# Patient Record
Sex: FEMALE | Race: WHITE | NOT HISPANIC OR LATINO | Employment: FULL TIME | URBAN - METROPOLITAN AREA
[De-identification: names, ages, dates, MRNs, and addresses within clinical notes are randomized per-mention and may not be internally consistent; named-entity substitution may affect disease eponyms.]

---

## 2017-01-06 ENCOUNTER — OFFICE VISIT (OUTPATIENT)
Dept: FAMILY MEDICINE | Facility: CLINIC | Age: 47
End: 2017-01-06
Payer: COMMERCIAL

## 2017-01-06 VITALS
BODY MASS INDEX: 42.7 KG/M2 | WEIGHT: 241 LBS | DIASTOLIC BLOOD PRESSURE: 68 MMHG | HEART RATE: 68 BPM | RESPIRATION RATE: 16 BRPM | SYSTOLIC BLOOD PRESSURE: 104 MMHG | HEIGHT: 63 IN | TEMPERATURE: 97.4 F

## 2017-01-06 DIAGNOSIS — E55.9 HYPOVITAMINOSIS D: ICD-10-CM

## 2017-01-06 DIAGNOSIS — F45.22 BODY DYSMORPHIC DISORDER: ICD-10-CM

## 2017-01-06 DIAGNOSIS — Z98.84 BARIATRIC SURGERY STATUS: ICD-10-CM

## 2017-01-06 DIAGNOSIS — R73.01 ELEVATED FASTING BLOOD SUGAR: ICD-10-CM

## 2017-01-06 DIAGNOSIS — Z00.00 ENCOUNTER FOR ROUTINE ADULT HEALTH EXAMINATION WITHOUT ABNORMAL FINDINGS: Primary | ICD-10-CM

## 2017-01-06 DIAGNOSIS — Z13.6 CARDIOVASCULAR SCREENING; LDL GOAL LESS THAN 160: ICD-10-CM

## 2017-01-06 DIAGNOSIS — E66.01 MORBID OBESITY, UNSPECIFIED OBESITY TYPE (H): ICD-10-CM

## 2017-01-06 DIAGNOSIS — Z23 NEED FOR PROPHYLACTIC VACCINATION AND INOCULATION AGAINST INFLUENZA: ICD-10-CM

## 2017-01-06 DIAGNOSIS — Z12.4 SCREENING FOR MALIGNANT NEOPLASM OF CERVIX: ICD-10-CM

## 2017-01-06 DIAGNOSIS — L73.1 INGROWN HAIR: ICD-10-CM

## 2017-01-06 DIAGNOSIS — Z00.00 LABORATORY EXAMINATION ORDERED AS PART OF A ROUTINE GENERAL MEDICAL EXAMINATION: ICD-10-CM

## 2017-01-06 DIAGNOSIS — Z87.42 HISTORY OF ABNORMAL CERVICAL PAP SMEAR: ICD-10-CM

## 2017-01-06 LAB
ALBUMIN SERPL-MCNC: 3.5 G/DL (ref 3.4–5)
ALP SERPL-CCNC: 74 U/L (ref 40–150)
ALT SERPL W P-5'-P-CCNC: 58 U/L (ref 0–50)
ANION GAP SERPL CALCULATED.3IONS-SCNC: 10 MMOL/L (ref 3–14)
AST SERPL W P-5'-P-CCNC: 28 U/L (ref 0–45)
BILIRUB SERPL-MCNC: 0.7 MG/DL (ref 0.2–1.3)
BUN SERPL-MCNC: 9 MG/DL (ref 7–30)
CALCIUM SERPL-MCNC: 8.7 MG/DL (ref 8.5–10.1)
CHLORIDE SERPL-SCNC: 103 MMOL/L (ref 94–109)
CHOLEST SERPL-MCNC: 173 MG/DL
CO2 SERPL-SCNC: 28 MMOL/L (ref 20–32)
CREAT SERPL-MCNC: 0.56 MG/DL (ref 0.52–1.04)
GFR SERPL CREATININE-BSD FRML MDRD: ABNORMAL ML/MIN/1.7M2
GLUCOSE SERPL-MCNC: 89 MG/DL (ref 70–99)
HDLC SERPL-MCNC: 45 MG/DL
LDLC SERPL CALC-MCNC: 100 MG/DL
NONHDLC SERPL-MCNC: 128 MG/DL
POTASSIUM SERPL-SCNC: 2.7 MMOL/L (ref 3.4–5.3)
PROT SERPL-MCNC: 6.9 G/DL (ref 6.8–8.8)
SODIUM SERPL-SCNC: 141 MMOL/L (ref 133–144)
TRIGL SERPL-MCNC: 142 MG/DL
TSH SERPL DL<=0.005 MIU/L-ACNC: 1.27 MU/L (ref 0.4–4)

## 2017-01-06 PROCEDURE — 87624 HPV HI-RISK TYP POOLED RSLT: CPT | Mod: 90 | Performed by: PHYSICIAN ASSISTANT

## 2017-01-06 PROCEDURE — 99212 OFFICE O/P EST SF 10 MIN: CPT | Mod: 25 | Performed by: PHYSICIAN ASSISTANT

## 2017-01-06 PROCEDURE — 90686 IIV4 VACC NO PRSV 0.5 ML IM: CPT | Performed by: PHYSICIAN ASSISTANT

## 2017-01-06 PROCEDURE — 82306 VITAMIN D 25 HYDROXY: CPT | Mod: 90 | Performed by: PHYSICIAN ASSISTANT

## 2017-01-06 PROCEDURE — 36415 COLL VENOUS BLD VENIPUNCTURE: CPT | Performed by: PHYSICIAN ASSISTANT

## 2017-01-06 PROCEDURE — 99000 SPECIMEN HANDLING OFFICE-LAB: CPT | Performed by: PHYSICIAN ASSISTANT

## 2017-01-06 PROCEDURE — 84443 ASSAY THYROID STIM HORMONE: CPT | Performed by: PHYSICIAN ASSISTANT

## 2017-01-06 PROCEDURE — G0145 SCR C/V CYTO,THINLAYER,RESCR: HCPCS | Mod: 90 | Performed by: PHYSICIAN ASSISTANT

## 2017-01-06 PROCEDURE — 90471 IMMUNIZATION ADMIN: CPT | Performed by: PHYSICIAN ASSISTANT

## 2017-01-06 PROCEDURE — 80061 LIPID PANEL: CPT | Performed by: PHYSICIAN ASSISTANT

## 2017-01-06 PROCEDURE — 80053 COMPREHEN METABOLIC PANEL: CPT | Performed by: PHYSICIAN ASSISTANT

## 2017-01-06 PROCEDURE — 99396 PREV VISIT EST AGE 40-64: CPT | Mod: 25 | Performed by: PHYSICIAN ASSISTANT

## 2017-01-06 NOTE — MR AVS SNAPSHOT
After Visit Summary   1/6/2017    Criss Don    MRN: [id number]           Patient Information     Date Of Birth          1970        Visit Information        Provider Department      1/6/2017 8:20 AM Katrina Garner PA-C [facility] [place]'s Diagnoses     Screening for malignant neoplasm of cervix    -  1     CARDIOVASCULAR SCREENING; LDL GOAL LESS THAN 160         History of abnormal cervical Pap smear         Morbid obesity with BMI of 50.0-59.9, adult (H)         Bariatric surgery status         Hypovitaminosis D         Laboratory examination ordered as part of a routine general medical examination         Elevated fasting blood sugar         Morbid obesity, unspecified obesity type (H)           Care Instructions      Preventive Health Recommendations  Female Ages 40 to 49    Yearly exam:     See your health care provider every year in order to  1. Review health changes.   2. Discuss preventive care.    3. Review your medicines if your doctor prescribed any.      Get a Pap test every three years (unless you have an abnormal result and your provider advises testing more often).      If you get Pap tests with HPV test, you only need to test every 5 years, unless you have an abnormal result. You do not need a Pap test if your uterus was removed (hysterectomy) and you have not had cancer.      You should be tested each year for STDs (sexually transmitted diseases), if you're at risk.       Ask your doctor if you should have a mammogram.      Have a colonoscopy (test for colon cancer) if someone in your family has had colon cancer or polyps before age 50.       Have a cholesterol test every 5 years.       Have a diabetes test (fasting glucose) after age 45. If you are at risk for diabetes, you should have this test every 3 years.    Shots: Get a flu shot each year. Get a tetanus shot every 10 years.     Nutrition:     Eat at least 5 servings of fruits and  vegetables each day.    Eat whole-grain bread, whole-wheat pasta and brown rice instead of white grains and rice.    Talk to your provider about Calcium and Vitamin D.     Lifestyle    Exercise at least 150 minutes a week (an average of 30 minutes a day, 5 days a week). This will help you control your weight and prevent disease.    Limit alcohol to one drink per day.    No smoking.     Wear sunscreen to prevent skin cancer.    See your dentist every six months for an exam and cleaning.        Follow-ups after your visit        Your next 10 appointments already scheduled     Jan 27, 2017  9:00 AM   Return Visit with Usman Arthur 3, MCKENZIE   [facility] ([facility])    [address] [place] [id number] [phone]            Jan 27, 2017  9:30 AM   Return Visit with Cris Paez PA-C   [facility] ([facility])    [address] [place] [id number] [phone]            Feb 02, 2017 10:00 AM   Return Visit with Ce Flores LP   [facility]    [facility]  [address]  [facility] [address] [phone]              Who to contact     If you have questions or need follow up information about today's clinic visit or your schedule please contact [facility] [place] [place] directly at [phone].  Normal or non-critical lab and imaging results will be communicated to you by MyChart, letter or phone within 4 business days after the clinic has received the results. If you do not hear from us within 7 days, please contact the clinic through Famig[employer] or phone. If you have a critical or abnormal lab result, we will notify you by phone as soon as possible.  Submit refill requests through [employer] or call your pharmacy and they will forward the refill request to us.  "Please allow 3 business days for your refill to be completed.          Additional Information About Your Visit        MyChart Information     JetPayhart lets you send messages to your doctor, view your test results, renew your prescriptions, schedule appointments and more. To sign up, go to [email or web address] . Click on \"Log in\" on the left side of the screen, which will take you to the Welcome page. Then click on \"Sign up Now\" on the right side of the page.     You will be asked to enter the access code listed below, as well as some personal information. Please follow the directions to create your username and password.     Your access code is: [email or web address]  Expires: 2017  1:51 PM     Your access code will  in 90 days. If you need help or a new code, please call your [facility] clinic or [phone].        Care EveryWhere ID     This is your Care EveryWhere ID. This could be used by other organizations to access your [facility] [employer] medical records  [id number]        Your Vitals Were     Pulse Temperature Respirations Height BMI (Body Mass Index) Last Period     97.4  F (36.3  C) 16 5' 2.5\" (1.588 m) 43.35 kg/m2 2016 (Exact Date)       Blood Pressure from Last 3 Encounters:   17 104/68   16 100/55   16 107/72    Weight from Last 3 Encounters:   17 241 lb (109.317 kg)   16 272 lb 1.6 oz (123.424 kg)   16 281 lb 1.6 oz (127.506 kg)              We Performed the Following     Comprehensive metabolic panel (BMP + Alb, Alk Phos, ALT, AST, Total. Bili, TP)     HPV High Risk Types DNA Cervical     Lipid Profile with reflex to direct LDL     Pap imaged thin layer screen with HPV - recommended age 30 - 65 years (select HPV order below)     TSH with free T4 reflex     Vitamin D Deficiency        Primary Care Provider Office Phone # Fax #    Katrina Garner PA-C [phone]       [facility] [address] [place] [address] DR [place] " [phone]        Thank you!     Thank you for choosing [facility]  for your care. Our goal is always to provide you with excellent care. Hearing back from our patients is one way we can continue to improve our services. Please take a few minutes to complete the written survey that you may receive in the mail after your visit with us. Thank you!             Your Updated Medication List - Protect others around you: Learn how to safely use, store and throw away your medicines at www.disposemymeds.org.          This list is accurate as of: 1/6/17  9:01 AM.  Always use your most recent med list.                   Brand Name Dispense Instructions for use    B-12 2500 MCG Subl      Place 1 tablet under the tongue 3 times weekly       FLAXSEED OIL PO      Take 1,000 mg by mouth 2 times daily       multivitamin  peds with iron 60 MG chewable tablet      Take 2 chew tab by mouth every morning       ranitidine 75 MG tablet    ZANTAC    60 tablet    Take 1 tablet (75 mg) by mouth 2 times daily       VIACTIV 500-500-40 MG-UNT-MCG Chew   Generic drug:  calcium-vitamin D-vitamin K      Take 1 tablet by mouth 3 times daily       VITAMIN D3 PO      Take 2,000 Units by mouth daily       VITRON-C  MG Tabs tablet   Generic drug:  ferrous fumarate 65 mg ([place]. FE)-Vitamin C 125 mg      Take 1 tablet by mouth every morning

## 2017-01-06 NOTE — Clinical Note
[facility] [place]  [address] [address]  [phone]      January 13, 2017    Criss Don  [address] [address] [facility] [address]    Dear Paulette,  We are happy to inform you that your PAP smear result from 01/06/17 is normal.  We are now able to do a follow up test on PAP smears. The DNA test is for HPV (Human Papilloma Virus). Cervical cancer is closely linked with certain types of HPV. Your result showed no evidence of high risk HPV.  Therefore we recommend you return in 3 years for your next pap smear.  You will still need to return to the clinic every year for an annual exam and other preventive tests.  Please contact the clinic with any questions.  Sincerely,  Katrina Garner PA-C/paulie

## 2017-01-06 NOTE — PROGRESS NOTES
SUBJECTIVE:     CC: Criss Don is an 46 year old woman who presents for preventive health visit.     Healthy Habits:    Do you get at least three servings of calcium containing foods daily (dairy, green leafy vegetables, etc.)? yes and no, taking calcium and/or vitamin D supplement: yes -     Amount of exercise or daily activities, outside of work: 3-4 day(s) per week    Problems taking medications regularly No    Medication side effects: No    Have you had an eye exam in the past two years? no    Do you see a dentist twice per year? no    Do you have sleep apnea, excessive snoring or daytime drowsiness?no    -? Ingrown hair in inguinal area. Got better now seems to be a little worse.   -Due for repeat pap d/t unknown abn in past.   -Has bariatric surgery a few months ago - has lost 40 lbs - having issues with vomiting and BDD - seeing dietician and therapist.     Today's PHQ-2 Score:   PHQ-2 ( 1999 [employer]) 1/6/2017 6/2/2016   Q1: Little interest or pleasure in doing things 0 0   Q2: Feeling down, depressed or hopeless 1 0   PHQ-2 Score 1 0       Abuse: Current or Past(Physical, Sexual or Emotional)- No  Do you feel safe in your environment - Yes    Social History   Substance Use Topics     Smoking status: Never Smoker      Smokeless tobacco: Never Used     Alcohol Use: 0.0 oz/week     0 Standard drinks or equivalent per week      Comment: very rare     The patient does not drink >3 drinks per day nor >7 drinks per week.    Recent Labs   Lab Test  12/21/15   1200   CHOL  196   HDL  39*   LDL  120*   TRIG  187*   NHDL  157*       Reviewed orders with patient.  Reviewed health maintenance and updated orders accordingly - Yes    OG Manning LPN      Mammo Decision Support:  Patient under age 50, mutual decision reflected in health maintenance.      Pertinent mammograms are reviewed under the imaging tab.  History of abnormal Pap smear: YES - updated in Problem List and Health Maintenance accordingly. Unsure  what abn was, but would always be normal the next year. Was a few years ago.   All Histories reviewed and updated in Epic.      ROS:  10 point review of systems negative other than symptoms noted above.   Constitutional, HEENT, CV, pulmonary, GI, , MS, Endo, Psych systems are all negative, except as otherwise noted.       Problem list, Medication list, Allergies, and Medical/Social/Surgical histories reviewed in [facility] and updated as appropriate.  Labs reviewed in EPIC  Patient Active Problem List   Diagnosis     Labial abscess     CARDIOVASCULAR SCREENING; LDL GOAL LESS THAN 160     Mass of right foot     Morbid obesity (H)     Elevated fasting blood sugar     History of abnormal cervical Pap smear     Morbid obesity with BMI of 50.0-59.9, adult (H)     Intertrigo     Bilateral edema of lower extremity     Bariatric surgery status     Malnutrition following gastrointestinal surgery     Body dysmorphic disorder     Past Surgical History   Procedure Laterality Date     No history of surgery       Laparoscopic bypass gastric N/A 10/26/2016     Procedure: LAPAROSCOPIC BYPASS GASTRIC;  Surgeon: Romario Reyna MD;  Location:  OR       Social History   Substance Use Topics     Smoking status: Never Smoker      Smokeless tobacco: Never Used     Alcohol Use: 0.0 oz/week     0 Standard drinks or equivalent per week      Comment: very rare     Family History   Problem Relation Age of Onset     Hypertension Mother      Hypertension Maternal Grandmother      Breast Cancer Mother      64 dx     Allergies Mother      CANCER Sister      Cervical     CANCER Maternal Grandmother      Ovarian     CANCER Other      Mat. great aunt-ovarian     Obesity Mother      Respiratory Mother      Asthma     Hypertension Brother      CANCER Maternal Aunt      ?Gastric     Brain Cancer Cousin      maternal         Current Outpatient Prescriptions   Medication Sig Dispense Refill     ferrous fumarate 65 mg, [place]. FE,-Vitamin C 125 mg  "(VITRON-C)  MG TABS Take 1 tablet by mouth every morning       Cyanocobalamin (B-12) 2500 MCG SUBL Place 1 tablet under the tongue 3 times weekly       ranitidine (ZANTAC) 75 MG tablet Take 1 tablet (75 mg) by mouth 2 times daily 60 tablet 1     calcium-vitamin D-vitamin K (VIACTIV) 500-500-40 MG-UNT-MCG CHEW Take 1 tablet by mouth 3 times daily        multivitamin  peds with iron (FLINTSTONES COMPLETE) 60 MG chewable tablet Take 2 chew tab by mouth every morning        Cholecalciferol (VITAMIN D3 PO) Take 2,000 Units by mouth daily        Flaxseed, Linseed, (FLAXSEED OIL PO) Take 1,000 mg by mouth 2 times daily        Allergies   Allergen Reactions     Lortab [Hydrocodone-Acetaminophen] Nausea     Also light headed and flushed     OBJECTIVE:     /68 mmHg  Pulse 68  Temp(Src) 97.4  F (36.3  C)  Resp 16  Ht 5' 2.5\" (1.588 m)  Wt 241 lb (109.317 kg)  BMI 43.35 kg/m2  LMP 12/27/2016 (Exact Date)  EXAM:  GENERAL: healthy, alert and no distress  EYES: Eyes grossly normal to inspection, PERRL and conjunctivae and sclerae normal  HENT: ear canals and TM's normal, nose and mouth without ulcers or lesions  NECK: no adenopathy, no asymmetry, masses, or scars and thyroid normal to palpation  RESP: lungs clear to auscultation - no rales, rhonchi or wheezes  BREAST: normal without masses, tenderness or nipple discharge and no palpable axillary masses or adenopathy  CV: regular rate and rhythm, normal S1 S2, no S3 or S4, no murmur, click or rub, no peripheral edema and peripheral pulses strong  ABDOMEN: soft, nontender, no hepatosplenomegaly, no masses and bowel sounds normal   (female): normal female external genitalia, normal urethral meatus, vaginal mucosa pink, moist, well rugated, and normal cervix/adnexa/uterus without masses or discharge. Pap collected. Ingrown hair of left labia - very small, non-erythematous and non-tender today, non-fluctuant.   MS: no gross musculoskeletal defects noted, no " edema  SKIN: no suspicious lesions or rashes  NEURO: Normal strength and tone, mentation intact and speech normal  PSYCH: mentation appears normal, affect normal/bright    ASSESSMENT/PLAN:     Criss was seen today for physical.    Diagnoses and all orders for this visit:    Encounter for routine adult health examination without abnormal findings    Screening for malignant neoplasm of cervix  -     Pap imaged thin layer screen with HPV - recommended age 30 - 65 years (select HPV order below)  -     HPV High Risk Types DNA Cervical    CARDIOVASCULAR SCREENING; LDL GOAL LESS THAN 160  -     Lipid Profile with reflex to direct LDL    History of abnormal cervical Pap smear  -     Pap imaged thin layer screen with HPV - recommended age 30 - 65 years (select HPV order below)  -     HPV High Risk Types DNA Cervical    Bariatric surgery status    Hypovitaminosis D  -     Vitamin D Deficiency    Laboratory examination ordered as part of a routine general medical examination  -     Lipid Profile with reflex to direct LDL  -     TSH with free T4 reflex  -     Vitamin D Deficiency  -     Comprehensive metabolic panel (BMP + Alb, Alk Phos, ALT, AST, Total. Bili, TP)    Elevated fasting blood sugar  -     Comprehensive metabolic panel (BMP + Alb, Alk Phos, ALT, AST, Total. Bili, TP)    Morbid obesity, unspecified obesity type (H)  She is exercising most days of the week. Encourage continuing this. Doing a great job!    Need for prophylactic vaccination and inoculation against influenza  -     FLU VAC, SPLIT VIRUS IM > 3 YO (QUADRIVALENT) [13397]  -     Vaccine Administration, Initial [93832]    Ingrown hair  -Heat packs and warm soaks. Nothing to drain and abx not indicated at this time, call if worsening .     Body dysmorphic disorder  -F/b therapist. She is weighting herself excessively, multiple times per day and feels like she is not losing weight fasting enough and her body does not look different. Encourage weighing no  "more than once per week and not putting so much stress on the number. Pay attention to how she is feeling and the changes in the way her clothes fit.       COUNSELING:   Reviewed preventive health counseling, as reflected in patient instructions  Special attention given to:        Regular exercise       Healthy diet/nutrition       Vision screening       Osteoporosis Prevention/Bone Health       (Sindy)menopause management         reports that she has never smoked. She has never used smokeless tobacco.    Estimated body mass index is 43.35 kg/(m^2) as calculated from the following:    Height as of this encounter: 5' 2.5\" (1.588 m).    Weight as of this encounter: 241 lb (109.317 kg).   Weight management plan: Patient referred to endocrine and/or weight management specialty Discussed healthy diet and exercise guidelines and patient will follow up in 12 months in clinic to re-evaluate. patient s/p bariatric surgery.     Counseling Resources:  ATP IV Guidelines  Pooled Cohorts Equation Calculator  Breast Cancer Risk Calculator  FRAX Risk Assessment  ICSI Preventive Guidelines  Dietary Guidelines for Americans, 2010  USDA's MyPlate  ASA Prophylaxis  Lung CA Screening    Katrina Garner PA-C  [facility] [place]  Injectable Influenza Immunization Documentation    1.  Is the person to be vaccinated sick today?  No    2. Does the person to be vaccinated have an allergy to eggs or to a component of the vaccine?  No    3. Has the person to be vaccinated today ever had a serious reaction to influenza vaccine in the past?  No    4. Has the person to be vaccinated ever had Guillain-[place] syndrome?  No     Form completed by OG Manning LPN      "

## 2017-01-06 NOTE — NURSING NOTE
"Chief Complaint   Patient presents with     Physical       Initial /68 mmHg  Pulse 68  Temp(Src) 97.4  F (36.3  C)  Resp 16  Ht 5' 2.5\" (1.588 m)  Wt 241 lb (109.317 kg)  BMI 43.35 kg/m2  LMP 12/27/2016 (Exact Date) Estimated body mass index is 43.35 kg/(m^2) as calculated from the following:    Height as of this encounter: 5' 2.5\" (1.588 m).    Weight as of this encounter: 241 lb (109.317 kg).  BP completed using cuff size: large. OG Manning LPN      "

## 2017-01-06 NOTE — Clinical Note
[address] [place] [address]   [phone]      January 11, 2017    Criss Don  [address] [address] [address]                Daryl Caldwell,    I have reviewed your recent labs. Here are the results:    -Liver and gallbladder tests (ALT,AST, Alk phos,bilirubin) are normal. The very minor elevation in ALT is nothing to worry about - we will monitor. Avoid tylenol and alcohol.   -Kidney function (GFR) is normal.  -Sodium is normal.  -Potassium is decreased as we discussed.   -Glucose (diabetic screening test) is normal.  -Cholesterol levels (LDL,HDL, Triglycerides) are normal.  ADVISE: rechecking in 1 year.  -TSH (thyroid stimulating hormone) level is normal which indicates normal thyroid function.  -Vitamin D level is normal, continue your supplements.     If you have any questions please do not hesitate to contact our office via phone ([phone]) or [facility] by clicking the contact my Care Team link.    If you have further questions about the interpretation of your lab results, [email or web address] is a great website to check out.    Thank you for allowing me to participate in your care!    ALEXANDER Hunt, PA-C  [place]

## 2017-01-08 ENCOUNTER — TELEPHONE (OUTPATIENT)
Dept: FAMILY MEDICINE | Facility: CLINIC | Age: 47
End: 2017-01-08

## 2017-01-08 DIAGNOSIS — E87.6 HYPOKALEMIA: Primary | ICD-10-CM

## 2017-01-08 RX ORDER — POTASSIUM CHLORIDE 1500 MG/1
20 TABLET, EXTENDED RELEASE ORAL 2 TIMES DAILY
Qty: 60 TABLET | Refills: 1 | Status: SHIPPED | OUTPATIENT
Start: 2017-01-08 | End: 2017-02-21

## 2017-01-08 NOTE — TELEPHONE ENCOUNTER
Hi Cris,     I saw Paulette for a physical this last week. She mentions ongoing vomiting since surgery. I drew a CMP as part of routine labs and her potassium is down to 2.7 from last check in Nov at 4.1. She is seemingly asymptomatic. I have sent KCl supps at 40 meq to her pharmacy. I don't see any medications on her list that may be the cause, so I assume this is from the vomiting? I wanted to run it by you and see how you would normally manage this in a post-desirae surg patient. Should something like Zofran be sent to help stop vomiting?     I will plan to have her come back mid-week to recheck a BMP.     Please let me know you thoughts on this.     Thanks!  ALEXANDER Hernandez, PA-C   (feel free to call if that's easier)  [phone]    *(Called patient and she will  prescription from [facility]. She denies any symptoms. Vomiting has gotten less this past week. She will schedule lab apptmt to recheck BMP on Wednesday. I will hopefully talk to Cris tomorrow and update patient. )*

## 2017-01-09 ENCOUNTER — TELEPHONE (OUTPATIENT)
Dept: SURGERY | Facility: CLINIC | Age: 47
End: 2017-01-09

## 2017-01-09 NOTE — TELEPHONE ENCOUNTER
Patient had gastric bypass 10/26/16.    Patient's provider called to discuss low K+ of 2.7 from 1/6/17.  Stated that patient is vomiting daily but unknown how many times per day and what contents are.  The vomiting was reported to be decreased from recent past.  BM's are tending toward constipation.  Provider stated a K+  prescription was done for patient to take.     Informed provider that this writer would call patient and assess P.O. Intake/vomiting.  Patient may be vomiting numerous times daily and losing K+ via vomited contents - she could also be strictured and taking in limited P.O. and need EGD.  Patients don't always get in high K+ foods due to tolerance or liking them as well as gut needs to adapt absorption postop.  Will also discuss foods high in K+.    This writer called patient.   Patient states vomiting was daily up until 4 days ago when she stopped.  When she was vomiting, she would vomit 3-4 times in a row and then may or may not vomit the remainder of the day; it varied.  She vomited food she ate.  When she vomited, she would go back to clear liquids for the rest of the day and resume purees the next day.    Discussed what foods she is able to eat and patient stated the following:  She tries to eat protein that is in moist liquid because this goes better.  Food recall from the past couple of days indicated the following intake:  Breakfast - SF Jello w/mandarin oranges 3/4 c  Lunch-  veggie straws (chip-like). She stated she may also eat baby pureed fruits and veggies up to 1/2 c if she didn't eat the veggie straws.  Dinner - soup with chix < 1/2 cup or with beef 1/2 cup.  She states lunch is eaten between clients and is having difficulty due to no refrigeration or microwave.    From food recall, patient doesn't appear strictured.  She's not getting in foods with K+ due food intolerances and types of food she likes (ex. doesn't like milk, yogurt tasted too sour immediately postop and she vomited and  "avoids bananas).  Suspect not getting in foods with K+, recent vomiting, and malabsorptive surgery causing the low K+.    Discussed with patient ways to improve P.O. intake including foods high in K+.  Breakfast - try to reintroduce yogurt as it may work now since it has been a couple of weeks since she tried it. Add cereal or fruit to breakfast in addition to the yogurt.  Lunch recommend she carry a cooler in the car in order to get in protein - cheese stix - cottage cheese - fruit  Try to reintroduce foods she didn't tolerate in the past - they may now work since it's been weeks and her body has healed some more.  Dinner get in a moist protein and potato./sweet potato or veggie or fruit high in K+.  Try to reintroduce the Fair Life milk (tried it a few times since surgery felt sick) 2 cups daily. It is an excellent source of K+.  Mailed hi K+ list to patient today.  Patient stated she would try the above.      Also discussed BM's.  Patient states she has BM's every 4-5 days gets uncomfortable then \"goes 2 days\" in a row.   She avoids bananas = constipation.  When she needs to have a BM, she has a Premier Protein drink that works to relieve her and has a BM in a day or 2.    Suggested for regularity:   4 ounces prune juice, warm on empty stomach or +/- lax 1-2 doses or continue with the Premier protein drinks.  Patient stated prune juice worked preop but didn't know she could use it postop.  He BM's aren't hard - didn't recommend stool softener at this time.  Continue with fluid intake of 50-65 ounces or more.  Don't add fiber until she is able to get in at least 1-2 cups above what she is taking in fluid wise.      Patient also wondering if her current weight loss is good  - Yes. Has lost 50# since surgery.  Keep moving forward and embracing the lifestyle and the surgery tool will work.  Patient to call if questions before she is seen in 1/27/16.  Patient verbalized understanding and is agreeable to plan.  Vesna " Cristina MS, RD, RN

## 2017-01-09 NOTE — TELEPHONE ENCOUNTER
"Patient called to inform you of symptoms that she has noted but did not think to include in your conversation yesterday    Has been waking up for the last few weeks with right arm tingling and numbeness    This morning she woke with a charley horse, right foot, top of foot to shin area    Also noted that when she woke up, right arm \"asleep feeling\" for about two hours after waking up, now is resolved, arm feels slightly weak, felt cooler than left arm, color was paler than left also during episode.  No symptoms of shortness of breath, chest pain noted.     Has instances in the last few weeks where she has noticed tingling in legs, in the front in thigh area.     Feeling a little lightheaded today but is feeling a bit stressed this morning, most likely related to stress    Started potassium pills last nigh.    Not sure if these symptoms are related to the potassium level being low or another issue     Is awaiting your response as stated in phone message from 1/8.    MIGDALIA Wade    "

## 2017-01-09 NOTE — TELEPHONE ENCOUNTER
Patient notified with information noted below from provider and agrees with plan.  Desirae Tolbert RN  Triage [employer]

## 2017-01-09 NOTE — TELEPHONE ENCOUNTER
Likely more related to positional nerve compression.   However, cramping could be related to low postassium/dehydration - plan would be the same.   I talked with Vesna RN at [facility] weight loss clinic - IMMANUEL Lynch is out today. She and dietician will call patient to assess PO intake and help offer some dietary support to increase potassium.   She will send a note back to me after taking with her and hopefully get an update from Cris tomorrow.   For now - wait for call from them, continue potassium, increase dietary options (milk, bananas, potatoes, etc) and plan for recheck here on Wednesday.  Call with any concerns.     Please let her know above -  Thanks!  ALEXANDER Hernandez, PAEmelinaC

## 2017-01-09 NOTE — TELEPHONE ENCOUNTER
Patient called with other questions/comments.  1. Would baked or instant potato be best for potassium?  Baked would be best with skin but can't eat the skin now even if it is pureed - so baked.    2. Should I stop exercising with low K+?  Patient reports she is working out 50-60 minutes most days (treadmill and bike).  Yes, stop until sees PCP this Thursday. After K+ normalized it will be safer to exercise.    3. She states her roommate thinks she is eating closer to 1/4 cup meat at a meal time.  This is still a good amount and don't think she is strictured.    4. She just vomited at 1.5-1.75 post meal of sf jello and mandarin oranges.  She states it is usually within 1/2 hour after ingestion.  This is an outlier and will occur.  Return to full liquids - try low fat butternut squash or potato soup along with her clear liquids.  Patient verbalized understanding and is agreeable to plan.  Vesna Evans, MS, RD, RN

## 2017-01-10 PROBLEM — R74.01 ELEVATED ALT MEASUREMENT: Status: ACTIVE | Noted: 2017-01-10

## 2017-01-10 LAB
COPATH REPORT: NORMAL
DEPRECATED CALCIDIOL+CALCIFEROL SERPL-MC: 57 UG/L (ref 20–75)
PAP: NORMAL

## 2017-01-11 LAB
FINAL DIAGNOSIS: NORMAL
HPV HR 12 DNA CVX QL NAA+PROBE: NEGATIVE
HPV16 DNA SPEC QL NAA+PROBE: NEGATIVE
HPV18 DNA SPEC QL NAA+PROBE: NEGATIVE
SPECIMEN DESCRIPTION: NORMAL

## 2017-01-12 ENCOUNTER — TELEPHONE (OUTPATIENT)
Dept: FAMILY MEDICINE | Facility: CLINIC | Age: 47
End: 2017-01-12

## 2017-01-12 ENCOUNTER — OFFICE VISIT (OUTPATIENT)
Dept: FAMILY MEDICINE | Facility: CLINIC | Age: 47
End: 2017-01-12
Payer: COMMERCIAL

## 2017-01-12 VITALS
SYSTOLIC BLOOD PRESSURE: 110 MMHG | HEIGHT: 63 IN | TEMPERATURE: 97.7 F | WEIGHT: 240 LBS | RESPIRATION RATE: 16 BRPM | BODY MASS INDEX: 42.52 KG/M2 | DIASTOLIC BLOOD PRESSURE: 70 MMHG | HEART RATE: 60 BPM

## 2017-01-12 DIAGNOSIS — Z98.84 BARIATRIC SURGERY STATUS: ICD-10-CM

## 2017-01-12 DIAGNOSIS — E87.6 HYPOKALEMIA: Primary | ICD-10-CM

## 2017-01-12 DIAGNOSIS — R11.10 VOMITING, INTRACTABILITY OF VOMITING NOT SPECIFIED, PRESENCE OF NAUSEA NOT SPECIFIED, UNSPECIFIED VOMITING TYPE: ICD-10-CM

## 2017-01-12 LAB
ANION GAP SERPL CALCULATED.3IONS-SCNC: 6 MMOL/L (ref 3–14)
BUN SERPL-MCNC: 6 MG/DL (ref 7–30)
CALCIUM SERPL-MCNC: 8.9 MG/DL (ref 8.5–10.1)
CHLORIDE SERPL-SCNC: 103 MMOL/L (ref 94–109)
CO2 SERPL-SCNC: 30 MMOL/L (ref 20–32)
CREAT SERPL-MCNC: 0.52 MG/DL (ref 0.52–1.04)
GFR SERPL CREATININE-BSD FRML MDRD: ABNORMAL ML/MIN/1.7M2
GLUCOSE SERPL-MCNC: 100 MG/DL (ref 70–99)
POTASSIUM SERPL-SCNC: 3 MMOL/L (ref 3.4–5.3)
SODIUM SERPL-SCNC: 139 MMOL/L (ref 133–144)

## 2017-01-12 PROCEDURE — 99213 OFFICE O/P EST LOW 20 MIN: CPT | Performed by: PHYSICIAN ASSISTANT

## 2017-01-12 PROCEDURE — 80048 BASIC METABOLIC PNL TOTAL CA: CPT | Performed by: PHYSICIAN ASSISTANT

## 2017-01-12 PROCEDURE — 36415 COLL VENOUS BLD VENIPUNCTURE: CPT | Performed by: PHYSICIAN ASSISTANT

## 2017-01-12 NOTE — TELEPHONE ENCOUNTER
Patient informed via VM to wait until lab result come back.    Patient to call clinic with further questions    Ruth Interiano RN

## 2017-01-12 NOTE — MR AVS SNAPSHOT
After Visit Summary   1/12/2017    Criss Don    MRN: [id number]           Patient Information     Date Of Birth          1970        Visit Information        Provider Department      1/12/2017 8:40 AM Katrina Garner PA-C [facility]        Today's Diagnoses     Hypokalemia    -  1     Bariatric surgery status         Vomiting, intractability of vomiting not specified, presence of nausea not specified, unspecified vomiting type            Follow-ups after your visit        Follow-up notes from your care team     Return in about 1 week (around 1/19/2017) for Lab Work.      Your next 10 appointments already scheduled     Jan 19, 2017  9:00 AM   LAB with [facility] LAB   [facility] ([facility]    [address] [place] [id number] [phone]           OUTSIDE LABS:  Please include name of facility and Physician that is requesting outside labs be drawn.  Please indicate if labs are fasting or non-fasting on appt notes.  Be as specific as you can on which labs are being drawn.            Jan 27, 2017  9:00 AM   Return Visit with Usman Raymond Diet 3, RD   [facility] ([facility]    [address] [place] [id number] [phone]            Jan 27, 2017  9:30 AM   Return Visit with Cris Paez PA-C   [facility] ([facility])    [address] [place] [id number] [phone]            Feb 02, 2017 10:00 AM   Return Visit with Ce Flores LP   [facility] ([facility]    [facility]  [address]  [facility] [id number] [phone]              Future tests that were ordered for you today     Open Future Orders        Priority Expected Expires Ordered    Potassium Routine  "2017            Who to contact     If you have questions or need follow up information about today's clinic visit or your schedule please contact [facility] [place] directly at [phone].  Normal or non-critical lab and imaging results will be communicated to you by MyChart, letter or phone within 4 business days after the clinic has received the results. If you do not hear from us within 7 days, please contact the clinic through MyChart or phone. If you have a critical or abnormal lab result, we will notify you by phone as soon as possible.  Submit refill requests through [employer] [facility] or call your pharmacy and they will forward the refill request to us. Please allow 3 business days for your refill to be completed.          Additional Information About Your Visit        MyChart Information     [employer] [facility] lets you send messages to your doctor, view your test results, renew your prescriptions, schedule appointments and more. To sign up, go to [email or web address] . Click on \"Log in\" on the left side of the screen, which will take you to the Welcome page. Then click on \"Sign up Now\" on the right side of the page.     You will be asked to enter the access code listed below, as well as some personal information. Please follow the directions to create your username and password.     Your access code is: [email or web address]  Expires: 2017  1:51 PM     Your access code will  in 90 days. If you need help or a new code, please call your [place] clinic or [phone].        Care EveryWhere ID     This is your Care EveryWhere ID. This could be used by other organizations to access your [place] medical records  [id number]        Your Vitals Were     Pulse Temperature Respirations Height BMI (Body Mass Index) Last Period    60 97.7  F (36.5  C) 16 5' 2.5\" (1.588 m) 43.17 kg/m2 2016 (Exact Date)       Blood Pressure from Last 3 Encounters:   17 110/70   17 104/68 "   11/09/16 100/55    Weight from Last 3 Encounters:   01/12/17 240 lb (108.863 kg)   01/06/17 241 lb (109.317 kg)   11/09/16 272 lb 1.6 oz (123.424 kg)              We Performed the Following     Basic metabolic panel  (Ca, Cl, CO2, Creat, Gluc, K, Na, BUN)        Primary Care Provider Office Phone # Fax #    Katrina Garner PA-C [phone]       [facility] [address] [place] [address] [address]  [place] [address]        Thank you!     Thank you for choosing [facility]  for your care. Our goal is always to provide you with excellent care. Hearing back from our patients is one way we can continue to improve our services. Please take a few minutes to complete the written survey that you may receive in the mail after your visit with us. Thank you!             Your Updated Medication List - Protect others around you: Learn how to safely use, store and throw away your medicines at www.disposemymeds.org.          This list is accurate as of: 1/12/17  3:44 PM.  Always use your most recent med list.                   Brand Name Dispense Instructions for use    B-12 2500 MCG Subl      Place 1 tablet under the tongue 3 times weekly       FLAXSEED OIL PO      Take 1,000 mg by mouth 2 times daily       multivitamin  peds with iron 60 MG chewable tablet      Take 2 chew tab by mouth every morning       potassium chloride SA 20 MEQ CR tablet    potassium chloride    60 tablet    Take 1 tablet (20 mEq) by mouth 2 times daily       ranitidine 75 MG tablet    ZANTAC    60 tablet    Take 1 tablet (75 mg) by mouth 2 times daily       VIACTIV 500-500-40 MG-UNT-MCG Chew   Generic drug:  calcium-vitamin D-vitamin K      Take 1 tablet by mouth 3 times daily       VITAMIN D3 PO      Take 2,000 Units by mouth daily       VITRON-C  MG Tabs tablet   Generic drug:  ferrous fumarate 65 mg ([place]. FE)-Vitamin C 125 mg      Take 1 tablet by mouth every morning

## 2017-01-12 NOTE — TELEPHONE ENCOUNTER
Paulette is calling as she forgot to ask Daina at her appt if she can exercise.  Or does she need to wait until blood tests are back?  On Monday she was told not to go the gym and exercise until she was seen by provider.    Routing to Daina Rodriguez RN- Triage [employer]

## 2017-01-12 NOTE — PROGRESS NOTES
SUBJECTIVE:                                                    Criss Don is a 46 year old female who presents to clinic today for the following health issues:      Medication Followup of Potassium supplement    Taking Medication as prescribed: yes    Side Effects:  None    Medication Helping Symptoms:  Due for lab recheck        Patient came in for px last week, CMP checked for routine labs and K+ noted to be 2.7.   Started on BID 20 meq K+.  Started taking Sunday night 4 days of dosing in so far.   Denies symptoms.   Is s/p desirae surgery and has had ongoing vomiting. Poor K+ intake, as well.   This week, vomited a large amount on Monday x2, Tuesday x1, Weds 1x after breakfast and 1x after dinner.   [facility] [place] RN/dietician talked with patient about dietary recommendations - she is having a hard time with sweet potatoes, as they are too sweet. Can't eat the skin. Tolerating normal potatoes OK. Yogurt tastes like spoiled milk to her.    -------------------------------------    Problem list and histories reviewed & adjusted, as indicated.  Additional history: as documented    Patient Active Problem List   Diagnosis     Labial abscess     CARDIOVASCULAR SCREENING; LDL GOAL LESS THAN 160     Mass of right foot     Morbid obesity (H)     Elevated fasting blood sugar     History of abnormal cervical Pap smear     Intertrigo     Bilateral edema of lower extremity     Bariatric surgery status     Malnutrition following gastrointestinal surgery     Body dysmorphic disorder     Hypokalemia     Elevated ALT measurement     Past Surgical History   Procedure Laterality Date     No history of surgery       Laparoscopic bypass gastric N/A 10/26/2016     Procedure: LAPAROSCOPIC BYPASS GASTRIC;  Surgeon: Romario Reyna MD;  Location:  OR       Social History   Substance Use Topics     Smoking status: Never Smoker      Smokeless tobacco: Never Used     Alcohol Use: 0.0 oz/week     0 Standard drinks or equivalent per week       Comment: very rare     Family History   Problem Relation Age of Onset     Hypertension Mother      Hypertension Maternal Grandmother      Breast Cancer Mother      64 dx     Allergies Mother      CANCER Sister      Cervical     CANCER Maternal Grandmother      Ovarian     CANCER Other      Mat. great aunt-ovarian     Obesity Mother      Respiratory Mother      Asthma     Hypertension Brother      CANCER Maternal Aunt      ?Gastric     Brain Cancer Cousin      maternal         Current Outpatient Prescriptions   Medication Sig Dispense Refill     potassium chloride SA (POTASSIUM CHLORIDE) 20 MEQ CR tablet Take 1 tablet (20 mEq) by mouth 2 times daily 60 tablet 1     ferrous fumarate 65 mg, [place]. FE,-Vitamin C 125 mg (VITRON-C)  MG TABS Take 1 tablet by mouth every morning       Cyanocobalamin (B-12) 2500 MCG SUBL Place 1 tablet under the tongue 3 times weekly       ranitidine (ZANTAC) 75 MG tablet Take 1 tablet (75 mg) by mouth 2 times daily 60 tablet 1     calcium-vitamin D-vitamin K (VIACTIV) 500-500-40 MG-UNT-MCG CHEW Take 1 tablet by mouth 3 times daily        multivitamin  peds with iron (FLINTSTONES COMPLETE) 60 MG chewable tablet Take 2 chew tab by mouth every morning        Cholecalciferol (VITAMIN D3 PO) Take 2,000 Units by mouth daily        Flaxseed, Linseed, (FLAXSEED OIL PO) Take 1,000 mg by mouth 2 times daily        Allergies   Allergen Reactions     Lortab [Hydrocodone-Acetaminophen] Nausea     Also light headed and flushed     Labs reviewed in EPIC  Problem list, Medication list, Allergies, and Medical/Social/Surgical histories reviewed in [facility] and updated as appropriate.    Social History     Social History     Marital Status:      Spouse Name: seperated     Number of Children: N/A     Years of Education: N/A     Occupational History     [employer]      Social History Main Topics     Smoking status: Never Smoker      Smokeless tobacco: Never Used     Alcohol Use: 0.0 oz/week     0 Standard  "drinks or equivalent per week      Comment: very rare     Drug Use: No     Sexual Activity:     Partners: Male     Other Topics Concern     None     Social History Narrative       10 point review of systems negative other than symptoms noted above.   Constitutional, HEENT, CV, pulmonary, GI, , MS, Endo, Psych systems are all negative, except as otherwise noted.       OBJECTIVE:  /70 mmHg  Pulse 60  Temp(Src) 97.7  F (36.5  C)  Resp 16  Ht 5' 2.5\" (1.588 m)  Wt 240 lb (108.863 kg)  BMI 43.17 kg/m2  LMP 12/27/2016 (Exact Date)  CONSTITUTIONAL: Alert, well-nourished, well-groomed, NAD  RESP: Lungs CTA. No wheeze, rhonchi, rales. Normal effort on room air. Equal lung sounds bilaterally.   CV: HRRR, normal S1, S2. No MRG. No peripheral edema.  DERM: No rashes or suspicious lesions    Diagnostic Tests:  Results for orders placed or performed in visit on 01/12/17   Basic metabolic panel  (Ca, Cl, CO2, Creat, Gluc, K, Na, BUN)   Result Value Ref Range    Sodium 139 133 - 144 mmol/L    Potassium 3.0 (L) 3.4 - 5.3 mmol/L    Chloride 103 94 - 109 mmol/L    Carbon Dioxide 30 20 - 32 mmol/L    Anion Gap 6 3 - 14 mmol/L    Glucose 100 (H) 70 - 99 mg/dL    Urea Nitrogen 6 (L) 7 - 30 mg/dL    Creatinine 0.52 0.52 - 1.04 mg/dL    GFR Estimate >90  Non  GFR Calc   >60 mL/min/1.7m2    GFR Estimate If Black >90   GFR Calc   >60 mL/min/1.7m2    Calcium 8.9 8.5 - 10.1 mg/dL         ASSESSMENT/PLAN:  (E87.6) Hypokalemia  (primary encounter diagnosis)  Comment:   Plan: Improving. Advised to continue dietary increases as directed and continue supplement.   Keep f/u with weight loss team later this month.   Recheck K+ level is ONE week. Labs ordered. Call with any concerns.     (Z98.84) Bariatric surgery status  Comment:   Plan:     (R11.10) Vomiting, intractability of vomiting not specified, presence of nausea not specified, unspecified vomiting type  Comment:   Plan:       FOLLOW-UP: Lab " recheck in 1 week.   The patient agrees with this assessment and plan and agrees to call or return to the clinic with any questions or concerns or if their condition worsens.    ALEXANDER Gonzalez, PAEmelinaC  [facility]

## 2017-01-12 NOTE — TELEPHONE ENCOUNTER
Please call patient - potassium is improving. Now 3.0 up from 2.7.    Keep taking potassium supplement and working on increasing potassium in diet.     If vomiting persists, I need her to call the bariatric team to make them aware of this. I have tried to contact the provider and still have not heard back.     ALEXANDER Hernandez, PA-C

## 2017-01-12 NOTE — TELEPHONE ENCOUNTER
Can patient exercise based on lab result?  Please review.  Thank you     Last Basic Metabolic Panel:  NA      139   1/12/2017   POTASSIUM      3.0   1/12/2017  CHLORIDE      103   1/12/2017  RAMSES      8.9   1/12/2017  CO2       30   1/12/2017  BUN        6   1/12/2017  CR     0.52   1/12/2017  GLC      100   1/12/2017    Ruth Interiano RN

## 2017-01-12 NOTE — NURSING NOTE
"Chief Complaint   Patient presents with     Recheck Medication       Initial /70 mmHg  Pulse 60  Temp(Src) 97.7  F (36.5  C)  Resp 16  Ht 5' 2.5\" (1.588 m)  Wt 240 lb (108.863 kg)  BMI 43.17 kg/m2  LMP 12/27/2016 (Exact Date) Estimated body mass index is 43.17 kg/(m^2) as calculated from the following:    Height as of this encounter: 5' 2.5\" (1.588 m).    Weight as of this encounter: 240 lb (108.863 kg).  BP completed using cuff size: ekaterina. OG Manning LPN      "

## 2017-01-12 NOTE — TELEPHONE ENCOUNTER
Patient informed.  States that she can't do gatorade/powerade zero.  Will continue with Potassium supplement and increased K+ in diet.     appt for lab scheduled next Thursday 1/19/17 at 9:00am.      Ruth Interiano RN

## 2017-01-12 NOTE — TELEPHONE ENCOUNTER
Patient informed of result and agrees with plan.   Bariatric team already informed of vomiting issues.      Ruth Interiano RN

## 2017-01-12 NOTE — TELEPHONE ENCOUNTER
I guess the biggest concern is that she would lose potassium in her sweat. I think in moderation is OK but should try to avoid anything that causes excessive sweating, but she needs to make sure she is getting enough potassium back in diet and if she can do a small amount of gatorade/powerade zero.     I am going to place another lab order for K+ recheck next week. Just come for lab, doesn't need to see provider.     Katrina Garner, ALEXANDER, PA-C

## 2017-01-12 NOTE — Clinical Note
Paulette Jhaveri is still having vomiting. She has f/u with you later this month. K+ up from 2.7 to 3.0. Will recheck in 1 week, continue K+ supp and increase in diet. Let me know if you recommend otherwise.   Thanks! Daina

## 2017-01-19 ENCOUNTER — TELEPHONE (OUTPATIENT)
Dept: FAMILY MEDICINE | Facility: CLINIC | Age: 47
End: 2017-01-19

## 2017-01-19 ENCOUNTER — OFFICE VISIT (OUTPATIENT)
Dept: FAMILY MEDICINE | Facility: CLINIC | Age: 47
End: 2017-01-19
Payer: COMMERCIAL

## 2017-01-19 VITALS
SYSTOLIC BLOOD PRESSURE: 100 MMHG | HEART RATE: 70 BPM | DIASTOLIC BLOOD PRESSURE: 70 MMHG | TEMPERATURE: 99.8 F | WEIGHT: 235 LBS | BODY MASS INDEX: 41.64 KG/M2 | HEIGHT: 63 IN

## 2017-01-19 DIAGNOSIS — E87.6 HYPOKALEMIA: Primary | ICD-10-CM

## 2017-01-19 DIAGNOSIS — E87.6 HYPOKALEMIA: ICD-10-CM

## 2017-01-19 DIAGNOSIS — R21 RASH AND NONSPECIFIC SKIN ERUPTION: Primary | ICD-10-CM

## 2017-01-19 LAB — POTASSIUM SERPL-SCNC: 3.5 MMOL/L (ref 3.4–5.3)

## 2017-01-19 PROCEDURE — 99213 OFFICE O/P EST LOW 20 MIN: CPT | Performed by: PHYSICIAN ASSISTANT

## 2017-01-19 PROCEDURE — 84132 ASSAY OF SERUM POTASSIUM: CPT | Performed by: PHYSICIAN ASSISTANT

## 2017-01-19 PROCEDURE — 36415 COLL VENOUS BLD VENIPUNCTURE: CPT | Performed by: PHYSICIAN ASSISTANT

## 2017-01-19 NOTE — NURSING NOTE
"Chief Complaint   Patient presents with     Derm Problem       Initial /70 mmHg  Pulse 70  Temp(Src) 99.8  F (37.7  C) (Tympanic)  Ht 5' 2.5\" (1.588 m)  Wt 235 lb (106.595 kg)  BMI 42.27 kg/m2  LMP 12/27/2016 (Exact Date) Estimated body mass index is 42.27 kg/(m^2) as calculated from the following:    Height as of this encounter: 5' 2.5\" (1.588 m).    Weight as of this encounter: 235 lb (106.595 kg).  BP completed using cuff size: large.sls      "

## 2017-01-19 NOTE — Clinical Note
[facility]          [address] [place] [address]                            [phone]  Fax: [phone]    Criss Don  [address] [facility] [id number]    [id number]    January 19, 2017      To whom it may concern    Please excuse Criss Don from work on 01/19/2017 due to illness/doctor visit.  If you have any other questions or concerns please feel free to contact me at anytime.        Sincerely,        Katrina Garner PA-C

## 2017-01-19 NOTE — PROGRESS NOTES
SUBJECTIVE:                                                    Criss Don is a 46 year old female who presents to clinic today for the following health issues:      Rash     Onset: x 2 hours     Description:   Location: upper chest and upper back  Character: blotchy, red  Itching (Pruritis): YES    Progression of Symptoms:  same    Accompanying Signs & Symptoms:  Fever: no   Body aches or joint pain: no   Sore throat symptoms: no   Recent cold symptoms: no    History:   Previous similar rash: no     Precipitating factors:   Exposure to similar rash: no   New exposures: None   Recent travel: no     Alleviating factors:       Therapies Tried and outcome: nothing    Itchy rash on upper chest and back that just started. No pain or blisters. No new exposures. Denies cough, vomiting, wheezing, swelling, throat symptoms. 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 She has been trying to reach her to see what they said the last time.   She otherwise feels fine and does not have a cold or diarrhea, etc.   -------------------------------------    Problem list and histories reviewed & adjusted, as indicated.  Additional history: as documented    Patient Active Problem List   Diagnosis     Labial abscess     CARDIOVASCULAR SCREENING; LDL GOAL LESS THAN 160     Mass of right foot     Morbid obesity (H)     Elevated fasting blood sugar     History of abnormal cervical Pap smear     Intertrigo     Bilateral edema of lower extremity     Bariatric surgery status     Malnutrition following gastrointestinal surgery     Body dysmorphic disorder     Hypokalemia     Elevated ALT measurement     Past Surgical History   Procedure Laterality Date     No history of surgery       Laparoscopic bypass gastric N/A 10/26/2016     Procedure: LAPAROSCOPIC BYPASS GASTRIC;  Surgeon: Romario Reyna MD;  Location:  [facility] OR       Social History   Substance Use Topics     Smoking status: Never Smoker      Smokeless tobacco: Never Used     Alcohol Use: 0.0 oz/week     0 Standard drinks or equivalent per week      Comment: very rare     Family History   Problem Relation Age of Onset     Hypertension Mother      Hypertension Maternal Grandmother      Breast Cancer Mother      64 dx     Allergies Mother      CANCER Sister      Cervical     CANCER Maternal Grandmother      Ovarian     CANCER Other      Mat. great aunt-ovarian     Obesity Mother      Respiratory Mother      Asthma     Hypertension Brother      CANCER Maternal Aunt      ?Gastric     Brain Cancer Cousin      maternal         Current Outpatient Prescriptions   Medication Sig Dispense Refill     potassium chloride SA (POTASSIUM CHLORIDE) 20 MEQ CR tablet Take 1 tablet (20 mEq) by mouth 2 times daily 60 tablet 1     ferrous fumarate 65 mg, [place]. FE,-Vitamin C 125 mg (VITRON-C)  MG TABS Take 1 tablet by mouth every morning       Cyanocobalamin (B-12) 2500 MCG SUBL Place 1 tablet under the tongue 3 times weekly       ranitidine (ZANTAC) 75 MG tablet Take 1 tablet (75 mg) by mouth 2 times daily 60 tablet 1     calcium-vitamin D-vitamin K (VIACTIV) 500-500-40 MG-UNT-MCG CHEW Take 1 tablet by mouth 3 times daily        multivitamin  peds with iron (FLINTSTONES COMPLETE) 60 MG chewable tablet Take 2 chew tab by mouth every morning        Cholecalciferol (VITAMIN D3 PO) Take 2,000 Units by mouth daily        Flaxseed, Linseed, (FLAXSEED OIL PO) Take 1,000 mg by mouth 2 times daily        Allergies   Allergen Reactions     Lortab [Hydrocodone-Acetaminophen] Nausea     Also light headed and flushed     Labs reviewed in EPIC  Problem list, Medication list, Allergies, and Medical/Social/Surgical histories reviewed in [facility] and updated as appropriate.    Social History     Social History     Marital Status:      Spouse Name: seperated     Number of Children: N/A     Years of  "Education: N/A     Occupational History     PCA      Social History Main Topics     Smoking status: Never Smoker      Smokeless tobacco: Never Used     Alcohol Use: 0.0 oz/week     0 Standard drinks or equivalent per week      Comment: very rare     Drug Use: No     Sexual Activity:     Partners: Male     Other Topics Concern     None     Social History Narrative       10 point review of systems negative other than symptoms noted above.   Constitutional, HEENT, CV, pulmonary, GI, , MS, Endo, Psych systems are all negative, except as otherwise noted.       OBJECTIVE:  /70 mmHg  Pulse 70  Temp(Src) 99.8  F (37.7  C) (Tympanic)  Ht 5' 2.5\" (1.588 m)  Wt 235 lb (106.595 kg)  BMI 42.27 kg/m2  LMP 12/27/2016 (Exact Date)  CONSTITUTIONAL: Alert, well-nourished, well-groomed, NAD  RESP: Lungs CTA. No wheeze, rhonchi, rales. Normal effort on room air. Equal lung sounds bilaterally.   CV: HRRR, normal S1, S2. No MRG. No peripheral edema.  DERM: very faint erythematous/pink excoriated rash of chest and upper back - follows the collar of her shirt and is not present under her bra strap.     Diagnostic Tests:  none    ASSESSMENT/PLAN:  (R21) Rash and nonspecific skin eruption  (primary encounter diagnosis)  Comment:   Plan: Rash seems to follow the distribution of exposure above the neckline of her shirt and outside of her bra straps. She has a light temp elevation, but feels fine. I have asked her to monitor for worsening and try a topical benadryl, like warm soaks and zyrtec. Return if worsening or concern and can call if she talks to her client and finds out what the dr gave her/diagnosed her with.       FOLLOW-UP: Routinely and sooner as needed.  The patient agrees with this assessment and plan and agrees to call or return to the clinic with any questions or concerns or if their condition worsens.    ALEXANDER Gonzalez, PAEmelinaC  [facility]          "

## 2017-01-19 NOTE — TELEPHONE ENCOUNTER
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  Advised patient that it would be best to be seen so that the provider can evaluate the type of rash it it.  Patient agreeable and will be seen today at 240 pm    Tammy Mendez RN  [facility]  [phone]

## 2017-01-19 NOTE — PATIENT INSTRUCTIONS
1. Topical benadryl.   2. Zyrtec daily.       Self-Care for Skin Rashes  When your skin reacts to a substance your body is sensitive to, it can cause a rash. You can treat most rashes at home by keeping the skin clean and dry. Many rashes may get better on their own within 2 to 3 days. You may need medical attention if your rash itches, drains, or hurts, particularly if the rash is getting worse.  What can cause a skin rash?    Sun poisoning, caused by too much exposure to the sun    An irritant or allergic reaction to a certain type of food, plant, or chemical, such as  shellfish, poison ivy, and or cleaning products    An infection caused by a fungus (ringworm), virus (chickenpox), or bacteria (strep)    Bites or infestation caused by insects or pests, such as ticks, lice, or mites    Dry skin, which is often seen during the winter months and in older people  How can I control itching and skin damage?    Take soothing  lukewarm baths in a colloidal oatmeal product. You can buy this at the [employer].    Do your best not to scratch. Clip fingernails short, especially in young children, to reduce skin damage if scratching does occur.    Use moisturizing skin lotion instead of scratching your dry skin.    Use sunscreen whenever going out into direct sun.    Use only mild cleansing agents whenever possible.    Wash with mild, nonirritating soap and warm water.    Wear clothing that breathes, such as cotton shirts or canvas shoes.    If fluid is seeping from the rash, cover it loosely with clean gauze to absorb the discharge.    Many rashes are contagious. Prevent the rash from spreading to others by washing your hands often before or after touching others with any skin rash.  Use medicine    Antihistamines such as diphenhydramine can help control itching. But use with caution because they can make you drowsy.    Using over-the-counter hydrocortisone cream on small rashes may help reduce swelling and itching    Most  over-the-counter antifungal medicines can treat athlete s foot and many other fungal infections of the skin.  Check with your healthcare provider  Call your healthcare provider if:    You were told that you have a fungal infection on your skin to make sure you have the correct type of medicine    You have questions or concerns about medicines or their side effects.      Call 911  Call 911 if either of these occur:    Your tongue or lips start to swell    You have difficulty breathing      Call your healthcare provider  Call your healthcare provider if any of these occur:    Temperature  of more than 101.0 F (38.3 C), or as directed    Sore throat, a cough, or unusual fatigue    Red, oozy, or painful rash gets worse. These are signs of infection.    Rash covers your face, genitals, or most of your body    Crusty sores or red rings that begin to spread    You were exposed to someone who has a contagious rash, such as scabies or lice.    Red bull s-eye rash with a white center (a sign of Lyme disease)    You were told that you have resistant bacteria (MRSA) on your skin.     8218-5639 [employer]. [address] [place] [address], [place] [address]. All rights reserved. This information is not intended as a substitute for professional medical care. Always follow your healthcare professional's instructions.

## 2017-01-19 NOTE — TELEPHONE ENCOUNTER
Patient informed and agrees with plan.  No further questions  appt with bariatric center on 1/27/17.  Will call clinic in 1 month for Potassium recheck    Ruth Interiano RN

## 2017-01-19 NOTE — MR AVS SNAPSHOT
After Visit Summary   1/19/2017    Criss Don    MRN: [id number]           Patient Information     Date Of Birth          1970        Visit Information        Provider Department      1/19/2017 2:40 PM Katrina Garner PA-C [facility]        Today's Diagnoses     Rash and nonspecific skin eruption    -  1       Care Instructions    1. Topical benadryl.   2. Zyrtec daily.       Self-Care for Skin Rashes  When your skin reacts to a substance your body is sensitive to, it can cause a rash. You can treat most rashes at home by keeping the skin clean and dry. Many rashes may get better on their own within 2 to 3 days. You may need medical attention if your rash itches, drains, or hurts, particularly if the rash is getting worse.  What can cause a skin rash?    Sun poisoning, caused by too much exposure to the sun    An irritant or allergic reaction to a certain type of food, plant, or chemical, such as  shellfish, poison ivy, and or cleaning products    An infection caused by a fungus (ringworm), virus (chickenpox), or bacteria (strep)    Bites or infestation caused by insects or pests, such as ticks, lice, or mites    Dry skin, which is often seen during the winter months and in older people  How can I control itching and skin damage?    Take soothing  lukewarm baths in a colloidal oatmeal product. You can buy this at the [employer].    Do your best not to scratch. Clip fingernails short, especially in young children, to reduce skin damage if scratching does occur.    Use moisturizing skin lotion instead of scratching your dry skin.    Use sunscreen whenever going out into direct sun.    Use only mild cleansing agents whenever possible.    Wash with mild, nonirritating soap and warm water.    Wear clothing that breathes, such as cotton shirts or canvas shoes.    If fluid is seeping from the rash, cover it loosely with clean gauze to absorb the discharge.    Many  rashes are contagious. Prevent the rash from spreading to others by washing your hands often before or after touching others with any skin rash.  Use medicine    Antihistamines such as diphenhydramine can help control itching. But use with caution because they can make you drowsy.    Using over-the-counter hydrocortisone cream on small rashes may help reduce swelling and itching    Most over-the-counter antifungal medicines can treat athlete s foot and many other fungal infections of the skin.  Check with your healthcare provider  Call your healthcare provider if:    You were told that you have a fungal infection on your skin to make sure you have the correct type of medicine    You have questions or concerns about medicines or their side effects.      Call 911  Call 911 if either of these occur:    Your tongue or lips start to swell    You have difficulty breathing      Call your healthcare provider  Call your healthcare provider if any of these occur:    Temperature  of more than 101.0 F (38.3 C), or as directed    Sore throat, a cough, or unusual fatigue    Red, oozy, or painful rash gets worse. These are signs of infection.    Rash covers your face, genitals, or most of your body    Crusty sores or red rings that begin to spread    You were exposed to someone who has a contagious rash, such as scabies or lice.    Red bull s-eye rash with a white center (a sign of Lyme disease)    You were told that you have resistant bacteria (MRSA) on your skin.     5579-0789 [employer]. [address] [place] [address]. All rights reserved. This information is not intended as a substitute for professional medical care. Always follow your healthcare professional's instructions.              Follow-ups after your visit        Your next 10 appointments already scheduled     Jan 27, 2017  9:00 AM   Return Visit with Usman Raymond Diet 3, RD   [facility] ([facility]    [address]  [place] [address]   [phone]            Jan 27, 2017  9:30 AM   Return Visit with Cris Paez PA-C   [facility] ([facility])    [address]  [place] [address]   [phone]            Feb 02, 2017 10:00 AM   Return Visit with Ce Flores LP   [facility] ([facility]    [facility]  [address]  [facility] [id number] [phone]              Future tests that were ordered for you today     Open Future Orders        Priority Expected Expires Ordered    Potassium Routine 2/19/2017 1/19/2018 1/19/2017            Who to contact     If you have questions or need follow up information about today's clinic visit or your schedule please contact [facility] [place] directly at [phone].  Normal or non-critical lab and imaging results will be communicated to you by MyChart, letter or phone within 4 business days after the clinic has received the results. If you do not hear from us within 7 days, please contact the clinic through MyChart or phone. If you have a critical or abnormal lab result, we will notify you by phone as soon as possible.  Submit refill requests through [employer] or call your pharmacy and they will forward the refill request to us. Please allow 3 business days for your refill to be completed.          Additional Information About Your Visit        [employer] Information     [employer] lets you send messages to your doctor, view your test results, renew your prescriptions, schedule appointments and more. To sign up, go to [email or web address] . Click on \"Log in\" on the left side of the screen, which will take you to the Welcome page. Then click on \"Sign up Now\" on the right side of the page.     You will be asked to enter the access code listed below, as well as some " "personal information. Please follow the directions to create your username and password.     Your access code is: [email or web address]  Expires: 2017  1:51 PM     Your access code will  in 90 days. If you need help or a new code, please call your [facility] clinic or [phone].        Care EveryWhere ID     This is your Care EveryWhere ID. This could be used by other organizations to access your [facility] medical records  [id number]        Your Vitals Were     Pulse Temperature Height BMI (Body Mass Index) Last Period        99.8  F (37.7  C) (Tympanic) 5' 2.5\" (1.588 m) 42.27 kg/m2 2016 (Exact Date)        Blood Pressure from Last 3 Encounters:   17 100/70   17 110/70   17 104/68    Weight from Last 3 Encounters:   17 235 lb (106.595 kg)   17 240 lb (108.863 kg)   17 241 lb (109.317 kg)              Today, you had the following     No orders found for display       Primary Care Provider Office Phone # Fax #    Katrina Garner PA-C [phone]       [facility] [place] [place] [address] [place] [address] DR  [place] [address]        Thank you!     [facility] you for [facility]  [facility] your care. Our goal is always to provide you with excellent care. Hearing back from our patients is one way we can continue to improve our services. Please take a few minutes to complete the written survey that you may receive in the mail after your visit with us. Thank you!             Your Updated Medication List - Protect others around you: Learn how to safely use, store and throw away your medicines at www.disposemymeds.org.          This list is accurate as of: 17  3:02 PM.  Always use your most recent med list.                   Brand Name Dispense Instructions for use    B-12 2500 MCG Subl      Place 1 tablet under the tongue 3 times weekly       FLAXSEED OIL PO      Take 1,000 mg by mouth 2 times daily       multivitamin  peds with iron " 60 MG chewable tablet      Take 2 chew tab by mouth every morning       potassium chloride SA 20 MEQ CR tablet    potassium chloride    60 tablet    Take 1 tablet (20 mEq) by mouth 2 times daily       ranitidine 75 MG tablet    ZANTAC    60 tablet    Take 1 tablet (75 mg) by mouth 2 times daily       VIACTIV 500-500-40 MG-UNT-MCG Chew   Generic drug:  calcium-vitamin D-vitamin K      Take 1 tablet by mouth 3 times daily       VITAMIN D3 PO      Take 2,000 Units by mouth daily       VITRON-C  MG Tabs tablet   Generic drug:  ferrous fumarate 65 mg ([place]. FE)-Vitamin C 125 mg      Take 1 tablet by mouth every morning

## 2017-01-19 NOTE — TELEPHONE ENCOUNTER
Please call patient and let her know her potassium level is now within normal range at 3.5 - this is the bottom of normal range.   Keep taking potassium. Hopefully the vomiting will lessen and she will maintain good potassium intake with her diet and we can stop in the future.   Please have her bring this up at her f/u with the bariatric center.   I have placed a lab order to recheck in 1 month.     ALEXANDER Hernandez, PAEmelinaC

## 2017-01-27 ENCOUNTER — OFFICE VISIT (OUTPATIENT)
Dept: SURGERY | Facility: CLINIC | Age: 47
End: 2017-01-27
Payer: COMMERCIAL

## 2017-01-27 ENCOUNTER — HOSPITAL ENCOUNTER (OUTPATIENT)
Dept: LAB | Facility: CLINIC | Age: 47
Discharge: HOME | End: 2017-01-27
Attending: PHYSICIAN ASSISTANT | Admitting: PHYSICIAN ASSISTANT
Payer: COMMERCIAL

## 2017-01-27 VITALS
DIASTOLIC BLOOD PRESSURE: 78 MMHG | BODY MASS INDEX: 41.89 KG/M2 | HEART RATE: 72 BPM | WEIGHT: 232.9 LBS | SYSTOLIC BLOOD PRESSURE: 116 MMHG | RESPIRATION RATE: 16 BRPM

## 2017-01-27 DIAGNOSIS — E87.6 HYPOKALEMIA: ICD-10-CM

## 2017-01-27 DIAGNOSIS — E66.01 OBESITY, CLASS III, BMI 40-49.9 (MORBID OBESITY) (H): ICD-10-CM

## 2017-01-27 DIAGNOSIS — R73.03 PREDIABETES: ICD-10-CM

## 2017-01-27 DIAGNOSIS — R74.01 ELEVATED ALT MEASUREMENT: ICD-10-CM

## 2017-01-27 DIAGNOSIS — Z98.84 BARIATRIC SURGERY STATUS: Primary | ICD-10-CM

## 2017-01-27 DIAGNOSIS — F43.29 ADJUSTMENT DISORDER WITH OTHER SYMPTOM: ICD-10-CM

## 2017-01-27 DIAGNOSIS — Z98.84 BARIATRIC SURGERY STATUS: ICD-10-CM

## 2017-01-27 DIAGNOSIS — K91.2 POSTSURGICAL MALABSORPTION: ICD-10-CM

## 2017-01-27 DIAGNOSIS — F45.22 BODY DYSMORPHIC DISORDER: ICD-10-CM

## 2017-01-27 LAB
DEPRECATED CALCIDIOL+CALCIFEROL SERPL-MC: 55 UG/L (ref 20–75)
FERRITIN SERPL-MCNC: 35 NG/ML (ref 8–252)
HBA1C MFR BLD: 5.1 % (ref 4.3–6)
IRON SATN MFR SERPL: 22 % (ref 15–46)
IRON SERPL-MCNC: 55 UG/DL (ref 35–180)
PTH-INTACT SERPL-MCNC: 92 PG/ML (ref 12–72)
TIBC SERPL-MCNC: 251 UG/DL (ref 240–430)
VIT B12 SERPL-MCNC: 698 PG/ML (ref 193–986)

## 2017-01-27 PROCEDURE — 82306 VITAMIN D 25 HYDROXY: CPT | Performed by: PHYSICIAN ASSISTANT

## 2017-01-27 PROCEDURE — 36415 COLL VENOUS BLD VENIPUNCTURE: CPT | Performed by: PHYSICIAN ASSISTANT

## 2017-01-27 PROCEDURE — 83540 ASSAY OF IRON: CPT | Performed by: PHYSICIAN ASSISTANT

## 2017-01-27 PROCEDURE — 83970 ASSAY OF PARATHORMONE: CPT | Performed by: PHYSICIAN ASSISTANT

## 2017-01-27 PROCEDURE — 83036 HEMOGLOBIN GLYCOSYLATED A1C: CPT | Performed by: PHYSICIAN ASSISTANT

## 2017-01-27 PROCEDURE — 97803 MED NUTRITION INDIV SUBSEQ: CPT | Performed by: DIETITIAN, REGISTERED

## 2017-01-27 PROCEDURE — 82607 VITAMIN B-12: CPT | Performed by: PHYSICIAN ASSISTANT

## 2017-01-27 PROCEDURE — 99214 OFFICE O/P EST MOD 30 MIN: CPT | Performed by: PHYSICIAN ASSISTANT

## 2017-01-27 PROCEDURE — 82728 ASSAY OF FERRITIN: CPT | Performed by: PHYSICIAN ASSISTANT

## 2017-01-27 PROCEDURE — 83550 IRON BINDING TEST: CPT | Performed by: PHYSICIAN ASSISTANT

## 2017-01-27 NOTE — PATIENT INSTRUCTIONS
Stop ranitidine.  Continue current vitamins.  Continue seeing Ce and working on weight self image  Start weigh ins every other day.  Start prune juice 4 oz daily to help with potassium.  Have labs drawn.  Follow up in 3 months.

## 2017-01-27 NOTE — Clinical Note
Paulette is struggling with some body image and also very focused on regulating her weight loss and food intake.  Please see my note.  Dietician is concerned if her thought patterns continue she might develop an eating disorder.  She has a follow up with you in Feb.  I think she might needs some regular follow up to help out with this.  I discussed this with he pt today.  GODFREY

## 2017-01-27 NOTE — PROGRESS NOTES
Bariatric Follow Up Visit   Date: 2017  RE: KOJO ASHTON  MR#: [id number]  : 1970  HISTORY OF PRESENT ILLNESS: KOJO ASHTON returns today for her follow-up appointment status post Laparoscopic Rasheeda-en-Y Gastric Bypass surgery. She has lost 75.7 lbs since starting our program. Patient is feeling well. She is doing the following exercise(s):  20 minutes on treadmill 30 minutes on the bike. 4-5 days a week.    Patient is taking the following bariatric postoperative vitamins:  2 Complete multivitamins with minerals (at different times than calcium)  2000 Int Units of Vitamin D daily   1857-7686 mg of Calcium daily in divided doses  2500 mcg of Vitamin B12 sl every three days  1 Iron/Vit C. daily    OBESITY RELATED CONDITIONS:  Pre-diabetes: present     SOCIAL HISTORY:  She does not smoke. She does not drink alcohol. She attends support group and feels safe in current environment.  REVIEW OF SYSTEMS:  GI:  Nausea-seldom  Vomiting-occasionally, this has improved in the last 2 weeks.  Is having some trouble tolerating meats but that is very normal at this time in her post op recovery.   Diarrhea-never  Constipation-seldom, having a BM at least every 3 days.  BM are soft and formed.  No straining involved.    Dysphagia-never  Abdominal Pains-never  Heartburn-never  Skin:  Intertriginous Irritation-never    Psychiatric:  Body Dysmorphic disorder- pt says she has always had a body image problem.  Even with her current weight loss she feels she is the same size she was before. States she weighs in daily to prove to herself that she has lost weight.  In the beginning she would be upset if she hadn't lost weight every day.  She states she is trying to get better with that.  She feels uncomfortable eating in front of anyone.  Feels they are judging her.  Very concerned he weight loss is going to stall.  Was hoping to get clear guidelines of calorie intake and exactly the types of foods she should eat.  Still in  that diet mentality.Tio has asked her to steer away from this focus.  Did give her the sample diet menu.  Dietician was concerned patient may develop an eating disorder if she does not change some of her thinking around food and her weight.        PHYSICAL EXAMINATION:   Vital Signs: Weight: 232 lbs; BMI: 41.7; BP: 100 / 55; Pulse: 98; Resp: 16;  GENERAL: Alert and oriented x3. NAD  HEART: Regular rate and rhythm, No murmurs, rubs or gallops  LUNGS: Breathing unlabored, Lung sounds clear to auscultation bilaterally  ABDOMEN: soft; nontender; nondistended, incision well healed. No hernia  EXTREMITIES: No LE edema bilaterally, Gait normal  SKIN: No intertriginous irritation or rash    ASSESSMENT/PLAN:   3 months s/p Laparoscopic Rasheeda-en-Y Gastric Bypass-    Return to clinic in 3 months  Post surgical malabsorption-    Ordered vitamin B12, vitamin D, PTH, ferritin, TIBC, and iron labs.   Continue taking recommended post-op vitamins.   Follow food plan per dietitian recommendations.  Elevated LFT   suspect this is transient from manipulating the liver during surgery. Will recheck at 6 months.  Hypokalemia   will start 4 oz of prune juice daily in addition to potassium supplement.    Continue seeing PCP for follow up labs  Pre-diabetes-    Ordered HgA1C today  Adjustment disorder   Continue to see SONI Gonzalez   Talked about incorporating a new healthy lifestyle.  Try to reframe your thinking about dieting. No need to count calories or write down your food daily. Shared concerned dietician had regarding the patterns the patient is exibiting.  Pt also feels she is at risk of developing an eating disorder and feels she has had one in the past.  Feels she is monitoring this and will continue working with Ce on these issues.     Body Dysmorphic disorder-    Decrease weight ins to every other day.  Will discuss decreasing to weekly weigh ins at her follow up in 3 months.     This was a 25 minute visit with  greater than 50% spent on counseling.

## 2017-01-27 NOTE — PROGRESS NOTES
POST-OPERATIVE NUTRITION APPOINTMENT  DATE OF VISIT: 2017  Name: KOJO ASHTON  : 1970  Gender: Female  MRN: [id number]  Age: 46    ASSESSMENT:  REASON FOR VISIT:  KOJO ASHTON is a 46 year old Female presents today for 3 month PO nutrition follow-up appointment.  DIAGNOSIS:  Status post Laparoscopic Rasheeda-en-Y Gastric Bypass surgery.  Obesity Class III  ANTHROPOMETRICS:  Height: 62.5 inches  Weight: 232 lbs  BMI: 41.7 kg/m2  VITAMINS AND MINERALS:  2 CCC-morning (morning/ 2 hour away from calcium)   500 mg calcium chew 3 time per day with meals   2000 international units Vitamin D  2500 Vitamin B-12, SL (3 times per week)   Vitron C (morning)  NUTRITION HISTORY:  Breakfast: 1/2 cup Malt-o-meal + 1 scoop protein powder or 1/2 cup sugar free gelatin or 1 protein drink   Lunch: 1/2 cup cottage cheese + 2 teaspoons pureed fruit (baby food)  Supper: 1/2 cup refried beans or chopped chicken breast with honey mustard or light dougherty   Snacks: none  Beverages:water, protein drink every other day  Consuming liquid calories: Protein supplements/shakes  Protein intake: <30 grams/day  Tolerate regular texture food: Yes  Any foods not tolerated details: many protein sources and fruits  Portion size: 1/2 cup or less  Take 30 minutes to consume each meal: Yes  Eat protein foods first: No  Fluids and meals separate by at least 30 minutes: Yes  Chew foods 20 plus times: Yes  Tolerating diet: bariatric regular diet  Drinking high protein supplements/shakes: Yes  Consuming meals per day: 3  Consuming snacks per day: none  Comment: Pt with low potassium - placed on K+ supplement by PCP and instructed on high potassium foods. Unfortunately, limited tolerance to many foods at this point despite strong effort to consume bananas, vegetables, potatoes, dairy, etc. Encouraged slow introduction of new foods and small amounts of protein supplement (not tolerating full protein drink). Recommended 4oz prune juice/day to help with  constipation as well as increase K+ intake. Of note, pt verbalizes a lot of anxiety regarding not losing enough weight - continues with daily weight checks (previously multiple times/day), inability to visually appreciate weight loss, fear of gaining muscle, etc. Relayed concerns to PAEmelinaC. Encouraged pt to mentally prepare (and not get discouraged) when rapid weight loss starts to decrease ~6 months post-op.     PHYSICAL ACTIVITY:  Type: Walking or stationary bike  Frequency: 2-3 times a week  Duration (min): 30  DIAGNOSIS:  Previous Nutrition Diagnosis: Altered gastrointestinal function related to alteration in gastrointestinal structure as evidenced by history of Laparoscopic Rasheeda-en-Y Gastric Bypass surgery.  Unchanged, modified below.   Previous goals:  Continue to practice all prebariatric surgery diet guidelines - met  Focus on getting in 60-90 g protein per day - not met  Transition to soft texture food now; then regular texture foods at 8-12 weeks post-op -met  If nausea persists upon waking try having a protein drink at breakfast - met  Limit weight checks to 1 time per week (was doing daily checks) - not met  Current Nutrition Diagnosis: Altered gastrointestinal function related to alteration in gastrointestinal structure as evidenced by history of Laparoscopic Rasheeda-en-Y Gastric Bypass   Unchanged  INTERVENTION:  Nutrition Prescription: Eat 3 meals a day at regular intervals. Consume 60-90 grams of protein daily. Follow post-surgical vitamins and minerals protocol.  Goals:  Try 3 new foods each week.   Have 4oz of prune juice each day   Focus on protein (60-90g), hydration (48-64oz) and portions (1c or less) rather than sugar and carbohydrate content of food.   Implementation: Discussed progress toward previous goals; reinforced importance of following bariatric lifestyle changes  NUTRITION MONITORING AND EVALUATION:  Anticipated compliance: Good  Verbalized understanding by discussing well-tolerated  sources of protein and potassium.     Follow up: in 3 months, at 6 months post op. Pt will follow up with PCP regarding K+ levels.  Patient to follow up in 3 months  TIME SPENT WITH PATIENT:  30 minutes  Marimar Smith RD, LD  Clinical Dietitian

## 2017-02-02 ENCOUNTER — OFFICE VISIT (OUTPATIENT)
Dept: PSYCHOLOGY | Facility: CLINIC | Age: 47
End: 2017-02-02
Payer: COMMERCIAL

## 2017-02-02 DIAGNOSIS — F41.9 ANXIETY DISORDER, UNSPECIFIED: Primary | ICD-10-CM

## 2017-02-02 PROCEDURE — 90834 PSYTX W PT 45 MINUTES: CPT | Performed by: PSYCHOLOGIST

## 2017-02-02 ASSESSMENT — ANXIETY QUESTIONNAIRES
2. NOT BEING ABLE TO STOP OR CONTROL WORRYING: SEVERAL DAYS
5. BEING SO RESTLESS THAT IT IS HARD TO SIT STILL: SEVERAL DAYS
5. BEING SO RESTLESS THAT IT IS HARD TO SIT STILL: NOT AT ALL
GAD7 TOTAL SCORE: 6
6. BECOMING EASILY ANNOYED OR IRRITABLE: NOT AT ALL
7. FEELING AFRAID AS IF SOMETHING AWFUL MIGHT HAPPEN: NOT AT ALL
1. FEELING NERVOUS, ANXIOUS, OR ON EDGE: MORE THAN HALF THE DAYS
1. FEELING NERVOUS, ANXIOUS, OR ON EDGE: SEVERAL DAYS
2. NOT BEING ABLE TO STOP OR CONTROL WORRYING: SEVERAL DAYS
3. WORRYING TOO MUCH ABOUT DIFFERENT THINGS: NOT AT ALL
7. FEELING AFRAID AS IF SOMETHING AWFUL MIGHT HAPPEN: NOT AT ALL
6. BECOMING EASILY ANNOYED OR IRRITABLE: NOT AT ALL
GAD7 TOTAL SCORE: 2
3. WORRYING TOO MUCH ABOUT DIFFERENT THINGS: SEVERAL DAYS

## 2017-02-02 ASSESSMENT — PATIENT HEALTH QUESTIONNAIRE - PHQ9
5. POOR APPETITE OR OVEREATING: SEVERAL DAYS
5. POOR APPETITE OR OVEREATING: NOT AT ALL

## 2017-02-02 NOTE — PROGRESS NOTES
"Progress Note    Client Name: Criss Don           Date:   2/2/2017                                            Service Type:Individual/3-Month Post-Surgery Follow-Up                            Session Start Time:  10:00                 Session End Time:  10:50                            Session Length:        50 mins                Session #:      6                Attendees:     Client attended alone                  DATA                Progress Since Last Session (Related to Symptoms / Goals / Homework):  Symptoms: Worsening anxiety; Following post-surgery recommendations; Frustration with eating as well as perceived lack of progress in weight loss; compulsively weighing herself; relational stressors are increasing    Homework: N/A    Current / Ongoing Stressors and Concerns:  3-Month Post-Bariatric Surgery  Relational stressors  Financial stressors    Treatment Objective(s) Addressed in This Session:         Reassess mental health symptoms  Review progress on following recommendations    Intervention:  Reassess symptoms  Assess for Compliance  Validation    Client reported she has been struggling with anxiety related to her perceived slow weight loss (has actually lost 60 pounds in 3 months), needing to eat foods that are high in potassium but also high in carbohydrates, eating larger portion sizes, etc. She stated she has reduced the amount of times she weighs herself, but continues to do so and indicated \"if I don't do it, I will go crazy.\" She also acknowledged that due to a potassium deficiency she has had to eat more foods that she perceives to be too high in carbohydrates or fat. This has led her to be anxious when eating these foods, however, she is able to rationalize why she needs to eat them. Client reported she also has been having difficulty eating in front of people because she worries they will  her. This has been reinforced by her nephew and roommate who have questioned and been critical of " what she is eating. Client able to identify ways she is coping with her stress. She has been spending down time cross stitching and reading. Finally, client's  (they are ) has been trying to be more present in her life which has been stressful due to his reported alcoholism. Client was in agreement with recommendation for on-going individual therapy. Assisted in scheduling her with Brenda Perez PsyD, LP for individual therapy beginning next week.                 ASSESSMENT: Current Emotional / Mental Status (status of significant symptoms):              Risk status (Self / Other harm or suicidal ideation)              Client denies current fears or concerns for personal safety.              Client reports the following current or recent suicidal ideation or behaviors: none.              Client denies current or recent homicidal ideation or behaviors.              Client denies current or recent self injurious behavior or ideation.              Client denies other safety concerns.              A safety and risk management plan has not been developed at this time, however client was given the after-hours number should there be a change in any of these risk factors.                Appearance:                           Appropriate               Eye Contact:                           Good               Psychomotor Behavior:          Normal               Attitude:                                   Cooperative               Orientation:                             All              Speech                          Rate / Production:       Normal                           Volume:                       Normal               Mood:                                      Anxious               Affect:                                      Subdued, Worrisome              Thought Content:                    Clear               Thought Form:                        Coherent  Goal Directed  Logical                Insight:                                    Fair                 Medication Review:              No changes to current psychiatric medication(s)                Medication Compliance:              Yes                Changes in Health Issues:  Yes: Vomiting has decreased, but continues a few times per week                Chemical Use Review:              Substance Use: Chemical use reviewed, no active concerns identified                 Tobacco Use: No current tobacco use.                  Collateral Reports Completed:              Routed note to Care Team Member(s)                  PLAN: (Client Tasks / Therapist Tasks / Other)  Client will follow up with [facility] team and Dr. Brenda Perez (therapy).  She is aware she can call to schedule with me at any time. She is to follow-up with other medical providers regarding continued difficulty with vomiting and food intake.    Ce Flores, AUBREY  2/2/2017

## 2017-02-03 ASSESSMENT — ANXIETY QUESTIONNAIRES: GAD7 TOTAL SCORE: 2

## 2017-02-03 ASSESSMENT — PATIENT HEALTH QUESTIONNAIRE - PHQ9: SUM OF ALL RESPONSES TO PHQ QUESTIONS 1-9: 2

## 2017-02-10 ENCOUNTER — OFFICE VISIT (OUTPATIENT)
Dept: PSYCHOLOGY | Facility: CLINIC | Age: 47
End: 2017-02-10
Payer: COMMERCIAL

## 2017-02-10 DIAGNOSIS — F41.9 ANXIETY DISORDER, UNSPECIFIED TYPE: Primary | ICD-10-CM

## 2017-02-10 PROCEDURE — 90834 PSYTX W PT 45 MINUTES: CPT | Performed by: PSYCHOLOGIST

## 2017-02-10 NOTE — MR AVS SNAPSHOT
MRN:[id number]                      After Visit Summary   2/10/2017    Criss Don    MRN: [id number]           Visit Information        Provider Department      2/10/2017 8:00 AM Brenda Perez PsyD [facility]      Your next 10 appointments already scheduled     Mar 10, 2017  8:00 AM CST   Return Visit with Brenda Perez PsyD   [facility] ([facility])    [address] [place], [place] [address] [address]   [phone]            Mar 24, 2017  8:00 AM CDT   Return Visit with Brenda Perez PsyD   [facility] ([facility])    [address] [place] [address]   [phone]            Apr 28, 2017 10:30 AM CDT   Return Visit with Cris Paez PA-C   [facility] ([facility])    [address] [place] [id number] [phone]            Apr 28, 2017 11:00 AM CDT   Return Visit with Usman Raymond Diet 2, RD   [facility] ([facility])    [address] [place] [id number] [phone]              Care EveryWhere ID     This is your Care EveryWhere ID. This could be used by other organizations to access your [facility] medical records  [id number]

## 2017-02-17 DIAGNOSIS — E87.6 HYPOKALEMIA: ICD-10-CM

## 2017-02-17 LAB — POTASSIUM SERPL-SCNC: 3.3 MMOL/L (ref 3.4–5.3)

## 2017-02-17 PROCEDURE — 84132 ASSAY OF SERUM POTASSIUM: CPT | Performed by: PHYSICIAN ASSISTANT

## 2017-02-17 PROCEDURE — 36415 COLL VENOUS BLD VENIPUNCTURE: CPT | Performed by: PHYSICIAN ASSISTANT

## 2017-02-20 ENCOUNTER — TELEPHONE (OUTPATIENT)
Dept: SURGERY | Facility: CLINIC | Age: 47
End: 2017-02-20

## 2017-02-20 ENCOUNTER — TELEPHONE (OUTPATIENT)
Dept: FAMILY MEDICINE | Facility: CLINIC | Age: 47
End: 2017-02-20

## 2017-02-20 DIAGNOSIS — Z98.84 BARIATRIC SURGERY STATUS: ICD-10-CM

## 2017-02-20 DIAGNOSIS — E87.6 HYPOKALEMIA: ICD-10-CM

## 2017-02-20 DIAGNOSIS — E87.6 LOW SERUM POTASSIUM: ICD-10-CM

## 2017-02-20 DIAGNOSIS — Z98.84 BARIATRIC SURGERY STATUS: Primary | ICD-10-CM

## 2017-02-20 PROCEDURE — 36415 COLL VENOUS BLD VENIPUNCTURE: CPT | Performed by: PHYSICIAN ASSISTANT

## 2017-02-20 PROCEDURE — 83735 ASSAY OF MAGNESIUM: CPT | Performed by: PHYSICIAN ASSISTANT

## 2017-02-20 NOTE — TELEPHONE ENCOUNTER
Pt had her potassium drawn at PCP. Despite 20 meq of Potassium BID she is still dropping low.  Wanted to let the clinic know she is vomiting about 2-3 times a week still. I reviewed this with the dietician. This still seems in the normal range 4 months post op.   Potassium could be low due to the small amount of foods she is eating, malnutrition, and her vomiting. Would like to check a magnesium level to rule our low magnesium as the cause.  Will staff message PCP today and discuss.  I suspect this will improve with time.

## 2017-02-20 NOTE — TELEPHONE ENCOUNTER
Daryl Lynch,     Thanks for the message! Good thought - ordered a mag for her to have drawn. Let's go from there. Patient is very concerned about this and she isn't taking any medications to cause this and it would be strange to all of a sudden develop some type of potassium wasting since K+ was always very normal pre-surgery. Will wait for mag level.     Thanks!    ALEXANDER Hernandez, PAEmelinaC

## 2017-02-20 NOTE — TELEPHONE ENCOUNTER
Patient states that she is takig potassium supplements and is eating baked potatoes.   Patient states that she has not had diarrhea. States that she vomits 2-3 times a week.  Patient states that she spoke with Cris GAMBINO from the bariatric clinic. Cris does not think that the patient's low potassium is due to her vomiting. Cris is really not sure why the patient's potassium is decreasing.    Patient states that she is concerned.  Please advise. Triage to call the patient back.  Megan Chery RN

## 2017-02-20 NOTE — TELEPHONE ENCOUNTER
Received a message from Cris -  She would like us to check her magnesium level for possible cause.   Lab ordered - please have her make lab apptmt.   They think her levels are normal for post-op status and her intake/vomiting.   Let's check mag and go from there.   Katrina Garner, ALEXANDER, PA-C

## 2017-02-20 NOTE — TELEPHONE ENCOUNTER
Called pt back to let her know Daina and I spoke and would like her to get her magnesium drawn.  She is actually on her way to get her magnesium level drawn now.

## 2017-02-21 LAB — MAGNESIUM SERPL-MCNC: 1.7 MG/DL (ref 1.6–2.3)

## 2017-02-21 RX ORDER — POTASSIUM CHLORIDE 1500 MG/1
20 TABLET, EXTENDED RELEASE ORAL 2 TIMES DAILY
Qty: 60 TABLET | Refills: 1 | Status: SHIPPED | OUTPATIENT
Start: 2017-02-21 | End: 2017-11-28

## 2017-02-21 NOTE — TELEPHONE ENCOUNTER
Patient informed and agrees with plan.    Will take K+ and OTC magnesium and recheck lab in 1 month.   No further questions    Ruth Interiano RN

## 2017-02-21 NOTE — TELEPHONE ENCOUNTER
Magnesium on low end of normal.   Let's have her  an over the counter supplement and begin taking. Usual dose would be around 400 mg, I think should be reasonable.   If having diarrhea, back off on dose.      REYNOLD Fordissa -- OK with plan?    Sent K+ refills. Labs ordered.    Let's recheck labs in 1 month.   ALEXANDER Hernandez, PAEmelinaC

## 2017-02-24 ENCOUNTER — OFFICE VISIT (OUTPATIENT)
Dept: PSYCHOLOGY | Facility: CLINIC | Age: 47
End: 2017-02-24
Payer: COMMERCIAL

## 2017-02-24 DIAGNOSIS — F41.9 ANXIETY DISORDER, UNSPECIFIED TYPE: Primary | ICD-10-CM

## 2017-02-24 PROCEDURE — 90834 PSYTX W PT 45 MINUTES: CPT | Performed by: PSYCHOLOGIST

## 2017-02-24 NOTE — MR AVS SNAPSHOT
MRN:[id number]                      After Visit Summary   2/24/2017    Criss Don    MRN: [id number]           Visit Information        Provider Department      2/24/2017 9:00 AM Brenda Perez PsyD [facility]      Your next 10 appointments already scheduled     Mar 10, 2017  8:00 AM CST   Return Visit with Brenda Perez PsyD   [facility] ([facility])    [address] [place], [place] [address] [address]   [phone]            Mar 24, 2017  8:00 AM CDT   Return Visit with Brenda Perez PsyD   [facility] ([facility])    [address] [place] [address]   [phone]            Apr 28, 2017 10:30 AM CDT   Return Visit with Cris Paez PA-C   [facility] ([facility])    [address] [place] [id number] [phone]            Apr 28, 2017 11:00 AM CDT   Return Visit with Usman Raymond Diet 2, RD   [facility] ([facility])    [address] [place] [id number] [phone]              Care EveryWhere ID     This is your Care EveryWhere ID. This could be used by other organizations to access your [facility] medical records  [id number]

## 2017-02-28 NOTE — PROGRESS NOTES
"                                             Progress Note    Client Name: Criss Don  Date: 2/10/2017         Service Type: Individual      Session Start Time: 8:00  Session End Time: 8:45      Session Length: 45     Session #: 1 (with this provider, client was transferred from Ce Flores PsyD LP)     Attendees: Client attended alone    Treatment Plan Last Reviewed: complete next session  PHQ-9 / NED-7 :      DATA      Progress Since Last Session (Related to Symptoms / Goals / Homework):   Symptoms: no improvement    Homework: N/A      Episode of Care Goals: No improvement - PREPARATION (Decided to change - considering how); Intervened by negotiating a change plan and determining options / strategies for behavior change, identifying triggers, exploring social supports, and working towards setting a date to begin behavior change     Current / Ongoing Stressors and Concerns:   Reported relationship strain with her roommate and concerns about her adult nephew who has an infant. Client is currently not living with her spouse and they are , but she does not feel financially secure enough to file for divorce. Reported she is weighing herself daily (to confirm she is not gaining weight) and \"will go crazy\" if she tries to avoid weighing herself. Reported that she does not see the weight she has lost when looking in the mirror, even though she can notice weight loss in the way her clothes fit. Reported she is struggling to eat in front of other people. Reported that it has been anxiety producing for her eat foods that are high in potassium (and carbs), even though she understands that she must eat them and is under the supervision of her weight loss team. Reported that she is getting better at using distractions and stress management skills (e.g. Coloring, cross stitch) but gets easily preoccupied with her weight.      Treatment Objective(s) Addressed in This Session:   stress management  Maintain " compliance with post-surgical dietary needs     Intervention:   CBT: assigned homework; explored thinking patterns that increase anxiety  Supportive therapy:  provided active listening, support, and encouragement. Rapport building.        ASSESSMENT: Current Emotional / Mental Status (status of significant symptoms):   Risk status (Self / Other harm or suicidal ideation)   Client denies current fears or concerns for personal safety.   Client denies current or recent suicidal ideation or behaviors.   Client denies current or recent homicidal ideation or behaviors.   Client denies current or recent self injurious behavior or ideation.   Client denies other safety concerns.   A safety and risk management plan has not been developed at this time, however client was given the after-hours number / 911 should there be a change in any of these risk factors.     Appearance:   Appropriate    Eye Contact:   Good    Psychomotor Behavior: Normal    Attitude:   Cooperative  Pleasant    Orientation:   All   Speech    Rate / Production: Normal     Volume:  Normal    Mood:    Anxious    Affect:    Appropriate    Thought Content:  Clear    Thought Form:  Coherent  Logical    Insight:    Fair      Medication Review:   No current psychiatric medications prescribed     Medication Compliance:   Yes     Changes in Health Issues:   None reported     Chemical Use Review:   Substance Use: Chemical use reviewed, no active concerns identified      Tobacco Use: No current tobacco use.       Collateral Reports Completed:   Not Applicable    PLAN: (Client Tasks / Therapist Tasks / Other)  Future appointments are scheduled. Complete treatment plan next visit. Homework: keep a thought log at mealtime to identify thinking patterns that trigger anxiety. Reduce weighing self to every other day, and keep a thought log to identify thought patterns and fears about not weighing self.         Brenda Perez PsyD LP

## 2017-02-28 NOTE — PROGRESS NOTES
"                                             Progress Note    Client Name: Criss Don  Date: 2/24/2017         Service Type: Individual      Session Start Time: 9:00  Session End Time: 9:45      Session Length: 45     Session #: 2 (with this provider)     Attendees: Client attended alone    Treatment Plan Last Reviewed: 2/24/2017  PHQ-9 / NED-7 :      DATA      Progress Since Last Session (Related to Symptoms / Goals / Homework):   Symptoms: no improvement    Homework: N/A      Episode of Care Goals: No improvement - ACTION (Actively working towards change); Intervened by reinforcing change plan / affirming steps taken     Current / Ongoing Stressors and Concerns:   Client attempted to cut back on weighing herself by 50%, but \"it drove me crazy\". Reported that she identified feeling sad after eating, that she is experiencing some doubt about her meal plan because it is so different from what her mother did when she had bariatric surgery many years ago (client reports that mother was on a liquid diet for a year, and client is irrationally--by her admission--worried that she will not lose enough weight if she is consuming more than liquids). Reported that eating solid foods leaves her feeling guilty at times because she does not think she has lost enough weight. Reported that weighing herself daily functions to provide confirmation that she is not gaining weight. Acknowledged that she has expectations for weight loss that are based on the experiences of others; again, client states she understands that the expectations are not fair or realistic and that direct comparisons cannot be made. Client was able to agree in session that since she is not gaining weight, following recommendations from her weight loss team is working.      Treatment Objective(s) Addressed in This Session:   stress management  Maintain compliance with post-surgical dietary needs     Intervention:   CBT: surfaced and challenged \"should\" " statements; explored fears underlying eating solid foods; explored expectations for weight loss; explored client's perception of her weight loss; provided psychoeducation about the cycle of anxiety and how certain behaviors reinforce rather than alleviate anxiety in the long term  Solution Focused: coached client on ways to distract away from worry thoughts about weight, and ways to manage interpersonal stress more effectively  Supportive therapy:  provided active listening, support, and encouragement. Reinforced follow through on homework.        ASSESSMENT: Current Emotional / Mental Status (status of significant symptoms):   Risk status (Self / Other harm or suicidal ideation)   Client denies current fears or concerns for personal safety.   Client denies current or recent suicidal ideation or behaviors.   Client denies current or recent homicidal ideation or behaviors.   Client denies current or recent self injurious behavior or ideation.   Client denies other safety concerns.   A safety and risk management plan has not been developed at this time, however client was given the after-hours number / 911 should there be a change in any of these risk factors.     Appearance:   Appropriate    Eye Contact:   Good    Psychomotor Behavior: Normal    Attitude:   Cooperative  Pleasant    Orientation:   All   Speech    Rate / Production: Normal     Volume:  Normal    Mood:    Anxious    Affect:    Appropriate    Thought Content:  Clear    Thought Form:  Coherent  Logical    Insight:    Fair      Medication Review:   No current psychiatric medications prescribed     Medication Compliance:   Yes     Changes in Health Issues:   None reported     Chemical Use Review:   Substance Use: Chemical use reviewed, no active concerns identified      Tobacco Use: No current tobacco use.       Collateral Reports Completed:   Not Applicable    PLAN: (Client Tasks / Therapist Tasks / Other)  Future appointments are scheduled. Homework:  Reduce the frequency of weighing self by 25%, and use calming and distracting activities to manage stress of not weighing self. Keep a thought log at meals to identify unhealthy and unrealistic thinking patterns related to food and weight loss. Create a list of additional ways to calm, distract, and entertain yourself.       Brenda Perez PsyD LP                                                       Treatment Plan    Client's Name: Criss Don  YOB: 1970    Date: 2/24/2017    DSM-V Diagnoses: 300.00 Unspecified Anxiety Disorder  Psychosocial / Contextual Factors: strain with roommate, spouse, and nephews  WHODAS: 22    Referral / Collaboration:  Referral to another professional/service is not indicated at this time..    Anticipated number of session or this episode of care: reassess after 12 sessions      MeasurableTreatment Goal(s) related to diagnosis / functional impairment(s)  Goal 1: Client will learn coping skills to manage stress and adjust to post-surgical lifestyle changes more effectively.    I will know I've met my goal when I'm not weighing myself all the time and feeling more comfortable with my weight loss.      Objective #A (Client Action)    Client will identify thinking patterns that contribute to anxiety about weight and eating habits.  Status: New - Date: 2/24/2017     Intervention(s)  Therapist will assign homework (thought  logs), assist client in surfacing and replacement ineffective thinking patterns.    Objective #B  Client will identify new ways to cope with stress and worry thoughts.  Status: New - Date: 2/24/2017     Intervention(s)  Therapist will assign homework, problem-solve barriers to follow through.    Objective #C  Client will identify and challenge unreasonable or unrealistic expectations about weight loss.  Status: New - Date: 2/24/2017     Intervention(s)  Therapist will assist client in identifying and balancing her expectations.        Client has reviewed  and agreed to the above plan.      Brenda Perez PsyD LP  2/24/2017

## 2017-03-10 ENCOUNTER — OFFICE VISIT (OUTPATIENT)
Dept: PSYCHOLOGY | Facility: CLINIC | Age: 47
End: 2017-03-10
Payer: COMMERCIAL

## 2017-03-10 DIAGNOSIS — F41.9 ANXIETY DISORDER, UNSPECIFIED TYPE: Primary | ICD-10-CM

## 2017-03-10 PROCEDURE — 90834 PSYTX W PT 45 MINUTES: CPT | Performed by: PSYCHOLOGIST

## 2017-03-10 NOTE — PROGRESS NOTES
"                                             Progress Note    Client Name: Criss Don  Date: 3/10/2017         Service Type: Individual      Session Start Time: 8:00  Session End Time: 8:45      Session Length: 45     Session #: 3 (with this provider)     Attendees: Client attended alone    Treatment Plan Last Reviewed: 2/24/2017  PHQ-9 / NED-7 :      DATA      Progress Since Last Session (Related to Symptoms / Goals / Homework):   Symptoms: Improved    Homework: Partially completed      Episode of Care Goals: Minimal progress - ACTION (Actively working towards change); Intervened by reinforcing change plan / affirming steps taken     Current / Ongoing Stressors and Concerns:   Client attempted to cut back on weighing herself by 25%, and reported some \"obessive\" thinking but was able to distract and divert her attention to more productive tasks to keep her mind off fixating on her weight. Reported that she has been working on identifying and replacing imbalanced thoughts about her weight and eating to reduce guilt and sadness. Reported that she sees the progress on the scale but not in her body, so continues to feel discouraged by that. Reported that she is planning a family meal in a few weeks, and is trying to plan a menu that accommodates everybody while putting an emphasis on the activities and spending time together (e.g. Game night) rather than the food.       Treatment Objective(s) Addressed in This Session:   stress management  Maintain compliance with post-surgical dietary needs     Intervention:   CBT: surfaced and challenged \"should\" statements and unreasonable expectations about weight loss; identified sources of unhealthy and unhelpful comparisons  Solution Focused: coached client on ways to distract away from worry thoughts about weight, and ways to manage interpersonal stress more effectively  Supportive therapy:  provided active listening, support, and encouragement. Reinforced follow through " on homework.        ASSESSMENT: Current Emotional / Mental Status (status of significant symptoms):   Risk status (Self / Other harm or suicidal ideation)   Client denies current fears or concerns for personal safety.   Client denies current or recent suicidal ideation or behaviors.   Client denies current or recent homicidal ideation or behaviors.   Client denies current or recent self injurious behavior or ideation.   Client denies other safety concerns.   A safety and risk management plan has not been developed at this time, however client was given the after-hours number / 911 should there be a change in any of these risk factors.     Appearance:   Appropriate    Eye Contact:   Good    Psychomotor Behavior: Normal    Attitude:   Cooperative  Pleasant    Orientation:   All   Speech    Rate / Production: Normal     Volume:  Normal    Mood:    Anxious    Affect:    Appropriate    Thought Content:  Clear    Thought Form:  Coherent  Logical    Insight:    Fair      Medication Review:   No current psychiatric medications prescribed     Medication Compliance:   Yes     Changes in Health Issues:   None reported     Chemical Use Review:   Substance Use: Chemical use reviewed, no active concerns identified      Tobacco Use: No current tobacco use.       Collateral Reports Completed:   Not Applicable    PLAN: (Client Tasks / Therapist Tasks / Other)  Future appointments are scheduled. Homework: Reduce the frequency of weighing self by 50%, and use calming and distracting activities to manage stress of not weighing self. Keep a thought log at meals to identify unhealthy and unrealistic thinking patterns related to food and weight loss. Create a list of additional ways to calm, distract, and entertain yourself. Place an emphasis on the activity, rather than the meal, at an upcoming family gathering.      Brenda Perez PsyD LP                                                       Treatment Plan    Client's Name: Criss SNATOS  Arian  YOB: 1970    Date: 3/10/2017    DSM-V Diagnoses: 300.00 Unspecified Anxiety Disorder  Psychosocial / Contextual Factors: strain with roommate, spouse, and nephews  WHODAS: 22    Referral / Collaboration:  Referral to another professional/service is not indicated at this time..    Anticipated number of session or this episode of care: reassess after 12 sessions      MeasurableTreatment Goal(s) related to diagnosis / functional impairment(s)  Goal 1: Client will learn coping skills to manage stress and adjust to post-surgical lifestyle changes more effectively.    I will know I've met my goal when I'm not weighing myself all the time and feeling more comfortable with my weight loss.      Objective #A (Client Action)    Client will identify thinking patterns that contribute to anxiety about weight and eating habits.  Status: Continued - Date(s): 3/10/2017     Intervention(s)  Therapist will assign homework (thought  logs), assist client in surfacing and replacement ineffective thinking patterns.    Objective #B  Client will identify new ways to cope with stress and worry thoughts.  Status: Continued - Date(s): 3/10/2017     Intervention(s)  Therapist will assign homework, problem-solve barriers to follow through.    Objective #C  Client will identify and challenge unreasonable or unrealistic expectations about weight loss.  Status: Continued - Date(s): 3/10/2017     Intervention(s)  Therapist will assist client in identifying and balancing her expectations.        Client has reviewed and agreed to the above plan.      Brenda Perez PsyD LP  3/10/2017

## 2017-03-10 NOTE — MR AVS SNAPSHOT
MRN:[id number]                      After Visit Summary   3/10/2017    Criss Don    MRN: [id number]           Visit Information        Provider Department      3/10/2017 8:00 AM Brenda Perez PsyD [facility] Generic      Your next 10 appointments already scheduled     Mar 17, 2017 10:00 AM CDT   LAB with [facility] LAB   [facility] [place] [address] [facility] [address]   [phone]           OUTSIDE LABS:  Please include name of facility and Physician that is requesting outside labs be drawn.  Please indicate if labs are fasting or non-fasting on appt notes.  Be as specific as you can on which labs are being drawn.            Mar 24, 2017  8:00 AM CDT   Return Visit with Brenda Perez PsyD   [facility] [place] ([facility])    [address] [place] [address]  [facility] [address]   [phone]            Apr 14, 2017  8:00 AM CDT   Return Visit with Brenda Perez PsyD   [facility] [place] ([facility]    [address] [place] [address] [place] [address]   [phone]            Apr 28, 2017 10:30 AM CDT   Return Visit with Cris Paez PA-C   [facility] ([facility])    [address]  [address]  [facility] [address] [phone]            Apr 28, 2017 11:00 AM CDT   Return Visit with Usman Raymond Diet 2, RD   [facility] ([facility])    [address]  [address]  [facility] [address] [phone]              Care EveryWhere ID     This is your Care EveryWhere ID. This could be used by other organizations to access your [facility] medical records  [id number]

## 2017-03-17 DIAGNOSIS — E87.6 LOW SERUM POTASSIUM: ICD-10-CM

## 2017-03-17 DIAGNOSIS — E87.6 HYPOKALEMIA: ICD-10-CM

## 2017-03-17 DIAGNOSIS — Z98.84 BARIATRIC SURGERY STATUS: ICD-10-CM

## 2017-03-17 LAB
MAGNESIUM SERPL-MCNC: 2 MG/DL (ref 1.6–2.3)
POTASSIUM SERPL-SCNC: 3.8 MMOL/L (ref 3.4–5.3)

## 2017-03-17 PROCEDURE — 36415 COLL VENOUS BLD VENIPUNCTURE: CPT | Performed by: PHYSICIAN ASSISTANT

## 2017-03-17 PROCEDURE — 84132 ASSAY OF SERUM POTASSIUM: CPT | Performed by: PHYSICIAN ASSISTANT

## 2017-03-17 PROCEDURE — 83735 ASSAY OF MAGNESIUM: CPT | Performed by: PHYSICIAN ASSISTANT

## 2017-03-24 ENCOUNTER — OFFICE VISIT (OUTPATIENT)
Dept: PSYCHOLOGY | Facility: CLINIC | Age: 47
End: 2017-03-24
Payer: COMMERCIAL

## 2017-03-24 DIAGNOSIS — F41.9 ANXIETY DISORDER, UNSPECIFIED TYPE: Primary | ICD-10-CM

## 2017-03-24 PROCEDURE — 90834 PSYTX W PT 45 MINUTES: CPT | Performed by: PSYCHOLOGIST

## 2017-03-24 NOTE — MR AVS SNAPSHOT
"                  MRN:[id number]                      After Visit Summary   3/24/2017    Criss Don    MRN: [id number]           Visit Information        Provider Department      3/24/2017 8:00 AM [facility] Generic      Your next 10 appointments already scheduled     Jun 23, 2017  8:00 AM CDT   Return Visit with Brenda Perez PsyD   [facility] [place] ([facility] [place])    [address] [place] [address]  [place] [address]   [phone]            Jul 07, 2017  8:00 AM CDT   Return Visit with Brenda Perez PsyD   [facility] [place] ([facility] [place])    [address] [place] [address] [place] [address]   [phone]            Jul 18, 2017  8:00 AM CDT   Return Visit with Brenda Perez PsyD   [facility] [place] ([facility] [place])    [address] [place], [place] [address] [place] [address]   [phone]            Oct 27, 2017 10:00 AM CDT   Annual Visit with Cris Paez PA-C   [facility] ([facility])    [address] [place] [address]   [phone]            Oct 27, 2017 10:30 AM CDT   Annual Visit with Usman Raymond Diet 2, RD   [facility])    [address] [place] [address]   [phone]              MyChart Information     optionsXpress lets you send messages to your doctor, view your test results, renew your prescriptions, schedule appointments and more. To sign up, go to [email or web address] . Click on \"Log in\" on the left side of the screen, which will take you to the Welcome page. Then click on \"Sign up Now\" on the right side of the page.     You will be asked to enter the access code listed below, as well as some personal information. Please follow the directions to create your " username and password.     Your access code is: [email or web address]  Expires: 2017 10:40 AM     Your access code will  in 90 days. If you need help or a new code, please call your [place] clinic or [phone].        Care EveryWhere ID     This is your Care EveryWhere ID. This could be used by other organizations to access your [place] medical records  [id number]

## 2017-04-14 ENCOUNTER — OFFICE VISIT (OUTPATIENT)
Dept: PSYCHOLOGY | Facility: CLINIC | Age: 47
End: 2017-04-14
Payer: COMMERCIAL

## 2017-04-14 DIAGNOSIS — F41.9 ANXIETY DISORDER, UNSPECIFIED TYPE: Primary | ICD-10-CM

## 2017-04-14 PROCEDURE — 90834 PSYTX W PT 45 MINUTES: CPT | Performed by: PSYCHOLOGIST

## 2017-04-14 NOTE — MR AVS SNAPSHOT
"                  MRN:[id number]                      After Visit Summary   4/14/2017    Criss Don    MRN: [id number]           Visit Information        Provider Department      4/14/2017 8:00 AM Brenda Perez PsyD [facility] [place] [facility] Generic      Your next 10 appointments already scheduled     Jun 23, 2017  8:00 AM CDT   Return Visit with Brenda Perez PsyD   [facility] [place] ([facility] [place])    [address] [place] [address]  [place] [address]   [phone]            Jul 07, 2017  8:00 AM CDT   Return Visit with Brenda Perez PsyD   [facility] [place] ([facility] [place])    [address] [place] [address] [place] [address]   [phone]            Jul 18, 2017  8:00 AM CDT   Return Visit with Brenda Perez PsyD   [facility] [place] ([facility] [place])    [address] [place], [place] [address] [place] [address]   [phone]            Oct 27, 2017 10:00 AM CDT   Annual Visit with Cris Paez PA-C   [facility] ([facility])    [address] [place] [address]   [phone]            Oct 27, 2017 10:30 AM CDT   Annual Visit with Usman Raymond Diet 2, RD   [facility])    [address] [place] [address]   [phone]              MyChart Information     [employer] lets you send messages to your doctor, view your test results, renew your prescriptions, schedule appointments and more. To sign up, go to [email or web address] . Click on \"Log in\" on the left side of the screen, which will take you to the Welcome page. Then click on \"Sign up Now\" on the right side of the page.     You will be asked to enter the access code listed below, as well as some personal information. Please follow the directions to create your " username and password.     Your access code is: [email or web address]  Expires: 2017 10:40 AM     Your access code will  in 90 days. If you need help or a new code, please call your [place] clinic or [phone].        Care EveryWhere ID     This is your Care EveryWhere ID. This could be used by other organizations to access your [place] medical records  [id number]

## 2017-04-28 ENCOUNTER — HOSPITAL ENCOUNTER (OUTPATIENT)
Dept: LAB | Facility: CLINIC | Age: 47
Discharge: HOME | End: 2017-04-28
Attending: PHYSICIAN ASSISTANT | Admitting: PHYSICIAN ASSISTANT
Payer: COMMERCIAL

## 2017-04-28 ENCOUNTER — OFFICE VISIT (OUTPATIENT)
Dept: SURGERY | Facility: CLINIC | Age: 47
End: 2017-04-28
Payer: COMMERCIAL

## 2017-04-28 VITALS
SYSTOLIC BLOOD PRESSURE: 113 MMHG | DIASTOLIC BLOOD PRESSURE: 72 MMHG | WEIGHT: 199 LBS | BODY MASS INDEX: 35.82 KG/M2 | HEART RATE: 85 BPM

## 2017-04-28 DIAGNOSIS — R79.89 ELEVATED LFTS: ICD-10-CM

## 2017-04-28 DIAGNOSIS — E87.6 HYPOKALEMIA: ICD-10-CM

## 2017-04-28 DIAGNOSIS — E66.9 OBESITY (BMI 30-39.9): ICD-10-CM

## 2017-04-28 DIAGNOSIS — K59.09 OTHER CONSTIPATION: ICD-10-CM

## 2017-04-28 DIAGNOSIS — K91.2 POSTSURGICAL MALABSORPTION: ICD-10-CM

## 2017-04-28 DIAGNOSIS — Z98.84 BARIATRIC SURGERY STATUS: Primary | ICD-10-CM

## 2017-04-28 DIAGNOSIS — Z98.84 BARIATRIC SURGERY STATUS: ICD-10-CM

## 2017-04-28 DIAGNOSIS — R74.01 ELEVATED ALT MEASUREMENT: ICD-10-CM

## 2017-04-28 DIAGNOSIS — E87.6 LOW POTASSIUM SYNDROME: ICD-10-CM

## 2017-04-28 DIAGNOSIS — L65.0 TELOGEN EFFLUVIUM: ICD-10-CM

## 2017-04-28 LAB
ALBUMIN SERPL-MCNC: 3.5 G/DL (ref 3.4–5)
ALP SERPL-CCNC: 75 U/L (ref 40–150)
ALT SERPL W P-5'-P-CCNC: 36 U/L (ref 0–50)
AST SERPL W P-5'-P-CCNC: 16 U/L (ref 0–45)
BILIRUB DIRECT SERPL-MCNC: 0.1 MG/DL (ref 0–0.2)
BILIRUB SERPL-MCNC: 0.5 MG/DL (ref 0.2–1.3)
DEPRECATED CALCIDIOL+CALCIFEROL SERPL-MC: 63 UG/L (ref 20–75)
ERYTHROCYTE [DISTWIDTH] IN BLOOD BY AUTOMATED COUNT: 14.4 % (ref 10–15)
FERRITIN SERPL-MCNC: 22 NG/ML (ref 8–252)
HCT VFR BLD AUTO: 42.3 % (ref 35–47)
HGB BLD-MCNC: 14 G/DL (ref 11.7–15.7)
IRON SATN MFR SERPL: 10 % (ref 15–46)
IRON SERPL-MCNC: 27 UG/DL (ref 35–180)
MAGNESIUM SERPL-MCNC: 2 MG/DL (ref 1.6–2.3)
MCH RBC QN AUTO: 28.5 PG (ref 26.5–33)
MCHC RBC AUTO-ENTMCNC: 33.1 G/DL (ref 31.5–36.5)
MCV RBC AUTO: 86 FL (ref 78–100)
PLATELET # BLD AUTO: 269 10E9/L (ref 150–450)
POTASSIUM SERPL-SCNC: 3.8 MMOL/L (ref 3.4–5.3)
PROT SERPL-MCNC: 7.1 G/DL (ref 6.8–8.8)
PTH-INTACT SERPL-MCNC: 70 PG/ML (ref 12–72)
RBC # BLD AUTO: 4.91 10E12/L (ref 3.8–5.2)
TIBC SERPL-MCNC: 284 UG/DL (ref 240–430)
WBC # BLD AUTO: 6.7 10E9/L (ref 4–11)

## 2017-04-28 PROCEDURE — 83550 IRON BINDING TEST: CPT | Performed by: PHYSICIAN ASSISTANT

## 2017-04-28 PROCEDURE — 83970 ASSAY OF PARATHORMONE: CPT | Performed by: PHYSICIAN ASSISTANT

## 2017-04-28 PROCEDURE — 97803 MED NUTRITION INDIV SUBSEQ: CPT | Performed by: DIETITIAN, REGISTERED

## 2017-04-28 PROCEDURE — 85027 COMPLETE CBC AUTOMATED: CPT | Performed by: PHYSICIAN ASSISTANT

## 2017-04-28 PROCEDURE — 99214 OFFICE O/P EST MOD 30 MIN: CPT | Performed by: PHYSICIAN ASSISTANT

## 2017-04-28 PROCEDURE — 83540 ASSAY OF IRON: CPT | Performed by: PHYSICIAN ASSISTANT

## 2017-04-28 PROCEDURE — 84132 ASSAY OF SERUM POTASSIUM: CPT | Performed by: PHYSICIAN ASSISTANT

## 2017-04-28 PROCEDURE — 82728 ASSAY OF FERRITIN: CPT | Performed by: PHYSICIAN ASSISTANT

## 2017-04-28 PROCEDURE — 83735 ASSAY OF MAGNESIUM: CPT | Performed by: PHYSICIAN ASSISTANT

## 2017-04-28 PROCEDURE — 80076 HEPATIC FUNCTION PANEL: CPT | Performed by: PHYSICIAN ASSISTANT

## 2017-04-28 PROCEDURE — 82306 VITAMIN D 25 HYDROXY: CPT | Performed by: PHYSICIAN ASSISTANT

## 2017-04-28 PROCEDURE — 36415 COLL VENOUS BLD VENIPUNCTURE: CPT | Performed by: PHYSICIAN ASSISTANT

## 2017-04-28 RX ORDER — ASPIRIN 81 MG
100 TABLET, DELAYED RELEASE (ENTERIC COATED) ORAL 2 TIMES DAILY PRN
Qty: 180 TABLET | Refills: 1 | Status: ON HOLD | OUTPATIENT
Start: 2017-04-28 | End: 2018-10-24

## 2017-04-28 NOTE — PROGRESS NOTES
POST-OPERATIVE NUTRITION APPOINTMENT  DATE OF VISIT: 2017  Name: KOJO ASHTON  : 1970  Gender: Female  MRN: [id number]  Age: 46    ASSESSMENT:  REASON FOR VISIT:  KOJO ASHTON is a 46 year old Female presents today for 6 month PO nutrition follow-up appointment.  DIAGNOSIS:  Status post Laparoscopic Rsaheeda-en-Y Gastric Bypass surgery.  Obesity Class II  ANTHROPOMETRICS:  Height: 62.5 inches  Weight: 199 lbs  BMI: 35.8 kg/m2  VITAMINS AND MINERALS:  Multivitamin - 2 adult doses daily   Calcium - 500mg TID   Vitamin D - 3000 international units, daily   Vitamin B12 - 2500 SL 3x/week   Iron - Vitron C daily  NUTRITION HISTORY:  Breakfast: egg w/onions and cheese OR yogurt w/fruit   Lunch: yogurt or SF jello, chicken and light dougherty  Supper: chicken, potatoes (d/t low potassium), sliced ham, SF jello w/fruit, protein shake  Snacks: none  Beverages:water (64+) + 5oz prune juice  Consuming liquid calories: Protein supplements/shakes  Protein intake: 60-90 grams/day  Tolerate regular texture food: Yes  Any foods not tolerated details: red meat, bread  Portion size: 1/2 - 1 cup  Take 30 minutes to consume each meal: No  Eat protein foods first: No  Fluids and meals separate by at least 30 minutes: Yes  Chew foods 20 plus times: Yes  Tolerating diet: bariatric regular diet  Drinking high protein supplements/shakes: Yes  Consuming meals per day: 3  Consuming snacks per day: none  Comment: Pt continues to struggle with body perception and visual appreciation of weight loss - meeting with therapist regarding these issues. Reiterated the importance of not fixating on CHO and grams of sugar, but rather protein, volume and variety.     PHYSICAL ACTIVITY:  Type: Walking or stationary bike, weight machines, water aerobics  Frequency: 4-5 times a week  Duration (min): 60  DIAGNOSIS:  Previous Nutrition Diagnosis: Altered gastrointestinal function related to alteration in gastrointestinal structure as evidenced by  history of Laparoscopic Rasheeda-en-Y Gastric Bypass surgery.  Unchanged, modified below.   Previous goals:  Try 3 new foods each week. - met   Have 4oz of prune juice each day - met   Focus on protein (60-90g), hydration (48-64oz) and portions (1c or less) rather than sugar and carbohydrate content of food. - improving  Current Nutrition Diagnosis: Altered gastrointestinal function related to alteration in gastrointestinal structure as evidenced by history of Laparoscopic Rasheeda-en-Y Gastric Bypass   Unchanged  INTERVENTION:  Nutrition Prescription: Eat 3 meals a day at regular intervals. Consume 60-90 grams of protein daily. Follow post-surgical vitamins and minerals protocol.  Goals:  Have 3 food groups per meal.   Aim for 60-90g of protein, 1cup volume per meal, and no more than 3g/fat per every 100 kcals.   Implementation: Discussed progress toward previous goals; reinforced importance of following bariatric lifestyle changes  NUTRITION MONITORING AND EVALUATION:  Anticipated compliance: Good  Verbalized understanding by discussing the importance of increasing variety.     Follow up: Continue to monitor pt closely regarding wt loss and diet, Patient to call with questions/concersns.  Patient to follow up in 6 months, at 1-year post-op  TIME SPENT WITH PATIENT:  25 minutes  Marimar Smith RD, LD  Clinical Dietitian

## 2017-04-28 NOTE — MR AVS SNAPSHOT
MRN:[id number]                      After Visit Summary   4/28/2017    Criss Don    MRN: [id number]           Visit Information        Provider Department      4/28/2017 11:00 AM 3, Usman Arthur RD [facility]      Your next 10 appointments already scheduled     Apr 28, 2017 11:30 AM CDT   LAB with  LAB ONLY   [facility] ([facility])    [address]  [place] [address]   [phone]           Patient must bring picture ID.  Patient should be prepared to give a urine specimen  Please do not eat 10-12 hours before your appointment if you are coming in fasting for labs on lipids, cholesterol, or glucose (sugar).  Pregnant women should follow their Care Team instructions. Water with medications is okay. Do not drink coffee or other fluids.   If you have concerns about taking  your medications, please ask at office or if scheduling via makerSQRhart, send a message by clicking on Secure Messaging, Message Your Care Team.            May 05, 2017  8:00 AM CDT   Return Visit with Brenda Perez PsyD   [facility] [place] ([facility] [place])    [address] [place] [address]   [phone]            May 19, 2017 11:00 AM CDT   Return Visit with Brenda Perez PsyD   [facility] [place] ([facility] [place])    [address] [place] [address]   [phone]            Oct 27, 2017 10:00 AM CDT   Annual Visit with Cris Paez PA-C   [facility] ([facility])    [address]  [address]  [place] [address] [phone]            Oct 27, 2017 10:30 AM CDT   Annual Visit with Usman Arthur 2, MCKENZIE   [facility] ([facility])    [address]  [address]  [place]  "[phone]   [phone]              MyChart Information     [employer] lets you send messages to your doctor, view your test results, renew your prescriptions, schedule appointments and more. To sign up, go to [email or web address] . Click on \"Log in\" on the left side of the screen, which will take you to the Welcome page. Then click on \"Sign up Now\" on the right side of the page.     You will be asked to enter the access code listed below, as well as some personal information. Please follow the directions to create your username and password.     Your access code is: [email or web address]  Expires: 2017 10:40 AM     Your access code will  in 90 days. If you need help or a new code, please call your [place] clinic or [phone].        Care EveryWhere ID     This is your Care EveryWhere ID. This could be used by other organizations to access your [place] medical records  [id number]        "

## 2017-04-28 NOTE — PROGRESS NOTES
Bariatric Follow Up Visit   2017    RE: KOJO ASHTON  MR#: [id number]  : 1970    HISTORY OF PRESENT ILLNESS: KOJO ASHTON returns today for her follow-up appointment status post Laparoscopic Rasheeda-en-Y Gastric Bypass surgery. She has lost 108.7 lbs since starting our program. Patient is feeling well. She is doing the following exercise(s): at gym 4 days a week on treadmill and /or other machines.   Other 3 days she is busy at work or does a walk at home.      Patient is taking the following bariatric postoperative vitamins:  2 Complete multivitamins with minerals (at different times than calcium)  3000 Int Units of Vitamin D daily   6121-4693 mg of Calcium daily in divided doses  2500 mcg of Vitamin B12 sl every three days  1 Iron/Vit C. Daily    Is avoiding NSAID use.    OBESITY RELATED CONDITIONS:  Pre-diabetes: resolved HgA1C 5.1 17    SOCIAL HISTORY:  She does not smoke. She does not drink alcohol. She attends support group and feels safe in current environment.  Is seeing Brenda Perez regularly for therapy.    REVIEW OF SYSTEMS:  GI:  Nausea-seldom  Vomiting-seldom, this has improved in the last 2 weeks.  Is having some trouble tolerating meats but that is very normal at this time in her post op recovery.   Diarrhea-never  Constipation-occasional, having a BM inconsistently.  If she has not gone for several days she notices it is painful to evacuate.  Occasionally gets lower abdominal pain.    Dysphagia-never  Abdominal Pains-never  Heartburn-never  Skin:  Intertriginous Irritation-never    Psychiatric:  Anxiety disorder, NOS:  pt is currently working with Cesario Lopez regarding her body image problem.  Even with her current weight loss she continues to feel she is the same size she was before. She is still weighing herself frequently. Is concerns she might be eating foods to high in fat and sugar.  Would like clear guidelines from the dietitian regarding this.     :    Pt  has monthly menses.  Is not using birth control.  States she infrequently has intercourse.  When she does her ex  pulls out.  She does have condoms but he does not like using them.      PHYSICAL EXAMINATION:   /72  Pulse 85  Wt 199 lb (90.3 kg)  BMI 35.82 kg/m2,  GENERAL: Alert and oriented x3. NAD  HEART: Regular rate and rhythm, No murmurs, rubs or gallops  LUNGS: Breathing unlabored, Lung sounds clear to auscultation bilaterally  ABDOMEN: soft; nontender; nondistended, incision well healed. No hernia  EXTREMITIES: No LE edema bilaterally, Gait normal  SKIN: No intertriginous irritation or rash    ASSESSMENT/PLAN:   6 months s/p Laparoscopic Rasheeda-en-Y Gastric Bypass-    Return to clinic in 6 months  Post surgical malabsorption-    Ordered vitamin D, PTH, ferritin, TIBC, and iron labs.   Continue taking recommended post-op vitamins along with vitamin D 3000 Int Units.   We discussed the need for 2 forms of birth control for the first 18 months following bariatric surgery. Stress reasoning behind this. Reviewed OCP options, mirena, IUD, abstinence.    Pt was not ready to commit to any birth control besides condoms.   Elevated LFT   suspect this is transient from manipulating the liver during surgery. Will recheck hepatic panel today.  Hypokalemia   Stay on potasium supplement.   Recheck potassium and magnesium today.    Continue seeing PCP for follow up.  Telogen effluvium   Gave written patient information on telogen effluvium and discussed normal course of hair loss.    Constipation:   Ordered stool softener BID daily.   Anxiety disorder, unspecified-    Continue to see SONI Lopez   Work on reducing the number of times you weigh in   Try to shift focus on how much easier it is to move/exercise with the 100 lbs lost,       This was a 25 minute visit with greater than 50% spent on counseling.

## 2017-04-28 NOTE — PATIENT INSTRUCTIONS
1.  Have labs drawn.  2.  Can change to prenatal vitamins as long as it contains the following:  in at least 18 mg of Iron, 400 mg of Folic Acid,  2 mg of Copper, 1.5 mg of Thiamine.  3.  Use 2 forms of birth control for the first 18 months following surgery.   Remember following this surgery you are extremely fertile.    4.  Follow up in 6 months.    Telogen Effluvium (Hair Loss)  What Is It?  At any given time, about 85% to 90% of the hairs on the average person's head are actively growing (the anagen phase) and the others are resting (the telogen phase). Typically, a hair is in the anagen phase for two to four years, then enters the telogen phase, rests for about two to four months, and then falls out and is replaced by a new, growing hair. The average person naturally loses about 100 hairs a day.  In a person with telogen effluvium, some body change or shock pushes more hairs into the telogen phase. Typically in this condition, about 30% of the hairs stop growing and go into the resting phase before falling out. So if you have telogen effluvium, you may lose an average of 300 hairs a day instead of 100.  Telogen effluvium can be triggered by a number of different events, including:    Surgery (like wt. loss surgery)    High fever or severe infection    Extreme weight loss    Extreme change in diet    Abrupt hormonal changes, including those associated with childbirth and menopause    Iron deficiency    Hypothyroidism or hyperthyroidism    A new medication  Because hairs that enter the telogen phase rest in place for two to four months before falling out, you may not notice any hair loss until two to four months after the event that caused the problem. Telogen effluvium rarely lasts longer than six months, although some cases last longer.  Although losing a great number of hairs within a short time can be frightening, the condition is usually temporary. Each hair that is pushed prematurely into the telogen phase  is replaced by a new, growing hair, so there is no danger of complete baldness. Because hair on the scalp grows slowly, your hair may feel or look thinner than usual for a time, but fullness will return as the new hairs grow in.  Expected Duration  Typically, hair loss begins two to four months after the event that triggered the problem, and lasts approximately six months. New hairs begin growing immediately after the hair falls out, but significant growth may not be noticed for several months.  Prevention  Nothing can be done to prevent most of the types of physical shock that can start telogen effluvium. Some cases may be caused by a poor diet, and these might be prevented by eating a balanced diet that provides enough protein, iron and other nutrients.  Treatment  No treatment for active telogen effluvium has been proven effective.  Prognosis  The outlook for telogen effluvium is very good. Most cases run their course within six to nine months, and the hair usually grows back. In some cases, the disorder may last longer. In other cases, not all hairs grow back.

## 2017-04-28 NOTE — MR AVS SNAPSHOT
After Visit Summary   4/28/2017    Criss Don    MRN: [id number]           Patient Information     Date Of Birth          1970        Visit Information        Provider Department      4/28/2017 10:30 AM Cris Paez PA-C [facility]      Today's Diagnoses     Bariatric surgery status    -  1    Obesity (BMI 30-39.9)        Elevated LFTs        Postsurgical malabsorption        Other constipation        Telogen effluvium          Care Instructions    1.  Have labs drawn.  2.  Can change to prenatal vitamins as long as it contains the following:  in at least 18 mg of Iron, 400 mg of Folic Acid,  2 mg of Copper, 1.5 mg of Thiamine.  3.  Use 2 forms of birth control for the first 18 months following surgery.   Remember following this surgery you are extremely fertile.    4.  Follow up in 6 months.    Telogen Effluvium (Hair Loss)  What Is It?  At any given time, about 85% to 90% of the hairs on the average person's head are actively growing (the anagen phase) and the others are resting (the telogen phase). Typically, a hair is in the anagen phase for two to four years, then enters the telogen phase, rests for about two to four months, and then falls out and is replaced by a new, growing hair. The average person naturally loses about 100 hairs a day.  In a person with telogen effluvium, some body change or shock pushes more hairs into the telogen phase. Typically in this condition, about 30% of the hairs stop growing and go into the resting phase before falling out. So if you have telogen effluvium, you may lose an average of 300 hairs a day instead of 100.  Telogen effluvium can be triggered by a number of different events, including:    Surgery (like wt. loss surgery)    High fever or severe infection    Extreme weight loss    Extreme change in diet    Abrupt hormonal changes, including those associated  with childbirth and menopause    Iron deficiency    Hypothyroidism or hyperthyroidism    A new medication  Because hairs that enter the telogen phase rest in place for two to four months before falling out, you may not notice any hair loss until two to four months after the event that caused the problem. Telogen effluvium rarely lasts longer than six months, although some cases last longer.  Although losing a great number of hairs within a short time can be frightening, the condition is usually temporary. Each hair that is pushed prematurely into the telogen phase is replaced by a new, growing hair, so there is no danger of complete baldness. Because hair on the scalp grows slowly, your hair may feel or look thinner than usual for a time, but fullness will return as the new hairs grow in.  Expected Duration  Typically, hair loss begins two to four months after the event that triggered the problem, and lasts approximately six months. New hairs begin growing immediately after the hair falls out, but significant growth may not be noticed for several months.  Prevention  Nothing can be done to prevent most of the types of physical shock that can start telogen effluvium. Some cases may be caused by a poor diet, and these might be prevented by eating a balanced diet that provides enough protein, iron and other nutrients.  Treatment  No treatment for active telogen effluvium has been proven effective.  Prognosis  The outlook for telogen effluvium is very good. Most cases run their course within six to nine months, and the hair usually grows back. In some cases, the disorder may last longer. In other cases, not all hairs grow back.                    Follow-ups after your visit        Follow-up notes from your care team     Return in 6 months (on 10/28/2017).      Your next 10 appointments already scheduled     Apr 28, 2017 11:00 AM CDT   Return Visit with Usman Arthur 3, RD   [facility]  ([facility])    [address]  [place] [address]   [phone]            May 05, 2017  8:00 AM CDT   Return Visit with Brenda Perez PsyD   [facility] [place] ([facility] [place])    [address]  [place] [address]   [phone]            May 19, 2017 11:00 AM CDT   Return Visit with Brenda Perez PsyD   [facility] [place] ([facility] [place])    [facility]  [place] [address]   [phone]              Future tests that were ordered for you today     Open Future Orders        Priority Expected Expires Ordered    CBC with platelets Routine 4/28/2017 10/25/2017 4/28/2017    Iron and Iron Binding Capacity Routine 4/28/2017 10/25/2017 4/28/2017    Ferritin Routine 4/28/2017 10/25/2017 4/28/2017    Vitamin D Screen Routine 4/28/2017 10/25/2017 4/28/2017    Parathyroid Hormone Intact Routine 4/28/2017 10/25/2017 4/28/2017    Hepatic panel Routine  4/28/2018 4/28/2017            Who to contact     If you have questions or need follow up information about today's clinic visit or your schedule please contact [facility] directly at [phone].  Normal or non-critical lab and imaging results will be communicated to you by [employer], letter or phone within 4 business days after the clinic has received the results. If you do not hear from us within 7 days, please contact the clinic through [employer] or phone. If you have a critical or abnormal lab result, we will notify you by phone as soon as possible.  Submit refill requests through [employer] or call your pharmacy and they will forward the refill request to us. Please allow 3 business days for your refill to be completed.          Additional Information About Your Visit        [employer] Information     [employer] lets you send messages to your doctor, view your test results, renew your prescriptions, schedule  "appointments and more. To sign up, go to [email or web address] . Click on \"Log in\" on the left side of the screen, which will take you to the Welcome page. Then click on \"Sign up Now\" on the right side of the page.     You will be asked to enter the access code listed below, as well as some personal information. Please follow the directions to create your username and password.     Your access code is: [email or web address]  Expires: 2017 10:40 AM     Your access code will  in 90 days. If you need help or a new code, please call your [place] clinic or [phone].        Care EveryWhere ID     This is your Care EveryWhere ID. This could be used by other organizations to access your [place] medical records  [id number]        Your Vitals Were     Pulse BMI (Body Mass Index)                85 35.82 kg/m2           Blood Pressure from Last 3 Encounters:   17 113/72   17 116/78   17 100/70    Weight from Last 3 Encounters:   17 199 lb (90.3 kg)   17 232 lb 14.4 oz (105.6 kg)   17 235 lb (106.6 kg)              We Performed the Following     OP ROOMING NOTE TO DEMETRI          Today's Medication Changes          These changes are accurate as of: 17 10:40 AM.  If you have any questions, ask your nurse or doctor.               Start taking these medicines.        Dose/Directions    docusate sodium 100 MG tablet   Commonly known as:  COLACE   Used for:  Other constipation   Started by:  Cris Paez PA-C        Dose:  100 mg   Take 100 mg by mouth 2 times daily as needed for constipation   Quantity:  180 tablet   Refills:  1            Where to get your medicines      These medications were sent to [id number] - [place], [place] - [address]  [address] [place] [address]    Hours:  Added 10/26 CK Checked with pharmacy Phone:  [phone]     docusate sodium 100 MG tablet                Primary Care Provider Office Phone # Fax " #    Katrina Garner PA-C [phone]       [facility] [place] [address] [place] [address] [address]  [place] [address]        Thank you!     Thank you for choosing [facility]  for your care. Our goal is always to provide you with excellent care. Hearing back from our patients is one way we can continue to improve our services. Please take a few minutes to complete the written survey that you may receive in the mail after your visit with us. Thank you!             Your Updated Medication List - Protect others around you: Learn how to safely use, store and throw away your medicines at www.disposemymeds.org.          This list is accurate as of: 4/28/17 10:40 AM.  Always use your most recent med list.                   Brand Name Dispense Instructions for use    B-12 2500 MCG Subl      Place 1 tablet under the tongue 3 times weekly       docusate sodium 100 MG tablet    COLACE    180 tablet    Take 100 mg by mouth 2 times daily as needed for constipation       FLAXSEED OIL PO      Take 1,000 mg by mouth 2 times daily Reported on 4/28/2017       MAGNESIUM OXIDE PO      Take 500 mg % by mouth       multivitamin  peds with iron 60 MG chewable tablet      Take 2 chew tab by mouth every morning       potassium chloride SA 20 MEQ CR tablet    potassium chloride    60 tablet    Take 1 tablet (20 mEq) by mouth 2 times daily       VIACTIV 500-500-40 MG-UNT-MCG Chew   Generic drug:  calcium-vitamin D-vitamin K      Take 1 tablet by mouth 3 times daily       VITAMIN D3 PO      Take 2,000 Units by mouth daily       VITRON-C  MG Tabs tablet   Generic drug:  ferrous fumarate 65 mg ([place]. FE)-Vitamin C 125 mg      Take 1 tablet by mouth every morning

## 2017-05-05 ENCOUNTER — OFFICE VISIT (OUTPATIENT)
Dept: PSYCHOLOGY | Facility: CLINIC | Age: 47
End: 2017-05-05
Payer: COMMERCIAL

## 2017-05-05 DIAGNOSIS — F41.9 ANXIETY DISORDER, UNSPECIFIED TYPE: Primary | ICD-10-CM

## 2017-05-05 PROCEDURE — 90834 PSYTX W PT 45 MINUTES: CPT | Performed by: PSYCHOLOGIST

## 2017-05-05 NOTE — MR AVS SNAPSHOT
"                  MRN:[id number]                      After Visit Summary   5/5/2017    Criss Don    MRN: [id number]           Visit Information        Provider Department      5/5/2017 8:00 AM Brenda Perez PsyD [facility]      Your next 10 appointments already scheduled     Jun 23, 2017  8:00 AM CDT   Return Visit with Brenda Perez PsyD   [facility] [place] ([facility] [place])    [address] [place] [address]  [place] [address]   [phone]            Jul 07, 2017  8:00 AM CDT   Return Visit with Brenda Perez PsyD   [facility] [place] ([facility] [place])    [address] [place] [address]   [phone]            Jul 18, 2017  8:00 AM CDT   Return Visit with Brenda Perez PsyD   [facility] ([facility] [place])    [address] [place] [address]   [phone]            Oct 27, 2017 10:00 AM CDT   Annual Visit with Cris Paez PA-C   [facility] ([facility])    [address] [place] [address] [phone]            Oct 27, 2017 10:30 AM CDT   Annual Visit with Usman Raymond Diet 2, RD   [facility])    [address] [place] [address]   [phone]              MyChart Information     [employer] lets you send messages to your doctor, view your test results, renew your prescriptions, schedule appointments and more. To sign up, go to [email or web address] . Click on \"Log in\" on the left side of the screen, which will take you to the Welcome page. Then click on \"Sign up Now\" on the right side of the page.     You will be asked to enter the access code listed below, as well as some personal information. Please follow the directions to create your username " and password.     Your access code is: [email or web address]  Expires: 2017 10:40 AM     Your access code will  in 90 days. If you need help or a new code, please call your [facility] or [phone].        Care EveryWhere ID     This is your Care EveryWhere ID. This could be used by other organizations to access your [place] medical records  [id number]

## 2017-05-19 ENCOUNTER — OFFICE VISIT (OUTPATIENT)
Dept: PSYCHOLOGY | Facility: CLINIC | Age: 47
End: 2017-05-19
Payer: COMMERCIAL

## 2017-05-19 DIAGNOSIS — F41.9 ANXIETY DISORDER, UNSPECIFIED TYPE: Primary | ICD-10-CM

## 2017-05-19 PROCEDURE — 90834 PSYTX W PT 45 MINUTES: CPT | Performed by: PSYCHOLOGIST

## 2017-05-19 NOTE — MR AVS SNAPSHOT
"                  MRN:[id number]                      After Visit Summary   5/19/2017    Criss Don    MRN: [id number]           Visit Information        Provider Department      5/19/2017 11:00 AM Brenda Perez PsyD [facility] [place] [facility]      Your next 10 appointments already scheduled     Jun 23, 2017  8:00 AM CDT   Return Visit with Brenda Perez PsyD   [facility] [place] ([facility] [place] [facility])    [address] [place] [address]  [place] [address]   [phone]            Jul 07, 2017  8:00 AM CDT   Return Visit with Brenda Perez PsyD   [facility] [place] ([facility])    [address] [place] [address] [place] [address]   [phone]            Jul 18, 2017  8:00 AM CDT   Return Visit with Brenda Perez PsyD   [facility] [place] ([facility])    [address] [place], [place] [address] [address]   [phone]            Oct 27, 2017 10:00 AM CDT   Annual Visit with Cris Paez PA-C   [facility] ([facility])    [address] [place] [address]   [phone]            Oct 27, 2017 10:30 AM CDT   Annual Visit with Usman Raymond Diet 2, RD   [facility])    [address] [place] [address]   [phone]              MyChart Information     Swidjit lets you send messages to your doctor, view your test results, renew your prescriptions, schedule appointments and more. To sign up, go to [email or web address] . Click on \"Log in\" on the left side of the screen, which will take you to the Welcome page. Then click on \"Sign up Now\" on the right side of the page.     You will be asked to enter the access code listed below, as well as some personal information. Please follow the directions to create your " username and password.     Your access code is: [email or web address]  Expires: 2017 10:40 AM     Your access code will  in 90 days. If you need help or a new code, please call your [facility] clinic or [phone].        Care EveryWhere ID     This is your Care EveryWhere ID. This could be used by other organizations to access your [facility] medical records  [id number]        Equal Access to Services     STEVEN MUNOZ : Phong langfordo Sojorge, waaxda luqadaha, qaybta kaalmada adenancie, suri yeager. So [facility] [phone].    ATENCIÓN: Si habla español, tiene a madera disposición servicios gratuitos de asistencia lingüística. Llame al [phone].    We comply with applicable federal civil rights laws and [place] laws. We do not discriminate on the basis of race, color, national origin, age, disability sex, sexual orientation or gender identity.

## 2017-05-24 ENCOUNTER — TELEPHONE (OUTPATIENT)
Dept: FAMILY MEDICINE | Facility: CLINIC | Age: 47
End: 2017-05-24

## 2017-05-24 NOTE — TELEPHONE ENCOUNTER
"Patient reports that her skin burns if she perspires when she is exercising. Describes it as, \" pins and needles\". Rates pain as 10/10. States that it is, \"like getting a body tattoo\". She states that she feels like she could scratch her skin right off.    Patient states that once she rinses off in the shower or cools down her skin calms down. States that her skin from the neck up is not affected.    Asked the patient if her skin was dry. She states that she applies Shea butter which she has used for years. Applies nothing else to her skin.    Patient is wondering if it could be a medication side effect. Asking if due to one of her supplements on her med list.    Please advise. Triage to call the patient back.  Megan Chery RN            "

## 2017-05-25 NOTE — TELEPHONE ENCOUNTER
Not that I could think of. Ask that she think back to when it started and if there was anything new in the weeks prior to that. Otherwise, she could cut things out for a few days to see if that helps.     Otherwise, take measures to stay cool while exercising - fan, breathable / light clothing, ice water. Recommend using eucerin or vanicream moisturizer more often. Also make sure she is staying hydrated.     Katrina Garner, ALEXANDER, PA-C

## 2017-05-25 NOTE — TELEPHONE ENCOUNTER
Left detail message for patient with PCP message below.    Patient to call to with further questions/concerns    Ruth Interiano RN

## 2017-05-30 ENCOUNTER — OFFICE VISIT (OUTPATIENT)
Dept: PSYCHOLOGY | Facility: CLINIC | Age: 47
End: 2017-05-30
Payer: COMMERCIAL

## 2017-05-30 DIAGNOSIS — F41.9 ANXIETY DISORDER, UNSPECIFIED TYPE: Primary | ICD-10-CM

## 2017-05-30 PROCEDURE — 90834 PSYTX W PT 45 MINUTES: CPT | Performed by: PSYCHOLOGIST

## 2017-05-30 NOTE — MR AVS SNAPSHOT
"                  MRN:[id number]                      After Visit Summary   5/30/2017    Criss Don    MRN: [id number]           Visit Information        Provider Department      5/30/2017 8:00 AM [facility]      Your next 10 appointments already scheduled     Jul 07, 2017  8:00 AM CDT   Return Visit with Brenda Perez PsyD   [facility] [place] ([facility] [place])    [address] [place] [address]  [place] [address]   [phone]            Jul 18, 2017  8:00 AM CDT   Return Visit with Brenda Perez PsyD   [facility] [place] ([facility] [place])    [address] [place] [address] [place] [address]   [phone]            Aug 01, 2017  7:00 AM CDT   Return Visit with Brenda Perez PsyD   [facility] [place] ([facility] [place])    [address] [place], [place] [address] [place] [address]   [phone]            Oct 27, 2017 10:00 AM CDT   Annual Visit with Cris Paez PA-C   [facility] ([facility])    [address] [place] [address]   [phone]            Oct 27, 2017 10:30 AM CDT   Annual Visit with Usman Raymond Diet 2, RD   [facility] ([facility])    [address] [place] [address]   [phone]              MyChart Information     Sassor lets you send messages to your doctor, view your test results, renew your prescriptions, schedule appointments and more. To sign up, go to [email or web address] . Click on \"Log in\" on the left side of the screen, which will take you to the Welcome page. Then click on \"Sign up Now\" on the right side of the page.     You will be asked to enter the access code listed below, as well as some personal information. Please follow the directions to create your " username and password.     Your access code is: [email or web address]  Expires: 2017 10:40 AM     Your access code will  in 90 days. If you need help or a new code, please call your [facility] clinic or [phone].        Care EveryWhere ID     This is your Care EveryWhere ID. This could be used by other organizations to access your [facility] medical records  [id number]        Equal Access to Services     STEVEN MUNOZ : Phong langfordo Sojorge, waaxda luqadaha, qaybta kaalmada adenancie, suri yeager. So [facility] [phone].    ATENCIÓN: Si habla español, tiene a madera disposición servicios gratuitos de asistencia lingüística. Llame al [phone].    We comply with applicable federal civil rights laws and [place] laws. We do not discriminate on the basis of race, color, national origin, age, disability sex, sexual orientation or gender identity.

## 2017-06-06 NOTE — PROGRESS NOTES
Progress Note    Client Name: Criss Don  Date: 4/14/2017         Service Type: Individual      Session Start Time: 8:00  Session End Time: 8:45      Session Length: 45     Session #: 5 (with this provider)     Attendees: Client attended alone    Treatment Plan Last Reviewed: 4/14/2017  PHQ-9 / NED-7 :      DATA      Progress Since Last Session (Related to Symptoms / Goals / Homework):   Symptoms: Stable    Homework: Partially completed      Episode of Care Goals: Minimal progress - ACTION (Actively working towards change); Intervened by reinforcing change plan / affirming steps taken     Current / Ongoing Stressors and Concerns:   Reported that she attempted to cut back on weighing herself by 25%, and was able to distract and divert her attention to more productive tasks to keep her mind off fixating on her weight. She acknowledged that she fears she will not lose enough weight even though rationally, she knows she is losing weight. Reported that she continues to feel discouraged by not seeing the difference in her size, and by others not making comments about her weight loss even though she knows she is losing weight. Reported that she has been working on identifying and replacing imbalanced thoughts about her weight and eating to reduce guilt and sadness. Reported that despite these thoughts, she is sticking to her bariatric diet as recommended by her weight loss team and denies problematic eating habits. Reported that she has an upcoming nutrition visit scheduled. Reported that work has been a source of stress due to the demands of her schedule and having some difficult clients. Also reported that there are some upcoming anniversaries (her wedding anniversary, the anniversary of her sister's death) that she is anticipating will be hard on her.          Treatment Objective(s) Addressed in This Session:   stress management  Maintain compliance with post-surgical  "dietary needs     Intervention:   CBT: surfaced and challenged \"should\" statements and unreasonable expectations about weight loss; identified sources of unhealthy and unhelpful comparisons  Solution Focused: coached client on ways to distract away from worry thoughts about weight, and ways to manage interpersonal stress more effectively; encouraged her to bring questions to her nutritionist at their upcoming visit  Supportive therapy:  provided active listening, support, and encouragement. Reinforced follow through on homework.        ASSESSMENT: Current Emotional / Mental Status (status of significant symptoms):   Risk status (Self / Other harm or suicidal ideation)   Client denies current fears or concerns for personal safety.   Client denies current or recent suicidal ideation or behaviors.   Client denies current or recent homicidal ideation or behaviors.   Client denies current or recent self injurious behavior or ideation.   Client denies other safety concerns.   A safety and risk management plan has not been developed at this time, however client was given the after-hours number / 911 should there be a change in any of these risk factors.     Appearance:   Appropriate    Eye Contact:   Good    Psychomotor Behavior: Normal    Attitude:   Cooperative  Pleasant    Orientation:   All   Speech    Rate / Production: Normal     Volume:  Normal    Mood:    Anxious    Affect:    Appropriate    Thought Content:  Clear    Thought Form:  Coherent  Logical    Insight:    Fair      Medication Review:   No current psychiatric medications prescribed     Medication Compliance:   Yes     Changes in Health Issues:   None reported     Chemical Use Review:   Substance Use: Chemical use reviewed, no active concerns identified      Tobacco Use: No current tobacco use.       Collateral Reports Completed:   Not Applicable    PLAN: (Client Tasks / Therapist Tasks / Other)  Future appointments are scheduled. Homework: Reduce the " frequency of weighing self by 50%, and use calming and distracting activities to manage stress of not weighing self. Keep a thought log at meals to identify unhealthy and unrealistic thinking patterns related to food and weight loss. Create a list of additional ways to calm, distract, and entertain yourself.       Brenda Perez PsyD LP                                                       Treatment Plan    Client's Name: Criss Don  YOB: 1970    Date: 4/14/17    DSM-V Diagnoses: 300.00 Unspecified Anxiety Disorder  Psychosocial / Contextual Factors: strain with roommate, spouse, and nephews  WHODAS: 22    Referral / Collaboration:  Referral to another professional/service is not indicated at this time..    Anticipated number of session or this episode of care: reassess after 12 sessions      MeasurableTreatment Goal(s) related to diagnosis / functional impairment(s)  Goal 1: Client will learn coping skills to manage stress and adjust to post-surgical lifestyle changes more effectively.    I will know I've met my goal when I'm not weighing myself all the time and feeling more comfortable with my weight loss.      Objective #A (Client Action)    Client will identify thinking patterns that contribute to anxiety about weight and eating habits.  Status: Continued - Date(s): 4/14/17    Intervention(s)  Therapist will assign homework (thought  logs), assist client in surfacing and replacement ineffective thinking patterns.    Objective #B  Client will identify new ways to cope with stress and worry thoughts.  Status: Continued - Date(s): 4/14/17    Intervention(s)  Therapist will assign homework, problem-solve barriers to follow through.    Objective #C  Client will identify and challenge unreasonable or unrealistic expectations about weight loss.  Status: Continued - Date(s): 4/14/17     Intervention(s)  Therapist will assist client in identifying and balancing her expectations.        Client has  reviewed and agreed to the above plan.      Brenda Perez PsyD LP  4/14/17

## 2017-06-16 NOTE — PROGRESS NOTES
"                                             Progress Note    Client Name: Criss Don  Date: 5/5/2017         Service Type: Individual      Session Start Time: 8:00  Session End Time: 8:45      Session Length: 45     Session #: 6 (with this provider)     Attendees: Client attended alone    Treatment Plan Last Reviewed: 5/5/2017  PHQ-9 / NED-7 :      DATA      Progress Since Last Session (Related to Symptoms / Goals / Homework):   Symptoms: Stable    Homework: Partially completed      Episode of Care Goals: Minimal progress - ACTION (Actively working towards change); Intervened by reinforcing change plan / affirming steps taken     Current / Ongoing Stressors and Concerns:   Reported that she continues to feel discouraged by not seeing the difference in her size, and by others not making comments about her weight loss even though she knows she is losing weight. Reported that she was reinforced at her last post-surgical office visit with her weight loss team, and is feeling a little more reassured that she is making good food choices. Reported that she has been working on identifying and replacing imbalanced thoughts about her weight and eating to reduce guilt and sadness. Reported that there were some difficult anniversaries that passed since our last visit, leaving her feel more sad and down than usual. Reported that she has been coping by trying to distract and get out of the house more. Denied problematic or emotional eating. Reported that has been less prone to feeling overwhelmed and anxious when putting off weighing herself.          Treatment Objective(s) Addressed in This Session:   stress management  Maintain compliance with post-surgical dietary needs     Intervention:   CBT: surfaced and challenged \"should\" statements and unreasonable expectations about weight loss; identified sources of unhealthy and unhelpful comparisons  Solution Focused: coached client on ways to distract away from worry thoughts " about weight, and ways to manage interpersonal stress more effectively  Supportive therapy:  provided active listening, support, and encouragement. Reinforced follow through on homework. Normalized feelings of sadness and loss during a time of year with several significant anniversaries.         ASSESSMENT: Current Emotional / Mental Status (status of significant symptoms):   Risk status (Self / Other harm or suicidal ideation)   Client denies current fears or concerns for personal safety.   Client denies current or recent suicidal ideation or behaviors.   Client denies current or recent homicidal ideation or behaviors.   Client denies current or recent self injurious behavior or ideation.   Client denies other safety concerns.   A safety and risk management plan has not been developed at this time, however client was given the after-hours number / 911 should there be a change in any of these risk factors.     Appearance:   Appropriate    Eye Contact:   Good    Psychomotor Behavior: Normal    Attitude:   Cooperative  Pleasant    Orientation:   All   Speech    Rate / Production: Normal     Volume:  Normal    Mood:    Anxious  Sad    Affect:    Appropriate    Thought Content:  Clear    Thought Form:  Coherent  Logical    Insight:    Fair      Medication Review:   No current psychiatric medications prescribed     Medication Compliance:   Yes     Changes in Health Issues:   None reported     Chemical Use Review:   Substance Use: Chemical use reviewed, no active concerns identified      Tobacco Use: No current tobacco use.       Collateral Reports Completed:   Not Applicable    PLAN: (Client Tasks / Therapist Tasks / Other)  Future appointments are scheduled. Give yourself permission to feel emotions rather than immediately try to avoid them. Homework: Reduce the frequency of weighing self by 50%, and use calming and distracting activities to manage stress of not weighing self. Keep a thought log at meals to identify  unhealthy and unrealistic thinking patterns related to food and weight loss. Create a list of additional ways to calm, distract, and entertain yourself.       Brenda Perez PsyD LP                                                       Treatment Plan    Client's Name: Criss Don  YOB: 1970    Date: 5/5/2017    DSM-V Diagnoses: 300.00 Unspecified Anxiety Disorder  Psychosocial / Contextual Factors: strain with roommate, spouse, and nephews  WHODAS: 22    Referral / Collaboration:  Referral to another professional/service is not indicated at this time..    Anticipated number of session or this episode of care: reassess after 12 sessions      MeasurableTreatment Goal(s) related to diagnosis / functional impairment(s)  Goal 1: Client will learn coping skills to manage stress and adjust to post-surgical lifestyle changes more effectively.    I will know I've met my goal when I'm not weighing myself all the time and feeling more comfortable with my weight loss.      Objective #A (Client Action)    Client will identify thinking patterns that contribute to anxiety about weight and eating habits.  Status: Continued - Date(s): 5/5/2017    Intervention(s)  Therapist will assign homework (thought  logs), assist client in surfacing and replacement ineffective thinking patterns.    Objective #B  Client will identify new ways to cope with stress and worry thoughts.  Status: Continued - Date(s): 5/5/2017    Intervention(s)  Therapist will assign homework, problem-solve barriers to follow through.    Objective #C  Client will identify and challenge unreasonable or unrealistic expectations about weight loss.  Status: Continued - Date(s): 5/5/2017     Intervention(s)  Therapist will assist client in identifying and balancing her expectations.        Client has reviewed and agreed to the above plan.      Brenda Perez PsyD LP  5/5/2017

## 2017-06-21 NOTE — PROGRESS NOTES
"                                             Progress Note    Client Name: Criss Don  Date: 5/19/2017         Service Type: Individual      Session Start Time: 11:00  Session End Time: 11:45      Session Length: 45     Session #: 7 (with this provider)     Attendees: Client attended alone    Treatment Plan Last Reviewed: 5/19/2017  PHQ-9 / NED-7 :      DATA      Progress Since Last Session (Related to Symptoms / Goals / Homework):   Symptoms: Stable    Homework: Partially completed      Episode of Care Goals: Minimal progress - ACTION (Actively working towards change); Intervened by reinforcing change plan / affirming steps taken     Current / Ongoing Stressors and Concerns:   Reported that she has been working on identifying and replacing imbalanced thoughts about her weight and eating to reduce guilt and sadness. Reported that has been less prone to feeling overwhelmed and anxious when putting off weighing herself.  Reported that her roommate has not been speaking to her but she cannot afford to ask him to move out. Reported that she has had some interactions with her spouse that confirm her reasons for being  but still leaves her feeling sad about the marriage failing.         Treatment Objective(s) Addressed in This Session:   stress management  Maintain compliance with post-surgical dietary needs     Intervention:   CBT: surfaced and challenged \"should\" statements and unreasonable expectations about weight loss; identified sources of unhealthy and unhelpful comparisons  Solution Focused: coached client on ways to distract away from worry thoughts about weight, and ways to manage interpersonal stress more effectively  Supportive therapy:  provided active listening, support, and encouragement. Reinforced follow through on homework and positive behavioral choices.          ASSESSMENT: Current Emotional / Mental Status (status of significant symptoms):   Risk status (Self / Other harm or suicidal " ideation)   Client denies current fears or concerns for personal safety.   Client denies current or recent suicidal ideation or behaviors.   Client denies current or recent homicidal ideation or behaviors.   Client denies current or recent self injurious behavior or ideation.   Client denies other safety concerns.   A safety and risk management plan has not been developed at this time, however client was given the after-hours number / 911 should there be a change in any of these risk factors.     Appearance:   Appropriate    Eye Contact:   Good    Psychomotor Behavior: Normal    Attitude:   Cooperative  Pleasant    Orientation:   All   Speech    Rate / Production: Normal     Volume:  Normal    Mood:    Anxious    Affect:    Appropriate    Thought Content:  Clear    Thought Form:  Coherent  Logical    Insight:    Fair      Medication Review:   No current psychiatric medications prescribed     Medication Compliance:   Yes     Changes in Health Issues:   None reported     Chemical Use Review:   Substance Use: Chemical use reviewed, no active concerns identified      Tobacco Use: No current tobacco use.       Collateral Reports Completed:   Not Applicable    PLAN: (Client Tasks / Therapist Tasks / Other)  Future appointments are scheduled. Give yourself permission to feel emotions rather than immediately try to avoid them. Homework: Reduce the frequency of weighing self by 50%, and use calming and distracting activities to manage stress of not weighing self. Keep a thought log at meals to identify unhealthy and unrealistic thinking patterns related to food and weight loss. Create a list of additional ways to calm, distract, and entertain yourself.       Brenda Perez PsyD LP                                                       Treatment Plan    Client's Name: Criss Don  YOB: 1970    Date: 5/18/17      DSM-V Diagnoses: 300.00 Unspecified Anxiety Disorder  Psychosocial / Contextual Factors: strain  with roommate, spouse, and nephews  WHODAS: 22    Referral / Collaboration:  Referral to another professional/service is not indicated at this time..    Anticipated number of session or this episode of care: reassess after 12 sessions      MeasurableTreatment Goal(s) related to diagnosis / functional impairment(s)  Goal 1: Client will learn coping skills to manage stress and adjust to post-surgical lifestyle changes more effectively.    I will know I've met my goal when I'm not weighing myself all the time and feeling more comfortable with my weight loss.      Objective #A (Client Action)    Client will identify thinking patterns that contribute to anxiety about weight and eating habits.  Status: Continued - Date(s): 5/18/17    Intervention(s)  Therapist will assign homework (thought  logs), assist client in surfacing and replacement ineffective thinking patterns.    Objective #B  Client will identify new ways to cope with stress and worry thoughts.  Status: Continued - Date(s): 5/18/17    Intervention(s)  Therapist will assign homework, problem-solve barriers to follow through.    Objective #C  Client will identify and challenge unreasonable or unrealistic expectations about weight loss.  Status: Continued - Date(s): 5/18/17     Intervention(s)  Therapist will assist client in identifying and balancing her expectations.        Client has reviewed and agreed to the above plan.      Brenda Perez PsyD LP  5/18/17

## 2017-06-23 ENCOUNTER — OFFICE VISIT (OUTPATIENT)
Dept: PSYCHOLOGY | Facility: CLINIC | Age: 47
End: 2017-06-23
Payer: COMMERCIAL

## 2017-06-23 DIAGNOSIS — F41.9 ANXIETY DISORDER, UNSPECIFIED TYPE: Primary | ICD-10-CM

## 2017-06-23 PROCEDURE — 90834 PSYTX W PT 45 MINUTES: CPT | Performed by: PSYCHOLOGIST

## 2017-06-23 NOTE — MR AVS SNAPSHOT
"                  MRN:[id number]                      After Visit Summary   6/23/2017    Criss Don    MRN: [id number]           Visit Information        Provider Department      6/23/2017 8:00 AM [facility]D [facility] [place] [facility] Generic      Your next 10 appointments already scheduled     Aug 01, 2017  7:00 AM CDT   Return Visit with Brenda Perez PsyD   [facility] [place] ([facility] [place])    [address] [place] [address]  [place] [address]   [phone]            Aug 18, 2017  8:00 AM CDT   Return Visit with Brenda Perez PsyD   [facility] [place] ([facility] [place])    [address] [place], [place] [address] [place] [address]   [phone]            Aug 29, 2017  7:00 AM CDT   Return Visit with Brenda Perez PsyD   [facility] [place] ([facility] [place])    [address] [place] [address]   [phone]            Oct 27, 2017 10:00 AM CDT   Annual Visit with Cris Paez PA-C   [facility] ([facility])    [address] [place] [address]   [phone]            Oct 27, 2017 10:30 AM CDT   Annual Visit with Usman Raymond Diet 2, RD   [facility] ([facility])    [address] [place] [address]   [phone]              MyChart Information     Ivisys lets you send messages to your doctor, view your test results, renew your prescriptions, schedule appointments and more. To sign up, go to [email or web address] . Click on \"Log in\" on the left side of the screen, which will take you to the Welcome page. Then click on \"Sign up Now\" on the right side of the page.     You will be asked to enter the access code listed below, as well as some personal information. Please follow the directions to create your " username and password.     Your access code is: [email or web address]  Expires: 2017 10:40 AM     Your access code will  in 90 days. If you need help or a new code, please call your [place] clinic or [phone].        Care EveryWhere ID     This is your Care EveryWhere ID. This could be used by other organizations to access your [place] medical records  [id number]        Equal Access to Services     STEVEN MUNOZ : Phong langfordo Sojorge, waaxda luqadaha, qaybta kaalmada adenancie, suri yeager. So [facility] [phone].    ATENCIÓN: Si habla español, tiene a madera disposición servicios gratuitos de asistencia lingüística. Llame al [phone].    We comply with applicable federal civil rights laws and [place] laws. We do not discriminate on the basis of race, color, national origin, age, disability sex, sexual orientation or gender identity.

## 2017-06-26 NOTE — PROGRESS NOTES
"                                             Progress Note    Client Name: Criss Don  Date: 5/30/2017         Service Type: Individual      Session Start Time: 8:00  Session End Time: 8:45      Session Length: 45     Session #: 8 (with this provider)     Attendees: Client attended alone    Treatment Plan Last Reviewed: 5/30/2017  PHQ-9 / NED-7 :      DATA      Progress Since Last Session (Related to Symptoms / Goals / Homework):   Symptoms: Stable    Homework: Partially completed      Episode of Care Goals: Minimal progress - ACTION (Actively working towards change); Intervened by reinforcing change plan / affirming steps taken     Current / Ongoing Stressors and Concerns:   Reported that she has signed up for a karoilna class with a friend but is worried about being judged by others in the class. Reported that strain with her roommate continues. Reported that a friend from out of [facility] has been persistently saying they should be in a romantic relationship, but has found comments he has made to be hurtful (e.g. Are you going to get skin removed when you lost all your weight). Reported that her spouse continues to send mixed messages to her but does not show signs of giving up alcohol. Reported that she still feels anxious about weighing herself and is not seeing the change in her body, but reported that the anxiety is less and denies that it is negatively impacting her food choices. Reported that she is getting adequate calories and has been avoiding unhealthy foods and avoiding emotional eating by engaging in other activities.         Treatment Objective(s) Addressed in This Session:   stress management  Maintain compliance with post-surgical dietary needs     Intervention:   CBT: surfaced and challenged \"should\" statements and unreasonable expectations about weight loss; identified sources of unhealthy and unhelpful comparisons  Solution Focused: coached client on ways to distract away from worry thoughts " about weight, and ways to manage interpersonal stress more effectively  Supportive therapy:  provided active listening, support, and encouragement. Reinforced follow through on homework and positive behavioral choices.          ASSESSMENT: Current Emotional / Mental Status (status of significant symptoms):   Risk status (Self / Other harm or suicidal ideation)   Client denies current fears or concerns for personal safety.   Client denies current or recent suicidal ideation or behaviors.   Client denies current or recent homicidal ideation or behaviors.   Client denies current or recent self injurious behavior or ideation.   Client denies other safety concerns.   A safety and risk management plan has not been developed at this time, however client was given the after-hours number / 911 should there be a change in any of these risk factors.     Appearance:   Appropriate    Eye Contact:   Good    Psychomotor Behavior: Normal    Attitude:   Cooperative  Pleasant    Orientation:   All   Speech    Rate / Production: Normal     Volume:  Normal    Mood:    Anxious    Affect:    Appropriate    Thought Content:  Clear    Thought Form:  Coherent  Logical    Insight:    Fair      Medication Review:   No current psychiatric medications prescribed     Medication Compliance:   Yes     Changes in Health Issues:   None reported     Chemical Use Review:   Substance Use: Chemical use reviewed, no active concerns identified      Tobacco Use: No current tobacco use.       Collateral Reports Completed:   Not Applicable    PLAN: (Client Tasks / Therapist Tasks / Other)  Future appointments are scheduled. Give yourself permission to feel emotions rather than immediately try to avoid them. Homework: Reduce the frequency of weighing self by 50%, and use calming and distracting activities to manage stress of not weighing self. Keep a thought log at meals to identify unhealthy and unrealistic thinking patterns related to food and weight loss.  Create a list of additional ways to calm, distract, and entertain yourself.       Brenda Perez PsyD LP                                                       Treatment Plan    Client's Name: Criss Don  YOB: 1970    Date: 5/30/2017      DSM-V Diagnoses: 300.00 Unspecified Anxiety Disorder  Psychosocial / Contextual Factors: strain with roommate, spouse, and nephews  WHODAS: 22    Referral / Collaboration:  Referral to another professional/service is not indicated at this time..    Anticipated number of session or this episode of care: reassess after 12 sessions      MeasurableTreatment Goal(s) related to diagnosis / functional impairment(s)  Goal 1: Client will learn coping skills to manage stress and adjust to post-surgical lifestyle changes more effectively.    I will know I've met my goal when I'm not weighing myself all the time and feeling more comfortable with my weight loss.      Objective #A (Client Action)    Client will identify thinking patterns that contribute to anxiety about weight and eating habits.  Status: Continued - Date(s): 5/30/2017    Intervention(s)  Therapist will assign homework (thought  logs), assist client in surfacing and replacement ineffective thinking patterns.    Objective #B  Client will identify new ways to cope with stress and worry thoughts.  Status: Continued - Date(s): 5/30/2017    Intervention(s)  Therapist will assign homework, problem-solve barriers to follow through.    Objective #C  Client will identify and challenge unreasonable or unrealistic expectations about weight loss.  Status: Continued - Date(s): 5/30/2017     Intervention(s)  Therapist will assist client in identifying and balancing her expectations.        Client has reviewed and agreed to the above plan.      Brenda Perez PsyD LP  5/30/2017

## 2017-07-07 ENCOUNTER — OFFICE VISIT (OUTPATIENT)
Dept: PSYCHOLOGY | Facility: CLINIC | Age: 47
End: 2017-07-07
Payer: COMMERCIAL

## 2017-07-07 DIAGNOSIS — F41.9 ANXIETY DISORDER, UNSPECIFIED TYPE: Primary | ICD-10-CM

## 2017-07-07 PROCEDURE — 90834 PSYTX W PT 45 MINUTES: CPT | Performed by: PSYCHOLOGIST

## 2017-07-07 NOTE — MR AVS SNAPSHOT
"                  MRN:[id number]                      After Visit Summary   7/7/2017    Criss Don    MRN: [id number]           Visit Information        Provider Department      7/7/2017 8:00 AM [facility]yD [facility] Generic      Your next 10 appointments already scheduled     Aug 18, 2017  8:00 AM CDT   Return Visit with Brenda Perez PsyD   [facility] [place] ([facility] [place])    [address] [place] [address]  [place] [address]   [phone]            Aug 29, 2017  7:00 AM CDT   Return Visit with Brenda Perez PsyD   [facility] [place] ([facility] [place])    [address] [place] [address]   [phone]            Sep 15, 2017  8:00 AM CDT   [facility] Visit with [facility] PsyD   [facility]    [address] [place] [address]   [phone]            Oct 27, 2017 10:00 AM CDT   Annual Visit with Cris Paez PA-C   [facility] ([facility])    [address] [place] [id number] [phone]            Oct 27, 2017 10:30 AM CDT   Annual Visit with Usman Raymond Diet 2, RD   [facility])    [address] [place] [address]   [phone]              MyChart Information     [email or web address] lets you send messages to your doctor, view your test results, renew your prescriptions, schedule appointments and more. To sign up, go to [email or web address] . Click on \"Log in\" on the left side of the screen, which will take you to the Welcome page. Then click on \"Sign up Now\" on the right side of the page.     You will be asked to enter the access code listed below, as well as some personal information. Please follow the directions to create your username " and password.     Your access code is: [email or web address]  Expires: 2017 10:21 AM     Your access code will  in 90 days. If you need help or a new code, please call your [place] clinic or [phone].        Care EveryWhere ID     This is your Care EveryWhere ID. This could be used by other organizations to access your [place] medical records  [id number]        Equal Access to Services     STEVEN MUNOZ : Phong langfordo Sojorge, waaxda luqadaha, qaybta kaalmada adeegyagabriela, suri yeager. So [facility] [phone].    ATENCIÓN: Si habla español, tiene a madera disposición servicios gratuitos de asistencia lingüística. Llame al [phone].    We comply with applicable federal civil rights laws and [place] laws. We do not discriminate on the basis of race, color, national origin, age, disability sex, sexual orientation or gender identity.

## 2017-07-18 ENCOUNTER — OFFICE VISIT (OUTPATIENT)
Dept: PSYCHOLOGY | Facility: CLINIC | Age: 47
End: 2017-07-18
Payer: COMMERCIAL

## 2017-07-18 ENCOUNTER — TELEPHONE (OUTPATIENT)
Dept: NUTRITION | Facility: CLINIC | Age: 47
End: 2017-07-18

## 2017-07-18 DIAGNOSIS — F41.9 ANXIETY DISORDER, UNSPECIFIED TYPE: Primary | ICD-10-CM

## 2017-07-18 PROCEDURE — 90834 PSYTX W PT 45 MINUTES: CPT | Performed by: PSYCHOLOGIST

## 2017-07-18 NOTE — TELEPHONE ENCOUNTER
"Returned phone call to patient regarding being \"stuck\" with weight loss at ~ 7.5 months s/p gastric bypass. Patient stated in her phone message that her therapist (Brenda Perez) advised her to talk with an RD.Congratulated patient on 109 pound weight loss over the past ~7.5 months. Patient replied that her records show she is down 125 pounds. Explained to patient that the most aggressive weight loss is in the first 6 months after surgery and it is quite normal to hit a weight plateau at this time. Exercise regimen is 5-6X per week for 50 minutes with strength training 3 X per week.Reviewed diet hx. (eating 2 meals/ protein drink for lunch and no snacks) and suggested that pt try to eat \"solid foods\" for lunch and have one protein drink divided between meals. Encouraged pt to keep weight checks to 1 X per week. Suggested RD follow up in 1-2 months (pt will consider this and call if desired). Encouraged pt to call with further questions/ concerns.  Lazaro Ortiz RD, LD  [facility]  [phone]   "

## 2017-07-18 NOTE — MR AVS SNAPSHOT
"                  MRN:[id number]                      After Visit Summary   7/18/2017    Criss Don    MRN: [id number]           Visit Information        Provider Department      7/18/2017 8:00 AM [facility]D [facility] Generic      Your next 10 appointments already scheduled     Aug 18, 2017  8:00 AM CDT   Return Visit with Brenda Perez PsyD   [facility] [place] ([facility] [place])    [address] [place] [address]  [place] [address]   [phone]            Aug 29, 2017  7:00 AM CDT   Return Visit with Brenda Perez PsyD   [facility] [place] ([facility] [place])    [address] [place] [address] [place] [address]   [phone]            Sep 15, 2017  8:00 AM CDT   Return Visit with Brenda Perez PsyD   [facility] [place] ([facility] [place])    [address] [place] [address]   [phone]            Oct 27, 2017 10:00 AM CDT   Annual Visit with Cris Paez PA-C   [facility] ([facility])    [address] [place] [address]   [phone]            Oct 27, 2017 10:30 AM CDT   Annual Visit with Usman Raymond Diet 2, RD   [facility])    [address] [place] [address]   [phone]              MyChart Information     [employer] lets you send messages to your doctor, view your test results, renew your prescriptions, schedule appointments and more. To sign up, go to [email or web address] . Click on \"Log in\" on the left side of the screen, which will take you to the Welcome page. Then click on \"Sign up Now\" on the right side of the page.     You will be asked to enter the access code listed below, as well as some personal information. Please follow the directions to create your " username and password.     Your access code is: [email or web address]  Expires: 2017 10:21 AM     Your access code will  in 90 days. If you need help or a new code, please call your [place] clinic or [phone].        Care EveryWhere ID     This is your Care EveryWhere ID. This could be used by other organizations to access your [place] medical records  [id number]        Equal Access to Services     STEVEN [facility] HospitalDILE[facility] : Hadii deandra langfordo Sojorge, waaxda luqadaha, qaybta kaalmada adenancie, suri cuello . So [facility] [phone].    ATENCIÓN: Si habla español, tiene a madera disposición servicios gratuitos de asistencia lingüística. Llame al [phone].    We comply with applicable federal civil rights laws and [place] laws. We do not discriminate on the basis of race, color, national origin, age, disability sex, sexual orientation or gender identity.

## 2017-07-24 NOTE — PROGRESS NOTES
"                                             Progress Note    Client Name: Criss Don  Date: 6/23/2017         Service Type: Individual      Session Start Time: 8:00  Session End Time: 8:45      Session Length: 45     Session #: 9 (with this provider)     Attendees: Client attended alone    Treatment Plan Last Reviewed: 6/23/2017  PHQ-9 / NED-7 :      DATA      Progress Since Last Session (Related to Symptoms / Goals / Homework):   Symptoms: Stable    Homework: Partially completed      Episode of Care Goals: Minimal progress - ACTION (Actively working towards change); Intervened by reinforcing change plan / affirming steps taken     Current / Ongoing Stressors and Concerns:   Reported that she is feeling like her spouse is trying to \"sabotage\" her weight loss efforts by bringing her unhealthy food choices and telling her \"you looked good the way you were.\" Reported having mixed feeling about their relationship because she sometimes sees \"glimpses of my \" but then sees many more instances of him drinking and making poor choices. Reported that she is also getting mixed messages from a friend. Reported that she has been less fixated on weighing herself but acknowledged that she is becoming uncomfortable with the plateau in her weight loss. Denies disordered eating or excessive exercise as means to lose more weight quickly.         Treatment Objective(s) Addressed in This Session:   stress management  Maintain compliance with post-surgical dietary needs     Intervention:   CBT: surfaced and challenged \"should\" statements and unreasonable expectations about weight loss; identified sources of unhealthy and unhelpful comparisons  Solution Focused: coached client on ways to encourage rather than criticize herself as her weight loss slows down; coached her on ways to set more effective limits with her spouse  Supportive therapy:  provided active listening, support, and encouragement. Reinforced follow through on " "homework and positive behavioral choices.          ASSESSMENT: Current Emotional / Mental Status (status of significant symptoms):   Risk status (Self / Other harm or suicidal ideation)   Client denies current fears or concerns for personal safety.   Client denies current or recent suicidal ideation or behaviors.   Client denies current or recent homicidal ideation or behaviors.   Client denies current or recent self injurious behavior or ideation.   Client denies other safety concerns.   A safety and risk management plan has not been developed at this time, however client was given the after-hours number / 911 should there be a change in any of these risk factors.     Appearance:   Appropriate    Eye Contact:   Good    Psychomotor Behavior: Normal    Attitude:   Cooperative  Pleasant    Orientation:   All   Speech    Rate / Production: Normal     Volume:  Normal    Mood:    Anxious    Affect:    Appropriate    Thought Content:  Clear    Thought Form:  Coherent  Logical    Insight:    Fair      Medication Review:   No current psychiatric medications prescribed     Medication Compliance:   Yes     Changes in Health Issues:   None reported     Chemical Use Review:   Substance Use: Chemical use reviewed, no active concerns identified      Tobacco Use: No current tobacco use.       Collateral Reports Completed:   Not Applicable    PLAN: (Client Tasks / Therapist Tasks / Other)  Future appointments are scheduled. Give yourself permission to feel emotions rather than immediately try to avoid them. Homework: Reduce the frequency of weighing self by 50%, and use calming and distracting activities to manage stress of not weighing self. Keep a thought log at meals to identify unhealthy and unrealistic thinking patterns related to food and weight loss. Create a list of additional ways to calm, distract, and entertain yourself. Practice assertiveness skills with others who \"sabotage\" your lifestyle changes as discussed in " session.       Brenda Perez PsyD LP                                                       Treatment Plan    Client's Name: Criss Don  YOB: 1970    Date: 6/23/2017      DSM-V Diagnoses: 300.00 Unspecified Anxiety Disorder  Psychosocial / Contextual Factors: strain with roommate, spouse, and nephews  WHODAS: 22    Referral / Collaboration:  Referral to another professional/service is not indicated at this time..    Anticipated number of session or this episode of care: reassess after 12 sessions      MeasurableTreatment Goal(s) related to diagnosis / functional impairment(s)  Goal 1: Client will learn coping skills to manage stress and adjust to post-surgical lifestyle changes more effectively.    I will know I've met my goal when I'm not weighing myself all the time and feeling more comfortable with my weight loss.      Objective #A (Client Action)    Client will identify thinking patterns that contribute to anxiety about weight and eating habits.  Status: Continued - Date(s): 6/23/2017    Intervention(s)  Therapist will assign homework (thought  logs), assist client in surfacing and replacement ineffective thinking patterns.    Objective #B  Client will identify new ways to cope with stress and worry thoughts.  Status: Continued - Date(s): 6/23/2017    Intervention(s)  Therapist will assign homework, problem-solve barriers to follow through.    Objective #C  Client will identify and challenge unreasonable or unrealistic expectations about weight loss.  Status: Continued - Date(s): 6/23/2017    Intervention(s)  Therapist will assist client in identifying and balancing her expectations.        Client has reviewed and agreed to the above plan.      Brenda Perez PsyD LP  6/23/2017

## 2017-08-01 ENCOUNTER — OFFICE VISIT (OUTPATIENT)
Dept: PSYCHOLOGY | Facility: CLINIC | Age: 47
End: 2017-08-01
Payer: COMMERCIAL

## 2017-08-01 DIAGNOSIS — F41.9 ANXIETY DISORDER, UNSPECIFIED TYPE: Primary | ICD-10-CM

## 2017-08-01 PROCEDURE — 90834 PSYTX W PT 45 MINUTES: CPT | Performed by: PSYCHOLOGIST

## 2017-08-01 NOTE — MR AVS SNAPSHOT
"                  MRN:[id number]                      After Visit Summary   8/1/2017    Criss Don    MRN: [id number]           Visit Information        Provider Department      8/1/2017 7:00 AM Brenda Perez PsyD [facility] [place] [facility]      Your next 10 appointments already scheduled     Aug 18, 2017  8:00 AM CDT   Return Visit with Brenda Perez PsyD   [facility]    [address] [place], [place] [address] [address]   [phone]            Aug 29, 2017  7:00 AM CDT   Return Visit with Brenda Perez PsyD   [facility]    [address] [place] [id number]   [phone]            Sep 15, 2017  8:00 AM CDT   Return Visit with Brenda Perez PsyD   [facility]    [address] [place] [address]   [phone]            Oct 27, 2017 10:00 AM CDT   Annual Visit with Cris Paez PA-C   [facility] ([facility])    [address] [place] [id number] [phone]            Oct 27, 2017 10:30 AM CDT   Annual Visit with Usman Raymond Diet 2, RD   [facility])    [address] [place] [address]   [phone]              MyChart Information     [facility] lets you send messages to your doctor, view your test results, renew your prescriptions, schedule appointments and more. To sign up, go to [email or web address] . Click on \"Log in\" on the left side of the screen, which will take you to the Welcome page. Then click on \"Sign up Now\" on the right side of the page.     You will be asked to enter the access code listed below, as well as some personal information. Please follow the directions to create your username " and password.     Your access code is: [email or web address]  Expires: 2017 10:21 AM     Your access code will  in 90 days. If you need help or a new code, please call your [facility] clinic or [phone].        Care EveryWhere ID     This is your Care EveryWhere ID. This could be used by other organizations to access your [facility] medical records  [id number]        Equal Access to Services     STEVEN MUNOZ : Phong langfordo Sojorge, waaxda luqadaha, qaybta kaalmada adeegyagabriela, suri yeager. So [facility] [phone].    ATENCIÓN: Si habla español, tiene a madera disposición servicios gratuitos de asistencia lingüística. Llame al [phone].    We comply with applicable federal civil rights laws and [place] laws. We do not discriminate on the basis of race, color, national origin, age, disability sex, sexual orientation or gender identity.

## 2017-08-04 NOTE — PROGRESS NOTES
"                                             Progress Note    Client Name: Criss Don  Date: 7/18/2017         Service Type: Individual      Session Start Time: 8:00  Session End Time: 8:45      Session Length: 45     Session #: 11 (with this provider)     Attendees: Client attended alone    Treatment Plan Last Reviewed: 7/18/2017  PHQ-9 / NED-7 :      DATA      Progress Since Last Session (Related to Symptoms / Goals / Homework):   Symptoms: Stable    Homework: Partially completed      Episode of Care Goals: Minimal progress - ACTION (Actively working towards change); Intervened by reinforcing change plan / affirming steps taken     Current / Ongoing Stressors and Concerns:   Reported that she has been suggesting that family bring yard games and board games to the annual family picnic in an effort to shift the focus from food to other methods of entertainment. Reported being very disappointed to hear that her nephews are planning a roadtrip to [place] for a bachelor party, and is concerned about them fighting, exacerbating their mental health issues by smoking pot, or getting into legal problems along the way. Acknowledged that high stress can result in her skipping meals because she gets preoccupied with worry thoughts and does not set aside time to eat her lunch while she's at work. Reported that this has happened a few times, but she is working to get back on track with bringing healthy food because she does not want to risk forming unhealthy eating habits. 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 Acknowledged that temptation to snack on something was high but she was able to avoid it and felt encouraged afterwards that she was able to resist those urges.         Treatment Objective(s) Addressed in This Session:   stress management  Maintain compliance with post-surgical dietary needs     Intervention:   CBT: surfaced " "and challenged \"should\" statements and unreasonable expectations about weight loss; identified sources of unhealthy and unhelpful comparisons  Solution Focused: coached client on ways to encourage rather than criticize herself as her weight loss slows down; coached her on ways to let go of frustration generated by things that are out of her control with her nephews; reviewed ways she is ensuring that she has healthy food options on hand during busy work days; encouraged her to call the weight loss clinic to address her nutrition questions  Supportive therapy:  provided active listening, support, and encouragement. Applauded client for using healthy coping skills when tempted to make poor food choices.          ASSESSMENT: Current Emotional / Mental Status (status of significant symptoms):   Risk status (Self / Other harm or suicidal ideation)   Client denies current fears or concerns for personal safety.   Client denies current or recent suicidal ideation or behaviors.   Client denies current or recent homicidal ideation or behaviors.   Client denies current or recent self injurious behavior or ideation.   Client denies other safety concerns.   A safety and risk management plan has not been developed at this time, however client was given the after-hours number / 911 should there be a change in any of these risk factors.     Appearance:   Appropriate    Eye Contact:   Good    Psychomotor Behavior: Normal    Attitude:   Cooperative  Pleasant    Orientation:   All   Speech    Rate / Production: Normal     Volume:  Normal    Mood:    Anxious    Affect:    Appropriate    Thought Content:  Clear    Thought Form:  Coherent  Logical    Insight:    Fair      Medication Review:   No current psychiatric medications prescribed     Medication Compliance:   Yes     Changes in Health Issues:   None reported     Chemical Use Review:   Substance Use: Chemical use reviewed, no active concerns identified      Tobacco Use: No current " "tobacco use.       Collateral Reports Completed:   Not Applicable    PLAN: (Client Tasks / Therapist Tasks / Other)  Future appointments are scheduled. Give yourself permission to feel emotions rather than immediately try to avoid them. Homework: Reduce the frequency of weighing self by 50%, and use calming and distracting activities to manage stress of not weighing self. Keep a thought log at meals to identify unhealthy and unrealistic thinking patterns related to food and weight loss. Create a list of additional ways to calm, distract, and entertain yourself. Practice assertiveness skills with others who \"sabotage\" your lifestyle changes as discussed in session.       Brenda Perez PsyD LP                                                       Treatment Plan    Client's Name: Criss Don  YOB: 1970    Date: 7/18/2017      DSM-V Diagnoses: 300.00 Unspecified Anxiety Disorder  Psychosocial / Contextual Factors: strain with roommate, spouse, and nephews  WHODAS: 22    Referral / Collaboration:  Referral to another professional/service is not indicated at this time..    Anticipated number of session or this episode of care: reassess after 12 sessions      MeasurableTreatment Goal(s) related to diagnosis / functional impairment(s)  Goal 1: Client will learn coping skills to manage stress and adjust to post-surgical lifestyle changes more effectively.    I will know I've met my goal when I'm not weighing myself all the time and feeling more comfortable with my weight loss.      Objective #A (Client Action)    Client will identify thinking patterns that contribute to anxiety about weight and eating habits.  Status: Continued - Date(s): 7/18/2017    Intervention(s)  Therapist will assign homework (thought  logs), assist client in surfacing and replacement ineffective thinking patterns.    Objective #B  Client will identify new ways to cope with stress and worry thoughts.  Status: Continued - Date(s): " 7/18/2017    Intervention(s)  Therapist will assign homework, problem-solve barriers to follow through.    Objective #C  Client will identify and challenge unreasonable or unrealistic expectations about weight loss.  Status: Continued - Date(s): 7/18/2017    Intervention(s)  Therapist will assist client in identifying and balancing her expectations.        Client has reviewed and agreed to the above plan.      Brenda Perez PsyD LP  7/18/2017

## 2017-08-04 NOTE — PROGRESS NOTES
"                                             Progress Note    Client Name: Criss Don  Date: 7/7/2017         Service Type: Individual      Session Start Time: 8:00  Session End Time: 8:45      Session Length: 45     Session #: 10 (with this provider)     Attendees: Client attended alone    Treatment Plan Last Reviewed: 7/7/2017  PHQ-9 / NED-7 :      DATA      Progress Since Last Session (Related to Symptoms / Goals / Homework):   Symptoms: Stable    Homework: Partially completed      Episode of Care Goals: Minimal progress - ACTION (Actively working towards change); Intervened by reinforcing change plan / affirming steps taken     Current / Ongoing Stressors and Concerns:   Reported feeling uncomfortable about her plateau in weight and frustrated that she is \"just a few pounds\" away from a weight loss goal. Denies disordered eating or excessive exercise as means to lose more weight quickly. Acknowledged that she continues to weigh herself frequently. Reported she is nervous about an upcoming annual family picnic with her extended family that tends to be centered around unhealthy food options. Reported feeling frustrated that her nephews have been making some poor choices but \"they're adults and I can't change their behaviors for them.\"        Treatment Objective(s) Addressed in This Session:   stress management  Maintain compliance with post-surgical dietary needs     Intervention:   CBT: surfaced and challenged \"should\" statements and unreasonable expectations about weight loss; identified sources of unhealthy and unhelpful comparisons  Solution Focused: coached client on ways to encourage rather than criticize herself as her weight loss slows down; coached her on ways to let go of frustration generated by things that are out of her control with her nephews  Supportive therapy:  provided active listening, support, and encouragement. Normalized concerns about her weight loss plateau, encouraged her to call " "the weight loss clinic with concerns or questions.          ASSESSMENT: Current Emotional / Mental Status (status of significant symptoms):   Risk status (Self / Other harm or suicidal ideation)   Client denies current fears or concerns for personal safety.   Client denies current or recent suicidal ideation or behaviors.   Client denies current or recent homicidal ideation or behaviors.   Client denies current or recent self injurious behavior or ideation.   Client denies other safety concerns.   A safety and risk management plan has not been developed at this time, however client was given the after-hours number / 911 should there be a change in any of these risk factors.     Appearance:   Appropriate    Eye Contact:   Good    Psychomotor Behavior: Normal    Attitude:   Cooperative  Pleasant    Orientation:   All   Speech    Rate / Production: Normal     Volume:  Normal    Mood:    Anxious    Affect:    Appropriate    Thought Content:  Clear    Thought Form:  Coherent  Logical    Insight:    Fair      Medication Review:   No current psychiatric medications prescribed     Medication Compliance:   Yes     Changes in Health Issues:   None reported     Chemical Use Review:   Substance Use: Chemical use reviewed, no active concerns identified      Tobacco Use: No current tobacco use.       Collateral Reports Completed:   Not Applicable    PLAN: (Client Tasks / Therapist Tasks / Other)  Future appointments are scheduled. Give yourself permission to feel emotions rather than immediately try to avoid them. Homework: Reduce the frequency of weighing self by 50%, and use calming and distracting activities to manage stress of not weighing self. Keep a thought log at meals to identify unhealthy and unrealistic thinking patterns related to food and weight loss. Create a list of additional ways to calm, distract, and entertain yourself. Practice assertiveness skills with others who \"sabotage\" your lifestyle changes as discussed " in session.       Brenda Perez PsyD LP                                                       Treatment Plan    Client's Name: Criss Don  YOB: 1970    Date: 7/7/2017      DSM-V Diagnoses: 300.00 Unspecified Anxiety Disorder  Psychosocial / Contextual Factors: strain with roommate, spouse, and nephews  WHODAS: 22    Referral / Collaboration:  Referral to another professional/service is not indicated at this time..    Anticipated number of session or this episode of care: reassess after 12 sessions      MeasurableTreatment Goal(s) related to diagnosis / functional impairment(s)  Goal 1: Client will learn coping skills to manage stress and adjust to post-surgical lifestyle changes more effectively.    I will know I've met my goal when I'm not weighing myself all the time and feeling more comfortable with my weight loss.      Objective #A (Client Action)    Client will identify thinking patterns that contribute to anxiety about weight and eating habits.  Status: Continued - Date(s): 7/7/2017    Intervention(s)  Therapist will assign homework (thought  logs), assist client in surfacing and replacement ineffective thinking patterns.    Objective #B  Client will identify new ways to cope with stress and worry thoughts.  Status: Continued - Date(s): 7/7/2017    Intervention(s)  Therapist will assign homework, problem-solve barriers to follow through.    Objective #C  Client will identify and challenge unreasonable or unrealistic expectations about weight loss.  Status: Continued - Date(s): 7/7/2017    Intervention(s)  Therapist will assist client in identifying and balancing her expectations.        Client has reviewed and agreed to the above plan.      Brenda Perez PsyD LP  7/7/2017

## 2017-08-04 NOTE — PROGRESS NOTES
"                                             Progress Note    Client Name: Criss Don  Date: 8/1/2017         Service Type: Individual      Session Start Time: 7:00  Session End Time: 7:45      Session Length: 45     Session #: 12 (with this provider)     Attendees: Client attended alone    Treatment Plan Last Reviewed: 8/1/2017  PHQ-9 / NED-7 :      DATA      Progress Since Last Session (Related to Symptoms / Goals / Homework):   Symptoms: Stable    Homework: Partially completed      Episode of Care Goals: Minimal progress - ACTION (Actively working towards change); Intervened by reinforcing change plan / affirming steps taken     Current / Ongoing Stressors and Concerns:   Reported that she followed up with the weight loss clinic, and felt assured that her exercise and nutrition were appropriate. Reported that she has achieved her initial weight loss goal and is hopeful that she will continue to lose a little more weight. She admitted that it is still hard to notice her weight loss by looking in the mirror (notably, client has lost over 100 pounds) and she is nervous about trying on smaller clothing even though her current clothes are getting too loose. Reported that she is \"working up the nerve\" to buy some new clothes and try on some clothes that a friend gave her. Reported that her family picnic is coming up, and she is nervous about people asking intrusive questions and about people not noticing her weight loss.         Treatment Objective(s) Addressed in This Session:   stress management  Maintain compliance with post-surgical dietary needs     Intervention:   CBT: surfaced and challenged \"should\" statements and unreasonable expectations about weight loss; identified sources of unhealthy and unhelpful comparisons  Solution Focused: coached client on ways to encourage rather than criticize herself as her weight loss slows down; reviewed her plan for managing comments at her family picnic, as well as her " plans for how to encourage others to engage in activities that are not just related to the meal  Supportive therapy:  provided active listening, support, and encouragement. Reflected on ways client can be more accepting and tolerant of her new body shape, and acknowledge the significant progress she has made with weight loss.          ASSESSMENT: Current Emotional / Mental Status (status of significant symptoms):   Risk status (Self / Other harm or suicidal ideation)   Client denies current fears or concerns for personal safety.   Client denies current or recent suicidal ideation or behaviors.   Client denies current or recent homicidal ideation or behaviors.   Client denies current or recent self injurious behavior or ideation.   Client denies other safety concerns.   A safety and risk management plan has not been developed at this time, however client was given the after-hours number / 911 should there be a change in any of these risk factors.     Appearance:   Appropriate    Eye Contact:   Good    Psychomotor Behavior: Normal    Attitude:   Cooperative  Pleasant    Orientation:   All   Speech    Rate / Production: Normal     Volume:  Normal    Mood:    Anxious    Affect:    Appropriate    Thought Content:  Clear    Thought Form:  Coherent  Logical    Insight:    Fair      Medication Review:   No current psychiatric medications prescribed     Medication Compliance:   Yes     Changes in Health Issues:   None reported     Chemical Use Review:   Substance Use: Chemical use reviewed, no active concerns identified      Tobacco Use: No current tobacco use.       Collateral Reports Completed:   Not Applicable    PLAN: (Client Tasks / Therapist Tasks / Other)  Future appointments are scheduled. Practice acceptance of your new body shape as discussed in session. Homework: Reduce the frequency of weighing self by 50%, and use calming and distracting activities to manage stress of not weighing self. Keep a thought log at  meals to identify unhealthy and unrealistic thinking patterns related to food and weight loss. Create a list of additional ways to calm, distract, and entertain yourself. Practice assertiveness skills with others who ask intrusive questions, and do not make assumptions about what people are thinking who do not comment on weight loss.       Brenda Perez PsyD LP                                                       Treatment Plan    Client's Name: Criss Don  YOB: 1970    Date: 8/1/2017     DSM-V Diagnoses: 300.00 Unspecified Anxiety Disorder  Psychosocial / Contextual Factors: strain with roommate, spouse, and nephews  WHODAS: 22    Referral / Collaboration:  Referral to another professional/service is not indicated at this time..    Anticipated number of session or this episode of care: reassess after 12 sessions      MeasurableTreatment Goal(s) related to diagnosis / functional impairment(s)  Goal 1: Client will learn coping skills to manage stress and adjust to post-surgical lifestyle changes more effectively.    I will know I've met my goal when I'm not weighing myself all the time and feeling more comfortable with my weight loss.      Objective #A (Client Action)    Client will identify thinking patterns that contribute to anxiety about weight and eating habits.  Status: Continued - Date(s): 8/1/2017    Intervention(s)  Therapist will assign homework (thought  logs), assist client in surfacing and replacement ineffective thinking patterns.    Objective #B  Client will identify new ways to cope with stress and worry thoughts.  Status: Continued - Date(s): 8/1/2017    Intervention(s)  Therapist will assign homework, problem-solve barriers to follow through.    Objective #C  Client will identify and challenge unreasonable or unrealistic expectations about weight loss.  Status: Continued - Date(s): 8/1/2017    Intervention(s)  Therapist will assist client in identifying and balancing her  expectations.        Client has reviewed and agreed to the above plan.      Brenda Perez PsyD LP  8/1/2017

## 2017-08-18 ENCOUNTER — OFFICE VISIT (OUTPATIENT)
Dept: PSYCHOLOGY | Facility: CLINIC | Age: 47
End: 2017-08-18
Payer: COMMERCIAL

## 2017-08-18 DIAGNOSIS — F41.9 ANXIETY DISORDER, UNSPECIFIED TYPE: Primary | ICD-10-CM

## 2017-08-18 PROCEDURE — 90834 PSYTX W PT 45 MINUTES: CPT | Performed by: PSYCHOLOGIST

## 2017-08-18 NOTE — PROGRESS NOTES
"                                             Progress Note    Client Name: Criss Don  Date: 8/18/2017         Service Type: Individual      Session Start Time: 8:00  Session End Time: 8:45      Session Length: 45     Session #: 13 (with this provider)     Attendees: Client attended alone    Treatment Plan Last Reviewed: 8/18/2017  PHQ-9 / NED-7 :      DATA      Progress Since Last Session (Related to Symptoms / Goals / Homework):   Symptoms: Stable    Homework: Partially completed      Episode of Care Goals: Minimal progress - ACTION (Actively working towards change); Intervened by reinforcing change plan / affirming steps taken     Current / Ongoing Stressors and Concerns:   Reported that the family joseluis went well overall. Reported that there were some family comments/questions that were unexpected, but felt she was prepared and handled them well. Reported that several family members commented on how much weight she has lost, though client reported that her change in size is still hard to see in the mirror. Reported that she has been taking more risks (e.g. Clothes shopping, wearing a jingle skirt at karolina class, etc) to push herself to accept her new size and boost her confidence. Reported being nervous about going swim suit shopping this afternoon. Reported being frustrated that her  has been calling her often and talking about future plans together, but he is usually intoxicated when he calls and \"doesn't follow through with anything he says.\"  Reported that she has been weighing herself less and is feeling more secure about her weight loss.         Treatment Objective(s) Addressed in This Session:   stress management  Maintain compliance with post-surgical dietary needs     Intervention:   CBT: surfaced and challenged \"should\" statements and unreasonable expectations about weight loss; identified sources of unhealthy and unhelpful comparisons  Solution Focused: reviewed her plan for how to keep " a balanced mindset while shopping for a swim suit  Supportive therapy:  provided active listening, support, and encouragement. Reflected on ways client can be more accepting and tolerant of her new body shape, and acknowledge the significant progress she has made with weight loss.          ASSESSMENT: Current Emotional / Mental Status (status of significant symptoms):   Risk status (Self / Other harm or suicidal ideation)   Client denies current fears or concerns for personal safety.   Client denies current or recent suicidal ideation or behaviors.   Client denies current or recent homicidal ideation or behaviors.   Client denies current or recent self injurious behavior or ideation.   Client denies other safety concerns.   A safety and risk management plan has not been developed at this time, however client was given the after-hours number / 911 should there be a change in any of these risk factors.     Appearance:   Appropriate    Eye Contact:   Good    Psychomotor Behavior: Normal    Attitude:   Cooperative  Pleasant    Orientation:   All   Speech    Rate / Production: Normal     Volume:  Normal    Mood:    Anxious    Affect:    Appropriate    Thought Content:  Clear    Thought Form:  Coherent  Logical    Insight:    Good      Medication Review:   No current psychiatric medications prescribed     Medication Compliance:   Yes     Changes in Health Issues:   None reported     Chemical Use Review:   Substance Use: Chemical use reviewed, no active concerns identified      Tobacco Use: No current tobacco use.       Collateral Reports Completed:   Not Applicable    PLAN: (Client Tasks / Therapist Tasks / Other)  Future appointments are scheduled. Practice acceptance of your new body shape as discussed in session. Homework: Reduce the frequency of weighing self by 50%, and use calming and distracting activities to manage stress of not weighing self. Keep a thought log at meals to identify unhealthy and unrealistic  thinking patterns related to food and weight loss. Create a list of additional ways to calm, distract, and entertain yourself. Practice assertiveness skills with others who ask intrusive questions, and do not make assumptions about what people are thinking who do not comment on weight loss.       Brenda Perez PsyD LP                                                       Treatment Plan    Client's Name: Criss Don  YOB: 1970    Date: 8/18/2017    DSM-V Diagnoses: 300.00 Unspecified Anxiety Disorder  Psychosocial / Contextual Factors: strain with roommate, spouse, and nephews  WHODAS: 22    Referral / Collaboration:  Referral to another professional/service is not indicated at this time..    Anticipated number of session or this episode of care: reassess after 12 sessions      MeasurableTreatment Goal(s) related to diagnosis / functional impairment(s)  Goal 1: Client will learn coping skills to manage stress and adjust to post-surgical lifestyle changes more effectively.    I will know I've met my goal when I'm not weighing myself all the time and feeling more comfortable with my weight loss.      Objective #A (Client Action)    Client will identify thinking patterns that contribute to anxiety about weight and eating habits.  Status: Continued - Date(s): 8/18/2017    Intervention(s)  Therapist will assign homework (thought  logs), assist client in surfacing and replacement ineffective thinking patterns.    Objective #B  Client will identify new ways to cope with stress and worry thoughts.  Status: Continued - Date(s): 8/18/2017    Intervention(s)  Therapist will assign homework, problem-solve barriers to follow through.    Objective #C  Client will identify and challenge unreasonable or unrealistic expectations about weight loss.  Status: Continued - Date(s): 8/18/2017    Intervention(s)  Therapist will assist client in identifying and balancing her expectations.        Client has reviewed and  agreed to the above plan.      Brenda Perez PsyD LP  8/18/2017

## 2017-08-18 NOTE — MR AVS SNAPSHOT
"                  MRN:[id number]                      After Visit Summary   8/18/2017    Criss Don    MRN: [id number]           Visit Information        Provider Department      8/18/2017 8:00 AM [facility]yD [facility]      Your next 10 appointments already scheduled     Aug 29, 2017  7:00 AM CDT   Return Visit with Brenda Perez PsyD   [facility] ([facility] [place])    [address] [place] [address]  [place] [address]   [phone]            Sep 15, 2017  8:00 AM CDT   Return Visit with Brenda Perez PsyD   [facility] ([facility] [place])    [address] [place] [address] [place] [address]   [phone]            Sep 29, 2017  8:00 AM CDT   Return Visit with Brenda Perez PsyD   [facility] [place] ([facility] [place])    [address] [place], [place] [address] [address]   [phone]            Oct 27, 2017 10:00 AM CDT   Annual Visit with Cris Paez PA-C   [facility] ([facility])    [address] [place] [id number] [phone]            Oct 27, 2017 10:30 AM CDT   Annual Visit with Usman Raymond Diet 2, RD   [facility])    [address] [place] [id number]   [phone]              MyChart Information     [employer] lets you send messages to your doctor, view your test results, renew your prescriptions, schedule appointments and more. To sign up, go to [email or web address] . Click on \"Log in\" on the left side of the screen, which will take you to the Welcome page. Then click on \"Sign up Now\" on the right side of the page.     You will be asked to enter the access code listed below, as well as some personal information. Please follow the directions to create your " username and password.     Your access code is: [email or web address]  Expires: 2017 10:21 AM     Your access code will  in 90 days. If you need help or a new code, please call your [facility] clinic or [phone].        Care EveryWhere ID     This is your Care EveryWhere ID. This could be used by other organizations to access your [facility] medical records  [id number]        Equal Access to Services     STEVEN [facility] CenterDILEEP : Hadii deandra langfordo Sojorge, waaxda luqadaha, qaybta kaalmada adenancie, suri cuello . So [facility] [phone].    ATENCIÓN: Si habla español, tiene a madera disposición servicios gratuitos de asistencia lingüística. Llame al [phone].    We comply with applicable federal civil rights laws and [place] laws. We do not discriminate on the basis of race, color, national origin, age, disability sex, sexual orientation or gender identity.

## 2017-08-22 ENCOUNTER — TELEPHONE (OUTPATIENT)
Dept: FAMILY MEDICINE | Facility: CLINIC | Age: 47
End: 2017-08-22

## 2017-08-22 NOTE — TELEPHONE ENCOUNTER
Patient states that she felt light headed this morning when she woke up. States that everything was spinning.Denies headache, vision changes, or any weakness. States that she ate and her symptoms went away. Denies any medication changes.  Slept 9 hours last night. Denies fever. Only other symptoms is a strange feeling when she breathes. States that it is not shortness of breath or pain with inspiration.    Patient states that it is not an anxiety attack. 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    Patient wants to see MARTHA Garner. Patient is at work and cannot take any available appointment today. Scheduled for Thursday afternoon.    Criss Don is a 46 year old female who calls with spining sensation.    NURSING ASSESSMENT:  Description:  above  Onset/duration:  This morning  Precip. factors:  unknown  Associated symptoms:  Weird breathing sensation, no pain or SOB  Improves/worsens symptoms:  eating  Pain scale (0-10)   0/10  LMP/preg/breast feeding:  Not assessed  Last exam/Treatment:  1/19/17  Allergies:   Allergies   Allergen Reactions     Lortab [Hydrocodone-Acetaminophen] Nausea     Also light headed and flushed         NURSING PLAN: Nursing advice to patient appointment today    RECOMMENDED DISPOSITION:  See in 24 hours - patient wanted to wait to see MARTHA Garner  Will comply with recommendation: No- Barriers to comply with plan of care provider preference.  If further questions/concerns or if symptoms do not improve, worsen or new symptoms develop, call your PCP or [facility] as soon as possible.      Guideline used:  Telephone Triage Protocols for Nurses, Fourth Edition, Megan Chery RN

## 2017-08-24 ENCOUNTER — OFFICE VISIT (OUTPATIENT)
Dept: FAMILY MEDICINE | Facility: CLINIC | Age: 47
End: 2017-08-24
Payer: COMMERCIAL

## 2017-08-24 VITALS
OXYGEN SATURATION: 100 % | HEIGHT: 63 IN | BODY MASS INDEX: 30.48 KG/M2 | TEMPERATURE: 97.3 F | WEIGHT: 172 LBS | HEART RATE: 73 BPM | DIASTOLIC BLOOD PRESSURE: 65 MMHG | SYSTOLIC BLOOD PRESSURE: 98 MMHG | RESPIRATION RATE: 12 BRPM

## 2017-08-24 DIAGNOSIS — H81.12 BPPV (BENIGN PAROXYSMAL POSITIONAL VERTIGO), LEFT: Primary | ICD-10-CM

## 2017-08-24 PROCEDURE — 99214 OFFICE O/P EST MOD 30 MIN: CPT | Performed by: PHYSICIAN ASSISTANT

## 2017-08-24 PROCEDURE — 80048 BASIC METABOLIC PNL TOTAL CA: CPT | Performed by: PHYSICIAN ASSISTANT

## 2017-08-24 PROCEDURE — 36415 COLL VENOUS BLD VENIPUNCTURE: CPT | Performed by: PHYSICIAN ASSISTANT

## 2017-08-24 NOTE — PROGRESS NOTES
SUBJECTIVE:   Criss Don is a 46 year old female who presents to clinic today for the following health issues:      Dizziness      Duration: 2 days     Description   Feeling faint:  no   Feeling like the surroundings are moving: YES  Loss of consciousness or falls: no     Intensity:  moderate    Accompanying signs and symptoms:   Nausea/vomitting: no   Palpitations: no   Weakness in arms or legs: no   Vision or speech changes: no   Ringing in ears (Tinnitus): no   Hearing loss related to dizziness: no   Other (fevers/chills/sweating/dyspnea): YES- tiredness     History (similar episodes/head trauma/previous evaluation/recent bleeding): None    Precipitating or alleviating factors (new meds/chemicals): None  Worse with activity/head movement: YES    Therapies tried and outcome: None    Wonders if her K+ is low again  She has not been vomiting any more.   Has been doing a lot of karolina. Still having appearance issues and seeing Brenda cohen.     -------------------------------------    Problem list and histories reviewed & adjusted, as indicated.  Additional history: as documented    Patient Active Problem List   Diagnosis     Labial abscess     CARDIOVASCULAR SCREENING; LDL GOAL LESS THAN 160     Mass of right foot     History of abnormal cervical Pap smear     Intertrigo     Bilateral edema of lower extremity     Bariatric surgery status     Malnutrition following gastrointestinal surgery     Body dysmorphic disorder     Hypokalemia     Elevated ALT measurement     Obesity (BMI 30-39.9)     Past Surgical History:   Procedure Laterality Date     LAPAROSCOPIC BYPASS GASTRIC N/A 10/26/2016    Procedure: LAPAROSCOPIC BYPASS GASTRIC;  Surgeon: Romario Reyna MD;  Location: [facility] OR     NO HISTORY OF SURGERY         Social History   Substance Use Topics     Smoking status: Never Smoker     Smokeless tobacco: Never Used     Alcohol use 0.0 oz/week     0 Standard drinks or equivalent per week      Comment: very  rare     Family History   Problem Relation Age of Onset     Hypertension Mother      Hypertension Maternal Grandmother      Breast Cancer Mother      64 dx     Allergies Mother      CANCER Sister      Cervical     CANCER Maternal Grandmother      Ovarian     CANCER Other      Mat. great aunt-ovarian     Obesity Mother      Respiratory Mother      Asthma     Hypertension Brother      CANCER Maternal Aunt      ?Gastric     Brain Cancer Cousin      maternal         Current Outpatient Prescriptions   Medication Sig Dispense Refill     MAGNESIUM OXIDE PO Take 500 mg % by mouth       docusate sodium (COLACE) 100 MG tablet Take 100 mg by mouth 2 times daily as needed for constipation 180 tablet 1     ferrous fumarate 65 mg, [place]. FE,-Vitamin C 125 mg (VITRON-C)  MG TABS Take 1 tablet by mouth every morning       Cyanocobalamin (B-12) 2500 MCG SUBL Place 1 tablet under the tongue 3 times weekly       calcium-vitamin D-vitamin K (VIACTIV) 500-500-40 MG-UNT-MCG CHEW Take 1 tablet by mouth 3 times daily        multivitamin  peds with iron (FLINTSTONES COMPLETE) 60 MG chewable tablet Take 2 chew tab by mouth every morning        Cholecalciferol (VITAMIN D3 PO) Take 2,000 Units by mouth daily        potassium chloride SA (POTASSIUM CHLORIDE) 20 MEQ CR tablet Take 1 tablet (20 mEq) by mouth 2 times daily (Patient not taking: Reported on 8/24/2017) 60 tablet 1     Allergies   Allergen Reactions     Lortab [Hydrocodone-Acetaminophen] Nausea     Also light headed and flushed     Labs reviewed in EPIC      Reviewed and updated as needed this visit by clinical staff     Reviewed and updated as needed this visit by Provider         Social History     Social History     Marital status:      Spouse name: seperated     Number of children: N/A     Years of education: N/A     Occupational History     [employer]      Social History Main Topics     Smoking status: Never Smoker     Smokeless tobacco: Never Used     Alcohol use 0.0  "oz/week     0 Standard drinks or equivalent per week      Comment: very rare     Drug use: No     Sexual activity: Yes     Partners: Male     Other Topics Concern     None     Social History Narrative       10 point review of systems negative other than symptoms noted above.   Constitutional, HEENT, CV, pulmonary, GI, , MS, Endo, Psych systems are all negative, except as otherwise noted.       OBJECTIVE:  BP 98/65 (Cuff Size: Adult Regular)  Pulse 73  Temp 97.3  F (36.3  C) (Tympanic)  Resp 12  Ht 5' 2.5\" (1.588 m)  Wt 172 lb (78 kg)  LMP 08/02/2017 (Exact Date)  SpO2 100%  BMI 30.96 kg/m2  CONSTITUTIONAL: Alert, well-nourished, well-groomed, NAD  RESP: Lungs CTA. No wheeze, rhonchi, rales. Normal effort on room air. Equal lung sounds bilaterally.   CV: HRRR, normal S1, S2. No MRG. No peripheral edema.  DERM: No rashes or suspicious lesions  Head: Normocephalic, atraumatic.  Eyes: Conjunctiva clear, non icteric. PERRLA.  Ears: External ears and TMs normal BL.  Nose: Septum midline, nasal mucosa pink and moist. No discharge.  Mouth / Throat: Normal dentition.  No oral lesions. Pharynx non erythematous, tonsils without hypertrophy.  Neck: Supple, no enlarged LN, trachea midline.  GI: Abdomen flat. BS x 4. No TTP. No HSM or masses. No CVAT  PSYCH: Alert and oriented. Thought process is clear and goal-directed. Adequate insight and judgement. Mood - normal. Affect - normal.  NEURO: CN II-XII grossly intact. Speech and mentation appropriate. Normal gait.   +gomez-hallpike on left.     Diagnostic Tests:  pending    ASSESSMENT/PLAN:  (H81.12) BPPV (benign paroxysmal positional vertigo), left  (primary encounter diagnosis)  Comment: will check BMP to verify OK electrolyte levels.   Likely BPPV. She will work on Epley at home, reviewed this in clinic.   Vestibular rehab if not improving.   Can send in meclizine if needed.   blood pressure slightly on low side - will monitor this. She hasn't eaten or drank anything " today.   She will return for blood pressure recheck in a few days.   Plan: Basic metabolic panel  (Ca, Cl, CO2, Creat,         Gluc, K, Na, BUN)              FOLLOW-UP: Routinely and sooner as needed.  The patient agrees with this assessment and plan and agrees to call or return to the clinic with any questions or concerns or if their condition worsens.    ALEXANDER Gonzalez, PAEmelinaC  [facility]

## 2017-08-24 NOTE — NURSING NOTE
"Chief Complaint   Patient presents with     Dizziness       Initial BP 98/65 (Cuff Size: Adult Regular)  Pulse 73  Temp 97.3  F (36.3  C) (Tympanic)  Resp 12  Ht 5' 2.5\" (1.588 m)  Wt 172 lb (78 kg)  LMP 08/02/2017 (Exact Date)  SpO2 100%  BMI 30.96 kg/m2 Estimated body mass index is 30.96 kg/(m^2) as calculated from the following:    Height as of this encounter: 5' 2.5\" (1.588 m).    Weight as of this encounter: 172 lb (78 kg).  Medication Reconciliation: complete   Mila Brown, KATINA    "

## 2017-08-24 NOTE — PATIENT INSTRUCTIONS
Benign Paroxysmal Positional Vertigo     Your health care provider may move your head in certain ways to treat your BPPV.     Benign paroxysmal positional vertigo (BPPV) is a problem with the inner ear. The inner ear contains the vestibular system. This system is what helps you keep your balance. BPPV causes a feeling of spinning. It is a common problem of the vestibular system.  Understanding the vestibular system  The vestibular system of the ear is made up of very tiny parts. They include the utricle, saccule, and semicircular canals. The utricle is a tiny organ that contains calcium crystals. In some people, the crystals can move into the semicircular canals. When this happens, the system no longer works as it should. This causes BPPV. Benign means it is not life-threatening. Paroxysmal means it happens suddenly. Positional means that it happens when you move your head. Vertigo is a feeling of spinning.  What causes BPPV?  Causes include injury to your head or neck. Other problems with the vestibular system may cause BPPV. In many people, the cause of BPPV is not known.  Symptoms of BPPV  You many have repeated feelings of spinning (vertigo). The vertigo usually lasts less than 1 minute. Some movements, suchas rolling over in bed, can bring on vertigo.  Diagnosing BPPV  Your primary health care provider may diagnose and treat your BPPV. Or you may see an ear, nose, and throat doctor (otolaryngologist). In some cases, you may see a nervous system doctor (neurologist).  The health care provider will ask about your symptoms and your medical history. He or she will examine you. You may have hearing and balance tests. As part of the exam, your health care provider may have you move your head and body in certain ways. If you have BPPV, the movements can bring on vertigo. Your provider will also look for abnormal movements of your eyes. You may have other tests to check your vestibular or nervous systems.  Treatment  for BPPV  Your health care provider may try to move the calcium crystals. This is done by having you move your head and neck in certain ways. This treatment is safe and often works well. You may also be told to do these movements at home. You may still have vertigo for a few weeks. Your health care provider will recheck your symptoms, usually in about a month. Special physical therapy may also be part of treatment. In rare cases surgery may be needed for BPPV that does not go away.     When to call the health care provider  Call your health care provider right away if you have any of these:    Symptoms that do not go away with treatment    Symptoms that get worse    New symptoms   Date Last Reviewed: 3/19/2015    5309-0535 [employer] [address] [place] [address], [place] [address]. All rights reserved. This information is not intended as a substitute for professional medical care. Always follow your healthcare professional's instructions.

## 2017-08-24 NOTE — MR AVS SNAPSHOT
After Visit Summary   8/24/2017    Criss Don    MRN: [id number]           Patient Information     Date Of Birth          1970        Visit Information        Provider Department      8/24/2017 3:00 PM Katrina Garner PA-C [facility]        Today's Diagnoses     BPPV (benign paroxysmal positional vertigo), left    -  1      Care Instructions      Benign Paroxysmal Positional Vertigo     Your health care provider may move your head in certain ways to treat your BPPV.     Benign paroxysmal positional vertigo (BPPV) is a problem with the inner ear. The inner ear contains the vestibular system. This system is what helps you keep your balance. BPPV causes a feeling of spinning. It is a common problem of the vestibular system.  Understanding the vestibular system  The vestibular system of the ear is made up of very tiny parts. They include the utricle, saccule, and semicircular canals. The utricle is a tiny organ that contains calcium crystals. In some people, the crystals can move into the semicircular canals. When this happens, the system no longer works as it should. This causes BPPV. Benign means it is not life-threatening. Paroxysmal means it happens suddenly. Positional means that it happens when you move your head. Vertigo is a feeling of spinning.  What causes BPPV?  Causes include injury to your head or neck. Other problems with the vestibular system may cause BPPV. In many people, the cause of BPPV is not known.  Symptoms of BPPV  You many have repeated feelings of spinning (vertigo). The vertigo usually lasts less than 1 minute. Some movements, suchas rolling over in bed, can bring on vertigo.  Diagnosing BPPV  Your primary health care provider may diagnose and treat your BPPV. Or you may see an ear, nose, and throat doctor (otolaryngologist). In some cases, you may see a nervous system doctor (neurologist).  The health care provider will ask about your  symptoms and your medical history. He or she will examine you. You may have hearing and balance tests. As part of the exam, your health care provider may have you move your head and body in certain ways. If you have BPPV, the movements can bring on vertigo. Your provider will also look for abnormal movements of your eyes. You may have other tests to check your vestibular or nervous systems.  Treatment for BPPV  Your health care provider may try to move the calcium crystals. This is done by having you move your head and neck in certain ways. This treatment is safe and often works well. You may also be told to do these movements at home. You may still have vertigo for a few weeks. Your health care provider will recheck your symptoms, usually in about a month. Special physical therapy may also be part of treatment. In rare cases surgery may be needed for BPPV that does not go away.     When to call the health care provider  Call your health care provider right away if you have any of these:    Symptoms that do not go away with treatment    Symptoms that get worse    New symptoms   Date Last Reviewed: 3/19/2015    3707-0976 [employer] [address] [place] [address]. All rights reserved. This information is not intended as a substitute for professional medical care. Always follow your healthcare professional's instructions.                Follow-ups after your visit        Your next 10 appointments already scheduled     Aug 29, 2017  7:00 AM CDT   Return Visit with Brenda Perez PsyD   [facility] [place] ([facility] [place])    [address] [place], [place] [address] [address]   [phone]            Sep 15, 2017  8:00 AM CDT   Return Visit with Brenda Perez PsyD   [facility] [place] ([facility] [place])    [address] [place] [address] [place] [address]   [phone]            Sep 29, 2017  8:00 AM CDT   Return Visit with Brenda Perez  "PsyD   [facility] [place] ([facility] [place])    [address]  [place] [address]   [phone]            Oct 27, 2017 10:00 AM CDT   Annual Visit with Cris Paez PA-C   [facility] ([facility])    [address]  [address]  [facility] [id number] [phone]            Oct 27, 2017 10:30 AM CDT   Annual Visit with Usman Raymond Diet 2, RD   [facility] ([facility])    [address]  [address]  [facility] [id number] [phone]              Who to contact     If you have questions or need follow up information about today's clinic visit or your schedule please contact [facility] [place] directly at [phone].  Normal or non-critical lab and imaging results will be communicated to you by [email or web address], letter or phone within 4 business days after the clinic has received the results. If you do not hear from us within 7 days, please contact the clinic through [employer] or phone. If you have a critical or abnormal lab result, we will notify you by phone as soon as possible.  Submit refill requests through [employer] or call your pharmacy and they will forward the refill request to us. Please allow 3 business days for your refill to be completed.          Additional Information About Your Visit        [employer] Information     [employer] lets you send messages to your doctor, view your test results, renew your prescriptions, schedule appointments and more. To sign up, go to [email or web address] . Click on \"Log in\" on the left side of the screen, which will take you to the Welcome page. Then click on \"Sign up Now\" on the right side of the page.     You will be asked to enter the access code listed below, as well as some personal information. Please follow the directions to create your username and password.     Your access code is: " "[email or web address]  Expires: 2017 10:21 AM     Your access code will  in 90 days. If you need help or a new code, please call your [place] clinic or [phone].        Care EveryWhere ID     This is your Care EveryWhere ID. This could be used by other organizations to access your [place] medical records  [id number]        Your Vitals Were     Pulse Temperature Respirations Height Last Period Pulse Oximetry    73 97.3  F (36.3  C) (Tympanic) 12 5' 2.5\" (1.588 m) 2017 (Exact Date) 100%    BMI (Body Mass Index)                   30.96 kg/m2            Blood Pressure from Last 3 Encounters:   17 98/65   17 113/72   17 116/78    Weight from Last 3 Encounters:   17 172 lb (78 kg)   17 199 lb (90.3 kg)   17 232 lb 14.4 oz (105.6 kg)              We Performed the Following     Basic metabolic panel  (Ca, Cl, CO2, Creat, Gluc, K, Na, BUN)        Primary Care Provider Office Phone # Fax #    Katrina Garner PA-C [phone]       [address]  [place] [address]        Equal Access to Services     [facility]: Hadii aad ku hadasho Soomaali, waaxda luqadaha, qaybta kaalmada adeegyada, suri cuello . So [facility] [phone].    ATENCIÓN: Si habla español, tiene a madera disposición servicios gratuitos de asistencia lingüística. Llame al [phone].    We comply with applicable federal civil rights laws and [place] laws. We do not discriminate on the basis of race, color, national origin, age, disability sex, sexual orientation or gender identity.            Thank you!     Thank you for choosing [facility] [place]  for your care. Our goal is always to provide you with excellent care. Hearing back from our patients is one way we can continue to improve our services. Please take a few minutes to complete the written survey that you may receive in the mail after your visit with us. Thank you!             Your " Updated Medication List - Protect others around you: Learn how to safely use, store and throw away your medicines at www.disposemymeds.org.          This list is accurate as of: 8/24/17  3:17 PM.  Always use your most recent med list.                   Brand Name Dispense Instructions for use Diagnosis    B-12 2500 MCG Subl      Place 1 tablet under the tongue 3 times weekly        docusate sodium 100 MG tablet    COLACE    180 tablet    Take 100 mg by mouth 2 times daily as needed for constipation    Other constipation       MAGNESIUM OXIDE PO      Take 500 mg % by mouth        multivitamin  peds with iron 60 MG chewable tablet      Take 2 chew tab by mouth every morning        potassium chloride SA 20 MEQ CR tablet    potassium chloride    60 tablet    Take 1 tablet (20 mEq) by mouth 2 times daily    Hypokalemia       VIACTIV 500-500-40 MG-UNT-MCG Chew   Generic drug:  calcium-vitamin D-vitamin K      Take 1 tablet by mouth 3 times daily        VITAMIN D3 PO      Take 2,000 Units by mouth daily        VITRON-C  MG Tabs tablet   Generic drug:  ferrous fumarate 65 mg ([place]. FE)-Vitamin C 125 mg      Take 1 tablet by mouth every morning

## 2017-08-24 NOTE — LETTER
August 28, 2017      Criss Don  [address] [facility] [address]      Paulette,    I have reviewed your recent labs. Here are the results:    -Kidney function is normal (Cr, GFR), Sodium is normal, Potassium is normal, Calcium is normal, Glucose is normal (diabetes screening test).     If you have any questions please do not hesitate to contact our office via phone ([phone]) or [employer] by clicking the contact my Care Team link.    If you have further questions about the interpretation of your lab results, www.labtestsonline.org is a great website to check out.    Thank you for allowing me to participate in your care!    ALEXANDER Hunt, PAEmelinaC  [facility] [place]

## 2017-08-25 LAB
ANION GAP SERPL CALCULATED.3IONS-SCNC: 11 MMOL/L (ref 3–14)
BUN SERPL-MCNC: 21 MG/DL (ref 7–30)
CALCIUM SERPL-MCNC: 8.7 MG/DL (ref 8.5–10.1)
CHLORIDE SERPL-SCNC: 106 MMOL/L (ref 94–109)
CO2 SERPL-SCNC: 23 MMOL/L (ref 20–32)
CREAT SERPL-MCNC: 0.52 MG/DL (ref 0.52–1.04)
GFR SERPL CREATININE-BSD FRML MDRD: >90 ML/MIN/1.7M2
GLUCOSE SERPL-MCNC: 77 MG/DL (ref 70–99)
POTASSIUM SERPL-SCNC: 4.1 MMOL/L (ref 3.4–5.3)
SODIUM SERPL-SCNC: 140 MMOL/L (ref 133–144)

## 2017-08-29 ENCOUNTER — OFFICE VISIT (OUTPATIENT)
Dept: PSYCHOLOGY | Facility: CLINIC | Age: 47
End: 2017-08-29
Payer: COMMERCIAL

## 2017-08-29 DIAGNOSIS — F41.9 ANXIETY DISORDER, UNSPECIFIED TYPE: Primary | ICD-10-CM

## 2017-08-29 PROCEDURE — 90834 PSYTX W PT 45 MINUTES: CPT | Performed by: PSYCHOLOGIST

## 2017-08-29 NOTE — PROGRESS NOTES
"                                             Progress Note    Client Name: Criss Don  Date: 8/29/2017         Service Type: Individual      Session Start Time: 7:00  Session End Time: 7:45      Session Length: 45     Session #: 14 (with this provider)     Attendees: Client attended alone    Treatment Plan Last Reviewed: 8/29/2017  PHQ-9 / NED-7 :      DATA      Progress Since Last Session (Related to Symptoms / Goals / Homework):   Symptoms: Stable    Homework: Partially completed      Episode of Care Goals: Minimal progress - ACTION (Actively working towards change); Intervened by reinforcing change plan / affirming steps taken     Current / Ongoing Stressors and Concerns:   Reported being concerned about traveling to [place] to visit a friend because she is unsure how he will respond to her weight loss, and is concerned that he will make critical or hurtful comments. Reported being frustrated that her  stopped over while intoxicated and started talking about their future, and got upset with her for losing so much weight. Reported that she was disappointed that many of her aunts and cousins did not attend her nephew's fiance's baby shower, and thinks that it is because \"they are judgmental about fat people.\" Reported that shopping for a bathing suit was a very anxiety producing experience because she felt like people were watching and negatively judging her.         Treatment Objective(s) Addressed in This Session:   stress management  Maintain compliance with post-surgical dietary needs     Intervention:   CBT: surfaced and challenged \"should\" statements and unreasonable expectations about weight loss; identified sources of unhealthy and unhelpful comparisons  Solution Focused: problem-solved ways to assert herself and set appropriate limits with her friend in [place]  Supportive therapy:  provided active listening, support, and encouragement. Reflected on ways client can be more accepting and tolerant of her " new body shape, and acknowledged the significant progress she has made with weight loss.          ASSESSMENT: Current Emotional / Mental Status (status of significant symptoms):   Risk status (Self / Other harm or suicidal ideation)   Client denies current fears or concerns for personal safety.   Client denies current or recent suicidal ideation or behaviors.   Client denies current or recent homicidal ideation or behaviors.   Client denies current or recent self injurious behavior or ideation.   Client denies other safety concerns.   A safety and risk management plan has not been developed at this time, however client was given the after-hours number / 911 should there be a change in any of these risk factors.     Appearance:   Appropriate    Eye Contact:   Good    Psychomotor Behavior: Normal    Attitude:   Cooperative  Pleasant    Orientation:   All   Speech    Rate / Production: Normal     Volume:  Normal    Mood:    Anxious    Affect:    Appropriate    Thought Content:  Clear    Thought Form:  Coherent  Logical    Insight:    Good      Medication Review:   No current psychiatric medications prescribed     Medication Compliance:   Yes     Changes in Health Issues:   None reported     Chemical Use Review:   Substance Use: Chemical use reviewed, no active concerns identified      Tobacco Use: No current tobacco use.       Collateral Reports Completed:   Not Applicable    PLAN: (Client Tasks / Therapist Tasks / Other)  Future appointments are scheduled. Practice acceptance of your new body shape as discussed in session. Homework: Reduce the frequency of weighing self by 50%, and use calming and distracting activities to manage stress of not weighing self. Keep a thought log at meals to identify unhealthy and unrealistic thinking patterns related to food and weight loss. Create a list of additional ways to calm, distract, and entertain yourself. Practice assertiveness skills with others who ask intrusive questions,  and do not make assumptions about what people are thinking who do not comment on weight loss.       Brenda Perez PsyD LP                                                       Treatment Plan    Client's Name: Criss Don  YOB: 1970    Date: 8/29/2017    DSM-V Diagnoses: 300.00 Unspecified Anxiety Disorder  Psychosocial / Contextual Factors: strain with roommate, spouse, and nephews  WHODAS: 22    Referral / Collaboration:  Referral to another professional/service is not indicated at this time..    Anticipated number of session or this episode of care: reassess after 12 sessions      MeasurableTreatment Goal(s) related to diagnosis / functional impairment(s)  Goal 1: Client will learn coping skills to manage stress and adjust to post-surgical lifestyle changes more effectively.    I will know I've met my goal when I'm not weighing myself all the time and feeling more comfortable with my weight loss.      Objective #A (Client Action)    Client will identify thinking patterns that contribute to anxiety about weight and eating habits.  Status: Continued - Date(s): 8/29/2017    Intervention(s)  Therapist will assign homework (thought  logs), assist client in surfacing and replacement ineffective thinking patterns.    Objective #B  Client will identify new ways to cope with stress and worry thoughts.  Status: Continued - Date(s): 8/29/2017    Intervention(s)  Therapist will assign homework, problem-solve barriers to follow through.    Objective #C  Client will identify and challenge unreasonable or unrealistic expectations about weight loss.  Status: Continued - Date(s): 8/29/2017    Intervention(s)  Therapist will assist client in identifying and balancing her expectations.        Client has reviewed and agreed to the above plan.      Brenda Perez PsyD LP  8/29/2017

## 2017-08-29 NOTE — MR AVS SNAPSHOT
"                  MRN:[id number]                      After Visit Summary   8/29/2017    Criss Don    MRN: [id number]           Visit Information        Provider Department      8/29/2017 7:00 AM [facility]D [facility]      Your next 10 appointments already scheduled     Sep 15, 2017  8:00 AM CDT   Return Visit with Brenda Perez PsyD   [facility] [place] ([facility] [place])    [address] [place] [address]  [place] [address]   [phone]            Sep 29, 2017  8:00 AM CDT   Return Visit with Brenda Perez PsyD   [facility] [place] ([facility] [place])    [address] [place] [address] [place] [address]   [phone]            Oct 13, 2017  8:00 AM CDT   Return Visit with Brenda Perez PsyD   [facility] [place] ([facility] [place])    [address] [place] [address] [address]   [phone]            Oct 27, 2017 10:00 AM CDT   Annual Visit with Cris Paez PA-C   [facility] ([facility])    [address] [place] [address] [phone]            Oct 27, 2017 10:30 AM CDT   Annual Visit with Usman Raymond Diet 2, RD   [facility] ([facility])    [address] [place] [address]   [phone]              MyChart Information     [employer] lets you send messages to your doctor, view your test results, renew your prescriptions, schedule appointments and more. To sign up, go to [email or web address] . Click on \"Log in\" on the left side of the screen, which will take you to the Welcome page. Then click on \"Sign up Now\" on the right side of the page.     You will be asked to enter the access code listed below, as well as some personal information. Please follow the directions to create your " username and password.     Your access code is: [email or web address]  Expires: 2017 10:21 AM     Your access code will  in 90 days. If you need help or a new code, please call your [place] clinic or [phone].        Care EveryWhere ID     This is your Care EveryWhere ID. This could be used by other organizations to access your [place] medical records  [id number]        Equal Access to Services     [facility] : Hadii deandra langfordo Sojorge, waaxda luqadaha, qaybta kaalmada adenancie, suri cuello . So [facility] [phone].    ATENCIÓN: Si habla español, tiene a madera disposición servicios gratuitos de asistencia lingüística. Llame al [phone].    We comply with applicable federal civil rights laws and [place] laws. We do not discriminate on the basis of race, color, national origin, age, disability sex, sexual orientation or gender identity.

## 2017-09-06 ENCOUNTER — TELEPHONE (OUTPATIENT)
Dept: SURGERY | Facility: CLINIC | Age: 47
End: 2017-09-06

## 2017-09-06 NOTE — TELEPHONE ENCOUNTER
Patient called in stating that she had spoke with Cris Paez regarding birth control and would like a call back.    Called patient back let her know that there are several options available to her for birth control.  It would be best to have that discussion with her primary care provider.  Patient was not sure who to initially ask regarding this.  Advised primary.

## 2017-09-15 ENCOUNTER — OFFICE VISIT (OUTPATIENT)
Dept: PSYCHOLOGY | Facility: CLINIC | Age: 47
End: 2017-09-15
Payer: COMMERCIAL

## 2017-09-15 DIAGNOSIS — F41.9 ANXIETY DISORDER, UNSPECIFIED TYPE: Primary | ICD-10-CM

## 2017-09-15 PROCEDURE — 90834 PSYTX W PT 45 MINUTES: CPT | Performed by: PSYCHOLOGIST

## 2017-09-15 NOTE — MR AVS SNAPSHOT
"                  MRN:[id number]                      After Visit Summary   9/15/2017    Criss Don    MRN: [id number]           Visit Information        Provider Department      9/15/2017 8:00 AM Brenda Perez PsyD [facility] Generic      Your next 10 appointments already scheduled     Sep 29, 2017  8:00 AM CDT   Return Visit with Brenda Perez PsyD   [facility] [place] ([facility])    [address] [place], [place] [address] [address]   [phone]            Oct 13, 2017  8:00 AM CDT   Return Visit with Brenda Perez PsyD   [facility]    [address] [place] [address]   [phone]            Oct 27, 2017 10:00 AM CDT   Annual Visit with Cris Paez PA-C   [facility] ([facility])    [address] [place] [id number] [phone]            Oct 27, 2017 10:30 AM CDT   Annual Visit with Usman Raymond Diet 2, RD   [facility] ([facility])    [address] [place] [address] [phone]            Nov 03, 2017  8:00 AM CDT   Return Visit with Brenda Perez PsyD   [facility] ([facility] [place])    [address] [place] [address]   [phone]            Nov 14, 2017  8:00 AM CST   Return Visit with Brenda Perez PsyD   [facility] ([facility]    [address] [place] [address]   [phone]              MyChart Information     [email or web address] lets you send messages to your doctor, view your test results, renew your prescriptions, schedule appointments and more. To sign up, go to [email or web address] . Click on \"Log in\" on the left side of the screen, which will take you to the Welcome " "page. Then click on \"Sign up Now\" on the right side of the page.     You will be asked to enter the access code listed below, as well as some personal information. Please follow the directions to create your username and password.     Your access code is: [email or web address]  Expires: 2017 10:21 AM     Your access code will  in 90 days. If you need help or a new code, please call your [place] clinic or [phone].        Care EveryWhere ID     This is your Care EveryWhere ID. This could be used by other organizations to access your [place] medical records  [id number]        Equal Access to Services     STEVEN MUNOZ : Phong Wooten, seble medina, mirna faulkner, suri yeager. So [facility] [phone].    ATENCIÓN: Si habla español, tiene a madera disposición servicios gratuitos de asistencia lingüística. Llame al [phone].    We comply with applicable federal civil rights laws and [place] laws. We do not discriminate on the basis of race, color, national origin, age, disability sex, sexual orientation or gender identity.            "

## 2017-09-15 NOTE — PROGRESS NOTES
"                                             Progress Note    Client Name: Criss Don  Date: 9/15/2017         Service Type: Individual      Session Start Time: 8:00  Session End Time: 8:45      Session Length: 45     Session #: 15     Attendees: Client attended alone    Treatment Plan Last Reviewed: 9/15/2017  PHQ-9 / NED-7 :      DATA      Progress Since Last Session (Related to Symptoms / Goals / Homework):   Symptoms: Stable    Homework: Partially completed      Episode of Care Goals: Minimal progress - ACTION (Actively working towards change); Intervened by reinforcing change plan / affirming steps taken     Current / Ongoing Stressors and Concerns:   Reported that she booked a ticket to visit her friend in [place] and also booked a cruise with a friend next spring. Reported that she is feeling some added pressure to lose more weight because she fears that her friend will not be impressed enough by her weight loss. Also acknowledged that she wants to \"look my best\" when she sees him in November. Reported that her spouse has been contacting her and talking about their future plans (e.g. Buying a home in [place], having a baby). Client reported that this is not her vision for the future and that \"he says this stuff all the time,\" particularly when he is drinking.         Treatment Objective(s) Addressed in This Session:   stress management  Maintain compliance with post-surgical dietary needs     Intervention:   CBT: surfaced and challenged \"should\" statements and unreasonable expectations about weight loss; identified sources of unhealthy and unhelpful comparisons; discussed \"red flag\" thoughts that could lead to unhealthy eating habits  Solution Focused: problem-solved ways to assert herself and set appropriate limits with her friend in [place]  Supportive therapy:  provided active listening, support, and encouragement. Reflected on ways client can be more accepting and tolerant of her new body shape. Normalized " concerns about what others think about size/shape after weight loss surgery, and assisted her in recognizing when the concerns are excessive, disproportionate, or trigger unhealthy thinking/behaviors.           ASSESSMENT: Current Emotional / Mental Status (status of significant symptoms):   Risk status (Self / Other harm or suicidal ideation)   Client denies current fears or concerns for personal safety.   Client denies current or recent suicidal ideation or behaviors.   Client denies current or recent homicidal ideation or behaviors.   Client denies current or recent self injurious behavior or ideation.   Client denies other safety concerns.   A safety and risk management plan has not been developed at this time, however client was given the after-hours number / 911 should there be a change in any of these risk factors.     Appearance:   Appropriate    Eye Contact:   Good    Psychomotor Behavior: Normal    Attitude:   Cooperative  Pleasant    Orientation:   All   Speech    Rate / Production: Normal     Volume:  Normal    Mood:    Anxious    Affect:    Appropriate    Thought Content:  Clear    Thought Form:  Coherent  Logical    Insight:    Good      Medication Review:   No current psychiatric medications prescribed     Medication Compliance:   Yes     Changes in Health Issues:   None reported     Chemical Use Review:   Substance Use: Chemical use reviewed, no active concerns identified      Tobacco Use: No current tobacco use.       Collateral Reports Completed:   Not Applicable    PLAN: (Client Tasks / Therapist Tasks / Other)  Future appointments are scheduled. Practice acceptance of your new body shape as discussed in session. Homework: Reduce the frequency of weighing self by 50%, and use calming and distracting activities to manage stress of not weighing self. Keep a thought log at meals to identify unhealthy and unrealistic thinking patterns related to food and weight loss. Create a list of additional ways  to calm, distract, and entertain yourself. Practice assertiveness skills with others who ask intrusive questions, and do not make assumptions about what people are thinking who do not comment on weight loss.       Bredna Perez PsyD LP                                                       Treatment Plan    Client's Name: Criss Don  YOB: 1970    Date: 9/15/2017    DSM-V Diagnoses: 300.00 Unspecified Anxiety Disorder  Psychosocial / Contextual Factors: strain with roommate, spouse, and nephews  WHODAS: 22    Referral / Collaboration:  Referral to another professional/service is not indicated at this time..    Anticipated number of session or this episode of care: reassess after 12 sessions      MeasurableTreatment Goal(s) related to diagnosis / functional impairment(s)  Goal 1: Client will learn coping skills to manage stress and adjust to post-surgical lifestyle changes more effectively.    I will know I've met my goal when I'm not weighing myself all the time and feeling more comfortable with my weight loss.      Objective #A (Client Action)    Client will identify thinking patterns that contribute to anxiety about weight and eating habits.  Status: Continued - Date(s): 9/15/2017    Intervention(s)  Therapist will assign homework (thought  logs), assist client in surfacing and replacement ineffective thinking patterns.    Objective #B  Client will identify new ways to cope with stress and worry thoughts.  Status: Continued - Date(s): 9/15/2017    Intervention(s)  Therapist will assign homework, problem-solve barriers to follow through.    Objective #C  Client will identify and challenge unreasonable or unrealistic expectations about weight loss.  Status: Continued - Date(s): 9/15/2017    Intervention(s)  Therapist will assist client in identifying and balancing her expectations.        Client has reviewed and agreed to the above plan.      Brenda Perez PsyD LP  9/15/2017

## 2017-09-19 ENCOUNTER — OFFICE VISIT (OUTPATIENT)
Dept: FAMILY MEDICINE | Facility: CLINIC | Age: 47
End: 2017-09-19
Payer: COMMERCIAL

## 2017-09-19 VITALS
SYSTOLIC BLOOD PRESSURE: 94 MMHG | OXYGEN SATURATION: 99 % | TEMPERATURE: 98 F | DIASTOLIC BLOOD PRESSURE: 58 MMHG | HEART RATE: 82 BPM | BODY MASS INDEX: 29.95 KG/M2 | RESPIRATION RATE: 16 BRPM | HEIGHT: 63 IN | WEIGHT: 169 LBS

## 2017-09-19 DIAGNOSIS — Z23 NEED FOR VACCINATION: ICD-10-CM

## 2017-09-19 DIAGNOSIS — Z30.09 GENERAL COUNSELING FOR PRESCRIPTION OF ORAL CONTRACEPTIVES: Primary | ICD-10-CM

## 2017-09-19 DIAGNOSIS — T75.3XXA MOTION SICKNESS, INITIAL ENCOUNTER: ICD-10-CM

## 2017-09-19 DIAGNOSIS — Z23 NEED FOR PROPHYLACTIC VACCINATION AND INOCULATION AGAINST INFLUENZA: ICD-10-CM

## 2017-09-19 DIAGNOSIS — I95.9 HYPOTENSION, UNSPECIFIED HYPOTENSION TYPE: ICD-10-CM

## 2017-09-19 LAB — BETA HCG QUAL IFA URINE: NEGATIVE

## 2017-09-19 PROCEDURE — 99214 OFFICE O/P EST MOD 30 MIN: CPT | Mod: 25 | Performed by: PHYSICIAN ASSISTANT

## 2017-09-19 PROCEDURE — 90472 IMMUNIZATION ADMIN EACH ADD: CPT | Performed by: PHYSICIAN ASSISTANT

## 2017-09-19 PROCEDURE — 90686 IIV4 VACC NO PRSV 0.5 ML IM: CPT | Performed by: PHYSICIAN ASSISTANT

## 2017-09-19 PROCEDURE — 90632 HEPA VACCINE ADULT IM: CPT | Performed by: PHYSICIAN ASSISTANT

## 2017-09-19 PROCEDURE — 90471 IMMUNIZATION ADMIN: CPT | Performed by: PHYSICIAN ASSISTANT

## 2017-09-19 PROCEDURE — 84703 CHORIONIC GONADOTROPIN ASSAY: CPT | Performed by: PHYSICIAN ASSISTANT

## 2017-09-19 RX ORDER — NORGESTIMATE AND ETHINYL ESTRADIOL 0.25-0.035
1 KIT ORAL DAILY
Qty: 84 TABLET | Refills: 1 | Status: SHIPPED | OUTPATIENT
Start: 2017-09-19 | End: 2018-01-19

## 2017-09-19 NOTE — PATIENT INSTRUCTIONS
Dramamine tabs over the counter - for sea sickness  Scopolamine patches - can wear for up to 72 hours - call if you would like these.

## 2017-09-19 NOTE — PROGRESS NOTES
Injectable Influenza Immunization Documentation    1.  Is the person to be vaccinated sick today? no    2. Does the person to be vaccinated have an allergy to a component   of the vaccine? no    3. Has the person to be vaccinated ever had a serious reaction   to influenza vaccine in the past? no    4. Has the person to be vaccinated ever had Guillain-Barré syndrome? no    Form completed by OG Manning LPN

## 2017-09-19 NOTE — MR AVS SNAPSHOT
After Visit Summary   9/19/2017    Criss Don    MRN: [id number]           Patient Information     Date Of Birth          1970        Visit Information        Provider Department      9/19/2017 9:00 AM Katrina Garner PA-C [facility]        Today's Diagnoses     Contraceptive management    -  1    General counseling for prescription of oral contraceptives          Care Instructions    Dramamine tabs over the counter - for sea sickness  Scopolamine patches - can wear for up to 72 hours - call if you would like these.             Follow-ups after your visit        Your next 10 appointments already scheduled     Sep 29, 2017  8:00 AM CDT   Return Visit with Brenda Perez PsyD   [facility] [place] ([facility] [place])    [address] [place], [place] [address] [address]   [phone]            Oct 13, 2017  8:00 AM CDT   Return Visit with Brenda Perez PsyD   [facility] [place] [place] ([facility] [place])    [address] [place] [address]   [phone]            Oct 27, 2017 10:00 AM CDT   Annual Visit with Cris Paez PA-C   [facility] [place] [facility] ([facility])    [address] [place] [address]   [phone]            Oct 27, 2017 10:30 AM CDT   Annual Visit with Usman Raymond Diet 2, RD   [facility] ([facility])    [address] [place] [address]   [phone]            Nov 03, 2017  8:00 AM CDT   Return Visit with Brenda Perez PsyD   [facility] [place] ([facility] [place])    [address] [place] [address]   [phone]            Nov 14, 2017  8:00 AM CST   Return Visit with Brenda Perez PsyD   [facility] [place] [place] ([facility] [place])    [address] [place] [address]  "Drive  [place] [id number] [phone]              Who to contact     If you have questions or need follow up information about today's clinic visit or your schedule please contact [facility] [place] directly at [phone].  Normal or non-critical lab and imaging results will be communicated to you by MyChart, letter or phone within 4 business days after the clinic has received the results. If you do not hear from us within 7 days, please contact the clinic through MyChart or phone. If you have a critical or abnormal lab result, we will notify you by phone as soon as possible.  Submit refill requests through [employer] or call your pharmacy and they will forward the refill request to us. Please allow 3 business days for your refill to be completed.          Additional Information About Your Visit        MyChart Information     [employer] lets you send messages to your doctor, view your test results, renew your prescriptions, schedule appointments and more. To sign up, go to [email or web address] . Click on \"Log in\" on the left side of the screen, which will take you to the Welcome page. Then click on \"Sign up Now\" on the right side of the page.     You will be asked to enter the access code listed below, as well as some personal information. Please follow the directions to create your username and password.     Your access code is: [email or web address]  Expires: 2017 10:21 AM     Your access code will  in 90 days. If you need help or a new code, please call your [place] clinic or [phone].        Care EveryWhere ID     This is your Care EveryWhere ID. This could be used by other organizations to access your [place] medical records  [id number]        Your Vitals Were     Pulse Temperature Respirations Height Last Period Pulse Oximetry    82 98  F (36.7  C) 16 5' 2.5\" (1.588 m) 2017 99%    BMI (Body Mass Index)                   30.42 kg/m2            Blood Pressure from Last 3 " Encounters:   09/19/17 94/58   08/24/17 98/65   04/28/17 113/72    Weight from Last 3 Encounters:   09/19/17 169 lb (76.7 kg)   08/24/17 172 lb (78 kg)   04/28/17 199 lb (90.3 kg)              We Performed the Following     Beta HCG qual IFA urine          Today's Medication Changes          These changes are accurate as of: 9/19/17  9:27 AM.  If you have any questions, ask your nurse or doctor.               Start taking these medicines.        Dose/Directions    norgestimate-ethinyl estradiol 0.25-35 MG-MCG per tablet   Commonly known as:  ORTHO-CYCLEN, SPRINTEC   Used for:  General counseling for prescription of oral contraceptives   Started by:  Katrina Garner PA-C        Dose:  1 tablet   Take 1 tablet by mouth daily   Quantity:  84 tablet   Refills:  1            Where to get your medicines      These medications were sent to [facility] [place], [place] - [address] [facility] [address]  [address] [facility] [place] [address]    Hours:  Added 10/26 CK Checked with pharmacy Phone:  [phone]     norgestimate-ethinyl estradiol 0.25-35 MG-MCG per tablet                Primary Care Provider Office Phone # Fax #    Katrina Garner PA-C [phone] [address]  [place] [address]        Equal Access to Services     [facility] AH: Hadii deandra cowart hadasho Solouisali, waaxda luqadaha, qaybta kaalmada adeegyada, suri yeager. So [facility] [phone].    ATENCIÓN: Si habla español, tiene a madera disposición servicios gratuitos de asistencia lingüística. Llame al [phone].    We comply with applicable federal civil rights laws and [place] laws. We do not discriminate on the basis of race, color, national origin, age, disability sex, sexual orientation or gender identity.            Thank you!     Thank you for choosing [facility] [place]  for your care. Our goal is always to provide you with excellent care. Hearing back  from our patients is one way we can continue to improve our services. Please take a few minutes to complete the written survey that you may receive in the mail after your visit with us. Thank you!             Your Updated Medication List - Protect others around you: Learn how to safely use, store and throw away your medicines at www.disposemymeds.org.          This list is accurate as of: 9/19/17  9:27 AM.  Always use your most recent med list.                   Brand Name Dispense Instructions for use Diagnosis    B-12 2500 MCG Subl      Place 1 tablet under the tongue 3 times weekly        docusate sodium 100 MG tablet    COLACE    180 tablet    Take 100 mg by mouth 2 times daily as needed for constipation    Other constipation       MAGNESIUM OXIDE PO      Take 500 mg % by mouth        multivitamin  peds with iron 60 MG chewable tablet      Take 2 chew tab by mouth every morning        norgestimate-ethinyl estradiol 0.25-35 MG-MCG per tablet    ORTHO-CYCLEN, SPRINTEC    84 tablet    Take 1 tablet by mouth daily    General counseling for prescription of oral contraceptives       potassium chloride SA 20 MEQ CR tablet    potassium chloride    60 tablet    Take 1 tablet (20 mEq) by mouth 2 times daily    Hypokalemia       VIACTIV 500-500-40 MG-UNT-MCG Chew   Generic drug:  calcium-vitamin D-vitamin K      Take 1 tablet by mouth 3 times daily        VITAMIN D3 PO      Take 2,000 Units by mouth daily        VITRON-C  MG Tabs tablet   Generic drug:  ferrous fumarate 65 mg ([place]. FE)-Vitamin C 125 mg      Take 1 tablet by mouth every morning

## 2017-09-19 NOTE — PROGRESS NOTES
"Chief Complaint   Patient presents with     Recheck Medication     bc       Initial BP 94/58  Pulse 82  Temp 98  F (36.7  C)  Resp 16  Ht 5' 2.5\" (1.588 m)  Wt 169 lb (76.7 kg)  LMP 09/06/2017  SpO2 99%  BMI 30.42 kg/m2 Estimated body mass index is 30.42 kg/(m^2) as calculated from the following:    Height as of this encounter: 5' 2.5\" (1.588 m).    Weight as of this encounter: 169 lb (76.7 kg).  Medication Reconciliation: complete. OG Manning LPN        SUBJECTIVE:   Criss Don is a 46 year old female who presents to clinic today for the following health issues:      Concern - discuss bc   Onset: wants flu shot    Wants to start pills. Used in past, no issues. No personal or fam h/o migraine aura, dvt, smoking.   3 mo w/o menses and then came back normal q30 days.   Using protection currently.  Had a few hot flashes in the past, now resolved.     #2- going on cruise to [place] in May - vaccines? Sea sickness options?    #3- blood pressure on low side. occas is dizzy.       -------------------------------------    Problem list and histories reviewed & adjusted, as indicated.  Additional history: as documented    Patient Active Problem List   Diagnosis     Labial abscess     CARDIOVASCULAR SCREENING; LDL GOAL LESS THAN 160     Mass of right foot     History of abnormal cervical Pap smear     Intertrigo     Bilateral edema of lower extremity     Bariatric surgery status     Malnutrition following gastrointestinal surgery     Body dysmorphic disorder     Hypokalemia     Elevated ALT measurement     Obesity (BMI 30-39.9)     Past Surgical History:   Procedure Laterality Date     LAPAROSCOPIC BYPASS GASTRIC N/A 10/26/2016    Procedure: LAPAROSCOPIC BYPASS GASTRIC;  Surgeon: Romario Reyna MD;  Location: [facility]     NO HISTORY OF SURGERY         Social History   Substance Use Topics     Smoking status: Never Smoker     Smokeless tobacco: Never Used     Alcohol use 0.0 oz/week     0 Standard drinks or " equivalent per week      Comment: very rare     Family History   Problem Relation Age of Onset     Hypertension Mother      Breast Cancer Mother      64 dx     Allergies Mother      Obesity Mother      Respiratory Mother      Asthma     Hypertension Maternal Grandmother      CANCER Maternal Grandmother      Ovarian     CANCER Sister      Cervical     CANCER Other      Mat. great aunt-ovarian     Hypertension Brother      CANCER Maternal Aunt      ?Gastric     Brain Cancer Cousin      maternal         Current Outpatient Prescriptions   Medication Sig Dispense Refill     norgestimate-ethinyl estradiol (ORTHO-CYCLEN, SPRINTEC) 0.25-35 MG-MCG per tablet Take 1 tablet by mouth daily 84 tablet 1     MAGNESIUM OXIDE PO Take 500 mg % by mouth       docusate sodium (COLACE) 100 MG tablet Take 100 mg by mouth 2 times daily as needed for constipation 180 tablet 1     ferrous fumarate 65 mg, [place]. FE,-Vitamin C 125 mg (VITRON-C)  MG TABS Take 1 tablet by mouth every morning       Cyanocobalamin (B-12) 2500 MCG SUBL Place 1 tablet under the tongue 3 times weekly       calcium-vitamin D-vitamin K (VIACTIV) 500-500-40 MG-UNT-MCG CHEW Take 1 tablet by mouth 3 times daily        multivitamin  peds with iron (FLINTSTONES COMPLETE) 60 MG chewable tablet Take 2 chew tab by mouth every morning        Cholecalciferol (VITAMIN D3 PO) Take 2,000 Units by mouth daily        potassium chloride SA (POTASSIUM CHLORIDE) 20 MEQ CR tablet Take 1 tablet (20 mEq) by mouth 2 times daily (Patient not taking: Reported on 8/24/2017) 60 tablet 1     Allergies   Allergen Reactions     Lortab [Hydrocodone-Acetaminophen] Nausea     Also light headed and flushed     Labs reviewed in EPIC      Reviewed and updated as needed this visit by clinical staffTobacco  Allergies  Meds  Fam Hx  Soc Hx      Reviewed and updated as needed this visit by Provider         Social History     Social History     Marital status:      Spouse name: seperated      "Number of children: N/A     Years of education: N/A     Occupational History     [employer]      Social History Main Topics     Smoking status: Never Smoker     Smokeless tobacco: Never Used     Alcohol use 0.0 oz/week     0 Standard drinks or equivalent per week      Comment: very rare     Drug use: No     Sexual activity: Yes     Partners: Male     Other Topics Concern     None     Social History Narrative       10 point review of systems negative other than symptoms noted above.   Constitutional, HEENT, CV, pulmonary, GI, , MS, Endo, Psych systems are all negative, except as otherwise noted.       OBJECTIVE:  BP 94/58  Pulse 82  Temp 98  F (36.7  C)  Resp 16  Ht 5' 2.5\" (1.588 m)  Wt 169 lb (76.7 kg)  LMP 09/06/2017  SpO2 99%  BMI 30.42 kg/m2  CONSTITUTIONAL: Alert, well-nourished, well-groomed, NAD  RESP: Lungs CTA. No wheeze, rhonchi, rales. Normal effort on room air. Equal lung sounds bilaterally.   CV: HRRR, normal S1, S2. No MRG. No peripheral edema.  DERM: No rashes or suspicious lesions  GI: Abdomen flat. BS x 4. No TTP. No HSM or masses. No CVAT  PSYCH: Alert and oriented. Thought process is clear and goal-directed. Adequate insight and judgement. Mood - normal. Affect - normal.  Neck: No lymphadenopathy or masses. Thyroid smooth, non-tender, and non-enlarged.  Head: Normocephalic, atraumatic.  Eyes: Conjunctiva clear, non icteric. PERRLA.  MUSCULOSKELETAL: maew. No gross abn.      Diagnostic Tests:  Results for orders placed or performed in visit on 09/19/17   Beta HCG qual IFA urine   Result Value Ref Range    Beta HCG Qual IFA Urine Negative NEG^Negative            ASSESSMENT/PLAN:  (Z30.09) General counseling for prescription of oral contraceptives  (primary encounter diagnosis)  Comment: start below.   Counseling was performed. She is aware it is not abnormal to have spotting/BTB/some irregularity for first couple months on pill. No personal or family history of clotting d/o's. No migraine " history. She is not a smoker and has no intention to begin. We talked about increased risk of clots, she should be evaluated for any leg pain or swelling, chest pain, sob, migraines/auras, severe abdominal pain. She is aware the pill does not prevent STDs and she should use another method, such as condoms to prevent these. She will use back up method (condoms) for first 14 days.  OK to refill x1 yr if going well. Px in spring.   Plan to stop and re-assess around 49 yo.   Plan: norgestimate-ethinyl estradiol (ORTHO-CYCLEN,         SPRINTEC) 0.25-35 MG-MCG per tablet            (I95.9) Hypotension, unspecified hypotension type  Comment:   Plan: Recommend salting her food. Monitor.     (T75.3XXA) Motion sickness, initial encounter  Comment:   Plan: dramamine otc or she can call for scopolamine patches her when gets closer.     Recommend hep A vaccine.   Flu shot today.     FOLLOW-UP: Spring for px.   1 yr desirae f/u in Oct.   The patient agrees with this assessment and plan and agrees to call or return to the clinic with any questions or concerns or if their condition worsens.    ALEXANDER Gonzalez, PAEmelinaC  [facility]

## 2017-09-29 ENCOUNTER — OFFICE VISIT (OUTPATIENT)
Dept: PSYCHOLOGY | Facility: CLINIC | Age: 47
End: 2017-09-29
Payer: COMMERCIAL

## 2017-09-29 DIAGNOSIS — F41.9 ANXIETY DISORDER, UNSPECIFIED TYPE: Primary | ICD-10-CM

## 2017-09-29 PROCEDURE — 90834 PSYTX W PT 45 MINUTES: CPT | Performed by: PSYCHOLOGIST

## 2017-09-29 NOTE — PROGRESS NOTES
"                                             Progress Note    Client Name: Criss Don  Date: 9/29/2017         Service Type: Individual      Session Start Time: 8:00  Session End Time: 8:45      Session Length: 45     Session #: 16     Attendees: Client attended alone    Treatment Plan Last Reviewed: 9/29/2017  PHQ-9 / NED-7 :      DATA      Progress Since Last Session (Related to Symptoms / Goals / Homework):   Symptoms: Stable    Homework: Partially completed      Episode of Care Goals: Minimal progress - ACTION (Actively working towards change); Intervened by reinforcing change plan / affirming steps taken     Current / Ongoing Stressors and Concerns:   Reported that she has been preoccupied with appearance due to her upcoming trip to visit a friend. Reported that she continues to see herself as fat in the mirror, even though she is wearing much smaller clothing and can feel the difference in her body. Acknowledged that she is fearful of  her spouse because \"at least he's a sure bet\", even though the relationship is unhealthy and unfulfilling. Reported that she does not trust people easily and has a hard time envisioning getting into another relationship.         Treatment Objective(s) Addressed in This Session:   stress management  Maintain compliance with post-surgical dietary needs     Intervention:   CBT: surfaced and challenged \"should\" statements and unreasonable expectations about weight loss; identified sources of unhealthy and unhelpful comparisons; discussed \"red flag\" thoughts that could lead to unhealthy eating habits  Solution Focused: problem-solved ways to assert herself and set appropriate limits with her friend in [place]  Supportive therapy:  provided active listening, support, and encouragement. Reflected on ways client can be more accepting and tolerant of her new body shape. Normalized concerns about what others think about size/shape after weight loss surgery, and assisted her in " recognizing when the concerns are excessive, disproportionate, or trigger unhealthy thinking/behaviors.           ASSESSMENT: Current Emotional / Mental Status (status of significant symptoms):   Risk status (Self / Other harm or suicidal ideation)   Client denies current fears or concerns for personal safety.   Client denies current or recent suicidal ideation or behaviors.   Client denies current or recent homicidal ideation or behaviors.   Client denies current or recent self injurious behavior or ideation.   Client denies other safety concerns.   A safety and risk management plan has not been developed at this time, however client was given the after-hours number / 911 should there be a change in any of these risk factors.     Appearance:   Appropriate    Eye Contact:   Good    Psychomotor Behavior: Normal    Attitude:   Cooperative  Pleasant    Orientation:   All   Speech    Rate / Production: Normal     Volume:  Normal    Mood:    Anxious    Affect:    Appropriate    Thought Content:  Clear    Thought Form:  Coherent  Logical    Insight:    Good      Medication Review:   No current psychiatric medications prescribed     Medication Compliance:   Yes     Changes in Health Issues:   None reported     Chemical Use Review:   Substance Use: Chemical use reviewed, no active concerns identified      Tobacco Use: No current tobacco use.       Collateral Reports Completed:   Not Applicable    PLAN: (Client Tasks / Therapist Tasks / Other)  Future appointments are scheduled. Practice acceptance of your new body shape as discussed in session. Surface and challenge unrealistic expectations and disproportionate thoughts.       Brenda Perez PsyD LP                                                       Treatment Plan    Client's Name: Criss Don  YOB: 1970    Date: 9/29/2017    DSM-V Diagnoses: 300.00 Unspecified Anxiety Disorder  Psychosocial / Contextual Factors: strain with roommate, spouse, and  sonia  WHODAS: 22    Referral / Collaboration:  Referral to another professional/service is not indicated at this time..    Anticipated number of session or this episode of care: reassess after 12 sessions      MeasurableTreatment Goal(s) related to diagnosis / functional impairment(s)  Goal 1: Client will learn coping skills to manage stress and adjust to post-surgical lifestyle changes more effectively.    I will know I've met my goal when I'm not weighing myself all the time and feeling more comfortable with my weight loss.      Objective #A (Client Action)    Client will identify thinking patterns that contribute to anxiety about weight and eating habits.  Status: Continued - Date(s): 9/29/2017    Intervention(s)  Therapist will assign homework (thought  logs), assist client in surfacing and replacement ineffective thinking patterns.    Objective #B  Client will identify new ways to cope with stress and worry thoughts.  Status: Continued - Date(s): 9/29/2017    Intervention(s)  Therapist will assign homework, problem-solve barriers to follow through.    Objective #C  Client will identify and challenge unreasonable or unrealistic expectations about weight loss.  Status: Continued - Date(s): 9/29/2017    Intervention(s)  Therapist will assist client in identifying and balancing her expectations.        Client has reviewed and agreed to the above plan.      Brenda Perez PsyD LP  9/29/2017

## 2017-09-29 NOTE — MR AVS SNAPSHOT
"                  MRN:[id number]                      After Visit Summary   9/29/2017    Criss Don    MRN: [id number]           Visit Information        Provider Department      9/29/2017 8:00 AM Brenda Perez PsyD [facility]      Your next 10 appointments already scheduled     Oct 13, 2017  8:00 AM CDT   Return Visit with Brenda Perez PsyD   [facility] [place] ([facility])    [address] [place] [address]   [phone]            Oct 27, 2017 10:00 AM CDT   Annual Visit with Cris Paez PA-C   [facility] ([facility])    [address]  [address]  [place] [address]   [phone]            Oct 27, 2017 10:30 AM CDT   Annual Visit with Usman Raymond Diet 2, RD   [facility] ([facility])    [address]  [address]  [place] [address]   [phone]            Nov 03, 2017  8:00 AM CDT   Return Visit with Brenda Perez PsyD   [facility]    [address] [place] [address]   [phone]            Nov 14, 2017  8:00 AM CST   Return Visit with Brenda Perez PsyD   [facility] ([facility])    [address] [place] [address]   [phone]            Dec 01, 2017  7:00 AM CST   Return Visit with Brenda Perez PsyD   [facility] [place] ([facility])    [address] [place] [address]   [phone]              MyChart Information     [employer] lets you send messages to your doctor, view your test results, renew your prescriptions, schedule appointments and more. To sign up, go to [email or web address] . Click on \"Log in\" on the left side of the screen, which will take you to the Welcome " "page. Then click on \"Sign up Now\" on the right side of the page.     You will be asked to enter the access code listed below, as well as some personal information. Please follow the directions to create your username and password.     Your access code is: [email or web address]  Expires: 2017 10:21 AM     Your access code will  in 90 days. If you need help or a new code, please call your [facility] clinic or [phone].        Care EveryWhere ID     This is your Care EveryWhere ID. This could be used by other organizations to access your [facility] medical records  [id number]        Equal Access to Services     STEVEN MUNOZ : Phong Wooten, seble medina, mirna faulkner, suri yeager. So [facility] [phone].    ATENCIÓN: Si habla español, tiene a madera disposición servicios gratuitos de asistencia lingüística. Llame al [phone].    We comply with applicable federal civil rights laws and [place] laws. We do not discriminate on the basis of race, color, national origin, age, disability, sex, sexual orientation, or gender identity.            "

## 2017-10-13 ENCOUNTER — TELEPHONE (OUTPATIENT)
Dept: FAMILY MEDICINE | Facility: CLINIC | Age: 47
End: 2017-10-13

## 2017-10-13 ENCOUNTER — OFFICE VISIT (OUTPATIENT)
Dept: PSYCHOLOGY | Facility: CLINIC | Age: 47
End: 2017-10-13
Payer: COMMERCIAL

## 2017-10-13 DIAGNOSIS — F41.9 ANXIETY DISORDER, UNSPECIFIED TYPE: Primary | ICD-10-CM

## 2017-10-13 PROCEDURE — 90834 PSYTX W PT 45 MINUTES: CPT | Performed by: PSYCHOLOGIST

## 2017-10-13 NOTE — MR AVS SNAPSHOT
MRN:[id number]                      After Visit Summary   10/13/2017    Criss Don    MRN: [id number]           Visit Information        Provider Department      10/13/2017 8:00 AM Brenda Perez PsyD [facility]      Your next 10 appointments already scheduled     Oct 17, 2017  7:40 AM CDT   Office Visit with Napoleon Gray PA-C   [facility]    [address] [place] [address]   [phone]           Bring a current list of meds and any records pertaining to this visit. For Physicals, please bring immunization records and any forms needing to be filled out. Please arrive 10 minutes early to complete paperwork.            Oct 27, 2017 10:00 AM CDT   Annual Visit with Cris Paez PA-C   [facility] ([facility])    [address] [place] [address]  [address] [address]   [phone]            Oct 27, 2017 10:30 AM CDT   Annual Visit with Usman Raymond Diet 2, RD   [facility] ([facility])    [address] [place] [address]  [address] [address]   [phone]            Nov 03, 2017  8:00 AM CDT   Return Visit with Brenda Perez PsyD   [facility] ([facility] [place])    [address] [place] [address]   [phone]            Nov 14, 2017  8:00 AM CST   Return Visit with Brenda Perez PsyD   [facility] ([facility] [place])    [address] [place], [place] [address] [place] [address]   [phone]            Dec 01, 2017  7:00 AM CST   Return Visit with Brenda Perez PsyD   [facility] [place] ([facility] [place])    [address] [place], [place] [address] [place] [address]   [phone]            Dec 12, 2017  8:00 AM CST   Return Visit with Brenda  "Elen Perez   [facility] [place] ([facility] [place])    [address]  [place] [address] [phone]              MyChart Information     [employer] lets you send messages to your doctor, view your test results, renew your prescriptions, schedule appointments and more. To sign up, go to [email or web address] . Click on \"Log in\" on the left side of the screen, which will take you to the Welcome page. Then click on \"Sign up Now\" on the right side of the page.     You will be asked to enter the access code listed below, as well as some personal information. Please follow the directions to create your username and password.     Your access code is: [email or web address]  Expires: 2017 10:21 AM     Your access code will  in 90 days. If you need help or a new code, please call your [facility] clinic or [phone].        Care EveryWhere ID     This is your Care EveryWhere ID. This could be used by other organizations to access your [facility] medical records  [id number]        Equal Access to Services     STEVEN MUNOZ AH: Hadii deandra Wooten, waaxda luzee, qaybta kaalmada adenancie, suri yeager. So [facility] [phone].    ATENCIÓN: Si habla español, tiene a madera disposición servicios gratuitos de asistencia lingüística. Trevon al [phone].    We comply with applicable federal civil rights laws and [place] laws. We do not discriminate on the basis of race, color, national origin, age, disability, sex, sexual orientation, or gender identity.            "

## 2017-10-13 NOTE — TELEPHONE ENCOUNTER
Spoke with patient who report that she has been having on and off light headedness for a while and PCP has been known about it  Usually upon waking up.  Last about 5 minutes and will resolved after sitting down.    BP has been on low side but was informed by PCP that this may be the norm after weight loss  Denies faint/fall.  Would like to see a provider to further eval as she has family history of the same issue and also aneurysm.    appt scheduled to Napoleon Gray PA-C on next Tuesday to determine if further testing is needed.   (also want to establish care as Daina is leaving)      BP Readings from Last 3 Encounters:   09/19/17 94/58   08/24/17 98/65   04/28/17 113/72     Next 5 appointments (look out 90 days)     Oct 17, 2017  7:40 AM CDT   Office Visit with Napoleon Gray PA-C   [facility] ([facility])    [address] [place] [address]   [phone]            Nov 03, 2017  8:00 AM CDT   Return Visit with Brenda Perez [facility] [place] ([facility] [place])    [address] [place] [address]   [phone]            Nov 14, 2017  8:00 AM CST   Return Visit with Brenda Perez [facility] [place] ([facility] [place])    [address] [place], [place] [address] [facility] [place] [address]   [phone]            Dec 01, 2017  7:00 AM CST   Return Visit with Brenda Perez [facility] [place] ([facility] [place])    [address] [place], [place] [address] [facility] [place] [address]   [phone]            Dec 12, 2017  8:00 AM CST   Return Visit with Brenda Perez [facility] [place] ([facility] [place])    [address] [place], [place] [address] [place] [address]   [phone]                Ruth Interiano RN

## 2017-10-13 NOTE — TELEPHONE ENCOUNTER
Reason for Call:  Other call back    Detailed comments: CONCERNS WITH LIGHT HEADDEDNESS, WONDERS IF FROM WEIGHT  LOSS WOULD LIKE A CALL BACK FROM NURSE    Phone Number Patient can be reached at: Home number on file [phone] (home)    Best Time: ANYTIME    Can we leave a detailed message on this number? YES    Call taken on 10/13/2017 at 9:12 AM by Junior Mclaughlin

## 2017-10-13 NOTE — PROGRESS NOTES
"                                             Progress Note    Client Name: Criss Don  Date: 10/13/2017         Service Type: Individual      Session Start Time: 8:00  Session End Time: 8:45      Session Length: 45     Session #: 17     Attendees: Client attended alone    Treatment Plan Last Reviewed: 10/13/2017  PHQ-9 / NED-7 :      DATA      Progress Since Last Session (Related to Symptoms / Goals / Homework):   Symptoms: Stable    Homework: Partially completed      Episode of Care Goals: Minimal progress - ACTION (Actively working towards change); Intervened by reinforcing change plan / affirming steps taken     Current / Ongoing Stressors and Concerns:   Reported that she continues to be preoccupied with appearance, however did report that she had success finding an outfit to wear at her nephew's wedding. Reported that this was a mild boost to her confidence and confirms that she has \"shrunk\" despite her perception of being the same size as she was before surgery.          Treatment Objective(s) Addressed in This Session:   stress management  Maintain compliance with post-surgical dietary needs     Intervention:   CBT: surfaced and challenged \"should\" statements and unreasonable expectations about weight loss; identified sources of unhealthy and unhelpful comparisons; discussed \"red flag\" thoughts that could lead to unhealthy eating habits  Supportive therapy:  provided active listening, support, and encouragement. Reflected on ways client can be more accepting and tolerant of her new body shape. Normalized concerns about what others think about size/shape after weight loss surgery, and assisted her in recognizing when the concerns are excessive, disproportionate, or trigger unhealthy thinking/behaviors.           ASSESSMENT: Current Emotional / Mental Status (status of significant symptoms):   Risk status (Self / Other harm or suicidal ideation)   Client denies current fears or concerns for personal " safety.   Client denies current or recent suicidal ideation or behaviors.   Client denies current or recent homicidal ideation or behaviors.   Client denies current or recent self injurious behavior or ideation.   Client denies other safety concerns.   A safety and risk management plan has not been developed at this time, however client was given the after-hours number / 911 should there be a change in any of these risk factors.     Appearance:   Appropriate    Eye Contact:   Good    Psychomotor Behavior: Normal    Attitude:   Cooperative  Pleasant    Orientation:   All   Speech    Rate / Production: Normal     Volume:  Normal    Mood:    Anxious    Affect:    Appropriate    Thought Content:  Clear    Thought Form:  Coherent  Logical    Insight:    Good      Medication Review:   No current psychiatric medications prescribed     Medication Compliance:   Yes     Changes in Health Issues:   None reported     Chemical Use Review:   Substance Use: Chemical use reviewed, no active concerns identified      Tobacco Use: No current tobacco use.       Collateral Reports Completed:   Not Applicable    PLAN: (Client Tasks / Therapist Tasks / Other)  Future appointments are scheduled. Practice acceptance of your new body shape as discussed in session. Surface and challenge unrealistic expectations and disproportionate thoughts.       Brenda Perez PsyD LP                                                       Treatment Plan    Client's Name: Criss Don  YOB: 1970    Date: 10/13/2017    DSM-V Diagnoses: 300.00 Unspecified Anxiety Disorder  Psychosocial / Contextual Factors: strain with roommate, spouse, and nephews  WHODAS: 22    Referral / Collaboration:  Referral to another professional/service is not indicated at this time..    Anticipated number of session or this episode of care: reassess after 12 sessions      MeasurableTreatment Goal(s) related to diagnosis / functional impairment(s)  Goal 1: Client  will learn coping skills to manage stress and adjust to post-surgical lifestyle changes more effectively.    I will know I've met my goal when I'm not weighing myself all the time and feeling more comfortable with my weight loss.      Objective #A (Client Action)    Client will identify thinking patterns that contribute to anxiety about weight and eating habits.  Status: Continued - Date(s): 10/13/2017    Intervention(s)  Therapist will assign homework (thought  logs), assist client in surfacing and replacement ineffective thinking patterns.    Objective #B  Client will identify new ways to cope with stress and worry thoughts.  Status: Continued - Date(s): 10/13/2017    Intervention(s)  Therapist will assign homework, problem-solve barriers to follow through.    Objective #C  Client will identify and challenge unreasonable or unrealistic expectations about weight loss.  Status: Continued - Date(s): 10/13/2017    Intervention(s)  Therapist will assist client in identifying and balancing her expectations.        Client has reviewed and agreed to the above plan.      Brenda Perez PsyD LP  10/13/2017

## 2017-10-17 ENCOUNTER — OFFICE VISIT (OUTPATIENT)
Dept: FAMILY MEDICINE | Facility: CLINIC | Age: 47
End: 2017-10-17
Payer: COMMERCIAL

## 2017-10-17 VITALS
SYSTOLIC BLOOD PRESSURE: 103 MMHG | RESPIRATION RATE: 14 BRPM | DIASTOLIC BLOOD PRESSURE: 67 MMHG | WEIGHT: 160.8 LBS | TEMPERATURE: 97.9 F | BODY MASS INDEX: 28.49 KG/M2 | HEART RATE: 66 BPM | OXYGEN SATURATION: 100 % | HEIGHT: 63 IN

## 2017-10-17 DIAGNOSIS — Z82.49 FAMILY HISTORY OF ABDOMINAL AORTIC ANEURYSM: ICD-10-CM

## 2017-10-17 DIAGNOSIS — R42 DIZZINESS: Primary | ICD-10-CM

## 2017-10-17 PROCEDURE — 99214 OFFICE O/P EST MOD 30 MIN: CPT | Performed by: PHYSICIAN ASSISTANT

## 2017-10-17 NOTE — NURSING NOTE
"Chief Complaint   Patient presents with     Dizziness       Initial /67 (BP Location: Left arm, Patient Position: Chair, Cuff Size: Adult Large)  Pulse 66  Temp 97.9  F (36.6  C) (Tympanic)  Resp 14  Ht 5' 2.5\" (1.588 m)  Wt 160 lb 12.8 oz (72.9 kg)  LMP 10/03/2017  SpO2 100%  BMI 28.94 kg/m2 Estimated body mass index is 28.94 kg/(m^2) as calculated from the following:    Height as of this encounter: 5' 2.5\" (1.588 m).    Weight as of this encounter: 160 lb 12.8 oz (72.9 kg).  Medication Reconciliation: complete    Aminata Walters MA  "

## 2017-10-17 NOTE — MR AVS SNAPSHOT
"              After Visit Summary   10/17/2017    Criss Don    MRN: [id number]           Patient Information     Date Of Birth          1970        Visit Information        Provider Department      10/17/2017 7:40 AM Napoleon Gray PA-C [facility] [place]        Today's Diagnoses     Dizziness    -  1    Family history of abdominal aortic aneurysm           Follow-ups after your visit        Additional Services     PHYSICAL THERAPY REFERRAL       *This therapy referral will be filtered to a centralized scheduling office at [facility] and the patient will receive a call to schedule an appointment at a [place] location most convenient for them. *     [facility] provides Physical Therapy evaluation and treatment and many specialty services across the [place] system.  If requesting a specialty program, please choose from the list below.    If you have not heard from the scheduling office within 2 business days, please call [phone] for all locations, with the exception of [place], please call [phone].  Treatment: Evaluation & Treatment  Special Instructions/Modalities: dizziness, vestibular therapy, modalities as per PT  Special Programs: Balance/Vestibular    Please be aware that coverage of these services is subject to the terms and limitations of your health insurance plan.  Call member services at your health plan with any benefit or coverage questions.      **Note to Provider:  If you are referring outside of [place] for the therapy appointment, please list the name of the location in the \"special instructions\" above, print the referral and give to the patient to schedule the appointment.                  Your next 10 appointments already scheduled     Oct 27, 2017 10:00 AM CDT   Annual Visit with Cris Paez PA-C   [facility]  [place] ([facility])    [address]  [place] [address] [phone]            Oct 27, 2017 10:30 AM CDT   Annual Visit with Usman Raymond Diet 2, RD   [facility] ([facility])    [address]  [place] [address]   [phone]            Nov 03, 2017  8:00 AM CDT   Return Visit with Brenda Perez PsyD   [facility] [place] ([facility] [place])    [address] [place] [address] [place] [address]   [phone]            Nov 14, 2017  8:00 AM CST   Return Visit with Brenda Perez PsyD   [facility] [place] ([facility] [place])    [address] [place], [place] [address] [address]   [phone]            Dec 01, 2017  7:00 AM CST   Return Visit with Brenda Perez PsyD   [facility] [place] ([facility] [place])    [address] [place] [address]   [phone]            Dec 12, 2017  8:00 AM CST   Return Visit with Brenda Perez PsyD   [facility] [place] ([facility] [place])    [address] [place] [address] [address]   [phone]              Future tests that were ordered for you today     Open Future Orders        Priority Expected Expires Ordered    MRA Angiogram Brain and Neck Routine  10/17/2018 10/17/2017            Who to contact     If you have questions or need follow up information about today's clinic visit or your schedule please contact [facility] directly at [phone].  Normal or non-critical lab and imaging results will be communicated to you by MyChart, letter or phone within 4 business days after the clinic has received the results. If you do not hear from us within 7 days, please contact the clinic through MyChart or phone. If you have a critical or abnormal lab result, we will notify you by phone as soon as possible.  Submit refill requests through [facility] or call your pharmacy and  "they will forward the refill request to us. Please allow 3 business days for your refill to be completed.          Additional Information About Your Visit        MyChart Information     [employer] lets you send messages to your doctor, view your test results, renew your prescriptions, schedule appointments and more. To sign up, go to [email or web address] . Click on \"Log in\" on the left side of the screen, which will take you to the Welcome page. Then click on \"Sign up Now\" on the right side of the page.     You will be asked to enter the access code listed below, as well as some personal information. Please follow the directions to create your username and password.     Your access code is: [email or web address]  Expires: 2017 10:21 AM     Your access code will  in 90 days. If you need help or a new code, please call your [facility] clinic or [phone].        Care EveryWhere ID     This is your [facility] EveryWhere ID. This could be used by other organizations to access your [facility] medical records  [id number]        Your Vitals Were     Pulse Temperature Respirations Height Last Period Pulse Oximetry    66 97.9  F (36.6  C) (Tympanic) 14 5' 2.5\" (1.588 m) 10/03/2017 100%    BMI (Body Mass Index)                   28.94 kg/m2            Blood Pressure from Last 3 Encounters:   10/17/17 103/67   17 94/58   17 98/65    Weight from Last 3 Encounters:   10/17/17 160 lb 12.8 oz (72.9 kg)   17 169 lb (76.7 kg)   17 172 lb (78 kg)              We Performed the Following     PHYSICAL THERAPY REFERRAL        Primary Care Provider Office Phone # Fax #    Katrina Garner PA-C [phone]       [address]  [place] [address]        Equal Access to Services     [facility] TAMMY : Phong Wooten, seble medina, suri sharma. [facility] [phone].    ATENCIÓN: Si habla español, tiene a madera disposición " servicios gratuitos de asistencia lingüística. Trevon wright [phone].    We comply with applicable federal civil rights laws and [place] laws. We do not discriminate on the basis of race, color, national origin, age, disability, sex, sexual orientation, or gender identity.            Thank you!     Thank you for choosing [facility] [place]  for your care. Our goal is always to provide you with excellent care. Hearing back from our patients is one way we can continue to improve our services. Please take a few minutes to complete the written survey that you may receive in the mail after your visit with us. Thank you!             Your Updated Medication List - Protect others around you: Learn how to safely use, store and throw away your medicines at www.disposemymeds.org.          This list is accurate as of: 10/17/17  8:26 AM.  Always use your most recent med list.                   Brand Name Dispense Instructions for use Diagnosis    B-12 2500 MCG Subl      Place 1 tablet under the tongue 3 times weekly        docusate sodium 100 MG tablet    COLACE    180 tablet    Take 100 mg by mouth 2 times daily as needed for constipation    Other constipation       MAGNESIUM OXIDE PO      Take 500 mg % by mouth        multivitamin  peds with iron 60 MG chewable tablet      Take 2 chew tab by mouth every morning        norgestimate-ethinyl estradiol 0.25-35 MG-MCG per tablet    ORTHO-CYCLEN, SPRINTEC    84 tablet    Take 1 tablet by mouth daily    General counseling for prescription of oral contraceptives       potassium chloride SA 20 MEQ CR tablet    KLOR-CON    60 tablet    Take 1 tablet (20 mEq) by mouth 2 times daily    Hypokalemia       VIACTIV 500-500-40 MG-UNT-MCG Chew   Generic drug:  calcium-vitamin D-vitamin K      Take 1 tablet by mouth 3 times daily        VITAMIN D3 PO      Take 2,000 Units by mouth daily        VITRON-C  MG Tabs tablet   Generic drug:  ferrous fumarate 65 mg ([place]. FE)-Vitamin  C 125 mg      Take 1 tablet by mouth every morning

## 2017-10-17 NOTE — PROGRESS NOTES
SUBJECTIVE:   Criss Don is a 46 year old female who presents to clinic today for the following health issues:    Dizziness  Onset: For over 2-3 months - Intermittent symptoms of dizziness    Description: Pt has been having dizziness that she describes as spinning and lightheadedness  Usually upon waking up and walking.  Last about 5 minutes and will resolved after sitting down.  She has episodes of this with standing from a seated position, and with head  movement. She underwent bariatric surgery 1 year ago and has lost about 80 lbs. Her BP has been running slightly low during last Office visits, she is not monitoring this at home.  No associated HA, n/v, changes in vision, or speech. She is concerned because her sister and cousin had brain aneurysm , sister  from rupture of aneurysm.  Epley maneuvers help  Do you feel faint:  YES-  Does it feel like the surroundings (bed, room) are moving: YES  Unsteady/off balance: YES  Have you passed out or fallen:No    Intensity: moderate    Progression of Symptoms:  worsening    Accompanying Signs & Symptoms:  Heart palpitations: no   Nausea, vomiting: nausea, no vomiting  Weakness in arms or legs: NO  Fatigue: YES  Vision or speech changes: NO  Ringing in ears (Tinnitus): YES- ocassionally  Hearing Loss: no     History:   Head trauma/concussion hx: no   Previous similar symptoms: no   Recent bleeding history: no     Precipitating factors:   Worse with activity or head movement: no   Any new medications (BP?): YES- Birthcontrol 17  Alcohol/drug abuse/withdrawal: no     Alleviating factors:   Does staying in a fixed position give relief:  YES- sitting down and covering eyes    Therapies Tried and outcome: Nothing          Problem list and histories reviewed & adjusted, as indicated.  Additional history: as documented    Patient Active Problem List   Diagnosis     Labial abscess     CARDIOVASCULAR SCREENING; LDL GOAL LESS THAN 160     Mass of right foot      History of abnormal cervical Pap smear     Intertrigo     Bilateral edema of lower extremity     Bariatric surgery status     Malnutrition following gastrointestinal surgery     Body dysmorphic disorder     Hypokalemia     Elevated ALT measurement     Obesity (BMI 30-39.9)     Past Surgical History:   Procedure Laterality Date     LAPAROSCOPIC BYPASS GASTRIC N/A 10/26/2016    Procedure: LAPAROSCOPIC BYPASS GASTRIC;  Surgeon: Romario Reyna MD;  Location: [facility] OR     NO HISTORY OF SURGERY         Social History   Substance Use Topics     Smoking status: Never Smoker     Smokeless tobacco: Never Used     Alcohol use 0.0 oz/week     0 Standard drinks or equivalent per week      Comment: very rare     Family History   Problem Relation Age of Onset     Hypertension Mother      Breast Cancer Mother      64 dx     Allergies Mother      Obesity Mother      Respiratory Mother      Asthma     Hypertension Maternal Grandmother      CANCER Maternal Grandmother      Ovarian     CANCER Sister      Cervical     CANCER Other      Mat. great aunt-ovarian     Hypertension Brother      CANCER Maternal Aunt      ?Gastric     Brain Cancer Cousin      maternal         Current Outpatient Prescriptions   Medication Sig Dispense Refill     norgestimate-ethinyl estradiol (ORTHO-CYCLEN, SPRINTEC) 0.25-35 MG-MCG per tablet Take 1 tablet by mouth daily 84 tablet 1     MAGNESIUM OXIDE PO Take 500 mg % by mouth       docusate sodium (COLACE) 100 MG tablet Take 100 mg by mouth 2 times daily as needed for constipation 180 tablet 1     ferrous fumarate 65 mg, [place]. FE,-Vitamin C 125 mg (VITRON-C)  MG TABS Take 1 tablet by mouth every morning       Cyanocobalamin (B-12) 2500 MCG SUBL Place 1 tablet under the tongue 3 times weekly       calcium-vitamin D-vitamin K (VIACTIV) 500-500-40 MG-UNT-MCG CHEW Take 1 tablet by mouth 3 times daily        multivitamin  peds with iron ([place] COMPLETE) 60 MG chewable tablet Take 2 chew tab by  "mouth every morning        Cholecalciferol (VITAMIN D3 PO) Take 2,000 Units by mouth daily        potassium chloride SA (POTASSIUM CHLORIDE) 20 MEQ CR tablet Take 1 tablet (20 mEq) by mouth 2 times daily (Patient not taking: Reported on 8/24/2017) 60 tablet 1     Allergies   Allergen Reactions     Lortab [Hydrocodone-Acetaminophen] Nausea     Also light headed and flushed         Reviewed and updated as needed this visit by clinical staff     Reviewed and updated as needed this visit by Provider         ROS:  Constitutional, HEENT, cardiovascular, pulmonary, GI, , musculoskeletal, neuro, skin, endocrine and psych systems are negative, except as otherwise noted.      OBJECTIVE:   /67 (BP Location: Left arm, Patient Position: Chair, Cuff Size: Adult Large)  Pulse 66  Temp 97.9  F (36.6  C) (Tympanic)  Resp 14  Ht 5' 2.5\" (1.588 m)  Wt 160 lb 12.8 oz (72.9 kg)  LMP 10/03/2017  SpO2 100%  BMI 28.94 kg/m2  Body mass index is 28.94 kg/(m^2).  GENERAL: healthy, alert and no distress  EYES: Eyes grossly normal to inspection, PERRL and conjunctivae and sclerae normal, EOMI, no nystagmus  HENT: ear canals and TM's normal, nose and mouth without ulcers or lesions  RESP: lungs clear to auscultation - no rales, rhonchi or wheezes  CV: regular rate and rhythm, normal S1 S2, no S3 or S4, no murmur, click or rub  MS: no gross musculoskeletal defects noted, no edema  NEURO: Normal strength and tone, mentation intact and speech normal, FROM of UE and LE, 5/5 strength of UE and LE, negative [place]-Hallpike  PSYCH: mentation appears normal, affect normal/bright    Diagnostic Test Results:  none     ASSESSMENT/PLAN:     1. Dizziness  Labs done recently and are normal.  Patient not having any dizziness now.  She is neuro intact today with negative [place]-hallpike. Etiology of symptoms may be multifactorial relating both to her recent weight loss, low blood pressure and possible inner ear cause.  She does have a family hx of " brain aneurysm and would like to be screening for this.  Guidelines conflict, but given her ongoing dizziness, imaging is indicated.  BP normal today, I have advised she monitor BP at home for lows, stay well hydrated, continue epley maneuvers and follow up with dizziness/vestibular rehab.she is agreeable  - MRA Angiogram Brain and Neck; Future  - PHYSICAL THERAPY REFERRAL    2. Family history of abdominal aortic aneurysm  - MRA Angiogram Brain and Neck; Future    See Patient Instructions    Napoleon Gray PA-C  [facility]

## 2017-10-24 ENCOUNTER — HOSPITAL ENCOUNTER (OUTPATIENT)
Dept: PHYSICAL THERAPY | Facility: CLINIC | Age: 47
Setting detail: THERAPIES SERIES
End: 2017-10-24
Attending: PHYSICIAN ASSISTANT
Payer: COMMERCIAL

## 2017-10-24 PROCEDURE — 97161 PT EVAL LOW COMPLEX 20 MIN: CPT | Mod: GP | Performed by: PHYSICAL THERAPIST

## 2017-10-24 PROCEDURE — 40000840 ZZHC STATISTIC PT VESTIBULAR VISIT: Performed by: PHYSICAL THERAPIST

## 2017-10-24 PROCEDURE — 95992 CANALITH REPOSITIONING PROC: CPT | Mod: GP | Performed by: PHYSICAL THERAPIST

## 2017-10-24 NOTE — PROGRESS NOTES
" 10/24/17 0700   Quick Adds   Quick Adds Vestibular Eval   Type of Visit Initial OP PT Evaluation   General Information   Start of Care Date 10/24/17   Referring Physician Napoleon Gray PA-C   Orders Evaluate and Treat as Indicated   Order Date 10/17/17   Medical Diagnosis Dizziness    Onset of illness/injury or Date of Surgery 08/25/17   Precautions/Limitations no known precautions/limitations   Surgical/Medical history reviewed Yes   Pertinent history of current problem (include personal factors and/or comorbidities that impact the POC) The client reports her symptoms have been present for about 2-3 months, describes the symptoms as spinning and lightheadedness.  Symptoms usually last about 5 minutes and than subside. The client reports her doctor diagnosed her with vertigo and prescribed Epley maneuvers about 2 months ago, which helped at first. Reports her symptoms have changed to a inconsistent feeling that her body will not hold her up.  Reports most major episode happened in which she felt warm, and was not able to stand any longer and had to \"plop down\" in a hurry.  The client reports the frequency is increasing over the last few weeks, happening sometimes when sitting still and sometimes when standing and walking. Has family history of sister who passed from anuerysm. Reports issues wiht \"black spots\" for almost a yeat, went to eye doctor who did not find anything to explain. Denies tinnitus, neck pain or headaches.     Prior level of function comment exercises 4 times per week on treadmill.   Patient role/Employment history Employed   Living environment House/townhome   Home/Community Accessibility Comments Reports has 2 adult sons with mild developmental concerns which cause her stress.    Patient/Family Goals Statement Improve symptoms back to normal   Fall Risk Screen   Per patient - Fall 2 or more times in past year? No   Per patient - Fall with injury in past year? No   Is patient a fall " "risk? No   System Outcome Measures   Outcome Measures BPPV   Dizziness Handicap Inventory (score out of 100) A decrease in score by 17.18 or greater indicates a clinically significant change in symptoms. 28   Pain   Patient currently in pain No   Cognitive Status Examination   Orientation orientation to person, place and time   Level of Consciousness alert   Follows Commands and Answers Questions 100% of the time   Personal Safety and Judgment intact   Memory intact   Posture   Posture Forward head position   Range of Motion (ROM)   ROM Comment normal   Strength   Strength Comments Not formally assessed   Bed Mobility   Bed Mobility Comments independent   Gait   Gait Comments No major gait deficits   Balance   Balance Comments Able to stand NBOS with EO, Romberg with EO, SLS with EO. Difficulty with NBOS and EC.    Coordination   Coordination no deficits were identified   Muscle Tone   Muscle Tone no deficits were identified   Cervicogenic Screen   Neck ROM Normal   Oculomotor Exam   Smooth Pursuit Normal   Saccades Normal   VOR Normal   VOR Comments mildly \"off\"   Infrared Goggle Exam or Frenzel Lense Exam   Vestibular Suppressant in Last 24 Hours? No   Exam completed with Infrared Goggles   Spontaneous Nystagmus Negative   Gaze Evoked Nystagmus Negative   Gaze Evoked Nystagmus comments reports feeling \"odd\" with upward gaze   Head Shake Horizontal Nystagmus Negative   Rosalinda-Hallpike (right) Upbeating R torsional   Glendo-Hallpike (Left) Negative   Planned Therapy Interventions   Planned Therapy Interventions balance training;neuromuscular re-education;other (see comments)  (CRT)   Clinical Impression   Criteria for Skilled Therapeutic Interventions Met yes, treatment indicated   PT Diagnosis R PC BPPV, imbalance   Influenced by the following impairments Dizziness, imbalance   Functional limitations due to impairments Decreased tolerance to mobility and exercise   Clinical Presentation Evolving/Changing   Clinical " Decision Making (Complexity) Low complexity   Therapy Frequency 1 time/week   Predicted Duration of Therapy Intervention (days/wks) 4 weeks   Risk & Benefits of therapy have been explained Yes   Patient, Family & other staff in agreement with plan of care Yes   Education Assessment   Barriers to Learning No barriers   GOALS   PT Eval Goals 1;2   Goal 1   Goal Identifier BPPV   Goal Description The client will be negative for any signs of BPPV in order to demonstrate resolution of symptoms   Target Date 11/22/17   Goal 2   Goal Identifier DHI   Goal Description The client will score 20 or less on DHI in order to demonstrate minimal impairment related to dizziness.    Target Date 11/22/17   Total Evaluation Time   Total Evaluation Time (Minutes) 30

## 2017-10-27 ENCOUNTER — OFFICE VISIT (OUTPATIENT)
Dept: SURGERY | Facility: CLINIC | Age: 47
End: 2017-10-27
Payer: COMMERCIAL

## 2017-10-27 ENCOUNTER — HOSPITAL ENCOUNTER (OUTPATIENT)
Dept: LAB | Facility: CLINIC | Age: 47
Discharge: HOME | End: 2017-10-27
Attending: PHYSICIAN ASSISTANT | Admitting: PHYSICIAN ASSISTANT
Payer: COMMERCIAL

## 2017-10-27 VITALS
SYSTOLIC BLOOD PRESSURE: 108 MMHG | WEIGHT: 159.5 LBS | HEART RATE: 65 BPM | BODY MASS INDEX: 28.71 KG/M2 | DIASTOLIC BLOOD PRESSURE: 66 MMHG

## 2017-10-27 DIAGNOSIS — E65 PANNUS, ABDOMINAL: ICD-10-CM

## 2017-10-27 DIAGNOSIS — E66.3 OVERWEIGHT (BMI 25.0-29.9): ICD-10-CM

## 2017-10-27 DIAGNOSIS — Z98.84 BARIATRIC SURGERY STATUS: ICD-10-CM

## 2017-10-27 DIAGNOSIS — Z98.84 BARIATRIC SURGERY STATUS: Primary | ICD-10-CM

## 2017-10-27 DIAGNOSIS — E16.1 REACTIVE HYPOGLYCEMIA: ICD-10-CM

## 2017-10-27 DIAGNOSIS — K91.2 POSTSURGICAL MALABSORPTION: ICD-10-CM

## 2017-10-27 DIAGNOSIS — R42 LIGHTHEADEDNESS: ICD-10-CM

## 2017-10-27 DIAGNOSIS — R42 VERTIGO: ICD-10-CM

## 2017-10-27 DIAGNOSIS — F45.22 BODY DYSMORPHIC DISORDER: ICD-10-CM

## 2017-10-27 PROBLEM — R74.01 ELEVATED ALT MEASUREMENT: Status: RESOLVED | Noted: 2017-01-10 | Resolved: 2017-10-27

## 2017-10-27 PROBLEM — E66.9 OBESITY (BMI 30-39.9): Status: RESOLVED | Noted: 2017-04-28 | Resolved: 2017-10-27

## 2017-10-27 LAB
DEPRECATED CALCIDIOL+CALCIFEROL SERPL-MC: 94 UG/L (ref 20–75)
ERYTHROCYTE [DISTWIDTH] IN BLOOD BY AUTOMATED COUNT: 13.3 % (ref 10–15)
FERRITIN SERPL-MCNC: 15 NG/ML (ref 8–252)
HCT VFR BLD AUTO: 39.4 % (ref 35–47)
HGB BLD-MCNC: 13.2 G/DL (ref 11.7–15.7)
IRON SATN MFR SERPL: 18 % (ref 15–46)
IRON SERPL-MCNC: 56 UG/DL (ref 35–180)
MCH RBC QN AUTO: 29.7 PG (ref 26.5–33)
MCHC RBC AUTO-ENTMCNC: 33.5 G/DL (ref 31.5–36.5)
MCV RBC AUTO: 89 FL (ref 78–100)
PLATELET # BLD AUTO: 253 10E9/L (ref 150–450)
PTH-INTACT SERPL-MCNC: 78 PG/ML (ref 12–72)
RBC # BLD AUTO: 4.45 10E12/L (ref 3.8–5.2)
TIBC SERPL-MCNC: 315 UG/DL (ref 240–430)
VIT B12 SERPL-MCNC: 1235 PG/ML (ref 193–986)
WBC # BLD AUTO: 6.4 10E9/L (ref 4–11)

## 2017-10-27 PROCEDURE — 85027 COMPLETE CBC AUTOMATED: CPT | Performed by: PHYSICIAN ASSISTANT

## 2017-10-27 PROCEDURE — 97803 MED NUTRITION INDIV SUBSEQ: CPT | Performed by: DIETITIAN, REGISTERED

## 2017-10-27 PROCEDURE — 83970 ASSAY OF PARATHORMONE: CPT | Performed by: PHYSICIAN ASSISTANT

## 2017-10-27 PROCEDURE — 82607 VITAMIN B-12: CPT | Performed by: PHYSICIAN ASSISTANT

## 2017-10-27 PROCEDURE — 82306 VITAMIN D 25 HYDROXY: CPT | Performed by: PHYSICIAN ASSISTANT

## 2017-10-27 PROCEDURE — 83550 IRON BINDING TEST: CPT | Performed by: PHYSICIAN ASSISTANT

## 2017-10-27 PROCEDURE — 36415 COLL VENOUS BLD VENIPUNCTURE: CPT | Performed by: PHYSICIAN ASSISTANT

## 2017-10-27 PROCEDURE — 82728 ASSAY OF FERRITIN: CPT | Performed by: PHYSICIAN ASSISTANT

## 2017-10-27 PROCEDURE — 83540 ASSAY OF IRON: CPT | Performed by: PHYSICIAN ASSISTANT

## 2017-10-27 PROCEDURE — 99215 OFFICE O/P EST HI 40 MIN: CPT | Performed by: PHYSICIAN ASSISTANT

## 2017-10-27 NOTE — PROGRESS NOTES
Bariatric Follow Up Visit   2017    RE: KOJO ASHTON  MR#: [id number]  : 1970    HISTORY OF PRESENT ILLNESS: KOJO ASHTON returns today for her follow-up appointment status post Laparoscopic Rasheeda-en-Y Gastric Bypass surgery. She has lost 148 lbs since starting our program. She states she is still losing about 1 lb a week.  She is doing the following exercise(s): at gym 4-5 days a week on treadmill and /or other machines for an hour.  She also does aqua karolina on Wednesday for an hour.     Paulette has been having problems with vertigo since August.  She recently underwent a vestibular exam with physical therapy and will continue to see them.       She also has been having another issue that she believe is related.  It can occur at random and is happening more frequently.  It occurs about 2-3 times a week.  Notices it can happen whenever.  During the episodes her head feels light, eyes get blurry.  Feels like legs are not going to hold her up.  She feels lightheaded.  She has never fainted.  She can also get some tingling in her fingers or feet. She sits down on leans on the wall. It usually lasts about 5 minutes and then resolves.      BARIATRIC METRICS:    Current Weight: 159 lb 8 oz (72.3 kg)  Body mass index is 28.71 kg/(m^2).   Wt change since last visit (lbs): -39.5  Cumulative weight loss (lbs): 147.94    Patient is taking the following bariatric postoperative vitamins:  2 Complete multivitamins with minerals (at different times than calcium)  6000 Int Units of Vitamin D daily   2426-5098 mg of Calcium daily in divided doses  2500 mcg of Vitamin B12 sl every three days  1 Iron/Vit C. Daily    Is avoiding NSAID use.    OBESITY RELATED CONDITIONS:  Pre-diabetes: resolved HgA1C 5.1 17    SOCIAL HISTORY:  She does not smoke. She does not drink alcohol. She feels safe in current environment.  She is .  Is seeing Brenda Perez regularly for therapy.    PMH:  Sister-  from  Brain aneurysm    REVIEW OF SYSTEMS:  GI:  Nausea-seldom  Vomiting-seldom,   Diarrhea-never  Constipation-occasional, takes stool softener daily.     Dysphagia-never  Abdominal Pains-never  Heartburn-never    Skin:  Large abdominal pannus with redundant skin present.    Intertriginous Irritation-never    Psychiatric:  Anxiety disorder, NOS:  pt is currently working with Soni Lopez regarding her body image problem.  Even with her current weight loss she continues to feel she is the same size she was before. She is down to weighing herself once a day.  Pt is hoping if she is able to get rid of redundant skin she may feel differently about her body.     :    Pt has monthly menses.  Is now on oral birth control.      PHYSICAL EXAMINATION:   /66  Pulse 65  Wt 159 lb 8 oz (72.3 kg)  LMP 10/03/2017  BMI 28.71 kg/m2,  GENERAL: Alert and oriented x3. NAD  HEART: Regular rate and rhythm, No murmurs, rubs or gallops  LUNGS: Breathing unlabored, Lung sounds clear to auscultation bilaterally  ABDOMEN: soft; nontender; nondistended, incision well healed. No hernia  EXTREMITIES: No LE edema bilaterally, Gait normal  SKIN: No intertriginous irritation or rash    ASSESSMENT/PLAN:   1 year s/p Laparoscopic Rsaheeda-en-Y Gastric Bypass-    Return to clinic in 1 year     Post surgical malabsorption-    Ordered CBC, vitamin D, PTH, ferritin, TIBC, and iron labs.   Continue taking recommended post-op vitamins.     R/O Reactive Hypoglycemia/Lightheadedness   Reviewed reactive hypoglycemia symptoms, diagnosis, and treatment.  Written PT information given today.  Pt to meet with dietician today to go over RH diet plan. Follow up in 1 month.     Vertigo   Continue working with PCP and PT   Referral to ENT- consider work up for meniere's  Constipation:   Increase stool softener to BID daily.     Anxiety disorder, unspecified-    Continue to see SONI Lopez    Abdominal pannus   Referral to [facility].   Pt to wait until wait loss has stabilized for at least 3 months before having consult.      This was a 50 minute visit with greater than 50% spent on counseling.

## 2017-10-27 NOTE — PROGRESS NOTES
POST-OPERATIVE NUTRITION APPOINTMENT  DATE OF VISIT: 10/27/2017  Name: KOJO ASHTON  : 1970  Gender: Female  MRN: [id number]  Age: 46    ASSESSMENT:  REASON FOR VISIT:  KOJO ASHTON is a 46 year old Female presents today for 1 year PO nutrition follow-up appointment.  DIAGNOSIS:  Status post Laparoscopic Rasheeda-en-Y Gastric Bypass surgery.  ANTHROPOMETRICS:  Height: 62.5 inches  Weight: 159 lbs  BMI: 28.6 kg/m2  VITAMINS AND MINERALS:  Multivitamin - 2 adult doses daily  Calcium - 500mg TID-2 hours away from MVI and Vitron C  Vitamin D - 6000 international units, daily  Vitamin B12 - 2500 SL 3x/week Iron   Vitron C daily  NUTRITION HISTORY:  Breakfast: 1 egg w/onions and cheese OR Greek yogurt w/Kashi cereal with dried fruit or 1 plain of flavored oatmeal-6:45-7:30   Lunch: yogurt or chicken and light dougherty; 30 minutes later 1 protein drink or 1 cup chili-12:00-1:30  Supper: chicken or steak, broccoli or cauliflower or cooked carrots, sometimes small amount of fruit-7:00-7:30  Snacks: none  Beverages:water (64+) + occasional 4oz prune juice,diluted  Consuming liquid calories: Protein supplements/shakes  Protein intake: 60-90 grams/day  Tolerate regular texture food: Yes  Any foods not tolerated details: bread  Portion size: 1/2 - 1 cup  Take 30 minutes to consume each meal: Yes  Eat protein foods first: No  Fluids and meals separate by at least 30 minutes: Yes  Chew foods 20 plus times: Yes  Tolerating diet: bariatric regular diet  Drinking high protein supplements/shakes: Yes  Consuming meals per day: 3  Consuming snacks per day: 0  Comment: pt. complains of feeling light headed/ faint and her face turning red 2X per week, but can not related this to any specific foods/ eating patterns ; Cris Paez PA-C suspects pt may be having hypoglycemia; pt continues to work with her therapist on body perception     PHYSICAL ACTIVITY:  Type: Walking or stationary bike, weight machines, water aerobics  Frequency:  5-6 times a week  Duration (min): 60  DIAGNOSIS:  Previous Nutrition Diagnosis: Altered gastrointestinal function related to alteration in gastrointestinal structure as evidenced by history of Laparoscopic Rasheeda-en-Y Gastric Bypass surgery.  Previous goals:  Continue to practice all prebariatric surgery diet guidelines  Have 3 food groups per meal-improving Aim for 60-90g of protein, 1cup volume per meal, and no more than 3g/fat per every 100 kcals.-met   Current Nutrition Diagnosis: Altered gastrointestinal function related to alteration in gastrointestinal structure as evidenced by history of Laparoscopic Rasheeda-en-Y Gastric Bypass   Unchanged  INTERVENTION:  Nutrition Prescription: Eat 3 meals a day at regular intervals. Consume 60-90 grams of protein daily. Follow post-surgical vitamins and minerals protocol.  Goals:  Track food intake when having possible symptoms of reactive hypoglycemia/ bring to next RD visit  Aim to get in 10-15 g fiber per day (discussed foods rich in fiber)  Follow diet guidelines to prevent hypoglycemia after bariatric surgery (written information provided)  Include protein at each meal (discussed not written)  Implementation: Discussed progress toward previous goals; reinforced importance of following bariatric lifestyle changes  NUTRITION MONITORING AND EVALUATION:  Anticipated compliance: Good  Verbalized understanding by listing high fiber food she will try    Follow up: Continue to monitor pt closely regarding wt loss and diet, Follow up with PCP, Follow-up with psychologist within 3 months post-op, Push fluids, Guidelines provided re: how to increase activity/exercise., Return to clinic 3 months post-op, Patient to call with questions/concerns.  Patient to follow up in 1 month.  TIME SPENT WITH PATIENT:  25 minutes  Lazaro Ortiz RD, LD  [facility]  [phone]

## 2017-10-27 NOTE — PATIENT INSTRUCTIONS
Increase stool softener to twice daily.    Work with the dietician to change diet around and to see if this helps with the reactive hypoglycemia.     Continue current vitamins.    Have labs drawn.    Call to have plastic surgery referral.      Follow up in 1 month.        Reactive Hypoglycemia     Reactive hypoglycemia can be common in sleeve gastrectomy or gastric bypass patients.  It is usually due to an excess insulin response caused by a rapid emptying of the stomach.  It happens 45 to 60 minutes after eating a meal, especially one that is high in carbohydrates.   Symptoms you may feel include:  sweaty   jittery   lightheaded   your heart is racing     To avoid reactive hypoglycemia, try the following steps:  Avoid simple carbohydrates such and limit sugar intake  Eat small meals and healthy snacks about every 3 hours  Exercise regularly  Eat protein first  Eat a variety of foods, including meat, poultry, fish, or nonmeat sources of protein, foods such as whole-grains, fruits, vegetables, and dairy products  Reactive hypoglycemia is treatable.  If symptoms continue, call [phone] and schedule an appointment with the dietitian and physician assistant.

## 2017-10-27 NOTE — MR AVS SNAPSHOT
MRN:[id number]                      After Visit Summary   10/27/2017    Criss Don    MRN: [id number]           Visit Information        Provider Department      10/27/2017 10:30 AM 1, Sh Mandi Arthur, RD [facility]      Your next 10 appointments already scheduled     Nov 01, 2017  8:15 AM CDT   Vestibular Treatment with Tyson Cherry, PT   [facility] ([facility])    [address] [place] [address]  [address]  [facility] [address]   [phone]            Nov 03, 2017  8:00 AM CDT   Return Visit with Brenda Perez PsyD   [facility] ([facility])    [address] [place] [address]   [phone]            Nov 14, 2017  8:00 AM CST   Return Visit with Brenda Perez PsyD   [facility] ([facility])    [address] [place] [address]   [phone]            Nov 28, 2017  9:00 AM CST   Return Visit with Usman Arthur 1, RD   [facility] [place] ([facility])    [address] [place], [place] [id number]  [facility] [address]   [phone]            Nov 28, 2017  9:30 AM CST   Return Visit with Cris Paez PA-C   [facility] [place] ([facility])    [address] [place], [place] [id number]  [facility] [address]   [phone]            Dec 01, 2017  7:00 AM CST   Return Visit with Brenda Perez PsyD   [facility] ([facility])    [address] [place] [address]   [phone]            Dec 12, 2017  8:00 AM CST   Return Visit with Brenda Perez PsyD   [facility] ([facility])    [address] [place] [address]   [phone]              [employer] Information     [employer] lets you send  "messages to your doctor, view your test results, renew your prescriptions, schedule appointments and more. To sign up, go to [email or web address] . Click on \"Log in\" on the left side of the screen, which will take you to the Welcome page. Then click on \"Sign up Now\" on the right side of the page.     You will be asked to enter the access code listed below, as well as some personal information. Please follow the directions to create your username and password.     Your access code is: [email or web address]  Expires: 2017 10:21 AM     Your access code will  in 90 days. If you need help or a new code, please call your [facility] clinic or [phone].        Care EveryWhere ID     This is your Care EveryWhere ID. This could be used by other organizations to access your [facility] medical records  [id number]        Equal Access to Services     STEVEN MUNOZ : Hadii deandra Wooten, waaxda luzee, qaybta kaalmada kaushik, suri cuello . So [facility] [phone].    ATENCIÓN: Si habla español, tiene a madera disposición servicios gratuitos de asistencia lingüística. Llame al [phone].    We comply with applicable federal civil rights laws and [place] laws. We do not discriminate on the basis of race, color, national origin, age, disability, sex, sexual orientation, or gender identity.            "

## 2017-10-27 NOTE — MR AVS SNAPSHOT
After Visit Summary   10/27/2017    Criss Don    MRN: [id number]           Patient Information     Date Of Birth          1970        Visit Information        Provider Department      10/27/2017 10:00 AM Cris Paez PA-C [facility]      Today's Diagnoses     Bariatric surgery status    -  1    Overweight (BMI 25.0-29.9)        Postsurgical malabsorption        Vertigo        Lightheadedness        Reactive hypoglycemia        Pannus, abdominal          Care Instructions    Increase stool softener to twice daily.    Work with the dietician to change diet around and to see if this helps with the reactive hypoglycemia.     Continue current vitamins.    Have labs drawn.    Call to have plastic surgery referral.      Follow up in 1 month.        Reactive Hypoglycemia     Reactive hypoglycemia can be common in sleeve gastrectomy or gastric bypass patients.  It is usually due to an excess insulin response caused by a rapid emptying of the stomach.  It happens 45 to 60 minutes after eating a meal, especially one that is high in carbohydrates.   Symptoms you may feel include:  sweaty   jittery   lightheaded   your heart is racing     To avoid reactive hypoglycemia, try the following steps:  Avoid simple carbohydrates such and limit sugar intake  Eat small meals and healthy snacks about every 3 hours  Exercise regularly  Eat protein first  Eat a variety of foods, including meat, poultry, fish, or nonmeat sources of protein, foods such as whole-grains, fruits, vegetables, and dairy products  Reactive hypoglycemia is treatable.  If symptoms continue, call [phone] and schedule an appointment with the dietitian and physician assistant.               Follow-ups after your visit        Additional Services     OTOLARYNGOLOGY REFERRAL       Your provider has referred you to: [facility]: [facility] -  Abbie Contreras [phone]   [email or web address]    Please be aware that coverage of these services is subject to the terms and limitations of your health insurance plan.  Call member services at your health plan with any benefit or coverage questions.      Please bring the following with you to your appointment:    (1) Any X-Rays, CTs or MRIs which have been performed.  Contact the facility where they were done to arrange for  prior to your scheduled appointment.   (2) List of current medications  (3) This referral request   (4) Any documents/labs given to you for this referral            PLASTIC SURGERY REFERRAL       Your provider has referred you to: [facility] [place] [phone]   [email or web address]    Please be aware that coverage of these services is subject to the terms and limitations of your health insurance plan.  Call member services at your health plan with any benefit or coverage questions.      Please bring the following with you to your appointment:    (1) Any X-Rays, CTs or MRIs which have been performed.  Contact the facility where they were done to arrange for  prior to your scheduled appointment.    (2) List of current medications  (3) This referral request   (4) Any documents/labs given to you for this referral                  Follow-up notes from your care team     Return in 1 year (on 10/27/2018).      Your next 10 appointments already scheduled     Oct 27, 2017 10:30 AM CDT   Annual Visit with Usman Arthur 2, RD   [facility] [place] ([facility])    [address] [place] [address]  [address]  [place] [address]   [phone]            Nov 01, 2017  8:15 AM CDT   Vestibular Treatment with Tyson Cherry, RACHAEL   [facility] ([facility])    [address]  [address]  [place] [address]   [phone]            Nov 03, 2017  8:00 AM CDT   Return  Visit with Brenda Perez PsyD   [facility] [place] ([facility] [place])    [address]  [place] [address]   [phone]            Nov 14, 2017  8:00 AM CST   Return Visit with Brenda Perez PsyD   [facility] [place] ([facility] [place])    [address]  [place] [address]   [phone]            Dec 01, 2017  7:00 AM CST   Return Visit with Brenda Perez PsyD   [facility] [place] ([facility] [place])    [address]  [place] [address]   [phone]            Dec 12, 2017  8:00 AM CST   Return Visit with Brenda Perez PsyD   [facility] [place] ([facility] [place])    [address]  [place] [address]   [phone]              Future tests that were ordered for you today     Open Future Orders        Priority Expected Expires Ordered    CBC with platelets Routine 10/27/2017 10/27/2018 10/27/2017    Vitamin B12 Routine 10/27/2017 10/27/2018 10/27/2017    Vitamin D Screen Routine 10/27/2017 10/27/2018 10/27/2017    Parathyroid Hormone Intact Routine 10/27/2017 10/27/2018 10/27/2017    Iron and Iron Binding Capacity Routine 10/27/2017 10/27/2018 10/27/2017    Ferritin Routine 10/27/2017 10/27/2018 10/27/2017            Who to contact     If you have questions or need follow up information about today's clinic visit or your schedule please contact [facility] directly at [phone].  Normal or non-critical lab and imaging results will be communicated to you by MyChart, letter or phone within 4 business days after the clinic has received the results. If you do not hear from us within 7 days, please contact the clinic through MyChart or phone. If you have a critical or abnormal lab result, we will notify you by phone as soon as possible.  Submit refill requests through [employer] or call your pharmacy and they will  "forward the refill request to us. Please allow 3 business days for your refill to be completed.          Additional Information About Your Visit        MyChart Information     [employer] lets you send messages to your doctor, view your test results, renew your prescriptions, schedule appointments and more. To sign up, go to [email or web address] . Click on \"Log in\" on the left side of the screen, which will take you to the Welcome page. Then click on \"Sign up Now\" on the right side of the page.     You will be asked to enter the access code listed below, as well as some personal information. Please follow the directions to create your username and password.     Your access code is: [email or web address]  Expires: 2017 10:21 AM     Your access code will  in 90 days. If you need help or a new code, please call your [facility] clinic or [phone].        Care EveryWhere ID     This is your Care EveryWhere ID. This could be used by other organizations to access your [facility] medical records  [id number]        Your Vitals Were     Pulse Last Period BMI (Body Mass Index)             65 10/03/2017 28.71 kg/m2          Blood Pressure from Last 3 Encounters:   10/27/17 108/66   10/17/17 103/67   17 94/58    Weight from Last 3 Encounters:   10/27/17 159 lb 8 oz (72.3 kg)   10/17/17 160 lb 12.8 oz (72.9 kg)   17 169 lb (76.7 kg)              We Performed the Following     OP ROOMING NOTE TO EDMETRI     OTOLARYNGOLOGY REFERRAL     PLASTIC SURGERY REFERRAL        Primary Care Provider Office Phone # Fax #    Katrina Garner PA-C [phone]       [address]  [place] [address]        Equal Access to Services     [facility] TAMMY : Phong langfordo Sojorge, waaxda luqadaha, qaybta kaalmada adeegyagabriela, waxay camille yeager. So [facility] [phone].    ATENCIÓN: Si habla español, tiene a madera disposición servicios gratuitos de asistencia lingüística. Llame al " [phone].    We comply with applicable federal civil rights laws and [place] laws. We do not discriminate on the basis of race, color, national origin, age, disability, sex, sexual orientation, or gender identity.            Thank you!     Thank you for choosing [facility]  for your care. Our goal is always to provide you with excellent care. Hearing back from our patients is one way we can continue to improve our services. Please take a few minutes to complete the written survey that you may receive in the mail after your visit with us. Thank you!             Your Updated Medication List - Protect others around you: Learn how to safely use, store and throw away your medicines at www.disposemymeds.org.          This list is accurate as of: 10/27/17 10:03 AM.  Always use your most recent med list.                   Brand Name Dispense Instructions for use Diagnosis    B-12 2500 MCG Subl      Place 1 tablet under the tongue 3 times weekly        docusate sodium 100 MG tablet    COLACE    180 tablet    Take 100 mg by mouth 2 times daily as needed for constipation    Other constipation       MAGNESIUM OXIDE PO      Take 500 mg % by mouth        multivitamin  peds with iron 60 MG chewable tablet      Take 2 chew tab by mouth every morning        norgestimate-ethinyl estradiol 0.25-35 MG-MCG per tablet    ORTHO-CYCLEN, SPRINTEC    84 tablet    Take 1 tablet by mouth daily    General counseling for prescription of oral contraceptives       potassium chloride SA 20 MEQ CR tablet    KLOR-CON    60 tablet    Take 1 tablet (20 mEq) by mouth 2 times daily    Hypokalemia       VIACTIV 500-500-40 MG-UNT-MCG Chew   Generic drug:  calcium-vitamin D-vitamin K      Take 1 tablet by mouth 3 times daily        VITAMIN D3 PO      Take 2,000 Units by mouth 3 times daily        VITRON-C  MG Tabs tablet   Generic drug:  ferrous fumarate 65 mg ([place]. FE)-Vitamin C 125 mg      Take 1 tablet by  mouth every morning

## 2017-10-30 DIAGNOSIS — E21.1 HYPERPARATHYROIDISM DUE TO INTESTINAL MALABSORPTION (H): ICD-10-CM

## 2017-10-30 DIAGNOSIS — K91.2 MALNUTRITION FOLLOWING GASTROINTESTINAL SURGERY: Primary | ICD-10-CM

## 2017-10-30 DIAGNOSIS — K90.9 HYPERPARATHYROIDISM DUE TO INTESTINAL MALABSORPTION (H): ICD-10-CM

## 2017-10-31 ENCOUNTER — TELEPHONE (OUTPATIENT)
Dept: SURGERY | Facility: CLINIC | Age: 47
End: 2017-10-31

## 2017-10-31 ENCOUNTER — TRANSFERRED RECORDS (OUTPATIENT)
Dept: HEALTH INFORMATION MANAGEMENT | Facility: CLINIC | Age: 47
End: 2017-10-31

## 2017-10-31 ENCOUNTER — TELEPHONE (OUTPATIENT)
Dept: FAMILY MEDICINE | Facility: CLINIC | Age: 47
End: 2017-10-31

## 2017-10-31 NOTE — TELEPHONE ENCOUNTER
Results received from [facility] and given to provider to review.  MR Intracranial/Cervical MR angiography  Barbara ARCEO

## 2017-10-31 NOTE — TELEPHONE ENCOUNTER
"Patient wanted to know if there was anything she needed to do differently from her recent lab draw on 10/27/17.  Informed patient that a letter had been done and sent on 10/30/17 and that the letter recommended:    \"Dear Criss,     Enclosed are your recent lab test results.  Recommendations are listed below.     All labs look great except:     Your PTH is slightly elevated and your vitamin D is high.   This means you could be getting some of your calcium from your bones instead of you food.  Because your Vitamin D is high, I would like to just watch this.  I will put labs in the [facility] system for you to recheck you PTH and Vit D level in 3 months.  Please have this done at any [facility] or [facility] lab.       Your iron labs look good but you could benefit from taking some additional iron.   If you can handle this now that you increase your stool softener, I would recommend you increase your iron to 2 supplements daily.  Continue with your current supplements.             Sincerely,        Cris Paez PA-C\"    Patient verbalized understanding and is agreeable to plan.  Vesna Evans, MS, RD, RN    "

## 2017-10-31 NOTE — TELEPHONE ENCOUNTER
Please call kennedy and inform her of a normal MRA head and neck without evidence of aneurysm. If she has continued to experience dizziness, please let me know.  We can discuss reevaluation vs. Neuro eval.

## 2017-11-01 ENCOUNTER — HOSPITAL ENCOUNTER (OUTPATIENT)
Dept: PHYSICAL THERAPY | Facility: CLINIC | Age: 47
Setting detail: THERAPIES SERIES
End: 2017-11-01
Attending: PHYSICIAN ASSISTANT
Payer: COMMERCIAL

## 2017-11-01 PROCEDURE — 97112 NEUROMUSCULAR REEDUCATION: CPT | Mod: GP | Performed by: PHYSICAL THERAPIST

## 2017-11-01 PROCEDURE — 40000840 ZZHC STATISTIC PT VESTIBULAR VISIT: Performed by: PHYSICAL THERAPIST

## 2017-11-03 ENCOUNTER — OFFICE VISIT (OUTPATIENT)
Dept: PSYCHOLOGY | Facility: CLINIC | Age: 47
End: 2017-11-03
Payer: COMMERCIAL

## 2017-11-03 DIAGNOSIS — F41.9 ANXIETY DISORDER, UNSPECIFIED TYPE: Primary | ICD-10-CM

## 2017-11-03 PROCEDURE — 90834 PSYTX W PT 45 MINUTES: CPT | Performed by: PSYCHOLOGIST

## 2017-11-03 NOTE — PROGRESS NOTES
"                                             Progress Note    Client Name: Criss Don  Date: 11/3/2017         Service Type: Individual      Session Start Time: 8:00  Session End Time: 8:45      Session Length: 45     Session #: 18     Attendees: Client attended alone    Treatment Plan Last Reviewed: 11/3/2017  PHQ-9 / NED-7 :      DATA      Progress Since Last Session (Related to Symptoms / Goals / Homework):   Symptoms: Stable    Homework: Partially completed      Episode of Care Goals: Minimal progress - ACTION (Actively working towards change); Intervened by reinforcing change plan / affirming steps taken     Current / Ongoing Stressors and Concerns:   Reported that her nephew's wedding went well, and that she felt fairly comfortable being \"in front of everybody\". Reported that she is starting to feel more nervous about her upcoming trip to [place] because she wants to make a good impression and is fearful of being judged about her new appearance. Reported feeling confused after her last dietary visit because she has to make some adjustments to her diet.           Treatment Objective(s) Addressed in This Session:   stress management  Maintain compliance with post-surgical dietary needs     Intervention:   CBT: surfaced and challenged \"should\" statements and unreasonable expectations about weight loss; identified sources of unhealthy and unhelpful comparisons; discussed \"red flag\" thoughts that could lead to unhealthy eating habits  Supportive therapy:  provided active listening, support, and encouragement. Reflected on ways client can be more accepting and tolerant of her new body shape. Normalized concerns about what others think about size/shape after weight loss surgery, and assisted her in recognizing when the concerns are excessive, disproportionate, or trigger unhealthy thinking/behaviors.           ASSESSMENT: Current Emotional / Mental Status (status of significant symptoms):   Risk status (Self / Other " harm or suicidal ideation)   Client denies current fears or concerns for personal safety.   Client denies current or recent suicidal ideation or behaviors.   Client denies current or recent homicidal ideation or behaviors.   Client denies current or recent self injurious behavior or ideation.   Client denies other safety concerns.   A safety and risk management plan has not been developed at this time, however client was given the after-hours number / 911 should there be a change in any of these risk factors.     Appearance:   Appropriate    Eye Contact:   Good    Psychomotor Behavior: Normal    Attitude:   Cooperative  Pleasant    Orientation:   All   Speech    Rate / Production: Normal     Volume:  Normal    Mood:    Anxious    Affect:    Appropriate    Thought Content:  Clear    Thought Form:  Coherent  Logical    Insight:    Good      Medication Review:   No current psychiatric medications prescribed     Medication Compliance:   Yes     Changes in Health Issues:   None reported     Chemical Use Review:   Substance Use: Chemical use reviewed, no active concerns identified      Tobacco Use: No current tobacco use.       Collateral Reports Completed:   Not Applicable    PLAN: (Client Tasks / Therapist Tasks / Other)  Future appointments are scheduled. Practice acceptance of your new body shape as discussed in session. Surface and challenge unrealistic expectations and disproportionate thoughts. Follow up with weight loss clinic about questions about dietary adjustments.       Brenda Perez PsyD LP                                                       Treatment Plan    Client's Name: Criss Don  YOB: 1970    Date: 11/3/2017    DSM-V Diagnoses: 300.00 Unspecified Anxiety Disorder  Psychosocial / Contextual Factors: strain with roommate, spouse, and nephews  WHODAS: 20    Referral / Collaboration:  Referral to another professional/service is not indicated at this time..    Anticipated number of  session or this episode of care: reassess after 12 sessions      MeasurableTreatment Goal(s) related to diagnosis / functional impairment(s)  Goal 1: Client will learn coping skills to manage stress and adjust to post-surgical lifestyle changes more effectively.    I will know I've met my goal when I'm not weighing myself all the time and feeling more comfortable with my weight loss.      Objective #A (Client Action)    Client will identify thinking patterns that contribute to anxiety about weight and eating habits.  Status: Continued - Date(s): 11/3/2017    Intervention(s)  Therapist will assign homework (thought  logs), assist client in surfacing and replacement ineffective thinking patterns.    Objective #B  Client will identify new ways to cope with stress and worry thoughts.  Status: Continued - Date(s): 11/3/2017    Intervention(s)  Therapist will assign homework, problem-solve barriers to follow through.    Objective #C  Client will identify and challenge unreasonable or unrealistic expectations about weight loss.  Status: Continued - Date(s): 11/3/2017    Intervention(s)  Therapist will assist client in identifying and balancing her expectations.        Client has reviewed and agreed to the above plan.      Brenda Perez PsyD LP  11/3/2017

## 2017-11-03 NOTE — MR AVS SNAPSHOT
"                  MRN:[id number]                      After Visit Summary   11/3/2017    Criss Don    MRN: [id number]           Visit Information        Provider Department      11/3/2017 8:00 AM Brenda Perez PsyD [facility]      Your next 10 appointments already scheduled     Nov 28, 2017  9:00 AM CST   Return Visit with Usman Raymond Diet 1, RD   [facility] [place] ([facility])    [address] [place] [address]   [phone]            Nov 28, 2017  9:30 AM CST   Return Visit with Cris Paez PA-C   [facility] [place] ([facility])    [address] [place] [address]   [phone]            Dec 01, 2017  7:00 AM CST   Return Visit with Brenda Perez Song   [facility] [place] ([facility] [place])    [address] [place] [address]   [phone]            Dec 12, 2017  8:00 AM CST   Return Visit with Brenda Perez PsyD   [facility] [place] ([facility] [place])    [address] [place] [address]   [phone]            Jan 05, 2018  8:00 AM CST   Return Visit with Brenda Perez PsyD   [facility] [place] ([facility] [place])    [address] [place] [address]   [phone]            Jan 19, 2018  8:00 AM CST   Return Visit with Brenda Perez PsyD   [facility] [place] ([facility] [place])    [address] [place] [address]   [phone]              MyChart Information     [employer] lets you send messages to your doctor, view your test results, renew your prescriptions, schedule appointments and more. To sign up, go to [email or web address] . Click on \"Log in\" on the left side of the screen, which will take you to the Welcome " "page. Then click on \"Sign up Now\" on the right side of the page.     You will be asked to enter the access code listed below, as well as some personal information. Please follow the directions to create your username and password.     Your access code is: [email or web address]  Expires: 2/15/2018  6:34 AM     Your access code will  in 90 days. If you need help or a new code, please call your [facility] clinic or [phone].        Care EveryWhere ID     This is your Care EveryWhere ID. This could be used by other organizations to access your [facility] medical records  [id number]        Equal Access to Services     STEVEN MUNOZ : Phong Wooten, seble medina, mirna faulkner, suri yeager. So [facility] [phone].    ATENCIÓN: Si habla español, tiene a madera disposición servicios gratuitos de asistencia lingüística. Llame al [phone].    We comply with applicable federal civil rights laws and [place] laws. We do not discriminate on the basis of race, color, national origin, age, disability, sex, sexual orientation, or gender identity.            "

## 2017-11-14 ENCOUNTER — OFFICE VISIT (OUTPATIENT)
Dept: PSYCHOLOGY | Facility: CLINIC | Age: 47
End: 2017-11-14
Payer: COMMERCIAL

## 2017-11-14 DIAGNOSIS — F41.9 ANXIETY DISORDER, UNSPECIFIED TYPE: Primary | ICD-10-CM

## 2017-11-14 PROCEDURE — 90834 PSYTX W PT 45 MINUTES: CPT | Performed by: PSYCHOLOGIST

## 2017-11-14 NOTE — MR AVS SNAPSHOT
"                  MRN:[id number]                      After Visit Summary   11/14/2017    Criss Don    MRN: [id number]           Visit Information        Provider Department      11/14/2017 8:00 AM Brenda Perez PsyD [facility] Generic      Your next 10 appointments already scheduled     Nov 28, 2017  9:00 AM CST   Return Visit with Usman Raymond Diet 1, RD   [facility] [place] ([facility])    [address] [place] [address]   [phone]            Nov 28, 2017  9:30 AM CST   Return Visit with Cris Paez PA-C   [facility] [place] ([facility])    [address] [place] [address]   [phone]            Dec 01, 2017  7:00 AM CST   Return Visit with Brenda Perez Song   [facility] [place] ([facility] [place])    [address] [place] [address]   [phone]            Dec 12, 2017  8:00 AM CST   Return Visit with Bredna Perez PsyD   [facility] [place] ([facility] [place])    [address] [place] [address]   [phone]            Jan 05, 2018  8:00 AM CST   Return Visit with Brenda Perez PsyD   [facility] [place] ([facility] [place])    [address] [place] [address]   [phone]            Jan 19, 2018  8:00 AM CST   Return Visit with Brenda Perez PsyD   [facility] [place] ([facility] [place])    [address] [place] [address]   [phone]              MyChart Information     [employer] lets you send messages to your doctor, view your test results, renew your prescriptions, schedule appointments and more. To sign up, go to [email or web address] . Click on \"Log in\" on the left side of the screen, which will take you to the Welcome " "page. Then click on \"Sign up Now\" on the right side of the page.     You will be asked to enter the access code listed below, as well as some personal information. Please follow the directions to create your username and password.     Your access code is: [email or web address]  Expires: 2/15/2018  6:34 AM     Your access code will  in 90 days. If you need help or a new code, please call your [facility] clinic or [phone].        Care EveryWhere ID     This is your Care EveryWhere ID. This could be used by other organizations to access your [facility] medical records  [id number]        Equal Access to Services     STEVEN MUNOZ : Phong Wooten, seble medina, mirna faulkner, suri yeager. So [facility] [phone].    ATENCIÓN: Si habla español, tiene a madera disposición servicios gratuitos de asistencia lingüística. Llame al [phone].    We comply with applicable federal civil rights laws and [place] laws. We do not discriminate on the basis of race, color, national origin, age, disability, sex, sexual orientation, or gender identity.            "

## 2017-11-17 NOTE — PROGRESS NOTES
"                                             Progress Note    Client Name: Criss Don  Date: 11/14/2017         Service Type: Individual      Session Start Time: 8:00  Session End Time: 8:45      Session Length: 45     Session #: 19     Attendees: Client attended alone    Treatment Plan Last Reviewed: 11/14/2017  PHQ-9 / NED-7 :      DATA      Progress Since Last Session (Related to Symptoms / Goals / Homework):   Symptoms: Stable    Homework: Partially completed      Episode of Care Goals: Minimal progress - ACTION (Actively working towards change); Intervened by reinforcing change plan / affirming steps taken     Current / Ongoing Stressors and Concerns:   Reported that she has been attempting to divert her attention to more productive thoughts when feeling insecure or nervous about her upcoming trip. Reported that she has been attempting to follow the dietary recommendations made at her last nutrition visit but acknowledged that she is getting \"fixated\" on her food choices and worrying that she is not doing the right things.            Treatment Objective(s) Addressed in This Session:   stress management  Maintain compliance with post-surgical dietary needs     Intervention:   CBT: surfaced and challenged \"should\" statements and unreasonable expectations about weight loss; identified sources of unhealthy and unhelpful comparisons; discussed \"red flag\" thoughts that could lead to unhealthy eating habits  Supportive therapy:  provided active listening, support, and encouragement. Reflected on ways client can be more accepting and tolerant of her new body shape. Normalized concerns about what others think about size/shape after weight loss surgery, and assisted her in recognizing when the concerns are excessive, disproportionate, or trigger unhealthy thinking/behaviors.           ASSESSMENT: Current Emotional / Mental Status (status of significant symptoms):   Risk status (Self / Other harm or suicidal " ideation)   Client denies current fears or concerns for personal safety.   Client denies current or recent suicidal ideation or behaviors.   Client denies current or recent homicidal ideation or behaviors.   Client denies current or recent self injurious behavior or ideation.   Client denies other safety concerns.   A safety and risk management plan has not been developed at this time, however client was given the after-hours number / 911 should there be a change in any of these risk factors.     Appearance:   Appropriate    Eye Contact:   Good    Psychomotor Behavior: Normal    Attitude:   Cooperative  Pleasant    Orientation:   All   Speech    Rate / Production: Normal     Volume:  Normal    Mood:    Anxious    Affect:    Appropriate    Thought Content:  Clear    Thought Form:  Coherent  Logical    Insight:    Good      Medication Review:   No current psychiatric medications prescribed     Medication Compliance:   Yes     Changes in Health Issues:   None reported     Chemical Use Review:   Substance Use: Chemical use reviewed, no active concerns identified      Tobacco Use: No current tobacco use.       Collateral Reports Completed:   Not Applicable    PLAN: (Client Tasks / Therapist Tasks / Other)  Future appointments are scheduled. Practice acceptance of your new body shape as discussed in session. Surface and challenge unrealistic expectations and disproportionate thoughts. Follow up with weight loss clinic about questions about dietary adjustments.       Brenda Perez PsyD LP                                                       Treatment Plan    Client's Name: Criss Don  YOB: 1970    Date: 11/14/2017    DSM-V Diagnoses: 300.00 Unspecified Anxiety Disorder  Psychosocial / Contextual Factors: strain with roommate, spouse, and nephews  WHODAS: 22    Referral / Collaboration:  Referral to another professional/service is not indicated at this time..    Anticipated number of session or this  episode of care: reassess after 12 sessions      MeasurableTreatment Goal(s) related to diagnosis / functional impairment(s)  Goal 1: Client will learn coping skills to manage stress and adjust to post-surgical lifestyle changes more effectively.    I will know I've met my goal when I'm not weighing myself all the time and feeling more comfortable with my weight loss.      Objective #A (Client Action)    Client will identify thinking patterns that contribute to anxiety about weight and eating habits.  Status: Continued - Date(s): 11/14/2017    Intervention(s)  Therapist will assign homework (thought  logs), assist client in surfacing and replacement ineffective thinking patterns.    Objective #B  Client will identify new ways to cope with stress and worry thoughts.  Status: Continued - Date(s): 11/14/2017    Intervention(s)  Therapist will assign homework, problem-solve barriers to follow through.    Objective #C  Client will identify and challenge unreasonable or unrealistic expectations about weight loss.  Status: Continued - Date(s): 11/14/2017    Intervention(s)  Therapist will assist client in identifying and balancing her expectations.        Client has reviewed and agreed to the above plan.      Brenda Perez PsyD LP  11/14/2017

## 2017-11-28 ENCOUNTER — OFFICE VISIT (OUTPATIENT)
Dept: SURGERY | Facility: CLINIC | Age: 47
End: 2017-11-28
Payer: COMMERCIAL

## 2017-11-28 VITALS
BODY MASS INDEX: 26.23 KG/M2 | DIASTOLIC BLOOD PRESSURE: 76 MMHG | WEIGHT: 148.06 LBS | HEIGHT: 63 IN | HEART RATE: 68 BPM | SYSTOLIC BLOOD PRESSURE: 110 MMHG

## 2017-11-28 DIAGNOSIS — E66.3 OVERWEIGHT (BMI 25.0-29.9): ICD-10-CM

## 2017-11-28 DIAGNOSIS — N64.81 SAGGING BREASTS: ICD-10-CM

## 2017-11-28 DIAGNOSIS — E65 ABDOMINAL PANNUS: ICD-10-CM

## 2017-11-28 DIAGNOSIS — F45.22 BODY DYSMORPHIC DISORDER: ICD-10-CM

## 2017-11-28 DIAGNOSIS — Z98.84 BARIATRIC SURGERY STATUS: ICD-10-CM

## 2017-11-28 DIAGNOSIS — E16.1 REACTIVE HYPOGLYCEMIA: ICD-10-CM

## 2017-11-28 DIAGNOSIS — Z98.84 BARIATRIC SURGERY STATUS: Primary | ICD-10-CM

## 2017-11-28 PROCEDURE — 97803 MED NUTRITION INDIV SUBSEQ: CPT | Performed by: DIETITIAN, REGISTERED

## 2017-11-28 PROCEDURE — 99214 OFFICE O/P EST MOD 30 MIN: CPT | Performed by: PHYSICIAN ASSISTANT

## 2017-11-28 NOTE — MR AVS SNAPSHOT
"                  MRN:[id number]                      After Visit Summary   11/28/2017    Criss Don    MRN: [id number]           Visit Information        Provider Department      11/28/2017 9:00 AM [address] RD [facility]      Your next 10 appointments already scheduled     Dec 01, 2017  7:00 AM CST   Return Visit with Brenda Perez PsyD   [facility] ([facility] [place])    [address] [place] [address]   [phone]            Dec 12, 2017  8:00 AM CST   Return Visit with Brenda Perez PsyD   [facility] [place] [place] ([facility] [place])    [address] [place] [address]   [phone]            Jan 05, 2018  8:00 AM CST   Return Visit with Brenda Perez PsyD   [facility] [place] ([facility] [place])    [address] [place] [address]   [phone]            Jan 19, 2018  8:00 AM CST   Return Visit with Brenda Perez PsyD   [facility] [place] ([facility] [place])    [address] [place] [address]   [phone]            Feb 02, 2018 11:00 AM CST   Return Visit with Usman Arthur 1MCKENZIE   [facility] ([facility])    [address] [place] [address]   [phone]            Feb 02, 2018 11:30 AM CST   Return Visit with Cris Paez PA-C   [facility] ([facility])    [address] [place] [id number] [phone]              MyChart Information     Inbiomotionhart lets you send messages to your doctor, view your test results, renew your prescriptions, schedule appointments and more. To sign up, go to [email or web address] . Click on \"Log in\" on the left side of the screen, which " "will take you to the Welcome page. Then click on \"Sign up Now\" on the right side of the page.     You will be asked to enter the access code listed below, as well as some personal information. Please follow the directions to create your username and password.     Your access code is: [email or web address]  Expires: 2/15/2018  6:34 AM     Your access code will  in 90 days. If you need help or a new code, please call your [place] clinic or [phone].        Care EveryWhere ID     This is your Care EveryWhere ID. This could be used by other organizations to access your [place] medical records  [id number]        Equal Access to Services     STEVEN MUNOZ : Phong Wooten, seble medina, mirna faulkner, suri yeager. So [facility] [phone].    ATENCIÓN: Si habla español, tiene a madera disposición servicios gratuitos de asistencia lingüística. Llame al [phone].    We comply with applicable federal civil rights laws and [place] laws. We do not discriminate on the basis of race, color, national origin, age, disability, sex, sexual orientation, or gender identity.            "

## 2017-11-28 NOTE — PROGRESS NOTES
"NUTRITION POST OP APPOINTMENT  DATE OF VISIT: November 28, 2017    Criss Don  1970  female  [id number]  47 year old     ASSESSMENT:    REASON FOR VISIT:  Criss is a 47 year old year old female presents today for 14 month PO nutrition follow-up appointment. Patient is accompanied by self.    DIAGNOSIS:  Status post gastric bypass surgery.      ANTHROPOMETRICS:  Initial Weight: 307.7 lbs  Height:  62.5\"  Current Weight: 149 lbs   BMI: 26.65  kg/(m^2).    VITAMINS AND MINERALS:  2 Multivitamin with Minerals  500 mg Calcium With Vitamin D TID-2 hours away from [facility] and Vitron C  2-2000 International units Vitamin D  2500 mcg Vitamin B-12 sublingual -3X per week  1 Vitron C    NUTRITION HISTORY:  Breakfast: Greek Yogurt or 1 egg, pepper or onion  Lunch: 1/2 cup turkey chili -times vary depending on work schedule  Supper: 3 oz chicken, 1/2 cup black beans-times vary depending on work schedule  Snacks: 1 cheese stick, 5 crackers/ 1/2 protein drink-2X per day  Fluids consumed: Water, Protein Drink and enhanced water  Consuming liquid calories: Yes  Protein intake: 70-80 grams/day  Tolerate regular texture food: Yes  Any foods not tolerated details: Yes  If any food not tolerated: bread  Portion size: 1/2-1 cup  Take 20-30 minutes to consume each meal: Yes   Eat protein foods first: Yes  Fluids and meals separate by at least 30 minutes: Yes  Chew foods thoroughly: Yes  Tolerating diet: Yes  Drinking high protein supplements: Yes  Consuming snacks per day: 2  Additional Information: 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; continues to work with Brenda pena on body perception issues; pt reports she had to stop logging her intake/ symptoms due to obsessing about her intake; pt has episodes (1-5 x per week ) of feeling light headed, seeing spots, can't stand up; was on a 1 week vacation; explained to patient that her exercise is getting to be excessive          PHYSICAL ACTIVITY:  Type: aqua karolina, " treadmill, strength training  Frequency (days per week): 6  Duration (min): 60-90    DIAGNOSIS:  Previous Nutrition Diagnosis: Altered gastrointestinal function related to alteration in gastrointestinal structure as evidenced by history of gastric bypass surgery.- no change    Previous goals:  Track food intake when having possible symptoms of reactive hypoglycemia/ bring to next RD visit-partially met (done for ~ 3 weeks)  Aim to get in 10-15 g fiber per day (discussed foods rich in fiber)-improving  Follow diet guidelines to prevent hypoglycemia after bariatric surgery (written information provided)-met  Include protein at each meal (discussed not written)-met    Current Nutrition Diagnosis: Altered gastrointestinal function related to alteration in gastrointestinal structure as evidenced by history of gastric bypass surgery.    INTERVENTION:   Nutrition Prescription: Eat 3 meals a day at regular intervals. Consume 60-90 grams of protein daily. Follow post-surgical vitamin and mineral protocol.  Assessed learning needs and learning preferences.    GOALS:  Relating To Eating:  Continue to have 5-6 small meals per day  Continue to follow diet guidelines for preventing hypoglycemia  Limit activity to 60 minutes 6 X per week  Consistently include a serving of carbohydrate at each meal (discussed options)      Follow-Up:   Implementation: Discussed progress toward previous goals; reinforced importance of following bariatric lifestyle changes.    NUTRITION MONITORING AND EVALUATION:  Anticipated compliance: fair-good  Verbalized fair-good understanding.    Follow up: Patient to follow up in 2 months.    TIME SPENT WITH PATIENT:  35 minutes    Lazaro Ortiz RD, LD  [facility]  [phone]

## 2017-11-28 NOTE — MR AVS SNAPSHOT
After Visit Summary   11/28/2017    Criss Don    MRN: [id number]           Patient Information     Date Of Birth          1970        Visit Information        Provider Department      11/28/2017 9:30 AM Cris Paez PA-C [facility]      Today's Diagnoses     Bariatric surgery status    -  1    Overweight (BMI 25.0-29.9)        Reactive hypoglycemia          Care Instructions    See endocrinologist for reactive hypoglycemia.  Decrese exercise to 60 minutes a session.  Have a carbohydrate at each meal.  Get fitted for new undergarments.  Follow up in 2 months.          Follow-ups after your visit        Additional Services     ENDOCRINOLOGY ADULT REFERRAL       Your provider has referred you to: [facility]: Endocrinology and Diabetes Clinic - [place] [phone]   [email or web address]    Pt is 1 year s/p gastric bypass surgery experiencing reactive hypoglycemia episodes.  Would like to have pt be seen for work up quickly but also considered for Dr. Arroyo study in the long run.      Please be aware that coverage of these services is subject to the terms and limitations of your health insurance plan.  Call member services at your health plan with any benefit or coverage questions.      Please bring the following to your appointment:    >>   Any x-rays, CTs or MRIs which have been performed.  Contact the facility where they were done to arrange for  prior to your scheduled appointment.    >>   List of current medications   >>   This referral request   >>   Any documents/labs given to you for this referral            ENDOCRINOLOGY ADULT REFERRAL       Your provider has referred you to: [facility]: Endocrinology Clinic [facility] [phone]   [email or web address]  Dr. Owen    Please be aware that coverage of these services is  subject to the terms and limitations of your health insurance plan.  Call member services at your health plan with any benefit or coverage questions.      Please bring the following to your appointment:    >>   Any x-rays, CTs or MRIs which have been performed.  Contact the facility where they were done to arrange for  prior to your scheduled appointment.    >>   List of current medications   >>   This referral request   >>   Any documents/labs given to you for this referral                  Your next 10 appointments already scheduled     Dec 01, 2017  7:00 AM CST   Return Visit with Brenda Perez [facility] ([facility] [place])    [address] [place] [address]  [place] [address]   [phone]            Dec 12, 2017  8:00 AM CST   Return Visit with Brenda Perez [facility] ([facility] [place])    [facility]  [place] [address]   [phone]            Jan 05, 2018  8:00 AM CST   Return Visit with Brenda Perez [facility] ([facility] [place])    [facility]  [place] [address]   [phone]            Jan 19, 2018  8:00 AM CST   Return Visit with Brenda Perez [facility] ([facility] [place])    [facility]  [place] [address]   [phone]              Who to contact     If you have questions or need follow up information about today's clinic visit or your schedule please contact [facility] directly at [phone].  Normal or non-critical lab and imaging results will be communicated to you by MyChart, letter or phone within 4 business days after the clinic has received the results. If you do not hear from us within 7 days, please contact the clinic through MyChart or phone. If you have a critical or abnormal lab result, we will notify you by phone as soon as  "possible.  Submit refill requests through Pyreg or call your pharmacy and they will forward the refill request to us. Please allow 3 business days for your refill to be completed.          Additional Information About Your Visit        [employer] Information     Pyreg lets you send messages to your doctor, view your test results, renew your prescriptions, schedule appointments and more. To sign up, go to [email or web address] . Click on \"Log in\" on the left side of the screen, which will take you to the Welcome page. Then click on \"Sign up Now\" on the right side of the page.     You will be asked to enter the access code listed below, as well as some personal information. Please follow the directions to create your username and password.     Your access code is: [email or web address]  Expires: 2/15/2018  6:34 AM     Your access code will  in 90 days. If you need help or a new code, please call your [place] clinic or [phone].        Care EveryWhere ID     This is your Care EveryWhere ID. This could be used by other organizations to access your [place] medical records  [id number]        Your Vitals Were     Pulse Height BMI (Body Mass Index)             68 5' 2.5\" (1.588 m) 26.65 kg/m2          Blood Pressure from Last 3 Encounters:   17 110/76   10/27/17 108/66   10/17/17 103/67    Weight from Last 3 Encounters:   17 148 lb 1 oz (67.2 kg)   10/27/17 159 lb 8 oz (72.3 kg)   10/17/17 160 lb 12.8 oz (72.9 kg)              We Performed the Following     ENDOCRINOLOGY ADULT REFERRAL     ENDOCRINOLOGY ADULT REFERRAL     OP ROOMING NOTE TO DEMETRI        Primary Care Provider Office Phone # Fax #    Napoleon Gray PA-C [phone]       [address]  [place] [address]        Equal Access to Services     [facility] : Phong Wooten, waaxda luqadaha, qaybta kaalsandra faulkner, suri cuello . So [facility] [phone].    ATENCIÓN: Si " keira schroeder, tiene a madera disposición servicios gratuitos de asistencia lingüística. Trevon wright [phone].    We comply with applicable federal civil rights laws and [place] laws. We do not discriminate on the basis of race, color, national origin, age, disability, sex, sexual orientation, or gender identity.            Thank you!     Thank you for choosing [facility]  for your care. Our goal is always to provide you with excellent care. Hearing back from our patients is one way we can continue to improve our services. Please take a few minutes to complete the written survey that you may receive in the mail after your visit with us. Thank you!             Your Updated Medication List - Protect others around you: Learn how to safely use, store and throw away your medicines at www.disposemymeds.org.          This list is accurate as of: 11/28/17 10:23 AM.  Always use your most recent med list.                   Brand Name Dispense Instructions for use Diagnosis    B-12 2500 MCG Subl      Place 1 tablet under the tongue 3 times weekly        docusate sodium 100 MG tablet    COLACE    180 tablet    Take 100 mg by mouth 2 times daily as needed for constipation    Other constipation       MAGNESIUM OXIDE PO      Take 500 mg % by mouth        multivitamin  peds with iron 60 MG chewable tablet      Take 2 chew tab by mouth every morning        norgestimate-ethinyl estradiol 0.25-35 MG-MCG per tablet    ORTHO-CYCLEN, SPRINTEC    84 tablet    Take 1 tablet by mouth daily    General counseling for prescription of oral contraceptives       VIACTIV 500-500-40 MG-UNT-MCG Chew   Generic drug:  calcium-vitamin D-vitamin K      Take 1 tablet by mouth 3 times daily        VITAMIN D3 PO      Take 6,000 Units by mouth daily        VITRON-C  MG Tabs tablet   Generic drug:  ferrous fumarate 65 mg ([place]. FE)-Vitamin C 125 mg      Take 1 tablet by mouth every morning

## 2017-11-28 NOTE — PATIENT INSTRUCTIONS
See endocrinologist for reactive hypoglycemia.  Decrese exercise to 60 minutes a session.  Have a carbohydrate at each meal.  Get fitted for new undergarments.  Follow up in 2 months.

## 2017-11-28 NOTE — Clinical Note
VLADIMIR Greer and Kwadwo,  Her is a pt I am sending you who is having some severe hypoglycemic episodes post bypass.  She is 1 year post op.  I have put in a endocrine consult.  Could you please have someone in your department see her quickly.  I know it will be hard to get in to see you quickly but if she would see a resident right a way for a work up and then follow up later with someone for the study that would be appreciated.  Thanks.  Cris Paez PA-C

## 2017-11-28 NOTE — PROGRESS NOTES
Bariatric Follow Up Visit   2017    RE: KOJO ASHTON  MR#: [id number]  : 1970    HISTORY OF PRESENT ILLNESS: KOJO ASHTON returns today for a 1 month follow up appointment status post Laparoscopic Rasheeda-en-Y Gastric Bypass surgery. Last month she described having episodes consistent with reactive hypoglycemia lasting around 5 minutes about 2-3 times a week.  We had her change her diet to 3 meals and 2 planned snacks daily developed specifically for patients with reactive hypoglycemia.  She followed this for the month but continues to have episodes about twice weekly.        The most recent episodes have added lower left sided abdominal pain sometimes severe for a few minutes that then subsides.  The episodes last 5-20 minutes.  They do not seem to happen in a relation to her food intake.  Some have been upon waking.  Some an hour after eating, others several hours later.  Consistent symptoms:  Lightheadedness, blurry vision, feeling flushed, weak, feeling like she her legs might buckle. Cold/chills and fatigue during recovery from the episode.  Inconsistent symptoms: vomiting/ LLQ abdominal pain, tingling in her fingers or feet.      She is exercising 60-90 minutes 6 days a week.  In contrast last month, She stated she was at the gym 4-5 days a week on treadmill and or other machines for an hour and doing aqua Momo on Wednesday for an hour.     BARIATRIC METRICS:  Current Weight: 148 lb 1 oz (67.2 kg)  Body mass index is 26.65 kg/(m^2).   Wt change since last visit (lbs): -11.44  since seen on 10/27/2017  Cumulative weight loss (lbs): 159.38    Patient is taking the following bariatric postoperative vitamins:  2 Complete multivitamins with minerals (at different times than calcium)  4000 Int Units of Vitamin D daily   1056-5893 mg of Calcium daily in divided doses  2500 mcg of Vitamin B12 sl every three days  1 Iron/Vit C. Daily      OBESITY RELATED CONDITIONS:  Pre-diabetes: resolved HgA1C  "5.1 1/27/17    SOCIAL HISTORY:  She does not smoke. She does not drink alcohol. She feels safe in current environment.  She is  and works as a PCA.  Is seeing Brenda Perez every other week for therapy.    REVIEW OF SYSTEMS:  GI:  Nausea-seldom  Vomiting-seldom,   Diarrhea-never  Constipation-occasional, takes stool softener daily.     Dysphagia-never  Abdominal Pains-never  Heartburn-never    Skin:  Intertriginous Irritation-never  Large abdominal pannus with redundant skin present.  Pt has appt set up at [facility] before xmas.  She would like to get abdominoplasty and breast reduction/lift.  Is hoping this will help her body image.  She asked me about this numerous times.  I cautioned her regarding this.  I do not think plastic surgery is going to be the answer to her boy image issues.  Paulette had some body distortion issues very early post op even before a lot of excess skin.  She did say, she would like to be able to exercise without falling out of her undergarments.  Her upper body has shifted so much she gets some back pain and chest discomfort.  I do think this could be improved with well fitting bras and/or surgery.        Psychiatric:  Anxiety disorder, NOS:  pt is currently working with Cesario Lopez regarding her body image problem. She sees her every other week.  Even with her current weight loss she continues to feel she is the same size she was before. She is down to weighing herself once a day.  Pt is hoping if she is able to get rid of redundant skin she may feel differently about her body.  The dietician asked her to keep a food journal at her last visit to track what she ate in relation to her hypoglycemia symptoms.  She dif this for two weeks but then had to stop because she became obsessive with this.     :    Pt has monthly menses.  Is now on oral birth control.      PHYSICAL EXAMINATION:   /76  Pulse 68  Ht 5' 2.5\" (1.588 m)  Wt 148 lb 1 oz (67.2 kg)  BMI " 26.65 kg/m2,  GENERAL: Alert and oriented x3. NAD  HEART: Regular rate and rhythm, No murmurs, rubs or gallops  LUNGS: Breathing unlabored, Lung sounds clear to auscultation bilaterally  ABDOMEN: soft; nontender; nondistended, incision well healed. No hernia  EXTREMITIES: No LE edema bilaterally, Gait normal  SKIN: No intertriginous irritation or rash    ASSESSMENT/PLAN:   S/P Laparoscopic Rasheeda-en-Y Gastric Bypass-   Post surgical malabsorption-   Reactive Hypoglycemia/Lightheadedness    Referral given for to both the [facility] (Dr. Greer) and also [facility] (Dr. Owen).  Pt is not sure which one is covered under her insurance and I would like her to be seen within the next month.   She has not responded well do the reactive hypoglycemia diet we gave her last month.  I would like to have this worked up further.    Have a carbohydrate at each meal per dietician in addition to RH diet we reviewed last month.     Body dysmorphic disorder   Continue to see SONI Lopez twice monthly   I am concerned she may have an eating/exercise disorder.  Wondering if an ED evaluation is warranted.  I did ask her today if she felt she was getting the help she needed to control the feeling she was having right now regarding her body image.  Making sure it was not interfering with her life.  She said yes, and she was working well with Brenda. For now I will continue to monitor along with the dietician.  May reach out the Brenda Perez,   if her weight continues to drop this rapidly.    Decrease exercise to 60 minutes a session. (Pt is willing to do this.)  Abdominal pannus/  saggy breasts   Referral to [facility].     Get fitted for new undergarments.  Handout given for professional fitters.   This was a 25 minute visit with greater than 50% spent on counseling.    Follow up in 2 months.

## 2017-12-01 ENCOUNTER — OFFICE VISIT (OUTPATIENT)
Dept: PSYCHOLOGY | Facility: CLINIC | Age: 47
End: 2017-12-01
Payer: COMMERCIAL

## 2017-12-01 DIAGNOSIS — F41.9 ANXIETY DISORDER, UNSPECIFIED TYPE: Primary | ICD-10-CM

## 2017-12-01 PROCEDURE — 90834 PSYTX W PT 45 MINUTES: CPT | Performed by: PSYCHOLOGIST

## 2017-12-01 NOTE — MR AVS SNAPSHOT
MRN:[id number]                      After Visit Summary   12/1/2017    Criss Don    MRN: [id number]           Visit Information        Provider Department      12/1/2017 7:00 AM Brenda Perez PsyD [facility]      Your next 10 appointments already scheduled     Dec 12, 2017  8:00 AM CST   Return Visit with Brenda Perez PsyD   [facility] ([facility])    [address] [place], [place] [address] [place] [address]   [phone]            Dec 29, 2017 10:00 AM CST   (Arrive by 9:45 AM)   NEW ENDOCRINE with Inocencia Cartagena MD   [facility] ([facility])    [address] [place] [id number] [phone]            Jan 05, 2018  8:00 AM CST   Return Visit with Brenda Perez PsyD   [facility] ([facility])    [address] [place] [address] [address]   [phone]            Jan 19, 2018  8:00 AM CST   Return Visit with Brenda Perez PsyD   [facility] ([facility])    [address] [place] [address]   [phone]            Feb 02, 2018  9:00 AM CST   Return Visit with Brenda Perez PsyD   [facility] ([facility])    [address] [place] [address]   [phone]            Feb 02, 2018 11:00 AM CST   Return Visit with Usman Raymond Diet 1, RD   [facility] [place] ([facility])    [address] [place] [address]  [address]  [place] [id number] [phone]            Feb 02, 2018 11:30 AM CST   Return Visit with Cris Paez PA-C   [facility] [place] ([facility])    [facility] [facility]  [address]  [place] [id number] [phone]              [employer] Information     [employer] lets you send  "messages to your doctor, view your test results, renew your prescriptions, schedule appointments and more. To sign up, go to [email or web address] . Click on \"Log in\" on the left side of the screen, which will take you to the Welcome page. Then click on \"Sign up Now\" on the right side of the page.     You will be asked to enter the access code listed below, as well as some personal information. Please follow the directions to create your username and password.     Your access code is: [email or web address]  Expires: 2/15/2018  6:34 AM     Your access code will  in 90 days. If you need help or a new code, please call your [facility] clinic or [phone].        Care EveryWhere ID     This is your Care EveryWhere ID. This could be used by other organizations to access your [facility] medical records  [id number]        Equal Access to Services     STEVEN MUNOZ : Hadii deandra Wooten, waaxda carol, qaybta kaalmada kaushik, suri yeager. So [facility] [phone].    ATENCIÓN: Si habla español, tiene a madera disposición servicios gratuitos de asistencia lingüística. Llame al [phone].    We comply with applicable federal civil rights laws and [place] laws. We do not discriminate on the basis of race, color, national origin, age, disability, sex, sexual orientation, or gender identity.            "

## 2017-12-04 ENCOUNTER — TELEPHONE (OUTPATIENT)
Dept: SURGERY | Facility: CLINIC | Age: 47
End: 2017-12-04

## 2017-12-04 ENCOUNTER — TELEPHONE (OUTPATIENT)
Dept: ENDOCRINOLOGY | Facility: CLINIC | Age: 47
End: 2017-12-04

## 2017-12-04 NOTE — TELEPHONE ENCOUNTER
----- Message from Yu Mahi sent at 12/4/2017  8:37 AM CST -----  Regarding: New Patient Referral  Contact: [phone]  Patient has a New Patient Referral for Reactive hypoglycemia, Bariatric surgery status referred by YOLANDE FOWLER.  Please follow up with patient at [phone].    Thank you,    [facility]

## 2017-12-04 NOTE — TELEPHONE ENCOUNTER
Pt called.  She called Dr. Benitez.  He is not taking new patients.  Would like alternative referral.  Has not heard from [facility] clinic yet. Only from [facility] the .  Will call [facility] tomorrow to see if he is able tot didier the few pts a refer or if he was another provider I should refer to.  Will also look into the [place] and reach out to Dr. Banerjee.

## 2017-12-04 NOTE — TELEPHONE ENCOUNTER
To schedulers: Please schedule her for lst available endocrine    Mary Jo Hill MD  Endocrine triage

## 2017-12-05 NOTE — TELEPHONE ENCOUNTER
Sent staff message to Dr. Banerjee asking if she is willing to see pt for symptoms consistent with reactive hypoglycemia.

## 2017-12-05 NOTE — PROGRESS NOTES
"                                             Progress Note    Client Name: Criss Don  Date: 12/1/2017         Service Type: Individual      Session Start Time: 7:00  Session End Time: 7:45      Session Length: 45     Session #: 20     Attendees: Client attended alone    Treatment Plan Last Reviewed: 12/1/2017  PHQ-9 / NED-7 :      DATA      Progress Since Last Session (Related to Symptoms / Goals / Homework):   Symptoms: Stable    Homework: Partially completed      Episode of Care Goals: Minimal progress - ACTION (Actively working towards change); Intervened by reinforcing change plan / affirming steps taken     Current / Ongoing Stressors and Concerns:   Reported that her trip to visit a friend in [place] went \"really well.\" Reported that she enjoyed herself and was pleased that he seemed to be impressed with her weight loss. Client acknowledged that she still sees herself as larger than she is, and has a consultation with a plastic surgeon to discuss excess skin removal. Reported that she hopes by removing extra skin around her abdomen she will be more satisfied with her appearance.  Reported that she is considering filing for divorce soon, because she is considering moving to [place] to be closer to her friend and pursue a relationship with him. Reported that the primary barrier is financial, and she does not want to borrow money from anybody even though some family members have offered to lend her money to pursue divorce.          Treatment Objective(s) Addressed in This Session:   stress management  Maintain compliance with post-surgical dietary needs     Intervention:   CBT: surfaced and challenged \"should\" statements and unreasonable expectations about weight loss; identified sources of unhealthy and unhelpful comparisons; explored client's perceptions about her body size  Supportive therapy:  provided active listening, support, and encouragement. Reflected on ways client can be more accepting and tolerant of " her new body shape. Normalized concerns about what others think about size/shape after weight loss surgery, and assisted her in recognizing when the concerns are excessive, disproportionate, or trigger unhealthy thinking/behaviors.           ASSESSMENT: Current Emotional / Mental Status (status of significant symptoms):   Risk status (Self / Other harm or suicidal ideation)   Client denies current fears or concerns for personal safety.   Client denies current or recent suicidal ideation or behaviors.   Client denies current or recent homicidal ideation or behaviors.   Client denies current or recent self injurious behavior or ideation.   Client denies other safety concerns.   A safety and risk management plan has not been developed at this time, however client was given the after-hours number / 911 should there be a change in any of these risk factors.     Appearance:   Appropriate    Eye Contact:   Good    Psychomotor Behavior: Normal    Attitude:   Cooperative  Pleasant    Orientation:   All   Speech    Rate / Production: Normal     Volume:  Normal    Mood:    Anxious    Affect:    Appropriate    Thought Content:  Clear    Thought Form:  Coherent  Logical    Insight:    Good      Medication Review:   No current psychiatric medications prescribed     Medication Compliance:   Yes     Changes in Health Issues:   None reported     Chemical Use Review:   Substance Use: Chemical use reviewed, no active concerns identified      Tobacco Use: No current tobacco use.       Collateral Reports Completed:   Not Applicable    PLAN: (Client Tasks / Therapist Tasks / Other)  Future appointments are scheduled. Practice acceptance of your new body shape as discussed in session. Surface and challenge unrealistic expectations and disproportionate thoughts. Follow up with weight loss clinic about questions about dietary adjustments.       Brenda Perez PsyD LP                                                       Treatment  Plan    Client's Name: Criss Don  YOB: 1970    Date: 12/1/2017    DSM-V Diagnoses: 300.00 Unspecified Anxiety Disorder  Psychosocial / Contextual Factors: strain with roommate, spouse, and nephews  WHODAS: 22    Referral / Collaboration:  Referral to another professional/service is not indicated at this time..    Anticipated number of session or this episode of care: reassess after 12 sessions      MeasurableTreatment Goal(s) related to diagnosis / functional impairment(s)  Goal 1: Client will learn coping skills to manage stress and adjust to post-surgical lifestyle changes more effectively.    I will know I've met my goal when I'm not weighing myself all the time and feeling more comfortable with my weight loss.      Objective #A (Client Action)    Client will identify thinking patterns that contribute to anxiety about weight and eating habits.  Status: Continued - Date(s): 12/1/2017    Intervention(s)  Therapist will assign homework (thought  logs), assist client in surfacing and replacement ineffective thinking patterns.    Objective #B  Client will identify new ways to cope with stress and worry thoughts.  Status: Continued - Date(s): 12/1/2017    Intervention(s)  Therapist will assign homework, problem-solve barriers to follow through.    Objective #C  Client will identify and challenge unreasonable or unrealistic expectations about weight loss.  Status: Continued - Date(s): 12/1/2017    Intervention(s)  Therapist will assist client in identifying and balancing her expectations.        Client has reviewed and agreed to the above plan.      Brenda Perez PsyD LP  12/1/2017

## 2017-12-07 ENCOUNTER — TRANSFERRED RECORDS (OUTPATIENT)
Dept: HEALTH INFORMATION MANAGEMENT | Facility: CLINIC | Age: 47
End: 2017-12-07

## 2017-12-07 LAB
CREAT SERPL-MCNC: 0.58 MG/DL (ref 0.5–1.1)
GFR SERPL CREATININE-BSD FRML MDRD: 108 ML/MIN/1.73M2
GLUCOSE SERPL-MCNC: 83 MG/DL (ref 65–99)
HBA1C MFR BLD: 5.2 % (ref 4–6)
POTASSIUM SERPL-SCNC: 3.7 MMOL/L (ref 3.5–5.3)
TSH SERPL-ACNC: 1.44 UIU/ML (ref 0.3–5)

## 2017-12-07 NOTE — TELEPHONE ENCOUNTER
Connected with Dr. Banerjee.  Dr. Owen will  Be joining [employer] after the first of the year.  In mean time, I see she has appt at .  Called pt back and saw she has appt at the [facility] within the month.  She states she had another episode last night.  Dr. Owen actually had a cancelation today so she will be seeing him today at 12:45 PM. I look forward to seeing the notes from the visit.

## 2017-12-08 ENCOUNTER — TRANSFERRED RECORDS (OUTPATIENT)
Dept: HEALTH INFORMATION MANAGEMENT | Facility: CLINIC | Age: 47
End: 2017-12-08

## 2017-12-12 ENCOUNTER — OFFICE VISIT (OUTPATIENT)
Dept: PSYCHOLOGY | Facility: CLINIC | Age: 47
End: 2017-12-12
Payer: COMMERCIAL

## 2017-12-12 DIAGNOSIS — F41.9 ANXIETY DISORDER, UNSPECIFIED TYPE: Primary | ICD-10-CM

## 2017-12-12 PROCEDURE — 90834 PSYTX W PT 45 MINUTES: CPT | Performed by: PSYCHOLOGIST

## 2017-12-12 NOTE — PROGRESS NOTES
"                                             Progress Note    Client Name: Criss Don  Date: 12/12/2017         Service Type: Individual      Session Start Time: 8:00  Session End Time: 8:45      Session Length: 45     Session #: 21     Attendees: Client attended alone    Treatment Plan Last Reviewed: 12/12/2017  PHQ-9 / NED-7 :      DATA      Progress Since Last Session (Related to Symptoms / Goals / Homework):   Symptoms: Stable    Homework: Partially completed      Episode of Care Goals: Minimal progress - ACTION (Actively working towards change); Intervened by reinforcing change plan / affirming steps taken     Current / Ongoing Stressors and Concerns:   Reported she is meeting with a  this week. Reported that she has reached a plateau in her weight loss, which she finds discouraging.          Treatment Objective(s) Addressed in This Session:   stress management  Maintain compliance with post-surgical dietary needs     Intervention:   CBT: surfaced and challenged \"should\" statements and unreasonable expectations about weight loss; identified sources of unhealthy and unhelpful comparisons; explored client's perceptions about her body size  Supportive therapy:  provided active listening, support, and encouragement.            ASSESSMENT: Current Emotional / Mental Status (status of significant symptoms):   Risk status (Self / Other harm or suicidal ideation)   Client denies current fears or concerns for personal safety.   Client denies current or recent suicidal ideation or behaviors.   Client denies current or recent homicidal ideation or behaviors.   Client denies current or recent self injurious behavior or ideation.   Client denies other safety concerns.   A safety and risk management plan has not been developed at this time, however client was given the after-hours number / 911 should there be a change in any of these risk factors.     Appearance:   Appropriate    Eye Contact:   Good "    Psychomotor Behavior: Normal    Attitude:   Cooperative  Pleasant    Orientation:   All   Speech    Rate / Production: Normal     Volume:  Normal    Mood:    Anxious    Affect:    Appropriate    Thought Content:  Clear    Thought Form:  Coherent  Logical    Insight:    Good      Medication Review:   No current psychiatric medications prescribed     Medication Compliance:   Yes     Changes in Health Issues:   None reported     Chemical Use Review:   Substance Use: Chemical use reviewed, no active concerns identified      Tobacco Use: No current tobacco use.       Collateral Reports Completed:   Not Applicable    PLAN: (Client Tasks / Therapist Tasks / Other)  Future appointments are scheduled. Practice acceptance of your new body shape as discussed in session. Surface and challenge unrealistic expectations and disproportionate thoughts. Follow up with weight loss clinic about questions about dietary adjustments.       Brenda Perez PsyD LP                                                       Treatment Plan    Client's Name: Criss Don  YOB: 1970    Date: 12/12/2017    DSM-V Diagnoses: 300.00 Unspecified Anxiety Disorder  Psychosocial / Contextual Factors: strain with roommate, spouse, and nephews  WHODAS: 22    Referral / Collaboration:  Referral to another professional/service is not indicated at this time..    Anticipated number of session or this episode of care: reassess after 12 sessions      MeasurableTreatment Goal(s) related to diagnosis / functional impairment(s)  Goal 1: Client will learn coping skills to manage stress and adjust to post-surgical lifestyle changes more effectively.    I will know I've met my goal when I'm not weighing myself all the time and feeling more comfortable with my weight loss.      Objective #A (Client Action)    Client will identify thinking patterns that contribute to anxiety about weight and eating habits.  Status: Continued - Date(s):  12/12/2017    Intervention(s)  Therapist will assign homework (thought  logs), assist client in surfacing and replacement ineffective thinking patterns.    Objective #B  Client will identify new ways to cope with stress and worry thoughts.  Status: Continued - Date(s): 12/12/2017    Intervention(s)  Therapist will assign homework, problem-solve barriers to follow through.    Objective #C  Client will identify and challenge unreasonable or unrealistic expectations about weight loss.  Status: Continued - Date(s): 12/12/2017    Intervention(s)  Therapist will assist client in identifying and balancing her expectations.        Client has reviewed and agreed to the above plan.      Brenda Perez PsyD LP  12/12/2017

## 2017-12-12 NOTE — MR AVS SNAPSHOT
"                  MRN:[id number]                      After Visit Summary   12/12/2017    Criss Don    MRN: [id number]           Visit Information        Provider Department      12/12/2017 8:00 AM Brenda Perez PsyD [facility] [place] [facility] Generic      Your next 10 appointments already scheduled     Jan 05, 2018  8:00 AM CST   Return Visit with Brenda Perez PsyD   [facility] [place] ([facility] [place])    [address] [place], [place] [address] [address]   [phone]            Jan 19, 2018  8:00 AM CST   Return Visit with Brenda Perez PsyD   [facility] [place] [facility]    [address] [place] [address]   [phone]            Feb 02, 2018  9:00 AM CST   Return Visit with Brenda Perez PsyD   [facility] [place]    [address] [place] [address]   [phone]            Feb 02, 2018 11:00 AM CST   Return Visit with Usman Arthur 1, RD   [facility] ([facility])    [address] [place] [address]   [phone]            Feb 02, 2018 11:30 AM CST   Return Visit with Cris Paez PA-C   [facility] ([facility])    [address] [place] [address]   [phone]            Feb 16, 2018  7:00 AM CST   Return Visit with Brenda Perez PsyD   [facility] [place]    [address] [place] [address]   [phone]              MyChart Information     DermLinkt lets you send messages to your doctor, view your test results, renew your prescriptions, schedule appointments and more. To sign up, go to [email or web address] . Click on \"Log in\" on the left side of the screen, which will take you to the Welcome " "page. Then click on \"Sign up Now\" on the right side of the page.     You will be asked to enter the access code listed below, as well as some personal information. Please follow the directions to create your username and password.     Your access code is: [email or web address]  Expires: 2/15/2018  6:34 AM     Your access code will  in 90 days. If you need help or a new code, please call your [place] clinic or [phone].        Care EveryWhere ID     This is your Care EveryWhere ID. This could be used by other organizations to access your [place] medical records  [id number]        Equal Access to Services     STEVEN MUNOZ : Phong Wooten, seble medina, mirna faulkner, suri yeager. So [facility] [phone].    ATENCIÓN: Si habla español, tiene a madera disposición servicios gratuitos de asistencia lingüística. Llame al [phone].    We comply with applicable federal civil rights laws and [place] laws. We do not discriminate on the basis of race, color, national origin, age, disability, sex, sexual orientation, or gender identity.            "

## 2017-12-22 ENCOUNTER — TRANSFERRED RECORDS (OUTPATIENT)
Dept: HEALTH INFORMATION MANAGEMENT | Facility: CLINIC | Age: 47
End: 2017-12-22

## 2018-01-04 DIAGNOSIS — K90.9 HYPERPARATHYROIDISM DUE TO INTESTINAL MALABSORPTION (H): ICD-10-CM

## 2018-01-04 DIAGNOSIS — E21.1 HYPERPARATHYROIDISM DUE TO INTESTINAL MALABSORPTION (H): ICD-10-CM

## 2018-01-04 DIAGNOSIS — K91.2 MALNUTRITION FOLLOWING GASTROINTESTINAL SURGERY: ICD-10-CM

## 2018-01-04 LAB
DEPRECATED CALCIDIOL+CALCIFEROL SERPL-MC: 93 UG/L (ref 20–75)
PTH-INTACT SERPL-MCNC: 70 PG/ML (ref 12–72)

## 2018-01-04 PROCEDURE — 82306 VITAMIN D 25 HYDROXY: CPT | Performed by: PHYSICIAN ASSISTANT

## 2018-01-04 PROCEDURE — 36415 COLL VENOUS BLD VENIPUNCTURE: CPT | Performed by: PHYSICIAN ASSISTANT

## 2018-01-04 PROCEDURE — 83970 ASSAY OF PARATHORMONE: CPT | Performed by: PHYSICIAN ASSISTANT

## 2018-01-05 ENCOUNTER — OFFICE VISIT (OUTPATIENT)
Dept: PSYCHOLOGY | Facility: CLINIC | Age: 48
End: 2018-01-05
Payer: COMMERCIAL

## 2018-01-05 DIAGNOSIS — F41.9 ANXIETY DISORDER, UNSPECIFIED TYPE: Primary | ICD-10-CM

## 2018-01-05 DIAGNOSIS — E66.3 OVERWEIGHT (BMI 25.0-29.9): Primary | ICD-10-CM

## 2018-01-05 DIAGNOSIS — Z98.84 BARIATRIC SURGERY STATUS: ICD-10-CM

## 2018-01-05 DIAGNOSIS — K91.2 MALNUTRITION FOLLOWING GASTROINTESTINAL SURGERY: ICD-10-CM

## 2018-01-05 PROCEDURE — 90834 PSYTX W PT 45 MINUTES: CPT | Performed by: PSYCHOLOGIST

## 2018-01-05 NOTE — MR AVS SNAPSHOT
"                  MRN:[id number]                      After Visit Summary   1/5/2018    Criss Don    MRN: [id number]           Visit Information        Provider Department      1/5/2018 8:00 AM Brenda Perez PsyD [facility] [place] Generic      Your next 10 appointments already scheduled     Jan 19, 2018  8:00 AM CST   Return Visit with Brenda Perez PsyD   [facility] [place] ([facility])    [address] [place] [address]   [phone]            Feb 02, 2018  9:00 AM CST   Return Visit with Brenda Perez PsyD   [facility]    [address] [place] [address]   [phone]            Feb 02, 2018 11:00 AM CST   Return Visit with Usman Arthur 1, RD   [facility] ([facility])    [address] [place] [address]   [phone]            Feb 02, 2018 11:30 AM CST   Return Visit with Cris Paez PA-C   [facility] ([facility])    [address] [place] [address]   [phone]            Feb 16, 2018  7:00 AM CST   Return Visit with Brenda Perez PsyD   [facility]    [address] [place] [address]   [phone]            Mar 02, 2018  7:00 AM CST   Return Visit with Brenda Perez PsyD   [facility] [place] ([facility]    [address] [place] [address]   [phone]              MyChart Information     Versartist lets you send messages to your doctor, view your test results, renew your prescriptions, schedule appointments and more. To sign up, go to [email or web address] . Click on \"Log in\" on the left side of the screen, which will take you to the Welcome page. " "Then click on \"Sign up Now\" on the right side of the page.     You will be asked to enter the access code listed below, as well as some personal information. Please follow the directions to create your username and password.     Your access code is: [email or web address]  Expires: 2/15/2018  6:34 AM     Your access code will  in 90 days. If you need help or a new code, please call your [place] clinic or [phone].        Care EveryWhere ID     This is your Care EveryWhere ID. This could be used by other organizations to access your [place] medical records  [id number]        Equal Access to Services     STEVEN MUNOZ : Phong Wooten, seble medina, mirna faulkner, suri yeager. So [facility] [phone].    ATENCIÓN: Si habla español, tiene a madera disposición servicios gratuitos de asistencia lingüística. Llame al [phone].    We comply with applicable federal civil rights laws and [place] laws. We do not discriminate on the basis of race, color, national origin, age, disability, sex, sexual orientation, or gender identity.            "

## 2018-01-05 NOTE — PROGRESS NOTES
"                                             Progress Note    Client Name: Criss Don  Date: 1/5/2018         Service Type: Individual      Session Start Time: 8:00  Session End Time: 8:45      Session Length: 45     Session #: 22     Attendees: Client attended alone    Treatment Plan Last Reviewed: 1/5/2018  PHQ-9 / NED-7 :      DATA      Progress Since Last Session (Related to Symptoms / Goals / Homework):   Symptoms: Stable    Homework: Partially completed      Episode of Care Goals: Minimal progress - ACTION (Actively working towards change); Intervened by reinforcing change plan / affirming steps taken     Current / Ongoing Stressors and Concerns:   Reported she is meeting with her  again today. Reported that she has informed her spouse that she wants a divorce and expects that he will be served papers sometime this week. Reported feeling guilty because he is trying to talk her out of it and sad that the marriage did not work out. Reported she had a consultation with a plastic surgeon about excess skin removal. Acknowledged that she had a hard time answering if she is done losing weight, because she still wants to lose more. Reported that she believes one of the benefits of skin removal aside from medical reasons is that it will improve her self-image and help her let go of the image of herself when she was morbidly obese.           Treatment Objective(s) Addressed in This Session:   stress management  Maintain compliance with post-surgical dietary needs     Intervention:   CBT: surfaced and challenged \"should\" statements and unreasonable expectations about weight loss; identified sources of unhealthy and unhelpful comparisons; explored client's perceptions about her body size  Supportive therapy:  provided active listening, support, and encouragement. Reinforced healthy behavioral choices. Normalized the mixed emotions that come along with seeking divorce.            ASSESSMENT: Current " Emotional / Mental Status (status of significant symptoms):   Risk status (Self / Other harm or suicidal ideation)   Client denies current fears or concerns for personal safety.   Client denies current or recent suicidal ideation or behaviors.   Client denies current or recent homicidal ideation or behaviors.   Client denies current or recent self injurious behavior or ideation.   Client denies other safety concerns.   A safety and risk management plan has not been developed at this time, however client was given the after-hours number / 911 should there be a change in any of these risk factors.     Appearance:   Appropriate    Eye Contact:   Good    Psychomotor Behavior: Normal    Attitude:   Cooperative  Pleasant    Orientation:   All   Speech    Rate / Production: Normal     Volume:  Normal    Mood:    Anxious    Affect:    Appropriate    Thought Content:  Clear    Thought Form:  Coherent  Logical    Insight:    Good      Medication Review:   No current psychiatric medications prescribed     Medication Compliance:   Yes     Changes in Health Issues:   None reported     Chemical Use Review:   Substance Use: Chemical use reviewed, no active concerns identified      Tobacco Use: No current tobacco use.       Collateral Reports Completed:   Not Applicable    PLAN: (Client Tasks / Therapist Tasks / Other)  Future appointments are scheduled. Practice acceptance of your new body shape as discussed in session. Surface and challenge unrealistic expectations and disproportionate thoughts. Surface and challenge disproportionate and unjustified guilt as discussed in session.       Brenda Perez PsyD LP                                                       Treatment Plan    Client's Name: Criss Don  YOB: 1970    Date: 1/5/2018    DSM-V Diagnoses: 300.00 Unspecified Anxiety Disorder  Psychosocial / Contextual Factors: strain with roommate, spouse, and nephews  WHODAS: 22    Referral /  Collaboration:  Referral to another professional/service is not indicated at this time..    Anticipated number of session or this episode of care: reassess after 12 sessions      MeasurableTreatment Goal(s) related to diagnosis / functional impairment(s)  Goal 1: Client will learn coping skills to manage stress and adjust to post-surgical lifestyle changes more effectively.    I will know I've met my goal when I'm not weighing myself all the time and feeling more comfortable with my weight loss.      Objective #A (Client Action)    Client will identify thinking patterns that contribute to anxiety about weight and eating habits.  Status: Continued - Date(s): 1/5/2018    Intervention(s)  Therapist will assign homework (thought  logs), assist client in surfacing and replacement ineffective thinking patterns.    Objective #B  Client will identify new ways to cope with stress and worry thoughts.  Status: Continued - Date(s): 1/5/2018    Intervention(s)  Therapist will assign homework, problem-solve barriers to follow through.    Objective #C  Client will identify and challenge unreasonable or unrealistic expectations about weight loss.  Status: Continued - Date(s): 1/5/2018    Intervention(s)  Therapist will assist client in identifying and balancing her expectations.        Client has reviewed and agreed to the above plan.      Brenda Perez PsyD LP  1/5/2018

## 2018-01-19 ENCOUNTER — OFFICE VISIT (OUTPATIENT)
Dept: PSYCHOLOGY | Facility: CLINIC | Age: 48
End: 2018-01-19
Payer: COMMERCIAL

## 2018-01-19 DIAGNOSIS — Z30.09 GENERAL COUNSELING FOR PRESCRIPTION OF ORAL CONTRACEPTIVES: ICD-10-CM

## 2018-01-19 DIAGNOSIS — F41.9 ANXIETY DISORDER, UNSPECIFIED TYPE: Primary | ICD-10-CM

## 2018-01-19 PROCEDURE — 90834 PSYTX W PT 45 MINUTES: CPT | Performed by: PSYCHOLOGIST

## 2018-01-19 NOTE — TELEPHONE ENCOUNTER
"Requested Prescriptions   Pending Prescriptions Disp Refills     norgestimate-ethinyl estradiol (ORTHO-CYCLEN, SPRINTEC) 0.25-35 MG-MCG per tablet  Last Written Prescription Date:  9/19/17  Last Fill Quantity: 84,  # refills: 1   Last Office Visit with CORETS, LINDA or [facility] prescribing provider:  10/17/17   Future Office Visit:    Next 5 appointments (look out 90 days)     Feb 02, 2018  9:00 AM CST   Return Visit with Brenda Perez PsyD   [facility]    [address] [place] [address]   [phone]            Feb 02, 2018 11:00 AM CST   Return Visit with Usman Arthur 1, RD   [facility] ([facility])    [address] [place] [address]   [phone]            Feb 02, 2018 11:30 AM CST   Return Visit with Cris Paez PA-C   [facility] ([facility])    [address] [place] [address]   [phone]            Feb 16, 2018  7:00 AM CST   Return Visit with Brenda Perez PsyD   [facility]    [address] [place] [address]   [phone]            Mar 02, 2018  7:00 AM CST   Return Visit with Brenda Perez PsyD   [facility]    [address] [place] [address]   [phone]                  84 tablet 1     Sig: Take 1 tablet by mouth daily    Contraceptives Protocol Passed    1/19/2018  8:07 AM       Passed - Patient is not a current smoker if age is 35 or older       Passed - Recent or future visit with authorizing provider's specialty    Patient had office visit in the last year or has a visit in the next 30 days with authorizing provider.  See \"Patient Info\" tab in inbasket, or \"Choose Columns\" in Meds & Orders section of the " refill encounter.            Passed - No active pregnancy on record       Passed - No positive pregnancy test in past 12 months

## 2018-01-19 NOTE — PROGRESS NOTES
"                                             Progress Note    Client Name: Criss Don  Date: 1/19/2018         Service Type: Individual      Session Start Time: 8:00  Session End Time: 8:45      Session Length: 45     Session #: 23     Attendees: Client attended alone    Treatment Plan Last Reviewed: 1/19/2018  PHQ-9 / NED-7 :      DATA      Progress Since Last Session (Related to Symptoms / Goals / Homework):   Symptoms: Stable    Homework: Partially completed      Episode of Care Goals: Minimal progress - ACTION (Actively working towards change); Intervened by reinforcing change plan / affirming steps taken     Current / Ongoing Stressors and Concerns:   Reported that  has been served divorce papers. Reported feeling anxious that she has not heard back from him yet, and is concerned that he is going to \"fight it\" or that his girlfriend is going to try to \"make my life hell\" by encouraging him to fight for assets or by putting client's personal information online. Reported that the stress has increased her temptation to snack when not hungry. Client reported she has resisted the temptation to snack so far, but admitted that she needs more things to do to keep her hands busy or her mind occupied.           Treatment Objective(s) Addressed in This Session:   stress management  Maintain compliance with post-surgical dietary needs     Intervention:   Solution Focused: assisted client indentifying healthy ways to keep her hands and mind occupied during times of high stress; activity planning  Supportive therapy:  provided active listening, support, and encouragement. Reinforced healthy behavioral choices. Normalized the mixed emotions that come along with seeking divorce. Reflected on the fear of retaliation by her spouse's girlfriend.           ASSESSMENT: Current Emotional / Mental Status (status of significant symptoms):   Risk status (Self / Other harm or suicidal ideation)   Client denies current fears " or concerns for personal safety.   Client denies current or recent suicidal ideation or behaviors.   Client denies current or recent homicidal ideation or behaviors.   Client denies current or recent self injurious behavior or ideation.   Client denies other safety concerns.   A safety and risk management plan has not been developed at this time, however client was given the after-hours number / 911 should there be a change in any of these risk factors.     Appearance:   Appropriate    Eye Contact:   Good    Psychomotor Behavior: Normal    Attitude:   Cooperative  Pleasant    Orientation:   All   Speech    Rate / Production: Normal     Volume:  Normal    Mood:    Anxious    Affect:    Appropriate    Thought Content:  Clear    Thought Form:  Coherent  Logical    Insight:    Good      Medication Review:   No current psychiatric medications prescribed     Medication Compliance:   Yes     Changes in Health Issues:   None reported     Chemical Use Review:   Substance Use: Chemical use reviewed, no active concerns identified      Tobacco Use: No current tobacco use.       Collateral Reports Completed:   Not Applicable    PLAN: (Client Tasks / Therapist Tasks / Other)  Future appointments are scheduled. Generate a list of productive ways to keep your hands and mind busy (i.e. Crafting, initiating contact with friends, planning outings, etc.). Continue: Practice acceptance of your new body shape as discussed in session. Surface and challenge unrealistic expectations and disproportionate thoughts. Surface and challenge disproportionate and unjustified guilt as discussed in session.       Brenda Perez PsyD LP                                                       Treatment Plan    Client's Name: Criss Don  YOB: 1970    Date: 1/19/2018    DSM-V Diagnoses: 300.00 Unspecified Anxiety Disorder  Psychosocial / Contextual Factors: strain with roommate, spouse, and nephews  WHODAS: 22    Referral /  Collaboration:  Referral to another professional/service is not indicated at this time..    Anticipated number of session or this episode of care: reassess after 12 sessions      MeasurableTreatment Goal(s) related to diagnosis / functional impairment(s)  Goal 1: Client will learn coping skills to manage stress and adjust to post-surgical lifestyle changes more effectively.    I will know I've met my goal when I'm not weighing myself all the time and feeling more comfortable with my weight loss.      Objective #A (Client Action)    Client will identify thinking patterns that contribute to anxiety about weight and eating habits.  Status: Continued - Date(s): 1/19/2018    Intervention(s)  Therapist will assign homework (thought  logs), assist client in surfacing and replacement ineffective thinking patterns.    Objective #B  Client will identify new ways to cope with stress and worry thoughts.  Status: Continued - Date(s): 1/19/2018    Intervention(s)  Therapist will assign homework, problem-solve barriers to follow through.    Objective #C  Client will identify and challenge unreasonable or unrealistic expectations about weight loss.  Status: Continued - Date(s): 1/19/2018    Intervention(s)  Therapist will assist client in identifying and balancing her expectations.        Client has reviewed and agreed to the above plan.      Brenda Perez PsyD LP  1/19/2018

## 2018-01-19 NOTE — TELEPHONE ENCOUNTER
Reason for Call:  Medication or medication refill:    Do you use a [facility]?  Name of the pharmacy and phone number for the current request:  [facility] - [phone]    Name of the medication requested: ortho cyclen    Other request: na     Can we leave a detailed message on this number? YES    Phone number patient can be reached at: Home number on file [phone] (home)    Best Time: anytime    Call taken on 1/19/2018 at 7:57 AM by Vee Flores

## 2018-01-19 NOTE — MR AVS SNAPSHOT
MRN:[id number]                      After Visit Summary   1/19/2018    Criss Don    MRN: [id number]           Visit Information        Provider Department      1/19/2018 8:00 AM Brenda Perez PsyD [facility] Generic      Your next 10 appointments already scheduled     Feb 02, 2018  7:40 AM CST   Office Visit with Napoleon Gray PA-C   [facility] ([facility]    [address] [place] [address]   [phone]           Bring a current list of meds and any records pertaining to this visit. For Physicals, please bring immunization records and any forms needing to be filled out. Please arrive 10 minutes early to complete paperwork.            Feb 02, 2018  9:00 AM CST   Return Visit with Brenda Perez PsyD   [facility])    [address] [place] [address]   [phone]            Feb 02, 2018 11:00 AM CST   Return Visit with Usman Raymond Diet 1, RD   [facility] ([facility])    [address] [place] [address]  [address] [place] [address]   [phone]            Feb 02, 2018 11:30 AM CST   Return Visit with Cris Paez PA-C   [facility] ([facility])    [address] [place] [address]  [address] [address]   [phone]            Feb 16, 2018  7:00 AM CST   Return Visit with Brenda Perez PsyD   [facility] ([facility])    [address] [place] [address]   [phone]            Mar 02, 2018  7:00 AM CST   Return Visit with Brenda Perez PsyD   [facility] ([facility])    [address] [place] [address]   [phone]            Mar 16, 2018  7:00 AM CDT   Return Visit with Brenda  "Elen Perez   [facility] [place] ([facility] [place])    [address] [facility]  [place] [address] [phone]              MyChart Information     V[employer] Industrieshart lets you send messages to your doctor, view your test results, renew your prescriptions, schedule appointments and more. To sign up, go to [email or web address] . Click on \"Log in\" on the left side of the screen, which will take you to the Welcome page. Then click on \"Sign up Now\" on the right side of the page.     You will be asked to enter the access code listed below, as well as some personal information. Please follow the directions to create your username and password.     Your access code is: [email or web address]  Expires: 2/15/2018  6:34 AM     Your access code will  in 90 days. If you need help or a new code, please call your [place] clinic or [phone].        Care EveryWhere ID     This is your Care EveryWhere ID. This could be used by other organizations to access your [place] medical records  [id number]        Equal Access to Services     STEVEN MUNOZ : Hadii deandra Wooten, waniyahda luzee, qaybta kaalmada kaushik, suri yeager. So [facility] [phone].    ATENCIÓN: Si habla español, tiene a madera disposición servicios gratuitos de asistencia lingüística. Trevon al [phone].    We comply with applicable federal civil rights laws and [place] laws. We do not discriminate on the basis of race, color, national origin, age, disability, sex, sexual orientation, or gender identity.            "

## 2018-01-22 RX ORDER — NORGESTIMATE AND ETHINYL ESTRADIOL 0.25-0.035
1 KIT ORAL DAILY
Qty: 84 TABLET | Refills: 1 | Status: SHIPPED | OUTPATIENT
Start: 2018-01-22 | End: 2018-07-19

## 2018-01-25 ENCOUNTER — OFFICE VISIT (OUTPATIENT)
Dept: FAMILY MEDICINE | Facility: CLINIC | Age: 48
End: 2018-01-25
Payer: COMMERCIAL

## 2018-01-25 VITALS
HEIGHT: 62 IN | HEART RATE: 75 BPM | OXYGEN SATURATION: 100 % | RESPIRATION RATE: 14 BRPM | DIASTOLIC BLOOD PRESSURE: 77 MMHG | SYSTOLIC BLOOD PRESSURE: 107 MMHG | TEMPERATURE: 97.3 F

## 2018-01-25 DIAGNOSIS — L01.00 IMPETIGO: Primary | ICD-10-CM

## 2018-01-25 LAB
GRAM STN SPEC: NORMAL
GRAM STN SPEC: NORMAL
SPECIMEN SOURCE: NORMAL

## 2018-01-25 PROCEDURE — 87205 SMEAR GRAM STAIN: CPT | Performed by: FAMILY MEDICINE

## 2018-01-25 PROCEDURE — 99214 OFFICE O/P EST MOD 30 MIN: CPT | Performed by: FAMILY MEDICINE

## 2018-01-25 PROCEDURE — 87070 CULTURE OTHR SPECIMN AEROBIC: CPT | Performed by: FAMILY MEDICINE

## 2018-01-25 RX ORDER — DAPAGLIFLOZIN 5 MG/1
5 TABLET, FILM COATED ORAL DAILY
COMMUNITY
End: 2018-02-02

## 2018-01-25 RX ORDER — SULFAMETHOXAZOLE/TRIMETHOPRIM 800-160 MG
1 TABLET ORAL 2 TIMES DAILY
Qty: 20 TABLET | Refills: 0 | Status: SHIPPED | OUTPATIENT
Start: 2018-01-25 | End: 2018-02-02

## 2018-01-25 RX ORDER — MUPIROCIN CALCIUM 20 MG/G
CREAM TOPICAL 2 TIMES DAILY
Qty: 15 G | Refills: 0 | Status: SHIPPED | OUTPATIENT
Start: 2018-01-25 | End: 2018-02-02

## 2018-01-25 NOTE — MR AVS SNAPSHOT
After Visit Summary   1/25/2018    Criss Don    MRN: [id number]           Patient Information     Date Of Birth          1970        Visit Information        Provider Department      1/25/2018 1:40 PM Juaquin Leggett MD [facility] [place]        Today's Diagnoses     Impetigo    -  1       Follow-ups after your visit        Follow-up notes from your care team     Return if symptoms worsen or fail to improve.      Your next 10 appointments already scheduled     Feb 02, 2018  7:40 AM CST   Office Visit with Napoleon Gray PA-C   [facility] [place] ([address] [address]   [phone]           Bring a current list of meds and any records pertaining to this visit. For Physicals, please bring immunization records and any forms needing to be filled out. Please arrive 10 minutes early to complete paperwork.            Feb 02, 2018  9:00 AM CST   Return Visit with Brenda Perez PsyD   [facility] ([address] [address]   [phone]            Feb 02, 2018 11:00 AM CST   Return Visit with Usman Arthur 1, RD   [facility] ([facility])    [address] [place] [address]   [phone]            Feb 02, 2018 11:30 AM CST   Return Visit with Cris Paez PA-C   [facility] ([facility])    [address] [place] [address]   [phone]            Feb 16, 2018  7:00 AM CST   Return Visit with Brenda Perez PsyD   [facility] ([address] [address]   [phone]            Mar 02, 2018  7:00 AM CST   Return Visit with Brenda Perez PsyD   [facility]  "[place] ([facility] [place])    [address] [facility]  [place] [address]   [phone]            Mar 16, 2018  7:00 AM CDT   Return Visit with Brenda Perez PsyD   [facility] [place] ([facility] [place])    [address] [facility]  [place] [address]   [phone]              Who to contact     If you have questions or need follow up information about today's clinic visit or your schedule please contact [facility] [place] directly at [phone].  Normal or non-critical lab and imaging results will be communicated to you by MyChart, letter or phone within 4 business days after the clinic has received the results. If you do not hear from us within 7 days, please contact the clinic through [employer] or phone. If you have a critical or abnormal lab result, we will notify you by phone as soon as possible.  Submit refill requests through Selatra or call your pharmacy and they will forward the refill request to us. Please allow 3 business days for your refill to be completed.          Additional Information About Your Visit        MyChart Information     Selatra lets you send messages to your doctor, view your test results, renew your prescriptions, schedule appointments and more. To sign up, go to [email or web address] . Click on \"Log in\" on the left side of the screen, which will take you to the Welcome page. Then click on \"Sign up Now\" on the right side of the page.     You will be asked to enter the access code listed below, as well as some personal information. Please follow the directions to create your username and password.     Your access code is: [email or web address]  Expires: 2/15/2018  6:34 AM     Your access code will  in 90 days. If you need help or a new code, please call your [facility] or [phone].        Care EveryWhere ID     This is your Care EveryWhere ID. This could be used by other organizations to access your [place] medical " "records  [id number]        Your Vitals Were     Pulse Temperature Respirations Height Pulse Oximetry       75 97.3  F (36.3  C) (Tympanic) 14 5' 2\" (1.575 m) 100%        Blood Pressure from Last 3 Encounters:   01/25/18 107/77   11/28/17 110/76   10/27/17 108/66    Weight from Last 3 Encounters:   11/28/17 148 lb 1 oz (67.2 kg)   10/27/17 159 lb 8 oz (72.3 kg)   10/17/17 160 lb 12.8 oz (72.9 kg)              We Performed the Following     Gram stain     Wound Culture Aerobic Bacterial          Today's Medication Changes          These changes are accurate as of 1/25/18  1:55 PM.  If you have any questions, ask your nurse or doctor.               Start taking these medicines.        Dose/Directions    mupirocin 2 % cream   Commonly known as:  BACTROBAN   Used for:  Impetigo   Started by:  Juaquin Leggett MD        Apply topically 2 times daily   Quantity:  15 g   Refills:  0       sulfamethoxazole-trimethoprim 800-160 MG per tablet   Commonly known as:  BACTRIM DS/SEPTRA DS   Used for:  Impetigo   Started by:  Juaquin Leggett MD        Dose:  1 tablet   Take 1 tablet by mouth 2 times daily   Quantity:  20 tablet   Refills:  0            Where to get your medicines      These medications were sent to [facility] [place] [facility] [place] [facility] [address] [place] [address]     Phone:  [phone]     mupirocin 2 % cream    sulfamethoxazole-trimethoprim 800-160 MG per tablet                Primary Care Provider Office Phone # Fax #    Napoleon Gray PA-C [phone]       [address] [place] [address] [address]  [place] [address]        Equal Access to Services     STEVEN MUNOZ AH: Phong Wooten, waaxda luqadaha, qaybta kaalmada adeegyada, suri yeager. So [facility] [phone].    ATENCIÓN: Si habla español, tiene a madera disposición servicios gratuitos de asistencia lingüística. [place] al [phone].    We comply with " applicable federal civil rights laws and [place] laws. We do not discriminate on the basis of race, color, national origin, age, disability, sex, sexual orientation, or gender identity.            Thank you!     Thank you for choosing [facility]  for your care. Our goal is always to provide you with excellent care. Hearing back from our patients is one way we can continue to improve our services. Please take a few minutes to complete the written survey that you may receive in the mail after your visit with us. Thank you!             Your Updated Medication List - Protect others around you: Learn how to safely use, store and throw away your medicines at www.disposemymeds.org.          This list is accurate as of 1/25/18  1:55 PM.  Always use your most recent med list.                   Brand Name Dispense Instructions for use Diagnosis    B-12 2500 MCG Subl      Place 1 tablet under the tongue 3 times weekly        docusate sodium 100 MG tablet    COLACE    180 tablet    Take 100 mg by mouth 2 times daily as needed for constipation    Other constipation       FARXIGA 5 MG Tabs tablet   Generic drug:  dapagliflozin      Take 5 mg by mouth daily        MAGNESIUM OXIDE PO      Take 500 mg % by mouth        multivitamin  peds with iron 60 MG chewable tablet      Take 2 chew tab by mouth every morning        mupirocin 2 % cream    BACTROBAN    15 g    Apply topically 2 times daily    Impetigo       norgestimate-ethinyl estradiol 0.25-35 MG-MCG per tablet    ORTHO-CYCLEN, SPRINTEC    84 tablet    Take 1 tablet by mouth daily    General counseling for prescription of oral contraceptives       sulfamethoxazole-trimethoprim 800-160 MG per tablet    BACTRIM DS/SEPTRA DS    20 tablet    Take 1 tablet by mouth 2 times daily    Impetigo       VIACTIV 500-500-40 MG-UNT-MCG Chew   Generic drug:  calcium-vitamin D-vitamin K      Take 1 tablet by mouth 3 times daily        VITAMIN D3 PO      Take 6,000 Units by  mouth daily        VITRON-C  MG Tabs tablet   Generic drug:  ferrous fumarate 65 mg ([place]. FE)-Vitamin C 125 mg      Take 1 tablet by mouth every morning

## 2018-01-25 NOTE — PROGRESS NOTES
SUBJECTIVE:   Criss Don is a 47 year old female who presents to clinic today for the following health issues:      Rash      Duration: 1 day     Description  Location: blisters on right wrist   Itching: no    Intensity:  moderate    Accompanying signs and symptoms: pain    History (similar episodes/previous evaluation): None    Precipitating or alleviating factors:  New exposures:  None  Recent travel: no      Therapies tried and outcome: Neosporin     Problem list and histories reviewed & adjusted, as indicated.  Additional history: as documented    Patient Active Problem List   Diagnosis     Labial abscess     CARDIOVASCULAR SCREENING; LDL GOAL LESS THAN 160     Mass of right foot     History of abnormal cervical Pap smear     Intertrigo     Bilateral edema of lower extremity     Bariatric surgery status     Malnutrition following gastrointestinal surgery     Body dysmorphic disorder     Hypokalemia     Overweight (BMI 25.0-29.9)     Past Surgical History:   Procedure Laterality Date     LAPAROSCOPIC BYPASS GASTRIC N/A 10/26/2016    Procedure: LAPAROSCOPIC BYPASS GASTRIC;  Surgeon: Romario Reyna MD;  Location: [facility] OR     NO HISTORY OF SURGERY         Social History   Substance Use Topics     Smoking status: Never Smoker     Smokeless tobacco: Never Used     Alcohol use 0.0 oz/week     0 Standard drinks or equivalent per week      Comment: very rare     Family History   Problem Relation Age of Onset     Hypertension Mother      Breast Cancer Mother      64 dx     Allergies Mother      Obesity Mother      Respiratory Mother      Asthma     Hypertension Maternal Grandmother      CANCER Maternal Grandmother      Ovarian     CANCER Sister      Cervical     CANCER Other      Mat. great aunt-ovarian     Hypertension Brother      CANCER Maternal Aunt      ?Gastric     Brain Cancer Cousin      maternal         Current Outpatient Prescriptions   Medication Sig Dispense Refill     dapagliflozin (FARXIGA) 5  MG TABS tablet Take 5 mg by mouth daily       mupirocin (BACTROBAN) 2 % cream Apply topically 2 times daily 15 g 0     sulfamethoxazole-trimethoprim (BACTRIM DS/SEPTRA DS) 800-160 MG per tablet Take 1 tablet by mouth 2 times daily 20 tablet 0     norgestimate-ethinyl estradiol (ORTHO-CYCLEN, SPRINTEC) 0.25-35 MG-MCG per tablet Take 1 tablet by mouth daily 84 tablet 1     MAGNESIUM OXIDE PO Take 500 mg % by mouth       docusate sodium (COLACE) 100 MG tablet Take 100 mg by mouth 2 times daily as needed for constipation 180 tablet 1     ferrous fumarate 65 mg, [place]. FE,-Vitamin C 125 mg (VITRON-C)  MG TABS Take 1 tablet by mouth every morning       Cyanocobalamin (B-12) 2500 MCG SUBL Place 1 tablet under the tongue 3 times weekly       calcium-vitamin D-vitamin K (VIACTIV) 500-500-40 MG-UNT-MCG CHEW Take 1 tablet by mouth 3 times daily        multivitamin  peds with iron (FLINTSTONES COMPLETE) 60 MG chewable tablet Take 2 chew tab by mouth every morning        Cholecalciferol (VITAMIN D3 PO) Take 6,000 Units by mouth daily        Allergies   Allergen Reactions     Lortab [Hydrocodone-Acetaminophen] Nausea     Also light headed and flushed     Recent Labs   Lab Test  08/24/17   1517  04/28/17   1118   01/27/17   1021   01/12/17   0910  01/06/17   0905   07/06/16   0755  12/21/15   1200   A1C   --    --    --   5.1   --    --    --    --   5.8  5.7   LDL   --    --    --    --    --    --   100*   --    --   120*   HDL   --    --    --    --    --    --   45*   --    --   39*   TRIG   --    --    --    --    --    --   142   --    --   187*   ALT   --   36   --    --    --    --   58*   --   27   --    CR  0.52   --    --    --    --   0.52  0.56   < >  0.58  0.53   GFRESTIMATED  >90   --    --    --    --   >90  Non  GFR Calc    >90  Non  GFR Calc     < >  >90  Non  GFR Calc    >90  Non  GFR Calc     GFRESTBLACK  >90   --    --    --    --    ">90   GFR Calc    >90   GFR Calc     < >  >90   GFR Calc    >90   GFR Calc     POTASSIUM  4.1  3.8   < >   --    < >  3.0*  2.7*   < >  4.5  3.9   TSH   --    --    --    --    --    --   1.27   --    --   2.13    < > = values in this interval not displayed.      BP Readings from Last 3 Encounters:   01/25/18 107/77   11/28/17 110/76   10/27/17 108/66    Wt Readings from Last 3 Encounters:   11/28/17 148 lb 1 oz (67.2 kg)   10/27/17 159 lb 8 oz (72.3 kg)   10/17/17 160 lb 12.8 oz (72.9 kg)              Reviewed and updated as needed this visit by clinical staff       Reviewed and updated as needed this visit by Provider         ROS:  Constitutional, HEENT, cardiovascular, pulmonary, gi and gu systems are negative, except as otherwise noted.    OBJECTIVE:     /77 (Cuff Size: Adult Regular)  Pulse 75  Temp 97.3  F (36.3  C) (Tympanic)  Resp 14  Ht 5' 2\" (1.575 m)  SpO2 100%  There is no height or weight on file to calculate BMI.  GENERAL: healthy, alert and no distress  NECK: no adenopathy, no asymmetry, masses, or scars and thyroid normal to palpation  RESP: lungs clear to auscultation - no rales, rhonchi or wheezes  CV: regular rate and rhythm, normal S1 S2, no S3 or S4, no murmur, click or rub, no peripheral edema and peripheral pulses strong  ABDOMEN: soft, nontender, no hepatosplenomegaly, no masses and bowel sounds normal  MS: no gross musculoskeletal defects noted, no edema  SKIN: yellow colored serous fluid filled bullaes on right arm radial side with perilesional erythema and swelling, has satellite lesion around it         ASSESSMENT/PLAN:   Criss was seen today for derm problem.    Diagnoses and all orders for this visit:    Impetigo  -     mupirocin (BACTROBAN) 2 % cream; Apply topically 2 times daily  -     sulfamethoxazole-trimethoprim (BACTRIM DS/SEPTRA DS) 800-160 MG per tablet; Take 1 tablet by mouth 2 times daily  -     " Wound Culture Aerobic Bacterial  -     Gram stain      Will have her to start taking mupirocin and bactrim for the lesion  If no improving within next 4-5 days, will consider referral to skin care for further evaluation         Juaquin Leggett MD  [facility] [place]

## 2018-01-26 ENCOUNTER — TELEPHONE (OUTPATIENT)
Dept: FAMILY MEDICINE | Facility: CLINIC | Age: 48
End: 2018-01-26

## 2018-01-26 NOTE — TELEPHONE ENCOUNTER
Patient given message from Dr. Leggett.    Exactly the same so far. Draining a little bit, but not much.    Agrees to call back Monday. We seek treatment if new or worsening symptoms over the weekend.  Megan Chery RN

## 2018-01-26 NOTE — TELEPHONE ENCOUNTER
Reason for Call:  Request for results:    Name of test or procedure: Wound Swab     Date of test of procedure: 01/25/18    Location of the test or procedure: Dr. Leggett Swabbed     OK to leave the result message on voice mail or with a family member? YES    Phone number Patient can be reached at:  Home number on file [phone] (home)    Additional comments: Pt does not have access to [employer] and would like a call with her results when they are ready     Call taken on 1/26/2018 at 12:14 PM by Sherron Garrison

## 2018-01-26 NOTE — TELEPHONE ENCOUNTER
Patient asking how long the wound culture is monitored? States that she will call back on Monday if her wrist is still blistered. Megan Chery RN

## 2018-01-26 NOTE — TELEPHONE ENCOUNTER
No bacterial growth so far, culture should be kept monitored if no growth within 24-48 hours for delayed growth     Please ask her if it is improving

## 2018-01-27 LAB
BACTERIA SPEC CULT: NO GROWTH
SPECIMEN SOURCE: NORMAL

## 2018-02-02 ENCOUNTER — OFFICE VISIT (OUTPATIENT)
Dept: PSYCHOLOGY | Facility: CLINIC | Age: 48
End: 2018-02-02
Payer: COMMERCIAL

## 2018-02-02 ENCOUNTER — OFFICE VISIT (OUTPATIENT)
Dept: SURGERY | Facility: CLINIC | Age: 48
End: 2018-02-02
Payer: COMMERCIAL

## 2018-02-02 ENCOUNTER — OFFICE VISIT (OUTPATIENT)
Dept: FAMILY MEDICINE | Facility: CLINIC | Age: 48
End: 2018-02-02
Payer: COMMERCIAL

## 2018-02-02 VITALS — HEIGHT: 62 IN | WEIGHT: 137.13 LBS | BODY MASS INDEX: 25.23 KG/M2

## 2018-02-02 VITALS — HEIGHT: 63 IN | BODY MASS INDEX: 24.31 KG/M2 | WEIGHT: 137.2 LBS

## 2018-02-02 VITALS
BODY MASS INDEX: 25.06 KG/M2 | TEMPERATURE: 95.4 F | DIASTOLIC BLOOD PRESSURE: 68 MMHG | OXYGEN SATURATION: 100 % | SYSTOLIC BLOOD PRESSURE: 96 MMHG | HEART RATE: 72 BPM | WEIGHT: 137 LBS

## 2018-02-02 DIAGNOSIS — Z12.31 ENCOUNTER FOR SCREENING MAMMOGRAM FOR BREAST CANCER: ICD-10-CM

## 2018-02-02 DIAGNOSIS — F45.22 BODY DYSMORPHIC DISORDER: ICD-10-CM

## 2018-02-02 DIAGNOSIS — Z13.6 CARDIOVASCULAR SCREENING; LDL GOAL LESS THAN 100: ICD-10-CM

## 2018-02-02 DIAGNOSIS — Z98.84 BARIATRIC SURGERY STATUS: ICD-10-CM

## 2018-02-02 DIAGNOSIS — Z98.84 BARIATRIC SURGERY STATUS: Primary | ICD-10-CM

## 2018-02-02 DIAGNOSIS — Z00.00 ENCOUNTER FOR ROUTINE ADULT HEALTH EXAMINATION WITHOUT ABNORMAL FINDINGS: Primary | ICD-10-CM

## 2018-02-02 DIAGNOSIS — R42 DIZZINESS: ICD-10-CM

## 2018-02-02 DIAGNOSIS — L23.9 ALLERGIC DERMATITIS: ICD-10-CM

## 2018-02-02 DIAGNOSIS — F41.9 ANXIETY DISORDER, UNSPECIFIED TYPE: Primary | ICD-10-CM

## 2018-02-02 PROCEDURE — 90834 PSYTX W PT 45 MINUTES: CPT | Performed by: PSYCHOLOGIST

## 2018-02-02 PROCEDURE — 80053 COMPREHEN METABOLIC PANEL: CPT | Performed by: PHYSICIAN ASSISTANT

## 2018-02-02 PROCEDURE — 97803 MED NUTRITION INDIV SUBSEQ: CPT | Performed by: DIETITIAN, REGISTERED

## 2018-02-02 PROCEDURE — 36415 COLL VENOUS BLD VENIPUNCTURE: CPT | Performed by: PHYSICIAN ASSISTANT

## 2018-02-02 PROCEDURE — 99396 PREV VISIT EST AGE 40-64: CPT | Performed by: PHYSICIAN ASSISTANT

## 2018-02-02 PROCEDURE — 99213 OFFICE O/P EST LOW 20 MIN: CPT | Performed by: PHYSICIAN ASSISTANT

## 2018-02-02 PROCEDURE — 80061 LIPID PANEL: CPT | Performed by: PHYSICIAN ASSISTANT

## 2018-02-02 RX ORDER — TRIAMCINOLONE ACETONIDE 5 MG/G
CREAM TOPICAL
Qty: 30 G | Refills: 0 | Status: SHIPPED | OUTPATIENT
Start: 2018-02-02 | End: 2018-07-20

## 2018-02-02 NOTE — LETTER
February 6, 2018      Criss Don  [address] [facility] [address]        Dear ,    We are writing to inform you of your test results.      -Liver and gallbladder tests (ALT,AST, Alk phos,bilirubin) are normal.  -Kidney function (GFR) is normal.  -Sodium is normal.  -Potassium is normal.  -Glucose (diabetic screening test) is normal.  -Your protein and albumin are slightly low, please follow up with your surgeon and nutritionist regarding this  -Cholesterol levels (LDL,HDL, Triglycerides) are normal.  ADVISE: rechecking in 1 year.    Resulted Orders   Lipid Profile (Chol, Trig, HDL, LDL calc)   Result Value Ref Range    Cholesterol 169 <200 mg/dL    Triglycerides 109 <150 mg/dL    HDL Cholesterol 57 >49 mg/dL    LDL Cholesterol Calculated 90 <100 mg/dL      Comment:      Desirable:       <100 mg/dl    Non HDL Cholesterol 112 <130 mg/dL   Comprehensive metabolic panel (BMP + Alb, Alk Phos, ALT, AST, Total. Bili, TP)   Result Value Ref Range    Sodium 140 133 - 144 mmol/L    Potassium 3.9 3.4 - 5.3 mmol/L    Chloride 106 94 - 109 mmol/L    Carbon Dioxide 26 20 - 32 mmol/L    Anion Gap 8 3 - 14 mmol/L    Glucose 81 70 - 99 mg/dL    Urea Nitrogen 14 7 - 30 mg/dL    Creatinine 0.60 0.52 - 1.04 mg/dL    GFR Estimate >90 >60 mL/min/1.7m2      Comment:      Non  GFR Calc    GFR Estimate If Black >90 >60 mL/min/1.7m2      Comment:       GFR Calc    Calcium 8.5 8.5 - 10.1 mg/dL    Bilirubin Total 0.4 0.2 - 1.3 mg/dL    Albumin 3.3 (L) 3.4 - 5.0 g/dL    Protein Total 6.4 (L) 6.8 - 8.8 g/dL    Alkaline Phosphatase 48 40 - 150 U/L    ALT 25 0 - 50 U/L    AST 15 0 - 45 U/L       If you have any questions or concerns, please call the clinic at the number listed above.       Sincerely,          Napoleon Gray PA-C

## 2018-02-02 NOTE — PROGRESS NOTES
"                                             Progress Note    Client Name: Criss Don  Date: 2/2/2018         Service Type: Individual      Session Start Time: 8:00  Session End Time: 8:45      Session Length: 45     Session #: 24     Attendees: Client attended alone    Treatment Plan Last Reviewed: 2/2/2018  PHQ-9 / NED-7 :      DATA      Progress Since Last Session (Related to Symptoms / Goals / Homework):   Symptoms: Stable    Homework: Partially completed      Episode of Care Goals: Minimal progress - ACTION (Actively working towards change); Intervened by reinforcing change plan / affirming steps taken     Current / Ongoing Stressors and Concerns:   Reported that  has been served divorce papers. Reported that her spouse met with her and her  to sign the divorce papers. Reported being relieved that he has been cooperative so far, and is awaiting \"the final say from the .\" Reported being worried about her grandson being mistreated by his maternal grandfather. Also reported that she has been staying busy and trying to socialize, which has helped her reduce worry and avoid snacking urges.           Treatment Objective(s) Addressed in This Session:   stress management  Maintain compliance with post-surgical dietary needs     Intervention:   Solution Focused: assisted client indentifying healthy ways to keep her hands and mind occupied during times of high stress; activity planning; provided recommendations for books and web resources about mindfulness and mindful eating  Supportive therapy:  provided active listening, support, and encouragement. Reinforced healthy behavioral choices. Normalized the mixed emotions that come along with seeking divorce.            ASSESSMENT: Current Emotional / Mental Status (status of significant symptoms):   Risk status (Self / Other harm or suicidal ideation)   Client denies current fears or concerns for personal safety.   Client denies current or recent " suicidal ideation or behaviors.   Client denies current or recent homicidal ideation or behaviors.   Client denies current or recent self injurious behavior or ideation.   Client denies other safety concerns.   A safety and risk management plan has not been developed at this time, however client was given the after-hours number / 911 should there be a change in any of these risk factors.     Appearance:   Appropriate    Eye Contact:   Good    Psychomotor Behavior: Normal    Attitude:   Cooperative  Pleasant    Orientation:   All   Speech    Rate / Production: Normal     Volume:  Normal    Mood:    Anxious    Affect:    Appropriate    Thought Content:  Clear    Thought Form:  Coherent  Logical    Insight:    Good      Medication Review:   No current psychiatric medications prescribed     Medication Compliance:   Yes     Changes in Health Issues:   None reported     Chemical Use Review:   Substance Use: Chemical use reviewed, no active concerns identified      Tobacco Use: No current tobacco use.       Collateral Reports Completed:   Not Applicable    PLAN: (Client Tasks / Therapist Tasks / Other)  Future appointments are scheduled. Generate a list of productive ways to keep your hands and mind busy (i.e. Crafting, initiating contact with friends, planning outings, etc.). Continue: Practice acceptance of your new body shape as discussed in session. Surface and challenge unrealistic expectations and disproportionate thoughts. Surface and challenge disproportionate and unjustified guilt as discussed in session.       Brenda Perez PsyD LP                                                       Treatment Plan    Client's Name: Criss Don  YOB: 1970    Date: 2/2/2018    DSM-V Diagnoses: 300.00 Unspecified Anxiety Disorder  Psychosocial / Contextual Factors: divorce proceedings, health concerns  WHODAS: 22    Referral / Collaboration:  Referral to another professional/service is not indicated at this  time..    Anticipated number of session or this episode of care: reassess after 12 sessions      MeasurableTreatment Goal(s) related to diagnosis / functional impairment(s)  Goal 1: Client will learn coping skills to manage stress and adjust to post-surgical lifestyle changes more effectively.    I will know I've met my goal when I'm not weighing myself all the time and feeling more comfortable with my weight loss.      Objective #A (Client Action)    Client will identify thinking patterns that contribute to anxiety about weight and eating habits.  Status: Continued - Date(s): 2/2/2018    Intervention(s)  Therapist will assign homework (thought  logs), assist client in surfacing and replacement ineffective thinking patterns.    Objective #B  Client will identify new ways to cope with stress and worry thoughts.  Status: Continued - Date(s): 2/2/2018    Intervention(s)  Therapist will assign homework, problem-solve barriers to follow through.    Objective #C  Client will identify and challenge unreasonable or unrealistic expectations about weight loss.  Status: Continued - Date(s): 2/2/2018    Intervention(s)  Therapist will assist client in identifying and balancing her expectations.        Client has reviewed and agreed to the above plan.      Brenda Perez PsyD LP  2/2/2018

## 2018-02-02 NOTE — PROGRESS NOTES
Bariatric Follow Up Visit   2018      RE: KOJO ASHTON  MR#: [id number]  : 1970  Kojo can in today for a follow up appointment.  I saw her last 2 months ago.  At that time she was having problems with lightheadedness episodes that we thought might be caused by reactive hypoglycemia. Pt has seen Dr. Owen regarding this he is going to put a 24 hr glucose monitor on her to monitor her blood sugars around the clock soon.  Her normal BS range in 70-80's and this is where she feels normal.  When she has her major episodes he BD have been in the low 100's.   Dr. Owen put her on farxiga for a short period. During this time she did not have any major episodes He thinks her body might be doing the reverse.  Without the farxiga on board her body has been doing some minor episodes.  Feeling a little off/dizziness.   Has not had much of the abdominal pain she experienced before her major episodes like she described at her last appointment.      She is exercising 60 minutes 6 days a week. This is a decrease since last time and was one of her goals. Checks in with her friend to make sure you are not going over that.  She makes sure she is not going over 1 hour.    BARIATRIC METRICS:    Current Weight: 137 lb 2 oz (62.2 kg)  Body mass index is 25.08 kg/(m^2).   Wt change since last visit (lbs): -10.94  Cumulative weight loss (lbs): 170.31    Patient is taking the following bariatric postoperative vitamins:  2 Complete multivitamins with minerals (at different times than calcium)  4000 Int Units of Vitamin D daily   8238-3088 mg of Calcium daily in divided doses  2500 mcg of Vitamin B12 sl every three days  1 Iron/Vit C. Daily    OBESITY RELATED CONDITIONS:  Pre-diabetes: resolved HgA1C 5.17    SOCIAL HISTORY:  She does not smoke. She does not drink alcohol. She feels safe in current environment.  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 She feels like things are getting better in her social  "life.   Her divorce is finalizing soon.  She signed the papers last Friday. Was feeling less stress from that but still is in somewhat of limbo until it is final. Her ex  continues to call her and ask for things.  He often is drunk.  He still want to get back together with her.      REVIEW OF SYSTEMS:  GI:  Nausea-seldom  Vomiting-seldom,   Diarrhea-never  Constipation-occasional, takes stool softener daily.    Has 1 stool every 4-5 days.  Every couple of weeks she uses Doculax and then she has a good bowel movement.    Dysphagia-never  Abdominal Pains-never  Heartburn-never      Psychiatric:  Anxiety disorder, NOS:  pt is currently working with Cesario Lopez regarding her body image problem. She sees her every other week.  Has made some progress with her body image.  Now knows she has lost weight and can tell she is smaller.  Is in a size 10.  Römer of her goals was to get into a single digit size.  She has a trip planned and is excited to get some pants in a size 8.      :    Pt has monthly menses.  Is now on oral birth control.      PHYSICAL EXAMINATION:   Ht 5' 2\" (1.575 m)  Wt 137 lb 2 oz (62.2 kg)  LMP 01/16/2018  BMI 25.08 kg/m2,  GENERAL: Alert and oriented x3. NAD  HEART: Regular rate and rhythm, No murmurs, rubs or gallops  LUNGS: Breathing unlabored, Lung sounds clear to auscultation bilaterally  ABDOMEN: soft; nontender; non distended, incision well healed. No hernia  EXTREMITIES: No LE edema bilaterally, Gait normal  SKIN: No intertriginous irritation or rash    ASSESSMENT/PLAN:   S/P Laparoscopic Rasheeda-en-Y Gastric Bypass-   Post surgical malabsorption-   Reactive Hypoglycemia/Lightheadedness    Continue following up with Dr. Owen at [facility].  Continue monitoring blood sugars when she has major episodes.   Dicussed having a carbohydrate at each meal again that month and stressed importance of this.  IT looks like her body may be doing the opposite keep her blood sugars " stable.  of reactive hypoglycemia so adding in carbs at each meal may help  in addition to RH diet we reviewed last month. adding this goal again.      Constipation   Start  Miralax 1 capful daily.  Can titrate down or up as needed for bowel regularity. Continue 3 stool softeners daily.      Body dysmorphic disorder   Continue to see SONI Lopez twice monthly.   I still have some concerns she may have an eating/exercise disorder but she did better this month on her goals.  Her weight loss was also less this month.  We   discussed  If an ED evaluation was warranted today.  PT states she does have some eating disorder tendencies.  She sometimes gets guilty when eating a big meals or eating carbs in her meals.    I did ask her today if she felt she was getting the help she needed to control the feelings she was having right now regarding her body image. She said yes she is with the help of Brenda Perez.  She is also going to start working on Mindful eating practices and has some books to  at the library on this.   Making sure it was not interfering with her life. For now I will continue to monitor.    Discussed that her lightheadedness episodes could also be symptoms of a metabolic imbalance due to her food restriction and exercise.  This is another reason to consider an ED evaluation.  The medical team at an eating disorder program could evaluate her for her symptoms and might also be able to work this up.  IF she does not get anywhere after thorough evaluation with Dr. Owen we can consider going this route.  Pt is agreeable to plan.     Continue exercise only up to 60 minutes a session up to 6 days a week.     Follow up with me in 3 months.        This was a 20 minute visit with greater than 50% spent on counseling.

## 2018-02-02 NOTE — MR AVS SNAPSHOT
MRN:[id number]                      After Visit Summary   2/2/2018    Criss Don    MRN: [id number]           Visit Information        Provider Department      2/2/2018 9:00 AM Brenda Perez PsyD [facility] [place] [facility] Generic      Your next 10 appointments already scheduled     Feb 16, 2018  7:00 AM CST   Return Visit with Brenda Perez PsyD   [facility] [place] ([facility] [place])    [facility] [place] [facility]  [place] [address]   [phone]            Feb 23, 2018  9:30 AM CST   New Visit with Guero Owen MD   [facility] [place] ([facility])    [address]  [address]  [place] [id number] [phone]            Mar 02, 2018  7:00 AM CST   Return Visit with Brenda Perez PsyD   [facility] [place] ([facility] [place])    [facility] [place] [address]   [phone]            Mar 16, 2018  7:00 AM CDT   Return Visit with Brenda Perez PsyD   [facility] [place] ([facility] [place])    [facility] [place] [address]   [phone]            Apr 06, 2018  9:00 AM CDT   Return Visit with Brenda Perez PsyD   [facility] [place] ([facility] [place])    [facility] [place] [address]   [phone]            Apr 06, 2018 10:15 AM CDT   Nurse Only with EC MA/LPN   [facility] [place] ([facility] [place])    [facility] [place] [address]   [phone]            May 04, 2018 11:30 AM CDT   Return Visit with Cris Paez PA-C   [facility] [place] ([facility])    [address]  [address]  [place] [id number] [phone]            Oct 26, 2018 11:00 AM CDT   Annual Visit with Cris Paez PA-C   [facility] [place]  "([facility])    [address]  [place] [address]   [phone]            Oct 26, 2018 11:30 AM CDT   Annual Visit with Usman Raymond Diet 1, RD   [facility] [place] ([facility])    [address]  [place] [address]   [phone]              MyChart Information     [employer] Sciences[employer] lets you send messages to your doctor, view your test results, renew your prescriptions, schedule appointments and more. To sign up, go to [email or web address] . Click on \"Log in\" on the left side of the screen, which will take you to the Welcome page. Then click on \"Sign up Now\" on the right side of the page.     You will be asked to enter the access code listed below, as well as some personal information. Please follow the directions to create your username and password.     Your access code is: [email or web address]  Expires: 2018 10:41 PM     Your access code will  in 90 days. If you need help or a new code, please call your [place] clinic or [phone].        Care EveryWhere ID     This is your Care EveryWhere ID. This could be used by other organizations to access your [place] medical records  [id number]        Equal Access to Services     STEVEN MUNOZ AH: Hadii deandra townsend Sojorge, waaxda luqadaha, qaybta kaalmada kaushik, suri yeager. So [facility] [phone].    ATENCIÓN: Si habla español, tiene a madera disposición servicios gratuitos de asistencia lingüística. Llame al [phone].    We comply with applicable federal civil rights laws and [place] laws. We do not discriminate on the basis of race, color, national origin, age, disability, sex, sexual orientation, or gender identity.            "

## 2018-02-02 NOTE — MR AVS SNAPSHOT
After Visit Summary   2/2/2018    Criss Don    MRN: [id number]           Patient Information     Date Of Birth          1970        Visit Information        Provider Department      2/2/2018 7:40 AM Napoleon Gray PA-C [facility]        Today's Diagnoses     Encounter for routine adult health examination without abnormal findings    -  1    Bariatric surgery status        CARDIOVASCULAR SCREENING; LDL GOAL LESS THAN 100        Body dysmorphic disorder        Encounter for screening mammogram for breast cancer        Allergic dermatitis          Care Instructions      Preventive Health Recommendations  Female Ages 40 to 49    Yearly exam:     See your health care provider every year in order to  1. Review health changes.   2. Discuss preventive care.    3. Review your medicines if your doctor prescribed any.      Get a Pap test every three years (unless you have an abnormal result and your provider advises testing more often).      If you get Pap tests with HPV test, you only need to test every 5 years, unless you have an abnormal result. You do not need a Pap test if your uterus was removed (hysterectomy) and you have not had cancer.      You should be tested each year for STDs (sexually transmitted diseases), if you're at risk.       Ask your doctor if you should have a mammogram.      Have a colonoscopy (test for colon cancer) if someone in your family has had colon cancer or polyps before age 50.       Have a cholesterol test every 5 years.       Have a diabetes test (fasting glucose) after age 45. If you are at risk for diabetes, you should have this test every 3 years.    Shots: Get a flu shot each year. Get a tetanus shot every 10 years.     Nutrition:     Eat at least 5 servings of fruits and vegetables each day.    Eat whole-grain bread, whole-wheat pasta and brown rice instead of white grains and rice.    Talk to your provider about Calcium and Vitamin  D.     Lifestyle    Exercise at least 150 minutes a week (an average of 30 minutes a day, 5 days a week). This will help you control your weight and prevent disease.    Limit alcohol to one drink per day.    No smoking.     Wear sunscreen to prevent skin cancer.    See your dentist every six months for an exam and cleaning.          Follow-ups after your visit        Follow-up notes from your care team     Return in about 1 year (around 2/2/2019) for Physical Exam.      Your next 10 appointments already scheduled     Feb 02, 2018  9:00 AM CST   Return Visit with Brenda Perez PsyD   [facility] [place]    [address] [place] [address] [place] [address]   [phone]            Feb 02, 2018 11:00 AM CST   Return Visit with Usman Escalante RD   [facility] ([facility])    [address]  [facility] [address]   [phone]            Feb 02, 2018 11:30 AM CST   Return Visit with Cris Paez PA-C   [facility] ([facility])    [address]  [facility] [address]   [phone]            Feb 16, 2018  7:00 AM CST   Return Visit with Brenda Preez PsyD   [facility] [place])    [address] [place] [address]   [phone]            Feb 23, 2018  9:30 AM CST   New Visit with Guero Owen MD   [facility]    [address]  [address]  [facility] [address]   [phone]            Mar 02, 2018  7:00 AM CST   Return Visit with Brenda Perez PsyD   [facility]    [address] [place] [address]   [phone]            Mar 16, 2018  7:00 AM CDT   Return Visit with Brenda Perez PsyD   [facility]  [place] ([facility] [place])    [address]  [place] [address]   [phone]            2018 10:15 AM CDT   Nurse Only with EC MA/LPN   [facility] [place])    [address]  [place] [address]   [phone]              Future tests that were ordered for you today     Open Future Orders        Priority Expected Expires Ordered    *MA Screening Digital Bilateral Routine  2019            Who to contact     If you have questions or need follow up information about today's clinic visit or your schedule please contact [facility] [place] directly at [phone].  Normal or non-critical lab and imaging results will be communicated to you by [employer], letter or phone within 4 business days after the clinic has received the results. If you do not hear from us within 7 days, please contact the clinic through [employer] or phone. If you have a critical or abnormal lab result, we will notify you by phone as soon as possible.  Submit refill requests through [employer] or call your pharmacy and they will forward the refill request to us. Please allow 3 business days for your refill to be completed.          Additional Information About Your Visit        [employer] Information     [employer] lets you send messages to your doctor, view your test results, renew your prescriptions, schedule appointments and more. To sign up, go to [email or web address] . Click on \"Log in\" on the left side of the screen, which will take you to the Welcome page. Then click on \"Sign up Now\" on the right side of the page.     You will be asked to enter the access code listed below, as well as some personal information. Please follow the directions to create your username and password.     Your access code is: [email or web address]  Expires: 2/15/2018  6:34 AM     Your access code will  in 90 days. If you need help or a new code, please call your [place] " clinic or [phone].        Care EveryWhere ID     This is your Care EveryWhere ID. This could be used by other organizations to access your [place] medical records  [id number]        Your Vitals Were     Pulse Temperature Last Period Pulse Oximetry BMI (Body Mass Index)       72 95.4  F (35.2  C) (Oral) 01/16/2018 100% 25.06 kg/m2        Blood Pressure from Last 3 Encounters:   02/02/18 96/68   01/25/18 107/77   11/28/17 110/76    Weight from Last 3 Encounters:   02/02/18 137 lb (62.1 kg)   11/28/17 148 lb 1 oz (67.2 kg)   10/27/17 159 lb 8 oz (72.3 kg)              We Performed the Following     Comprehensive metabolic panel (BMP + Alb, Alk Phos, ALT, AST, Total. Bili, TP)     Lipid Profile (Chol, Trig, HDL, LDL calc)          Today's Medication Changes          These changes are accurate as of 2/2/18  8:40 AM.  If you have any questions, ask your nurse or doctor.               Start taking these medicines.        Dose/Directions    triamcinolone 0.5 % cream   Commonly known as:  KENALOG   Used for:  Allergic dermatitis   Started by:  Napoleon Gray PA-C        Apply sparingly to affected area three times daily.   Quantity:  30 g   Refills:  0         Stop taking these medicines if you haven't already. Please contact your care team if you have questions.     FARXIGA 5 MG Tabs tablet   Generic drug:  dapagliflozin   Stopped by:  Napoleon Gray PA-C                Where to get your medicines      These medications were sent to [id number] [place] [facility] [address]  [address] [facility] [address] [address] [address]    Hours:  Added 10/26 CK Checked with pharmacy Phone:  [phone]     triamcinolone 0.5 % cream                Primary Care Provider Office Phone # Fax #    Napoleon Gray PA-C [phone]       [address]  [place] [address]        Equal Access to Services     STEVEN MUNOZ AH: seble Xiong,  mirna carreon heydisuri canada arutroin hayaan adeeg kharash la'aan ah. So [facility] [phone].    ATENCIÓN: Si keira schroeder, tiene a madera disposición servicios gratuitos de asistencia lingüística. Trevon al [phone].    We comply with applicable federal civil rights laws and [place] laws. We do not discriminate on the basis of race, color, national origin, age, disability, sex, sexual orientation, or gender identity.            Thank you!     Thank you for choosing [facility]  for your care. Our goal is always to provide you with excellent care. Hearing back from our patients is one way we can continue to improve our services. Please take a few minutes to complete the written survey that you may receive in the mail after your visit with us. Thank you!             Your Updated Medication List - Protect others around you: Learn how to safely use, store and throw away your medicines at www.disposemymeds.org.          This list is accurate as of 2/2/18  8:40 AM.  Always use your most recent med list.                   Brand Name Dispense Instructions for use Diagnosis    B-12 2500 MCG Subl      Place 1 tablet under the tongue 3 times weekly        docusate sodium 100 MG tablet    COLACE    180 tablet    Take 100 mg by mouth 2 times daily as needed for constipation    Other constipation       MAGNESIUM OXIDE PO      Take 500 mg % by mouth        multivitamin  peds with iron 60 MG chewable tablet      Take 2 chew tab by mouth every morning        norgestimate-ethinyl estradiol 0.25-35 MG-MCG per tablet    ORTHO-CYCLEN, SPRINTEC    84 tablet    Take 1 tablet by mouth daily    General counseling for prescription of oral contraceptives       triamcinolone 0.5 % cream    KENALOG    30 g    Apply sparingly to affected area three times daily.    Allergic dermatitis       VIACTIV 500-500-40 MG-UNT-MCG Chew   Generic drug:  calcium-vitamin D-vitamin K      Take 1 tablet by mouth 3 times daily        VITAMIN D3  PO      Take 6,000 Units by mouth daily        VITRON-C  MG Tabs tablet   Generic drug:  ferrous fumarate 65 mg ([place]. FE)-Vitamin C 125 mg      Take 1 tablet by mouth every morning

## 2018-02-02 NOTE — PROGRESS NOTES
SUBJECTIVE:   CC: Criss Don is an 47 year old woman who presents for preventive health visit.     Healthy Habits:    Do you get at least three servings of calcium containing foods daily (dairy, green leafy vegetables, etc.)? yes    Amount of exercise or daily activities, outside of work: 6 days a week     Problems taking medications regularly No    Medication side effects: No    Have you had an eye exam in the past two years? yes    Do you see a dentist twice per year? no    Do you have sleep apnea, excessive snoring or daytime drowsiness?no          Today's PHQ-2 Score:   PHQ-2 ( 1999 [employer]) 1/25/2018 10/17/2017   Q1: Little interest or pleasure in doing things 0 0   Q2: Feeling down, depressed or hopeless 0 0   PHQ-2 Score 0 0       Abuse: Current or Past(Physical, Sexual or Emotional)- No  Do you feel safe in your environment - Yes    Social History   Substance Use Topics     Smoking status: Never Smoker     Smokeless tobacco: Never Used     Alcohol use 0.0 oz/week     0 Standard drinks or equivalent per week      Comment: very rare     If you drink alcohol do you typically have >3 drinks per day or >7 drinks per week? No                     Reviewed orders with patient.  Reviewed health maintenance and updated orders accordingly - Yes  Patient Active Problem List   Diagnosis     Labial abscess     CARDIOVASCULAR SCREENING; LDL GOAL LESS THAN 160     Mass of right foot     History of abnormal cervical Pap smear     Intertrigo     Bilateral edema of lower extremity     Bariatric surgery status     Malnutrition following gastrointestinal surgery     Body dysmorphic disorder     Hypokalemia     Overweight (BMI 25.0-29.9)     Past Surgical History:   Procedure Laterality Date     LAPAROSCOPIC BYPASS GASTRIC N/A 10/26/2016    Procedure: LAPAROSCOPIC BYPASS GASTRIC;  Surgeon: Romario Reyna MD;  Location: [facility] OR     NO HISTORY OF SURGERY         Social History   Substance Use Topics     Smoking  status: Never Smoker     Smokeless tobacco: Never Used     Alcohol use 0.0 oz/week     0 Standard drinks or equivalent per week      Comment: very rare     Family History   Problem Relation Age of Onset     Hypertension Mother      Breast Cancer Mother      64 dx     Allergies Mother      Obesity Mother      Respiratory Mother      Asthma     HEART DISEASE Mother      Hypertension Maternal Grandmother      CANCER Maternal Grandmother      Ovarian     CANCER Sister      Cervical     CANCER Other      Mat. great aunt-ovarian     Hypertension Brother      CANCER Maternal Aunt      ?Gastric     Brain Cancer Cousin      maternal         Current Outpatient Prescriptions   Medication Sig Dispense Refill     triamcinolone (KENALOG) 0.5 % cream Apply sparingly to affected area three times daily. 30 g 0     norgestimate-ethinyl estradiol (ORTHO-CYCLEN, SPRINTEC) 0.25-35 MG-MCG per tablet Take 1 tablet by mouth daily 84 tablet 1     MAGNESIUM OXIDE PO Take 500 mg % by mouth       docusate sodium (COLACE) 100 MG tablet Take 100 mg by mouth 2 times daily as needed for constipation 180 tablet 1     ferrous fumarate 65 mg, [place]. FE,-Vitamin C 125 mg (VITRON-C)  MG TABS Take 1 tablet by mouth every morning       Cyanocobalamin (B-12) 2500 MCG SUBL Place 1 tablet under the tongue 3 times weekly       calcium-vitamin D-vitamin K (VIACTIV) 500-500-40 MG-UNT-MCG CHEW Take 1 tablet by mouth 3 times daily        multivitamin  peds with iron (FLINTSTONES COMPLETE) 60 MG chewable tablet Take 2 chew tab by mouth every morning        Cholecalciferol (VITAMIN D3 PO) Take 6,000 Units by mouth daily        Allergies   Allergen Reactions     Lortab [Hydrocodone-Acetaminophen] Nausea     Also light headed and flushed     Recent Labs   Lab Test  08/24/17   1517  04/28/17   1118   01/27/17   1021   01/12/17   0910  01/06/17   0905   07/06/16   0755  12/21/15   1200   A1C   --    --    --   5.1   --    --    --    --   5.8  5.7   LDL   --     --    --    --    --    --   100*   --    --   120*   HDL   --    --    --    --    --    --   45*   --    --   39*   TRIG   --    --    --    --    --    --   142   --    --   187*   ALT   --   36   --    --    --    --   58*   --   27   --    CR  0.52   --    --    --    --   0.52  0.56   < >  0.58  0.53   GFRESTIMATED  >90   --    --    --    --   >90  Non  GFR Calc    >90  Non  GFR Calc     < >  >90  Non  GFR Calc    >90  Non  GFR Calc     GFRESTBLACK  >90   --    --    --    --   >90   GFR Calc    >90   GFR Calc     < >  >90   GFR Calc    >90   GFR Calc     POTASSIUM  4.1  3.8   < >   --    < >  3.0*  2.7*   < >  4.5  3.9   TSH   --    --    --    --    --    --   1.27   --    --   2.13    < > = values in this interval not displayed.        Patient under age 50, mutual decision reflected in health maintenance.      Pertinent mammograms are reviewed under the imaging tab.  History of abnormal Pap smear: NO - age 30-65 PAP every 5 years with negative HPV co-testing recommended    Reviewed and updated as needed this visit by clinical staff  Tobacco  Allergies  Meds         Reviewed and updated as needed this visit by Provider        Past Medical History:   Diagnosis Date     morbid obesity      Obese      Urinary frequency       Past Surgical History:   Procedure Laterality Date     LAPAROSCOPIC BYPASS GASTRIC N/A 10/26/2016    Procedure: LAPAROSCOPIC BYPASS GASTRIC;  Surgeon: Romario Reyna MD;  Location: [facility] OR     NO HISTORY OF SURGERY       Obstetric History       T0      L0     SAB0   TAB0   Ectopic0   Multiple0   Live Births0           ROS:  C: NEGATIVE for fever, chills, change in weight  I: NEGATIVE for worrisome rashes, moles or lesions  E: NEGATIVE for vision changes or irritation  ENT: NEGATIVE for ear, mouth and throat problems  R: NEGATIVE for  significant cough or SOB  B: NEGATIVE for masses, tenderness or discharge  CV: NEGATIVE for chest pain, palpitations or peripheral edema  GI: NEGATIVE for nausea, abdominal pain, heartburn, or change in bowel habits  : NEGATIVE for unusual urinary or vaginal symptoms. Periods are regular.  M: NEGATIVE for significant arthralgias or myalgia  N: Positive for dizziness  P: NEGATIVE for changes in mood or affect    OBJECTIVE:   Wt 137 lb (62.1 kg)  LMP 01/16/2018  BMI 25.06 kg/m2  EXAM:  GENERAL: healthy, alert and no distress  EYES: Eyes grossly normal to inspection, PERRL and conjunctivae and sclerae normal  HENT: ear canals and TM's normal, nose and mouth without ulcers or lesions  NECK: no adenopathy, no asymmetry, masses, or scars and thyroid normal to palpation  RESP: lungs clear to auscultation - no rales, rhonchi or wheezes  BREAST: normal without masses, tenderness or nipple discharge and no palpable axillary masses or adenopathy  ABDOMEN: soft, nontender, no hepatosplenomegaly, no masses and bowel sounds normal  MS: no gross musculoskeletal defects noted, no edema  SKIN: no suspicious lesions or rashes  NEURO: Normal strength and tone, mentation intact and speech normal  PSYCH: mentation appears normal, affect normal/bright    ASSESSMENT/PLAN:   1. Encounter for routine adult health examination without abnormal findings  - Comprehensive metabolic panel (BMP + Alb, Alk Phos, ALT, AST, Total. Bili, TP)    2. Bariatric surgery status  Per endocrinology    3. CARDIOVASCULAR SCREENING; LDL GOAL LESS THAN 100  - Lipid Profile (Chol, Trig, HDL, LDL calc)    4. Body dysmorphic disorder  Working with Chloé Perez, controlled symptoms at this time    5. Encounter for screening mammogram for breast cancer  - *MA Screening Digital Bilateral; Future    6. Allergic dermatitis  Healing area of dermatitis of right wrist ( see last visit notes), will try 1 week of kenalog cream  - triamcinolone (KENALOG) 0.5 % cream;  "Apply sparingly to affected area three times daily.  Dispense: 30 g; Refill: 0    7. Dizziness  Working with endocrinology on this, improving symptoms      COUNSELING:   Reviewed preventive health counseling, as reflected in patient instructions       Regular exercise       Healthy diet/nutrition         reports that she has never smoked. She has never used smokeless tobacco.    Estimated body mass index is 25.06 kg/(m^2) as calculated from the following:    Height as of 1/25/18: 5' 2\" (1.575 m).    Weight as of this encounter: 137 lb (62.1 kg).       Counseling Resources:  ATP IV Guidelines  Pooled Cohorts Equation Calculator  Breast Cancer Risk Calculator  FRAX Risk Assessment  ICSI Preventive Guidelines  Dietary Guidelines for Americans, 2010  USDA's MyPlate  ASA Prophylaxis  Lung CA Screening    Napoleon Gray PA-C  [facility]  "

## 2018-02-02 NOTE — MR AVS SNAPSHOT
MRN:[id number]                      After Visit Summary   2/2/2018    Criss Don    MRN: [id number]           Visit Information        Provider Department      2/2/2018 11:00 AM Boris, Usman Arthur RD [facility]      Your next 10 appointments already scheduled     Feb 13, 2018  8:45 AM CST   (Arrive by 8:30 AM)   MA SCREENING DIGITAL BILATERAL with ECMA1   [facility] ([address] [address]   [phone]           Do not use any powder, lotion or deodorant under your arms or on your breast. If you do, we will ask you to remove it before your exam.  Wear comfortable, two-piece clothing.  If you have any allergies, tell your care team.  Bring any previous mammograms from other facilities or have them mailed to the breast center.            Feb 16, 2018  7:00 AM CST   Return Visit with Brenda Perez PsyD   [facility] ([facility])    [address] [place] [id number]   [phone]            Feb 23, 2018  9:30 AM CST   New Visit with Guero Owen MD   [facility] ([facility]    [address] [place] [address]   [phone]            Mar 02, 2018  7:00 AM CST   Return Visit with Brenda Perez PsyD   [facility] ([facility])    [address] [place] [address]   [phone]            Mar 16, 2018  7:00 AM CDT   Return Visit with Brenda Perez PsyD   [facility] ([facility])    [address] [place] [address]   [phone]            Apr 06, 2018  9:00 AM CDT   Return Visit with Brenda Perez PsyD   [facility] ([facility])    [address] [place] [id number]  "  [phone]            2018 10:15 AM CDT   Nurse Only with EC MA/LPN   [facility] ([facility])    [address] [place] [id number] [phone]            May 04, 2018 11:30 AM CDT   Return Visit with Cris Paez PA-C   [facility] ([facility])    [address] [place] [address]   [phone]            Oct 26, 2018 11:00 AM CDT   Annual Visit with Cris Paez PA-C   [facility] ([facility])    [address] [place] [id number] [phone]            Oct 26, 2018 11:30 AM CDT   Annual Visit with Usman Raymond Diet 1, RD   [facility] ([facility])    [address] [place] [id number] [phone]              MyChart Information     Nomi lets you send messages to your doctor, view your test results, renew your prescriptions, schedule appointments and more. To sign up, go to [email or web address] . Click on \"Log in\" on the left side of the screen, which will take you to the Welcome page. Then click on \"Sign up Now\" on the right side of the page.     You will be asked to enter the access code listed below, as well as some personal information. Please follow the directions to create your username and password.     Your access code is: [email or web address]  Expires: 2/15/2018  6:34 AM     Your access code will  in 90 days. If you need help or a new code, please call your [facility] clinic or [phone].        Care EveryWhere ID     This is your Care EveryWhere ID. This could be used by other organizations to access your [facility] medical records  [id number]        Equal Access to Services     STEVEN MUNOZ : Hadii seble Boyce, mirna carreon " suri faulkner ah. So [facility] [phone].    ATENCIÓN: Si habla español, tiene a madera disposición servicios gratuitos de asistencia lingüística. Llame al [phone].    We comply with applicable federal civil rights laws and [place] laws. We do not discriminate on the basis of race, color, national origin, age, disability, sex, sexual orientation, or gender identity.

## 2018-02-02 NOTE — PROGRESS NOTES
"NUTRITION POST OP APPOINTMENT  DATE OF VISIT: February 2, 2018    Criss Don  1970  female  [id number]  47 year old     ASSESSMENT:    REASON FOR VISIT:  Criss is a 47 year old year old female presents today for 14 month PO nutrition follow-up appointment. Patient is accompanied by self.    DIAGNOSIS:  Status post gastric bypass surgery.  Obesity Normal BMI     ANTHROPOMETRICS:  Initial Weight: 139.6 kg (307 lb 11.2 oz)  Height: 158.8 cm (5' 2.5\")  Current Weight: 62.2 kg (137 lb 3.2 oz)   BMI: 24.75 kg/(m^2).    VITAMINS AND MINERALS:  2 Multivitamin with Minerals-Flintsones complete  500 mg Calcium With Vitamin D TID-2 hours away MVI and iron  Two 2000 International units Vitamin D  2500 mcg Vitamin B-12 sublingual  65 mg Iron  500 mg Vitamin C      NUTRITION HISTORY:  Breakfast: 5 oz Greek yogurt, sometimes frozen fruit or 1 egg with cheese, veggies  Lunch: 1/2-3/4 cup turkey chili, grated cheese, 2 whole grain crackers or lunch meat with cheese  Supper: 1/3 cup black beans and 2-2.5 oz chicken cooked in broth  Snacks: Greek yogurt or 1/2 protein drink or cherry tomatoes-2X per day  Fluids consumed: Water and Protein Drink  Consuming liquid calories: Yes  Protein intake: 70-80 grams/day  Tolerate regular texture food: Yes  Any foods not tolerated details: Yes  If any food not tolerated: pancakes, bread  Portion size: 1/2-1 cup  Take 20-30 minutes to consume each meal: Yes   Eat protein foods first: Yes  Fluids and meals separate by at least 30 minutes: Yes  Chew foods thoroughly: Yes  Tolerating diet: Yes  Drinking high protein supplements: Yes  Consuming snacks per day: 2  Additional Information: patient reports she is working with Brenda Perez on various issues (many related to bariatric surgery); complains of having 1 stool every 4-5 days; pt is also working with an endocrine doctor on her blood sugar control (pt says she has symptoms of hypoglycemia when BS at 103-106); pt shared that her " divorce is almost finalized and this should be a reduce her stress level        PHYSICAL ACTIVITY:  Type: treadmill, elliptical, strength training, belly dancing (1X per week)  Frequency (days per week): 5  Duration (min): 60    DIAGNOSIS:  Previous Nutrition Diagnosis: Altered gastrointestinal function related to alteration in gastrointestinal structure as evidenced by history of gastric bypass surgery.- no change    Previous goals:  Continue to have 5-6 small meals per day-met  Continue to follow diet guidelines for preventing hypoglycemia-improving  Limit activity to 60 minutes 6 X per week-met (see above)  Consistently include a serving of carbohydrate at each meal (discussed options)-improving    Current Nutrition Diagnosis: Altered gastrointestinal function related to alteration in gastrointestinal structure as evidenced by history of gastric bypass surgery.    INTERVENTION:   Nutrition Prescription: Eat 3 meals a day at regular intervals. Consume 60-90 grams of protein daily. Follow post-surgical vitamin and mineral protocol.  Assessed learning needs and learning preferences.    GOALS:  Relating To Eating:  Gradually increase fiber intake to 10-15 grams of fiber per day  Continue to practice all post- bariatric diet guidelines  Continue to include at least 1 serving of a food with carbohydrate (grain, fruit, starch veggie or legume) at each meal    Weight maintenance (not written)      Follow-Up:   Implementation: Discussed progress toward previous goals; reinforced importance of following bariatric lifestyle changes. Discussed fiber rich food sources    NUTRITION MONITORING AND EVALUATION:  Anticipated compliance: fair-good  Verbalized fair-good understanding.    Follow up: Patient to follow up in 10 months or 2 years post-op.    TIME SPENT WITH PATIENT:  30 minutes    Lazaro Ortiz RD, LD  [facility]  [phone]

## 2018-02-02 NOTE — MR AVS SNAPSHOT
After Visit Summary   2/2/2018    Criss Don    MRN: [id number]           Patient Information     Date Of Birth          1970        Visit Information        Provider Department      2/2/2018 11:30 AM Cris Paez PA-C [facility]      Today's Diagnoses     Bariatric surgery status    -  1    BMI 25.0-25.9,adult           Follow-ups after your visit        Your next 10 appointments already scheduled     Feb 13, 2018  8:45 AM CST   (Arrive by 8:30 AM)   MA SCREENING DIGITAL BILATERAL with ECMA1   [facility] ([facility])    [address] [place] [address]   [phone]           Do not use any powder, lotion or deodorant under your arms or on your breast. If you do, we will ask you to remove it before your exam.  Wear comfortable, two-piece clothing.  If you have any allergies, tell your care team.  Bring any previous mammograms from other facilities or have them mailed to the breast center.            Feb 16, 2018  7:00 AM CST   Return Visit with Brenda Perez PsyD   [facility] [place] ([facility] [place])    [address] [place] [address]   [phone]            Feb 23, 2018  9:30 AM CST   New Visit with Guero Owen MD   [facility] ([facility])    [address] [place] [id number] [phone]            Mar 02, 2018  7:00 AM CST   Return Visit with Brenda Perez PsyD   [facility] [place] ([facility] [place])    [address] [place] [address]   [phone]            Mar 16, 2018  7:00 AM CDT   Return Visit with Brenda Perez PsyD   [facility] [place] ([facility] [place])    [address] [place] [address]   [phone]            Apr 06, 2018  9:00 AM CDT    Return Visit with Brenda Perez PsyD   [facility] [place] ([facility] [place])    [address]  [place] [address]   [phone]            Apr 06, 2018 10:15 AM CDT   Nurse Only with EC MA/LPN   [facility]    [address]  [place] [address]   [phone]            May 04, 2018 11:30 AM CDT   Return Visit with Cris Paez PA-C   [facility] ([facility])    [address] [place] [address]  [address]  [facility] [phone]            Oct 26, 2018 11:00 AM CDT   Annual Visit with Cris Paez PA-C   [facility] ([facility])    [facility] [place] [phone] [phone]            Oct 26, 2018 11:30 AM CDT   Annual Visit with Usman Raymond Diet 1, RD   [facility] ([facility])    [facility] [place] [phone]              Future tests that were ordered for you today     Open Future Orders        Priority Expected Expires Ordered    *MA Screening Digital Bilateral Routine  2/2/2019 2/2/2018            Who to contact     If you have questions or need follow up information about today's clinic visit or your schedule please contact [facility] directly at [phone].  Normal or non-critical lab and imaging results will be communicated to you by MyChart, letter or phone within 4 business days after the clinic has received the results. If you do not hear from us within 7 days, please contact the clinic through MyChart or phone. If you have a critical or abnormal lab result, we will notify you by phone as soon as possible.  Submit refill requests through [employer] or call your pharmacy and they will forward  "the refill request to us. Please allow 3 business days for your refill to be completed.          Additional Information About Your Visit        [employer] Information     [employer] for the rest of us lets you send messages to your doctor, view your test results, renew your prescriptions, schedule appointments and more. To sign up, go to [email or web address] - [employer] for the rest of us . Click on \"Log in\" on the left side of the screen, which will take you to the Welcome page. Then click on \"Sign up Now\" on the right side of the page.     You will be asked to enter the access code listed below, as well as some personal information. Please follow the directions to create your username and password.     Your access code is: [email or web address]  Expires: 2/15/2018  6:34 AM     Your access code will  in 90 days. If you need help or a new code, please call your [place] clinic or [phone].        Care EveryWhere ID     This is your Care EveryWhere ID. This could be used by other organizations to access your [place] medical records  [id number]        Your Vitals Were     Height Last Period BMI (Body Mass Index)             5' 2\" (1.575 m) 2018 25.08 kg/m2          Blood Pressure from Last 3 Encounters:   18 96/68   18 107/77   17 110/76    Weight from Last 3 Encounters:   18 137 lb 2 oz (62.2 kg)   18 137 lb 3.2 oz (62.2 kg)   18 137 lb (62.1 kg)              We Performed the Following     OP ROOMING NOTE TO DEMETRI          Today's Medication Changes          These changes are accurate as of 18 12:32 PM.  If you have any questions, ask your nurse or doctor.               Start taking these medicines.        Dose/Directions    triamcinolone 0.5 % cream   Commonly known as:  KENALOG   Used for:  Allergic dermatitis   Started by:  Napoleon Gray PA-C        Apply sparingly to affected area three times daily.   Quantity:  30 g   Refills:  0         Stop taking these medicines if you haven't already. Please contact " your care team if you have questions.     FARXIGA 5 MG Tabs tablet   Generic drug:  dapagliflozin   Stopped by:  Napoleon Gray PA-C                Where to get your medicines      These medications were sent to [id number] [place] [address] [facility] [address]  [address] [address] [address]    Hours:  Added 10/26 CK Checked with pharmacy Phone:  [phone]     triamcinolone 0.5 % cream                Primary Care Provider Office Phone # Fax #    Napoleon Gray PA-C [phone]       [address]  [place] [address]        Equal Access to Services     [facility]: Hadii aad ku hadasho Soomaali, waaxda luqadaha, qaybta kaalmada adeegyada, waxay idiin hayderrickn stefany cuello . So [facility] [phone].    ATENCIÓN: Si habla español, tiene a madera disposición servicios gratuitos de asistencia lingüística. Llame al [phone].    We comply with applicable federal civil rights laws and [place] laws. We do not discriminate on the basis of race, color, national origin, age, disability, sex, sexual orientation, or gender identity.            Thank you!     Thank you for choosing [facility]  for your care. Our goal is always to provide you with excellent care. Hearing back from our patients is one way we can continue to improve our services. Please take a few minutes to complete the written survey that you may receive in the mail after your visit with us. Thank you!             Your Updated Medication List - Protect others around you: Learn how to safely use, store and throw away your medicines at www.disposemymeds.org.          This list is accurate as of 2/2/18 12:32 PM.  Always use your most recent med list.                   Brand Name Dispense Instructions for use Diagnosis    B-12 2500 MCG Subl      Place 1 tablet under the tongue 3 times weekly        docusate sodium 100 MG tablet    COLACE    180 tablet     Take 100 mg by mouth 2 times daily as needed for constipation    Other constipation       MAGNESIUM OXIDE PO      Take 500 mg % by mouth        multivitamin  peds with iron 60 MG chewable tablet      Take 2 chew tab by mouth every morning        norgestimate-ethinyl estradiol 0.25-35 MG-MCG per tablet    ORTHO-CYCLEN, SPRINTEC    84 tablet    Take 1 tablet by mouth daily    General counseling for prescription of oral contraceptives       triamcinolone 0.5 % cream    KENALOG    30 g    Apply sparingly to affected area three times daily.    Allergic dermatitis       VIACTIV 500-500-40 MG-UNT-MCG Chew   Generic drug:  calcium-vitamin D-vitamin K      Take 1 tablet by mouth 3 times daily        VITAMIN D3 PO      Take 6,000 Units by mouth daily        VITRON-C  MG Tabs tablet   Generic drug:  ferrous fumarate 65 mg ([place]. FE)-Vitamin C 125 mg      Take 1 tablet by mouth every morning

## 2018-02-03 LAB
ALBUMIN SERPL-MCNC: 3.3 G/DL (ref 3.4–5)
ALP SERPL-CCNC: 48 U/L (ref 40–150)
ALT SERPL W P-5'-P-CCNC: 25 U/L (ref 0–50)
ANION GAP SERPL CALCULATED.3IONS-SCNC: 8 MMOL/L (ref 3–14)
AST SERPL W P-5'-P-CCNC: 15 U/L (ref 0–45)
BILIRUB SERPL-MCNC: 0.4 MG/DL (ref 0.2–1.3)
BUN SERPL-MCNC: 14 MG/DL (ref 7–30)
CALCIUM SERPL-MCNC: 8.5 MG/DL (ref 8.5–10.1)
CHLORIDE SERPL-SCNC: 106 MMOL/L (ref 94–109)
CHOLEST SERPL-MCNC: 169 MG/DL
CO2 SERPL-SCNC: 26 MMOL/L (ref 20–32)
CREAT SERPL-MCNC: 0.6 MG/DL (ref 0.52–1.04)
GFR SERPL CREATININE-BSD FRML MDRD: >90 ML/MIN/1.7M2
GLUCOSE SERPL-MCNC: 81 MG/DL (ref 70–99)
HDLC SERPL-MCNC: 57 MG/DL
LDLC SERPL CALC-MCNC: 90 MG/DL
NONHDLC SERPL-MCNC: 112 MG/DL
POTASSIUM SERPL-SCNC: 3.9 MMOL/L (ref 3.4–5.3)
PROT SERPL-MCNC: 6.4 G/DL (ref 6.8–8.8)
SODIUM SERPL-SCNC: 140 MMOL/L (ref 133–144)
TRIGL SERPL-MCNC: 109 MG/DL

## 2018-02-06 NOTE — PROGRESS NOTES
Letter sent to patient with results.    Napoleon Gray PA-C  [facility]

## 2018-02-13 ENCOUNTER — RADIANT APPOINTMENT (OUTPATIENT)
Dept: MAMMOGRAPHY | Facility: CLINIC | Age: 48
End: 2018-02-13
Attending: PHYSICIAN ASSISTANT
Payer: COMMERCIAL

## 2018-02-13 DIAGNOSIS — Z12.31 ENCOUNTER FOR SCREENING MAMMOGRAM FOR BREAST CANCER: ICD-10-CM

## 2018-02-13 PROCEDURE — 77067 SCR MAMMO BI INCL CAD: CPT | Mod: TC

## 2018-02-16 ENCOUNTER — OFFICE VISIT (OUTPATIENT)
Dept: PSYCHOLOGY | Facility: CLINIC | Age: 48
End: 2018-02-16
Payer: COMMERCIAL

## 2018-02-16 ENCOUNTER — TELEPHONE (OUTPATIENT)
Dept: SURGERY | Facility: CLINIC | Age: 48
End: 2018-02-16

## 2018-02-16 DIAGNOSIS — F41.9 ANXIETY DISORDER, UNSPECIFIED TYPE: Primary | ICD-10-CM

## 2018-02-16 PROCEDURE — 90834 PSYTX W PT 45 MINUTES: CPT | Performed by: PSYCHOLOGIST

## 2018-02-16 NOTE — Clinical Note
Kyaw Lynch, I saw Paulette today and she acknowledged that she has been struggling with disordered eating patterns more than she had been letting on. This was brought on by recent episode of vomiting after a meal in an attempt to lose more weight because she was not yet approved for skin removal surgery. I have recommended that she get an evaluation at an eating disorder clinic. I will continue to see her until she has an assessment, and then we can see what makes the most sense after she gets the recommendations. I have asked her to call your office to let you know that she will be seeking treatment and to request referrals. I had briefly mentioned [facility] and [facility], but encouraged her to get your input as well. My next appt with her is on 3/2/18. I am hoping she will have her assessment scheduled (even if not completed) before our next visit. Please feel free to contact me with questions/concerns. Thank you!

## 2018-02-16 NOTE — TELEPHONE ENCOUNTER
Pt called me to discuss her visit and the recommendations Brenda recommended.  I applauded her for being truthful so she can get the help she needs.  I gave her information for intake at [facility].  PT will continue to see both Brenda Perez and myself as scheduled in addition to her ED program.      We discussed that her current symptomatic episodes may be secondary to her eating disorder and not hypoglycemia.  She said she had 2 recent episodes and all 3 episodes her BS were around 119.

## 2018-02-16 NOTE — MR AVS SNAPSHOT
MRN:[id number]                      After Visit Summary   2/16/2018    Criss Don    MRN: [id number]           Visit Information        Provider Department      2/16/2018 7:00 AM Brenda Perez PsyD [facility] [place] [facility] Generic      Your next 10 appointments already scheduled     Feb 23, 2018  9:30 AM CST   New Visit with Guero Owen MD   [facility] ([facility] [place])    [address]  [address]  [place] [address] [phone]            Mar 02, 2018  7:00 AM CST   Return Visit with Brenda Perez PsyD   [facility] [place] ([facility] [place])    [place] [address] [place] [facility] [place] [address]   [phone]            Mar 16, 2018  7:00 AM CDT   Return Visit with Brenda Perez PsyD   [facility] [place] ([facility] [place])    [place] [address] [place] [facility]  [place] [address]   [phone]            Apr 06, 2018  9:00 AM CDT   Return Visit with Brenda Perez PsyD   [facility] [place] ([facility] [place])    [place] [address] [place] [facility] [place] [address]   [phone]            Apr 06, 2018 10:15 AM CDT   Nurse Only with EC MA/LPN   [facility] [place] ([facility] [place])    [place] [facility]  [place] [address]   [phone]            Apr 20, 2018  7:00 AM CDT   Return Visit with Brenda Perez PsyD   [facility] [place] ([facility] [place])    [place] [facility] [place] [address]   [phone]            May 04, 2018 11:30 AM CDT   Return Visit with Cris Paez PA-C   [facility] [place] ([facility])    [address]  [address]  [place] [address] [phone]            Oct 26, 2018 11:00 AM CDT   Annual Visit with Cris Paez PA-C   [facility] [place]  "([facility])    [address]  [place] [address]   [phone]            Oct 26, 2018 11:30 AM CDT   Annual Visit with Usman Raymodn Diet 1, RD   [facility])    [address]  [place] [address]   [phone]              MyChart Information     [email or web address] lets you send messages to your doctor, view your test results, renew your prescriptions, schedule appointments and more. To sign up, go to [email or web address] . Click on \"Log in\" on the left side of the screen, which will take you to the Welcome page. Then click on \"Sign up Now\" on the right side of the page.     You will be asked to enter the access code listed below, as well as some personal information. Please follow the directions to create your username and password.     Your access code is: [email or web address]  Expires: 2018 10:41 PM     Your access code will  in 90 days. If you need help or a new code, please call your [place] clinic or [phone].        Care EveryWhere ID     This is your Care EveryWhere ID. This could be used by other organizations to access your [place] medical records  [id number]        Equal Access to Services     STEVEN MUNOZ AH: Hadii deandra townsend Sojorge, waaxda luqadaha, qaybta kaalmada kaushik, suri yeager. So [facility] [phone].    ATENCIÓN: Si habla español, tiene a madera disposición servicios gratuitos de asistencia lingüística. Llame al [phone].    We comply with applicable federal civil rights laws and [place] laws. We do not discriminate on the basis of race, color, national origin, age, disability, sex, sexual orientation, or gender identity.            "

## 2018-02-16 NOTE — TELEPHONE ENCOUNTER
I am so glad she finally came to terms with this.  I have thought she had a eating disorder for a while now.  We have talked about this at each appointment but she felt she was on the edge and she was getting the help she needed through you at this point.  Last time I mentioned the symptoms she was experiencing which she though were due to her blood sugars may actually be due to metabolic abnormalities due to her unhealthy eating/ restriction patterns.  I told her getting an ED evaluation may be a way to have this evaluated by a MD who sees these problems all the time.  This seemed to resonate with her.  We left it that she would think about it and we would revisit it at our next appointment.    I will call her with the ED evaluation information we have.  ([facility] and the three we refer to.)  Thanks for all the work you have done with her thus far.    Cris Paez PA-C        ===View-only below this line===    ----- Message -----     From: Brenda Perez PsyD     Sent: 2/16/2018   9:33 AM       To: MANISHA Wilson, I saw Paulette today and she acknowledged that she has been struggling with disordered eating patterns more than she had been letting on. This was brought on by recent episode of vomiting after a meal in an attempt to lose more weight because she was not yet approved for skin removal surgery. I have recommended that she get an evaluation at an eating disorder clinic. I will continue to see her until she has an assessment, and then we can see what makes the most sense after she gets the recommendations. I have asked her to call your office to let you know that she will be seeking treatment and to request referrals. I had briefly mentioned [facility] and [facility], but encouraged her to get your input as well. My next appt with her is on 3/2/18. I am hoping she will have her assessment scheduled (even if not completed) before our next  visit. Please feel free to contact me with questions/concerns. Thank you!

## 2018-02-16 NOTE — PROGRESS NOTES
"                                             Progress Note    Client Name: Criss Don  Date: 2/16/2018         Service Type: Individual      Session Start Time: 7:00  Session End Time: 7:45      Session Length: 45     Session #: 25     Attendees: Client attended alone    Treatment Plan Last Reviewed: 2/16/2018  PHQ-9 / NED-7 :      DATA      Progress Since Last Session (Related to Symptoms / Goals / Homework):   Symptoms: Stable    Homework: Partially completed      Episode of Care Goals: Minimal progress - ACTION (Actively working towards change); Intervened by reinforcing change plan / affirming steps taken     Current / Ongoing Stressors and Concerns:   Client found out that her insurance has denied her authorization for excess skin removal because they want to see one year of stable weight. Client acknowledged that she got very upset and anxious about this, so she made herself vomit after eating because \"I need to lose the weight somehow.\" Reported that she realized what she was doing and thinking was unhealthy and acknowledged that she has been struggling with unhealthy thoughts and behaviors more than she realized or let on because, \"I would just make excuses for it\" or minimize it, but now sees that it is a sign of disordered eating. Also acknowledged that she feels guilty after almost every meal and has not always been as compliant as she has reported with her meal plan.            Treatment Objective(s) Addressed in This Session:   stress management  Maintain compliance with post-surgical dietary needs     Intervention:   Solution Focused: discussed referral to specialized eating disorder treatment, client verbalized agreement with this plan  Supportive therapy:  provided active listening, support, and encouragement. Reinforced client for being more forthcoming about her struggles. Normalized her concerns about seeking eating disorder treatment and provided additional perspective about the benefits " she will get in the long run by being more honest and getting help now.            ASSESSMENT: Current Emotional / Mental Status (status of significant symptoms):   Risk status (Self / Other harm or suicidal ideation)   Client denies current fears or concerns for personal safety.   Client denies current or recent suicidal ideation or behaviors.   Client denies current or recent homicidal ideation or behaviors.   Client denies current or recent self injurious behavior or ideation.   Client denies other safety concerns.   A safety and risk management plan has not been developed at this time, however client was given the after-hours number / 911 should there be a change in any of these risk factors.     Appearance:   Appropriate    Eye Contact:   Good    Psychomotor Behavior: Normal    Attitude:   Cooperative  Pleasant    Orientation:   All   Speech    Rate / Production: Normal     Volume:  Normal    Mood:    Anxious    Affect:    Appropriate    Thought Content:  Clear    Thought Form:  Coherent  Logical    Insight:    Good      Medication Review:   No current psychiatric medications prescribed     Medication Compliance:   Yes     Changes in Health Issues:   None reported     Chemical Use Review:   Substance Use: Chemical use reviewed, no active concerns identified      Tobacco Use: No current tobacco use.       Collateral Reports Completed:   Routed note to Cris Paez PA-C at the [facility] Loss Clinic    PLAN: (Client Tasks / Therapist Tasks / Other)  Future appointments are scheduled. Follow up with the weight loss clinic about a referral to an eating disorder program and schedule an intake. Client and I will continue to meet regularly until she is able to get an ED assessment and recommendations, at which time we will re-evaluate her treatment options. Continue: Practice acceptance of your new body shape as discussed in session. Surface and challenge unrealistic expectations and disproportionate thoughts. Surface  and challenge disproportionate and unjustified guilt as discussed in session.       Brenda Perez PsyD LP                                                       Treatment Plan    Client's Name: Criss Don  YOB: 1970    Date: 2/16/2018    DSM-V Diagnoses: 300.00 Unspecified Anxiety Disorder  Psychosocial / Contextual Factors: divorce proceedings, health concerns  WHODAS: 20    Referral / Collaboration:  Referral to another professional/service is not indicated at this time..    Anticipated number of session or this episode of care: reassess after 12 sessions      MeasurableTreatment Goal(s) related to diagnosis / functional impairment(s)  Goal 1: Client will learn coping skills to manage stress and adjust to post-surgical lifestyle changes more effectively.    I will know I've met my goal when I'm not weighing myself all the time and feeling more comfortable with my weight loss.      Objective #A (Client Action)    Client will identify thinking patterns that contribute to anxiety about weight and eating habits.  Status: Continued - Date(s): 2/16/2018    Intervention(s)  Therapist will assign homework (thought  logs), assist client in surfacing and replacement ineffective thinking patterns.    Objective #B  Client will identify new ways to cope with stress and worry thoughts.  Status: Continued - Date(s): 2/16/2018    Intervention(s)  Therapist will assign homework, problem-solve barriers to follow through.    Objective #C  Client will identify and challenge unreasonable or unrealistic expectations about weight loss.  Status: Continued - Date(s): 2/16/2018    Intervention(s)  Therapist will assist client in identifying and balancing her expectations.        Client has reviewed and agreed to the above plan.      Brenda Perez PsyD LP  2/16/2018

## 2018-02-22 PROBLEM — E16.2 HYPOGLYCEMIA: Status: ACTIVE | Noted: 2018-02-22

## 2018-02-22 PROBLEM — Z98.84 HISTORY OF GASTRIC BYPASS: Status: ACTIVE | Noted: 2018-02-22

## 2018-02-23 ENCOUNTER — OFFICE VISIT (OUTPATIENT)
Dept: ENDOCRINOLOGY | Facility: CLINIC | Age: 48
End: 2018-02-23
Payer: COMMERCIAL

## 2018-02-23 VITALS
WEIGHT: 133 LBS | HEART RATE: 76 BPM | DIASTOLIC BLOOD PRESSURE: 66 MMHG | SYSTOLIC BLOOD PRESSURE: 88 MMHG | HEIGHT: 62 IN | BODY MASS INDEX: 24.48 KG/M2

## 2018-02-23 DIAGNOSIS — E16.2 HYPOGLYCEMIA: Primary | ICD-10-CM

## 2018-02-23 DIAGNOSIS — Z98.84 HISTORY OF GASTRIC BYPASS: ICD-10-CM

## 2018-02-23 PROCEDURE — 99214 OFFICE O/P EST MOD 30 MIN: CPT | Performed by: INTERNAL MEDICINE

## 2018-02-23 NOTE — NURSING NOTE
"Chief Complaint   Patient presents with     Diabetes       Initial BP (!) 88/66 (BP Location: Right arm, Cuff Size: Adult Regular)  Pulse 76  Ht 5' 2\" (1.575 m)  Wt 133 lb (60.3 kg)  BMI 24.33 kg/m2 Estimated body mass index is 24.33 kg/(m^2) as calculated from the following:    Height as of this encounter: 5' 2\" (1.575 m).    Weight as of this encounter: 133 lb (60.3 kg).  Medication Reconciliation: complete         Christophe Pruett      "

## 2018-02-23 NOTE — PROGRESS NOTES
Name: Criss Don    HPI:  Recent issues:  Here for f/u evaluation  Tried the Farxiga medication, effective for treatment of the hypoglycemia but recently ran out of the medication  Recalls seeing MAIKOL Paez recently, considering plan for nutrition eval at [facility]        Previous history of morbid obesity, subsequent gastric bypass surgery  Had been overweight for many years since high school, progressive weight gain  2011. Tried Medifast diet plan, some success with nearly 100# weight loss to kisha 205#, then wt gain  9/2016. Gastric bypass surgery consult at  Bariatric clinic, preop wt 307#  10/26/16. RNY lap surgery by Dr. Romario Reyna/[facility]  Postop success with wt loss, but initial issues with bloating, some nausea... Resolved  Takes Viactiv calcium 3 QD, vit D 2000 IU 2 QD, vit B12 sl 3x/wk, magnesium + Flintstones MVI 2 QD  7/2017. Noted progressive episodic symptoms of concern   Sudden lightheadedness, decreased mental focus, visual blurring, warmth/flushing, weakness   Treats by sitting down, drinking water, though sometimes eats crackers   Spells resolve after 5-10 min, though often without carb supplement   Occurs at random times of day, approx 2-3x/wk, no typical food or exercise relationship   If severe spell, pain at the left lower quadrant of abdomen  [facility], has also seen Ms Willis Angel, RD, LD   Advised to consume 5-6 small meals/day   No significant benefit with this dietary supplement and spells however  No previous BG testing with spells. Patient did not have a BG meter  Denies prior history of diabetes mellitus, diabetes med use, or adrenal disease    12/7/17. Initial consult with me at my former clinic ([facility])  Labs:  hgbA1c 5.2%, glucose 83, cortisol 16.2, C-P 1.09, TSH 1.44  Received instruction on use of BG meter  Noted spells every 2 days on avg   Usually mild, transient   Has measured BG levesl with her OneTouch Verio meter, levels w/ spells   and 2-spells with BG<70  12/2017. Began off-label Farxiga 5 mg po QAM  Noticed only mild spells and transient spells, some indigestion  Late 1/2017. Ran out of Farxiga medication, has noticed spells now more frequent and larger spells more intense  Recalls some recent spell , 116, 116     Sees Napoleon Gray at  Clinics [facility]  Has seen Cris Philippe PAC[facility]    PMH/PSH:  Past Medical History:   Diagnosis Date     morbid obesity      Obese      Urinary frequency      Past Surgical History:   Procedure Laterality Date     LAPAROSCOPIC BYPASS GASTRIC N/A 10/26/2016    Procedure: LAPAROSCOPIC BYPASS GASTRIC;  Surgeon: Romario Reyna MD;  Location: [facility] OR     NO HISTORY OF SURGERY         Family Hx:  Family History   Problem Relation Age of Onset     Hypertension Mother      Breast Cancer Mother      64 dx     Allergies Mother      Obesity Mother      Respiratory Mother      Asthma     HEART DISEASE Mother      Hypertension Maternal Grandmother      CANCER Maternal Grandmother      Ovarian     CANCER Sister      Cervical     CANCER Other      Mat. great aunt-ovarian     Hypertension Brother      CANCER Maternal Aunt      ?Gastric     Brain Cancer Cousin      maternal         Social Hx:  Social History     Social History     Marital status:      Spouse name: seperated     Number of children: N/A     Years of education: N/A     Occupational History     [employer]      Social History Main Topics     Smoking status: Never Smoker     Smokeless tobacco: Never Used     Alcohol use 0.0 oz/week     0 Standard drinks or equivalent per week      Comment: very rare     Drug use: No     Sexual activity: Yes     Partners: Male     Other Topics Concern     Not on file     Social History Narrative          MEDICATIONS:  has a current medication list which includes the following prescription(s): canagliflozin, triamcinolone, norgestimate-ethinyl estradiol, magnesium oxide, docusate sodium,  "ferrous fumarate 65 mg ([place]. fe)-vitamin c 125 mg, b-12, calcium-vitamin d-vitamin k, multivitamin  peds with iron, and cholecalciferol.    ROS:     ROS: 10 point ROS neg other than the symptoms noted above in the HPI.    GENERAL: no weight changes, fatigue, fevers, chills, night sweats.   HEENT: no dysphagia, odonophagia, diplopia, neck pain  THYROID:  no apparent hyper or hypothyroid symptoms  CV: no chest pain, pressure, palpitations  LUNGS: no SOB, FAUSTIN, cough, wheezing   ABDOMEN: no diarrhea, constipation, abdominal pain  EXTREMITIES: no rashes, ulcers, edema  NEUROLOGY: no headaches, denies changes in vision, tingling, extremitiy numbness   MSK: no muscle aches or pains, weakness  SKIN: no rashes or lesions  PSYCH:  stable mood, no significant anxiety or depression    Physical Exam   VS: BP (!) 88/66 (BP Location: Right arm, Cuff Size: Adult Regular)  Pulse 76  Ht 1.575 m (5' 2\")  Wt 60.3 kg (133 lb)  BMI 24.33 kg/m2  GENERAL: AXOX3, NAD, eyeglasses, well dressed, answering questions appropriately, appears stated age.  THYROID:  normal gland, no apparent nodules or goiter  ABDOMEN: soft, nontender, nondistended, no organomegaly    LABS:    All pertinent notes, labs, and images personally reviewed by me.     A/P:  Encounter Diagnoses   Name Primary?     Hypoglycemia Yes     History of gastric bypass        Comments:   Complicated case.  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    Recent use of off-label Farxiga was effective and well tolerated.    Plan:  Discussed general issues with the hypoglycemia diagnosis and management  Reviewed pancreas gland anatomy and hormone physiology  Discussed lab tests used to assess patient glucose levels and hypoglycemia events  Reviewed treatment options using off-label diabetes medications to help with insulin resistance or enhancing glucagon secretion    Recommend:  Since Farxiga non-formulary, " would retry the SGLPTI as Invokana.  Try low dose Invokana 100 mg po QAM, Rx sent to her [facility] pharmacy  Monitor for symptom changes  Reasonable to have evaluation at [facility], if recommendation by her other providers     Addressed patient questions today    Labs ordered today: No orders of the defined types were placed in this encounter.    Radiology/Consults ordered today: None    More than 50% of the time spent with Ms. Don on counseling / coordinating her care.  Total appointment time was 25 minutes.    Follow-up:  6 weeks    Guero Owen MD  Endocrinology  [facility]  CC: Napoleon Gray

## 2018-02-23 NOTE — MR AVS SNAPSHOT
After Visit Summary   2/23/2018    Criss Don    MRN: [id number]           Patient Information     Date Of Birth          1970        Visit Information        Provider Department      2/23/2018 9:30 AM Guero Owen MD [facility]        Today's Diagnoses     Hypoglycemia    -  1    History of gastric bypass           Follow-ups after your visit        Follow-up notes from your care team     Return in about 6 weeks (around 4/6/2018).      Your next 10 appointments already scheduled     Mar 02, 2018  7:00 AM CST   Return Visit with Brenda Perez [facility] ([facility])    [address] [place], [place] [address] [facility] [address]   [phone]            Mar 16, 2018  7:00 AM CDT   Return Visit with Brenda Perez [facility] ([facility])    [facility] [place] [address]   [phone]            Apr 06, 2018  9:00 AM CDT   Return Visit with Brenda Perez Song   [facility] ([facility])    [facility] [place] [address]   [phone]            Apr 06, 2018 10:15 AM CDT   Nurse Only with EC MA/LPN   [facility]    [facility] [place] [address]   [phone]            Apr 20, 2018  7:00 AM CDT   Return Visit with Brenda Perez PsyD   [facility] ([facility])    [facility] [place] [address]   [phone]            Apr 20, 2018 10:00 AM CDT   Return Visit with Guero Owen MD   [facility]    [address] [place] [address]   [phone]            May 04, 2018 11:30 AM CDT   Return Visit with Cris Paez PA-C   [facility] ([facility])    [address]  [facility] [address]   [phone]            Oct 26, 2018 11:00 AM CDT   Annual Visit with Cris Paez PA-C   [facility] ([facility])    [address] [place], [place] [id number]  [facility] [address]   [phone]            Oct 26, 2018 11:30 AM CDT   Annual Visit with Usman Raymond Diet 1, RD   [facility] ([facility])    [address] [place], [place] [id number]  [facility] [address] [phone]              Who to contact     If you have questions or need follow up information about today's clinic visit or your schedule please contact [facility] directly at [phone].  Normal or non-critical lab and imaging results will be communicated to you by [employer] Easyhart, letter or phone within 4 business days after the clinic has received the results. If you do not hear from us within 7 days, please contact the clinic through [employer] Easyhart or phone. If you have a critical or abnormal lab result, we will notify you by phone as soon as possible.  Submit refill requests through Autotether or call your pharmacy and they will forward the refill request to us. Please allow 3 business days for your refill to be completed.          Additional Information About Your Visit        MyChart Information     Autotether lets you send messages to your doctor, view your test results, renew your prescriptions, schedule appointments and more. To sign up, go to [email or web address] . Click on \"Log in\" on the left side of the screen, which will take you to the Welcome page. Then click on \"Sign up Now\" on the right side of the page.     You will be asked to enter the access code listed below, as well as some personal information. Please follow the directions to create your username and password.     Your access code is: [email or web address]  Expires: 2018 10:41 PM     Your access code will  in " "90 days. If you need help or a new code, please call your [facility] clinic or [phone].        Care EveryWhere ID     This is your Care EveryWhere ID. This could be used by other organizations to access your [facility] medical records  [id number]        Your Vitals Were     Pulse Height BMI (Body Mass Index)             76 1.575 m (5' 2\") 24.33 kg/m2          Blood Pressure from Last 3 Encounters:   02/23/18 (!) 88/66   02/02/18 96/68   01/25/18 107/77    Weight from Last 3 Encounters:   02/23/18 60.3 kg (133 lb)   02/02/18 62.2 kg (137 lb 2 oz)   02/02/18 62.2 kg (137 lb 3.2 oz)              Today, you had the following     No orders found for display         Today's Medication Changes          These changes are accurate as of 2/23/18 11:59 PM.  If you have any questions, ask your nurse or doctor.               Start taking these medicines.        Dose/Directions    canagliflozin 100 MG tablet   Commonly known as:  INVOKANA   Used for:  History of gastric bypass, Hypoglycemia   Started by:  Guero Owen MD        Dose:  100 mg   Take 1 tablet (100 mg) by mouth every morning (before breakfast)   Quantity:  30 tablet   Refills:  11            Where to get your medicines      These medications were sent to [id number] - [place], [place] - [address] [facility]  [address] [facility] [address]    Hours:  Added 10/26 CK Checked with pharmacy Phone:  [phone]     canagliflozin 100 MG tablet                Primary Care Provider Office Phone # Fax #    Napoleon Gray PA-C [phone]       [address]  [place] [address]        Equal Access to Services     KRISTIN MUNOZ AH: Phong Wooten, wanancie medina, qaybta kaalmada kaushik, suri yeager. So [facility] [phone].    ATENCIÓN: Si habla español, tiene a madera disposición servicios gratuitos de asistencia lingüística. Llame al [phone].    We comply with " applicable federal civil rights laws and [place] laws. We do not discriminate on the basis of race, color, national origin, age, disability, sex, sexual orientation, or gender identity.            Thank you!     Thank you for choosing [facility]  for your care. Our goal is always to provide you with excellent care. Hearing back from our patients is one way we can continue to improve our services. Please take a few minutes to complete the written survey that you may receive in the mail after your visit with us. Thank you!             Your Updated Medication List - Protect others around you: Learn how to safely use, store and throw away your medicines at www.disposemymeds.org.          This list is accurate as of 2/23/18 11:59 PM.  Always use your most recent med list.                   Brand Name Dispense Instructions for use Diagnosis    B-12 2500 MCG Subl      Place 1 tablet under the tongue 3 times weekly        canagliflozin 100 MG tablet    INVOKANA    30 tablet    Take 1 tablet (100 mg) by mouth every morning (before breakfast)    History of gastric bypass, Hypoglycemia       docusate sodium 100 MG tablet    COLACE    180 tablet    Take 100 mg by mouth 2 times daily as needed for constipation    Other constipation       MAGNESIUM OXIDE PO      Take 500 mg % by mouth        multivitamin  peds with iron 60 MG chewable tablet      Take 2 chew tab by mouth every morning        norgestimate-ethinyl estradiol 0.25-35 MG-MCG per tablet    ORTHO-CYCLEN, SPRINTEC    84 tablet    Take 1 tablet by mouth daily    General counseling for prescription of oral contraceptives       triamcinolone 0.5 % cream    KENALOG    30 g    Apply sparingly to affected area three times daily.    Allergic dermatitis       VIACTIV 500-500-40 MG-UNT-MCG Chew   Generic drug:  calcium-vitamin D-vitamin K      Take 1 tablet by mouth 3 times daily        VITAMIN D3 PO      Take 6,000 Units by mouth daily        VITRON-C  MG  Tabs tablet   Generic drug:  ferrous fumarate 65 mg ([place]. FE)-Vitamin C 125 mg      Take 1 tablet by mouth every morning

## 2018-02-23 NOTE — LETTER
2/23/2018         RE: Criss Don  [address] [facility] [address]        Dear Colleague,    Thank you for referring your patient, Criss Don, to the [facility]. Please see a copy of my visit note below.    Name: Criss Don    HPI:  Recent issues:  Here for f/u evaluation  Tried the Farxiga medication, effective for treatment of the hypoglycemia but recently ran out of the medication  Recalls seeing MAIKOL Paez recently, considering plan for nutrition eval at [facility]        Previous history of morbid obesity, subsequent gastric bypass surgery  Had been overweight for many years since high school, progressive weight gain  2011. Tried Medifast diet plan, some success with nearly 100# weight loss to kisha 205#, then wt gain  9/2016. Gastric bypass surgery consult at  Bariatric clinic, preop wt 307#  10/26/16. RNY lap surgery by Dr. Romario Reyna/[facility]  Postop success with wt loss, but initial issues with bloating, some nausea... Resolved  Takes Viactiv calcium 3 QD, vit D 2000 IU 2 QD, vit B12 sl 3x/wk, magnesium + Flintstones MVI 2 QD  7/2017. Noted progressive episodic symptoms of concern   Sudden lightheadedness, decreased mental focus, visual blurring, warmth/flushing, weakness   Treats by sitting down, drinking water, though sometimes eats crackers   Spells resolve after 5-10 min, though often without carb supplement   Occurs at random times of day, approx 2-3x/wk, no typical food or exercise relationship   If severe spell, pain at the left lower quadrant of abdomen  [facility], has also seen Ms Lazaro Ortiz, RD, LD   Advised to consume 5-6 small meals/day   No significant benefit with this dietary supplement and spells however  No previous BG testing with spells. Patient did not have a BG meter  Denies prior history of diabetes mellitus, diabetes med use, or adrenal disease    12/7/17. Initial consult with me at my former  clinic ([facility])  Labs:  hgbA1c 5.2%, glucose 83, cortisol 16.2, C-P 1.09, TSH 1.44  Received instruction on use of BG meter  Noted spells every 2 days on avg   Usually mild, transient   Has measured BG levesl with her OneTouch Verio meter, levels w/ spells  and 2-spells with BG<70  12/2017. Began off-label Farxiga 5 mg po QAM  Noticed only mild spells and transient spells, some indigestion  Late 1/2017. Ran out of Farxiga medication, has noticed spells now more frequent and larger spells more intense  Recalls some recent spell , 116, 116     Sees Napoleon Gray at  Cl[facility]ics [facility]  Has [facility] Cris Paez PAC[facility]    PMH/PSH:  Past Medical History:   Diagnosis Date     morbid obesity      Obese      Urinary frequency      Past Surgical History:   Procedure Laterality Date     LAPAROSCOPIC BYPASS GASTRIC N/A 10/26/2016    Procedure: LAPAROSCOPIC BYPASS GASTRIC;  Surgeon: Romario Reyna MD;  Location:  OR     NO HISTORY OF SURGERY         Family Hx:  Family History   Problem Relation Age of Onset     Hypertension Mother      Breast Cancer Mother      64 dx     Allergies Mother      Obesity Mother      Respiratory Mother      Asthma     HEART DISEASE Mother      Hypertension Maternal Grandmother      CANCER Maternal Grandmother      Ovarian     CANCER Sister      Cervical     CANCER Other      Mat. great aunt-ovarian     Hypertension Brother      CANCER Maternal Aunt      ?Gastric     Brain Cancer Cousin      maternal         Social Hx:  Social History     Social History     Marital status:      Spouse name: seperated     Number of children: N/A     Years of education: N/A     Occupational History     PCA      Social History Main Topics     Smoking status: Never Smoker     Smokeless tobacco: Never Used     Alcohol use 0.0 oz/week     0 Standard drinks or equivalent per week      Comment: very rare     Drug use: No     Sexual activity: Yes     Partners: Male     Other  "Topics Concern     Not on file     Social History Narrative          MEDICATIONS:  has a current medication list which includes the following prescription(s): canagliflozin, triamcinolone, norgestimate-ethinyl estradiol, magnesium oxide, docusate sodium, ferrous fumarate 65 mg ([place]. fe)-vitamin c 125 mg, b-12, calcium-vitamin d-vitamin k, multivitamin  peds with iron, and cholecalciferol.    ROS:     ROS: 10 point ROS neg other than the symptoms noted above in the HPI.    GENERAL: no weight changes, fatigue, fevers, chills, night sweats.   HEENT: no dysphagia, odonophagia, diplopia, neck pain  THYROID:  no apparent hyper or hypothyroid symptoms  CV: no chest pain, pressure, palpitations  LUNGS: no SOB, FAUSTIN, cough, wheezing   ABDOMEN: no diarrhea, constipation, abdominal pain  EXTREMITIES: no rashes, ulcers, edema  NEUROLOGY: no headaches, denies changes in vision, tingling, extremitiy numbness   MSK: no muscle aches or pains, weakness  SKIN: no rashes or lesions  PSYCH:  stable mood, no significant anxiety or depression    Physical Exam   VS: BP (!) 88/66 (BP Location: Right arm, Cuff Size: Adult Regular)  Pulse 76  Ht 1.575 m (5' 2\")  Wt 60.3 kg (133 lb)  BMI 24.33 kg/m2  GENERAL: AXOX3, NAD, eyeglasses, well dressed, answering questions appropriately, appears stated age.  THYROID:  normal gland, no apparent nodules or goiter  ABDOMEN: soft, nontender, nondistended, no organomegaly    LABS:    All pertinent notes, labs, and images personally reviewed by me.     A/P:  Encounter Diagnoses   Name Primary?     Hypoglycemia Yes     History of gastric bypass        Comments:   Complicated case.  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    Recent use of off-label Farxiga was effective and well tolerated.    Plan:  Discussed general issues with the hypoglycemia diagnosis and management  Reviewed pancreas gland anatomy and hormone " physiology  Discussed lab tests used to assess patient glucose levels and hypoglycemia events  Reviewed treatment options using off-label diabetes medications to help with insulin resistance or enhancing glucagon secretion    Recommend:  Since Farxiga non-formulary, would retry the SGLPTI as Invokana.  Try low dose Invokana 100 mg po QAM, Rx sent to her [facility] pharmacy  Monitor for symptom changes  Reasonable to have evaluation at [facility], if recommendation by her other providers     Addressed patient questions today    Labs ordered today: No orders of the defined types were placed in this encounter.    Radiology/Consults ordered today: None    More than 50% of the time spent with Ms. Don on counseling / coordinating her care.  Total appointment time was 25 minutes.    Follow-up:  6 weeks    Guero Owen MD  Endocrinology  [place] [facility]  CC: Napoleon Gray       Again, thank you for allowing me to participate in the care of your patient.        Sincerely,        Guero Owen MD

## 2018-03-01 ENCOUNTER — OFFICE VISIT (OUTPATIENT)
Dept: FAMILY MEDICINE | Facility: CLINIC | Age: 48
End: 2018-03-01
Payer: COMMERCIAL

## 2018-03-01 VITALS
WEIGHT: 134 LBS | DIASTOLIC BLOOD PRESSURE: 60 MMHG | TEMPERATURE: 99 F | HEART RATE: 82 BPM | SYSTOLIC BLOOD PRESSURE: 89 MMHG | HEIGHT: 62 IN | OXYGEN SATURATION: 100 % | BODY MASS INDEX: 24.66 KG/M2

## 2018-03-01 DIAGNOSIS — K08.89 PAIN, DENTAL: Primary | ICD-10-CM

## 2018-03-01 PROCEDURE — 99213 OFFICE O/P EST LOW 20 MIN: CPT | Performed by: FAMILY MEDICINE

## 2018-03-01 NOTE — PROGRESS NOTES
SUBJECTIVE:   Criss Don is a 47 year old female who presents to clinic today for the following health issues:      Concern - Abscessed Tooth/Dental problem    Onset: x Tuesday has dental appt coming up next week     Description:   Lower left frontal molar , feels sore possible drainage.  Worried about possible dental abscess .   no sore throat ,Uri sx no fever or chills     Intensity: mild soreness     Progression of Symptoms:  worsening    Accompanying Signs & Symptoms: swelling and possible abscess drainage     She had called for dentist but cannot be see until next week so suggested to get her non antibiotics     Therapies Tried and outcome:       Problem list and histories reviewed & adjusted, as indicated.  Additional history: as documented    Patient Active Problem List   Diagnosis     Labial abscess     CARDIOVASCULAR SCREENING; LDL GOAL LESS THAN 160     Mass of right foot     History of abnormal cervical Pap smear     Intertrigo     Bilateral edema of lower extremity     Bariatric surgery status     Malnutrition following gastrointestinal surgery     Body dysmorphic disorder     Hypokalemia     Overweight (BMI 25.0-29.9)     Hypoglycemia     History of gastric bypass     Past Surgical History:   Procedure Laterality Date     LAPAROSCOPIC BYPASS GASTRIC N/A 10/26/2016    Procedure: LAPAROSCOPIC BYPASS GASTRIC;  Surgeon: Romario Reyna MD;  Location: [facility]     NO HISTORY OF SURGERY         Social History   Substance Use Topics     Smoking status: Never Smoker     Smokeless tobacco: Never Used     Alcohol use 0.0 oz/week     0 Standard drinks or equivalent per week      Comment: very rare     Family History   Problem Relation Age of Onset     Hypertension Mother      Breast Cancer Mother      64 dx     Allergies Mother      Obesity Mother      Respiratory Mother      Asthma     HEART DISEASE Mother      Hypertension Maternal Grandmother      CANCER Maternal Grandmother      Ovarian     CANCER  "Sister      Cervical     CANCER Other      Mat. great aunt-ovarian     Hypertension Brother      CANCER Maternal Aunt      ?Gastric     Brain Cancer Cousin      maternal           Reviewed and updated as needed this visit by clinical staff       Reviewed and updated as needed this visit by Provider         ROS:  Constitutional, HEENT, cardiovascular, pulmonary, GI, , musculoskeletal, neuro, skin, endocrine and psych systems are negative, except as otherwise noted.    OBJECTIVE:     BP (!) 89/60  Pulse 82  Temp 99  F (37.2  C) (Tympanic)  Ht 5' 2\" (1.575 m)  Wt 134 lb (60.8 kg)  LMP 02/12/2018  SpO2 100%  BMI 24.51 kg/m2  Body mass index is 24.51 kg/(m^2).  GENERAL: healthy, alert and no distress  EYES: Eyes grossly normal to inspection  HENT: ear canals and TM's normal and mouth without ulcers or lesions, no sinus tenderness   left lower laterl gum in front  with mild discomfort but no acute swelling of the gum  NECK: no adenopathy,   RESP: lungs clear to auscultation - no rales, rhonchi or wheezes  CV: regular rate and rhythm, normal S1 S2, no S3 or S4,      ASSESSMENT/PLAN:         (K08.89) Pain, dental  (primary encounter diagnosis)  Comment: left lower   Plan: amoxicillin-clavulanate (AUGMENTIN) 875-125 MG         per tablet          Prescription faxed.  Also told patient to make sure to follow-up with her dentist .     Patient expressed understanding and agreement with treatment plan. All patient's questions were answered, will let me know if has more later.  Medications: Rx's: Reviewed the potential side effects/complications of medications prescribed.       Iveth Mcnally MD  [facility]    "

## 2018-03-01 NOTE — MR AVS SNAPSHOT
After Visit Summary   3/1/2018    Criss Don    MRN: [id number]           Patient Information     Date Of Birth          1970        Visit Information        Provider Department      3/1/2018 1:20 PM Iveth Mcnally MD [facility] [place]        Today's Diagnoses     Pain, dental    -  1      Care Instructions    Take medications as directed.  Cares and symptomatic cares discussed   Follow up if problem or concern             Follow-ups after your visit        Follow-up notes from your care team     Return if symptoms worsen or fail to improve.      Your next 10 appointments already scheduled     Mar 02, 2018  7:00 AM CST   Return Visit with Brenda Perez [facility] [place] ([facility] [place])    [facility] [place] [facility] [place] [place] [address]   [phone]            Mar 16, 2018  7:00 AM CDT   Return Visit with Brenda Perez PsyD   [facility] [place] ([facility] [place])    [facility] [place] [address]   [phone]            Apr 06, 2018  9:00 AM CDT   Return Visit with Brenda Perez Song   [facility] [place] ([facility] [place])    [facility] [place] [address]   [phone]            Apr 06, 2018 10:15 AM CDT   Nurse Only with EC MA/LPN   [facility] [place] ([facility] [place])    [facility]  [place] [address]   [phone]            Apr 20, 2018  7:00 AM CDT   Return Visit with Brenda Perez PsyD   [facility] [place] ([facility] [place])    [facility] [place] [address]   [phone]            Apr 20, 2018 10:00 AM CDT   Return Visit with Guero Owen MD   [facility] ([facility])    [address] [place] [address]   [phone]            May 04, 2018 11:30 AM CDT   Return Visit with Cris  "Hay Paez PA-C   [facility] ([facility])    [address]  [place] [address] [phone]            Oct 26, 2018 11:00 AM CDT   Annual Visit with Cris Paez PA-C   [facility] ([facility])    [address]  [place] [address] [phone]            Oct 26, 2018 11:30 AM CDT   Annual Visit with Usman Wl Diet 1, RD   [facility] ([facility])    [address]  [place] [address] [phone]              Who to contact     If you have questions or need follow up information about today's clinic visit or your schedule please contact [facility] [place] directly at [phone].  Normal or non-critical lab and imaging results will be communicated to you by [place] Everywherehart, letter or phone within 4 business days after the clinic has received the results. If you do not hear from us within 7 days, please contact the clinic through [employer] or phone. If you have a critical or abnormal lab result, we will notify you by phone as soon as possible.  Submit refill requests through [employer] or call your pharmacy and they will forward the refill request to us. Please allow 3 business days for your refill to be completed.          Additional Information About Your Visit        [employer] Information     [employer] lets you send messages to your doctor, view your test results, renew your prescriptions, schedule appointments and more. To sign up, go to [email or web address] . Click on \"Log in\" on the left side of the screen, which will take you to the Welcome page. Then click on \"Sign up Now\" on the right side of the page.     You will be asked to enter the access code listed below, as well as some personal information. Please follow the directions to create your " "username and password.     Your access code is: [email or web address]  Expires: 2018 10:41 PM     Your access code will  in 90 days. If you need help or a new code, please call your [place] clinic or [phone].        Care EveryWhere ID     This is your Care EveryWhere ID. This could be used by other organizations to access your [place] medical records  [id number]        Your Vitals Were     Pulse Temperature Height Last Period Pulse Oximetry BMI (Body Mass Index)    82 99  F (37.2  C) (Tympanic) 5' 2\" (1.575 m) 2018 100% 24.51 kg/m2       Blood Pressure from Last 3 Encounters:   18 (!) 89/60   18 (!) 88/66   18 96/68    Weight from Last 3 Encounters:   18 134 lb (60.8 kg)   18 133 lb (60.3 kg)   18 137 lb 2 oz (62.2 kg)              Today, you had the following     No orders found for display         Today's Medication Changes          These changes are accurate as of 3/1/18  1:50 PM.  If you have any questions, ask your nurse or doctor.               Start taking these medicines.        Dose/Directions    amoxicillin-clavulanate 875-125 MG per tablet   Commonly known as:  AUGMENTIN   Used for:  Pain, dental   Started by:  Iveth Mcnally MD        Dose:  1 tablet   Take 1 tablet by mouth 2 times daily   Quantity:  20 tablet   Refills:  0            Where to get your medicines      These medications were sent to [facility] [place] [id number] [facility], [place] [address]     Phone:  [phone]     amoxicillin-clavulanate 875-125 MG per tablet                Primary Care Provider Office Phone # Fax #    Napoleon Gray PA-C [phone]       [address] [place] [address] [address]  [place] [address]        Equal Access to Services     STEVEN MUNOZ AH: Phong Wooten, waaxmirna pineda waxay idiin hayaan adeeg kharash la'aan ah. [employer] " [phone].    ATENCIÓN: Si keira schroeder, tiene a madera disposición servicios gratuitos de asistencia lingüística. Trevon wright [phone].    We comply with applicable federal civil rights laws and [place] laws. We do not discriminate on the basis of race, color, national origin, age, disability, sex, sexual orientation, or gender identity.            Thank you!     Thank you for choosing [facility] [place]  for your care. Our goal is always to provide you with excellent care. Hearing back from our patients is one way we can continue to improve our services. Please take a few minutes to complete the written survey that you may receive in the mail after your visit with us. Thank you!             Your Updated Medication List - Protect others around you: Learn how to safely use, store and throw away your medicines at www.disposemymeds.org.          This list is accurate as of 3/1/18  1:50 PM.  Always use your most recent med list.                   Brand Name Dispense Instructions for use Diagnosis    amoxicillin-clavulanate 875-125 MG per tablet    AUGMENTIN    20 tablet    Take 1 tablet by mouth 2 times daily    Pain, dental       B-12 2500 MCG Subl      Place 1 tablet under the tongue 3 times weekly        canagliflozin 100 MG tablet    INVOKANA    30 tablet    Take 1 tablet (100 mg) by mouth every morning (before breakfast)    History of gastric bypass, Hypoglycemia       docusate sodium 100 MG tablet    COLACE    180 tablet    Take 100 mg by mouth 2 times daily as needed for constipation    Other constipation       MAGNESIUM OXIDE PO      Take 500 mg % by mouth        multivitamin  peds with iron 60 MG chewable tablet      Take 2 chew tab by mouth every morning        norgestimate-ethinyl estradiol 0.25-35 MG-MCG per tablet    ORTHO-CYCLEN, SPRINTEC    84 tablet    Take 1 tablet by mouth daily    General counseling for prescription of oral contraceptives       triamcinolone 0.5 % cream    KENALOG    30  g    Apply sparingly to affected area three times daily.    Allergic dermatitis       VIACTIV 500-500-40 MG-UNT-MCG Chew   Generic drug:  calcium-vitamin D-vitamin K      Take 1 tablet by mouth 3 times daily        VITAMIN D3 PO      Take 6,000 Units by mouth daily        VITRON-C  MG Tabs tablet   Generic drug:  ferrous fumarate 65 mg ([place]. FE)-Vitamin C 125 mg      Take 1 tablet by mouth every morning

## 2018-03-01 NOTE — NURSING NOTE
"Chief Complaint   Patient presents with     Dental Pain       Initial BP (!) 89/60  Pulse 82  Temp 99  F (37.2  C) (Tympanic)  Ht 5' 2\" (1.575 m)  Wt 134 lb (60.8 kg)  LMP 02/12/2018  SpO2 100%  BMI 24.51 kg/m2 Estimated body mass index is 24.51 kg/(m^2) as calculated from the following:    Height as of this encounter: 5' 2\" (1.575 m).    Weight as of this encounter: 134 lb (60.8 kg).  Medication Reconciliation: complete  "

## 2018-03-02 ENCOUNTER — OFFICE VISIT (OUTPATIENT)
Dept: PSYCHOLOGY | Facility: CLINIC | Age: 48
End: 2018-03-02
Payer: COMMERCIAL

## 2018-03-02 DIAGNOSIS — F50.9 EATING DISORDER: ICD-10-CM

## 2018-03-02 DIAGNOSIS — F41.9 ANXIETY DISORDER, UNSPECIFIED TYPE: Primary | ICD-10-CM

## 2018-03-02 PROCEDURE — 90834 PSYTX W PT 45 MINUTES: CPT | Performed by: PSYCHOLOGIST

## 2018-03-02 NOTE — MR AVS SNAPSHOT
MRN:[id number]                      After Visit Summary   3/2/2018    Criss Don    MRN: [id number]           Visit Information        Provider Department      3/2/2018 7:00 AM Brenda Perez PsyD [facility] [place] [facility] Generic      Your next 10 appointments already scheduled     Mar 16, 2018  7:00 AM CDT   Return Visit with Brenda Perez PsyD   [facility] [place] ([facility] [place])    [facility]  [place] [address]   [phone]            Apr 06, 2018  9:00 AM CDT   Return Visit with Brenda Perez PsyD   [facility] [place] ([facility] [place])    [facility] [place] [address]   [phone]            Apr 06, 2018 10:15 AM CDT   Nurse Only with EC MA/LPN   [facility] [place] ([facility]    [address] [place] [address] [place] [address]   [phone]            Apr 20, 2018  7:00 AM CDT   Return Visit with Brenda Perez PsyD   [facility] [place] ([facility] [place])    [facility] [place] [address]   [phone]            Apr 20, 2018 10:00 AM CDT   [facility] Guero Owen MD   [facility]    [address]  [place] [address] [phone]            May 04, 2018  9:00 AM CDT   Return Visit with Brenda Perez PsyD   [facility] [place] ([facility] [place])    [address] [place], [place] [address] [place] [address]   [phone]            May 04, 2018 11:30 AM CDT   Return Visit with Cris Paez PA-C   [facility] [place] ([facility])    [address]  [place] [address] [phone]            Oct 26, 2018 11:00 AM CDT   Annual Visit with Cris Paez PA-C   [facility] [place]  "([facility])    [address]  [place] [address]   [phone]            Oct 26, 2018 11:30 AM CDT   Annual Visit with Usman Raymond Diet 1, RD   [facility] ([facility])    [address]  [place] [address]   [phone]              MyChart Information     [employer] lets you send messages to your doctor, view your test results, renew your prescriptions, schedule appointments and more. To sign up, go to [email or web address] . Click on \"Log in\" on the left side of the screen, which will take you to the Welcome page. Then click on \"Sign up Now\" on the right side of the page.     You will be asked to enter the access code listed below, as well as some personal information. Please follow the directions to create your username and password.     Your access code is: [email or web address]  Expires: 2018 10:41 PM     Your access code will  in 90 days. If you need help or a new code, please call your [place] clinic or [phone].        Care EveryWhere ID     This is your Care EveryWhere ID. This could be used by other organizations to access your [place] medical records  [id number]        Equal Access to Services     STEVEN MUNOZ AH: Hadii deandra townsend Sojorge, waaxda luqadaha, qaybta kaalmada kaushik, suri yeager. So [facility] [phone].    ATENCIÓN: Si habla español, tiene a madera disposición servicios gratuitos de asistencia lingüística. Llame al [phone].    We comply with applicable federal civil rights laws and [place] laws. We do not discriminate on the basis of race, color, national origin, age, disability, sex, sexual orientation, or gender identity.            "

## 2018-03-02 NOTE — PROGRESS NOTES
Progress Note    Client Name: Criss Don  Date: 3/2/2018         Service Type: Individual      Session Start Time: 7:00  Session End Time: 7:45      Session Length: 45     Session #: 26     Attendees: Client attended alone    Treatment Plan Last Reviewed: 3/2/2018  PHQ-9 / NED-7 :      DATA      Progress Since Last Session (Related to Symptoms / Goals / Homework):   Symptoms: Stable    Homework: Partially completed      Episode of Care Goals: Minimal progress - ACTION (Actively working towards change); Intervened by reinforcing change plan / affirming steps taken     Current / Ongoing Stressors and Concerns:   Client has scheduled an intake at [facility]. Reported being nervous but willing to address disordered eating more effectively. Reported that current stressors include waiting for her divorce to be finalized and financial strain. Reported that she has been more aware of unhealthy thinking patterns that are prompted by self-doubt, guilt, and shame.             Treatment Objective(s) Addressed in This Session:   stress management  Maintain compliance with post-surgical dietary needs     Intervention:   CBT: explored underlying beliefs that increase feelings of shame and guilt  Supportive therapy:  provided active listening, support, and encouragement. Reinforced client for being more forthcoming about her struggles. Applauded client for following through with scheduling her intake at [facility].             ASSESSMENT: Current Emotional / Mental Status (status of significant symptoms):   Risk status (Self / Other harm or suicidal ideation)   Client denies current fears or concerns for personal safety.   Client denies current or recent suicidal ideation or behaviors.   Client denies current or recent homicidal ideation or behaviors.   Client denies current or recent self injurious behavior or ideation.   Client denies other safety concerns.   A safety and  risk management plan has not been developed at this time, however client was given the after-hours number / 911 should there be a change in any of these risk factors.     Appearance:   Appropriate    Eye Contact:   Good    Psychomotor Behavior: Normal    Attitude:   Cooperative  Pleasant    Orientation:   All   Speech    Rate / Production: Normal     Volume:  Normal    Mood:    Anxious    Affect:    Appropriate    Thought Content:  Clear    Thought Form:  Coherent  Logical    Insight:    Good      Medication Review:   No current psychiatric medications prescribed     Medication Compliance:   Yes     Changes in Health Issues:   None reported     Chemical Use Review:   Substance Use: Chemical use reviewed, no active concerns identified      Tobacco Use: No current tobacco use.       Collateral Reports Completed:   Routed note to Cris Paez PA-C at the [facility]    PLAN: (Client Tasks / Therapist Tasks / Other)  Future appointments are scheduled. Client and I will continue to meet regularly until she is able to get an ED assessment and recommendations, at which time we will re-evaluate her treatment options. Continue: Practice acceptance of your new body shape as discussed in session. Surface and challenge unrealistic expectations and disproportionate thoughts. Surface and challenge disproportionate and unjustified guilt as discussed in session.       Brenda Perez PsyD LP                                                       Treatment Plan    Client's Name: Criss Don  YOB: 1970    Date: 3/2/2018    DSM-V Diagnoses: 300.00 Unspecified Anxiety Disorder  Psychosocial / Contextual Factors: divorce proceedings, health concerns  WHODAS: 20    Referral / Collaboration:  Referral to another professional/service is not indicated at this time..    Anticipated number of session or this episode of care: reassess after 12 sessions      MeasurableTreatment Goal(s) related to diagnosis /  functional impairment(s)  Goal 1: Client will learn coping skills to manage stress and adjust to post-surgical lifestyle changes more effectively.    I will know I've met my goal when I'm not weighing myself all the time and feeling more comfortable with my weight loss.      Objective #A (Client Action)    Client will identify thinking patterns that contribute to anxiety about weight and eating habits.  Status: Continued - Date(s): 3/2/2018    Intervention(s)  Therapist will assign homework (thought  logs), assist client in surfacing and replacement ineffective thinking patterns.    Objective #B  Client will identify new ways to cope with stress and worry thoughts.  Status: Continued - Date(s): 3/2/2018    Intervention(s)  Therapist will assign homework, problem-solve barriers to follow through.    Objective #C  Client will identify and challenge unreasonable or unrealistic expectations about weight loss.  Status: Continued - Date(s): 3/2/2018    Intervention(s)  Therapist will assist client in identifying and balancing her expectations.        Client has reviewed and agreed to the above plan.      Brenda Perez PsyD LP  3/2/2018

## 2018-03-02 NOTE — Clinical Note
Daryl Lynch, just wanted to update you and let you know that Paulette has scheduled her intake at [facility] for 3/7/2018. Thanks!

## 2018-03-06 ENCOUNTER — OFFICE VISIT (OUTPATIENT)
Dept: FAMILY MEDICINE | Facility: CLINIC | Age: 48
End: 2018-03-06
Payer: COMMERCIAL

## 2018-03-06 VITALS
BODY MASS INDEX: 24.51 KG/M2 | HEART RATE: 66 BPM | SYSTOLIC BLOOD PRESSURE: 98 MMHG | DIASTOLIC BLOOD PRESSURE: 52 MMHG | WEIGHT: 134 LBS | TEMPERATURE: 97.8 F

## 2018-03-06 DIAGNOSIS — B96.89 BACTERIAL VAGINOSIS: ICD-10-CM

## 2018-03-06 DIAGNOSIS — F45.22 BODY DYSMORPHIC DISORDER: ICD-10-CM

## 2018-03-06 DIAGNOSIS — N89.8 VAGINAL ITCHING: Primary | ICD-10-CM

## 2018-03-06 DIAGNOSIS — Z98.84 HISTORY OF GASTRIC BYPASS: ICD-10-CM

## 2018-03-06 DIAGNOSIS — N76.0 BACTERIAL VAGINOSIS: ICD-10-CM

## 2018-03-06 LAB
SPECIMEN SOURCE: ABNORMAL
WET PREP SPEC: ABNORMAL

## 2018-03-06 PROCEDURE — 87210 SMEAR WET MOUNT SALINE/INK: CPT | Performed by: PHYSICIAN ASSISTANT

## 2018-03-06 PROCEDURE — 99213 OFFICE O/P EST LOW 20 MIN: CPT | Performed by: PHYSICIAN ASSISTANT

## 2018-03-06 RX ORDER — METRONIDAZOLE 7.5 MG/G
1 GEL VAGINAL AT BEDTIME
Qty: 70 G | Refills: 0 | Status: SHIPPED | OUTPATIENT
Start: 2018-03-06 | End: 2019-02-01

## 2018-03-06 RX ORDER — FLUCONAZOLE 150 MG/1
TABLET ORAL
Qty: 3 TABLET | Refills: 0 | Status: SHIPPED | OUTPATIENT
Start: 2018-03-06 | End: 2018-07-20

## 2018-03-06 NOTE — PROGRESS NOTES
"  SUBJECTIVE:   Criss Don is a 47 year old female who presents to clinic today for the following health issues:      Vaginal Symptoms  Onset:  Yesterday     Description:  Vaginal Discharge: thinks so    Itching (Pruritis): YES  Burning sensation:  YES- \"tight, swollen kind of feeling\"   Odor: YES    Accompanying Signs & Symptoms:  Pain with Urination: no   Abdominal Pain: no   Fever: no     History:   Sexually active: no   New Partner: no   Possibility of Pregnancy:  No    Precipitating factors:   Recent Antibiotic Use: YES- amoxicillin     Alleviating factors:  Na     Therapies Tried and outcome: na     Was taking Amox for dental pain/abscess. Developed white vaginal discharge and itching over the past 3 days. will be starting clindamycin prior to tooth extraction next week.      Problem list and histories reviewed & adjusted, as indicated.  Additional history: as documented    Patient Active Problem List   Diagnosis     Labial abscess     CARDIOVASCULAR SCREENING; LDL GOAL LESS THAN 160     Mass of right foot     History of abnormal cervical Pap smear     Intertrigo     Bilateral edema of lower extremity     Bariatric surgery status     Malnutrition following gastrointestinal surgery     Body dysmorphic disorder     Hypokalemia     Overweight (BMI 25.0-29.9)     Hypoglycemia     History of gastric bypass     Past Surgical History:   Procedure Laterality Date     LAPAROSCOPIC BYPASS GASTRIC N/A 10/26/2016    Procedure: LAPAROSCOPIC BYPASS GASTRIC;  Surgeon: Romario Reyna MD;  Location: [facility] OR     NO HISTORY OF SURGERY         Social History   Substance Use Topics     Smoking status: Never Smoker     Smokeless tobacco: Never Used     Alcohol use 0.0 oz/week     0 Standard drinks or equivalent per week      Comment: very rare     Family History   Problem Relation Age of Onset     Hypertension Mother      Breast Cancer Mother      64 dx     Allergies Mother      Obesity Mother      Respiratory Mother  "     Asthma     HEART DISEASE Mother      Hypertension Maternal Grandmother      CANCER Maternal Grandmother      Ovarian     CANCER Sister      Cervical     CANCER Other      Mat. great aunt-ovarian     Hypertension Brother      CANCER Maternal Aunt      ?Gastric     Brain Cancer Cousin      maternal           Reviewed and updated as needed this visit by clinical staff       Reviewed and updated as needed this visit by Provider         ROS:  Constitutional, HEENT, cardiovascular, pulmonary, gi and gu systems are negative, except as otherwise noted.    OBJECTIVE:     BP 98/52 (BP Location: Left arm, Patient Position: Chair, Cuff Size: Adult Regular)  Pulse 66  Temp 97.8  F (36.6  C) (Tympanic)  Wt 134 lb (60.8 kg)  LMP 02/12/2018  BMI 24.51 kg/m2  Body mass index is 24.51 kg/(m^2).  GENERAL: healthy, alert and no distress  RESP: lungs clear to auscultation - no rales, rhonchi or wheezes  CV: regular rate and rhythm, normal S1 S2, no S3 or S4, no murmur, click or rub, no peripheral edema  ABDOMEN: soft, nontender, no hepatosplenomegaly, no masses and bowel sounds normal   (female): deferred, self wet prep    Diagnostic Test Results:  Results for orders placed or performed in visit on 03/06/18 (from the past 24 hour(s))   Wet prep   Result Value Ref Range    Specimen Description Vagina     Wet Prep Clue cells seen (A)     Wet Prep Yeast seen (A)     Wet Prep No Trichomonas seen        ASSESSMENT/PLAN:       1. Bacterial vaginosis  Will treat BV with Metrogel and subsequently give diflucan for yeast.  She may need to repeat diflucan this when she finished with clinda.  She is agreeable to this.  - metroNIDAZOLE (METROGEL) 0.75 % vaginal gel; Place 1 applicator (5 g) vaginally At Bedtime for 5 days  Dispense: 70 g; Refill: 0    2. Vaginal itching  - Wet prep  - fluconazole (DIFLUCAN) 150 MG tablet; Take 1 tablet by mouth x 1, repeat following antibiotics if needed  Dispense: 3 tablet; Refill: 0    3. Body  dysmorphic disorder  Will be having intake tomorrow with [facility]    4. History of gastric bypass  Per surgery      See Patient Instructions    Napoleon Gray PA-C  [facility] [place]

## 2018-03-06 NOTE — NURSING NOTE
"Chief Complaint   Patient presents with     Vaginal Problem       Initial BP 98/52 (BP Location: Left arm, Patient Position: Chair, Cuff Size: Adult Regular)  Pulse 66  Temp 97.8  F (36.6  C) (Tympanic)  Wt 134 lb (60.8 kg)  LMP 02/12/2018  BMI 24.51 kg/m2 Estimated body mass index is 24.51 kg/(m^2) as calculated from the following:    Height as of 3/1/18: 5' 2\" (1.575 m).    Weight as of this encounter: 134 lb (60.8 kg).  Medication Reconciliation: complete  "

## 2018-03-06 NOTE — MR AVS SNAPSHOT
After Visit Summary   3/6/2018    Criss Don    MRN: [id number]           Patient Information     Date Of Birth          1970        Visit Information        Provider Department      3/6/2018 2:40 PM Napoleon Gray PA-C [facility]        Today's Diagnoses     Vaginal itching    -  1    Bacterial vaginosis           Follow-ups after your visit        Follow-up notes from your care team     Return  1-2 weeks if new or worsening symptoms.      Your next 10 appointments already scheduled     Mar 16, 2018  7:00 AM CDT   Return Visit with Brenda Perez Song   [facility] [place] ([facility] [place])    [address] [facility] [place] [address]   [phone]            Apr 06, 2018  9:00 AM CDT   Return Visit with Brenda Perez PsyD   [facility] [place] ([facility] [place])    [address] [facility] [place] [address]   [phone]            Apr 06, 2018 10:15 AM CDT   Nurse Only with EC MA/LPN   [facility] [place] ([facility] [place])    [address] [facility] [place] [address]   [phone]            Apr 20, 2018  7:00 AM CDT   Return Visit with Brenda Perez PsyD   [facility] [place] ([facility] [place])    [address] [facility] [place] [address]   [phone]            Apr 20, 2018 10:00 AM CDT   Return Visit with Guero Owen MD   [facility] [place] [facility]    [address] [place] [id number] [phone]            May 04, 2018  9:00 AM CDT   Return Visit with Brenda Perez PsyD   [facility] [place] ([facility] [place])    [address] [facility] [place] [address]   [phone]            May 04, 2018 11:30 AM CDT   Return Visit with Cris Paez PA-C   [facility] [place] [facility]    [address] [place], [place] [id number]  [facility] [address]   [phone]            Oct 26, 2018 11:00 AM CDT   Annual Visit with Cris Paez PA-C   [facility] ([facility])    [address] [facility] [place] [address]   [phone]            Oct 26, 2018 11:30 AM CDT   Annual Visit with Usman Raymond Diet 1, RD   [facility] ([facility])    [address] [facility] [place] [address] [phone]              Who to contact     If you have questions or need follow up information about today's clinic visit or your schedule please contact [facility] [place] directly at [phone].  Normal or non-critical lab and imaging results will be communicated to you by Conviohart, letter or phone within 4 business days after the clinic has received the results. If you do not hear from us within 7 days, please contact the clinic through DailyCredt or phone. If you have a critical or abnormal lab result, we will notify you by phone as soon as possible.  Submit refill requests through Tutum or call your pharmacy and they will forward the refill request to us. Please allow 3 business days for your refill to be completed.          Additional Information About Your Visit        Tutum Information     Tutum lets you send messages to your doctor, view your test results, renew your prescriptions, schedule appointments and more. To sign up, go to [email or web address] . Click on \"Log in\" on the left side of the screen, which will take you to the Welcome page. Then click on \"Sign up Now\" on the right side of the page.     You will be asked to enter the access code listed below, as well as some personal information. Please follow the directions to create your username and password.     Your access code is: [email or web address]  Expires: 5/16/2018 10:41 PM     Your " access code will  in 90 days. If you need help or a new code, please call your [facility] clinic or [phone].        Care EveryWhere ID     This is your Care EveryWhere ID. This could be used by other organizations to access your [facility] medical records  [id number]        Your Vitals Were     Pulse Temperature Last Period BMI (Body Mass Index)          66 97.8  F (36.6  C) (Tympanic) 2018 24.51 kg/m2         Blood Pressure from Last 3 Encounters:   18 98/52   18 (!) 89/60   18 (!) 88/66    Weight from Last 3 Encounters:   18 134 lb (60.8 kg)   18 134 lb (60.8 kg)   18 133 lb (60.3 kg)              We Performed the Following     Wet prep          Today's Medication Changes          These changes are accurate as of 3/6/18  3:12 PM.  If you have any questions, ask your nurse or doctor.               Start taking these medicines.        Dose/Directions    fluconazole 150 MG tablet   Commonly known as:  DIFLUCAN   Used for:  Vaginal itching   Started by:  Napoleon Gray PA-C        Take 1 tablet by mouth x 1, repeat following antibiotics if needed   Quantity:  3 tablet   Refills:  0       metroNIDAZOLE 0.75 % vaginal gel   Commonly known as:  METROGEL   Used for:  Bacterial vaginosis   Started by:  Napoleon Gray PA-C        Dose:  1 applicator   Place 1 applicator (5 g) vaginally At Bedtime for 5 days   Quantity:  70 g   Refills:  0            Where to get your medicines      These medications were sent to [facility] [place] [address] [place] [address] [facility], [place] [address]     Phone:  [phone]     fluconazole 150 MG tablet    metroNIDAZOLE 0.75 % vaginal gel                Primary Care Provider Office Phone # Fax #    Napoleon Gray PA-C [phone]       [address] [place] [address] [address]  [place] [address]        Equal Access to Services     STEVEN MUNOZ AH: Phong cowart  kristian Wooten, waniyahda luqadaha, qaybta kaalmada kaushik, suri reed layumikobienvenido shobha. So [facility] [phone].    ATENCIÓN: Si habla elda, tiene a madera disposición servicios gratuitos de asistencia lingüística. Trevon al [phone].    We comply with applicable federal civil rights laws and [place] laws. We do not discriminate on the basis of race, color, national origin, age, disability, sex, sexual orientation, or gender identity.            Thank you!     Thank you for choosing [facility]  for your care. Our goal is always to provide you with excellent care. Hearing back from our patients is one way we can continue to improve our services. Please take a few minutes to complete the written survey that you may receive in the mail after your visit with us. Thank you!             Your Updated Medication List - Protect others around you: Learn how to safely use, store and throw away your medicines at www.disposemymeds.org.          This list is accurate as of 3/6/18  3:12 PM.  Always use your most recent med list.                   Brand Name Dispense Instructions for use Diagnosis    amoxicillin-clavulanate 875-125 MG per tablet    AUGMENTIN    20 tablet    Take 1 tablet by mouth 2 times daily    Pain, dental       B-12 2500 MCG Subl      Place 1 tablet under the tongue 3 times weekly        canagliflozin 100 MG tablet    INVOKANA    30 tablet    Take 1 tablet (100 mg) by mouth every morning (before breakfast)    History of gastric bypass, Hypoglycemia       docusate sodium 100 MG tablet    COLACE    180 tablet    Take 100 mg by mouth 2 times daily as needed for constipation    Other constipation       fluconazole 150 MG tablet    DIFLUCAN    3 tablet    Take 1 tablet by mouth x 1, repeat following antibiotics if needed    Vaginal itching       MAGNESIUM OXIDE PO      Take 500 mg % by mouth        metroNIDAZOLE 0.75 % vaginal gel    METROGEL    70 g    Place 1 applicator (5 g)  vaginally At Bedtime for 5 days    Bacterial vaginosis       multivitamin  peds with iron 60 MG chewable tablet      Take 2 chew tab by mouth every morning        norgestimate-ethinyl estradiol 0.25-35 MG-MCG per tablet    ORTHO-CYCLEN, SPRINTEC    84 tablet    Take 1 tablet by mouth daily    General counseling for prescription of oral contraceptives       triamcinolone 0.5 % cream    KENALOG    30 g    Apply sparingly to affected area three times daily.    Allergic dermatitis       VIACTIV 500-500-40 MG-UNT-MCG Chew   Generic drug:  calcium-vitamin D-vitamin K      Take 1 tablet by mouth 3 times daily        VITAMIN D3 PO      Take 6,000 Units by mouth daily        VITRON-C  MG Tabs tablet   Generic drug:  ferrous fumarate 65 mg ([place]. FE)-Vitamin C 125 mg      Take 1 tablet by mouth every morning

## 2018-03-07 ENCOUNTER — TRANSFERRED RECORDS (OUTPATIENT)
Dept: HEALTH INFORMATION MANAGEMENT | Facility: CLINIC | Age: 48
End: 2018-03-07

## 2018-03-12 ENCOUNTER — TELEPHONE (OUTPATIENT)
Dept: FAMILY MEDICINE | Facility: CLINIC | Age: 48
End: 2018-03-12

## 2018-03-12 DIAGNOSIS — B96.89 BACTERIAL VAGINOSIS: Primary | ICD-10-CM

## 2018-03-12 DIAGNOSIS — N76.0 BACTERIAL VAGINOSIS: Primary | ICD-10-CM

## 2018-03-12 RX ORDER — METRONIDAZOLE 500 MG/1
500 TABLET ORAL 2 TIMES DAILY
Qty: 14 TABLET | Refills: 0 | Status: SHIPPED | OUTPATIENT
Start: 2018-03-12 | End: 2018-07-20

## 2018-03-12 NOTE — TELEPHONE ENCOUNTER
Reason for Call:  Other call back    Detailed comments: patient has question in regards to yeast infection    Phone Number Patient can be reached at: Home number on file [phone] (home)    Best Time: any  Can we leave a detailed message on this number? YES    Call taken on 3/12/2018 at 9:21 AM by Jennifer Soto

## 2018-03-12 NOTE — TELEPHONE ENCOUNTER
Informed and agreed with plan.  Patient report that she doesn't drink.    Pharmacy pended.  Please order as appropriate.  Thank you    Ruth Interiano RN

## 2018-03-12 NOTE — TELEPHONE ENCOUNTER
Patient states that seen last Tuesday and put on antibiotic and states she feels the bacterial vaginosis is still there.  She notes that she does not have itching as she does with the yeast.  Having white discharge without any odor.      Denies: burning, urgency, frequency with urination, cloudy urine, fever.      Patient reports used 5 doses of vaginal gel with last dose on Friday.  Patient is currently in [place],  Please advise follow up recommendations  Desirae Tolbert RN - Triage  [facility]      LOV notes 3/6/2018  1. Bacterial vaginosis  Will treat BV with Metrogel and subsequently give diflucan for yeast.  She may need to repeat diflucan this when she finished with clinda.  She is agreeable to this.  - metroNIDAZOLE (METROGEL) 0.75 % vaginal gel; Place 1 applicator (5 g) vaginally At Bedtime for 5 days  Dispense: 70 g; Refill: 0     2. Vaginal itching  - Wet prep  - fluconazole (DIFLUCAN) 150 MG tablet; Take 1 tablet by mouth x 1, repeat following antibiotics if needed  Dispense: 3 tablet; Refill: 0     3. Body dysmorphic disorder  Will be having intake tomorrow with [facility]     4. History of gastric bypass  Per surgery

## 2018-03-12 NOTE — TELEPHONE ENCOUNTER
We can try oral metronidazole ( 1 tablet BID x 7 days).  She should avoid alcohol use with this medication as it can cause vomiting. Can we find out which pharmacy to send this to?

## 2018-03-16 ENCOUNTER — OFFICE VISIT (OUTPATIENT)
Dept: PSYCHOLOGY | Facility: CLINIC | Age: 48
End: 2018-03-16
Payer: COMMERCIAL

## 2018-03-16 DIAGNOSIS — F41.1 GENERALIZED ANXIETY DISORDER: Primary | ICD-10-CM

## 2018-03-16 DIAGNOSIS — F50.9 EATING DISORDER: ICD-10-CM

## 2018-03-16 PROCEDURE — 90834 PSYTX W PT 45 MINUTES: CPT | Performed by: PSYCHOLOGIST

## 2018-03-16 NOTE — PROGRESS NOTES
"                                             Progress Note    Client Name: Criss Don  Date: 3/16/2018         Service Type: Individual      Session Start Time: 7:00  Session End Time: 7:45      Session Length: 45     Session #: 27     Attendees: Client attended alone    Treatment Plan Last Reviewed: 3/16/2018  PHQ-9 / NED-7 :      DATA      Progress Since Last Session (Related to Symptoms / Goals / Homework):   Symptoms: increased anxiety    Homework: Partially completed      Episode of Care Goals: Minimal progress - ACTION (Actively working towards change); Intervened by reinforcing change plan / affirming steps taken     Current / Ongoing Stressors and Concerns:   Client followed through with her referral to [facility] and is scheduled to begin services there next week. Reported feeling nervous but \"I know it's the right thing to do.\" Reported that her divorce has been finalized, which is a \"huge relief.\" Client acknowledged that her eating patterns and anxiety levels have been \"worse than I really thought.\" She also reported that her anxiety is likely \"causing more problems with sleep than I thought.\"              Treatment Objective(s) Addressed in This Session:   stress management  Maintain compliance with post-surgical dietary needs     Intervention:   CBT: explored underlying beliefs that increase feelings of shame and guilt  Supportive therapy:  provided active listening, support, and encouragement. Reinforced client for being more forthcoming about her struggles. Applauded client for following through with her intake at [facility] and scheduling additional services with them.             ASSESSMENT: Current Emotional / Mental Status (status of significant symptoms):   Risk status (Self / Other harm or suicidal ideation)   Client denies current fears or concerns for personal safety.   Client denies current or recent suicidal ideation or behaviors.   Client denies current or recent homicidal " ideation or behaviors.   Client denies current or recent self injurious behavior or ideation.   Client denies other safety concerns.   A safety and risk management plan has not been developed at this time, however client was given the after-hours number / 911 should there be a change in any of these risk factors.     Appearance:   Appropriate    Eye Contact:   Good    Psychomotor Behavior: Normal    Attitude:   Cooperative  Pleasant    Orientation:   All   Speech    Rate / Production: Normal     Volume:  Normal    Mood:    Anxious    Affect:    Appropriate    Thought Content:  Clear    Thought Form:  Coherent  Logical    Insight:    Good      Medication Review:   No current psychiatric medications prescribed     Medication Compliance:   Yes     Changes in Health Issues:   None reported     Chemical Use Review:   Substance Use: Chemical use reviewed, no active concerns identified      Tobacco Use: No current tobacco use.       Collateral Reports Completed:   Received summary of diagnostic interview and recommendations from [facility]. Will contact client's assigned therapist to coordinate care.     PLAN: (Client Tasks / Therapist Tasks / Other)  Future appointments are scheduled. I will contact her assigned therapist at [facility] to coordinate care. Continue: Practice acceptance of your new body shape as discussed in session. Surface and challenge unrealistic expectations and disproportionate thoughts. Surface and challenge disproportionate and unjustified guilt as discussed in session.       Brenda Perez PsyD LP                                                       Treatment Plan    Client's Name: Criss Don  YOB: 1970    Date: 3/16/2018    DSM-V Diagnoses: 300.02 Generalized Anxiety Disorder, eating disorder, unspecified  Psychosocial / Contextual Factors: divorce proceedings, health concerns  WHODAS: 22    Referral / Collaboration:  Referral to another professional/service is not  indicated at this time..    Anticipated number of session or this episode of care: reassess after 12 sessions      MeasurableTreatment Goal(s) related to diagnosis / functional impairment(s)  Goal 1: Client will learn coping skills to manage stress and adjust to post-surgical lifestyle changes more effectively.    I will know I've met my goal when I'm not weighing myself all the time and feeling more comfortable with my weight loss.      Objective #A (Client Action)    Client will identify thinking patterns that contribute to anxiety about weight and eating habits.  Status: Continued - Date(s): 3/16/2018    Intervention(s)  Therapist will assign homework (thought  logs), assist client in surfacing and replacement ineffective thinking patterns.    Objective #B  Client will identify new ways to cope with stress and worry thoughts.  Status: Continued - Date(s): 3/16/2018    Intervention(s)  Therapist will assign homework, problem-solve barriers to follow through.    Objective #C  Client will identify and challenge unreasonable or unrealistic expectations about weight loss.  Status: Continued - Date(s): 3/16/2018    Intervention(s)  Therapist will assist client in identifying and balancing her expectations.        Client has reviewed and agreed to the above plan.      Brenda Perez PsyD LP  3/16/2018

## 2018-03-16 NOTE — MR AVS SNAPSHOT
MRN:[id number]                      After Visit Summary   3/16/2018    Criss Don    MRN: [id number]           Visit Information        Provider Department      3/16/2018 7:00 AM Brenda Perez PsyD [facility] [place] [facility] Generic      Your next 10 appointments already scheduled     Apr 06, 2018  9:00 AM CDT   Return Visit with Brenda Perez PsyD   [facility] [place] ([facility] [place])    [facility] [place] [address]   [phone]            Apr 06, 2018 10:15 AM CDT   Nurse Only with EC MA/LPN   [facility] ([facility])    [address] [place], [place] [address] [place] [address]   [phone]            Apr 20, 2018  7:00 AM CDT   Return Visit with Brenda Perez PsyD   [facility] [place] ([facility] [place])    [address] [place], [place] [address] [place] [address]   [phone]            Apr 20, 2018 10:00 AM CDT   Return Visit with Guero Owen MD   [facility]    [address]  [facility] [address] [phone]            May 04, 2018  9:00 AM CDT   Return Visit with Brenda Perez PsyD   [facility] [place] ([facility] [place])    [address] [place] [address]   [phone]            May 04, 2018 11:30 AM CDT   Return Visit with Cris Paez PA-C   [facility] [place] ([facility])    [address]  [facility] [id number] [phone]            May 18, 2018  9:00 AM CDT   Return Visit with Brenda Perez PsyD   [facility] [place] ([facility] [place])    [address] [place], [place] [address] [place] [address]   [phone]            Oct 26, 2018 11:00 AM CDT   Annual Visit with Cris Paez PA-C   [facility] [place]  "([facility])    [address] [facility] [address] [address]  [place] [address]   [phone]            Oct 26, 2018 11:30 AM CDT   Annual Visit with Usman Raymond Diet 1, RD   [facility] ([facility])    [address] [facility] [address]  [place] [address]   [phone]              MyChart Information     [employer] lets you send messages to your doctor, view your test results, renew your prescriptions, schedule appointments and more. To sign up, go to [email or web address] . Click on \"Log in\" on the left side of the screen, which will take you to the Welcome page. Then click on \"Sign up Now\" on the right side of the page.     You will be asked to enter the access code listed below, as well as some personal information. Please follow the directions to create your username and password.     Your access code is: [email or web address]  Expires: 2018 11:41 PM     Your access code will  in 90 days. If you need help or a new code, please call your [facility] clinic or [phone].        Care EveryWhere ID     This is your Care EveryWhere ID. This could be used by other organizations to access your [facility] medical records  [id number]        Equal Access to Services     STEVEN MUNOZ AH: Hadii deandra townsend Sojorge, waaxda luqadaha, qaybta kaalmada kaushik, suri yeager. So [facility] [phone].    ATENCIÓN: Si habla español, tiene a madera disposición servicios gratuitos de asistencia lingüística. Llame al [phone].    We comply with applicable federal civil rights laws and [place] laws. We do not discriminate on the basis of race, color, national origin, age, disability, sex, sexual orientation, or gender identity.            "

## 2018-04-06 ENCOUNTER — ALLIED HEALTH/NURSE VISIT (OUTPATIENT)
Dept: NURSING | Facility: CLINIC | Age: 48
End: 2018-04-06
Payer: COMMERCIAL

## 2018-04-06 ENCOUNTER — OFFICE VISIT (OUTPATIENT)
Dept: PSYCHOLOGY | Facility: CLINIC | Age: 48
End: 2018-04-06
Payer: COMMERCIAL

## 2018-04-06 DIAGNOSIS — F41.1 GENERALIZED ANXIETY DISORDER: Primary | ICD-10-CM

## 2018-04-06 DIAGNOSIS — F50.9 EATING DISORDER: ICD-10-CM

## 2018-04-06 DIAGNOSIS — Z23 NEED FOR VACCINATION: Primary | ICD-10-CM

## 2018-04-06 PROCEDURE — 90471 IMMUNIZATION ADMIN: CPT

## 2018-04-06 PROCEDURE — 90632 HEPA VACCINE ADULT IM: CPT

## 2018-04-06 PROCEDURE — 90834 PSYTX W PT 45 MINUTES: CPT | Performed by: PSYCHOLOGIST

## 2018-04-06 NOTE — MR AVS SNAPSHOT
After Visit Summary   4/6/2018    Criss Don    MRN: [id number]           Patient Information     Date Of Birth          1970        Visit Information        Provider Department      4/6/2018 10:15 AM EC MA/LPN [facility]        Today's Diagnoses     Need for vaccination    -  1       Follow-ups after your visit        Your next 10 appointments already scheduled     Apr 06, 2018 10:15 AM CDT   Nurse Only with EC MA/LPN   [facility] ([facility])    [address] [place] [address]   [phone]            Apr 20, 2018  7:00 AM CDT   Return Visit with Brenda Perez PsyD   [facility] ([facility])    [address] [place] [address]   [phone]            Apr 20, 2018  8:00 AM CDT   LAB with EC LAB   [facility] ([facility])    [address] [place] [address]   [phone]           OUTSIDE LABS: Please include name of facility and Physician that is requesting outside labs be drawn.  Please indicate if labs are fasting or non-fasting on appt notes.  Be as specific as you can on which labs are being drawn.            Apr 20, 2018 10:00 AM CDT   Return Visit with Guero Owen MD   [facility]    [address] [facility]  [facility] [address]   [phone]            May 04, 2018  9:00 AM CDT   Return Visit with Brenda Perez PsyD   [facility] ([facility]    [address] [place] [address]   [phone]            May 04, 2018 11:30 AM CDT   Return Visit with Cris Paez PA-C   [facility] ([facility])    [address]  [facility] [address]   [phone]            May 18, 2018  9:00 AM CDT   Return  "Visit with Brenda Perez PsyD   [facility] [place] ([facility] [place])    [address] [facility]  [place] [address]   [phone]            Jun 01, 2018 11:00 AM CDT   Return Visit with Brenda Perez PsyD   [facility] [place] ([facility] [place])    [address] [facility]  [place] [address]   [phone]            Oct 26, 2018 11:00 AM CDT   Annual Visit with Cris Paez PA-C   [facility] ([facility])    [address] [address] [phone]            Oct 26, 2018 11:30 AM CDT   Annual Visit with Usman Raymond Diet 1, RD   [facility] ([facility])    [facility] [address] [address] [phone]              Who to contact     If you have questions or need follow up information about today's clinic visit or your schedule please contact [facility] directly at [phone].  Normal or non-critical lab and imaging results will be communicated to you by Ovulinehart, letter or phone within 4 business days after the clinic has received the results. If you do not hear from us within 7 days, please contact the clinic through MyChart or phone. If you have a critical or abnormal lab result, we will notify you by phone as soon as possible.  Submit refill requests through [employer] or call your pharmacy and they will forward the refill request to us. Please allow 3 business days for your refill to be completed.          Additional Information About Your Visit        [employer] Information     [employer] lets you send messages to your doctor, view your test results, renew your prescriptions, schedule appointments and more. To sign up, go to [email or web address] . Click on \"Log in\" on the left side of the screen, which will take you to the Welcome page. Then click " "on \"Sign up Now\" on the right side of the page.     You will be asked to enter the access code listed below, as well as some personal information. Please follow the directions to create your username and password.     Your access code is: [email or web address]  Expires: 2018 11:41 PM     Your access code will  in 90 days. If you need help or a new code, please call your [facility] clinic or [phone].        Care EveryWhere ID     This is your Care EveryWhere ID. This could be used by other organizations to access your [facility] medical records  [id number]         Blood Pressure from Last 3 Encounters:   18 98/52   18 (!) 89/60   18 (!) 88/66    Weight from Last 3 Encounters:   18 134 lb (60.8 kg)   18 134 lb (60.8 kg)   18 133 lb (60.3 kg)              We Performed the Following     1st  Administration  [96599]     HEPATITIS A VACCINE, ADULT  [[id number]]        Primary Care Provider Office Phone # Fax #    Napoleon Gray PA-C [phone] [address]  [place] [address]        Equal Access to Services     [facility] TAMMY AH: Hadii aad ku hadasho Soomaali, waaxda luqadaha, qaybta kaalmada adeegyada, waxay arturoin hayisabelle cuello . So [facility] [phone].    ATENCIÓN: Si habla español, tiene a madera disposición servicios gratuitos de asistencia lingüística. Llame al [phone].    We comply with applicable federal civil rights laws and [place] laws. We do not discriminate on the basis of race, color, national origin, age, disability, sex, sexual orientation, or gender identity.            Thank you!     Thank you for choosing [facility] [place]  for your care. Our goal is always to provide you with excellent care. Hearing back from our patients is one way we can continue to improve our services. Please take a few minutes to complete the written survey that you may receive in the mail after your visit with us. Thank you!      "        Your Updated Medication List - Protect others around you: Learn how to safely use, store and throw away your medicines at www.disposemymeds.org.          This list is accurate as of 4/6/18 10:10 AM.  Always use your most recent med list.                   Brand Name Dispense Instructions for use Diagnosis    amoxicillin-clavulanate 875-125 MG per tablet    AUGMENTIN    20 tablet    Take 1 tablet by mouth 2 times daily    Pain, dental       B-12 2500 MCG Subl      Place 1 tablet under the tongue 3 times weekly        canagliflozin 100 MG tablet    INVOKANA    30 tablet    Take 1 tablet (100 mg) by mouth every morning (before breakfast)    History of gastric bypass, Hypoglycemia       docusate sodium 100 MG tablet    COLACE    180 tablet    Take 100 mg by mouth 2 times daily as needed for constipation    Other constipation       fluconazole 150 MG tablet    DIFLUCAN    3 tablet    Take 1 tablet by mouth x 1, repeat following antibiotics if needed    Vaginal itching       MAGNESIUM OXIDE PO      Take 500 mg % by mouth        metroNIDAZOLE 500 MG tablet    FLAGYL    14 tablet    Take 1 tablet (500 mg) by mouth 2 times daily    Bacterial vaginosis       multivitamin  peds with iron 60 MG chewable tablet      Take 2 chew tab by mouth every morning        norgestimate-ethinyl estradiol 0.25-35 MG-MCG per tablet    ORTHO-CYCLEN, SPRINTEC    84 tablet    Take 1 tablet by mouth daily    General counseling for prescription of oral contraceptives       triamcinolone 0.5 % cream    KENALOG    30 g    Apply sparingly to affected area three times daily.    Allergic dermatitis       VIACTIV 500-500-40 MG-UNT-MCG Chew   Generic drug:  calcium-vitamin D-vitamin K      Take 1 tablet by mouth 3 times daily        VITAMIN D3 PO      Take 6,000 Units by mouth daily        VITRON-C  MG Tabs tablet   Generic drug:  ferrous fumarate 65 mg ([place]. FE)-Vitamin C 125 mg      Take 1 tablet by mouth every morning

## 2018-04-06 NOTE — PROGRESS NOTES
"                                             Progress Note    Client Name: Criss Don  Date: 4/6/2018         Service Type: Individual      Session Start Time: 9:00  Session End Time: 9:45      Session Length: 45     Session #: 28     Attendees: Client attended alone    Treatment Plan Last Reviewed: 4/6/2018  PHQ-9 / NED-7 :      DATA      Progress Since Last Session (Related to Symptoms / Goals / Homework):   Symptoms: no improvement    Homework: Partially completed      Episode of Care Goals: Minimal progress - ACTION (Actively working towards change); Intervened by reinforcing change plan / affirming steps taken     Current / Ongoing Stressors and Concerns:   Client reported that her nephew was recently diagnosed with leukemia, which has been a significant source of stress. Reported that she has started working with her team at [facility] and is beginning to realize how significant her disordered eating patterns have been. Also reported that she has been uncomfortable with some of the homework they have given her \"but I know I need to do it.\" Reported that she is being upfront and honest with them in order to get the best help.               Treatment Objective(s) Addressed in This Session:   stress management  Maintain compliance with post-surgical dietary needs     Intervention:   Supportive therapy:  provided active listening, support, and encouragement. Reinforced client for being upfront with her team at [facility]. Normalized the fear and uncertainty of her nephew being diagnosed with leukemia.            ASSESSMENT: Current Emotional / Mental Status (status of significant symptoms):   Risk status (Self / Other harm or suicidal ideation)   Client denies current fears or concerns for personal safety.   Client denies current or recent suicidal ideation or behaviors.   Client denies current or recent homicidal ideation or behaviors.   Client denies current or recent self injurious behavior or " ideation.   Client denies other safety concerns.   A safety and risk management plan has not been developed at this time, however client was given the after-hours number / 911 should there be a change in any of these risk factors.     Appearance:   Appropriate    Eye Contact:   Good    Psychomotor Behavior: Normal    Attitude:   Cooperative  Pleasant    Orientation:   All   Speech    Rate / Production: Normal     Volume:  Normal    Mood:    Anxious    Affect:    Appropriate    Thought Content:  Clear    Thought Form:  Coherent  Logical    Insight:    Good      Medication Review:   No current psychiatric medications prescribed     Medication Compliance:   Yes     Changes in Health Issues:   None reported     Chemical Use Review:   Substance Use: Chemical use reviewed, no active concerns identified      Tobacco Use: No current tobacco use.       Collateral Reports Completed:   Not Applicable. Will contact client's assigned therapist to coordinate care.     PLAN: (Client Tasks / Therapist Tasks / Other)  Future appointments are scheduled. I will contact her assigned therapist at [facility] to coordinate care. Continue: Practice acceptance of your new body shape as discussed in session. Surface and challenge unrealistic expectations and disproportionate thoughts. Surface and challenge disproportionate and unjustified guilt as discussed in session.       Brenda Perez PsyD LP                                                       Treatment Plan    Client's Name: Criss Don  YOB: 1970    Date: 4/6/2018    DSM-V Diagnoses: 300.02 Generalized Anxiety Disorder, eating disorder, unspecified  Psychosocial / Contextual Factors: divorce proceedings, health concerns  WHODAS: 22    Referral / Collaboration:  Referral to another professional/service is not indicated at this time..    Anticipated number of session or this episode of care: reassess after 12 sessions      MeasurableTreatment Goal(s) related to  diagnosis / functional impairment(s)  Goal 1: Client will learn coping skills to manage stress and adjust to post-surgical lifestyle changes more effectively.    I will know I've met my goal when I'm not weighing myself all the time and feeling more comfortable with my weight loss.      Objective #A (Client Action)    Client will identify thinking patterns that contribute to anxiety about weight and eating habits.  Status: Continued - Date(s): 4/6/2018    Intervention(s)  Therapist will assign homework (thought  logs), assist client in surfacing and replacement ineffective thinking patterns.    Objective #B  Client will identify new ways to cope with stress and worry thoughts.  Status: Continued - Date(s): 4/6/2018    Intervention(s)  Therapist will assign homework, problem-solve barriers to follow through.    Objective #C  Client will identify and challenge unreasonable or unrealistic expectations about weight loss.  Status: Continued - Date(s): 4/6/2018    Intervention(s)  Therapist will assist client in identifying and balancing her expectations.        Client has reviewed and agreed to the above plan.      Brenda Perez PsyD LP  4/6/2018

## 2018-04-06 NOTE — MR AVS SNAPSHOT
MRN:[id number]                      After Visit Summary   4/6/2018    Criss Don    MRN: [id number]           Visit Information        Provider Department      4/6/2018 9:00 AM [facility]D [facility] [place] [facility] Generic      Your next 10 appointments already scheduled     Apr 20, 2018  7:00 AM CDT   Return Visit with Brenda Perez PsyD   [facility] [place] ([facility] [place])    [address] [place], [place] [address] [place] [address]   [phone]            Apr 20, 2018  8:00 AM CDT   LAB with EC LAB   [facility] ([facility])    [address] [place] [address]   [phone]           OUTSIDE LABS: Please include name of facility and Physician that is requesting outside labs be drawn.  Please indicate if labs are fasting or non-fasting on appt notes.  Be as specific as you can on which labs are being drawn.            Apr 20, 2018 10:00 AM CDT   Return Visit with Guero Owen MD   [facility] [place])    [address]  [facility] [address] [phone]            May 04, 2018  9:00 AM CDT   Return Visit with Brenda Perez PsyD   [facility] [place] ([facility] [place])    [address] [place] [address]   [phone]            May 04, 2018 11:30 AM CDT   Return Visit with Cris Paez PA-C   [facility] [place] [facility])    [address]  [place] [address] [phone]            May 18, 2018  9:00 AM CDT   Return Visit with Brenda Perez PsyD   [facility] [place] ([facility] [place])    [address] [place], [place] [address] [place] [address]   [phone]            Jun 01, 2018 11:00 AM CDT   Return Visit with Brenda Perez PsyD   [facility] [place] [facility]    [address]  [place] [address]   [phone]            Oct 26, 2018 11:00 AM CDT   Annual Visit with Cris Paez PA-C   [facility] [place] ([facility])    [address] [address] [phone]            Oct 26, 2018 11:30 AM CDT   Annual Visit with Usman Arthur 1, RD   [facility] ([facility])    [address] [address] [phone]              MyChart Information     [employer] lets you send messages to your doctor, view your test results, renew your prescriptions, schedule appointments and more. To sign up, go to [email or web address] . Click on \"Log in\" on the left side of the screen, which will take you to the Welcome page. Then click on \"Sign up Now\" on the right side of the page.     You will be asked to enter the access code listed below, as well as some personal information. Please follow the directions to create your username and password.     Your access code is: [email or web address]  Expires: 2018 11:41 PM     Your access code will  in 90 days. If you need help or a new code, please call your [facility] clinic or [phone].        Care EveryWhere ID     This is your Care EveryWhere ID. This could be used by other organizations to access your [facility] medical records  [id number]        Equal Access to Services     STEVEN MUNOZ : Phong Wooten, seble medina, suri sharma. So [facility] [phone].    ATENCIÓN: Si habla español, tiene a madera disposición servicios gratuitos de asistencia lingüística. Llame al [phone].    We comply with applicable federal civil rights laws and [place] laws. We do not discriminate on the basis of race, color, national origin, age, disability, sex, sexual " orientation, or gender identity.

## 2018-04-20 ENCOUNTER — OFFICE VISIT (OUTPATIENT)
Dept: ENDOCRINOLOGY | Facility: CLINIC | Age: 48
End: 2018-04-20
Payer: COMMERCIAL

## 2018-04-20 ENCOUNTER — OFFICE VISIT (OUTPATIENT)
Dept: PSYCHOLOGY | Facility: CLINIC | Age: 48
End: 2018-04-20
Payer: COMMERCIAL

## 2018-04-20 VITALS
BODY MASS INDEX: 24.11 KG/M2 | SYSTOLIC BLOOD PRESSURE: 88 MMHG | HEART RATE: 71 BPM | DIASTOLIC BLOOD PRESSURE: 61 MMHG | HEIGHT: 62 IN | WEIGHT: 131 LBS

## 2018-04-20 DIAGNOSIS — F41.1 GENERALIZED ANXIETY DISORDER: Primary | ICD-10-CM

## 2018-04-20 DIAGNOSIS — E66.3 OVERWEIGHT (BMI 25.0-29.9): ICD-10-CM

## 2018-04-20 DIAGNOSIS — E16.2 HYPOGLYCEMIA: Primary | ICD-10-CM

## 2018-04-20 DIAGNOSIS — Z98.84 BARIATRIC SURGERY STATUS: ICD-10-CM

## 2018-04-20 DIAGNOSIS — F50.9 EATING DISORDER: ICD-10-CM

## 2018-04-20 DIAGNOSIS — K91.2 MALNUTRITION FOLLOWING GASTROINTESTINAL SURGERY: ICD-10-CM

## 2018-04-20 DIAGNOSIS — Z98.84 HISTORY OF GASTRIC BYPASS: ICD-10-CM

## 2018-04-20 PROBLEM — K59.01 SLOW TRANSIT CONSTIPATION: Status: ACTIVE | Noted: 2018-03-27

## 2018-04-20 PROBLEM — F50.89 OTHER SPECIFIED EATING DISORDER: Status: ACTIVE | Noted: 2018-03-26

## 2018-04-20 PROBLEM — E67.3 HYPERVITAMINOSIS D: Status: ACTIVE | Noted: 2018-03-26

## 2018-04-20 LAB
FERRITIN SERPL-MCNC: 11 NG/ML (ref 8–252)
IRON SATN MFR SERPL: 11 % (ref 15–46)
IRON SERPL-MCNC: 33 UG/DL (ref 35–180)
PTH-INTACT SERPL-MCNC: 61 PG/ML (ref 18–80)
TIBC SERPL-MCNC: 296 UG/DL (ref 240–430)

## 2018-04-20 PROCEDURE — 83550 IRON BINDING TEST: CPT | Performed by: PHYSICIAN ASSISTANT

## 2018-04-20 PROCEDURE — 90834 PSYTX W PT 45 MINUTES: CPT | Performed by: PSYCHOLOGIST

## 2018-04-20 PROCEDURE — 83970 ASSAY OF PARATHORMONE: CPT | Performed by: PHYSICIAN ASSISTANT

## 2018-04-20 PROCEDURE — 99213 OFFICE O/P EST LOW 20 MIN: CPT | Performed by: INTERNAL MEDICINE

## 2018-04-20 PROCEDURE — 82306 VITAMIN D 25 HYDROXY: CPT | Performed by: PHYSICIAN ASSISTANT

## 2018-04-20 PROCEDURE — 82728 ASSAY OF FERRITIN: CPT | Performed by: PHYSICIAN ASSISTANT

## 2018-04-20 PROCEDURE — 83540 ASSAY OF IRON: CPT | Performed by: PHYSICIAN ASSISTANT

## 2018-04-20 PROCEDURE — 36415 COLL VENOUS BLD VENIPUNCTURE: CPT | Performed by: PHYSICIAN ASSISTANT

## 2018-04-20 NOTE — MR AVS SNAPSHOT
After Visit Summary   4/20/2018    Criss Don    MRN: [id number]           Patient Information     Date Of Birth          1970        Visit Information        Provider Department      4/20/2018 10:00 AM Guero Owen MD [facility] [place]        Today's Diagnoses     Hypoglycemia    -  1    History of gastric bypass           Follow-ups after your visit        Follow-up notes from your care team     Return in about 3 months (around 7/20/2018).      Your next 10 appointments already scheduled     May 04, 2018  9:00 AM CDT   Return Visit with Brenda Perez Song   [facility] [place] ([facility] [place])    [facility] [address] [facility]  [place] [address]   [phone]            May 04, 2018 11:30 AM CDT   Return Visit with Cris Paez PA-C   [facility]  [place] ([facility])    [address]  [place] [address] [phone]            May 18, 2018  9:00 AM CDT   Return Visit with Brenda Perez PsyD   [facility] [place] ([facility] [place])    [address] [facility]  [place] [address]   [phone]            Jun 01, 2018 11:00 AM CDT   Return Visit with Brenda Perze PsyD   [facility] [place] ([facility] [place])    [address] [facility]  [place] [address]   [phone]            Ravinder 15, 2018  8:00 AM CDT   Return Visit with Brenda Perez Song   [facility] [place] ([facility] [place])    [address] [facility]  [place] [address]   [phone]            Jul 20, 2018 10:00 AM CDT   Return Visit with Guero Owen MD   [facility] [place] ([facility] [place])    [address]  [address]  [place] [address]   [phone]            Oct 26, 2018 11:00 AM CDT   Annual Visit with Cris Paez PA-C   [facility] -  "[place] ([facility])    [address]  [address]  [place] [address] [phone]            Oct 26, 2018 11:30 AM CDT   Annual Visit with Usman Arthur 1, RD   [facility] [place] ([facility])    [address]  [place] [address] [phone]              Who to contact     If you have questions or need follow up information about today's clinic visit or your schedule please contact [facility] directly at [phone].  Normal or non-critical lab and imaging results will be communicated to you by ThetaRayhart, letter or phone within 4 business days after the clinic has received the results. If you do not hear from us within 7 days, please contact the clinic through ThetaRayhart or phone. If you have a critical or abnormal lab result, we will notify you by phone as soon as possible.  Submit refill requests through Metooo or call your pharmacy and they will forward the refill request to us. Please allow 3 business days for your refill to be completed.          Additional Information About Your Visit        ThetaRayharTablo Publishing Information     Metooo lets you send messages to your doctor, view your test results, renew your prescriptions, schedule appointments and more. To sign up, go to [email or web address] . Click on \"Log in\" on the left side of the screen, which will take you to the Welcome page. Then click on \"Sign up Now\" on the right side of the page.     You will be asked to enter the access code listed below, as well as some personal information. Please follow the directions to create your username and password.     Your access code is: [email or web address]  Expires: 2018 11:41 PM     Your access code will  in 90 days. If you need help or a new code, please call your [facility] or [phone].        Care EveryWhere ID     This is your Care EveryWhere ID. This could be used by other organizations " "to access your [facility] medical records  [id number]        Your Vitals Were     Pulse Height BMI (Body Mass Index)             71 5' 2\" (1.575 m) 23.96 kg/m2          Blood Pressure from Last 3 Encounters:   04/20/18 (!) 88/61   03/06/18 98/52   03/01/18 (!) 89/60    Weight from Last 3 Encounters:   04/20/18 131 lb (59.4 kg)   03/06/18 134 lb (60.8 kg)   03/01/18 134 lb (60.8 kg)              Today, you had the following     No orders found for display       Primary Care Provider Office Phone # Fax #    Napoleon Gray PA-C [phone]       [address] [facility] [place] [place] [address]        Equal Access to Services     [facility]: Hadii aad supa hadasho Soomaali, waaxda luqadaha, qaybta kaalmada adeegyada, suri cuello . So [facility] [phone].    ATENCIÓN: Si habla español, tiene a madera disposición servicios gratuitos de asistencia lingüística. Trevon al [phone].    We comply with applicable federal civil rights laws and [place] laws. We do not discriminate on the basis of race, color, national origin, age, disability, sex, sexual orientation, or gender identity.            Thank you!     Thank you for choosing [facility]  for your care. Our goal is always to provide you with excellent care. Hearing back from our patients is one way we can continue to improve our services. Please take a few minutes to complete the written survey that you may receive in the mail after your visit with us. Thank you!             Your Updated Medication List - Protect others around you: Learn how to safely use, store and throw away your medicines at www.disposemymeds.org.          This list is accurate as of 4/20/18 10:38 AM.  Always use your most recent med list.                   Brand Name Dispense Instructions for use Diagnosis    B-12 2500 MCG Subl      Place 1 tablet under the tongue 3 times weekly        canagliflozin 100 MG tablet    INVOKANA    30 tablet    " Take 1 tablet (100 mg) by mouth every morning (before breakfast)    History of gastric bypass, Hypoglycemia       docusate sodium 100 MG tablet    COLACE    180 tablet    Take 100 mg by mouth 2 times daily as needed for constipation    Other constipation       fluconazole 150 MG tablet    DIFLUCAN    3 tablet    Take 1 tablet by mouth x 1, repeat following antibiotics if needed    Vaginal itching       metroNIDAZOLE 500 MG tablet    FLAGYL    14 tablet    Take 1 tablet (500 mg) by mouth 2 times daily    Bacterial vaginosis       multivitamin  peds with iron 60 MG chewable tablet      Take 2 chew tab by mouth every morning        norgestimate-ethinyl estradiol 0.25-35 MG-MCG per tablet    ORTHO-CYCLEN, SPRINTEC    84 tablet    Take 1 tablet by mouth daily    General counseling for prescription of oral contraceptives       triamcinolone 0.5 % cream    KENALOG    30 g    Apply sparingly to affected area three times daily.    Allergic dermatitis       VIACTIV 500-500-40 MG-UNT-MCG Chew   Generic drug:  calcium-vitamin D-vitamin K      Take 1 tablet by mouth 3 times daily        VITAMIN D3 PO      Take 6,000 Units by mouth daily        VITRON-C  MG Tabs tablet   Generic drug:  ferrous fumarate 65 mg ([place]. FE)-Vitamin C 125 mg      Take 1 tablet by mouth every morning

## 2018-04-20 NOTE — MR AVS SNAPSHOT
MRN:[id number]                      After Visit Summary   4/20/2018    Criss Don    MRN: [id number]           Visit Information        Provider Department      4/20/2018 7:00 AM Brenda Perez PsyD [facility] [place] [facility]      Your next 10 appointments already scheduled     May 04, 2018  9:00 AM CDT   Return Visit with Brenda Perez PsyD   [facility] [place] ([facility] [place])    [facility]  [place] [address]   [phone]            May 04, 2018 11:30 AM CDT   Return Visit with Cris Paez PA-C   [facility] [place] ([facility])    [address] [place], [place] [address]  [place] [address] [phone]            May 18, 2018  9:00 AM CDT   Return Visit with Brenda Perez PsyD   [facility] [place] ([facility] [place])    [facility] [place] [address]   [phone]            Jun 01, 2018 11:00 AM CDT   Return Visit with Brenda Perez PsyD   [facility] [place] ([facility] [place])    [address] [place] [address] [place] [address]   [phone]            Ravinder 15, 2018  8:00 AM CDT   Return Visit with Brenda Perez PsyD   [facility] [place] ([facility] [place])    [address] [place] [address] [address]   [phone]            Jul 20, 2018 10:00 AM CDT   Return Visit with Guero Owen MD   [facility] [place] ([facility] [place])    [address]  [place] [address] [phone]            Oct 26, 2018 11:00 AM CDT   Annual Visit with Cris Paez PA-C   [facility] [place] ([facility])    [address]  [place] [address] [phone]            Oct 26, 2018 11:30 AM CDT   Annual Visit with Usman Arthur 1, [place]  [facility] [place] ([facility])    [address] [place], [place] [address] [place] [address] [phone]              MyChart Information     Zaarlyhart lets you send messages to your doctor, view your test results, renew your prescriptions, schedule appointments and more. To sign up, go to [email or web address] . Click on \"Log in\" on the left side of the screen, which will take you to the Welcome page. Then click on \"Sign up Now\" on the right side of the page.     You will be asked to enter the access code listed below, as well as some personal information. Please follow the directions to create your username and password.     Your access code is: [email or web address]  Expires: 2018 11:41 PM     Your access code will  in 90 days. If you need help or a new code, please call your [facility] clinic or [phone].        Care EveryWhere ID     This is your Care EveryWhere ID. This could be used by other organizations to access your [facility] medical records  [id number]        Equal Access to Services     STEVEN MUNOZ : Phong Wooten, seble medina, mirna faulkner, suri yeager. So [facility] [phone].    ATENCIÓN: Si habla español, tiene a madera disposición servicios gratuitos de asistencia lingüística. Trevon al [phone].    We comply with applicable federal civil rights laws and [place] laws. We do not discriminate on the basis of race, color, national origin, age, disability, sex, sexual orientation, or gender identity.            "

## 2018-04-20 NOTE — PROGRESS NOTES
"Name: Criss Don    HPI:  Recent issues:  Here for f/u evaluation  Taking the low-dose (off label) Invokana, seems helpful to minimize \"spells\"          Previous history of morbid obesity, subsequent gastric bypass surgery  Had been overweight for many years since high school, progressive weight gain  2011. Tried Medifast diet plan, some success with nearly 100# weight loss to kisha 205#, then wt gain  9/2016. Gastric bypass surgery consult at  Bariatric clinic, preop wt 307#  10/26/16. RNY lap surgery by Dr. Romario Reyna/[facility]  Postop success with wt loss, but initial issues with bloating, some nausea... Resolved  Takes Viactiv calcium 3 QD, vit D 2000 IU 2 QD, vit B12 sl 3x/wk, magnesium + Flintstones MVI 2 QD  7/2017. Noted progressive episodic symptoms of concern   Sudden lightheadedness, decreased mental focus, visual blurring, warmth/flushing, weakness   Treats by sitting down, drinking water, though sometimes eats crackers   Spells resolve after 5-10 min, though often without carb supplement   Occurs at random times of day, approx 2-3x/wk, no typical food or exercise relationship   If severe spell, pain at the left lower quadrant of abdomen  Had medical eval at  [facility], has also seen Ms Lazaro Ortiz RD, LD   Advised to consume 5-6 small meals/day   No significant benefit with this dietary supplement and spells however  No previous BG testing with spells. Patient did not have a BG meter  Denies prior history of diabetes mellitus, diabetes med use, or adrenal disease    12/7/17. Initial consult with me at my former clinic ([facility])  Labs:  hgbA1c 5.2%, glucose 83, cortisol 16.2, C-P 1.09, TSH 1.44  Received instruction on use of BG meter  Noted spells every 2 days on avg   Usually mild, transient   Has measured BG levesl with her OneTouch Verio meter, levels w/ spells  and 2-spells with BG<70  12/2017. Began off-label Farxiga 5 mg po QAM  Noticed only mild spells and transient spells, " "some indigestion  Late 1/2017. Ran out of Farxiga medication, has noticed spells now more frequent and larger spells more intense  Recalls some recent spell , 116, 116    2/23/18. Initial endocrine evaluation with me.  Late 2/2018. Started Invokana 100 mg 1-tab QAM  Occasional lightheadedness spells approx 1x/week, treated with small snack or cracker  3/11/18 Had spell while visiting [place], symptoms of lightheadedness and blurry vision   Was seated, then blacked-out and awoke in EMT tent area   Recalls having \"normal\" BP, emesis x1, checked BG and normal vs high level     Sees Napoleon Gray at  Clinics EP  Has seen Cris Paez PAC/ [facility]    PMH/PSH:  Past Medical History:   Diagnosis Date     morbid obesity      Obese      Urinary frequency      Past Surgical History:   Procedure Laterality Date     LAPAROSCOPIC BYPASS GASTRIC N/A 10/26/2016    Procedure: LAPAROSCOPIC BYPASS GASTRIC;  Surgeon: Romario Reyna MD;  Location:  OR     NO HISTORY OF SURGERY         Family Hx:  Family History   Problem Relation Age of Onset     Hypertension Mother      Breast Cancer Mother      64 dx     Allergies Mother      Obesity Mother      Respiratory Mother      Asthma     HEART DISEASE Mother      Hypertension Maternal Grandmother      CANCER Maternal Grandmother      Ovarian     CANCER Sister      Cervical     CANCER Other      Mat. great aunt-ovarian     Hypertension Brother      CANCER Maternal Aunt      ?Gastric     Brain Cancer Cousin      maternal         Social Hx:  Social History     Social History     Marital status:      Spouse name: seperated     Number of children: N/A     Years of education: N/A     Occupational History     [employer]      Social History Main Topics     Smoking status: Never Smoker     Smokeless tobacco: Never Used     Alcohol use 0.0 oz/week     0 Standard drinks or equivalent per week      Comment: very rare     Drug use: No     Sexual activity: Yes     " "Partners: Male     Other Topics Concern     Not on file     Social History Narrative          MEDICATIONS:  has a current medication list which includes the following prescription(s): calcium-vitamin d-vitamin k, canagliflozin, cholecalciferol, b-12, docusate sodium, ferrous fumarate 65 mg ([place]. fe)-vitamin c 125 mg, fluconazole, metronidazole, multivitamin  peds with iron, norgestimate-ethinyl estradiol, and triamcinolone.    ROS:     ROS: 10 point ROS neg other than the symptoms noted above in the HPI.    GENERAL: no weight changes, fatigue, fevers, chills, night sweats.   HEENT: no dysphagia, odonophagia, diplopia, neck pain  THYROID:  no apparent hyper or hypothyroid symptoms  CV: no chest pain, pressure, palpitations  LUNGS: no SOB, FAUSTIN, cough, wheezing   ABDOMEN: no diarrhea, constipation, abdominal pain  EXTREMITIES: no rashes, ulcers, edema  NEUROLOGY: no headaches, denies changes in vision, tingling, extremitiy numbness   MSK: no muscle aches or pains, weakness  :  Nocturia 3-4x/night (baseline pattern), denies dysuria  SKIN: no rashes or lesions  PSYCH:  stable mood, no significant anxiety or depression    Physical Exam   VS: BP (!) 88/61 (BP Location: Right arm, Cuff Size: Adult Regular)  Pulse 71  Ht 5' 2\" (1.575 m)  Wt 131 lb (59.4 kg)  BMI 23.96 kg/m2  GENERAL: AXOX3, NAD, eyeglasses, well dressed, answering questions appropriately, appears stated age.  THYROID:  normal gland, no apparent nodules or goiter  ABDOMEN: soft, nontender, nondistended, no organomegaly    LABS:    All pertinent notes, labs, and images personally reviewed by me.     A/P:  Encounter Diagnoses   Name Primary?     Hypoglycemia Yes     History of gastric bypass        Comments:   Complicated case.  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    Recent use of off-label Invokana effective and well tolerated.    Plan:  Discussed general issues with the " hypoglycemia diagnosis and management  We have reviewed pancreas gland anatomy and hormone physiology  Discussed lab tests used to assess patient glucose levels and hypoglycemia events  Reviewed treatment options using off-label diabetes medications to help with insulin resistance or enhancing glucagon secretion    Recommend:  Continue the current (SGLPTI med) Invokana 100 mg 1-tab QAM   Discussed idea of trying 1/2-tab QAM  Monitor for symptom changes  Check BG meter test if significant hypoglycemia-like spell, record time/date/symptoms/circumstance  Avoid high carb meals/snacks  Discussed possibility that hypotension may also cause similar symptoms, would be unrelated to glucose problem  Reasonable to have evaluation at [facility], if recommendation by her other providers     Addressed patient questions today    Labs ordered today: No orders of the defined types were placed in this encounter.    Radiology/Consults ordered today: None    More than 50% of the time spent with Ms. Don on counseling / coordinating her care.  Total appointment time was 20 minutes.    Follow-up:  6 weeks    Guero Owen MD  Endocrinology  [facility]

## 2018-04-20 NOTE — NURSING NOTE
"Chief Complaint   Patient presents with     Consult     Hypoglycemia       Initial BP (!) 88/61 (BP Location: Right arm, Cuff Size: Adult Regular)  Pulse 71  Ht 5' 2\" (1.575 m)  Wt 131 lb (59.4 kg)  BMI 23.96 kg/m2 Estimated body mass index is 23.96 kg/(m^2) as calculated from the following:    Height as of this encounter: 5' 2\" (1.575 m).    Weight as of this encounter: 131 lb (59.4 kg).  Medication Reconciliation: complete         Christophe Pruett      "

## 2018-04-20 NOTE — PROGRESS NOTES
Progress Note    Client Name: Criss Don  Date:  4/20/2018         Service Type: Individual      Session Start Time: 7:00  Session End Time: 7:45      Session Length: 45     Session #: 29     Attendees: Client attended alone    Treatment Plan Last Reviewed: 4/20/2018  PHQ-9 / NED-7 :      DATA      Progress Since Last Session (Related to Symptoms / Goals / Homework):   Symptoms: no improvement    Homework: Partially completed      Episode of Care Goals: Minimal progress - ACTION (Actively working towards change); Intervened by reinforcing change plan / affirming steps taken     Current / Ongoing Stressors and Concerns:   Client feeling overwhelmed by interactions with her ex- and upcoming anniversaries of her sister's death and her own wedding anniversary. Reported that she has not yet received certified copies of her divorce decree, so still feels obligated to answer her ex-'s calls. Reported that she does not want to upset him before she can officially change insurance, etc. Reported she has been sorting through some of her sister's belongings, which has been upsetting at times.              Treatment Objective(s) Addressed in This Session:   stress management  Maintain compliance with post-surgical dietary needs     Intervention:   Solution Focused: discussed healthy ways to decrease stress/anxiety and use healthy distractions to take breaks from family stressors  Supportive therapy:  provided active listening, support, and encouragement.             ASSESSMENT: Current Emotional / Mental Status (status of significant symptoms):   Risk status (Self / Other harm or suicidal ideation)   Client denies current fears or concerns for personal safety.   Client denies current or recent suicidal ideation or behaviors.   Client denies current or recent homicidal ideation or behaviors.   Client denies current or recent self injurious behavior or  ideation.   Client denies other safety concerns.   A safety and risk management plan has not been developed at this time, however client was given the after-hours number / 911 should there be a change in any of these risk factors.     Appearance:   Appropriate    Eye Contact:   Good    Psychomotor Behavior: Normal    Attitude:   Cooperative  Pleasant    Orientation:   All   Speech    Rate / Production: Normal     Volume:  Normal    Mood:    Anxious    Affect:    Appropriate    Thought Content:  Clear    Thought Form:  Coherent  Logical    Insight:    Good      Medication Review:   No current psychiatric medications prescribed     Medication Compliance:   Yes     Changes in Health Issues:   None reported     Chemical Use Review:   Substance Use: Chemical use reviewed, no active concerns identified      Tobacco Use: No current tobacco use.       Collateral Reports Completed:   Left message for Yaya Lyle, therapist at [facility], to coordinate care.     PLAN: (Client Tasks / Therapist Tasks / Other)  Future appointments are scheduled. Create a list of healthy distractions. Continue: Practice acceptance of your new body shape as discussed in session. Surface and challenge unrealistic expectations and disproportionate thoughts. Surface and challenge disproportionate and unjustified guilt as discussed in session.       Brenda Perez PsyD LP                                                       Treatment Plan    Client's Name: Criss Don  YOB: 1970    Date: 4/20/2018    DSM-V Diagnoses: 300.02 Generalized Anxiety Disorder, eating disorder, unspecified  Psychosocial / Contextual Factors: divorce proceedings, health concerns  WHODAS: 22    Referral / Collaboration:  Referral to another professional/service is not indicated at this time..    Anticipated number of session or this episode of care: reassess after 12 sessions      MeasurableTreatment Goal(s) related to diagnosis / functional  impairment(s)  Goal 1: Client will learn coping skills to manage stress and adjust to post-surgical lifestyle changes more effectively.    I will know I've met my goal when I'm not weighing myself all the time and feeling more comfortable with my weight loss.      Objective #A (Client Action)    Client will identify thinking patterns that contribute to anxiety about weight and eating habits.  Status: Continued - Date(s): 4/20/2018    Intervention(s)  Therapist will assign homework (thought  logs), assist client in surfacing and replacement ineffective thinking patterns.    Objective #B  Client will identify new ways to cope with stress and worry thoughts.  Status: Continued - Date(s): 4/20/2018    Intervention(s)  Therapist will assign homework, problem-solve barriers to follow through.    Objective #C  Client will identify and challenge unreasonable or unrealistic expectations about weight loss.  Status: Continued - Date(s): 4/20/2018    Intervention(s)  Therapist will assist client in identifying and balancing her expectations.        Client has reviewed and agreed to the above plan.      Brenda Perez PsyD LP  4/20/2018

## 2018-04-21 LAB — DEPRECATED CALCIDIOL+CALCIFEROL SERPL-MC: 87 UG/L (ref 20–75)

## 2018-05-04 ENCOUNTER — OFFICE VISIT (OUTPATIENT)
Dept: PSYCHOLOGY | Facility: CLINIC | Age: 48
End: 2018-05-04
Payer: COMMERCIAL

## 2018-05-04 ENCOUNTER — OFFICE VISIT (OUTPATIENT)
Dept: SURGERY | Facility: CLINIC | Age: 48
End: 2018-05-04
Payer: COMMERCIAL

## 2018-05-04 VITALS
HEIGHT: 62 IN | SYSTOLIC BLOOD PRESSURE: 91 MMHG | DIASTOLIC BLOOD PRESSURE: 60 MMHG | BODY MASS INDEX: 23.22 KG/M2 | WEIGHT: 126.2 LBS | HEART RATE: 65 BPM

## 2018-05-04 DIAGNOSIS — E16.1 REACTIVE HYPOGLYCEMIA: ICD-10-CM

## 2018-05-04 DIAGNOSIS — K59.01 SLOW TRANSIT CONSTIPATION: ICD-10-CM

## 2018-05-04 DIAGNOSIS — K91.2 MALNUTRITION FOLLOWING GASTROINTESTINAL SURGERY: ICD-10-CM

## 2018-05-04 DIAGNOSIS — F50.89 OTHER SPECIFIED EATING DISORDER: ICD-10-CM

## 2018-05-04 DIAGNOSIS — Z98.84 BARIATRIC SURGERY STATUS: ICD-10-CM

## 2018-05-04 DIAGNOSIS — K91.2 POSTSURGICAL MALABSORPTION: ICD-10-CM

## 2018-05-04 DIAGNOSIS — F50.9 EATING DISORDER: ICD-10-CM

## 2018-05-04 DIAGNOSIS — E61.1 IRON DEFICIENCY: ICD-10-CM

## 2018-05-04 DIAGNOSIS — F41.1 GAD (GENERALIZED ANXIETY DISORDER): ICD-10-CM

## 2018-05-04 DIAGNOSIS — F45.22 BODY DYSMORPHIC DISORDER: ICD-10-CM

## 2018-05-04 DIAGNOSIS — F41.1 GENERALIZED ANXIETY DISORDER: Primary | ICD-10-CM

## 2018-05-04 DIAGNOSIS — E67.3 HYPERVITAMINOSIS D: Primary | ICD-10-CM

## 2018-05-04 PROCEDURE — 90834 PSYTX W PT 45 MINUTES: CPT | Performed by: PSYCHOLOGIST

## 2018-05-04 PROCEDURE — 99213 OFFICE O/P EST LOW 20 MIN: CPT | Performed by: PHYSICIAN ASSISTANT

## 2018-05-04 NOTE — PROGRESS NOTES
Bariatric Follow Up Visit   May 4, 2018    RE: KOJO ASHTON  MR#: [id number]  : 1970    Kojo came in today for a follow up appointment.  In February I referred her to [facility] for an eating disorder evaluation.  She is currently getting intensive outpatient treatment treatment.  She works with a  therapist and dietician weekly.  She continues to see Brenda Perez at [facility]  every 2 weeks as well.      Paulette is liking the ED program.  States is is individualized which is what she was looking for.  States she is a work in progress.   Her divorce has been finalized.  She is still waiting for certified copy of the degree. Her Ex cannot let go.  She feels sorry for ex and when he calls when he has been drinking this causes her to want to purge.      She is still exercising 60 minutes 5-6 days a week. Sometimes she also goes foe extra walks when her anxiety is high.   She is working with the [facility] therapist to find healthy strategies for her anxiety as well.      BARIATRIC METRICS:  Current Weight: 126 lb 3.2 oz (57.2 kg)  Body mass index is 23.08 kg/(m^2).   Wt change since last visit (lbs): -11  Cumulative weight loss (lbs): 181.24      Patient is taking the following bariatric postoperative vitamins:  2 Complete multivitamins with minerals (at different times than calcium)  Stopped Vitamin D in March, Was taking 4000.    5409-7934 mg of Calcium daily in divided doses  2500 mcg of Vitamin B12 sl every three days  1 Iron/Vit C. Daily    OBESITY RELATED CONDITIONS:  Pre-diabetes: resolved HgA1C 5.17    REVIEW OF SYSTEMS:  GI:  Nausea-seldom  Vomiting-seldom,   Diarrhea-never  Constipation-occasional, takes stool softener daily.    Has 1 stool every 4-5 days.  Is taking Miralax every 2-3 days.  If she takes it every day she gets abdominal pain.  Every couple of weeks she uses Doculax and then she has a good bowel movement.    Dysphagia-never  Abdominal  "Pains-never  Heartburn-never      Psychiatric:  Anxiety disorder, NOS:  pt is currently working with Soni Lopez regarding her body image problem. She sees her every other week.      :    Pt has monthly menses.  Is now on oral birth control.      Endo: Hypoglycemic episodes have improved.  She is on  canagliflozin and just had her dose cut in half.  Continues to see Dr. Bridges for this.     PHYSICAL EXAMINATION:   BP 91/60  Pulse 65  Ht 5' 2\" (1.575 m)  Wt 126 lb 3.2 oz (57.2 kg)  BMI 23.08 kg/m2   GENERAL Pt in NAD.  HEART: No JVD  LUNGS: Breathing unlabored.  MUSC: Gait normal  NEURO: Alert and oriented x3.     ASSESSMENT/PLAN:   S/P Laparoscopic Rasheeda-en-Y Gastric Bypass-   Post surgical malabsorption   Reviewed Vit D, PTH, Iron Labs.   Ordered Vit D, Iron Labs to be drawn in 3 months    Reactive Hypoglycemia/Lightheadedness    Continue following up with [facility].  Continue monitoring blood sugars when she has major episodes.  Iron deficiency   Increase Vitron C to two tablets daily as tolerated with constipation. Continue stool softener daily and Miralax every 2-3 days.   Body dysmorphic disorder   Continue to see SONI Lopez twice monthly.  Follow up with me in 3 months.      This was a 20 minute visit with greater than 50% spent on counseling.  "

## 2018-05-04 NOTE — MR AVS SNAPSHOT
After Visit Summary   5/4/2018    Criss Don    MRN: [id number]           Patient Information     Date Of Birth          1970        Visit Information        Provider Department      5/4/2018 11:30 AM Cris Paez PA-C [facility]      Today's Diagnoses     Hypervitaminosis D    -  1    Slow transit constipation        Malnutrition following gastrointestinal surgery        Body dysmorphic disorder        NED (generalized anxiety disorder)        Bariatric surgery status        Other specified eating disorder        Reactive hypoglycemia        Iron deficiency        Postsurgical malabsorption           Follow-ups after your visit        Your next 10 appointments already scheduled     May 18, 2018  9:00 AM CDT   Return Visit with Brenda Perez [facility] [place] ([facility] [place]    [address] [place] [address]   [phone]            Jun 01, 2018 11:00 AM CDT   Return Visit with Brenda Perez PsyD   [facility] [place] ([facility] [place])    [address] [place] [address]   [phone]            Ravinder 15, 2018  8:00 AM CDT   Return Visit with Brenda Perez PsyD   [facility] [place] ([facility] [place])    [address] [place] [address]   [phone]            Jun 29, 2018  7:00 AM CDT   Return Visit with Brenda Perez PsyD   [facility] [place] ([facility] [place])    [address] [place] [address]   [phone]            Jul 20, 2018 10:00 AM CDT   [facility]    [address] [place] [address]   [phone]            Jul 27, 2018 10:30 AM CDT   Return Visit with Cris Paez PA-C   [facility] [place] [facility])    [address]  [address]  [place] [address] [phone]            Oct 26, 2018 11:00 AM CDT   Annual Visit with Cris Paez PA-C   [facility] [place] ([facility])    [address]  [address]  [facility] [address] [phone]            Oct 26, 2018 11:30 AM CDT   Annual Visit with Usman Raymond Diet 1, RD   [facility] [place] ([facility])    [address]  [facility] [facility] [address] [phone]              Future tests that were ordered for you today     Open Future Orders        Priority Expected Expires Ordered    Iron and Iron Binding Capacity Routine 9/4/2018 5/4/2019 5/4/2018    Ferritin Routine 8/4/2018 9/4/2018 5/4/2018    Vitamin D Screen Routine 8/4/2018 9/4/2018 5/4/2018            Who to contact     If you have questions or need follow up information about today's clinic visit or your schedule please contact [facility] directly at [phone].  Normal or non-critical lab and imaging results will be communicated to you by ONOFFMIX (?????)hart, letter or phone within 4 business days after the clinic has received the results. If you do not hear from us within 7 days, please contact the clinic through ONOFFMIX (?????)hart or phone. If you have a critical or abnormal lab result, we will notify you by phone as soon as possible.  Submit refill requests through JoyTunes or call your pharmacy and they will forward the refill request to us. Please allow 3 business days for your refill to be completed.          Additional Information About Your Visit        JoyTunes Information     JoyTunes lets you send messages to your doctor, view your test results, renew your prescriptions, schedule appointments and more. To sign up, go to [email or web address] . Click on \"Log in\" on " "the left side of the screen, which will take you to the Welcome page. Then click on \"Sign up Now\" on the right side of the page.     You will be asked to enter the access code listed below, as well as some personal information. Please follow the directions to create your username and password.     Your access code is: [email or web address]  Expires: 2018 11:41 PM     Your access code will  in 90 days. If you need help or a new code, please call your [facility] clinic or [phone].        Care EveryWhere ID     This is your Care EveryWhere ID. This could be used by other organizations to access your [facility] medical records  [id number]        Your Vitals Were     Pulse Height BMI (Body Mass Index)             65 5' 2\" (1.575 m) 23.08 kg/m2          Blood Pressure from Last 3 Encounters:   18 91/60   18 (!) 88/61   18 98/52    Weight from Last 3 Encounters:   18 126 lb 3.2 oz (57.2 kg)   18 131 lb (59.4 kg)   18 134 lb (60.8 kg)               Primary Care Provider Office Phone # Fax #    Napoleon Gray PA-C [phone]       [address]  [place] [address]        Equal Access to Services     [facility] AH: Hadii aad ku hadasho Soomaali, waaxda luqadaha, qaybta kaalmada adeegyada, suri cuello . So [facility] [phone].    ATENCIÓN: Si habla español, tiene a madera disposición servicios gratuitos de asistencia lingüística. Trevon al [phone].    We comply with applicable federal civil rights laws and [place] laws. We do not discriminate on the basis of race, color, national origin, age, disability, sex, sexual orientation, or gender identity.            Thank you!     Thank you for choosing [facility]  for your care. Our goal is always to provide you with excellent care. Hearing back from our patients is one way we can continue to improve our services. Please take a few minutes to " complete the written survey that you may receive in the mail after your visit with us. Thank you!             Your Updated Medication List - Protect others around you: Learn how to safely use, store and throw away your medicines at www.disposemymeds.org.          This list is accurate as of 5/4/18  1:12 PM.  Always use your most recent med list.                   Brand Name Dispense Instructions for use Diagnosis    B-12 2500 MCG Subl      Place 1 tablet under the tongue 3 times weekly        canagliflozin 100 MG tablet    INVOKANA    30 tablet    Take 1 tablet (100 mg) by mouth every morning (before breakfast)    History of gastric bypass, Hypoglycemia       docusate sodium 100 MG tablet    COLACE    180 tablet    Take 100 mg by mouth 2 times daily as needed for constipation    Other constipation       fluconazole 150 MG tablet    DIFLUCAN    3 tablet    Take 1 tablet by mouth x 1, repeat following antibiotics if needed    Vaginal itching       metroNIDAZOLE 500 MG tablet    FLAGYL    14 tablet    Take 1 tablet (500 mg) by mouth 2 times daily    Bacterial vaginosis       multivitamin  peds with iron 60 MG chewable tablet      Take 2 chew tab by mouth every morning        norgestimate-ethinyl estradiol 0.25-35 MG-MCG per tablet    ORTHO-CYCLEN, SPRINTEC    84 tablet    Take 1 tablet by mouth daily    General counseling for prescription of oral contraceptives       triamcinolone 0.5 % cream    KENALOG    30 g    Apply sparingly to affected area three times daily.    Allergic dermatitis       VIACTIV 500-500-40 MG-UNT-MCG Chew   Generic drug:  calcium-vitamin D-vitamin K      Take 1 tablet by mouth 3 times daily        VITAMIN D3 PO      Take 6,000 Units by mouth daily        VITRON-C  MG Tabs tablet   Generic drug:  ferrous fumarate 65 mg ([place]. FE)-Vitamin C 125 mg      Take 1 tablet by mouth every morning

## 2018-05-04 NOTE — MR AVS SNAPSHOT
MRN:[id number]                      After Visit Summary   5/4/2018    Criss Don    MRN: [id number]           Visit Information        Provider Department      5/4/2018 9:00 AM Brenda Perez PsyD [facility] [place]      Your next 10 appointments already scheduled     May 04, 2018 11:30 AM CDT   Return Visit with Cris Paez PA-C   [facility] [place] ([facility])    [address]  [place] [address] [phone]            May 18, 2018  9:00 AM CDT   Return Visit with Brenda Perez PsyD   [facility] [place] ([facility] [place])    [address] [place], [place] [address] [place] [address]   [phone]            Jun 01, 2018 11:00 AM CDT   Return Visit with Brenda Perez PsyD   [facility] [place] ([facility] [place])    [address] [place], [place] [address] [place] [address]   [phone]            Ravinder 15, 2018  8:00 AM CDT   Return Visit with Brenda Perez PsyD   [facility] [place] ([facility] [place])    [address] [place], [place] [address] [place] [address]   [phone]            Jun 29, 2018  7:00 AM CDT   Return Visit with Brenda Perez PsyD   [facility] [place] ([facility] [place])    [address] [place], [place] [address] [place] [address]   [phone]            Jul 20, 2018 10:00 AM CDT   Return Visit with Guero Owen MD   [facility] [place] ([facility] [place])    [address]  [place] [address] [phone]            Oct 26, 2018 11:00 AM CDT   Annual Visit with Cris Paez PA-C   [facility] [place] ([facility])    [address]  [place] [address] [phone]            Oct 26, 2018 11:30 AM CDT   Annual Visit with Usman Arthur 1, [place]  [facility] [place] ([facility])    [address] [place], [place] [address] [place] [address] [phone]              MyChart Information     iSquarehart lets you send messages to your doctor, view your test results, renew your prescriptions, schedule appointments and more. To sign up, go to [email or web address] . Click on \"Log in\" on the left side of the screen, which will take you to the Welcome page. Then click on \"Sign up Now\" on the right side of the page.     You will be asked to enter the access code listed below, as well as some personal information. Please follow the directions to create your username and password.     Your access code is: [email or web address]  Expires: 2018 11:41 PM     Your access code will  in 90 days. If you need help or a new code, please call your [facility] clinic or [phone].        Care EveryWhere ID     This is your Care EveryWhere ID. This could be used by other organizations to access your [facility] medical records  [id number]        Equal Access to Services     STEVEN MUNOZ : Phong Wooten, seble medina, mirna faulkner, suri yeager. So [facility] [phone].    ATENCIÓN: Si habla español, tiene a madera disposición servicios gratuitos de asistencia lingüística. Trevon al [phone].    We comply with applicable federal civil rights laws and [place] laws. We do not discriminate on the basis of race, color, national origin, age, disability, sex, sexual orientation, or gender identity.            "

## 2018-05-04 NOTE — PROGRESS NOTES
"                                             Progress Note    Client Name: Criss Don  Date:  5/4/2018         Service Type: Individual      Session Start Time: 9:00  Session End Time: 9:45      Session Length: 45     Session #: 30     Attendees: Client attended alone    Treatment Plan Last Reviewed: 5/4/2018  PHQ-9 / NED-7 :      DATA      Progress Since Last Session (Related to Symptoms / Goals / Homework):   Symptoms: no improvement    Homework: Partially completed      Episode of Care Goals: Minimal progress - ACTION (Actively working towards change); Intervened by reinforcing change plan / affirming steps taken     Current / Ongoing Stressors and Concerns:   Client continues to report feeling overwhelmed and anxious by interactions with her ex-. Reported that she has still not yet received certified copies of her divorce decree, so still feels obligated to answer her ex-'s calls. Reported that today is their wedding anniversary, and she just went through the anniversary of her sister's death and the anniversary of when her ex- \"walked out on me.\" Reported she has been worried about her nephews because they have not been making good choices and she feels like \"I failed\" when they make mistakes.              Treatment Objective(s) Addressed in This Session:   stress management  Maintain compliance with post-surgical dietary needs     Intervention:   CBT: coached client on ways to surface and replace thinking errors that increase anxiety  Solution Focused: discussed healthy ways to decrease stress/anxiety and use healthy distractions to take breaks from family stressors  Supportive therapy:  provided active listening, support, and encouragement.             ASSESSMENT: Current Emotional / Mental Status (status of significant symptoms):   Risk status (Self / Other harm or suicidal ideation)   Client denies current fears or concerns for personal safety.   Client denies current or recent " suicidal ideation or behaviors.   Client denies current or recent homicidal ideation or behaviors.   Client denies current or recent self injurious behavior or ideation.   Client denies other safety concerns.   A safety and risk management plan has not been developed at this time, however client was given the after-hours number / 911 should there be a change in any of these risk factors.     Appearance:   Appropriate    Eye Contact:   Good    Psychomotor Behavior: Normal    Attitude:   Cooperative  Pleasant    Orientation:   All   Speech    Rate / Production: Normal     Volume:  Normal    Mood:    Anxious    Affect:    Appropriate    Thought Content:  Clear    Thought Form:  Coherent  Logical    Insight:    Good      Medication Review:   No current psychiatric medications prescribed     Medication Compliance:   Yes     Changes in Health Issues:   None reported     Chemical Use Review:   Substance Use: Chemical use reviewed, no active concerns identified      Tobacco Use: No current tobacco use.       Collateral Reports Completed:   Not Applicable.     PLAN: (Client Tasks / Therapist Tasks / Other)  Future appointments are scheduled. Create a list of healthy distractions. Continue: Practice acceptance of your new body shape as discussed in session. Surface and challenge unrealistic expectations and disproportionate thoughts. Surface and challenge disproportionate and unjustified guilt and worry as discussed in session.       Brenda Perez PsyD LP                                                       Treatment Plan    Client's Name: Criss Don  YOB: 1970    Date: 5/4/2018    DSM-V Diagnoses: 300.02 Generalized Anxiety Disorder, eating disorder, unspecified  Psychosocial / Contextual Factors: divorce proceedings, health concerns  WHODAS: 22    Referral / Collaboration:  Referral to another professional/service is not indicated at this time..    Anticipated number of session or this episode of  care: reassess after 12 sessions      MeasurableTreatment Goal(s) related to diagnosis / functional impairment(s)  Goal 1: Client will learn coping skills to manage stress and adjust to post-surgical lifestyle changes more effectively.    I will know I've met my goal when I'm not weighing myself all the time and feeling more comfortable with my weight loss.      Objective #A (Client Action)    Client will identify thinking patterns that contribute to anxiety about weight and eating habits.  Status: Continued - Date(s): 5/4/2018    Intervention(s)  Therapist will assign homework (thought  logs), assist client in surfacing and replacement ineffective thinking patterns.    Objective #B  Client will identify new ways to cope with stress and worry thoughts.  Status: Continued - Date(s): 5/4/2018    Intervention(s)  Therapist will assign homework, problem-solve barriers to follow through.    Objective #C  Client will identify and challenge unreasonable or unrealistic expectations about weight loss.  Status: Continued - Date(s): 5/4/2018    Intervention(s)  Therapist will assist client in identifying and balancing her expectations.        Client has reviewed and agreed to the above plan.      Brenda Perez PsyD LP  5/4/2018

## 2018-05-18 ENCOUNTER — OFFICE VISIT (OUTPATIENT)
Dept: PSYCHOLOGY | Facility: CLINIC | Age: 48
End: 2018-05-18
Payer: COMMERCIAL

## 2018-05-18 DIAGNOSIS — F50.9 EATING DISORDER: ICD-10-CM

## 2018-05-18 DIAGNOSIS — F41.1 GENERALIZED ANXIETY DISORDER: Primary | ICD-10-CM

## 2018-05-18 PROCEDURE — 90834 PSYTX W PT 45 MINUTES: CPT | Performed by: PSYCHOLOGIST

## 2018-05-18 NOTE — MR AVS SNAPSHOT
MRN:[id number]                      After Visit Summary   5/18/2018    Criss Don    MRN: [id number]           Visit Information        Provider Department      5/18/2018 9:00 AM Brenda Perez PsyD [facility] [place] [facility] Generic      Your next 10 appointments already scheduled     Jun 01, 2018 11:00 AM CDT   Return Visit with Brenda Perez PsyD   [facility] [place] ([facility] [place])    [address] [facility]  [place] [address]   [phone]            Ravinder 15, 2018  8:00 AM CDT   Return Visit with Brenda Perez PsyD   [facility] [place] ([facility] [place])    [address] [facility]  [place] [address]   [phone]            Jun 29, 2018  7:00 AM CDT   Return Visit with Brenda Perez PsyD   [facility] [place] ([facility] [place])    [address] [facility]  [place] [address]   [phone]            Jul 13, 2018  9:00 AM CDT   Return Visit with Brenda Perez PsyD   [facility] [place] ([facility] [place])    [address]  [place] [address]   [phone]            Jul 20, 2018 10:00 AM CDT   Return Visit with Guero Owen MD   [facility]    [address]  [place] [address]   [phone]            Jul 27, 2018 10:30 AM CDT   Return Visit with Cris Paez PA-C   [facility] [place] ([facility])    [address]  [place] [address]   [phone]            Oct 26, 2018 11:00 AM CDT   Annual Visit with Cris Paez PA-C   [facility] [place] ([facility])    [address]  [place] [address] [phone]            Oct 26, 2018 11:30 AM CDT   Annual Visit with Usman Arthur 1, MCKENZIE Obregon  "[facility] [place] ([facility])    [address] [place], [place] [address] [place] [address] [phone]              MyChart Information     Red e Apphart lets you send messages to your doctor, view your test results, renew your prescriptions, schedule appointments and more. To sign up, go to [email or web address] . Click on \"Log in\" on the left side of the screen, which will take you to the Welcome page. Then click on \"Sign up Now\" on the right side of the page.     You will be asked to enter the access code listed below, as well as some personal information. Please follow the directions to create your username and password.     Your access code is: [email or web address]  Expires: 2018  9:38 AM     Your access code will  in 90 days. If you need help or a new code, please call your [facility] clinic or [phone].        Care EveryWhere ID     This is your Care EveryWhere ID. This could be used by other organizations to access your [facility] medical records  [id number]        Equal Access to Services     STEVEN MUNOZ : Phong Wooten, seble medina, mirna faulkner, suri yeager. So [facility] [place] [phone].    ATENCIÓN: Si habla español, tiene a madera disposición servicios gratuitos de asistencia lingüística. Trevon al [phone].    We comply with applicable federal civil rights laws and [place] laws. We do not discriminate on the basis of race, color, national origin, age, disability, sex, sexual orientation, or gender identity.            "

## 2018-05-18 NOTE — PROGRESS NOTES
Progress Note    Client Name: Criss Don  Date:  5/18/2018         Service Type: Individual      Session Start Time: 9:00  Session End Time: 9:45      Session Length: 45     Session #: 31     Attendees: Client attended alone    Treatment Plan Last Reviewed:  5/18/2018  PHQ-9 / NED-7 :      DATA      Progress Since Last Session (Related to Symptoms / Goals / Homework):   Symptoms: no improvement    Homework: Partially completed      Episode of Care Goals: Minimal progress - ACTION (Actively working towards change); Intervened by reinforcing change plan / affirming steps taken     Current / Ongoing Stressors and Concerns:   Client continues to report feeling overwhelmed and anxious by interactions with her ex-, but is feeling relieved to have a certified copy of her divorce decree so she can move ahead. Reported she is doing a better job of coping with anxiety. Reported she is weighing herself less and resisting the temptation to purge by using healthy distractions and positive self-encouragement.              Treatment Objective(s) Addressed in This Session:   stress management  Maintain compliance with post-surgical dietary needs     Intervention:   CBT: coached client on ways to surface and replace thinking errors that increase anxiety  Solution Focused: discussed healthy ways to decrease stress/anxiety and use healthy distractions to take breaks from family stressors  Supportive therapy:  provided active listening, support, and encouragement.             ASSESSMENT: Current Emotional / Mental Status (status of significant symptoms):   Risk status (Self / Other harm or suicidal ideation)   Client denies current fears or concerns for personal safety.   Client denies current or recent suicidal ideation or behaviors.   Client denies current or recent homicidal ideation or behaviors.   Client denies current or recent self injurious behavior or ideation.   Client  denies other safety concerns.   A safety and risk management plan has not been developed at this time, however client was given the after-hours number / 911 should there be a change in any of these risk factors.     Appearance:   Appropriate    Eye Contact:   Good    Psychomotor Behavior: Normal    Attitude:   Cooperative  Pleasant    Orientation:   All   Speech    Rate / Production: Normal     Volume:  Normal    Mood:    Anxious    Affect:    Appropriate    Thought Content:  Clear    Thought Form:  Coherent  Logical    Insight:    Good      Medication Review:   Changes to psychiatric medications, see updated Medication List in EPIC.      Medication Compliance:   Yes     Changes in Health Issues:   None reported     Chemical Use Review:   Substance Use: Chemical use reviewed, no active concerns identified      Tobacco Use: No current tobacco use.       Collateral Reports Completed:   Not Applicable.     PLAN: (Client Tasks / Therapist Tasks / Other)  Future appointments are scheduled.  Continue: Practice using self-encouragement and healthy distractions. Practice acceptance of your new body shape as discussed in session. Surface and challenge unrealistic expectations and disproportionate thoughts. Surface and challenge disproportionate and unjustified guilt and worry as discussed in session.       Brenda Perez PsyD LP                                                       Treatment Plan    Client's Name: Criss Don  YOB: 1970    Date:  5/18/2018    DSM-V Diagnoses: 300.02 Generalized Anxiety Disorder, eating disorder, unspecified  Psychosocial / Contextual Factors: divorce proceedings, health concerns  WHODAS: 22    Referral / Collaboration:  Referral to another professional/service is not indicated at this time..    Anticipated number of session or this episode of care: reassess after 12 sessions      MeasurableTreatment Goal(s) related to diagnosis / functional impairment(s)  Goal 1: Client  will learn coping skills to manage stress and adjust to post-surgical lifestyle changes more effectively.    I will know I've met my goal when I'm not weighing myself all the time and feeling more comfortable with my weight loss.      Objective #A (Client Action)    Client will identify thinking patterns that contribute to anxiety about weight and eating habits.  Status: Continued - Date(s):  5/18/2018    Intervention(s)  Therapist will assign homework (thought  logs), assist client in surfacing and replacement ineffective thinking patterns.    Objective #B  Client will identify new ways to cope with stress and worry thoughts.  Status: Continued - Date(s):  5/18/2018    Intervention(s)  Therapist will assign homework, problem-solve barriers to follow through.    Objective #C  Client will identify and challenge unreasonable or unrealistic expectations about weight loss.  Status: Continued - Date(s):  5/18/2018    Intervention(s)  Therapist will assist client in identifying and balancing her expectations.        Client has reviewed and agreed to the above plan.      Brenda Perez PsyD LP   5/18/2018

## 2018-06-01 ENCOUNTER — OFFICE VISIT (OUTPATIENT)
Dept: PSYCHOLOGY | Facility: CLINIC | Age: 48
End: 2018-06-01
Payer: COMMERCIAL

## 2018-06-01 DIAGNOSIS — F41.1 GENERALIZED ANXIETY DISORDER: Primary | ICD-10-CM

## 2018-06-01 DIAGNOSIS — F50.9 EATING DISORDER: ICD-10-CM

## 2018-06-01 PROCEDURE — 90834 PSYTX W PT 45 MINUTES: CPT | Performed by: PSYCHOLOGIST

## 2018-06-01 NOTE — MR AVS SNAPSHOT
MRN:[id number]                      After Visit Summary   6/1/2018    Criss Don    MRN: [id number]           Visit Information        Provider Department      6/1/2018 11:00 AM Brenda Perez PsyD [facility] [place] [facility]      Your next 10 appointments already scheduled     Ravinder 15, 2018  8:00 AM CDT   Return Visit with Brenda Perez PsyD   [facility] [place] ([facility] [place])    [facility]  [place] [address]   [phone]            Jun 29, 2018  7:00 AM CDT   Return Visit with Brenda Perez PsyD   [facility] [place] ([facility] [place])    [facility]  [place] [address]   [phone]            Jul 13, 2018  9:00 AM CDT   Return Visit with Brenda Perez PsyD   [facility] [place] ([facility] [place])    [facility]  [place] [address]   [phone]            Jul 20, 2018 10:00 AM CDT   Return Visit with Guero Owen MD   [facility]    [address] [place] [address]  [facility] [address] [phone]            Jul 27, 2018  9:00 AM CDT   Return Visit with Brenda Perez PsyD   [facility] [place] ([facility] [place])    [address] [place], [place] [address] [address]   [phone]            Jul 27, 2018 10:30 AM CDT   Return Visit with Cris Paez PA-C   [facility] [place] ([facility])    [facility] [facility] [address]  [place] [address] [phone]            Oct 26, 2018 11:00 AM CDT   Annual Visit with Cris Paez PA-C   [facility] [place] ([facility])    [address] [facility]  [place] [address] [phone]            Oct 26, 2018 11:30 AM CDT   Annual Visit with Usman Arthur 1, MCKENZIE Obregon  "[facility] [place] ([facility])    [address] [place], [place] [address] [place] [address] [phone]              MyChart Information     [facility] lets you send messages to your doctor, view your test results, renew your prescriptions, schedule appointments and more. To sign up, go to [email or web address] . Click on \"Log in\" on the left side of the screen, which will take you to the Welcome page. Then click on \"Sign up Now\" on the right side of the page.     You will be asked to enter the access code listed below, as well as some personal information. Please follow the directions to create your username and password.     Your access code is: [email or web address]  Expires: 2018  9:38 AM     Your access code will  in 90 days. If you need help or a new code, please call your [facility] clinic or [phone].        Care EveryWhere ID     This is your Care EveryWhere ID. This could be used by other organizations to access your [facility] medical records  [id number]        Equal Access to Services     STEVEN MUNOZ : Phong Wooten, seble medina, mirna faulkner, suri yeager. So [facility] [phone].    ATENCIÓN: Si habla español, tiene a madera disposición servicios gratuitos de asistencia lingüística. Trevon al [phone].    We comply with applicable federal civil rights laws and [place] laws. We do not discriminate on the basis of race, color, national origin, age, disability, sex, sexual orientation, or gender identity.            "

## 2018-06-04 NOTE — PROGRESS NOTES
"                                             Progress Note    Client Name: Criss Don  Date:  6/1/2018         Service Type: Individual      Session Start Time: 11:00  Session End Time: 11:45      Session Length: 45     Session #: 32     Attendees: Client attended alone    Treatment Plan Last Reviewed:  6/1/2018  PHQ-9 / NED-7 :      DATA      Progress Since Last Session (Related to Symptoms / Goals / Homework):   Symptoms: no improvement    Homework: Partially completed      Episode of Care Goals: Minimal progress - ACTION (Actively working towards change); Intervened by reinforcing change plan / affirming steps taken     Current / Ongoing Stressors and Concerns:   Reported she has had more \"anxious days\" than usual, and links this to family stress.              Treatment Objective(s) Addressed in This Session:   stress management  Maintain compliance with post-surgical dietary needs     Intervention:   CBT: coached client on ways to surface and replace thinking errors that increase anxiety  Solution Focused: discussed healthy ways to decrease stress/anxiety and use healthy distractions to take breaks from family stressors; coached client on ways to identify physical signs of anxiety and how to use relaxation techniques when feeling anxious  Supportive therapy:  provided active listening, support, and encouragement.             ASSESSMENT: Current Emotional / Mental Status (status of significant symptoms):   Risk status (Self / Other harm or suicidal ideation)   Client denies current fears or concerns for personal safety.   Client denies current or recent suicidal ideation or behaviors.   Client denies current or recent homicidal ideation or behaviors.   Client denies current or recent self injurious behavior or ideation.   Client denies other safety concerns.   A safety and risk management plan has not been developed at this time, however client was given the after-hours number / 911 should there be a change " in any of these risk factors.     Appearance:   Appropriate    Eye Contact:   Good    Psychomotor Behavior: Normal    Attitude:   Cooperative  Pleasant    Orientation:   All   Speech    Rate / Production: Normal     Volume:  Normal    Mood:    Anxious    Affect:    Appropriate    Thought Content:  Clear    Thought Form:  Coherent  Logical    Insight:    Good      Medication Review:   No changes to current psychiatric medication(s)     Medication Compliance:   Yes     Changes in Health Issues:   None reported     Chemical Use Review:   Substance Use: Chemical use reviewed, no active concerns identified      Tobacco Use: No current tobacco use.       Collateral Reports Completed:   Not Applicable.     PLAN: (Client Tasks / Therapist Tasks / Other)  Future appointments are scheduled.  Identify physical signs of anxiety and use relaxation skills as discussed in session. Continue: Practice using self-encouragement and healthy distractions. Practice acceptance of your new body shape as discussed in session. Surface and challenge unrealistic expectations and disproportionate thoughts. Surface and challenge disproportionate and unjustified guilt and worry as discussed in session.       Brenda Perez PsyD LP                                                       Treatment Plan    Client's Name: Criss Don  YOB: 1970    Date:  6/1/2018    DSM-V Diagnoses: 300.02 Generalized Anxiety Disorder, eating disorder, unspecified  Psychosocial / Contextual Factors: divorce proceedings, health concerns  WHODAS: 22    Referral / Collaboration:  Referral to another professional/service is not indicated at this time..    Anticipated number of session or this episode of care: reassess after 12 sessions      MeasurableTreatment Goal(s) related to diagnosis / functional impairment(s)  Goal 1: Client will learn coping skills to manage stress and adjust to post-surgical lifestyle changes more effectively.    I will know  I've met my goal when I'm not weighing myself all the time and feeling more comfortable with my weight loss.      Objective #A (Client Action)    Client will identify thinking patterns that contribute to anxiety about weight and eating habits.  Status: Continued - Date(s):  6/1/2018    Intervention(s)  Therapist will assign homework (thought  logs), assist client in surfacing and replacement ineffective thinking patterns.    Objective #B  Client will identify new ways to cope with stress and worry thoughts.  Status: Continued - Date(s):  6/1/2018    Intervention(s)  Therapist will assign homework, problem-solve barriers to follow through.    Objective #C  Client will identify and challenge unreasonable or unrealistic expectations about weight loss.  Status: Continued - Date(s):  6/1/2018    Intervention(s)  Therapist will assist client in identifying and balancing her expectations.        Client has reviewed and agreed to the above plan.      Brenda Perez PsyD LP   6/1/2018

## 2018-06-15 ENCOUNTER — OFFICE VISIT (OUTPATIENT)
Dept: PSYCHOLOGY | Facility: CLINIC | Age: 48
End: 2018-06-15
Payer: COMMERCIAL

## 2018-06-15 DIAGNOSIS — F41.1 GENERALIZED ANXIETY DISORDER: Primary | ICD-10-CM

## 2018-06-15 DIAGNOSIS — F50.9 EATING DISORDER: ICD-10-CM

## 2018-06-15 PROCEDURE — 90834 PSYTX W PT 45 MINUTES: CPT | Performed by: PSYCHOLOGIST

## 2018-06-15 NOTE — PROGRESS NOTES
Progress Note    Client Name: Criss Don  Date:  6/15/2018         Service Type: Individual      Session Start Time: 8:00  Session End Time: 8:45      Session Length: 45     Session #: 33     Attendees: Client attended alone    Treatment Plan Last Reviewed:  6/15/2018  PHQ-9 / NED-7 :      DATA      Progress Since Last Session (Related to Symptoms / Goals / Homework):   Symptoms: Stable    Homework: Partially completed      Episode of Care Goals: Minimal progress - ACTION (Actively working towards change); Intervened by reinforcing change plan / affirming steps taken     Current / Ongoing Stressors and Concerns:   Reported that she will be hosting a game night with friends and is worried about the food. Reported being worried that she will run out of food or have too many leftovers that will be tempting. Also reported that her ex- continues to call her and ask to be taken back.      Treatment Objective(s) Addressed in This Session:   stress management  Maintain compliance with post-surgical dietary needs     Intervention:   CBT: coached client on ways to surface and replace thinking errors that increase anxiety  Solution Focused: discussed healthy ways to decrease stress/anxiety and use healthy distractions to take breaks from family stressors; coached client on ways to identify physical signs of anxiety and how to use relaxation techniques when feeling anxious; coached client on how to prepare herself mentally for hosting a game night and cope ahead if she experiences urges to engage in unhealthy eating behaviors  Supportive therapy:  provided active listening, support, and encouragement.  Reinforced healthy behavioral choices.           ASSESSMENT: Current Emotional / Mental Status (status of significant symptoms):   Risk status (Self / Other harm or suicidal ideation)   Client denies current fears or concerns for personal safety.   Client denies current or  recent suicidal ideation or behaviors.   Client denies current or recent homicidal ideation or behaviors.   Client denies current or recent self injurious behavior or ideation.   Client denies other safety concerns.   A safety and risk management plan has not been developed at this time, however client was given the after-hours number / 911 should there be a change in any of these risk factors.     Appearance:   Appropriate    Eye Contact:   Good    Psychomotor Behavior: Normal    Attitude:   Cooperative  Pleasant    Orientation:   All   Speech    Rate / Production: Normal     Volume:  Normal    Mood:    Anxious    Affect:    Appropriate    Thought Content:  Clear    Thought Form:  Coherent  Logical    Insight:    Good      Medication Review:   No changes to current psychiatric medication(s)     Medication Compliance:   Yes     Changes in Health Issues:   None reported     Chemical Use Review:   Substance Use: Chemical use reviewed, no active concerns identified      Tobacco Use: No current tobacco use.       Collateral Reports Completed:   Not Applicable.     PLAN: (Client Tasks / Therapist Tasks / Other)  Future appointments are scheduled.  Identify physical signs of anxiety and use relaxation skills as discussed in session. Continue: Practice using self-encouragement and healthy distractions. Practice acceptance of your new body shape as discussed in session. Surface and challenge unrealistic expectations and disproportionate thoughts. Surface and challenge disproportionate and unjustified guilt and worry as discussed in session.       Brenda Perez PsyD LP                                                       Treatment Plan    Client's Name: Criss Don  YOB: 1970    Date:  6/15/2018    DSM-V Diagnoses: 300.02 Generalized Anxiety Disorder, eating disorder, unspecified  Psychosocial / Contextual Factors: divorce proceedings, health concerns  WHODAS: 22    Referral / Collaboration:  Referral  to another professional/service is not indicated at this time..    Anticipated number of session or this episode of care: reassess after 12 sessions      MeasurableTreatment Goal(s) related to diagnosis / functional impairment(s)  Goal 1: Client will learn coping skills to manage stress and adjust to post-surgical lifestyle changes more effectively.    I will know I've met my goal when I'm not weighing myself all the time and feeling more comfortable with my weight loss.      Objective #A (Client Action)    Client will identify thinking patterns that contribute to anxiety about weight and eating habits.  Status: Continued - Date(s):  6/15/2018    Intervention(s)  Therapist will assign homework (thought  logs), assist client in surfacing and replacement ineffective thinking patterns.    Objective #B  Client will identify new ways to cope with stress and worry thoughts.  Status: Continued - Date(s):  6/15/2018    Intervention(s)  Therapist will assign homework, problem-solve barriers to follow through.    Objective #C  Client will identify and challenge unreasonable or unrealistic expectations about weight loss.  Status: Continued - Date(s):  6/15/2018    Intervention(s)  Therapist will assist client in identifying and balancing her expectations.        Client has reviewed and agreed to the above plan.      Brenda Perez PsyD LP   6/15/2018

## 2018-06-15 NOTE — MR AVS SNAPSHOT
MRN:[id number]                      After Visit Summary   6/15/2018    Criss Don    MRN: [id number]           Visit Information        Provider Department      6/15/2018 8:00 AM Brenda Perez PsyD [facility] [place] [facility] Generic      Your next 10 appointments already scheduled     Jun 29, 2018  7:00 AM CDT   Return Visit with Brenda Perez PsyD   [facility] [place] ([facility] [place])    [facility]  [place] [address]   [phone]            Jul 13, 2018  9:00 AM CDT   Return Visit with Brenda Perez PsyD   [facility] [place] ([facility] [place])    [address] [place], [place] [address] [address]   [phone]            Jul 20, 2018 10:00 AM CDT   [facility]    [address] [place] [address]  [place] [address] [phone]            Jul 27, 2018  9:00 AM CDT   Return Visit with Brenda Perez PsyD   [facility] [place] ([facility] [place])    [address] [place], [place] [address] [address]   [phone]            Jul 27, 2018 10:30 AM CDT   Return Visit with Cris Paez PA-C   [facility] [place] ([facility])    [address] [place] [address] [address]  [place] [address] [phone]            Aug 10, 2018  9:00 AM CDT   Return Visit with Brenda Perez PsyD   [facility] [place] ([facility] [place])    [address] [place], [place] [address] [address]   [phone]            Oct 26, 2018 11:00 AM CDT   Annual Visit with Cris Paez PA-C   [facility] [place] ([facility])    [facility] [facility]  [place] [address]   [phone]            Oct 26, 2018 11:30 AM CDT   Annual Visit with Usman Arthur 1, MCKENZIE Obregon  "[facility] ([facility])    [address] [place], [place] [address] [place] [address] [phone]              MyChart Information     [employer] lets you send messages to your doctor, view your test results, renew your prescriptions, schedule appointments and more. To sign up, go to [email or web address] . Click on \"Log in\" on the left side of the screen, which will take you to the Welcome page. Then click on \"Sign up Now\" on the right side of the page.     You will be asked to enter the access code listed below, as well as some personal information. Please follow the directions to create your username and password.     Your access code is: [email or web address]  Expires: 2018  9:38 AM     Your access code will  in 90 days. If you need help or a new code, please call your [facility] clinic or [phone].        Care EveryWhere ID     This is your Care EveryWhere ID. This could be used by other organizations to access your [facility] medical records  [id number]        Equal Access to Services     STEVEN MUNOZ : Phong Wooten, seble medina, mirna faulkner, suri yeager. So [facility] [phone].    ATENCIÓN: Si habla español, tiene a madera disposición servicios gratuitos de asistencia lingüística. Trevon al [phone].    We comply with applicable federal civil rights laws and [place] laws. We do not discriminate on the basis of race, color, national origin, age, disability, sex, sexual orientation, or gender identity.            "

## 2018-06-29 ENCOUNTER — OFFICE VISIT (OUTPATIENT)
Dept: PSYCHOLOGY | Facility: CLINIC | Age: 48
End: 2018-06-29
Payer: COMMERCIAL

## 2018-06-29 DIAGNOSIS — F50.9 EATING DISORDER: ICD-10-CM

## 2018-06-29 DIAGNOSIS — F41.1 GENERALIZED ANXIETY DISORDER: Primary | ICD-10-CM

## 2018-06-29 PROCEDURE — 90834 PSYTX W PT 45 MINUTES: CPT | Performed by: PSYCHOLOGIST

## 2018-06-29 NOTE — PROGRESS NOTES
"                                             Progress Note    Client Name: Criss Don  Date:  2018         Service Type: Individual      Session Start Time: 7:00  Session End Time: 7:45      Session Length: 45     Session #: 34     Attendees: Client attended alone    Treatment Plan Last Reviewed:   2018  PHQ-9 / NED-7 :      DATA      Progress Since Last Session (Related to Symptoms / Goals / Homework):   Symptoms: Stable    Homework: Partially completed      Episode of Care Goals: Minimal progress - ACTION (Actively working towards change); Intervened by reinforcing change plan / affirming steps taken     Current / Ongoing Stressors and Concerns:   Reported that the game night she hosted went well and she was able to eat in front of others and resist urges to purge. Reported that her roommate has been \"more cranky than usual\" which has been a trigger to anxiety for her. Also reported feeling anxious about her nephew making poor choices and feeling guilty that she did not do an adequate job of parenting him after his mother . Reported being excited that a friend is likely coming to visit her in August.       Treatment Objective(s) Addressed in This Session:   stress management  Maintain compliance with post-surgical dietary needs     Intervention:   CBT: coached client on ways to surface and replace thinking errors that increase anxiety  Solution Focused: discussed healthy ways to decrease stress/anxiety and use healthy distractions to take breaks from family stressors; coached client on ways to identify physical signs of anxiety and how to use relaxation techniques when feeling anxious  Supportive therapy:  provided active listening, support, and encouragement.  Reinforced healthy behavioral choices.           ASSESSMENT: Current Emotional / Mental Status (status of significant symptoms):   Risk status (Self / Other harm or suicidal ideation)   Client denies current fears or concerns for " personal safety.   Client denies current or recent suicidal ideation or behaviors.   Client denies current or recent homicidal ideation or behaviors.   Client denies current or recent self injurious behavior or ideation.   Client denies other safety concerns.   A safety and risk management plan has not been developed at this time, however client was given the after-hours number / 911 should there be a change in any of these risk factors.     Appearance:   Appropriate    Eye Contact:   Good    Psychomotor Behavior: Normal    Attitude:   Cooperative  Pleasant    Orientation:   All   Speech    Rate / Production: Normal     Volume:  Normal    Mood:    Anxious    Affect:    Appropriate    Thought Content:  Clear    Thought Form:  Coherent  Logical    Insight:    Good      Medication Review:   No changes to current psychiatric medication(s)     Medication Compliance:   Yes     Changes in Health Issues:   None reported     Chemical Use Review:   Substance Use: Chemical use reviewed, no active concerns identified      Tobacco Use: No current tobacco use.       Collateral Reports Completed:   Not Applicable.     PLAN: (Client Tasks / Therapist Tasks / Other)  Future appointments are scheduled.  Identify physical signs of anxiety and use relaxation skills as discussed in session. Continue: Practice using self-encouragement and healthy distractions. Practice acceptance of your new body shape as discussed in session. Surface and challenge unrealistic expectations and disproportionate thoughts. Surface and challenge disproportionate and unjustified guilt and worry as discussed in session.       Brenda Perez PsyD LP                                                       Treatment Plan    Client's Name: Criss Don  YOB: 1970    Date:   6/29/2018    DSM-V Diagnoses: 300.02 Generalized Anxiety Disorder, eating disorder, unspecified  Psychosocial / Contextual Factors: divorce proceedings, health  concerns  WHODAS: 22    Referral / Collaboration:  Referral to another professional/service is not indicated at this time..    Anticipated number of session or this episode of care: reassess after 12 sessions      MeasurableTreatment Goal(s) related to diagnosis / functional impairment(s)  Goal 1: Client will learn coping skills to manage stress and adjust to post-surgical lifestyle changes more effectively.    I will know I've met my goal when I'm not weighing myself all the time and feeling more comfortable with my weight loss.      Objective #A (Client Action)    Client will identify thinking patterns that contribute to anxiety about weight and eating habits.  Status: Continued - Date(s):   6/29/2018    Intervention(s)  Therapist will assign homework (thought  logs), assist client in surfacing and replacement ineffective thinking patterns.    Objective #B  Client will identify new ways to cope with stress and worry thoughts.  Status: Continued - Date(s):   6/29/2018    Intervention(s)  Therapist will assign homework, problem-solve barriers to follow through.    Objective #C  Client will identify and challenge unreasonable or unrealistic expectations about weight loss.  Status: Continued - Date(s):   6/29/2018    Intervention(s)  Therapist will assist client in identifying and balancing her expectations.        Client has reviewed and agreed to the above plan.      Brenda Perez PsyD LP    6/29/2018

## 2018-06-29 NOTE — MR AVS SNAPSHOT
MRN:[id number]                      After Visit Summary   6/29/2018    Criss Don    MRN: [id number]           Visit Information        Provider Department      6/29/2018 7:00 AM Brenda Perez PsyD [facility] [place] [facility]      Your next 10 appointments already scheduled     Jul 13, 2018  9:00 AM CDT   Return Visit with Brenda Perez PsyD   [facility] [place] ([facility] [place])    [address] [place] [address]  [place] [address]   [phone]            Jul 20, 2018 10:00 AM CDT   Return Visit with Guero Owen MD   [facility]    [address]  [place] [address] [phone]            Jul 27, 2018  9:00 AM CDT   Return Visit with Brenda Perez PsyD   [facility] [place] ([facility] [place])    [address] [place], [place] [address] [address]   [phone]            Jul 27, 2018 10:30 AM CDT   Return Visit with Cris Paez PA-C   [facility] [place] ([facility])    [address] [place] [address] [address]  [place] [address] [phone]            Aug 10, 2018  9:00 AM CDT   Return Visit with Brenda Perez PsyD   [facility] [place] ([facility] [place])    [facility]  [place] [address]   [phone]            Sep 07, 2018 10:00 AM CDT   Return Visit with Brenda Perez PsyD   [facility] [place] ([facility] [place])    [address] [place], [place] [address] [address]   [phone]            Oct 26, 2018 11:00 AM CDT   Annual Visit with Cris Paez PA-C   [facility] [place] ([facility])    [facility]  [place] [address]   [phone]            Oct 26, 2018 11:30 AM CDT   Annual Visit with Usman Arthur 1, MCKENZIE Obregon  [facility] ([facility])    [address]  [address]  [place] [address] [phone]              Care EveryWhere ID     This is your Care EveryWhere ID. This could be used by other organizations to access your [facility] medical records  [id number]        Equal Access to Services     STEVEN MUNOZ : Phong Wooten, wanacnie medina, qabetzaidata kaalmagabriela faulkner, suri yeager. So [facility] [phone].    ATENCIÓN: Si habla español, tiene a madera disposición servicios gratuitos de asistencia lingüística. Llame al [phone].    We comply with applicable federal civil rights laws and [place] laws. We do not discriminate on the basis of race, color, national origin, age, disability, sex, sexual orientation, or gender identity.

## 2018-07-13 ENCOUNTER — OFFICE VISIT (OUTPATIENT)
Dept: PSYCHOLOGY | Facility: CLINIC | Age: 48
End: 2018-07-13
Payer: COMMERCIAL

## 2018-07-13 DIAGNOSIS — F41.1 GENERALIZED ANXIETY DISORDER: Primary | ICD-10-CM

## 2018-07-13 DIAGNOSIS — F50.9 EATING DISORDER: ICD-10-CM

## 2018-07-13 PROCEDURE — 90834 PSYTX W PT 45 MINUTES: CPT | Performed by: PSYCHOLOGIST

## 2018-07-13 NOTE — PROGRESS NOTES
"                                             Progress Note    Client Name: Criss Don  Date:  7/13/2018         Service Type: Individual      Session Start Time: 9:00  Session End Time: 9:45      Session Length: 45     Session #: 35     Attendees: Client attended alone    Treatment Plan Last Reviewed:   7/13/2018  PHQ-9 / NED-7 :      DATA      Progress Since Last Session (Related to Symptoms / Goals / Homework):   Symptoms: increased anxiety    Homework: Partially completed      Episode of Care Goals: Minimal progress - ACTION (Actively working towards change); Intervened by reinforcing change plan / affirming steps taken     Current / Ongoing Stressors and Concerns:   Reported that she had an episode of \"racing heart and shakiness\" yesterday. Reported no other symptoms, but was able to identify some interpersonal stressors from earlier in the week that could have contributed to that episode. Reported that she has been seeing some \"red flags\" in her current relationship and is worried that she sees patterns forming that were present in her marriage, or that she is \"overthinking things and making it bigger deal than it is.\"   Reported she is nervous about an upcoming family reunion because she does not know what kind of questions her family will ask about her weight or eating habits.      Treatment Objective(s) Addressed in This Session:   stress management  Maintain compliance with post-surgical dietary needs     Intervention:   CBT: coached client on ways to surface and replace thinking errors that increase anxiety; helped her rehearse statements she can use with family if they are being overly intrusive in their questions  Solution Focused: coached client on assertiveness skills; encouraged her to be more transparent and up front about her concerns rather than ignoring them  Supportive therapy:  provided active listening, support, and encouragement.  Reinforced healthy behavioral choices.   "         ASSESSMENT: Current Emotional / Mental Status (status of significant symptoms):   Risk status (Self / Other harm or suicidal ideation)   Client denies current fears or concerns for personal safety.   Client denies current or recent suicidal ideation or behaviors.   Client denies current or recent homicidal ideation or behaviors.   Client denies current or recent self injurious behavior or ideation.   Client denies other safety concerns.   A safety and risk management plan has not been developed at this time, however client was given the after-hours number / 911 should there be a change in any of these risk factors.     Appearance:   Appropriate    Eye Contact:   Good    Psychomotor Behavior: Normal    Attitude:   Cooperative  Pleasant    Orientation:   All   Speech    Rate / Production: Normal     Volume:  Normal    Mood:    Anxious    Affect:    Appropriate    Thought Content:  Clear    Thought Form:  Coherent  Logical    Insight:    Good      Medication Review:   No changes to current psychiatric medication(s)     Medication Compliance:   Yes     Changes in Health Issues:   None reported     Chemical Use Review:   Substance Use: Chemical use reviewed, no active concerns identified      Tobacco Use: No current tobacco use.       Collateral Reports Completed:   Not Applicable.     PLAN: (Client Tasks / Therapist Tasks / Other)  Future appointments are scheduled.  Identify physical signs of anxiety and use relaxation skills as discussed in session. Use assertiveness skills as discussed in session to set effective limits and express concerns more openly.  Surface and challenge unrealistic expectations and disproportionate thoughts that generate anxiety.        Brenda Perez PsyD LP                                                       Treatment Plan    Client's Name: Criss Don  YOB: 1970    Date:   7/13/2018    DSM-V Diagnoses: 300.02 Generalized Anxiety Disorder, eating disorder,  unspecified  Psychosocial / Contextual Factors: divorce proceedings, health concerns  WHODAS: 22    Referral / Collaboration:  Referral to another professional/service is not indicated at this time..    Anticipated number of session or this episode of care: reassess after 12 sessions      MeasurableTreatment Goal(s) related to diagnosis / functional impairment(s)  Goal 1: Client will learn coping skills to manage stress and adjust to post-surgical lifestyle changes more effectively.    I will know I've met my goal when I'm not weighing myself all the time and feeling more comfortable with my weight loss.      Objective #A (Client Action)    Client will surface and challenge thinking errors that contribute to anxiety.  Status: New - Date: 7/13/2018       Intervention(s)  Therapist will assign homework (thought  logs), assist client in surfacing and replacement ineffective thinking patterns.    Objective #B  Client will learn deep breathing and relaxation skills and practice at least 3x daily.  Status: New - Date: 7/13/2018       Intervention(s)  Therapist will teach deep breathing and relaxation skills, assign homework, problem-solve barriers to follow through.    Objective #C  Client will learn and use assertiveness skills to set healthy boundaries and communicate her needs more effectively.  Status: New - Date: 7/13/2018       Intervention(s)  Therapist will assist client in identifying and balancing her expectations.        Client has reviewed and agreed to the above plan.      Brenda Perez PsyD LP    7/13/2018

## 2018-07-13 NOTE — MR AVS SNAPSHOT
MRN:[id number]                      After Visit Summary   7/13/2018    Criss Don    MRN: [id number]           Visit Information        Provider Department      7/13/2018 9:00 AM Brenda Perez PsyD [facility] [place] [facility]      Your next 10 appointments already scheduled     Jul 19, 2018  2:00 PM CDT   LAB with EC LAB   [facility]    [address] [place], [place] [address] [address]   [phone]           OUTSIDE LABS: Please include name of facility and Physician that is requesting outside labs be drawn.  Please indicate if labs are fasting or non-fasting on appt notes.  Be as specific as you can on which labs are being drawn.            Jul 20, 2018 10:00 AM CDT   Return Visit with Guero Owen MD   [facility]    [address]  [address]  [place] [address] [phone]            Jul 27, 2018  9:00 AM CDT   Return Visit with Brenda Perez PsyD   [facility] [place] ([facility] [place])    [address] [place] [address]   [phone]            Jul 27, 2018 10:30 AM CDT   Return Visit with Cris Paez PA-C   [facility] ([facility])    [address]  [address]  [place] [address] [phone]            Aug 10, 2018  9:00 AM CDT   Return Visit with Brenda Perez PsyD   [facility] [place] ([facility] [place])    [address] [place], [place] [address] [address]   [phone]            Sep 07, 2018 10:00 AM CDT   Return Visit with Brenda Perez PsyD   [facility] [place] ([facility] [place])    [address] [place], [place] [address] [address]   [phone]            Sep 21, 2018  7:00 AM CDT   Return Visit with Brenda Perez PsyD   [facility] [place] ([facility] [place]    [address]  [place] [address]   [phone]            Oct 26, 2018 11:00 AM CDT   Annual Visit with Cris Paez PA-C   [facility] ([facility])    [address]  [address]  [place] [address] [phone]            Oct 26, 2018 11:30 AM CDT   Annual Visit with Usman Raymond Diet 1, RD   [facility] ([facility])    [address]  [address]  [place] [address] [phone]              Care EveryWhere ID     This is your Care EveryWhere ID. This could be used by other organizations to access your [facility] medical records  [id number]        Equal Access to Services     STEVEN MUNOZ AH: Hadii aad ku hadasho Sojorge, waaxda luqadaha, qaybta kaalmada aderandeeyagabrieal, suri cuello . So [facility] [phone].    ATENCIÓN: Si habla español, tiene a madera disposición servicios gratuitos de asistencia lingüística. Llame al [phone].    We comply with applicable federal civil rights laws and [place] laws. We do not discriminate on the basis of race, color, national origin, age, disability, sex, sexual orientation, or gender identity.

## 2018-07-19 DIAGNOSIS — Z30.09 GENERAL COUNSELING FOR PRESCRIPTION OF ORAL CONTRACEPTIVES: ICD-10-CM

## 2018-07-19 DIAGNOSIS — K91.2 POSTSURGICAL MALABSORPTION: ICD-10-CM

## 2018-07-19 DIAGNOSIS — E61.1 IRON DEFICIENCY: ICD-10-CM

## 2018-07-19 DIAGNOSIS — E67.3 HYPERVITAMINOSIS D: ICD-10-CM

## 2018-07-19 DIAGNOSIS — Z98.84 BARIATRIC SURGERY STATUS: ICD-10-CM

## 2018-07-19 PROCEDURE — 83540 ASSAY OF IRON: CPT | Performed by: PHYSICIAN ASSISTANT

## 2018-07-19 PROCEDURE — 82728 ASSAY OF FERRITIN: CPT | Performed by: PHYSICIAN ASSISTANT

## 2018-07-19 PROCEDURE — 36415 COLL VENOUS BLD VENIPUNCTURE: CPT | Performed by: PHYSICIAN ASSISTANT

## 2018-07-19 PROCEDURE — 83550 IRON BINDING TEST: CPT | Performed by: PHYSICIAN ASSISTANT

## 2018-07-19 PROCEDURE — 82306 VITAMIN D 25 HYDROXY: CPT | Performed by: PHYSICIAN ASSISTANT

## 2018-07-19 RX ORDER — NORGESTIMATE AND ETHINYL ESTRADIOL 0.25-0.035
KIT ORAL
Qty: 84 TABLET | Refills: 2 | Status: SHIPPED | OUTPATIENT
Start: 2018-07-19 | End: 2019-03-21

## 2018-07-19 NOTE — TELEPHONE ENCOUNTER
Prescription approved per [facility].  Yvette Coleman RN  EP Triage

## 2018-07-19 NOTE — TELEPHONE ENCOUNTER
"Requested Prescriptions   Pending Prescriptions Disp Refills     SPRINTEC 28 0.25-35 MG-MCG per tablet [Pharmacy Med Name: SPRINTEC 28 TAB 28 DAY] 84 tablet 1    Last Written Prescription Date:  01/22/2018  Last Fill Quantity: 84,  # refills: 1   Last office visit: 3/6/2018 with prescribing provider:  Isaac   Office Visit:   Next 5 appointments (look out 90 days)     Jul 20, 2018 10:00 AM CDT   Return Visit with Guero Owen MD   [facility] ([facility])    [address]  [facility] [address]   [phone]            Jul 27, 2018  9:00 AM CDT   Return Visit with Brenda Perez PsyD   [facility]    [address] [place] [address]   [phone]            Jul 27, 2018 10:30 AM CDT   Return Visit with Cris Paez PA-C   [facility] ([facility])    [address]  [facility] [address]   [phone]            Aug 10, 2018  9:00 AM CDT   Return Visit with Brenda Perez PsyD   [facility] ([facility] [place])    [address] [place] [address]   [phone]            Sep 07, 2018 10:00 AM CDT   Return Visit with Brenda Perez PsyD   [facility] ([facility] [place])    [address] [place] [address]   [phone]                      Sig: TAKE ONE TABLET BY MOUTH ONCE DAILY    Contraceptives Protocol Passed    7/19/2018  7:57 AM       Passed - Patient is not a current smoker if age is 35 or older       Passed - Recent (12 mo) or future (30 days) visit within the authorizing provider's specialty    Patient had office visit in the last 12 months or has a visit in the next 30 days with authorizing provider or within the authorizing provider's specialty.  See \"Patient Info\" tab in inbasket, or \"Choose Columns\" in " Meds & Orders section of the refill encounter.           Passed - No active pregnancy on record       Passed - No positive pregnancy test in past 12 months

## 2018-07-20 ENCOUNTER — OFFICE VISIT (OUTPATIENT)
Dept: ENDOCRINOLOGY | Facility: CLINIC | Age: 48
End: 2018-07-20
Payer: COMMERCIAL

## 2018-07-20 VITALS
BODY MASS INDEX: 25.03 KG/M2 | DIASTOLIC BLOOD PRESSURE: 59 MMHG | HEART RATE: 69 BPM | HEIGHT: 62 IN | SYSTOLIC BLOOD PRESSURE: 89 MMHG | WEIGHT: 136 LBS

## 2018-07-20 DIAGNOSIS — Z98.84 BARIATRIC SURGERY STATUS: ICD-10-CM

## 2018-07-20 DIAGNOSIS — E16.2 HYPOGLYCEMIA: Primary | ICD-10-CM

## 2018-07-20 LAB
DEPRECATED CALCIDIOL+CALCIFEROL SERPL-MC: 66 UG/L (ref 20–75)
FERRITIN SERPL-MCNC: 6 NG/ML (ref 8–252)
IRON SATN MFR SERPL: 12 % (ref 15–46)
IRON SERPL-MCNC: 36 UG/DL (ref 35–180)
TIBC SERPL-MCNC: 313 UG/DL (ref 240–430)

## 2018-07-20 PROCEDURE — 99213 OFFICE O/P EST LOW 20 MIN: CPT | Performed by: INTERNAL MEDICINE

## 2018-07-20 RX ORDER — METFORMIN HCL 500 MG
500 TABLET, EXTENDED RELEASE 24 HR ORAL DAILY
Qty: 30 TABLET | Refills: 11 | Status: ON HOLD | OUTPATIENT
Start: 2018-07-20 | End: 2018-10-24

## 2018-07-20 NOTE — MR AVS SNAPSHOT
After Visit Summary   7/20/2018    Criss Don    MRN: [id number]           Patient Information     Date Of Birth          1970        Visit Information        Provider Department      7/20/2018 10:00 AM Guero Owen MD [facility] [place]        Today's Diagnoses     Hypoglycemia    -  1    Bariatric surgery status           Follow-ups after your visit        Follow-up notes from your care team     Return in about 2 months (around 9/20/2018).      Your next 10 appointments already scheduled     Jul 27, 2018  9:00 AM CDT   Return Visit with Brenda Peerz Song   [facility] [place] ([facility] [place])    [facility] [address] [facility]  [place] [address]   [phone]            Jul 27, 2018 10:30 AM CDT   Return Visit with Cirs Paez PA-C   [facility] ([facility])    [address]  [address]  [place] [address] [phone]            Aug 10, 2018  9:00 AM CDT   Return Visit with Brenda Perez PsyD   [facility] [place] ([facility] [place])    [address] [facility] [address]  [place] [address]   [phone]            Sep 07, 2018 10:00 AM CDT   Return Visit with Brenda Perez PsyD   [facility] [place] ([facility] [place])    [address] [facility] [address]  [place] [address]   [phone]            Sep 21, 2018  7:00 AM CDT   Return Visit with Brenda Perez PsyD   [facility] [place] ([facility] [place])    [address] [facility] [address]  [place] [address]   [phone]            Oct 05, 2018  3:30 PM CDT   Return Visit with Guero Owen MD   [facility] [place] ([facility] [place])    [address]  [address]  [place] [address]   [phone]            Oct 26, 2018 11:00 AM CDT   Annual Visit with Cris Paez PA-C   [facility]  "[place] ([facility])    [address]  [address]  [place] [address] [phone]            Oct 26, 2018 11:30 AM CDT   Annual Visit with Usman Arthur 1, RD   [facility] [place] ([facility])    [address]  [address]  [place] [address] [phone]              Who to contact     If you have questions or need follow up information about today's clinic visit or your schedule please contact [facility] directly at [phone].  Normal or non-critical lab and imaging results will be communicated to you by MyChart, letter or phone within 4 business days after the clinic has received the results. If you do not hear from us within 7 days, please contact the clinic through MyChart or phone. If you have a critical or abnormal lab result, we will notify you by phone as soon as possible.  Submit refill requests through [employer] or call your pharmacy and they will forward the refill request to us. Please allow 3 business days for your refill to be completed.          Additional Information About Your Visit        Care EveryWhere ID     This is your Care EveryWhere ID. This could be used by other organizations to access your [place] medical records  [id number]        Your Vitals Were     Pulse Height BMI (Body Mass Index)             69 1.575 m (5' 2\") 24.87 kg/m2          Blood Pressure from Last 3 Encounters:   07/20/18 (!) 89/59   05/04/18 91/60   04/20/18 (!) 88/61    Weight from Last 3 Encounters:   07/20/18 61.7 kg (136 lb)   05/04/18 57.2 kg (126 lb 3.2 oz)   04/20/18 59.4 kg (131 lb)              Today, you had the following     No orders found for display         Today's Medication Changes          These changes are accurate as of 7/20/18 10:33 AM.  If you have any questions, ask your nurse or doctor.               Start taking these medicines.        Dose/Directions    metFORMIN 500 MG 24 hr tablet "   Commonly known as:  GLUCOPHAGE-XR   Used for:  Hypoglycemia, Bariatric surgery status   Started by:  Guero Owen MD        Dose:  500 mg   Take 1 tablet (500 mg) by mouth daily   Quantity:  30 tablet   Refills:  11            Where to get your medicines      These medications were sent to [id number] - [place], [place] - [address]  [address] [address]    Hours:  Added 10/26 CK Checked with pharmacy Phone:  [phone]     metFORMIN 500 MG 24 hr tablet                Primary Care Provider Office Phone # Fax #    Napoleon Gray PA-C [phone]       [address]  [place] [address]        Equal Access to Services     [facility] : Hadii aad ku hadasho Soomaali, waaxda luqadaha, qaybta kaalmada adeegyada, waxay idiin hayisabelle cuello . So [facility] [phone].    ATENCIÓN: Si habla español, tiene a madera disposición servicios gratuitos de asistencia lingüística. [facility] [phone].    We comply with applicable federal civil rights laws and [place] laws. We do not discriminate on the basis of race, color, national origin, age, disability, sex, sexual orientation, or gender identity.            Thank you!     Thank you for choosing [facility]  for your care. Our goal is always to provide you with excellent care. Hearing back from our patients is one way we can continue to improve our services. Please take a few minutes to complete the written survey that you may receive in the mail after your visit with us. Thank you!             Your Updated Medication List - Protect others around you: Learn how to safely use, store and throw away your medicines at www.disposemymeds.org.          This list is accurate as of 7/20/18 10:33 AM.  Always use your most recent med list.                   Brand Name Dispense Instructions for use Diagnosis    B-12 2500 MCG Subl      Place 1 tablet under the tongue 3 times weekly         canagliflozin 100 MG tablet    INVOKANA    30 tablet    Take 1 tablet (100 mg) by mouth every morning (before breakfast)    History of gastric bypass, Hypoglycemia       docusate sodium 100 MG tablet    COLACE    180 tablet    Take 100 mg by mouth 2 times daily as needed for constipation    Other constipation       metFORMIN 500 MG 24 hr tablet    GLUCOPHAGE-XR    30 tablet    Take 1 tablet (500 mg) by mouth daily    Hypoglycemia, Bariatric surgery status       multivitamin  peds with iron 60 MG chewable tablet      Take 2 chew tab by mouth every morning        SPRINTEC 28 0.25-35 MG-MCG per tablet   Generic drug:  norgestimate-ethinyl estradiol     84 tablet    TAKE ONE TABLET BY MOUTH ONCE DAILY    General counseling for prescription of oral contraceptives       VIACTIV 500-500-40 MG-UNT-MCG Chew   Generic drug:  calcium-vitamin D-vitamin K      Take 1 tablet by mouth 3 times daily        VITAMIN D3 PO      Take 6,000 Units by mouth daily        VITRON-C  MG Tabs tablet   Generic drug:  ferrous fumarate 65 mg ([place]. FE)-Vitamin C 125 mg      Take 1 tablet by mouth every morning

## 2018-07-20 NOTE — PROGRESS NOTES
"Name: Criss Don    HPI:  Recent issues:  Here for f/u evaluation  Taking the low-dose (off label) Invokana, seems helpful to minimize \"spells\"          Previous history of morbid obesity, subsequent gastric bypass surgery  Had been overweight for many years since high school, progressive weight gain  2011. Tried Medifast diet plan, some success with nearly 100# weight loss to kisha 205#, then wt gain  9/2016. Gastric bypass surgery consult at  Bariatric clinic, preop wt 307#  10/26/16. RNY lap surgery by Dr. Romario Reyna/[facility]  Postop success with wt loss, but initial issues with bloating, some nausea... Resolved  Takes Viactiv calcium 3 QD, vit D 2000 IU 2 QD, vit B12 sl 3x/wk, magnesium + Flintstones MVI 2 QD  7/2017. Noted progressive episodic symptoms of concern   Sudden lightheadedness, decreased mental focus, visual blurring, warmth/flushing, weakness   Treats by sitting down, drinking water, though sometimes eats crackers   Spells resolve after 5-10 min, though often without carb supplement   Occurs at random times of day, approx 2-3x/wk, no typical food or exercise relationship   If severe spell, pain at the left lower quadrant of abdomen  Had medical eval at  [facility] Clinic, has also seen Ms Lazaro Ortiz RD, LD   Advised to consume 5-6 small meals/day   No significant benefit with this dietary supplement and spells however  No previous BG testing with spells. Patient did not have a BG meter  Denies prior history of diabetes mellitus, diabetes med use, or adrenal disease    12/7/17. Initial consult with me at my former clinic ([facility])  Labs:  hgbA1c 5.2%, glucose 83, cortisol 16.2, C-P 1.09, TSH 1.44  Received instruction on use of BG meter  Noted spells every 2 days on avg   Usually mild, transient   Has measured BG levesl with her OneTouch Verio meter, levels w/ spells  and 2-spells with BG<70  12/2017. Began off-label Farxiga 5 mg po QAM  Noticed only mild spells and transient spells, " "some indigestion  Late 1/2017. Ran out of Farxiga medication, has noticed spells now more frequent and larger spells more intense  Recalls some recent spell , 116, 116    2/23/18. Initial endocrine evaluation with me.  Late 2/2018. Started Invokana 100 mg 1-tab QAM  Occasional lightheadedness spells approx 1x/week, treated with small snack or cracker  3/11/18 Had spell while visiting [place], symptoms of lightheadedness and blurry vision   Was seated, then blacked-out and awoke in EMT tent area   Recalls having \"normal\" BP, emesis x1, checked BG and normal vs high level  Spells more common midday and late evening   Spells still occur approx every other day, mild and no major spells  Current dose:  Invokana 100 mg 1/2-tab po QAM       Sees Napoleon Gray at  Clinics EP  Has seen Cris Paez [facility]    PMH/PSH:  Past Medical History:   Diagnosis Date     morbid obesity      Obese      Urinary frequency      Past Surgical History:   Procedure Laterality Date     LAPAROSCOPIC BYPASS GASTRIC N/A 10/26/2016    Procedure: LAPAROSCOPIC BYPASS GASTRIC;  Surgeon: Romario Reyna MD;  Location: [facility] OR     NO HISTORY OF SURGERY         Family Hx:  Family History   Problem Relation Age of Onset     Hypertension Mother      Breast Cancer Mother      64 dx     Allergies Mother      Obesity Mother      Respiratory Mother      Asthma     HEART DISEASE Mother      Hypertension Maternal Grandmother      Cancer Maternal Grandmother      Ovarian     Cancer Sister      Cervical     Cancer Other      Mat. great aunt-ovarian     Hypertension Brother      Cancer Maternal Aunt      ?Gastric     Brain Cancer Cousin      maternal         Social Hx:  Social History     Social History     Marital status:      Spouse name: seperated     Number of children: N/A     Years of education: N/A     Occupational History     [employer]      Social History Main Topics     Smoking status: Never Smoker     Smokeless " "tobacco: Never Used     Alcohol use 0.0 oz/week     0 Standard drinks or equivalent per week      Comment: very rare     Drug use: No     Sexual activity: Yes     Partners: Male     Other Topics Concern     Not on file     Social History Narrative          MEDICATIONS:  has a current medication list which includes the following prescription(s): calcium-vitamin d-vitamin k, canagliflozin, cholecalciferol, b-12, docusate sodium, ferrous fumarate 65 mg ([place]. fe)-vitamin c 125 mg, multivitamin  peds with iron, and sprintec 28.    ROS:     ROS: 10 point ROS neg other than the symptoms noted above in the HPI.    GENERAL: no weight changes, fatigue, fevers, chills, night sweats.   HEENT: no dysphagia, odonophagia, diplopia, neck pain  THYROID:  no apparent hyper or hypothyroid symptoms  CV: no chest pain, pressure, palpitations  LUNGS: no SOB, FAUSTIN, cough, wheezing   ABDOMEN: no diarrhea, constipation, abdominal pain  EXTREMITIES: no rashes, ulcers, edema  NEUROLOGY: no headaches, denies changes in vision, tingling, extremitiy numbness   MSK: no muscle aches or pains, weakness  :  Nocturia 3-4x/night (baseline pattern), denies dysuria  SKIN: no rashes or lesions  PSYCH:  stable mood, no significant anxiety or depression    Physical Exam   VS: BP (!) 89/59 (BP Location: Right arm, Cuff Size: Adult Regular)  Pulse 69  Ht 1.575 m (5' 2\")  Wt 61.7 kg (136 lb)  BMI 24.87 kg/m2  GENERAL: AXOX3, NAD, eyeglasses, well dressed, answering questions appropriately, appears stated age.  THYROID:  normal gland, no apparent nodules or goiter  ABDOMEN: soft, nontender, nondistended, no organomegaly    LABS:    All pertinent notes, labs, and images personally reviewed by me.     A/P:  Encounter Diagnoses   Name Primary?     Hypoglycemia Yes     Bariatric surgery status        Comments:   Complicated case.  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    Recent use " of off-label Invokana effective and well tolerated.    Plan:  Discussed general issues with the hypoglycemia diagnosis and management  We have reviewed pancreas gland anatomy and hormone physiology  Discussed lab tests used to assess patient glucose levels and hypoglycemia events  Reviewed treatment options using off-label diabetes medications to help with insulin resistance or enhancing glucagon secretion    Recommend:  Continue the current (SGLPTI med) Invokana but increase dose as 100 mg 1-tab QAM  Add metforminXR 500 mg po QD  Monitor for symptom changes  Check BG meter test if significant hypoglycemia-like spell, record time/date/symptoms/circumstance  Avoid high carb meals/snacks  Discussed possibility that hypotension may also cause similar symptoms, would be unrelated to glucose problem   Consider wearing ambulatory BP monitor vs cardiology consultation  Keep planned evaluation at [facility], if recommendation by her other providers.    Addressed patient questions today    Labs ordered today: No orders of the defined types were placed in this encounter.    Radiology/Consults ordered today: None    More than 50% of the time spent with Ms. Don on counseling / coordinating her care.  Total appointment time was 20 minutes.    Follow-up:  2 mo.    Guero Owen MD  Endocrinology  [facility]/[place]    Copy: CHARI Gray

## 2018-07-27 ENCOUNTER — OFFICE VISIT (OUTPATIENT)
Dept: SURGERY | Facility: CLINIC | Age: 48
End: 2018-07-27
Payer: COMMERCIAL

## 2018-07-27 ENCOUNTER — OFFICE VISIT (OUTPATIENT)
Dept: PSYCHOLOGY | Facility: CLINIC | Age: 48
End: 2018-07-27
Payer: COMMERCIAL

## 2018-07-27 VITALS
RESPIRATION RATE: 12 BRPM | OXYGEN SATURATION: 100 % | HEIGHT: 62 IN | HEART RATE: 65 BPM | WEIGHT: 133.1 LBS | SYSTOLIC BLOOD PRESSURE: 97 MMHG | DIASTOLIC BLOOD PRESSURE: 63 MMHG | BODY MASS INDEX: 24.49 KG/M2

## 2018-07-27 DIAGNOSIS — F45.22 BODY DYSMORPHIC DISORDER: ICD-10-CM

## 2018-07-27 DIAGNOSIS — K91.2 MALNUTRITION FOLLOWING GASTROINTESTINAL SURGERY: ICD-10-CM

## 2018-07-27 DIAGNOSIS — F41.1 GENERALIZED ANXIETY DISORDER: Primary | ICD-10-CM

## 2018-07-27 DIAGNOSIS — E61.1 IRON DEFICIENCY: ICD-10-CM

## 2018-07-27 DIAGNOSIS — K59.01 SLOW TRANSIT CONSTIPATION: ICD-10-CM

## 2018-07-27 DIAGNOSIS — F50.9 EATING DISORDER: ICD-10-CM

## 2018-07-27 DIAGNOSIS — Z98.84 BARIATRIC SURGERY STATUS: Primary | ICD-10-CM

## 2018-07-27 DIAGNOSIS — F50.89 OTHER SPECIFIED EATING DISORDER: ICD-10-CM

## 2018-07-27 PROBLEM — E67.3 HYPERVITAMINOSIS D: Status: RESOLVED | Noted: 2018-03-26 | Resolved: 2018-07-27

## 2018-07-27 PROCEDURE — 90834 PSYTX W PT 45 MINUTES: CPT | Performed by: PSYCHOLOGIST

## 2018-07-27 PROCEDURE — 99214 OFFICE O/P EST MOD 30 MIN: CPT | Performed by: PHYSICIAN ASSISTANT

## 2018-07-27 NOTE — MR AVS SNAPSHOT
After Visit Summary   7/27/2018    Criss Don    MRN: [id number]           Patient Information     Date Of Birth          1970        Visit Information        Provider Department      7/27/2018 10:30 AM Cris Paez PA-C [facility]      Today's Diagnoses     Iron deficiency    -  1    Bariatric surgery status        Malnutrition following gastrointestinal surgery          Care Instructions    Vitamin D until Labor Day and then restart 2000 Int Units of Vitamin D.    See hematologist for possible Iron infusion.    Can start kickboxing.  Just remember to add planned protein snack and work with nutritionist.     Follow up in October for annual visit.               Follow-ups after your visit        Additional Services     ONC/HEME ADULT REFERRAL       Your provider has referred you to: [facility]: [facility] (Augustin Segal, [place], [place], [place] )  [phone]  Reason: Iron Deficiency Anemia S/P Gastric Bypass Surgery.  Assess for possible iron infusion therapy vs PO iron intake    Please be aware that coverage of these services is subject to the terms and limitations of your health insurance plan.  Call member services at your health plan with any benefit or coverage questions.      Please bring the following to your appointment:  >>   List of current medications   >>   This referral request   >>   Any documents/labs given to you for this referral                    Your next 10 appointments already scheduled     Aug 10, 2018  9:00 AM CDT   Return Visit with Brenda Perez PsyD   [facility] [place] ([facility] [place])    [address] [place] [address]   [phone]            Sep 07, 2018 10:00 AM CDT   Return Visit with Brenda Perez PsyD   [facility] [place] ([facility] [place])    [address] [place] [address]  [address]   [phone]            Sep 21, 2018  7:00 AM CDT   Return Visit with Brenda Perez PsyD   [facility] [place] ([facility] [place])    [address]  [place] [address]   [phone]            Oct 05, 2018  7:00 AM CDT   Return Visit with Brenda Perez PsyD   [facility] [place] ([facility] [place])    [facility]  [place] [address]   [phone]            Oct 05, 2018  3:30 PM CDT   Return Visit with Guero Owen MD   [facility]    [address] [place] [address] [address]  [facility] [address]   [phone]            Oct 26, 2018 11:00 AM CDT   Annual Visit with Cris Paez PA-C   [facility] ([facility])    [address] [place] [address] [address]  [facility] [address] [phone]            Oct 26, 2018 11:30 AM CDT   Annual Visit with Usman Arthur 1, MCKENZIE   [facility] ([facility])    [address] [place], [place] [id number]  [facility] [address] [phone]              Who to contact     If you have questions or need follow up information about today's clinic visit or your schedule please contact [facility] directly at [phone].  Normal or non-critical lab and imaging results will be communicated to you by MyChart, letter or phone within 4 business days after the clinic has received the results. If you do not hear from us within 7 days, please contact the clinic through MyChart or phone. If you have a critical or abnormal lab result, we will notify you by phone as soon as possible.  Submit refill requests through [employer] or call your pharmacy and they will forward the refill request to us. Please allow 3 business days for your refill to be completed.          Additional Information About Your Visit        Care  "EveryWhere ID     This is your Care EveryWhere ID. This could be used by other organizations to access your [place] medical records  [id number]        Your Vitals Were     Pulse Respirations Height Pulse Oximetry BMI (Body Mass Index)       65 12 5' 2\" (1.575 m) 100% 24.34 kg/m2        Blood Pressure from Last 3 Encounters:   07/27/18 97/63   07/20/18 (!) 89/59   05/04/18 91/60    Weight from Last 3 Encounters:   07/27/18 133 lb 1.6 oz (60.4 kg)   07/20/18 136 lb (61.7 kg)   05/04/18 126 lb 3.2 oz (57.2 kg)              We Performed the Following     ONC/HEME ADULT REFERRAL        Primary Care Provider Office Phone # Fax #    Napoleon Gray PA-C [phone]       [address]  [place] [address]        Equal Access to Services     [facility]: Hadii aad ku hadasho Soomaali, waaxda luqadaha, qaybta kaalmada adeegyada, waxay idiin hayaan stefany cuello . So [facility] [phone].    ATENCIÓN: Si carolala espshanta, tiene a maedra disposición servicios gratuitos de asistencia lingüística. Trevon al [phone].    We comply with applicable federal civil rights laws and [place] laws. We do not discriminate on the basis of race, color, national origin, age, disability, sex, sexual orientation, or gender identity.            Thank you!     Thank you for choosing [place] [facility]  for your care. Our goal is always to provide you with excellent care. Hearing back from our patients is one way we can continue to improve our services. Please take a few minutes to complete the written survey that you may receive in the mail after your visit with us. Thank you!             Your Updated Medication List - Protect others around you: Learn how to safely use, store and throw away your medicines at www.disposemymeds.org.          This list is accurate as of 7/27/18 11:00 AM.  Always use your most recent med list.                   Brand Name Dispense Instructions for use " Diagnosis    B-12 2500 MCG Subl      Place 1 tablet under the tongue 3 times weekly        canagliflozin 100 MG tablet    INVOKANA    30 tablet    Take 1 tablet (100 mg) by mouth every morning (before breakfast)    History of gastric bypass, Hypoglycemia       docusate sodium 100 MG tablet    COLACE    180 tablet    Take 100 mg by mouth 2 times daily as needed for constipation    Other constipation       metFORMIN 500 MG 24 hr tablet    GLUCOPHAGE-XR    30 tablet    Take 1 tablet (500 mg) by mouth daily    Hypoglycemia, Bariatric surgery status       multivitamin  peds with iron 60 MG chewable tablet      Take 2 chew tab by mouth every morning        SPRINTEC 28 0.25-35 MG-MCG per tablet   Generic drug:  norgestimate-ethinyl estradiol     84 tablet    TAKE ONE TABLET BY MOUTH ONCE DAILY    General counseling for prescription of oral contraceptives       VIACTIV 500-500-40 MG-UNT-MCG Chew   Generic drug:  calcium-vitamin D-vitamin K      Take 1 tablet by mouth 3 times daily        VITAMIN D3 PO      Take 6,000 Units by mouth daily        VITRON-C  MG Tabs tablet   Generic drug:  ferrous fumarate 65 mg ([place]. FE)-Vitamin C 125 mg      Take 1 tablet by mouth every morning

## 2018-07-27 NOTE — PATIENT INSTRUCTIONS
Vitamin D until Labor Day and then restart 2000 Int Units of Vitamin D.    See hematologist for possible Iron infusion.    Can start kickboxing.  Just remember to add planned protein snack and work with nutritionist.     Follow up in October for annual visit.

## 2018-07-27 NOTE — PROGRESS NOTES
"                                             Progress Note    Client Name: Criss Don  Date:  7/27/2018         Service Type: Individual      Session Start Time: 9:00  Session End Time: 9:45      Session Length: 45     Session #: 36     Attendees: Client attended alone    Treatment Plan Last Reviewed:   7/27/2018  PHQ-9 / NED-7 :      DATA      Progress Since Last Session (Related to Symptoms / Goals / Homework):   Symptoms: stable    Homework: Partially completed      Episode of Care Goals: Minimal progress - ACTION (Actively working towards change); Intervened by reinforcing change plan / affirming steps taken     Current / Ongoing Stressors and Concerns:   Reported that she is still feeling nervous about her family reunion this weekend but was able to identify her coping plan and exit plan in session. Reported that she \"got some things off my chest\" with her partner and felt better for having done so; also reported that he initially seemed upset but then made some positive changes that showed he listened to what she said.       Treatment Objective(s) Addressed in This Session:   stress management  Maintain compliance with post-surgical dietary needs     Intervention:   CBT: coached client on ways to surface and replace thinking errors that increase anxiety; helped her rehearse statements she can use with family if they are being overly intrusive in their questions  Solution Focused: coached client on assertiveness skills to use with family at the upcoming reunion; reviewed her plan for emotional regulation and how to take breaks from conversations if needed  Supportive therapy:  provided active listening, support, and encouragement.  Reinforced healthy behavioral choices and use of assertiveness skills.           ASSESSMENT: Current Emotional / Mental Status (status of significant symptoms):   Risk status (Self / Other harm or suicidal ideation)   Client denies current fears or concerns for personal " safety.   Client denies current or recent suicidal ideation or behaviors.   Client denies current or recent homicidal ideation or behaviors.   Client denies current or recent self injurious behavior or ideation.   Client denies other safety concerns.   A safety and risk management plan has not been developed at this time, however client was given the after-hours number / 911 should there be a change in any of these risk factors.     Appearance:   Appropriate    Eye Contact:   Good    Psychomotor Behavior: Normal    Attitude:   Cooperative  Pleasant    Orientation:   All   Speech    Rate / Production: Normal     Volume:  Normal    Mood:    Anxious    Affect:    Appropriate    Thought Content:  Clear    Thought Form:  Coherent  Logical    Insight:    Good      Medication Review:   No changes to current psychiatric medication(s)     Medication Compliance:   Yes     Changes in Health Issues:   None reported     Chemical Use Review:   Substance Use: Chemical use reviewed, no active concerns identified      Tobacco Use: No current tobacco use.       Collateral Reports Completed:   Not Applicable.     PLAN: (Client Tasks / Therapist Tasks / Other)  Future appointments are scheduled.  Identify physical signs of anxiety and use relaxation skills as discussed in session. Use assertiveness skills as discussed in session to set effective limits with family, and shift conversations toward other topics if needed.  Surface and challenge unrealistic expectations and disproportionate thoughts that generate anxiety.        Brenda Perez PsyD LP                                                       Treatment Plan    Client's Name: Criss Don  YOB: 1970    Date:   7/27/2018    DSM-V Diagnoses: 300.02 Generalized Anxiety Disorder, eating disorder, unspecified  Psychosocial / Contextual Factors: divorce proceedings, health concerns  WHODAS: 22    Referral / Collaboration:  Referral to another professional/service  is not indicated at this time..    Anticipated number of session or this episode of care: reassess after 12 sessions      MeasurableTreatment Goal(s) related to diagnosis / functional impairment(s)  Goal 1: Client will learn coping skills to manage stress and adjust to post-surgical lifestyle changes more effectively.    I will know I've met my goal when I'm not weighing myself all the time and feeling more comfortable with my weight loss.      Objective #A (Client Action)    Client will surface and challenge thinking errors that contribute to anxiety.  Status: Continued - Date(s): 7/27/2018       Intervention(s)  Therapist will assign homework (thought  logs), assist client in surfacing and replacement ineffective thinking patterns.    Objective #B  Client will learn deep breathing and relaxation skills and practice at least 3x daily.  Status: Continued - Date(s): 7/27/2018       Intervention(s)  Therapist will teach deep breathing and relaxation skills, assign homework, problem-solve barriers to follow through.    Objective #C  Client will learn and use assertiveness skills to set healthy boundaries and communicate her needs more effectively.  Status: Continued - Date(s): 7/27/2018       Intervention(s)  Therapist will assist client in identifying and balancing her expectations.        Client has reviewed and agreed to the above plan.      Brenda Perez PsyD LP    7/27/2018

## 2018-07-27 NOTE — PROGRESS NOTES
Bariatric Follow Up Visit   May 4, 2018    RE: KOJO ASHTON  MR#: [id number]  : 1970    Kojo came in today for a follow up appointment.  In February I referred her to [facility] for an eating disorder evaluation.  She is currently getting intensive outpatient treatment. Paulette likes the ED program.  Feels the program is individualized which is what she was looking for.    Is seeing the therapist weekly. She works with a  therapist weekly and dietician every other week.   Sees the psychiatrist every 6 weeks. She continues to see Brenda Perez at [facility]  every 2 weeks as well.    Her divorce has been finalized.  This has been a huge relief. Her  still tries to get a hold of her but has not tried for a week and a half.     She is still exercising 60 minutes 5-6 days a week. She would like to try 9 rounds of mini kick boxing in [place].  Is doing it for a fitness goal.  Is not looking to do it for weight loss. Has the ok from her therapist and dietician at [facility].     BARIATRIC METRICS:    Current Weight: 133 lb 1.6 oz (60.4 kg)  Body mass index is 24.34 kg/(m^2).   Wt change since last visit (lbs): 7.1  Cumulative weight loss (lbs): 174.6    Patient is taking the following bariatric postoperative vitamins:  2 Complete multivitamins with minerals (at different times than calcium)  Stopped Vitamin D in March, Was taking 4000.    3350-8253 mg of Calcium daily in divided doses  2500 mcg of Vitamin B12 sl every three days  1 Iron/Vit C. Daily- was unable to increase to two because it was causing issues with constipation.     Is able to get 65-80 oz of water in daily.     OBESITY RELATED CONDITIONS:  Pre-diabetes: resolved HgA1C 5.17    REVIEW OF SYSTEMS:  GI:  Nausea-seldom  Vomiting-seldom,   Diarrhea-never  Constipation-occasional, takes 3 stool softener daily.    Has 1 stools variable every other to every 7-9 days.  Is taking Miralax every other day days.  If she takes it every day  "she gets abdominal pain.  Every couple of weeks she uses Doculax and then she has a good bowel movement.    Dysphagia-never  Abdominal Pains-never  Heartburn-never    Psychiatric:  Anxiety disorder, NOS:  pt is currently working with Soni Lopez regarding her body image problem. She sees her every other week.  Feels she is getting a little better with her body image, but still sees a lot of the old her.     :    Pt has monthly menses.  Is now on oral birth control.      Endo: Hypoglycemic episodes have improved.  Continues to see Dr. Bridges for this. She is on a couple different medication combinations.  Hasn't had a major episode since March.     PHYSICAL EXAMINATION:   BP 97/63 (BP Location: Left arm, Patient Position: Sitting, Cuff Size: Adult Regular)  Pulse 65  Resp 12  Ht 5' 2\" (1.575 m)  Wt 133 lb 1.6 oz (60.4 kg)  SpO2 100%  BMI 24.34 kg/m2   GENERAL Pt in NAD.  HEART: No JVD  LUNGS: Breathing unlabored.  MUSC: Gait normal  NEURO: Alert and oriented x3.     ASSESSMENT/PLAN:   S/P Laparoscopic Rasheeda-en-Y Gastric Bypass   Post surgical malabsorption   Reviewed Vit D,  Iron Labs.   Hold Vitamin D until Labor Day and then restart 2000 Int Units of Vitamin D.    Reactive Hypoglycemia/Lightheadedness    Continue following up with Dr. Oewn at [facility].    Iron deficiency   See hematologist for possible Iron infusion.  Body dysmorphic disorder   Continue to see SONI Lopez twice monthly.  EDNOS   Can start kickboxing.  Just remember to add planned protein snack and work with nutritionist.   Constipation   See hematologist for possible Iron infusion.  Continue current bowel regimen.  Follow up in October for annual visit.     This was a 25 minute visit with greater than 50% spent on counseling.  "

## 2018-07-27 NOTE — MR AVS SNAPSHOT
MRN:[id number]                      After Visit Summary   7/27/2018    Criss Don    MRN: [id number]           Visit Information        Provider Department      7/27/2018 9:00 AM Brenda Perez PsyD [facility] Generic      Your next 10 appointments already scheduled     Aug 10, 2018  9:00 AM CDT   Return Visit with Brenda Perez PsyD   [facility] [place] ([facility] [place])    [facility]  [place] [address]   [phone]            Sep 07, 2018 10:00 AM CDT   Return Visit with Brenda Perez PsyD   [facility] ([facility])    [facility]  [place] [address]   [phone]            Sep 21, 2018  7:00 AM CDT   Return Visit with Brenda Perez PsyD   [facility] ([facility])    [facility]  [place] [address]   [phone]            Oct 05, 2018  7:00 AM CDT   Return Visit with Brenda Perez PsyD   [facility] ([facility] [place])    [facility]  [place] [address]   [phone]            Oct 05, 2018  3:30 PM CDT   Return Visit with Guero Owen MD   [facility]    [address]  [address]  [place] [address] [phone]            Oct 26, 2018 11:00 AM CDT   Annual Visit with Cris Paez PA-C   [facility] ([facility])    [address]  [place] [address] [phone]            Oct 26, 2018 11:30 AM CDT   Annual Visit with Usman Raymond Diet 1, RD   [facility] ([facility])    [address]  [address]  [place] [address] [phone]              Care EveryWhere ID     This is your Care EveryWhere ID. This could be used by other  organizations to access your [place] medical records  [id number]        Equal Access to Services     STEVEN MUNOZ : Phong Wooten, esble medina, suri sharma. So [facility] [phone].    ATENCIÓN: Si habla español, tiene a madera disposición servicios gratuitos de asistencia lingüística. Llame al [phone].    We comply with applicable federal civil rights laws and [place] laws. We do not discriminate on the basis of race, color, national origin, age, disability, sex, sexual orientation, or gender identity.

## 2018-08-02 ENCOUNTER — HOSPITAL ENCOUNTER (OUTPATIENT)
Facility: CLINIC | Age: 48
Setting detail: SPECIMEN
Discharge: HOME | End: 2018-08-02
Attending: INTERNAL MEDICINE | Admitting: INTERNAL MEDICINE
Payer: COMMERCIAL

## 2018-08-02 ENCOUNTER — ONCOLOGY VISIT (OUTPATIENT)
Dept: ONCOLOGY | Facility: CLINIC | Age: 48
End: 2018-08-02
Attending: INTERNAL MEDICINE
Payer: COMMERCIAL

## 2018-08-02 VITALS
TEMPERATURE: 98.1 F | RESPIRATION RATE: 16 BRPM | WEIGHT: 136.4 LBS | BODY MASS INDEX: 24.95 KG/M2 | OXYGEN SATURATION: 97 % | SYSTOLIC BLOOD PRESSURE: 100 MMHG | HEART RATE: 74 BPM | DIASTOLIC BLOOD PRESSURE: 65 MMHG

## 2018-08-02 DIAGNOSIS — E61.1 IRON DEFICIENCY: Primary | ICD-10-CM

## 2018-08-02 DIAGNOSIS — K90.9 MALABSORPTION OF IRON: ICD-10-CM

## 2018-08-02 DIAGNOSIS — D50.9 IRON DEFICIENCY ANEMIA, UNSPECIFIED IRON DEFICIENCY ANEMIA TYPE: ICD-10-CM

## 2018-08-02 LAB
ERYTHROCYTE [DISTWIDTH] IN BLOOD BY AUTOMATED COUNT: 13 % (ref 10–15)
HCT VFR BLD AUTO: 33.1 % (ref 35–47)
HGB BLD-MCNC: 10.8 G/DL (ref 11.7–15.7)
MCH RBC QN AUTO: 29.2 PG (ref 26.5–33)
MCHC RBC AUTO-ENTMCNC: 32.6 G/DL (ref 31.5–36.5)
MCV RBC AUTO: 90 FL (ref 78–100)
PLATELET # BLD AUTO: 236 10E9/L (ref 150–450)
RBC # BLD AUTO: 3.7 10E12/L (ref 3.8–5.2)
WBC # BLD AUTO: 6.6 10E9/L (ref 4–11)

## 2018-08-02 PROCEDURE — 99215 OFFICE O/P EST HI 40 MIN: CPT | Performed by: INTERNAL MEDICINE

## 2018-08-02 PROCEDURE — 85027 COMPLETE CBC AUTOMATED: CPT | Performed by: INTERNAL MEDICINE

## 2018-08-02 PROCEDURE — G0463 HOSPITAL OUTPT CLINIC VISIT: HCPCS

## 2018-08-02 RX ORDER — MIRTAZAPINE 15 MG/1
15 TABLET, FILM COATED ORAL
COMMUNITY
Start: 2018-07-19 | End: 2019-02-01

## 2018-08-02 ASSESSMENT — PAIN SCALES - GENERAL: PAINLEVEL: NO PAIN (0)

## 2018-08-02 NOTE — MR AVS SNAPSHOT
After Visit Summary   8/2/2018    Criss Don    MRN: [id number]           Patient Information     Date Of Birth          1970        Visit Information        Provider Department      8/2/2018 1:20 PM Pricilla Rahman MD [facility]        Today's Diagnoses     Iron deficiency    -  1    Malabsorption of iron          Care Instructions    CBC today.- Done LW  IV injectafer x 2 if approved by insurance.- Sherry will check coverage - LW   Follow up in 6 months with labs.          Follow-ups after your visit        Your next 10 appointments already scheduled     Aug 10, 2018  9:00 AM CDT   Return Visit with Brenda Perez [facility] [place] ([facility] [place])    [facility] [place] [address]   [phone]            Sep 07, 2018 10:00 AM CDT   Return Visit with Brenda Perez PsyD   [facility] [place] ([facility] [place])    [facility] [place] [address]   [phone]            Sep 21, 2018  7:00 AM CDT   Return Visit with Brenda Perez [facility] [place] ([facility] [place])    [address] [facility] [place] [address]   [phone]            Oct 05, 2018  7:00 AM CDT   Return Visit with Brenda Perez PsyD   [facility] [place] ([facility] [place])    [facility] [place] [address]   [phone]            Oct 05, 2018  3:30 PM CDT   Return Visit with Guero Owen MD   [facility] [place] ([facility] [place])    [address]  [address]  [place] [address] [phone]            Oct 26, 2018 11:00 AM CDT   Annual Visit with Cris Paez PA-C   [facility] ([facility])    [address]  [address]  [place] [address] [phone]            Oct 26, 2018 11:30 AM CDT   Annual Visit with Usman  Wl Diet 1, RD   [facility] ([facility])    [address]  [place] [address] [phone]            Feb 01, 2019  9:45 AM CST   Return Visit with  Oncology Nurse   [facility] ([facility])    [facility] [place]  [address]  [place] [address] [phone]            Feb 07, 2019  1:00 PM CST   Return Visit with Pricilla Rahman MD   [facility] ([facility])    [facility]  [address]  [place] [address] [phone]              Future tests that were ordered for you today     Open Future Orders        Priority Expected Expires Ordered    CBC with platelets Routine 2/2/2019 6/2/2019 8/2/2018    Ferritin Routine 2/2/2019 6/2/2019 8/2/2018    Iron and iron binding capacity Routine 2/2/2019 6/2/2019 8/2/2018            Who to contact     If you have questions or need follow up information about today's clinic visit or your schedule please contact [facility] directly at [phone].  Normal or non-critical lab and imaging results will be communicated to you by MyChart, letter or phone within 4 business days after the clinic has received the results. If you do not hear from us within 7 days, please contact the clinic through MyChart or phone. If you have a critical or abnormal lab result, we will notify you by phone as soon as possible.  Submit refill requests through [employer] or call your pharmacy and they will forward the refill request to us. Please allow 3 business days for your refill to be completed.          Additional Information About Your Visit        Care EveryWhere ID     This is your Care EveryWhere ID. This could be used by other organizations to access your [place] medical records  [id number]        Your Vitals Were     Pulse Temperature Respirations Pulse Oximetry BMI (Body Mass  Index)       74 98.1  F (36.7  C) (Oral) 16 97% 24.95 kg/m2        Blood Pressure from Last 3 Encounters:   08/02/18 100/65   07/27/18 97/63   07/20/18 (!) 89/59    Weight from Last 3 Encounters:   08/02/18 61.9 kg (136 lb 6.4 oz)   07/27/18 60.4 kg (133 lb 1.6 oz)   07/20/18 61.7 kg (136 lb)              We Performed the Following     CBC with platelets        Primary Care Provider Office Phone # Fax #    Napoleon Gray PA-C [phone]       [address]  [place] [address]        Equal Access to Services     [facility]: Hadii aad ku hadasho Soomaali, waaxda luqadaha, qaybta kaalmada adeegyada, waxadelita cuello . So [facility] [phone].    ATENCIÓN: Si habla español, tiene a madera disposición servicios gratuitos de asistencia lingüística. Ll[facility] [phone].    We comply with applicable federal civil rights laws and [place] laws. We do not discriminate on the basis of race, color, national origin, age, disability, sex, sexual orientation, or gender identity.            Thank you!     Thank you for choosing [facility]  for your care. Our goal is always to provide you with excellent care. Hearing back from our patients is one way we can continue to improve our services. Please take a few minutes to complete the written survey that you may receive in the mail after your visit with us. Thank you!             Your Updated Medication List - Protect others around you: Learn how to safely use, store and throw away your medicines at www.disposemymeds.org.          This list is accurate as of 8/2/18  1:54 PM.  Always use your most recent med list.                   Brand Name Dispense Instructions for use Diagnosis    B-12 2500 MCG Subl      Place 1 tablet under the tongue 3 times weekly        canagliflozin 100 MG tablet    INVOKANA    30 tablet    Take 1 tablet (100 mg) by mouth every morning (before breakfast)    History of gastric bypass,  Hypoglycemia       docusate sodium 100 MG tablet    COLACE    180 tablet    Take 100 mg by mouth 2 times daily as needed for constipation    Other constipation       metFORMIN 500 MG 24 hr tablet    GLUCOPHAGE-XR    30 tablet    Take 1 tablet (500 mg) by mouth daily    Hypoglycemia, Bariatric surgery status       mirtazapine 15 MG tablet    REMERON      Iron deficiency, Malabsorption of iron       multivitamin  peds with iron 60 MG chewable tablet      Take 2 chew tab by mouth every morning        SPRINTEC 28 0.25-35 MG-MCG per tablet   Generic drug:  norgestimate-ethinyl estradiol     84 tablet    TAKE ONE TABLET BY MOUTH ONCE DAILY    General counseling for prescription of oral contraceptives       VIACTIV 500-500-40 MG-UNT-MCG Chew   Generic drug:  calcium-vitamin D-vitamin K      Take 1 tablet by mouth 3 times daily        VITAMIN D3 PO      Take 6,000 Units by mouth daily        VITRON-C  MG Tabs tablet   Generic drug:  ferrous fumarate 65 mg ([place]. FE)-Vitamin C 125 mg      Take 1 tablet by mouth every morning

## 2018-08-02 NOTE — LETTER
"    8/2/2018         RE: Criss Don  [address] [facility] [address]        Dear Colleague,    Thank you for referring your patient, Criss Don, to the [facility]. Please see a copy of my visit note below.    Oncology Rooming Note    August 2, 2018 1:15 PM   Criss Don is a 47 year old female who presents for:    Chief Complaint   Patient presents with     Hematology     Iron deficiency Anemia     Initial Vitals: /65 (BP Location: Left arm, Patient Position: Sitting, Cuff Size: Adult Regular)  Pulse 74  Temp 98.1  F (36.7  C) (Oral)  Resp 16  Wt 61.9 kg (136 lb 6.4 oz)  SpO2 97%  BMI 24.95 kg/m2 Estimated body mass index is 24.95 kg/(m^2) as calculated from the following:    Height as of 7/27/18: 1.575 m (5' 2\").    Weight as of this encounter: 61.9 kg (136 lb 6.4 oz). Body surface area is 1.65 meters squared.  No Pain (0) Comment: Data Unavailable   No LMP recorded.  Allergies reviewed: Yes  Medications reviewed: Yes    Medications: Medication refills not needed today.  Pharmacy name entered into [facility]: [facility] - [facility] [address] [address]    Clinical concerns: None                      4 minutes for nursing intake (face to face time)     Temitope Davies MA              Visit Date:   08/02/2018      This consult has been requested by Cris Paez PA-C for iron deficiency anemia.       Ms. Don is a 47-year-old female who had gastric bypass surgery in October 2016.  Since her gastric bypass surgery, her ferritin level has been decreasing and now she has iron deficiency.  Patient has been on oral iron.  In spite of that, her ferritin continues to decrease. Labs are reviewed and summarized below.   1.  On 03/07/2018 (care everywhere), hemoglobin of 12.3 with MCV of 89.7.   2.  On 04/20/2018, iron of 33, ferritin of 11 and saturation of 11%.   3.  On 07/19/2018, iron of 36, ferritin of 6 and saturation of 12%.      Patient says " that lately she has been more tired.  Her fatigue has been getting worse.  Sometimes she has been getting muscle cramps.        REVIEW OF SYSTEMS:    No headache.  No dizziness.  No neck pain.  No chest pain or difficulty breathing.  No abdominal pain, nausea or vomiting.  No urinary or bowel complaints.      She is premenopausal.  She has menstrual bleed every 30 days.  She bleeds for 5-7 days.  Her bleeding sometimes is heavy.      All other review of systems negative.      ALLERGIES:  Reviewed.      MEDICATIONS:  Reviewed.      PAST MEDICAL HISTORY:   1.  Insomnia.   2.  Generalized anxiety disorder.   3.  Gastric bypass surgery in 2016.      SOCIAL HISTORY:   -No smoking.   -No alcohol use.      FAMILY HISTORY:   -Her parents are in fairly good health for their age.   -She has 1 brother who is in good health.   -She had 1 sister who  of brain aneurysm.  Sister had cervical cancer.      PHYSICAL EXAMINATION:   GENERAL:  Alert, oriented x 3.   VITAL SIGNS:  Reviewed.   Rest of the systems not examined.      LABORATORY DATA:  CBC was done today.  Hemoglobin is 10.8 with MCV of 90.  Normal WBC and platelets.      ASSESSMENT:   1.  A 47-year-old female with iron deficiency anemia secondary to malabsorption of iron.   2.  Malabsorption of iron secondary to gastric bypass surgery.   3.  Fatigue and muscle cramps secondary to iron deficiency.      RECOMMENDATIONS:   1.  I had a long discussion with the patient regarding anemia and iron deficiency.  Iron metabolism was all discussed with her.  She has malabsorption of iron secondary to gastric bypass surgery which is causing anemia.     2.  Discussed with her regarding treatment for iron deficiency anemia.  She has already tried oral iron, which is not helping and not expected to help because of gastric bypass surgery.      I discussed regarding IV iron as she is symptomatic.  Side effects of IV iron, including anaphylactic reactions, were discussed.  We will  give her IV injectafer for 2 doses.  She is agreeable for it.      3.  I told the patient that she is at risk of becoming iron deficient in the future.  We will monitor her.  I will see her in 6 months' time with labs including CBC and iron studies.      4.  Patient advised to continue with vitamin B12 sublingual pills.       5.  She had multiple questions, which were all answered.  Advised her to see a physician if she has worsening weakness, chest pain, shortness of breath, dizziness, muscle cramps or any other concerns.      Thanks for the consult.      Total face-to-face time spent 40 minutes, more than 50% of the time spent in counseling and coordination of care.         JORGE BANDA MD             D: 2018   T: 2018   MT: PENNY      Name:     KOJO ASHTON   MRN:      [id number]        Account:      [id number]   :      1970           Visit Date:   2018      Document: [id number]       cc: YOLANDE FOWLER PA-C       Again, thank you for allowing me to participate in the care of your patient.        Sincerely,        Jorge Banda MD

## 2018-08-02 NOTE — PATIENT INSTRUCTIONS
CBC today.- Done LW  IV injectafer x 2 if approved by insurance.Scheduled/robinson  Follow up in 6 months with labs. Scheduled/Robinson    AVS given to patient/robinson

## 2018-08-03 ENCOUNTER — INFUSION THERAPY VISIT (OUTPATIENT)
Dept: INFUSION THERAPY | Facility: CLINIC | Age: 48
End: 2018-08-03
Attending: INTERNAL MEDICINE
Payer: COMMERCIAL

## 2018-08-03 VITALS
TEMPERATURE: 97.8 F | SYSTOLIC BLOOD PRESSURE: 98 MMHG | HEART RATE: 57 BPM | OXYGEN SATURATION: 100 % | RESPIRATION RATE: 20 BRPM | DIASTOLIC BLOOD PRESSURE: 65 MMHG

## 2018-08-03 DIAGNOSIS — D50.9 IRON DEFICIENCY ANEMIA, UNSPECIFIED IRON DEFICIENCY ANEMIA TYPE: ICD-10-CM

## 2018-08-03 DIAGNOSIS — K90.9 MALABSORPTION OF IRON: Primary | ICD-10-CM

## 2018-08-03 PROCEDURE — 96365 THER/PROPH/DIAG IV INF INIT: CPT

## 2018-08-03 PROCEDURE — 25000128 H RX IP 250 OP 636: Performed by: INTERNAL MEDICINE

## 2018-08-03 RX ADMIN — FERRIC CARBOXYMALTOSE INJECTION 750 MG: 50 INJECTION, SOLUTION INTRAVENOUS at 08:06

## 2018-08-03 RX ADMIN — SODIUM CHLORIDE 250 ML: 9 INJECTION, SOLUTION INTRAVENOUS at 08:07

## 2018-08-03 ASSESSMENT — PAIN SCALES - GENERAL: PAINLEVEL: NO PAIN (0)

## 2018-08-03 NOTE — PROGRESS NOTES
Visit Date:   2018      This consult has been requested by Cris Paez PA-C for iron deficiency anemia.       Ms. Don is a 47-year-old female who had gastric bypass surgery in 2016.  Since her gastric bypass surgery, her ferritin level has been decreasing and now she has iron deficiency.  Patient has been on oral iron.  In spite of that, her ferritin continues to decrease. Labs are reviewed and summarized below.   1.  On 2018 (care everywhere), hemoglobin of 12.3 with MCV of 89.7.   2.  On 2018, iron of 33, ferritin of 11 and saturation of 11%.   3.  On 2018, iron of 36, ferritin of 6 and saturation of 12%.      Patient says that lately she has been more tired.  Her fatigue has been getting worse.  Sometimes she has been getting muscle cramps.        REVIEW OF SYSTEMS:    No headache.  No dizziness.  No neck pain.  No chest pain or difficulty breathing.  No abdominal pain, nausea or vomiting.  No urinary or bowel complaints.      She is premenopausal.  She has menstrual bleed every 30 days.  She bleeds for 5-7 days.  Her bleeding sometimes is heavy.      All other review of systems negative.      ALLERGIES:  Reviewed.      MEDICATIONS:  Reviewed.      PAST MEDICAL HISTORY:   1.  Insomnia.   2.  Generalized anxiety disorder.   3.  Gastric bypass surgery in 2016.      SOCIAL HISTORY:   -No smoking.   -No alcohol use.      FAMILY HISTORY:   -Her parents are in fairly good health for their age.   -She has 1 brother who is in good health.   -She had 1 sister who  of brain aneurysm.  Sister had cervical cancer.      PHYSICAL EXAMINATION:   GENERAL:  Alert, oriented x 3.   VITAL SIGNS:  Reviewed.   Rest of the systems not examined.      LABORATORY DATA:  CBC was done today.  Hemoglobin is 10.8 with MCV of 90.  Normal WBC and platelets.      ASSESSMENT:   1.  A 47-year-old female with iron deficiency anemia secondary to malabsorption of iron.   2.  Malabsorption of iron  secondary to gastric bypass surgery.   3.  Fatigue and muscle cramps secondary to iron deficiency.      RECOMMENDATIONS:   1.  I had a long discussion with the patient regarding anemia and iron deficiency.  Iron metabolism was all discussed with her.  She has malabsorption of iron secondary to gastric bypass surgery which is causing anemia.     2.  Discussed with her regarding treatment for iron deficiency anemia.  She has already tried oral iron, which is not helping and not expected to help because of gastric bypass surgery.      I discussed regarding IV iron as she is symptomatic.  Side effects of IV iron, including anaphylactic reactions, were discussed.  We will give her IV injectafer for 2 doses.  She is agreeable for it.      3.  I told the patient that she is at risk of becoming iron deficient in the future.  We will monitor her.  I will see her in 6 months' time with labs including CBC and iron studies.      4.  Patient advised to continue with vitamin B12 sublingual pills.       5.  She had multiple questions, which were all answered.  Advised her to see a physician if she has worsening weakness, chest pain, shortness of breath, dizziness, muscle cramps or any other concerns.      Thanks for the consult.      Total face-to-face time spent 40 minutes, more than 50% of the time spent in counseling and coordination of care.         JORGE BANDA MD             D: 2018   T: 2018   MT: PENNY      Name:     KOJO ASHTON   MRN:      [id number]        Account:      [id number]   :      1970           Visit Date:   2018      Document: [id number]       cc: YOLANDE FOWLER PA-C

## 2018-08-03 NOTE — PROGRESS NOTES
Infusion Nursing Note:  Criss Don presents today for first time Injectafer.    Patient seen by provider today: No   present during visit today: Not Applicable.    Note: Patient was seen in clinic yesterday for her iron deficiency anemia. History of bariatric surgery. Oral iron ineffective. Reports symptoms of increased fatigue and intermittent muscle aches. Side effects of Injectafer reviewed. Handout provided as well. Will receive 2nd and final dose next week. Has follow-up labs and clinic visit with Dr. Rahman in 6 months.    Intravenous Access:  Peripheral IV placed.    Treatment Conditions:  Not Applicable.      Post Infusion Assessment:  Patient tolerated Injectafer 750 mg infusion without incident.  Patient observed for 30 minutes post Injectafer per protocol.  Blood return noted pre and post infusion.  Site patent and intact, free from redness, edema or discomfort.  No evidence of extravasations.  Access discontinued per protocol.    Discharge Plan:   Copy of AVS reviewed with patient and/or family.  Patient will return 8/10/18 for next appointment.  Patient discharged in stable condition accompanied by: self.  Departure Mode: Ambulatory.    Mimi Stern RN

## 2018-08-03 NOTE — MR AVS SNAPSHOT
After Visit Summary   8/3/2018    Criss Don    MRN: [id number]           Patient Information     Date Of Birth          1970        Visit Information        Provider Department      8/3/2018 7:30 AM  INFUSION CHAIR 2 [facility]        Today's Diagnoses     Malabsorption of iron    -  1    Iron deficiency anemia, unspecified iron deficiency anemia type           Follow-ups after your visit        Your next 10 appointments already scheduled     Aug 10, 2018  9:00 AM CDT   Return Visit with Brenda Perez PsyD   [facility] [place] ([facility] [place])    [facility] [place] [address]   [phone]            Aug 10, 2018 12:30 PM CDT   Level 1 with  INFUSION CHAIR 2   [facility] ([facility])    [facility]  [address] [facility] [address]  [place] [address]   [phone]            Sep 07, 2018 10:00 AM CDT   Return Visit with Brenda Perez PsyD   [facility] [place] ([facility] [place])    [facility] [place] [address]   [phone]            Sep 21, 2018  7:00 AM CDT   Return Visit with Brenda Perez PsyD   [facility] [place] ([facility] [place])    [facility] [place] [address]   [phone]            Oct 05, 2018  7:00 AM CDT   Return Visit with Brenda Perez PsyD   [facility] [place] ([facility] [place])    [facility] [place] [address]   [phone]            Oct 05, 2018  3:30 PM CDT   Return Visit with Guero Owen MD   [facility] [place] ([facility] [place])    [address]  [place] [address]   [phone]            Oct 26, 2018 11:00 AM CDT   Annual Visit with Cris Paez PA-C   [facility] [place] ([facility])    [address]  [address]  [place] [address]   [phone]            Oct 26, 2018 11:30 AM CDT   Annual Visit with  Mandi Diet 1, RD   [facility] [place] ([facility])    [address]  [address]  [place] [address]   [phone]            Feb 01, 2019  9:45 AM CST   Return Visit with  Oncology Nurse   [facility] ([facility])    [facility]  [address]  [place] [address]   [phone]            Feb 07, 2019  1:00 PM CST   Return Visit with Pricilla Rahman MD   [facility] ([facility])    [facility]  [address]  [place] [address]   [phone]              Future tests that were ordered for you today     Open Future Orders        Priority Expected Expires Ordered    CBC with platelets Routine 2/2/2019 6/2/2019 8/2/2018    Ferritin Routine 2/2/2019 6/2/2019 8/2/2018    Iron and iron binding capacity Routine 2/2/2019 6/2/2019 8/2/2018            Who to contact     If you have questions or need follow up information about today's clinic visit or your schedule please contact [facility] directly at [phone].  Normal or non-critical lab and imaging results will be communicated to you by MyChart, letter or phone within 4 business days after the clinic has received the results. If you do not hear from us within 7 days, please contact the clinic through [facility] or phone. If you have a critical or abnormal lab result, we will notify you by phone as soon as possible.  Submit refill requests through [email or web address] or call your pharmacy and they will forward the refill request to us. Please allow 3 business days for your refill to be completed.          Additional Information About Your Visit        Care EveryWhere ID     This is your Care EveryWhere ID. This could be  used by other organizations to access your [place] medical records  [id number]        Your Vitals Were     Pulse Temperature Respirations Pulse Oximetry          57 97.8  F (36.6  C) (Oral) 20 100%         Blood Pressure from Last 3 Encounters:   08/03/18 98/65   08/02/18 100/65   07/27/18 97/63    Weight from Last 3 Encounters:   08/02/18 61.9 kg (136 lb 6.4 oz)   07/27/18 60.4 kg (133 lb 1.6 oz)   07/20/18 61.7 kg (136 lb)              Today, you had the following     No orders found for display       Primary Care Provider Office Phone # Fax #    Napoleon Gray PA-C [phone]       [address] [place] [facility] [place] [address]        Equal Access to Services     [facility]: Hadii aad supa hadasho Soomaali, waaxda luqadaha, qaybta kaalmada aderandeeyada, suri cuello . So [facility] [phone].    ATENCIÓN: Si habla español, tiene a madera disposición servicios gratuitos de asistencia lingüística. Trevon al [phone].    We comply with applicable federal civil rights laws and [place] laws. We do not discriminate on the basis of race, color, national origin, age, disability, sex, sexual orientation, or gender identity.            Thank you!     Thank you for choosing [facility] AND [facility]  for your care. Our goal is always to provide you with excellent care. Hearing back from our patients is one way we can continue to improve our services. Please take a few minutes to complete the written survey that you may receive in the mail after your visit with us. Thank you!             Your Updated Medication List - Protect others around you: Learn how to safely use, store and throw away your medicines at www.disposemymeds.org.          This list is accurate as of 8/3/18  9:59 AM.  Always use your most recent med list.                   Brand Name Dispense Instructions for use Diagnosis    B-12 2500 MCG Subl      Place 1 tablet under the tongue 3 times  weekly        canagliflozin 100 MG tablet    INVOKANA    30 tablet    Take 1 tablet (100 mg) by mouth every morning (before breakfast)    History of gastric bypass, Hypoglycemia       docusate sodium 100 MG tablet    COLACE    180 tablet    Take 100 mg by mouth 2 times daily as needed for constipation    Other constipation       metFORMIN 500 MG 24 hr tablet    GLUCOPHAGE-XR    30 tablet    Take 1 tablet (500 mg) by mouth daily    Hypoglycemia, Bariatric surgery status       mirtazapine 15 MG tablet    REMERON      Iron deficiency, Malabsorption of iron       multivitamin  peds with iron 60 MG chewable tablet      Take 2 chew tab by mouth every morning        SPRINTEC 28 0.25-35 MG-MCG per tablet   Generic drug:  norgestimate-ethinyl estradiol     84 tablet    TAKE ONE TABLET BY MOUTH ONCE DAILY    General counseling for prescription of oral contraceptives       VIACTIV 500-500-40 MG-UNT-MCG Chew   Generic drug:  calcium-vitamin D-vitamin K      Take 1 tablet by mouth 3 times daily        VITAMIN D3 PO      Take 6,000 Units by mouth daily        VITRON-C  MG Tabs tablet   Generic drug:  ferrous fumarate 65 mg ([place]. FE)-Vitamin C 125 mg      Take 1 tablet by mouth every morning

## 2018-08-10 ENCOUNTER — INFUSION THERAPY VISIT (OUTPATIENT)
Dept: INFUSION THERAPY | Facility: CLINIC | Age: 48
End: 2018-08-10
Attending: INTERNAL MEDICINE
Payer: COMMERCIAL

## 2018-08-10 ENCOUNTER — OFFICE VISIT (OUTPATIENT)
Dept: PSYCHOLOGY | Facility: CLINIC | Age: 48
End: 2018-08-10
Payer: COMMERCIAL

## 2018-08-10 VITALS
SYSTOLIC BLOOD PRESSURE: 93 MMHG | RESPIRATION RATE: 16 BRPM | TEMPERATURE: 97.5 F | OXYGEN SATURATION: 99 % | DIASTOLIC BLOOD PRESSURE: 60 MMHG | HEART RATE: 70 BPM

## 2018-08-10 DIAGNOSIS — F41.1 GENERALIZED ANXIETY DISORDER: Primary | ICD-10-CM

## 2018-08-10 DIAGNOSIS — F50.9 EATING DISORDER: ICD-10-CM

## 2018-08-10 DIAGNOSIS — K90.9 MALABSORPTION OF IRON: Primary | ICD-10-CM

## 2018-08-10 DIAGNOSIS — D50.9 IRON DEFICIENCY ANEMIA, UNSPECIFIED IRON DEFICIENCY ANEMIA TYPE: ICD-10-CM

## 2018-08-10 PROCEDURE — 96365 THER/PROPH/DIAG IV INF INIT: CPT

## 2018-08-10 PROCEDURE — 90834 PSYTX W PT 45 MINUTES: CPT | Performed by: PSYCHOLOGIST

## 2018-08-10 PROCEDURE — 25000128 H RX IP 250 OP 636: Performed by: INTERNAL MEDICINE

## 2018-08-10 RX ADMIN — FERRIC CARBOXYMALTOSE INJECTION 750 MG: 50 INJECTION, SOLUTION INTRAVENOUS at 12:35

## 2018-08-10 ASSESSMENT — PAIN SCALES - GENERAL: PAINLEVEL: NO PAIN (0)

## 2018-08-10 NOTE — MR AVS SNAPSHOT
MRN:[id number]                      After Visit Summary   8/10/2018    Criss Don    MRN: [id number]           Visit Information        Provider Department      8/10/2018 9:00 AM Brenda Perez PsyD [facility] [place] [facility]      Your next 10 appointments already scheduled     Sep 07, 2018 10:00 AM CDT   Return Visit with Brenda Perez PsyD   [facility] [place] ([facility] [place])    [facility]  [place] [address]   [phone]            Sep 21, 2018  7:00 AM CDT   Return Visit with Brenda Perez PsyD   [facility] [place] ([facility] [place])    [facility]  [place] [address]   [phone]            Oct 05, 2018  7:00 AM CDT   Return Visit with Brenda Perez PsyD   [facility] [place] ([facility] [place])    [facility]  [place] [address]   [phone]            Oct 05, 2018  3:30 PM CDT   Return Visit with Guero Owen MD   [facility])    [address]  [facility] [address] [phone]            Oct 26, 2018  7:00 AM CDT   Return Visit with Brenda Perez PsyD   [facility] [place] ([facility] [place])    [address] [place] [address]  [place] [address]   [phone]            Oct 26, 2018 11:00 AM CDT   Annual Visit with Cris Paez PA-C   [facility] ([facility])    [address] [place] [address] [address]  [place] [address] [phone]            Oct 26, 2018 11:30 AM CDT   Annual Visit with Usman Raymond Diet 1, RD   [facility] ([facility])    [address]  [place] [address] [phone]            Feb 01, 2019  9:45 AM CST   Return Visit with  Oncology Nurse   [facility])    [facility] [place]  [address]  [place] [address]   [phone]            Feb 07, 2019  1:00 PM CST   Return Visit with Pricilla Rahman MD   [facility] ([facility])    [facility] [place]  [address]  [place] [address]   [phone]              Care EveryWhere ID     This is your Care EveryWhere ID. This could be used by other organizations to access your [place] medical records  [id number]        Equal Access to Services     STEVEN MUNOZ : Phong Wooten, seble medina, mirna faulkner, suri yeager. So [facility] [phone].    ATENCIÓN: Si habla español, tiene a madera disposición servicios gratuitos de asistencia lingüística. Llame al [phone].    We comply with applicable federal civil rights laws and [place] laws. We do not discriminate on the basis of race, color, national origin, age, disability, sex, sexual orientation, or gender identity.

## 2018-08-10 NOTE — PROGRESS NOTES
Infusion Nursing Note:  Criss Lantiguaey presents today for injectafer.    Patient seen by provider today: No   present during visit today: Not Applicable.    Note: N/A.    Intravenous Access:  Peripheral IV placed.    Treatment Conditions:  Not Applicable.      Post Infusion Assessment:  Patient tolerated infusion without incident.  Patient observed for 30 minutes post injectafer per protocol.  Site patent and intact, free from redness, edema or discomfort.  No evidence of extravasations.  Access discontinued per protocol.    Discharge Plan:   AVS to patient via MYCHART.  Patient will return prn for next appointment.   Patient discharged in stable condition accompanied by: self.  Departure Mode: Ambulatory.    Parish Rios RN

## 2018-08-10 NOTE — MR AVS SNAPSHOT
After Visit Summary   8/10/2018    Criss Don    MRN: [id number]           Patient Information     Date Of Birth          1970        Visit Information        Provider Department      8/10/2018 12:30 PM  INFUSION CHAIR [facility]        Today's Diagnoses     Malabsorption of iron    -  1    Iron deficiency anemia, unspecified iron deficiency anemia type           Follow-ups after your visit        Your next 10 appointments already scheduled     Sep 07, 2018 10:00 AM CDT   Return Visit with Brenda Perez PsyD   [facility] ([facility])    [address] [place] [address] [place] [address]   [phone]            Sep 21, 2018  7:00 AM CDT   Return Visit with Brenda Perez PsyD   [facility] ([facility])    [address] [place] [address]   [phone]            Oct 05, 2018  7:00 AM CDT   Return Visit with Brenda Perez PsyD   [facility] ([facility])    [address] [place], [place] [address] [address]   [phone]            Oct 05, 2018  3:30 PM CDT   Return Visit with Guero Owen MD   [facility] ([facility])    [address]  [facility] [address]   [phone]            Oct 26, 2018  7:00 AM CDT   Return Visit with Brenda Perez PsyD   [facility] ([facility])    [address] [place] [address]   [phone]            Oct 26, 2018 11:00 AM CDT   Annual Visit with Cris Paez PA-C   [facility] ([facility])    [address]  [facility] [address]   [phone]            Oct 26, 2018 11:30 AM CDT   Annual Visit with Usman Wl Diet 1, RD   [facility] ([facility])    [address]  [place] [address]   [phone]            Feb 01, 2019  9:45 AM CST   Return Visit with  Oncology Nurse   [facility] ([facility])    [facility]  [address]  [place] [address]   [phone]            Feb 07, 2019  1:00 PM CST   Return Visit with Pricilla Rahman MD   [facility] ([facility])    [facility]  [address]  [place] [address] [phone]              Who to contact     If you have questions or need follow up information about today's clinic visit or your schedule please contact [facility] directly at [phone].  Normal or non-critical lab and imaging results will be communicated to you by MyChart, letter or phone within 4 business days after the clinic has received the results. If you do not hear from us within 7 days, please contact the clinic through MyChart or phone. If you have a critical or abnormal lab result, we will notify you by phone as soon as possible.  Submit refill requests through [employer] or call your pharmacy and they will forward the refill request to us. Please allow 3 business days for your refill to be completed.          Additional Information About Your Visit        Care EveryWhere ID     This is your Care EveryWhere ID. This could be used by other organizations to access your [facility] medical records  [id number]        Your Vitals Were     Pulse Temperature Respirations Pulse Oximetry          70 97.5  F (36.4  C) (Oral) 16 99%         Blood Pressure from Last 3 Encounters:   08/10/18 93/60   08/03/18 98/65   08/02/18 100/65    Weight from Last 3 Encounters:   08/02/18 61.9 kg (136 lb 6.4 oz)   07/27/18 60.4 kg (133 lb 1.6 oz)   07/20/18 61.7 kg (136 lb)              Today, you had the following     No orders found for display       Primary Care Provider  Office Phone # Fax #    Napoleon Gray PA-C [phone] [address] [facility] [facility] [address]        Equal Access to Services     STEVEN MUNOZ : Phong deandra ku anastasiyao Solouisali, waaxda luqadaha, qaybta kaalmada adeegyada, suri reed laОлегisabelle yeager. So [facility] [phone].    ATENCIÓN: Si habla español, tiene a madera disposición servicios gratuitos de asistencia lingüística. Llame al [phone].    We comply with applicable federal civil rights laws and [place] laws. We do not discriminate on the basis of race, color, national origin, age, disability, sex, sexual orientation, or gender identity.            Thank you!     Thank you for choosing [facility]  for your care. Our goal is always to provide you with excellent care. Hearing back from our patients is one way we can continue to improve our services. Please take a few minutes to complete the written survey that you may receive in the mail after your visit with us. Thank you!             Your Updated Medication List - Protect others around you: Learn how to safely use, store and throw away your medicines at www.disposemymeds.org.          This list is accurate as of 8/10/18  1:48 PM.  Always use your most recent med list.                   Brand Name Dispense Instructions for use Diagnosis    B-12 2500 MCG Subl      Place 1 tablet under the tongue 3 times weekly        canagliflozin 100 MG tablet    INVOKANA    30 tablet    Take 1 tablet (100 mg) by mouth every morning (before breakfast)    History of gastric bypass, Hypoglycemia       docusate sodium 100 MG tablet    COLACE    180 tablet    Take 100 mg by mouth 2 times daily as needed for constipation    Other constipation       metFORMIN 500 MG 24 hr tablet    GLUCOPHAGE-XR    30 tablet    Take 1 tablet (500 mg) by mouth daily    Hypoglycemia, Bariatric surgery status       mirtazapine 15 MG tablet    REMERON      Iron deficiency,  Malabsorption of iron       multivitamin  peds with iron 60 MG chewable tablet      Take 2 chew tab by mouth every morning        SPRINTEC 28 0.25-35 MG-MCG per tablet   Generic drug:  norgestimate-ethinyl estradiol     84 tablet    TAKE ONE TABLET BY MOUTH ONCE DAILY    General counseling for prescription of oral contraceptives       VIACTIV 500-500-40 MG-UNT-MCG Chew   Generic drug:  calcium-vitamin D-vitamin K      Take 1 tablet by mouth 3 times daily        VITAMIN D3 PO      Take 6,000 Units by mouth daily        VITRON-C  MG Tabs tablet   Generic drug:  ferrous fumarate 65 mg ([place]. FE)-Vitamin C 125 mg      Take 1 tablet by mouth every morning

## 2018-08-10 NOTE — PROGRESS NOTES
"                                             Progress Note    Client Name: Criss Don  Date:  8/9/2018         Service Type: Individual      Session Start Time: 9:00  Session End Time: 9:45      Session Length: 45     Session #: 37     Attendees: Client attended alone    Treatment Plan Last Reviewed:   8/9/2018  PHQ-9 / NED-7 :      DATA      Progress Since Last Session (Related to Symptoms / Goals / Homework):   Symptoms: Worsening anxiety    Homework: Partially completed      Episode of Care Goals: Minimal progress - ACTION (Actively working towards change); Intervened by reinforcing change plan / affirming steps taken     Current / Ongoing Stressors and Concerns:   Reported that her family reunion was \"very overwhelming\" because people were misinformed about her eating disorder and \"watching my every move and trying to force me to eat\". Reported that some family members put food on her plate and made statements such as \"I can help you eat.\" Reported being concerned about her nephew because he is avoiding many of his adult responsibilities and is not taking steps to contribute to his household. Reported that her boyfriend has been pressuring her to \"get this eating thing figured out\" so she can finish treatment and move to [place] with him. Reported feeling nervous about \"red flags\" she is seeing in their relationship, especially regarding lack of reciprocation and his resistance to coming here to visit and spend time with her.      Treatment Objective(s) Addressed in This Session:   stress management  Maintain compliance with post-surgical dietary needs     Intervention:   CBT: coached client on ways to surface and replace thinking errors that increase anxiety  Solution Focused: coached client on assertiveness skills to use with her nephew and boyfriend  Supportive therapy:  provided active listening, support, and encouragement.  Reflected on the frustration she experienced at being misunderstood by her family. " Explored dynamics in her relationship that reflect imbalance and lack of reciprocation. Reflected on the importance of establishing boundaries in the relationship before relocating to another state.      ASSESSMENT: Current Emotional / Mental Status (status of significant symptoms):   Risk status (Self / Other harm or suicidal ideation)   Client denies current fears or concerns for personal safety.   Client denies current or recent suicidal ideation or behaviors.   Client denies current or recent homicidal ideation or behaviors.   Client denies current or recent self injurious behavior or ideation.   Client denies other safety concerns.   A safety and risk management plan has not been developed at this time, however client was given the after-hours number / 911 should there be a change in any of these risk factors.     Appearance:   Appropriate    Eye Contact:   Good    Psychomotor Behavior: Normal    Attitude:   Cooperative  Pleasant    Orientation:   All   Speech    Rate / Production: Normal     Volume:  Normal    Mood:    Anxious    Affect:    Appropriate    Thought Content:  Clear    Thought Form:  Coherent  Logical    Insight:    Good      Medication Review:   No changes to current psychiatric medication(s)     Medication Compliance:   Yes     Changes in Health Issues:   None reported     Chemical Use Review:   Substance Use: Chemical use reviewed, no active concerns identified      Tobacco Use: No current tobacco use.       Collateral Reports Completed:   Not Applicable.     PLAN: (Client Tasks / Therapist Tasks / Other)  Future appointments are scheduled.  Identify physical signs of anxiety and use relaxation skills as discussed in session. Use assertiveness skills as discussed in session to set effective limits with boyfriend and nephew. Surface and challenge unrealistic expectations and disproportionate thoughts that generate anxiety.        Brenda Perez PsyD LP                                                        Treatment Plan    Client's Name: Criss Don  YOB: 1970    Date:   8/9/2018    DSM-V Diagnoses: 300.02 Generalized Anxiety Disorder, eating disorder, unspecified  Psychosocial / Contextual Factors: divorce proceedings, health concerns  WHODAS: 22    Referral / Collaboration:  Referral to another professional/service is not indicated at this time..    Anticipated number of session or this episode of care: reassess after 12 sessions      MeasurableTreatment Goal(s) related to diagnosis / functional impairment(s)  Goal 1: Client will learn coping skills to manage stress and adjust to post-surgical lifestyle changes more effectively.    I will know I've met my goal when I'm not weighing myself all the time and feeling more comfortable with my weight loss.      Objective #A (Client Action)    Client will surface and challenge thinking errors that contribute to anxiety.  Status: Continued - Date(s): 8/9/2018      Intervention(s)  Therapist will assign homework (thought  logs), assist client in surfacing and replacement ineffective thinking patterns.    Objective #B  Client will learn deep breathing and relaxation skills and practice at least 3x daily.  Status: Continued - Date(s): 8/9/2018      Intervention(s)  Therapist will teach deep breathing and relaxation skills, assign homework, problem-solve barriers to follow through.    Objective #C  Client will learn and use assertiveness skills to set healthy boundaries and communicate her needs more effectively.  Status: Continued - Date(s): 8/9/2018       Intervention(s)  Therapist will assist client in identifying and balancing her expectations.        Client has reviewed and agreed to the above plan.      Brenda Perez PsyD LP    8/9/2018

## 2018-09-07 ENCOUNTER — OFFICE VISIT (OUTPATIENT)
Dept: PSYCHOLOGY | Facility: CLINIC | Age: 48
End: 2018-09-07
Payer: COMMERCIAL

## 2018-09-07 DIAGNOSIS — F41.1 GENERALIZED ANXIETY DISORDER: Primary | ICD-10-CM

## 2018-09-07 DIAGNOSIS — F50.9 EATING DISORDER: ICD-10-CM

## 2018-09-07 PROCEDURE — 90834 PSYTX W PT 45 MINUTES: CPT | Performed by: PSYCHOLOGIST

## 2018-09-07 NOTE — MR AVS SNAPSHOT
MRN:[id number]                      After Visit Summary   9/7/2018    Criss Don    MRN: [id number]           Visit Information        Provider Department      9/7/2018 10:00 AM Brenda Perez PsyD [facility] Generic      Your next 10 appointments already scheduled     Sep 21, 2018  7:00 AM CDT   Return Visit with Brenda Perez PsyD   [facility] ([facility])    [address] [place] [address]  [place] [address]   [phone]            Oct 05, 2018  7:00 AM CDT   Return Visit with Brenda Perez PsyD   [facility] ([facility])    [address] [place] [address] [place] [address]   [phone]            Oct 05, 2018  3:30 PM CDT   Return Visit with Guero Owen MD   [facility] ([facility])    [address] [place] [address] [address]  [place] [address]   [phone]            Oct 26, 2018  7:00 AM CDT   Return Visit with Brenda Perez PsyD   [facility] ([facility])    [address] [place], [place] [address] [address]   [phone]            Oct 26, 2018 11:00 AM CDT   Annual Visit with Cris Paez PA-C   [facility] ([facility])    [address] [place] [address] [address]  [place] [address] [phone]            Oct 26, 2018 11:30 AM CDT   Annual Visit with Usman Arthur 2, RD   [facility] ([facility])    [facility] [facility] [address]  [place] [address] [phone]            Nov 08, 2018  2:00 PM CST   Return Visit with Brenda Perez PsyD   [facility] ([facility] [place])    [address] [place], [place] [address] [address]   [phone]            Feb 01, 2019  9:45 AM CST   Return Visit with  Oncology Nurse   [facility])    [facility] [facility]  [address]  [place] [address]   [phone]            Feb 07, 2019  1:00 PM CST   Return Visit with Pricilla Rahman MD   [facility] ([facility])    [facility] [place] [facility]  [address]  [place] [address]   [phone]              Care EveryWhere ID     This is your Care EveryWhere ID. This could be used by other organizations to access your [facility] medical records  [id number]        Equal Access to Services     STEVEN MUNOZ : Phong Wooten, seble medina, mirna faulkner, suri yeager. So [facility] [phone].    ATENCIÓN: Si habla español, tiene a madera disposición servicios gratuitos de asistencia lingüística. Llame al [phone].    We comply with applicable federal civil rights laws and [place] laws. We do not discriminate on the basis of race, color, national origin, age, disability, sex, sexual orientation, or gender identity.

## 2018-09-07 NOTE — PROGRESS NOTES
"                                             Progress Note    Client Name: Criss Don  Date:  9/7/2018         Service Type: Individual      Session Start Time: 10:00  Session End Time: 10:45      Session Length: 45     Session #: 38     Attendees: Client attended alone    Treatment Plan Last Reviewed:   9/7/2018  PHQ-9 / NED-7 :      DATA      Progress Since Last Session (Related to Symptoms / Goals / Homework):   Symptoms: no improvement    Homework: Partially completed      Episode of Care Goals: Minimal progress - ACTION (Actively working towards change); Intervened by reinforcing change plan / affirming steps taken     Current / Ongoing Stressors and Concerns:   Reported several family and friend stressors that have emerged since last session. Reported that her anxiety has surfaced some urges to purge, but she has been able to resist those urges. Acknowledged that she is upset about gaining some weight after starting anti-depressants, and being upset that she saw \"obese\" on some recent paperwork from her doctor, which has resulted in some urges to restrict her food intake. Reported she has not restricted but \"I really want to.\" Reported that she had stopped her medication for 2 days to \"see what would happen\" and noticed she did lose some weight but \"felt worse\" so she plans to discuss with her psychiatrist next week.      Treatment Objective(s) Addressed in This Session:   stress management  Maintain compliance with post-surgical dietary needs     Intervention:   CBT: coached client on ways to surface and replace thinking errors that increase anxiety  Solution Focused: coached client on relaxation and stress management skills; coached her on taking things one step at a time  Supportive therapy:  provided active listening, support, and encouragement.  Reinforced her for being honest about urges and stopping her medication for a few days.      ASSESSMENT: Current Emotional / Mental Status (status of " significant symptoms):   Risk status (Self / Other harm or suicidal ideation)   Client denies current fears or concerns for personal safety.   Client denies current or recent suicidal ideation or behaviors.   Client denies current or recent homicidal ideation or behaviors.   Client denies current or recent self injurious behavior or ideation.   Client denies other safety concerns.   A safety and risk management plan has not been developed at this time, however client was given the after-hours number / 911 should there be a change in any of these risk factors.     Appearance:   Appropriate    Eye Contact:   Good    Psychomotor Behavior: Normal    Attitude:   Cooperative  Pleasant    Orientation:   All   Speech    Rate / Production: Normal     Volume:  Normal    Mood:    Anxious    Affect:    Appropriate    Thought Content:  Clear    Thought Form:  Coherent  Logical    Insight:    Good      Medication Review:   No changes to current psychiatric medication(s)     Medication Compliance:   No , see above     Changes in Health Issues:   None reported     Chemical Use Review:   Substance Use: Chemical use reviewed, no active concerns identified      Tobacco Use: No current tobacco use.       Collateral Reports Completed:   Not Applicable.     PLAN: (Client Tasks / Therapist Tasks / Other)  Future appointments are scheduled. Talk to your team at [facility] about recent urges and medication non-compliance. Identify physical signs of anxiety and use relaxation skills as discussed in session. Use assertiveness skills as discussed in session to set effective limits with family and friends. Surface and challenge unrealistic expectations and disproportionate thoughts that generate anxiety.        Brenda Perez PsyD LP                                                       Treatment Plan    Client's Name: Criss Don  YOB: 1970    Date:  9/7/2018    DSM-V Diagnoses: 300.02 Generalized Anxiety Disorder, eating  disorder, unspecified  Psychosocial / Contextual Factors: divorce proceedings, health concerns  WHODAS: 22    Referral / Collaboration:  Referral to another professional/service is not indicated at this time..    Anticipated number of session or this episode of care: reassess after 12 sessions      MeasurableTreatment Goal(s) related to diagnosis / functional impairment(s)  Goal 1: Client will learn coping skills to manage stress and adjust to post-surgical lifestyle changes more effectively.    I will know I've met my goal when I'm not weighing myself all the time and feeling more comfortable with my weight loss.      Objective #A (Client Action)    Client will surface and challenge thinking errors that contribute to anxiety.  Status: Continued - Date(s): 9/7/2018     Intervention(s)  Therapist will assign homework (thought  logs), assist client in surfacing and replacement ineffective thinking patterns.    Objective #B  Client will learn deep breathing and relaxation skills and practice at least 3x daily.  Status: Continued - Date(s): 9/7/2018    Intervention(s)  Therapist will teach deep breathing and relaxation skills, assign homework, problem-solve barriers to follow through.    Objective #C  Client will learn and use assertiveness skills to set healthy boundaries and communicate her needs more effectively.  Status: Continued - Date(s): 9/7/2018     Intervention(s)  Therapist will assist client in identifying and balancing her expectations.        Client has reviewed and agreed to the above plan.      Brenda Perez PsyD LP   9/7/2018

## 2018-09-21 ENCOUNTER — OFFICE VISIT (OUTPATIENT)
Dept: PSYCHOLOGY | Facility: CLINIC | Age: 48
End: 2018-09-21
Payer: COMMERCIAL

## 2018-09-21 DIAGNOSIS — F41.1 GENERALIZED ANXIETY DISORDER: Primary | ICD-10-CM

## 2018-09-21 DIAGNOSIS — F50.9 EATING DISORDER: ICD-10-CM

## 2018-09-21 PROCEDURE — 90834 PSYTX W PT 45 MINUTES: CPT | Performed by: PSYCHOLOGIST

## 2018-09-21 NOTE — MR AVS SNAPSHOT
MRN:[id number]                      After Visit Summary   9/21/2018    Criss Don    MRN: [id number]           Visit Information        Provider Department      9/21/2018 7:00 AM Brenda Perez PsyD [facility] [place] [facility] Generic      Your next 10 appointments already scheduled     Oct 05, 2018  7:00 AM CDT   Return Visit with Brenda Perez PsyD   [facility] [place] ([facility] [place])    [facility]  [place] [address]   [phone]            Oct 05, 2018  3:30 PM CDT   Return Visit with Guero Owen MD   [facility] ([facility])    [address]  [place] [address] [phone]            Oct 26, 2018  7:00 AM CDT   Return Visit with Brenda Perez PsyD   [facility] [place] ([facility] [place])    [address] [place], [place] [address] [address]   [phone]            Oct 26, 2018 11:00 AM CDT   Return Bariatric Visit with Cris Paez PA-C   [facility] [place] ([facility])    [address]  [place] [address] [phone]            Oct 26, 2018 11:30 AM CDT   Return Bariatric Nutrition Visit with Usman Raymond Diet 2, RD   [facility] [place] ([facility])    [address]  [place] [address] [phone]            Nov 08, 2018  2:00 PM CST   Return Visit with Brenda Perez PsyD   [facility] [place] ([facility] [place])    [address] [place], [place] [address] [address]   [phone]            Nov 30, 2018  7:00 AM CST   Return Visit with Brenda Perez PsyD   [facility] [place] ([facility] [place])    [facility] [place] [address]   [phone]            Feb 01, 2019  9:45 AM CST   Return Visit with Sh  Oncology Nurse   [facility] ([facility])    [facility] [place]  [address]  [place] [address]   [phone]            Feb 07, 2019  1:00 PM CST   Return Visit with Pricilla Rahman MD   [facility] ([facility])    [facility] [place]  [address]  [place] [address]   [phone]              Care EveryWhere ID     This is your Care EveryWhere ID. This could be used by other organizations to access your [place] medical records  [id number]        Equal Access to Services     STEVEN MUNOZ : Phong Wooten, seble medina, mirna faulkner, suri yeager. So [facility] [phone].    ATENCIÓN: Si habla español, tiene a madera disposición servicios gratuitos de asistencia lingüística. Llame al [phone].    We comply with applicable federal civil rights laws and [place] laws. We do not discriminate on the basis of race, color, national origin, age, disability, sex, sexual orientation, or gender identity.

## 2018-09-21 NOTE — PROGRESS NOTES
"                                             Progress Note    Client Name: Criss Don  Date:  9/24/2018         Service Type: Individual      Session Start Time: 7:00  Session End Time: 7:45      Session Length: 45     Session #: 39     Attendees: Client attended alone    Treatment Plan Last Reviewed:   9/24/2018  PHQ-9 / NED-7 :      DATA      Progress Since Last Session (Related to Symptoms / Goals / Homework):   Symptoms: no improvement    Homework: Partially completed      Episode of Care Goals: Minimal progress - ACTION (Actively working towards change); Intervened by reinforcing change plan / affirming steps taken     Current / Ongoing Stressors and Concerns:   Reported that family stressors have remained heightened. Reported that her psychiatrist has changed her medications, which seems to have mildly helped her anxiety but \"I feel off.\" Reported it can be hard for her to detect anxiety before it becomes really intense.      Treatment Objective(s) Addressed in This Session:   stress management  Maintain compliance with post-surgical dietary needs     Intervention:   CBT: coached client on ways to surface and replace thinking errors that increase anxiety; explored triggers to anxiety; reviewed physical warning signs of anxiety  Solution Focused: coached client on relaxation and stress management skills; coached her on taking things one step at a time; assigned homework to practice at least one mindfulness skill that she is learning in group daily  Supportive therapy:  provided active listening, support, and encouragement.       ASSESSMENT: Current Emotional / Mental Status (status of significant symptoms):   Risk status (Self / Other harm or suicidal ideation)   Client denies current fears or concerns for personal safety.   Client denies current or recent suicidal ideation or behaviors.   Client denies current or recent homicidal ideation or behaviors.   Client denies current or recent self injurious " behavior or ideation.   Client denies other safety concerns.   A safety and risk management plan has not been developed at this time, however client was given the after-hours number / 911 should there be a change in any of these risk factors.     Appearance:   Appropriate    Eye Contact:   Good    Psychomotor Behavior: Normal    Attitude:   Cooperative  Pleasant    Orientation:   All   Speech    Rate / Production: Normal     Volume:  Normal    Mood:    Anxious    Affect:    Appropriate    Thought Content:  Clear    Thought Form:  Coherent  Logical    Insight:    Good      Medication Review:   No changes to current psychiatric medication(s)     Medication Compliance:   No , see above     Changes in Health Issues:   None reported     Chemical Use Review:   Substance Use: Chemical use reviewed, no active concerns identified      Tobacco Use: No current tobacco use.       Collateral Reports Completed:   Not Applicable.     PLAN: (Client Tasks / Therapist Tasks / Other)  Future appointments are scheduled. Identify physical signs of anxiety and use relaxation skills as discussed in session. Use assertiveness skills as discussed in session to set effective limits with family and friends. Surface and challenge unrealistic expectations and disproportionate thoughts that generate anxiety.        Brenda Perez PsyD LP                                                       Treatment Plan    Client's Name: Criss Don  YOB: 1970    Date:  9/24/2018    DSM-V Diagnoses: 300.02 Generalized Anxiety Disorder, eating disorder, unspecified  Psychosocial / Contextual Factors: divorce proceedings, health concerns  WHODAS: 22    Referral / Collaboration:  Referral to another professional/service is not indicated at this time..    Anticipated number of session or this episode of care: reassess after 12 sessions      MeasurableTreatment Goal(s) related to diagnosis / functional impairment(s)  Goal 1: Client will learn  coping skills to manage stress and adjust to post-surgical lifestyle changes more effectively.    I will know I've met my goal when I'm not weighing myself all the time and feeling more comfortable with my weight loss.      Objective #A (Client Action)    Client will surface and challenge thinking errors that contribute to anxiety.  Status: Continued - Date(s): 9/24/2018    Intervention(s)  Therapist will assign homework (thought  logs), assist client in surfacing and replacement ineffective thinking patterns.    Objective #B  Client will learn deep breathing and relaxation skills and practice at least 3x daily.  Status: Continued - Date(s): 9/24/2018    Intervention(s)  Therapist will teach deep breathing and relaxation skills, assign homework, problem-solve barriers to follow through.    Objective #C  Client will learn and use assertiveness skills to set healthy boundaries and communicate her needs more effectively.  Status: Continued - Date(s): 9/24/2018    Intervention(s)  Therapist will assist client in identifying and balancing her expectations.        Client has reviewed and agreed to the above plan.      Bredna Perez PsyD LP   9/24/2018

## 2018-10-05 ENCOUNTER — OFFICE VISIT (OUTPATIENT)
Dept: PSYCHOLOGY | Facility: CLINIC | Age: 48
End: 2018-10-05
Payer: COMMERCIAL

## 2018-10-05 DIAGNOSIS — F50.9 EATING DISORDER: ICD-10-CM

## 2018-10-05 DIAGNOSIS — F41.1 GENERALIZED ANXIETY DISORDER: Primary | ICD-10-CM

## 2018-10-05 PROCEDURE — 90834 PSYTX W PT 45 MINUTES: CPT | Performed by: PSYCHOLOGIST

## 2018-10-05 NOTE — PROGRESS NOTES
"                                             Progress Note    Client Name: Criss Don  Date:  10/5/2018         Service Type: Individual      Session Start Time: 7:00  Session End Time: 7:45      Session Length: 45     Session #: 40     Attendees: Client attended alone    Treatment Plan Last Reviewed:   10/5/2018  PHQ-9 / NED-7 :      DATA      Progress Since Last Session (Related to Symptoms / Goals / Homework):   Symptoms: no improvement    Homework: Partially completed      Episode of Care Goals: Minimal progress - ACTION (Actively working towards change); Intervened by reinforcing change plan / affirming steps taken     Current / Ongoing Stressors and Concerns:   Reported that family stressors have remained heightened and she feels as if she is \"not doing enough\" to help others.      Treatment Objective(s) Addressed in This Session:   stress management  Maintain compliance with post-surgical dietary needs     Intervention:   CBT: coached client on ways to surface and replace thinking errors that increase anxiety; explored triggers to anxiety; reviewed physical warning signs of anxiety  Solution Focused: coached client on relaxation and stress management skills; coached her on taking things one step at a time; assigned homework to practice at least one mindfulness skill that she is learning in group daily  Supportive therapy:  provided active listening, support, and encouragement.       ASSESSMENT: Current Emotional / Mental Status (status of significant symptoms):   Risk status (Self / Other harm or suicidal ideation)   Client denies current fears or concerns for personal safety.   Client denies current or recent suicidal ideation or behaviors.   Client denies current or recent homicidal ideation or behaviors.   Client denies current or recent self injurious behavior or ideation.   Client denies other safety concerns.   A safety and risk management plan has not been developed at this time, however client " was given the after-hours number / 911 should there be a change in any of these risk factors.     Appearance:   Appropriate    Eye Contact:   Good    Psychomotor Behavior: Normal    Attitude:   Cooperative  Pleasant    Orientation:   All   Speech    Rate / Production: Normal     Volume:  Normal    Mood:    Anxious    Affect:    Appropriate    Thought Content:  Clear    Thought Form:  Coherent  Logical    Insight:    Good      Medication Review:   No changes to current psychiatric medication(s)     Medication Compliance:   Yes     Changes in Health Issues:   None reported     Chemical Use Review:   Substance Use: Chemical use reviewed, no active concerns identified      Tobacco Use: No current tobacco use.       Collateral Reports Completed:   Not Applicable.     PLAN: (Client Tasks / Therapist Tasks / Other)  Future appointments are scheduled. Identify physical signs of anxiety and use relaxation skills as discussed in session. Use assertiveness skills as discussed in session to set effective limits with family and friends. Surface and challenge unrealistic expectations and disproportionate thoughts that generate anxiety.        Brenda Perez PsyD LP                                                       Treatment Plan    Client's Name: Criss Don  YOB: 1970    Date:  10/5/2018    DSM-V Diagnoses: 300.02 Generalized Anxiety Disorder, eating disorder, unspecified  Psychosocial / Contextual Factors: divorce proceedings, health concerns  WHODAS: 22    Referral / Collaboration:  Referral to another professional/service is not indicated at this time..    Anticipated number of session or this episode of care: reassess after 12 sessions      MeasurableTreatment Goal(s) related to diagnosis / functional impairment(s)  Goal 1: Client will learn coping skills to manage stress and adjust to post-surgical lifestyle changes more effectively.    I will know I've met my goal when I'm not weighing myself all  the time and feeling more comfortable with my weight loss.      Objective #A (Client Action)    Client will surface and challenge thinking errors that contribute to anxiety.  Status: Continued - Date(s): 10/5/2018    Intervention(s)  Therapist will assign homework (thought  logs), assist client in surfacing and replacement ineffective thinking patterns.    Objective #B  Client will learn deep breathing and relaxation skills and practice at least 3x daily.  Status: Continued - Date(s): 10/5/2018    Intervention(s)  Therapist will teach deep breathing and relaxation skills, assign homework, problem-solve barriers to follow through.    Objective #C  Client will learn and use assertiveness skills to set healthy boundaries and communicate her needs more effectively.  Status: Continued - Date(s): 10/5/2018    Intervention(s)  Therapist will assist client in identifying and balancing her expectations.        Client has reviewed and agreed to the above plan.      Brenda Perez PsyD LP  10/5/2018

## 2018-10-24 ENCOUNTER — ANESTHESIA EVENT (OUTPATIENT)
Dept: SURGERY | Facility: CLINIC | Age: 48
DRG: 326 | End: 2018-10-24
Payer: COMMERCIAL

## 2018-10-24 ENCOUNTER — ANESTHESIA (OUTPATIENT)
Dept: SURGERY | Facility: CLINIC | Age: 48
DRG: 326 | End: 2018-10-24
Payer: COMMERCIAL

## 2018-10-24 ENCOUNTER — APPOINTMENT (OUTPATIENT)
Dept: GENERAL RADIOLOGY | Facility: CLINIC | Age: 48
DRG: 326 | End: 2018-10-24
Attending: SURGERY
Payer: COMMERCIAL

## 2018-10-24 ENCOUNTER — APPOINTMENT (OUTPATIENT)
Dept: CT IMAGING | Facility: CLINIC | Age: 48
DRG: 326 | End: 2018-10-24
Attending: EMERGENCY MEDICINE
Payer: COMMERCIAL

## 2018-10-24 ENCOUNTER — HOSPITAL ENCOUNTER (INPATIENT)
Facility: CLINIC | Age: 48
LOS: 4 days | Discharge: HOME | DRG: 326 | End: 2018-10-28
Attending: EMERGENCY MEDICINE | Admitting: SURGERY
Payer: COMMERCIAL

## 2018-10-24 DIAGNOSIS — K56.1 INTUSSUSCEPTION OF INTESTINE (H): ICD-10-CM

## 2018-10-24 DIAGNOSIS — K80.20 GALL STONES: ICD-10-CM

## 2018-10-24 DIAGNOSIS — K63.1 PERFORATED BOWEL (H): ICD-10-CM

## 2018-10-24 DIAGNOSIS — K27.5 PERFORATED ULCER (H): Primary | ICD-10-CM

## 2018-10-24 LAB
ABO + RH BLD: NORMAL
ABO + RH BLD: NORMAL
ALBUMIN SERPL-MCNC: 2.9 G/DL (ref 3.4–5)
ALBUMIN UR-MCNC: NEGATIVE MG/DL
ALP SERPL-CCNC: 58 U/L (ref 40–150)
ALT SERPL W P-5'-P-CCNC: 25 U/L (ref 0–50)
ANION GAP SERPL CALCULATED.3IONS-SCNC: 4 MMOL/L (ref 3–14)
APPEARANCE UR: CLEAR
AST SERPL W P-5'-P-CCNC: 14 U/L (ref 0–45)
BASOPHILS # BLD AUTO: 0 10E9/L (ref 0–0.2)
BASOPHILS NFR BLD AUTO: 0.1 %
BILIRUB SERPL-MCNC: 0.4 MG/DL (ref 0.2–1.3)
BILIRUB UR QL STRIP: NEGATIVE
BLD GP AB SCN SERPL QL: NORMAL
BLOOD BANK CMNT PATIENT-IMP: NORMAL
BUN SERPL-MCNC: 14 MG/DL (ref 7–30)
CALCIUM SERPL-MCNC: 8 MG/DL (ref 8.5–10.1)
CHLORIDE SERPL-SCNC: 106 MMOL/L (ref 94–109)
CO2 SERPL-SCNC: 27 MMOL/L (ref 20–32)
COLOR UR AUTO: YELLOW
CREAT SERPL-MCNC: 0.56 MG/DL (ref 0.52–1.04)
DIFFERENTIAL METHOD BLD: ABNORMAL
EOSINOPHIL # BLD AUTO: 0 10E9/L (ref 0–0.7)
EOSINOPHIL NFR BLD AUTO: 0.3 %
ERYTHROCYTE [DISTWIDTH] IN BLOOD BY AUTOMATED COUNT: 12.8 % (ref 10–15)
GFR SERPL CREATININE-BSD FRML MDRD: >90 ML/MIN/1.7M2
GLUCOSE SERPL-MCNC: 103 MG/DL (ref 70–99)
GLUCOSE UR STRIP-MCNC: NEGATIVE MG/DL
HCG UR QL: NEGATIVE
HCT VFR BLD AUTO: 40.6 % (ref 35–47)
HGB BLD-MCNC: 13.4 G/DL (ref 11.7–15.7)
HGB UR QL STRIP: NEGATIVE
IMM GRANULOCYTES # BLD: 0 10E9/L (ref 0–0.4)
IMM GRANULOCYTES NFR BLD: 0.3 %
INTERPRETATION ECG - MUSE: NORMAL
KETONES UR STRIP-MCNC: NEGATIVE MG/DL
LACTATE BLD-SCNC: 0.9 MMOL/L (ref 0.7–2)
LEUKOCYTE ESTERASE UR QL STRIP: ABNORMAL
LIPASE SERPL-CCNC: 102 U/L (ref 73–393)
LYMPHOCYTES # BLD AUTO: 0.8 10E9/L (ref 0.8–5.3)
LYMPHOCYTES NFR BLD AUTO: 8.6 %
MCH RBC QN AUTO: 30.5 PG (ref 26.5–33)
MCHC RBC AUTO-ENTMCNC: 33 G/DL (ref 31.5–36.5)
MCV RBC AUTO: 92 FL (ref 78–100)
MONOCYTES # BLD AUTO: 0.3 10E9/L (ref 0–1.3)
MONOCYTES NFR BLD AUTO: 3.4 %
MUCOUS THREADS #/AREA URNS LPF: PRESENT /LPF
NEUTROPHILS # BLD AUTO: 8.5 10E9/L (ref 1.6–8.3)
NEUTROPHILS NFR BLD AUTO: 87.3 %
NITRATE UR QL: NEGATIVE
NRBC # BLD AUTO: 0 10*3/UL
NRBC BLD AUTO-RTO: 0 /100
PH UR STRIP: 5 PH (ref 5–7)
PLATELET # BLD AUTO: 233 10E9/L (ref 150–450)
POTASSIUM SERPL-SCNC: 3.8 MMOL/L (ref 3.4–5.3)
PROT SERPL-MCNC: 6.2 G/DL (ref 6.8–8.8)
RADIOLOGIST FLAGS: ABNORMAL
RBC # BLD AUTO: 4.4 10E12/L (ref 3.8–5.2)
RBC #/AREA URNS AUTO: 1 /HPF (ref 0–2)
SODIUM SERPL-SCNC: 137 MMOL/L (ref 133–144)
SOURCE: ABNORMAL
SP GR UR STRIP: 1.02 (ref 1–1.03)
SPECIMEN EXP DATE BLD: NORMAL
SQUAMOUS #/AREA URNS AUTO: 1 /HPF (ref 0–1)
UROBILINOGEN UR STRIP-MCNC: NORMAL MG/DL (ref 0–2)
WBC # BLD AUTO: 9.8 10E9/L (ref 4–11)
WBC #/AREA URNS AUTO: 11 /HPF (ref 0–5)

## 2018-10-24 PROCEDURE — 36000056 ZZH SURGERY LEVEL 3 1ST 30 MIN: Performed by: SURGERY

## 2018-10-24 PROCEDURE — 43659 UNLISTED LAPS PX STOMACH: CPT | Performed by: SURGERY

## 2018-10-24 PROCEDURE — 86901 BLOOD TYPING SEROLOGIC RH(D): CPT | Performed by: ANESTHESIOLOGY

## 2018-10-24 PROCEDURE — 36000058 ZZH SURGERY LEVEL 3 EA 15 ADDTL MIN: Performed by: SURGERY

## 2018-10-24 PROCEDURE — 83605 ASSAY OF LACTIC ACID: CPT | Performed by: EMERGENCY MEDICINE

## 2018-10-24 PROCEDURE — 96376 TX/PRO/DX INJ SAME DRUG ADON: CPT

## 2018-10-24 PROCEDURE — 37000009 ZZH ANESTHESIA TECHNICAL FEE, EACH ADDTL 15 MIN: Performed by: SURGERY

## 2018-10-24 PROCEDURE — 40000986 XR ABDOMEN 1 VW

## 2018-10-24 PROCEDURE — 99285 EMERGENCY DEPT VISIT HI MDM: CPT | Mod: 25

## 2018-10-24 PROCEDURE — 25000128 H RX IP 250 OP 636: Performed by: EMERGENCY MEDICINE

## 2018-10-24 PROCEDURE — 27210794 ZZH OR GENERAL SUPPLY STERILE: Performed by: SURGERY

## 2018-10-24 PROCEDURE — 81001 URINALYSIS AUTO W/SCOPE: CPT | Performed by: EMERGENCY MEDICINE

## 2018-10-24 PROCEDURE — 0DU64KZ SUPPLEMENT STOMACH WITH NONAUTOLOGOUS TISSUE SUBSTITUTE, PERCUTANEOUS ENDOSCOPIC APPROACH: ICD-10-PCS | Performed by: SURGERY

## 2018-10-24 PROCEDURE — 25000128 H RX IP 250 OP 636: Performed by: NURSE ANESTHETIST, CERTIFIED REGISTERED

## 2018-10-24 PROCEDURE — 12000007 ZZH R&B INTERMEDIATE

## 2018-10-24 PROCEDURE — 37000008 ZZH ANESTHESIA TECHNICAL FEE, 1ST 30 MIN: Performed by: SURGERY

## 2018-10-24 PROCEDURE — 87086 URINE CULTURE/COLONY COUNT: CPT | Performed by: EMERGENCY MEDICINE

## 2018-10-24 PROCEDURE — 25000125 ZZHC RX 250: Performed by: NURSE ANESTHETIST, CERTIFIED REGISTERED

## 2018-10-24 PROCEDURE — 25000128 H RX IP 250 OP 636: Performed by: ANESTHESIOLOGY

## 2018-10-24 PROCEDURE — 25000566 ZZH SEVOFLURANE, EA 15 MIN: Performed by: SURGERY

## 2018-10-24 PROCEDURE — 99254 IP/OBS CNSLTJ NEW/EST MOD 60: CPT | Mod: 57 | Performed by: SURGERY

## 2018-10-24 PROCEDURE — 81025 URINE PREGNANCY TEST: CPT | Performed by: ANESTHESIOLOGY

## 2018-10-24 PROCEDURE — 96375 TX/PRO/DX INJ NEW DRUG ADDON: CPT

## 2018-10-24 PROCEDURE — 43659 UNLISTED LAPS PX STOMACH: CPT | Mod: 80 | Performed by: SURGERY

## 2018-10-24 PROCEDURE — 80053 COMPREHEN METABOLIC PANEL: CPT | Performed by: EMERGENCY MEDICINE

## 2018-10-24 PROCEDURE — 86900 BLOOD TYPING SEROLOGIC ABO: CPT | Performed by: ANESTHESIOLOGY

## 2018-10-24 PROCEDURE — 71000013 ZZH RECOVERY PHASE 1 LEVEL 1 EA ADDTL HR: Performed by: SURGERY

## 2018-10-24 PROCEDURE — 85025 COMPLETE CBC W/AUTO DIFF WBC: CPT | Performed by: EMERGENCY MEDICINE

## 2018-10-24 PROCEDURE — 96365 THER/PROPH/DIAG IV INF INIT: CPT

## 2018-10-24 PROCEDURE — 86850 RBC ANTIBODY SCREEN: CPT | Performed by: ANESTHESIOLOGY

## 2018-10-24 PROCEDURE — 83690 ASSAY OF LIPASE: CPT | Performed by: EMERGENCY MEDICINE

## 2018-10-24 PROCEDURE — 27210995 ZZH RX 272: Performed by: SURGERY

## 2018-10-24 PROCEDURE — 74177 CT ABD & PELVIS W/CONTRAST: CPT

## 2018-10-24 PROCEDURE — 40000170 ZZH STATISTIC PRE-PROCEDURE ASSESSMENT II: Performed by: SURGERY

## 2018-10-24 PROCEDURE — 25000125 ZZHC RX 250: Performed by: SURGERY

## 2018-10-24 PROCEDURE — 71000012 ZZH RECOVERY PHASE 1 LEVEL 1 FIRST HR: Performed by: SURGERY

## 2018-10-24 PROCEDURE — 0DUA4KZ SUPPLEMENT JEJUNUM WITH NONAUTOLOGOUS TISSUE SUBSTITUTE, PERCUTANEOUS ENDOSCOPIC APPROACH: ICD-10-PCS | Performed by: SURGERY

## 2018-10-24 PROCEDURE — 93005 ELECTROCARDIOGRAM TRACING: CPT

## 2018-10-24 PROCEDURE — C9113 INJ PANTOPRAZOLE SODIUM, VIA: HCPCS | Performed by: EMERGENCY MEDICINE

## 2018-10-24 PROCEDURE — 96361 HYDRATE IV INFUSION ADD-ON: CPT

## 2018-10-24 PROCEDURE — 25000125 ZZHC RX 250: Performed by: EMERGENCY MEDICINE

## 2018-10-24 PROCEDURE — 25000128 H RX IP 250 OP 636: Performed by: STUDENT IN AN ORGANIZED HEALTH CARE EDUCATION/TRAINING PROGRAM

## 2018-10-24 RX ORDER — SODIUM CHLORIDE, SODIUM LACTATE, POTASSIUM CHLORIDE, CALCIUM CHLORIDE 600; 310; 30; 20 MG/100ML; MG/100ML; MG/100ML; MG/100ML
INJECTION, SOLUTION INTRAVENOUS CONTINUOUS
Status: DISCONTINUED | OUTPATIENT
Start: 2018-10-24 | End: 2018-10-24 | Stop reason: HOSPADM

## 2018-10-24 RX ORDER — PROPOFOL 10 MG/ML
INJECTION, EMULSION INTRAVENOUS CONTINUOUS PRN
Status: DISCONTINUED | OUTPATIENT
Start: 2018-10-24 | End: 2018-10-24

## 2018-10-24 RX ORDER — LIDOCAINE 40 MG/G
CREAM TOPICAL
Status: DISCONTINUED | OUTPATIENT
Start: 2018-10-24 | End: 2018-10-28 | Stop reason: HOSPADM

## 2018-10-24 RX ORDER — ONDANSETRON 2 MG/ML
4 INJECTION INTRAMUSCULAR; INTRAVENOUS EVERY 30 MIN PRN
Status: DISCONTINUED | OUTPATIENT
Start: 2018-10-24 | End: 2018-10-24 | Stop reason: HOSPADM

## 2018-10-24 RX ORDER — LIDOCAINE HYDROCHLORIDE 20 MG/ML
INJECTION, SOLUTION INFILTRATION; PERINEURAL PRN
Status: DISCONTINUED | OUTPATIENT
Start: 2018-10-24 | End: 2018-10-24

## 2018-10-24 RX ORDER — PIPERACILLIN SODIUM, TAZOBACTAM SODIUM 2; .25 G/10ML; G/10ML
INJECTION, POWDER, LYOPHILIZED, FOR SOLUTION INTRAVENOUS PRN
Status: DISCONTINUED | OUTPATIENT
Start: 2018-10-24 | End: 2018-10-24

## 2018-10-24 RX ORDER — ONDANSETRON 2 MG/ML
INJECTION INTRAMUSCULAR; INTRAVENOUS PRN
Status: DISCONTINUED | OUTPATIENT
Start: 2018-10-24 | End: 2018-10-24

## 2018-10-24 RX ORDER — HYDROMORPHONE HYDROCHLORIDE 1 MG/ML
0.5 INJECTION, SOLUTION INTRAMUSCULAR; INTRAVENOUS; SUBCUTANEOUS
Status: DISCONTINUED | OUTPATIENT
Start: 2018-10-24 | End: 2018-10-24

## 2018-10-24 RX ORDER — PIPERACILLIN SODIUM, TAZOBACTAM SODIUM 3; .375 G/15ML; G/15ML
3.38 INJECTION, POWDER, LYOPHILIZED, FOR SOLUTION INTRAVENOUS EVERY 6 HOURS
Status: COMPLETED | OUTPATIENT
Start: 2018-10-24 | End: 2018-10-27

## 2018-10-24 RX ORDER — DOCUSATE SODIUM 100 MG/1
300 CAPSULE, LIQUID FILLED ORAL EVERY MORNING
COMMUNITY

## 2018-10-24 RX ORDER — ONDANSETRON 4 MG/1
4 TABLET, ORALLY DISINTEGRATING ORAL EVERY 30 MIN PRN
Status: DISCONTINUED | OUTPATIENT
Start: 2018-10-24 | End: 2018-10-24 | Stop reason: HOSPADM

## 2018-10-24 RX ORDER — SODIUM CHLORIDE, SODIUM LACTATE, POTASSIUM CHLORIDE, CALCIUM CHLORIDE 600; 310; 30; 20 MG/100ML; MG/100ML; MG/100ML; MG/100ML
INJECTION, SOLUTION INTRAVENOUS CONTINUOUS PRN
Status: DISCONTINUED | OUTPATIENT
Start: 2018-10-24 | End: 2018-10-24

## 2018-10-24 RX ORDER — HYDROMORPHONE HYDROCHLORIDE 1 MG/ML
.3-.5 INJECTION, SOLUTION INTRAMUSCULAR; INTRAVENOUS; SUBCUTANEOUS
Status: DISCONTINUED | OUTPATIENT
Start: 2018-10-24 | End: 2018-10-28 | Stop reason: HOSPADM

## 2018-10-24 RX ORDER — HYDROMORPHONE HYDROCHLORIDE 1 MG/ML
.3-.5 INJECTION, SOLUTION INTRAMUSCULAR; INTRAVENOUS; SUBCUTANEOUS EVERY 5 MIN PRN
Status: DISCONTINUED | OUTPATIENT
Start: 2018-10-24 | End: 2018-10-24 | Stop reason: HOSPADM

## 2018-10-24 RX ORDER — PROPOFOL 10 MG/ML
INJECTION, EMULSION INTRAVENOUS PRN
Status: DISCONTINUED | OUTPATIENT
Start: 2018-10-24 | End: 2018-10-24

## 2018-10-24 RX ORDER — ONDANSETRON 2 MG/ML
4 INJECTION INTRAMUSCULAR; INTRAVENOUS EVERY 6 HOURS PRN
Status: DISCONTINUED | OUTPATIENT
Start: 2018-10-24 | End: 2018-10-28 | Stop reason: HOSPADM

## 2018-10-24 RX ORDER — NALOXONE HYDROCHLORIDE 0.4 MG/ML
.1-.4 INJECTION, SOLUTION INTRAMUSCULAR; INTRAVENOUS; SUBCUTANEOUS
Status: DISCONTINUED | OUTPATIENT
Start: 2018-10-24 | End: 2018-10-24

## 2018-10-24 RX ORDER — DEXAMETHASONE SODIUM PHOSPHATE 4 MG/ML
INJECTION, SOLUTION INTRA-ARTICULAR; INTRALESIONAL; INTRAMUSCULAR; INTRAVENOUS; SOFT TISSUE PRN
Status: DISCONTINUED | OUTPATIENT
Start: 2018-10-24 | End: 2018-10-24

## 2018-10-24 RX ORDER — SODIUM CHLORIDE, SODIUM LACTATE, POTASSIUM CHLORIDE, CALCIUM CHLORIDE 600; 310; 30; 20 MG/100ML; MG/100ML; MG/100ML; MG/100ML
INJECTION, SOLUTION INTRAVENOUS CONTINUOUS
Status: DISCONTINUED | OUTPATIENT
Start: 2018-10-24 | End: 2018-10-28 | Stop reason: HOSPADM

## 2018-10-24 RX ORDER — PIPERACILLIN SODIUM, TAZOBACTAM SODIUM 3; .375 G/15ML; G/15ML
3.38 INJECTION, POWDER, LYOPHILIZED, FOR SOLUTION INTRAVENOUS ONCE
Status: COMPLETED | OUTPATIENT
Start: 2018-10-24 | End: 2018-10-24

## 2018-10-24 RX ORDER — FENTANYL CITRATE 50 UG/ML
25-50 INJECTION, SOLUTION INTRAMUSCULAR; INTRAVENOUS
Status: DISCONTINUED | OUTPATIENT
Start: 2018-10-24 | End: 2018-10-24 | Stop reason: HOSPADM

## 2018-10-24 RX ORDER — GLYCOPYRROLATE 0.2 MG/ML
INJECTION, SOLUTION INTRAMUSCULAR; INTRAVENOUS PRN
Status: DISCONTINUED | OUTPATIENT
Start: 2018-10-24 | End: 2018-10-24

## 2018-10-24 RX ORDER — FENTANYL CITRATE 50 UG/ML
INJECTION, SOLUTION INTRAMUSCULAR; INTRAVENOUS PRN
Status: DISCONTINUED | OUTPATIENT
Start: 2018-10-24 | End: 2018-10-24

## 2018-10-24 RX ORDER — EPHEDRINE SULFATE 50 MG/ML
INJECTION, SOLUTION INTRAMUSCULAR; INTRAVENOUS; SUBCUTANEOUS PRN
Status: DISCONTINUED | OUTPATIENT
Start: 2018-10-24 | End: 2018-10-24

## 2018-10-24 RX ORDER — NALOXONE HYDROCHLORIDE 0.4 MG/ML
.1-.4 INJECTION, SOLUTION INTRAMUSCULAR; INTRAVENOUS; SUBCUTANEOUS
Status: DISCONTINUED | OUTPATIENT
Start: 2018-10-24 | End: 2018-10-28 | Stop reason: HOSPADM

## 2018-10-24 RX ORDER — IOPAMIDOL 755 MG/ML
69 INJECTION, SOLUTION INTRAVASCULAR ONCE
Status: COMPLETED | OUTPATIENT
Start: 2018-10-24 | End: 2018-10-24

## 2018-10-24 RX ORDER — ONDANSETRON 4 MG/1
4 TABLET, ORALLY DISINTEGRATING ORAL EVERY 6 HOURS PRN
Status: DISCONTINUED | OUTPATIENT
Start: 2018-10-24 | End: 2018-10-28 | Stop reason: HOSPADM

## 2018-10-24 RX ORDER — BUPIVACAINE HYDROCHLORIDE AND EPINEPHRINE 5; 5 MG/ML; UG/ML
INJECTION, SOLUTION PERINEURAL PRN
Status: DISCONTINUED | OUTPATIENT
Start: 2018-10-24 | End: 2018-10-24 | Stop reason: HOSPADM

## 2018-10-24 RX ORDER — PROCHLORPERAZINE MALEATE 10 MG
10 TABLET ORAL EVERY 6 HOURS PRN
Status: DISCONTINUED | OUTPATIENT
Start: 2018-10-24 | End: 2018-10-28 | Stop reason: HOSPADM

## 2018-10-24 RX ORDER — NEOSTIGMINE METHYLSULFATE 1 MG/ML
VIAL (ML) INJECTION PRN
Status: DISCONTINUED | OUTPATIENT
Start: 2018-10-24 | End: 2018-10-24

## 2018-10-24 RX ADMIN — ROCURONIUM BROMIDE 10 MG: 10 INJECTION INTRAVENOUS at 08:52

## 2018-10-24 RX ADMIN — PIPERACILLIN SODIUM,TAZOBACTAM SODIUM 3.38 G: 3; .375 INJECTION, POWDER, FOR SOLUTION INTRAVENOUS at 14:24

## 2018-10-24 RX ADMIN — SUCCINYLCHOLINE CHLORIDE 100 MG: 20 INJECTION, SOLUTION INTRAMUSCULAR; INTRAVENOUS; PARENTERAL at 07:45

## 2018-10-24 RX ADMIN — PIPERACILLIN SODIUM,TAZOBACTAM SODIUM 3.38 G: 3; .375 INJECTION, POWDER, FOR SOLUTION INTRAVENOUS at 06:16

## 2018-10-24 RX ADMIN — SODIUM CHLORIDE, POTASSIUM CHLORIDE, SODIUM LACTATE AND CALCIUM CHLORIDE: 600; 310; 30; 20 INJECTION, SOLUTION INTRAVENOUS at 07:59

## 2018-10-24 RX ADMIN — SODIUM CHLORIDE, POTASSIUM CHLORIDE, SODIUM LACTATE AND CALCIUM CHLORIDE: 600; 310; 30; 20 INJECTION, SOLUTION INTRAVENOUS at 07:15

## 2018-10-24 RX ADMIN — PANTOPRAZOLE SODIUM 80 MG: 40 INJECTION, POWDER, FOR SOLUTION INTRAVENOUS at 06:11

## 2018-10-24 RX ADMIN — Medication 0.5 MG: at 06:10

## 2018-10-24 RX ADMIN — ROCURONIUM BROMIDE 30 MG: 10 INJECTION INTRAVENOUS at 07:59

## 2018-10-24 RX ADMIN — Medication 5 MG: at 07:50

## 2018-10-24 RX ADMIN — PHENYLEPHRINE HYDROCHLORIDE 50 MCG: 10 INJECTION, SOLUTION INTRAMUSCULAR; INTRAVENOUS; SUBCUTANEOUS at 07:50

## 2018-10-24 RX ADMIN — SODIUM CHLORIDE, POTASSIUM CHLORIDE, SODIUM LACTATE AND CALCIUM CHLORIDE: 600; 310; 30; 20 INJECTION, SOLUTION INTRAVENOUS at 12:32

## 2018-10-24 RX ADMIN — SODIUM CHLORIDE 2000 ML: 9 INJECTION, SOLUTION INTRAVENOUS at 04:49

## 2018-10-24 RX ADMIN — DEXAMETHASONE SODIUM PHOSPHATE 4 MG: 4 INJECTION, SOLUTION INTRA-ARTICULAR; INTRALESIONAL; INTRAMUSCULAR; INTRAVENOUS; SOFT TISSUE at 08:14

## 2018-10-24 RX ADMIN — PROPOFOL 200 MG: 10 INJECTION, EMULSION INTRAVENOUS at 07:45

## 2018-10-24 RX ADMIN — ROCURONIUM BROMIDE 20 MG: 10 INJECTION INTRAVENOUS at 08:07

## 2018-10-24 RX ADMIN — PHENYLEPHRINE HYDROCHLORIDE 50 MCG: 10 INJECTION, SOLUTION INTRAMUSCULAR; INTRAVENOUS; SUBCUTANEOUS at 07:45

## 2018-10-24 RX ADMIN — MIDAZOLAM 1 MG: 1 INJECTION INTRAMUSCULAR; INTRAVENOUS at 07:36

## 2018-10-24 RX ADMIN — HYDROMORPHONE HYDROCHLORIDE 0.3 MG: 1 INJECTION, SOLUTION INTRAMUSCULAR; INTRAVENOUS; SUBCUTANEOUS at 13:10

## 2018-10-24 RX ADMIN — Medication 0.5 MG: at 04:50

## 2018-10-24 RX ADMIN — HYDROMORPHONE HYDROCHLORIDE 0.5 MG: 1 INJECTION, SOLUTION INTRAMUSCULAR; INTRAVENOUS; SUBCUTANEOUS at 15:33

## 2018-10-24 RX ADMIN — PHENYLEPHRINE HYDROCHLORIDE 100 MCG: 10 INJECTION, SOLUTION INTRAMUSCULAR; INTRAVENOUS; SUBCUTANEOUS at 07:59

## 2018-10-24 RX ADMIN — FENTANYL CITRATE 50 MCG: 50 INJECTION, SOLUTION INTRAMUSCULAR; INTRAVENOUS at 10:52

## 2018-10-24 RX ADMIN — FENTANYL CITRATE 100 MCG: 50 INJECTION, SOLUTION INTRAMUSCULAR; INTRAVENOUS at 07:45

## 2018-10-24 RX ADMIN — SODIUM CHLORIDE, PRESERVATIVE FREE 61 ML: 5 INJECTION INTRAVENOUS at 05:28

## 2018-10-24 RX ADMIN — LIDOCAINE HYDROCHLORIDE 80 MG: 20 INJECTION, SOLUTION INFILTRATION; PERINEURAL at 07:45

## 2018-10-24 RX ADMIN — HYDROMORPHONE HYDROCHLORIDE 0.5 MG: 1 INJECTION, SOLUTION INTRAMUSCULAR; INTRAVENOUS; SUBCUTANEOUS at 20:13

## 2018-10-24 RX ADMIN — PIPERACILLIN SODIUM AND TAZOBACTAM SODIUM 2.25 G: .25; 2 INJECTION, POWDER, LYOPHILIZED, FOR SOLUTION INTRAVENOUS at 08:27

## 2018-10-24 RX ADMIN — PROPOFOL 50 MCG/KG/MIN: 10 INJECTION, EMULSION INTRAVENOUS at 07:48

## 2018-10-24 RX ADMIN — GLYCOPYRROLATE 0.4 MG: 0.2 INJECTION, SOLUTION INTRAMUSCULAR; INTRAVENOUS at 09:14

## 2018-10-24 RX ADMIN — HYDROMORPHONE HYDROCHLORIDE 0.5 MG: 1 INJECTION, SOLUTION INTRAMUSCULAR; INTRAVENOUS; SUBCUTANEOUS at 17:50

## 2018-10-24 RX ADMIN — IOPAMIDOL 69 ML: 755 INJECTION, SOLUTION INTRAVENOUS at 05:27

## 2018-10-24 RX ADMIN — PIPERACILLIN SODIUM,TAZOBACTAM SODIUM 3.38 G: 3; .375 INJECTION, POWDER, FOR SOLUTION INTRAVENOUS at 20:20

## 2018-10-24 RX ADMIN — FENTANYL CITRATE 50 MCG: 50 INJECTION, SOLUTION INTRAMUSCULAR; INTRAVENOUS at 09:49

## 2018-10-24 RX ADMIN — ONDANSETRON 4 MG: 2 INJECTION INTRAMUSCULAR; INTRAVENOUS at 09:15

## 2018-10-24 RX ADMIN — NEOSTIGMINE METHYLSULFATE 3 MG: 1 INJECTION, SOLUTION INTRAVENOUS at 09:14

## 2018-10-24 ASSESSMENT — ACTIVITIES OF DAILY LIVING (ADL)
ADLS_ACUITY_SCORE: 9
ADLS_ACUITY_SCORE: 9

## 2018-10-24 ASSESSMENT — ENCOUNTER SYMPTOMS
VOMITING: 1
DYSURIA: 0
FEVER: 0
DIAPHORESIS: 1
DYSRHYTHMIAS: 0
ABDOMINAL PAIN: 1
FREQUENCY: 0
DIARRHEA: 0
CHILLS: 1
HEMATURIA: 0

## 2018-10-24 NOTE — ED NOTES
"[facility]  ED Nurse Handoff Report    ED Chief complaint: Abdominal Pain (Started at 1200. Reports vomiting over the weekend.)      ED Diagnosis:   Final diagnoses:   Perforated bowel (H)   Gall stones   Intussusception of intestine (H)       Code Status: Full Code    Allergies:   Allergies   Allergen Reactions     Lortab [Hydrocodone-Acetaminophen] Nausea     Also light headed and flushed       Activity level - Baseline/Home:  Independent    Activity Level - Current:   Independent     Needed?: No    Isolation: No  Infection: Not Applicable  Bariatric?: No    Vital Signs:   Vitals:    10/24/18 0332 10/24/18 0334 10/24/18 0459 10/24/18 0502   BP:  101/62 (!) 88/60 99/65   Pulse: 84      Temp: 97.9  F (36.6  C)      TempSrc: Temporal      SpO2: 100%      Height: 1.575 m (5' 2\")          Cardiac Rhythm: ,   Cardiac  Cardiac Rhythm: Normal sinus rhythm    Pain level: 0-10 Pain Scale: 8    Is this patient confused?: No   [place] - Suicide Severity Rating Scale Completed?  Yes  If yes, what color did the patient score?  White    Patient Report: Initial Complaint: Abd pain, woke up with chest pain/diaphoresis, and chills. Pain moved from chest to her abd overnight, so patient came to [facility] ED for eval. Pt has hx of gastric bypass.   Focused Assessment:   Cards: WNL, EKG done  Resp: WNL  GI: abd pain, all over, with palpation, dilaudid x2 helps pain from 8/10 to 5/10. Emesis over the weekend with intermittent nausea  : WNL  Musculo: WNL  Tests Performed: CT of abd, labs  Abnormal Results: CT, free air, see results tab  Treatments provided: Labs, IV fluids, IV dilaudid for pain, see MAR for all meds given    Family Comments: Sig other at bedside, mother en route to [facility]    OBS brochure/video discussed/provided to patient/family: N/A              Name of person given brochure if not patient: n/a              Relationship to patient: n/a    ED Medications:   Medications   HYDROmorphone (PF) " (DILAUDID) injection 0.5 mg (0.5 mg Intravenous Given 10/24/18 0610)   piperacillin-tazobactam (ZOSYN) 3.375 g vial to attach to  mL bag (3.375 g Intravenous New Bag 10/24/18 0616)   iohexol (OMNIPAQUE) solution 50 mL (50 mLs Oral Given 10/24/18 0421)   0.9% sodium chloride BOLUS (2,000 mLs Intravenous Restarted 10/24/18 0621)   iopamidol (ISOVUE-370) solution 69 mL (69 mLs Intravenous Given 10/24/18 0527)   Saline flush (61 mLs Intravenous Given 10/24/18 0528)   pantoprazole (PROTONIX) 40 mg IV push injection (80 mg Intravenous Given 10/24/18 0611)       Drips infusing?:  Yes    For the majority of the shift this patient was Green.   Interventions performed were n/a.    Severe Sepsis OR Septic Shock Diagnosis Present: No    To be done/followed up on inpatient unit:  n/a    ED NURSE PHONE NUMBER: [phone]

## 2018-10-24 NOTE — ED PROVIDER NOTES
"  History     Chief Complaint:  Abdominal Pain    HPI   Criss Don is a 47 year old female who presents to the ED for evaluation of abdominal pain. The patient reports that she had a few episodes of vomiting about 3-4 days ago. This evening around 0000, she developed an onset of diffuse abdominal pain with some associated chills and diaphoresis with brief chest pains. She described these episodes as \"tight\" pains lasting a few seconds. Given the persistence of her symptoms, she presented to the ED this evening for evaluation. She denies any frequency, urgency, hematuria, dysuria, diarrhea, or fevers. She has no surgical history of an appendectomy, though has had a gastric bypass by Dr. Reyan. She denies any new foods or recently travel. She notes that she has had a similar pain in the past, which resolved on its own and has never lasted this long. She denies any history of gallstones, diverticulitis, or bowel obstruction. She notes that she has kept down fluids.     Allergies:  Lortab    Medications:    Invokana  Colace  Metformin  Remeron  Sprintec    Past Medical History:    Morbid obesity  Labial abscess  Edema  Intertrigo  Body dysmorphic disorder  Anemia  Anxiety    Past Surgical History:    Gastric bypass    Family History:    HTN  Breast cancer  Allergies  Obesity  Asthma  Heart disease    Social History:  Marital Status:   [2]  Negative for tobacco use.  Rare alcohol use     Review of Systems   Constitutional: Positive for chills and diaphoresis. Negative for fever.   Cardiovascular: Positive for chest pain.   Gastrointestinal: Positive for abdominal pain and vomiting. Negative for diarrhea.   Genitourinary: Negative for dysuria, frequency, hematuria and urgency.   All other systems reviewed and are negative.    Physical Exam     Patient Vitals for the past 24 hrs:   BP Temp Temp src Pulse SpO2 Height   10/24/18 0502 99/65 - - - - -   10/24/18 0459 (!) 88/60 - - - - -   10/24/18 0334 101/62 - - " "- - -   10/24/18 0332 - 97.9  F (36.6  C) Temporal 84 100 % 1.575 m (5' 2\")     Physical Exam  Nursing note and vitals reviewed.  Constitutional:  Appears well-developed and well-nourished.   HENT:   Head:    Atraumatic.   Mouth/Throat:   Oropharynx is clear and moist. No oropharyngeal exudate.   Eyes:    Pupils are equal, round, and reactive to light.   Neck:    Normal range of motion. Neck supple.      No tracheal deviation present. No thyromegaly present.   Cardiovascular:  Normal rate, regular rhythm, no murmur   Pulmonary/Chest: Breath sounds are clear and equal without wheezes or crackles.  Abdominal:   Soft. Bowel sounds are normal. Exhibits no distension and no mass.      Diffuse abdominal tenderness with guarding. No rebound.  Musculoskeletal:  Exhibits no edema.   Lymphadenopathy:  No cervical adenopathy.   Neurological:   Alert and oriented to person, place, and time.   Skin:    Skin is warm and dry. No rash noted. No pallor.     Emergency Department Course   ECG:  Indication: Chest Pain  Time: 0412  Vent. Rate 86 bpm. AK interval 156. QRS duration 86. QT/QTc 366/437. P-R-T axis 58 44 50.  Normal sinus rhythm. Possible left atrial enlargement. Cannot rule out anterior infarct, age undetermined. Abnormal ECG. No significant change compared to EKG dated 10/26/16. Read time: 0417.    Imaging:  Radiographic findings were communicated with the patient who voiced understanding of the findings.  CT Abdomen/Pelvis with IV contrast:   Free air mostly in the upper abdomen, most consistent with possible perforated ulcer. Gallstones present and area of intussuception in the left lower abdomen without obstruction, which is thought to be a incidental finding. Ascites. Per verbal report by radiologist.    CT Abdomen Pelvis w Contrast    (Results Pending)       Laboratory:  CBC: WBC: 9.8, HGB: 13.4, PLT: 233  CMP: Glucose 103 (H), Calcium 8.0 (L), Albumin 2.9 (L), Protein 6.2 (L), o/w WNL (Creatinine: 0.56)  Lipase: " 102    Lactic acid: pending on admission    UA with Microscopic: leukocyte esterase moderate (A), WBC 11 (H), mucous present (A), o/w WNL  Urine culture: pending    Interventions:  0421 Omnipaque 50 mL PO  0449 NS 2L IV  0450 Dilaudid, 0.5 mg, IV injection  Protonix 40 mg IV  Zosyn 3.375 g IV    Emergency Department Course:  Nursing notes and vitals reviewed. (6130) I performed an exam of the patient as documented above.     IV inserted. Medicine administered as documented above. Blood drawn. This was sent to the lab for further testing, results above.    The patient provided a urine sample here in the emergency department. This was sent for laboratory testing, findings above.     The patient was sent for an Abdomen/Pelvis CT while in the emergency department, findings above.     EKG obtained in the ED, see results above.     (9972) I consulted with Dr. Jesus Cameron, Radiology, regarding the patient's history and presentation here in the emergency department. They report that the patient may have a perforated ulcer, as there is free air on her CT.     (4341) I rechecked the patient and discussed the results of her workup thus far.     (2788) I consulted with Dr. Weeks, General Surgery, regarding the patient's history and presentation here in the emergency department. They are in agreement to accept the patient for transfer to the OR for surgery.    Findings and plan explained to the Patient who consents to surgery.    Impression & Plan      Medical Decision Making:  I found the patient to have free abdominal air consistent with a bowel perforation, which the radiologist feels is likely from a perforated ulcer. I feel this is the cause of her abdominal pain, so she was given IV Zosyn and I consulted General Surgery. The patient was also found to have gallstones and has an area of intussusception in her left lower abdomen without any sign of a small bowel obstruction. She was accepted by Dr. Weeks for  transfer to the OR.    Diagnosis:    ICD-10-CM    1. Perforated bowel (H) K63.1    2. Gall stones K80.20    3. Intussusception of intestine (H) K56.1      Disposition:  Admitted to Dr. Weeks    Scribdaniela Disclosure:  IMarcia, am serving as a scribe on 10/24/2018 at 3:49 AM to personally document services performed by Veronique Marley MD based on my observations and the provider's statements to me.     Marcia Douglas  10/24/2018    EMERGENCY DEPARTMENT       Veronique Marley MD  10/24/18 0616

## 2018-10-24 NOTE — CONSULTS
"[facility] General Surgery Consultation    Criss Don MRN# [id number]   YOB: 1970 Age: 47 year old      Date of Admission:  10/24/2018  Date of Consult: 10/24/2018         Assessment and Plan:   Patient is a 47 year old female with h/o RYGB in 2016 who presents with free intraabdominal air and likely ulcer perforation at her GJ anastomosis.     PLAN:  Diagnostic laparoscopy, repair of perforation  NPO  NGT  Admit to 33 post operatively         Requesting Physician:      Dr. Marley        Chief Complaint:     Chief Complaint   Patient presents with     Abdominal Pain     Started at 1200. Reports vomiting over the weekend.          History of Present Illness:   Criss Don is a 47 year old female who was seen in consultation at the request of Dr. Marley who presented with abdominal pain. She reports over the weekend she has had issues with intermittent nausea and emesis. It is common for her to have emesis following her RYGB and was not surprising for her. Last night she awoke at midnight with sharp upper abdominal pain. This improved slightly after 10 minutes but then returned and worsened and she presented to the ED. She has 10/10 pain. Movement makes the pain worse. She has not had similar pain in the past. she denies tobacco use but has had significant 2nd hand smoke exposure. She denies NSAID use. She occasionally takes tylenol for pain. She has issues with chronic constipation. Her last BM was yesterday. She takes three bowel meds/day. She felt febrile when she awoke and was drenched with sweat.            Physical Exam:   Blood pressure 101/74, pulse 84, temperature 97.9  F (36.6  C), temperature source Temporal, height 5' 2\" (1.575 m), SpO2 100 %, not currently breastfeeding.  0 lbs 0 oz  General: lying in bed, appears uncomfortable  Psych: Alert and Oriented.  Normal affect  Neurological: grossly intact  Eyes: Sclera clear  Respiratory:  nonlabored " breathing  Cardiovascular:  Regular Rate and Rhythm   GI: abdomen is soft, tender to palpation throughout, worse in upper abdomen and suprapubic, voluntary guarding.    Lymphatic/Hematologic/Immune:  No femoral or cervical lymphadenopathy.  Integumentary:  No rashes         Past Medical History:     Past Medical History:   Diagnosis Date     morbid obesity      Obese      Urinary frequency             Past Surgical History:     Past Surgical History:   Procedure Laterality Date     LAPAROSCOPIC BYPASS GASTRIC N/A 10/26/2016    Procedure: LAPAROSCOPIC BYPASS GASTRIC;  Surgeon: Romario Reyna MD;  Location:  OR     NO HISTORY OF SURGERY              Current Medications:           piperacillin-tazobactam  3.375 g Intravenous Once       HYDROmorphone         Home Medications:     Prior to Admission medications    Medication Sig Last Dose Taking? Auth Provider   calcium-vitamin D-vitamin K (VIACTIV) 500-500-40 MG-UNT-MCG CHEW Take 1 tablet by mouth 3 times daily    Reported, Patient   canagliflozin (INVOKANA) 100 MG tablet Take 1 tablet (100 mg) by mouth every morning (before breakfast)   Guero Owen MD   Cholecalciferol (VITAMIN D3 PO) Take 6,000 Units by mouth daily    Reported, Patient   Cyanocobalamin (B-12) 2500 MCG SUBL Place 1 tablet under the tongue 3 times weekly   Reported, Patient   docusate sodium (COLACE) 100 MG tablet Take 100 mg by mouth 2 times daily as needed for constipation   Cris Paez PA-C   ferrous fumarate 65 mg, [place]. FE,-Vitamin C 125 mg (VITRON-C)  MG TABS Take 1 tablet by mouth every morning   Reported, Patient   metFORMIN (GLUCOPHAGE-XR) 500 MG 24 hr tablet Take 1 tablet (500 mg) by mouth daily   Guero Owen MD   mirtazapine (REMERON) 15 MG tablet    Reported, Patient   multivitamin  peds with iron (FLINTSTONES COMPLETE) 60 MG chewable tablet Take 2 chew tab by mouth every morning    Chris Dutta MD   SPRINTEC 28 0.25-35 MG-MCG per tablet  TAKE ONE TABLET BY MOUTH ONCE DAILY   Napoleon Gray PA-C            Allergies:     Allergies   Allergen Reactions     Lortab [Hydrocodone-Acetaminophen] Nausea     Also light headed and flushed            Family History:     Family History   Problem Relation Age of Onset     Hypertension Mother      Breast Cancer Mother      64 dx     Allergies Mother      Obesity Mother      Respiratory Mother      Asthma     HEART DISEASE Mother      Hypertension Maternal Grandmother      Cancer Maternal Grandmother      Ovarian     Cancer Sister      Cervical     Cancer Other      Mat. great aunt-ovarian     Hypertension Brother      Cancer Maternal Aunt      ?Gastric     Brain Cancer Cousin      maternal           Social History:   Criss Don  reports that she has never smoked. She has never used smokeless tobacco. She reports that she drinks alcohol. She reports that she does not use illicit drugs.          Review of Systems:   The 10 point Review of Systems is negative other than noted in the HPI.         Labs/Imaging   All new lab and imaging data was reviewed.   I have personally reviewed the imaging studies including her CT.         Collette Weeks MD

## 2018-10-24 NOTE — IP AVS SNAPSHOT
MRN:[id number]                      After Visit Summary   10/24/2018    Criss Don    MRN: [id number]           Thank you!     Thank you for choosing [facility] for your care. Our goal is always to provide you with excellent care. Hearing back from our patients is one way we can continue to improve our services. Please take a few minutes to complete the written survey that you may receive in the mail after you visit with us. Thank you!        Patient Information     Date Of Birth          1970        Designated Caregiver       Most Recent Value    Caregiver    Will someone help with your care after discharge? no      About your hospital stay     You were admitted on:  October 24, 2018 You last received care in the:  [facility] [facility] [facility]    You were discharged on:  October 28, 2018        Reason for your hospital stay       Perforated ulcer requiring surgery with Dr. Chang Corea                  Who to Call     For medical emergencies, please call 911.  For non-urgent questions about your medical care, please call your primary care provider or clinic, [phone]  For questions related to your surgery, please call your surgery clinic        Attending Provider     Provider Specialty    Veronique Marley MD Emergency Medicine    Anmol, Chang Arciniega MD Surgery       Primary Care Provider Office Phone # Fax #    Napoleon Gray PA-C [phone]      After Care Instructions     Activity       Avoid strenuous activity or heavy lifting >20 pounds for 3-4 weeks.            Diet       Follow a full liquid diet for 2 weeks, until you return to clinic.            Discharge Instructions       Call [facility] right away or seek care in a medical office/hospital if you have any of the following symptoms:  - Increasing warmth or heat around incisions  - Increasing redness around incisions  - Drainage from incisions  -  Increasing or uncontrolled abdominal pain  - Increasing or uncontrolled nausea  - Fever >101F            Wound care and dressings       Keep dressings clean and dry. Wait 10 days before removing the small white steri strips over your incision. These will often fall off on their own in 7-10 days. Do not attempt to replace these if they should fall off sooner. You can shower normally. You can allow warm water and soap to run over the incision. Do not scrub the incision. After showering pat your skin and steri strips dry. Do not submerge or soak your incisions under water for 2 weeks.    Cover the site of the previous drain with gauze/tape or a bandaid if desired - this may have fluid draining from it for several days until it heals.                  Follow-up Appointments     Follow-up and recommended labs and tests        Please follow-up in the weight loss clinic in approximately 2 weeks for recheck. Call [phone] to schedule this appointment or if you have any questions or concerns.                  Your next 10 appointments already scheduled     Nov 08, 2018  2:00 PM CST   Return Visit with Brenda Perez PsyD   [facility] ([facility])    [address] [place] [address]   [phone]            Nov 30, 2018  7:00 AM CST   Return Visit with Brenda Perez PsyD   [facility] ([facility])    [address] [place] [address]   [phone]            Dec 14, 2018  7:00 AM CST   Return Visit with Brenda Perez PsyD   [facility] ([facility])    [address] [place] [address]   [phone]            Feb 01, 2019  9:45 AM CST   Return Visit with  Oncology Nurse   [facility] ([facility])    [facility] [place]  [address]  [place] [address]   [phone]            Feb 07, 2019  1:00  "PM CST   Return Visit with Pricilla Rahman MD   [facility] ([facility])    [facility] [place]  [address]  [place] [id number] [phone]              Pending Results     No orders found from 10/22/2018 to 10/25/2018.            Statement of Approval     Ordered          10/28/18 1001  I have reviewed and agree with all the recommendations and orders detailed in this document.  EFFECTIVE NOW     Approved and electronically signed by:  Martha Beck PA-C             Admission Information     Date & Time Provider Department Dept. Phone    10/24/2018 Chang Corea MD [place] [address] [facility] [phone]      Your Vitals Were     Blood Pressure Pulse Temperature Respirations Height Weight    109/72 (BP Location: Right arm) 68 98  F (36.7  C) (Oral) 16 1.575 m (5' 2\") 63.8 kg (140 lb 9.6 oz)    Pulse Oximetry BMI (Body Mass Index)                98% 25.72 kg/m2          Care EveryWhere ID     This is your Care EveryWhere ID. This could be used by other organizations to access your [facility] medical records  [id number]        Equal Access to Services     STEVEN MUNOZ AH: Phong langfordo Sojorge, waaxda luqadaha, qaybta kaalmada aderandeeyada, suri yeager. So [facility] [phone].    ATENCIÓN: Si habla español, tiene a madera disposición servicios gratuitos de asistencia lingüística. Llame al [phone].    We comply with applicable federal civil rights laws and [place] laws. We do not discriminate on the basis of race, color, national origin, age, disability, sex, sexual orientation, or gender identity.               Review of your medicines      START taking        Dose / Directions    acetaminophen 32 mg/mL solution   Commonly known as:  TYLENOL        Dose:  650 mg   Take 20.3 mLs (650 mg) by mouth every 4 hours as needed for pain   Quantity:  473 mL   Refills:  0       cyclobenzaprine 5 MG " tablet   Commonly known as:  FLEXERIL        Dose:  5-10 mg   Take 1-2 tablets (5-10 mg) by mouth 3 times daily as needed for muscle spasms   Quantity:  20 tablet   Refills:  0       ondansetron 4 MG ODT tab   Commonly known as:  ZOFRAN-ODT        Place 1-2 tablets (4-8mg) on tongue to dissolve every 6-8 hours as needed for nausea   Quantity:  20 tablet   Refills:  0       oxyCODONE IR 5 MG tablet   Commonly known as:  ROXICODONE        Dose:  5-10 mg   Take 1-2 tablets (5-10 mg) by mouth every 4 hours as needed for moderate to severe pain   Quantity:  25 tablet   Refills:  0       pantoprazole 40 MG EC tablet   Commonly known as:  PROTONIX        Dose:  40 mg   Take 1 tablet (40 mg) by mouth 2 times daily (before meals) Take 30-60 minutes before a meal.   Quantity:  60 tablet   Refills:  3       simethicone 80 MG chewable tablet   Commonly known as:  MYLICON        Dose:  80 mg   Take 1 tablet (80 mg) by mouth every 6 hours as needed for flatulence or cramping   Quantity:  30 tablet   Refills:  1         CONTINUE these medicines which have NOT CHANGED        Dose / Directions    B-12 2500 MCG Subl        Dose:  1 tablet   Place 1 tablet under the tongue Every Mon, Wed, Fri Morning 3 times weekly   Refills:  0       docusate sodium 100 MG capsule   Commonly known as:  COLACE        Dose:  300 mg   Take 300 mg by mouth every morning   Refills:  0       FLUoxetine 20 MG capsule   Commonly known as:  PROzac        Dose:  20 mg   Take 20 mg by mouth daily   Refills:  0       mirtazapine 15 MG tablet   Commonly known as:  REMERON   Used for:  Iron deficiency, Malabsorption of iron        Dose:  15 mg   Take 15 mg by mouth nightly as needed   Refills:  0       multivitamin  peds with iron 60 MG chewable tablet        Dose:  2 chew tab   Take 2 chew tab by mouth every morning   Refills:  0       ranitidine 150 MG tablet   Commonly known as:  ZANTAC        Dose:  150 mg   Take 150 mg by mouth daily   Refills:  0        SPRINTEC 28 0.25-35 MG-MCG per tablet   Used for:  General counseling for prescription of oral contraceptives   Generic drug:  norgestimate-ethinyl estradiol        TAKE ONE TABLET BY MOUTH ONCE DAILY   Quantity:  84 tablet   Refills:  2       VIACTIV 500-500-40 MG-UNT-MCG Chew   Generic drug:  calcium-vitamin D-vitamin K        Dose:  3 tablet   Take 3 tablets by mouth At Bedtime   Refills:  0       VITAMIN D3 PO        Dose:  2000 Units   Take 2,000 Units by mouth 2 times daily   Refills:  0            Where to get your medicines      These medications were sent to [facility] [place], [place] - [address]  [address], [place] [address]     Phone:  [phone]     acetaminophen 32 mg/mL solution    cyclobenzaprine 5 MG tablet    ondansetron 4 MG ODT tab    pantoprazole 40 MG EC tablet    simethicone 80 MG chewable tablet         Some of these will need a paper prescription and others can be bought over the counter. Ask your nurse if you have questions.     Bring a paper prescription for each of these medications     oxyCODONE IR 5 MG tablet                Protect others around you: Learn how to safely use, store and throw away your medicines at www.disposemymeds.org.        Information about OPIOIDS     PRESCRIPTION OPIOIDS: WHAT YOU NEED TO KNOW   We gave you an opioid (narcotic) pain medicine. It is important to manage your pain, but opioids are not always the best choice. You should first try all the other options your care team gave you. Take this medicine for as short a time (and as few doses) as possible.    Some activities can increase your pain, such as bandage changes or therapy sessions. It may help to take your pain medicine 30 to 60 minutes before these activities. Reduce your stress by getting enough sleep, working on hobbies you enjoy and practicing relaxation or meditation. Talk to your care team about ways to manage your pain beyond prescription opioids.    These  medicines have risks:    DO NOT drive when on new or higher doses of pain medicine. These medicines can affect your alertness and reaction times, and you could be arrested for driving under the influence (DUI). If you need to use opioids long-term, talk to your care team about driving.    DO NOT operate heavy machinery    DO NOT do any other dangerous activities while taking these medicines.    DO NOT drink any alcohol while taking these medicines.     If the opioid prescribed includes acetaminophen, DO NOT take with any other medicines that contain acetaminophen. Read all labels carefully. Look for the word  acetaminophen  or  Tylenol.  Ask your pharmacist if you have questions or are unsure.    You can get addicted to pain medicines, especially if you have a history of addiction (chemical, alcohol or substance dependence). Talk to your care team about ways to reduce this risk.    All opioids tend to cause constipation. Drink plenty of water and eat foods that have a lot of fiber, such as fruits, vegetables, prune juice, apple juice and high-fiber cereal. Take a laxative (Miralax, milk of magnesia, Colace, Senna) if you don t move your bowels at least every other day. Other side effects include upset stomach, sleepiness, dizziness, throwing up, tolerance (needing more of the medicine to have the same effect), physical dependence and slowed breathing.    Store your pills in a secure place, locked if possible. We will not replace any lost or stolen medicine. If you don t finish your medicine, please throw away (dispose) as directed by your pharmacist. The [place] [employer]ution [employer] has more information about safe disposal: https://www.pca.[facility].[place].us/living-green/managing-unwanted-medications             Medication List: This is a list of all your medications and when to take them. Check marks below indicate your daily home schedule. Keep this list as a reference.      Medications           Morning Afternoon  Evening Bedtime As Needed    acetaminophen 32 mg/mL solution   Commonly known as:  TYLENOL   Take 20.3 mLs (650 mg) by mouth every 4 hours as needed for pain   Last time this was given:  650 mg on 10/28/2018 10:26 AM   Next Dose Due:  Anytime after 230pm                                   B-12 2500 MCG Subl   Place 1 tablet under the tongue Every Mon, Wed, Fri Morning 3 times weekly                                cyclobenzaprine 5 MG tablet   Commonly known as:  FLEXERIL   Take 1-2 tablets (5-10 mg) by mouth 3 times daily as needed for muscle spasms   Last time this was given:  5 mg on 10/28/2018  8:22 AM   Next Dose Due:  Anytime after 2pm                                   docusate sodium 100 MG capsule   Commonly known as:  COLACE   Take 300 mg by mouth every morning                                FLUoxetine 20 MG capsule   Commonly known as:  PROzac   Take 20 mg by mouth daily                                mirtazapine 15 MG tablet   Commonly known as:  REMERON   Take 15 mg by mouth nightly as needed                                multivitamin  peds with iron 60 MG chewable tablet   Take 2 chew tab by mouth every morning                                ondansetron 4 MG ODT tab   Commonly known as:  ZOFRAN-ODT   Place 1-2 tablets (4-8mg) on tongue to dissolve every 6-8 hours as needed for nausea   Last time this was given:  4 mg on 10/28/2018  8:22 AM   Next Dose Due:  Anytime after 230pm                                   oxyCODONE IR 5 MG tablet   Commonly known as:  ROXICODONE   Take 1-2 tablets (5-10 mg) by mouth every 4 hours as needed for moderate to severe pain   Last time this was given:  5 mg on 10/28/2018  6:03 AM   Next Dose Due:  Anytime                                   pantoprazole 40 MG EC tablet   Commonly known as:  PROTONIX   Take 1 tablet (40 mg) by mouth 2 times daily (before meals) Take 30-60 minutes before a meal.                                ranitidine 150 MG tablet   Commonly known as:   ZANTAC   Take 150 mg by mouth daily                                simethicone 80 MG chewable tablet   Commonly known as:  MYLICON   Take 1 tablet (80 mg) by mouth every 6 hours as needed for flatulence or cramping   Last time this was given:  80 mg on 10/27/2018  6:28 PM   Next Dose Due:  Anytime                                   SPRINTEC 28 0.25-35 MG-MCG per tablet   TAKE ONE TABLET BY MOUTH ONCE DAILY   Generic drug:  norgestimate-ethinyl estradiol                                VIACTIV 500-500-40 MG-UNT-MCG Chew   Take 3 tablets by mouth At Bedtime   Generic drug:  calcium-vitamin D-vitamin K                                VITAMIN D3 PO   Take 2,000 Units by mouth 2 times daily

## 2018-10-24 NOTE — ANESTHESIA PREPROCEDURE EVALUATION
Procedure: Procedure(s):  LAPAROSCOPY DIAGNOSTIC (GENERAL)  COLECTOMY WITHOUT COLOSTOMY  Preop diagnosis: Perforated gastric ulcer     Allergies   Allergen Reactions     Lortab [Hydrocodone-Acetaminophen] Nausea     Also light headed and flushed     Past Medical History:   Diagnosis Date     morbid obesity      Obese      Urinary frequency      Past Surgical History:   Procedure Laterality Date     LAPAROSCOPIC BYPASS GASTRIC N/A 10/26/2016    Procedure: LAPAROSCOPIC BYPASS GASTRIC;  Surgeon: Romario Reyna MD;  Location: [facility]     NO HISTORY OF SURGERY       Social History   Substance Use Topics     Smoking status: Never Smoker     Smokeless tobacco: Never Used     Alcohol use 0.0 oz/week     0 Standard drinks or equivalent per week      Comment: very rare     Prior to Admission medications    Medication Sig Start Date End Date Taking? Authorizing Provider   calcium-vitamin D-vitamin K (VIACTIV) 500-500-40 MG-UNT-MCG CHEW Take 1 tablet by mouth 3 times daily     Reported, Patient   canagliflozin (INVOKANA) 100 MG tablet Take 1 tablet (100 mg) by mouth every morning (before breakfast) 2/23/18   Guero Owen MD   Cholecalciferol (VITAMIN D3 PO) Take 6,000 Units by mouth daily     Reported, Patient   Cyanocobalamin (B-12) 2500 MCG SUBL Place 1 tablet under the tongue 3 times weekly    Reported, Patient   docusate sodium (COLACE) 100 MG tablet Take 100 mg by mouth 2 times daily as needed for constipation 4/28/17   Cris Paez PA-C   ferrous fumarate 65 mg, [place]. FE,-Vitamin C 125 mg (VITRON-C)  MG TABS Take 1 tablet by mouth every morning    Reported, Patient   metFORMIN (GLUCOPHAGE-XR) 500 MG 24 hr tablet Take 1 tablet (500 mg) by mouth daily 7/20/18   Guero Owen MD   mirtazapine (REMERON) 15 MG tablet  7/19/18   Reported, Patient   multivitamin  peds with iron (FLINTSTONES COMPLETE) 60 MG chewable tablet Take 2 chew tab by mouth every morning  8/10/16   Chris Dutta,  MD   SPRINTEC 28 0.25-35 MG-MCG per tablet TAKE ONE TABLET BY MOUTH ONCE DAILY 7/19/18   Napoleon Gray PA-C     Current Facility-Administered Medications Ordered in Epic   Medication Dose Route Frequency Last Rate Last Dose     [Auto Hold] HYDROmorphone (PF) (DILAUDID) injection 0.5 mg  0.5 mg Intravenous Q15 Min PRN   0.5 mg at 10/24/18 0610     No current [facility]-ordered outpatient prescriptions on file.       Wt Readings from Last 1 Encounters:   08/02/18 61.9 kg (136 lb 6.4 oz)     Temp Readings from Last 1 Encounters:   10/24/18 36.6  C (97.9  F) (Temporal)     BP Readings from Last 6 Encounters:   10/24/18 101/74   08/10/18 93/60   08/03/18 98/65   08/02/18 100/65   07/27/18 97/63   07/20/18 (!) 89/59     Pulse Readings from Last 4 Encounters:   10/24/18 84   08/10/18 70   08/03/18 57   08/02/18 74     Resp Readings from Last 1 Encounters:   08/10/18 16     SpO2 Readings from Last 1 Encounters:   10/24/18 100%     Recent Labs   Lab Test  10/24/18   0400  02/02/18   0842   NA  137  140   POTASSIUM  3.8  3.9   CHLORIDE  106  106   CO2  27  26   ANIONGAP  4  8   GLC  103*  81   BUN  14  14   CR  0.56  0.60   RAMSES  8.0*  8.5     Recent Labs   Lab Test  10/24/18   0400  02/02/18   0842   AST  14  15   ALT  25  25   ALKPHOS  58  48   BILITOTAL  0.4  0.4   LIPASE  102   --      Recent Labs   Lab Test  10/24/18   0400  08/02/18   1345   WBC  9.8  6.6   HGB  13.4  10.8*   PLT  233  236     No results for input(s): ABO, RH in the last 09480 hours.  Recent Labs   Lab Test  07/06/16   0755   INR  0.95      No results for input(s): TROPI in the last 28719 hours.  No results for input(s): PH, PCO2, PO2, HCO3 in the last 17703 hours.  Recent Labs   Lab Test  10/24/18   0420   HCG  Negative     Recent Results (from the past 744 hour(s))   CT Abdomen Pelvis w Contrast   Result Value    Radiologist flags Free air (AA)    Narrative    CT ABDOMEN PELVIS W CONTRAST  10/24/2018 5:30 AM     HISTORY: Abdominal pain and history  of gastric bypass surgery - check  for appendicitis, diverticulitis, small bowel obstruction,  pancreatitis, perforated ulcer, complication of Rasheeda-en-Y gastric  bypass surgery.    TECHNIQUE: Volumetric acquisition through abdomen and pelvis with IV  contrast. 69 mL Isovue-370. Radiation dose for this scan was reduced  using automated exposure control, adjustment of the mA and/or kV  according to patient size, or iterative reconstruction technique.    COMPARISON: None.    FINDINGS: Small amount of free air and minimal free fluid and hazy fat  stranding in the abdomen. No focal liver lesions. Cholelithiasis.  Spleen, pancreas, adrenal glands and kidneys demonstrate no worrisome  findings. Small cyst upper left kidney. Postoperative changes of  gastric bypass.    There is a small bowel intussusception in the left lower pelvis  without significant associated bowel obstruction. Appendix is not  identified. Uterus is present. Possible 2.9 cm right adnexal/ovarian  cyst. Small amount of ascites.         Impression    IMPRESSION:  1. Ascites and a small amount of free intraperitoneal air, worrisome  for bowel perforation in the absence of recent surgery. Exact source  of this is unclear. This could be due to a perforated peptic ulcer or  possibly related to the patient's gastric bypass.  2. Small bowel intussusception in the left lower quadrant without  associated bowel obstruction. This may be an incidental, transient  intussusception.  3. Cholelithiasis.  4. Possible 2.9 cm right adnexal/ovarian cyst.    [Critical Result: Free air]    Finding was identified on 10/24/2018 5:31 AM.     Dr. Marley was contacted by me on 10/24/2018 5:40 AM and verbalized  understanding of the critical result.    DAVID BEDOYA MD       RECENT LABS:   ECG:   ECHO:       Anesthesia Evaluation     . Pt has had prior anesthetic.     No history of anesthetic complications          ROS/MED HX    ENT/Pulmonary:      (-) sleep apnea    Neurologic:  - neg neurologic ROS     Cardiovascular:        (-) FAUSTIN, arrhythmias, irregular heartbeat/palpitations and valvular problems/murmurs   METS/Exercise Tolerance:  >4 METS   Hematologic:        (-) anemia   Musculoskeletal:         GI/Hepatic:        (-) GERD   Renal/Genitourinary:         Endo:     (+) type II DM Obesity (s/p gastric bypass with large weight loss), .      Psychiatric:     (+) psychiatric history anxiety      Infectious Disease:   (+) Other Infectious Disease current concern for bowel perf, antibiotics initiated      Malignancy:         Other:                     Physical Exam  Normal systems: cardiovascular, pulmonary and dental    Airway   Mallampati: II  TM distance: >3 FB  Neck ROM: full    Dental     Cardiovascular   Rhythm and rate: regular and normal      Pulmonary    breath sounds clear to auscultation                        Anesthesia Plan      History & Physical Review  History and physical reviewed and following examination; no interval change.    ASA Status:  3 emergent.    NPO Status:  > 8 hours    Plan for General, ETT and RSI with Intravenous and Propofol induction. Maintenance will be Balanced.    PONV prophylaxis:  Ondansetron (or other 5HT-3) and Dexamethasone or Solumedrol  Additional equipment: 2nd IV Propofol drip      Postoperative Care  Postoperative pain management:  IV analgesics.      Consents  Anesthetic plan, risks, benefits and alternatives discussed with:  Patient..

## 2018-10-24 NOTE — PLAN OF CARE
Problem: Patient Care Overview  Goal: Plan of Care/Patient Progress Review  Patient was A&O x4 throughout shift. Lungs clear throughout. Unable to auscultate bowel sounds, not passing flatus. Patient experiencing pain in throat and abdomen, IV Dilaudid given x3 throughout shift. Lap sites clean dry & intact. ESMER to bulb suction, stripped x2. Ambulated in [place], became light-headed at the end of walk, but otherwise tolerated activity.

## 2018-10-24 NOTE — IP AVS SNAPSHOT
[facility]    [address] [place] [address]    Phone:  [phone]                                       After Visit Summary   10/24/2018    Criss Don    MRN: [id number]           After Visit Summary Signature Page     I have received my discharge instructions, and my questions have been answered. I have discussed any challenges I see with this plan with the nurse or doctor.    ..........................................................................................................................................  Patient/Patient Representative Signature      ..........................................................................................................................................  Patient Representative Print Name and Relationship to Patient    ..................................................               ................................................  Date                                   Time    ..........................................................................................................................................  Reviewed by Signature/Title    ...................................................              ..............................................  Date                                               Time          22EPIC Rev 08/18

## 2018-10-24 NOTE — ANESTHESIA POSTPROCEDURE EVALUATION
Patient: Criss Don    Procedure(s):  LAPAROSCOPY DIAGNOSTIC FOR PERFORATED GASTRIC ULCER    Diagnosis:Perforated gastric ulcer   Diagnosis Additional Information: No value filed.    Anesthesia Type:  General, ETT, RSI    Note:  Anesthesia Post Evaluation    Patient location during evaluation: PACU  Patient participation: Able to fully participate in evaluation  Level of consciousness: awake and alert  Pain management: adequate  Airway patency: patent  Cardiovascular status: acceptable and hemodynamically stable  Respiratory status: acceptable and nasal cannula  Hydration status: euvolemic  PONV: none             Last vitals:  Vitals:    10/24/18 1300 10/24/18 1330 10/24/18 1430   BP: 101/64 106/47 100/59   Pulse:      Resp: 16 16 16   Temp:      SpO2: 96% 95% 95%         Electronically Signed By: Sven Mtz MD  October 24, 2018  2:44 PM

## 2018-10-24 NOTE — LETTER
[facility]  [address], [address]  [place], [address]   [phone]             October 28, 2018    RE:  Criss Don                                                                                                                                                       [address] [facility] [address]            To whom it may concern:    Criss Don is under my professional care for her recent surgery and hospital stay. Please excuse her from any missed work as she was hospitalized from 10/24/18 - 10/28/18 and will need to remain out of work for 3 more weeks. She will likely be able to return to work on Monday, November 19th without restrictions. We will be seeing her in clinic prior to that date and will adjust her return to work date and restrictions as needed.      Sincerely,              Martha Beck PA-C  [facility]  [phone]

## 2018-10-24 NOTE — ANESTHESIA CARE TRANSFER NOTE
Patient: Criss Don    Procedure(s):  LAPAROSCOPY DIAGNOSTIC FOR PERFORATED GASTRIC ULCER    Diagnosis: Perforated gastric ulcer   Diagnosis Additional Information: No value filed.    Anesthesia Type:   General, ETT, RSI     Note:  Airway :Face Mask  Patient transferred to:PACU  Comments: Spont. Resps, pt. Responding.  Extubated, sufficient air exchange. To PACU VSS, Monitors on. Report to RNHandoff Report: Identifed the Patient, Identified the Reponsible Provider, Reviewed the pertinent medical history, Discussed the surgical course, Reviewed Intra-OP anesthesia mangement and issues during anesthesia, Set expectations for post-procedure period and Allowed opportunity for questions and acknowledgement of understanding      Vitals: (Last set prior to Anesthesia Care Transfer)    CRNA VITALS  10/24/2018 0901 - 10/24/2018 0936      10/24/2018             Resp Rate (set): 10                Electronically Signed By: MARIELLE Wylie CRNA  October 24, 2018  9:36 AM

## 2018-10-24 NOTE — OR NURSING
Spoke with Dr. Corea re: inability to auscultate NG tube in stomach.  Dr. Corea to look at xray and let staff know.  Currently hooked up to low intermittent suction per order.

## 2018-10-24 NOTE — BRIEF OP NOTE
[facility] Brief Operative Note    Pre-operative diagnosis: Perforated gastric ulcer    Post-operative diagnosis same   Procedure: Procedure(s):  LAPAROSCOPY DIAGNOSTIC FOR PERFORATED GASTRIC ULCER   Surgeon(s): Surgeon(s) and Role:     * Collette Weeks MD - Assisting     * Chang Corea MD - Primary     * Nomi Aquino MD - Resident - Assisting   Estimated blood loss: 10 ml   Specimens: * No specimens in log *   Findings: 1.  Small perforation near the gastrojejunal anastomosis with localized peritonitis.  2.  No intussusception seen.  3.  JJ anastomosis without apparent abnormality.

## 2018-10-25 LAB
ANION GAP SERPL CALCULATED.3IONS-SCNC: 6 MMOL/L (ref 3–14)
BACTERIA SPEC CULT: NORMAL
BASOPHILS # BLD AUTO: 0 10E9/L (ref 0–0.2)
BASOPHILS NFR BLD AUTO: 0.1 %
BUN SERPL-MCNC: 6 MG/DL (ref 7–30)
CALCIUM SERPL-MCNC: 7.9 MG/DL (ref 8.5–10.1)
CHLORIDE SERPL-SCNC: 106 MMOL/L (ref 94–109)
CO2 SERPL-SCNC: 29 MMOL/L (ref 20–32)
CREAT SERPL-MCNC: 0.5 MG/DL (ref 0.52–1.04)
DIFFERENTIAL METHOD BLD: ABNORMAL
EOSINOPHIL # BLD AUTO: 0 10E9/L (ref 0–0.7)
EOSINOPHIL NFR BLD AUTO: 0.4 %
ERYTHROCYTE [DISTWIDTH] IN BLOOD BY AUTOMATED COUNT: 13.2 % (ref 10–15)
GFR SERPL CREATININE-BSD FRML MDRD: >90 ML/MIN/1.7M2
GLUCOSE SERPL-MCNC: 79 MG/DL (ref 70–99)
HCT VFR BLD AUTO: 34.2 % (ref 35–47)
HGB BLD-MCNC: 11.2 G/DL (ref 11.7–15.7)
IMM GRANULOCYTES # BLD: 0 10E9/L (ref 0–0.4)
IMM GRANULOCYTES NFR BLD: 0.1 %
LYMPHOCYTES # BLD AUTO: 1.8 10E9/L (ref 0.8–5.3)
LYMPHOCYTES NFR BLD AUTO: 23.8 %
MAGNESIUM SERPL-MCNC: 1.9 MG/DL (ref 1.6–2.3)
MCH RBC QN AUTO: 30.4 PG (ref 26.5–33)
MCHC RBC AUTO-ENTMCNC: 32.7 G/DL (ref 31.5–36.5)
MCV RBC AUTO: 93 FL (ref 78–100)
MONOCYTES # BLD AUTO: 0.4 10E9/L (ref 0–1.3)
MONOCYTES NFR BLD AUTO: 5.2 %
NEUTROPHILS # BLD AUTO: 5.4 10E9/L (ref 1.6–8.3)
NEUTROPHILS NFR BLD AUTO: 70.4 %
NRBC # BLD AUTO: 0 10*3/UL
NRBC BLD AUTO-RTO: 0 /100
PHOSPHATE SERPL-MCNC: 2.9 MG/DL (ref 2.5–4.5)
PLATELET # BLD AUTO: 178 10E9/L (ref 150–450)
POTASSIUM SERPL-SCNC: 3.9 MMOL/L (ref 3.4–5.3)
RBC # BLD AUTO: 3.69 10E12/L (ref 3.8–5.2)
SODIUM SERPL-SCNC: 141 MMOL/L (ref 133–144)
SPECIMEN SOURCE: NORMAL
WBC # BLD AUTO: 7.6 10E9/L (ref 4–11)

## 2018-10-25 PROCEDURE — 83735 ASSAY OF MAGNESIUM: CPT | Performed by: SURGERY

## 2018-10-25 PROCEDURE — 85025 COMPLETE CBC W/AUTO DIFF WBC: CPT | Performed by: SURGERY

## 2018-10-25 PROCEDURE — 25000128 H RX IP 250 OP 636: Performed by: STUDENT IN AN ORGANIZED HEALTH CARE EDUCATION/TRAINING PROGRAM

## 2018-10-25 PROCEDURE — C9113 INJ PANTOPRAZOLE SODIUM, VIA: HCPCS | Performed by: SURGERY

## 2018-10-25 PROCEDURE — 25000128 H RX IP 250 OP 636: Performed by: SURGERY

## 2018-10-25 PROCEDURE — 84100 ASSAY OF PHOSPHORUS: CPT | Performed by: SURGERY

## 2018-10-25 PROCEDURE — 12000007 ZZH R&B INTERMEDIATE

## 2018-10-25 PROCEDURE — 36415 COLL VENOUS BLD VENIPUNCTURE: CPT | Performed by: SURGERY

## 2018-10-25 PROCEDURE — 80048 BASIC METABOLIC PNL TOTAL CA: CPT | Performed by: SURGERY

## 2018-10-25 RX ADMIN — HYDROMORPHONE HYDROCHLORIDE 0.5 MG: 1 INJECTION, SOLUTION INTRAMUSCULAR; INTRAVENOUS; SUBCUTANEOUS at 19:25

## 2018-10-25 RX ADMIN — PIPERACILLIN SODIUM,TAZOBACTAM SODIUM 3.38 G: 3; .375 INJECTION, POWDER, FOR SOLUTION INTRAVENOUS at 14:11

## 2018-10-25 RX ADMIN — HYDROMORPHONE HYDROCHLORIDE 0.5 MG: 1 INJECTION, SOLUTION INTRAMUSCULAR; INTRAVENOUS; SUBCUTANEOUS at 00:27

## 2018-10-25 RX ADMIN — HYDROMORPHONE HYDROCHLORIDE 0.5 MG: 1 INJECTION, SOLUTION INTRAMUSCULAR; INTRAVENOUS; SUBCUTANEOUS at 07:13

## 2018-10-25 RX ADMIN — SODIUM CHLORIDE, POTASSIUM CHLORIDE, SODIUM LACTATE AND CALCIUM CHLORIDE: 600; 310; 30; 20 INJECTION, SOLUTION INTRAVENOUS at 00:25

## 2018-10-25 RX ADMIN — HYDROMORPHONE HYDROCHLORIDE 0.5 MG: 1 INJECTION, SOLUTION INTRAMUSCULAR; INTRAVENOUS; SUBCUTANEOUS at 09:43

## 2018-10-25 RX ADMIN — SODIUM CHLORIDE, POTASSIUM CHLORIDE, SODIUM LACTATE AND CALCIUM CHLORIDE: 600; 310; 30; 20 INJECTION, SOLUTION INTRAVENOUS at 10:32

## 2018-10-25 RX ADMIN — PIPERACILLIN SODIUM,TAZOBACTAM SODIUM 3.38 G: 3; .375 INJECTION, POWDER, FOR SOLUTION INTRAVENOUS at 08:29

## 2018-10-25 RX ADMIN — SODIUM CHLORIDE, POTASSIUM CHLORIDE, SODIUM LACTATE AND CALCIUM CHLORIDE: 600; 310; 30; 20 INJECTION, SOLUTION INTRAVENOUS at 20:28

## 2018-10-25 RX ADMIN — PIPERACILLIN SODIUM,TAZOBACTAM SODIUM 3.38 G: 3; .375 INJECTION, POWDER, FOR SOLUTION INTRAVENOUS at 02:49

## 2018-10-25 RX ADMIN — ENOXAPARIN SODIUM 40 MG: 40 INJECTION SUBCUTANEOUS at 08:31

## 2018-10-25 RX ADMIN — PANTOPRAZOLE SODIUM 40 MG: 40 INJECTION, POWDER, FOR SOLUTION INTRAVENOUS at 20:26

## 2018-10-25 RX ADMIN — HYDROMORPHONE HYDROCHLORIDE 0.5 MG: 1 INJECTION, SOLUTION INTRAMUSCULAR; INTRAVENOUS; SUBCUTANEOUS at 21:55

## 2018-10-25 RX ADMIN — HYDROMORPHONE HYDROCHLORIDE 0.5 MG: 1 INJECTION, SOLUTION INTRAMUSCULAR; INTRAVENOUS; SUBCUTANEOUS at 16:58

## 2018-10-25 RX ADMIN — HYDROMORPHONE HYDROCHLORIDE 0.5 MG: 1 INJECTION, SOLUTION INTRAMUSCULAR; INTRAVENOUS; SUBCUTANEOUS at 12:52

## 2018-10-25 RX ADMIN — PIPERACILLIN SODIUM,TAZOBACTAM SODIUM 3.38 G: 3; .375 INJECTION, POWDER, FOR SOLUTION INTRAVENOUS at 20:25

## 2018-10-25 RX ADMIN — PANTOPRAZOLE SODIUM 40 MG: 40 INJECTION, POWDER, FOR SOLUTION INTRAVENOUS at 08:33

## 2018-10-25 ASSESSMENT — ACTIVITIES OF DAILY LIVING (ADL)
ADLS_ACUITY_SCORE: 9

## 2018-10-25 NOTE — PROGRESS NOTES
SPIRITUAL HEALTH SERVICES  Spiritual Assessment Progress Note  [facility]   met with pt to give her a copy of [employer] Cho[employer] -[facility]CD.  Pt was not interested in any education regarding completion of the document but preferred to read it on her own.    She will complete it at another time.      No other needs at this time.      No plans to follow .                                                                                                                                            Carmen Lindsey M.Div., [facility]  Staff    Pager [phone]

## 2018-10-25 NOTE — PLAN OF CARE
Problem: Patient Care Overview  Goal: Individualization & Mutuality  1430 Dilaudid iv for pain. Ambulates in [place]ell[place] with SBA. Gerard ulloa at 1315, due to void. Derek patent x1. Strict NPO. IS to 1000. Lightheaded while walking after last dose Dilaudid. Instructed to wait a while before ambulating after pain meds. [place] RN

## 2018-10-25 NOTE — PROGRESS NOTES
"GENERAL SURGERY PROGRESS NOTE    S:  Pain under good control.  No n/v.  No flatus or BM.    O:  BP 90/56 (BP Location: Right arm)  Pulse 67  Temp 98.9  F (37.2  C) (Oral)  Resp 16  Ht 1.575 m (5' 2\")  SpO2 94%  BMI 24.95 kg/m2  I/O: drain 115, UOP 1350 yesterday  General: A&O, NAD.  Chest:  CTA with diminished effort and air movement bilaterally.  Heart:  RRR, grade 2/6 NICK.  Abdomen:  Nondistended.  Soft, expected post-op tenderness.  Dressings c/d/i.  Drain with serosanguinous output.    BMP unremarkable.  WBC 7.6, Hgb 11.2    A:  POD#1 s/p laparoscopic repair of perforated GJ ulcer.  Recovering as expected.    P:  1.  Remove Gee.  2.  Continue NPO, abx.  3.  Upper GI series tomorrow.    Nomi Aquino MD  General surgery resident  [phone]    Agree with above.  Pt looks well.  Okay to start clears if UGI looks okay  Wander Corea MD  General Surgery, Office [phone]    "

## 2018-10-25 NOTE — PLAN OF CARE
Problem: Patient Care Overview  Goal: Plan of Care/Patient Progress Review  Outcome: No Change  A&O x4, VSS, abd lap site CDI, with ESMER in place, hypoactive bowel sound, no flatus yet. Denies N/V. Pain managed with prn iv dilaudid. Up with one assist. Good UOP via moya cath. Plans for X ray upper GI water soluble tomorrow.

## 2018-10-26 ENCOUNTER — APPOINTMENT (OUTPATIENT)
Dept: GENERAL RADIOLOGY | Facility: CLINIC | Age: 48
DRG: 326 | End: 2018-10-26
Attending: SURGERY
Payer: COMMERCIAL

## 2018-10-26 LAB
ANION GAP SERPL CALCULATED.3IONS-SCNC: 8 MMOL/L (ref 3–14)
BUN SERPL-MCNC: 9 MG/DL (ref 7–30)
CALCIUM SERPL-MCNC: 8 MG/DL (ref 8.5–10.1)
CHLORIDE SERPL-SCNC: 103 MMOL/L (ref 94–109)
CO2 SERPL-SCNC: 28 MMOL/L (ref 20–32)
CREAT SERPL-MCNC: 0.49 MG/DL (ref 0.52–1.04)
GFR SERPL CREATININE-BSD FRML MDRD: >90 ML/MIN/1.7M2
GLUCOSE SERPL-MCNC: 69 MG/DL (ref 70–99)
MAGNESIUM SERPL-MCNC: 1.8 MG/DL (ref 1.6–2.3)
PHOSPHATE SERPL-MCNC: 2.8 MG/DL (ref 2.5–4.5)
POTASSIUM SERPL-SCNC: 3.4 MMOL/L (ref 3.4–5.3)
SODIUM SERPL-SCNC: 139 MMOL/L (ref 133–144)

## 2018-10-26 PROCEDURE — 12000007 ZZH R&B INTERMEDIATE

## 2018-10-26 PROCEDURE — 36415 COLL VENOUS BLD VENIPUNCTURE: CPT | Performed by: STUDENT IN AN ORGANIZED HEALTH CARE EDUCATION/TRAINING PROGRAM

## 2018-10-26 PROCEDURE — 25000128 H RX IP 250 OP 636: Performed by: SURGERY

## 2018-10-26 PROCEDURE — C9113 INJ PANTOPRAZOLE SODIUM, VIA: HCPCS | Performed by: SURGERY

## 2018-10-26 PROCEDURE — 80048 BASIC METABOLIC PNL TOTAL CA: CPT | Performed by: STUDENT IN AN ORGANIZED HEALTH CARE EDUCATION/TRAINING PROGRAM

## 2018-10-26 PROCEDURE — 83735 ASSAY OF MAGNESIUM: CPT | Performed by: STUDENT IN AN ORGANIZED HEALTH CARE EDUCATION/TRAINING PROGRAM

## 2018-10-26 PROCEDURE — 40000986 XR UPPER GI WATER SOLUBLE

## 2018-10-26 PROCEDURE — 25000131 ZZH RX MED GY IP 250 OP 636 PS 637: Performed by: STUDENT IN AN ORGANIZED HEALTH CARE EDUCATION/TRAINING PROGRAM

## 2018-10-26 PROCEDURE — 84100 ASSAY OF PHOSPHORUS: CPT | Performed by: STUDENT IN AN ORGANIZED HEALTH CARE EDUCATION/TRAINING PROGRAM

## 2018-10-26 PROCEDURE — 25000128 H RX IP 250 OP 636: Performed by: STUDENT IN AN ORGANIZED HEALTH CARE EDUCATION/TRAINING PROGRAM

## 2018-10-26 RX ORDER — OXYCODONE HYDROCHLORIDE 5 MG/1
5-10 TABLET ORAL EVERY 4 HOURS PRN
Status: DISCONTINUED | OUTPATIENT
Start: 2018-10-26 | End: 2018-10-28 | Stop reason: HOSPADM

## 2018-10-26 RX ADMIN — HYDROMORPHONE HYDROCHLORIDE 0.5 MG: 1 INJECTION, SOLUTION INTRAMUSCULAR; INTRAVENOUS; SUBCUTANEOUS at 04:54

## 2018-10-26 RX ADMIN — PANTOPRAZOLE SODIUM 40 MG: 40 INJECTION, POWDER, FOR SOLUTION INTRAVENOUS at 20:45

## 2018-10-26 RX ADMIN — HYDROMORPHONE HYDROCHLORIDE 0.5 MG: 1 INJECTION, SOLUTION INTRAMUSCULAR; INTRAVENOUS; SUBCUTANEOUS at 12:26

## 2018-10-26 RX ADMIN — SODIUM CHLORIDE, POTASSIUM CHLORIDE, SODIUM LACTATE AND CALCIUM CHLORIDE: 600; 310; 30; 20 INJECTION, SOLUTION INTRAVENOUS at 19:30

## 2018-10-26 RX ADMIN — ENOXAPARIN SODIUM 40 MG: 40 INJECTION SUBCUTANEOUS at 07:55

## 2018-10-26 RX ADMIN — PANTOPRAZOLE SODIUM 40 MG: 40 INJECTION, POWDER, FOR SOLUTION INTRAVENOUS at 08:00

## 2018-10-26 RX ADMIN — HYDROMORPHONE HYDROCHLORIDE 0.5 MG: 1 INJECTION, SOLUTION INTRAMUSCULAR; INTRAVENOUS; SUBCUTANEOUS at 15:55

## 2018-10-26 RX ADMIN — HYDROMORPHONE HYDROCHLORIDE 0.5 MG: 1 INJECTION, SOLUTION INTRAMUSCULAR; INTRAVENOUS; SUBCUTANEOUS at 02:37

## 2018-10-26 RX ADMIN — PIPERACILLIN SODIUM,TAZOBACTAM SODIUM 3.38 G: 3; .375 INJECTION, POWDER, FOR SOLUTION INTRAVENOUS at 10:08

## 2018-10-26 RX ADMIN — HYDROMORPHONE HYDROCHLORIDE 0.5 MG: 1 INJECTION, SOLUTION INTRAMUSCULAR; INTRAVENOUS; SUBCUTANEOUS at 07:55

## 2018-10-26 RX ADMIN — PIPERACILLIN SODIUM,TAZOBACTAM SODIUM 3.38 G: 3; .375 INJECTION, POWDER, FOR SOLUTION INTRAVENOUS at 02:40

## 2018-10-26 RX ADMIN — HYDROMORPHONE HYDROCHLORIDE 0.5 MG: 1 INJECTION, SOLUTION INTRAMUSCULAR; INTRAVENOUS; SUBCUTANEOUS at 19:30

## 2018-10-26 RX ADMIN — ONDANSETRON 4 MG: 4 TABLET, ORALLY DISINTEGRATING ORAL at 18:33

## 2018-10-26 RX ADMIN — PIPERACILLIN SODIUM,TAZOBACTAM SODIUM 3.38 G: 3; .375 INJECTION, POWDER, FOR SOLUTION INTRAVENOUS at 20:45

## 2018-10-26 RX ADMIN — PIPERACILLIN SODIUM,TAZOBACTAM SODIUM 3.38 G: 3; .375 INJECTION, POWDER, FOR SOLUTION INTRAVENOUS at 14:54

## 2018-10-26 ASSESSMENT — ACTIVITIES OF DAILY LIVING (ADL)
ADLS_ACUITY_SCORE: 9

## 2018-10-26 ASSESSMENT — PAIN DESCRIPTION - DESCRIPTORS
DESCRIPTORS: ACHING
DESCRIPTORS: ACHING

## 2018-10-26 NOTE — PLAN OF CARE
Problem: Surgery Nonspecified (Adult)  Goal: Signs and Symptoms of Listed Potential Problems Will be Absent, Minimized or Managed (Surgery Nonspecified)  Signs and symptoms of listed potential problems will be absent, minimized or managed by discharge/transition of care (reference Surgery Nonspecified (Adult) CPG).   Outcome: Improving  Admitted for perf gastric ulcer, strict NPO, upper GI exam in tomorrow morning. VSS. A/Ox4. ESMER drain intact, dressing drainage marked. Lap sites open to air. Pain controlled with prn IV dilaudid. Up with SBA, ambulating halls. -gas, -bm. Voiding small amount, poor oral intake due to NPO status, IV fluids running. LS lear. IS to 1000.

## 2018-10-26 NOTE — PROGRESS NOTES
"GENERAL SURGERY PROGRESS NOTE    S:  Patient reports good pain control.  Denies n/v more than baseline.    O:  /66 (BP Location: Right arm)  Pulse 72  Temp 98.2  F (36.8  C) (Oral)  Resp 16  Ht 1.575 m (5' 2\")  SpO2 97%  BMI 24.95 kg/m2  General: A&O, NAD.  Chest:  Clear to auscultation bilaterally.  Heart:  RRR.  Abdomen:  Incisions c/d/i.  Soft with improving tenderness.    Upper GI shows no leak.    A:  POD#2 s/p repair of GJ anastomotic ulcer.  Recovering well.    P:  Advance to clear liquid diet today.    Nomi Aquino MD  General surgery resident  [phone]    Agree with above.  Patient looks to be doing well.  Start her on clears and advance to full's.  When she is tolerating a diet her drain may be removed if output does not increase.  Should be able to discharge this weekend.  Wander Corea MD  General Surgery, Office [phone]  "

## 2018-10-26 NOTE — PLAN OF CARE
Problem: Patient Care Overview  Goal: Individualization & Mutuality  Outcome: Improving  Vital signs stable, abdomen is soft, bowel sounds hypoactive, passing flatus, bowel sounds hypoactive, pain well controlled with Dilaudid.  Encourage ambulation, she walked x 2 this shift.  Voiding good amounts . Had an x ray this morning and she was advanced to a clear liquid diet, but patient does not want to drink anything until the Dr.talks to her.  Iv fluids and antibiotics.  Voiding good amounts.  Urine is slightly bloody. Up with stand by assist.

## 2018-10-26 NOTE — PLAN OF CARE
Problem: Patient Care Overview  Goal: Plan of Care/Patient Progress Review  Outcome: Improving  VSS, ESMER in place with small dried drainage. Hypoactive bowel sound, no flatus yet. Denies N/V. Pain decreased with prn iv dilaudid. Up with SBA. Voiding good UOP. NPO, plans for X ray upper GI water soluble today.

## 2018-10-26 NOTE — PLAN OF CARE
Problem: Patient Care Overview  Goal: Plan of Care/Patient Progress Review  Outcome: Improving  A&Ox4. VSS on RA. IV infusing. Up with A1. Walking in halls. IV dilaudid for pain. Clear liquid diet, taking it very slow. Lap sites and drain site CDI. Bowel sounds hypoactive, passing flatus. Voiding adequately. Menses; using pads. Zofran given x1 for nausea. Continue to monitor.        Elevated PSA  02/21/2018    Active  Godfrey Pollock

## 2018-10-27 LAB
GLUCOSE BLDC GLUCOMTR-MCNC: 101 MG/DL (ref 70–99)
GLUCOSE BLDC GLUCOMTR-MCNC: 58 MG/DL (ref 70–99)
GLUCOSE BLDC GLUCOMTR-MCNC: 80 MG/DL (ref 70–99)

## 2018-10-27 PROCEDURE — 25000132 ZZH RX MED GY IP 250 OP 250 PS 637: Performed by: PHYSICIAN ASSISTANT

## 2018-10-27 PROCEDURE — 25000131 ZZH RX MED GY IP 250 OP 636 PS 637: Performed by: STUDENT IN AN ORGANIZED HEALTH CARE EDUCATION/TRAINING PROGRAM

## 2018-10-27 PROCEDURE — 25000128 H RX IP 250 OP 636: Performed by: SURGERY

## 2018-10-27 PROCEDURE — 25000128 H RX IP 250 OP 636: Performed by: STUDENT IN AN ORGANIZED HEALTH CARE EDUCATION/TRAINING PROGRAM

## 2018-10-27 PROCEDURE — 12000007 ZZH R&B INTERMEDIATE

## 2018-10-27 PROCEDURE — C9113 INJ PANTOPRAZOLE SODIUM, VIA: HCPCS | Performed by: SURGERY

## 2018-10-27 PROCEDURE — 00000146 ZZHCL STATISTIC GLUCOSE BY METER IP

## 2018-10-27 RX ORDER — SIMETHICONE 80 MG
80 TABLET,CHEWABLE ORAL EVERY 6 HOURS PRN
Status: DISCONTINUED | OUTPATIENT
Start: 2018-10-27 | End: 2018-10-28 | Stop reason: HOSPADM

## 2018-10-27 RX ORDER — CYCLOBENZAPRINE HCL 5 MG
5-10 TABLET ORAL 3 TIMES DAILY PRN
Status: DISCONTINUED | OUTPATIENT
Start: 2018-10-27 | End: 2018-10-28 | Stop reason: HOSPADM

## 2018-10-27 RX ADMIN — ACETAMINOPHEN 650 MG: 160 SUSPENSION ORAL at 22:16

## 2018-10-27 RX ADMIN — HYDROMORPHONE HYDROCHLORIDE 0.5 MG: 1 INJECTION, SOLUTION INTRAMUSCULAR; INTRAVENOUS; SUBCUTANEOUS at 00:44

## 2018-10-27 RX ADMIN — CYCLOBENZAPRINE HYDROCHLORIDE 5 MG: 5 TABLET, FILM COATED ORAL at 22:16

## 2018-10-27 RX ADMIN — CYCLOBENZAPRINE HYDROCHLORIDE 5 MG: 5 TABLET, FILM COATED ORAL at 12:20

## 2018-10-27 RX ADMIN — HYDROMORPHONE HYDROCHLORIDE 0.5 MG: 1 INJECTION, SOLUTION INTRAMUSCULAR; INTRAVENOUS; SUBCUTANEOUS at 04:52

## 2018-10-27 RX ADMIN — ONDANSETRON 4 MG: 4 TABLET, ORALLY DISINTEGRATING ORAL at 03:30

## 2018-10-27 RX ADMIN — SIMETHICONE CHEW TAB 80 MG 80 MG: 80 TABLET ORAL at 10:56

## 2018-10-27 RX ADMIN — SODIUM CHLORIDE, POTASSIUM CHLORIDE, SODIUM LACTATE AND CALCIUM CHLORIDE: 600; 310; 30; 20 INJECTION, SOLUTION INTRAVENOUS at 06:33

## 2018-10-27 RX ADMIN — PANTOPRAZOLE SODIUM 40 MG: 40 INJECTION, POWDER, FOR SOLUTION INTRAVENOUS at 08:45

## 2018-10-27 RX ADMIN — SODIUM CHLORIDE, POTASSIUM CHLORIDE, SODIUM LACTATE AND CALCIUM CHLORIDE: 600; 310; 30; 20 INJECTION, SOLUTION INTRAVENOUS at 16:23

## 2018-10-27 RX ADMIN — PIPERACILLIN SODIUM,TAZOBACTAM SODIUM 3.38 G: 3; .375 INJECTION, POWDER, FOR SOLUTION INTRAVENOUS at 01:46

## 2018-10-27 RX ADMIN — ONDANSETRON 4 MG: 4 TABLET, ORALLY DISINTEGRATING ORAL at 09:39

## 2018-10-27 RX ADMIN — ENOXAPARIN SODIUM 40 MG: 40 INJECTION SUBCUTANEOUS at 08:45

## 2018-10-27 RX ADMIN — OXYCODONE HYDROCHLORIDE 5 MG: 5 TABLET ORAL at 09:39

## 2018-10-27 RX ADMIN — OXYCODONE HYDROCHLORIDE 5 MG: 5 TABLET ORAL at 16:23

## 2018-10-27 RX ADMIN — ACETAMINOPHEN 650 MG: 160 SUSPENSION ORAL at 18:25

## 2018-10-27 RX ADMIN — SIMETHICONE CHEW TAB 80 MG 80 MG: 80 TABLET ORAL at 18:28

## 2018-10-27 RX ADMIN — ACETAMINOPHEN 650 MG: 160 SUSPENSION ORAL at 10:56

## 2018-10-27 RX ADMIN — OXYCODONE HYDROCHLORIDE 5 MG: 5 TABLET ORAL at 22:16

## 2018-10-27 RX ADMIN — PANTOPRAZOLE SODIUM 40 MG: 40 INJECTION, POWDER, FOR SOLUTION INTRAVENOUS at 22:16

## 2018-10-27 RX ADMIN — PIPERACILLIN SODIUM,TAZOBACTAM SODIUM 3.38 G: 3; .375 INJECTION, POWDER, FOR SOLUTION INTRAVENOUS at 08:45

## 2018-10-27 RX ADMIN — ONDANSETRON 4 MG: 4 TABLET, ORALLY DISINTEGRATING ORAL at 18:28

## 2018-10-27 ASSESSMENT — ACTIVITIES OF DAILY LIVING (ADL)
ADLS_ACUITY_SCORE: 9

## 2018-10-27 NOTE — PROGRESS NOTES
"General Surgery Progress Note    Admission Date: 10/24/2018  Today's Date: 10/27/2018         Assessment:      Criss Don is a 47 year old female   S/P lap repair of perforated marginal ulcer POD 3, recovering well         Plan:   - Continue full liquids, monitor drain output  - Adjust pain regimen - schedule tylenol, oxy available prn. Add simethicone and flexeril for cramping and muscle spasms  - No further antibiotics  - Continue ambulation, PCDs, lovenox while inpatient  - Continue protonix IV bid, will transition to PO at discharge  - Likely discharge to home tomorrow if pain improved, tolerating full liquids and drain output remains low. Plan to remove drain tomorrow          Interval History:   Afebrile overnight, tolerating clears and fulls but reports increased pain/cramping with PO intake. ESMER drain output serous, has been decreasing since taking PO. UGI yesterday looked good. Tried oxy this morning, has not taken any tylenol. Has been up going for walks frequently.          Physical Exam:   /71 (BP Location: Left arm)  Pulse 58  Temp 98.3  F (36.8  C) (Oral)  Resp 16  Ht 1.575 m (5' 2\")  SpO2 96%  BMI 24.95 kg/m2  I/O last 3 completed shifts:  In: 2577 [P.O.:120; I.V.:2457]  Out: 2945 [Urine:2875; Drains:70]  General: NAD, pleasant, alert and oriented x3  Respiratory: non-labored breathing   Abdomen: soft, appropriately tender around incisions. Nondistended. Loose skin. + bowel sounds. Incisions intact and healing appropriately. ESMER drain in place with serous fluid in bulb, some drainage around drain site    LABS:  No new labs             Martha Beck PA-C  [facility]: [phone]  Pager: [phone] (7am-4pm)      "

## 2018-10-27 NOTE — PLAN OF CARE
Problem: Patient Care Overview  Goal: Plan of Care/Patient Progress Review  Outcome: Improving  A/O,Ind, VSS, nausea x1 in am, tolerated meds and full liquid diet.  Lap sites CDI. Drain patent. Pain controled with flexeril, oxy and simethicone. Cont to monitor.

## 2018-10-27 NOTE — PLAN OF CARE
Problem: Patient Care Overview  Goal: Plan of Care/Patient Progress Review  Outcome: No Change  A&Ox4. VSS. IV infusing. Up with assist 1. IV dilaudid for pain. Clear liquid diet. BG 58, 15g carbs given, recheck;80, another 15 g carbs. Recheck; 101. zofran given for nausea. Incisions CDI. Hypoactive bowel sounds. Passing flatus. Iv fluids running at 100ml/hr.

## 2018-10-28 VITALS
SYSTOLIC BLOOD PRESSURE: 109 MMHG | RESPIRATION RATE: 16 BRPM | OXYGEN SATURATION: 98 % | HEART RATE: 68 BPM | DIASTOLIC BLOOD PRESSURE: 72 MMHG | BODY MASS INDEX: 25.88 KG/M2 | TEMPERATURE: 98 F | WEIGHT: 140.6 LBS | HEIGHT: 62 IN

## 2018-10-28 LAB
GLUCOSE BLDC GLUCOMTR-MCNC: 86 MG/DL (ref 70–99)
GLUCOSE BLDC GLUCOMTR-MCNC: 90 MG/DL (ref 70–99)

## 2018-10-28 PROCEDURE — 00000146 ZZHCL STATISTIC GLUCOSE BY METER IP

## 2018-10-28 PROCEDURE — 25000128 H RX IP 250 OP 636: Performed by: SURGERY

## 2018-10-28 PROCEDURE — 90686 IIV4 VACC NO PRSV 0.5 ML IM: CPT | Performed by: SURGERY

## 2018-10-28 PROCEDURE — 25000131 ZZH RX MED GY IP 250 OP 636 PS 637: Performed by: STUDENT IN AN ORGANIZED HEALTH CARE EDUCATION/TRAINING PROGRAM

## 2018-10-28 PROCEDURE — 25000132 ZZH RX MED GY IP 250 OP 250 PS 637: Performed by: PHYSICIAN ASSISTANT

## 2018-10-28 PROCEDURE — 25000128 H RX IP 250 OP 636: Performed by: STUDENT IN AN ORGANIZED HEALTH CARE EDUCATION/TRAINING PROGRAM

## 2018-10-28 PROCEDURE — C9113 INJ PANTOPRAZOLE SODIUM, VIA: HCPCS | Performed by: SURGERY

## 2018-10-28 RX ORDER — ONDANSETRON 4 MG/1
TABLET, ORALLY DISINTEGRATING ORAL
Qty: 20 TABLET | Refills: 0 | Status: SHIPPED | OUTPATIENT
Start: 2018-10-28 | End: 2018-11-27

## 2018-10-28 RX ORDER — SIMETHICONE 80 MG
80 TABLET,CHEWABLE ORAL EVERY 6 HOURS PRN
Qty: 30 TABLET | Refills: 1 | Status: SHIPPED | OUTPATIENT
Start: 2018-10-28 | End: 2019-02-14

## 2018-10-28 RX ORDER — PANTOPRAZOLE SODIUM 40 MG/1
40 TABLET, DELAYED RELEASE ORAL
Status: DISCONTINUED | OUTPATIENT
Start: 2018-10-28 | End: 2018-10-28 | Stop reason: HOSPADM

## 2018-10-28 RX ORDER — OXYCODONE HYDROCHLORIDE 5 MG/1
5-10 TABLET ORAL EVERY 4 HOURS PRN
Qty: 25 TABLET | Refills: 0 | Status: SHIPPED | OUTPATIENT
Start: 2018-10-28 | End: 2018-11-16

## 2018-10-28 RX ORDER — CYCLOBENZAPRINE HCL 5 MG
5-10 TABLET ORAL 3 TIMES DAILY PRN
Qty: 20 TABLET | Refills: 0 | Status: SHIPPED | OUTPATIENT
Start: 2018-10-28 | End: 2019-02-14

## 2018-10-28 RX ORDER — PANTOPRAZOLE SODIUM 40 MG/1
40 TABLET, DELAYED RELEASE ORAL
Qty: 60 TABLET | Refills: 3 | Status: SHIPPED | OUTPATIENT
Start: 2018-10-28 | End: 2018-11-27

## 2018-10-28 RX ADMIN — ACETAMINOPHEN 650 MG: 160 SUSPENSION ORAL at 10:26

## 2018-10-28 RX ADMIN — ONDANSETRON 4 MG: 4 TABLET, ORALLY DISINTEGRATING ORAL at 08:22

## 2018-10-28 RX ADMIN — INFLUENZA A VIRUS A/MICHIGAN/45/2015 X-275 (H1N1) ANTIGEN (FORMALDEHYDE INACTIVATED), INFLUENZA A VIRUS A/SINGAPORE/INFIMH-16-0019/2016 IVR-186 (H3N2) ANTIGEN (FORMALDEHYDE INACTIVATED), INFLUENZA B VIRUS B/PHUKET/3073/2013 ANTIGEN (FORMALDEHYDE INACTIVATED), AND INFLUENZA B VIRUS B/MARYLAND/15/2016 BX-69A ANTIGEN (FORMALDEHYDE INACTIVATED) 0.5 ML: 15; 15; 15; 15 INJECTION, SUSPENSION INTRAMUSCULAR at 10:30

## 2018-10-28 RX ADMIN — CYCLOBENZAPRINE HYDROCHLORIDE 5 MG: 5 TABLET, FILM COATED ORAL at 08:22

## 2018-10-28 RX ADMIN — ACETAMINOPHEN 650 MG: 160 SUSPENSION ORAL at 05:57

## 2018-10-28 RX ADMIN — OXYCODONE HYDROCHLORIDE 5 MG: 5 TABLET ORAL at 06:03

## 2018-10-28 RX ADMIN — ENOXAPARIN SODIUM 40 MG: 40 INJECTION SUBCUTANEOUS at 08:22

## 2018-10-28 RX ADMIN — PANTOPRAZOLE SODIUM 40 MG: 40 INJECTION, POWDER, FOR SOLUTION INTRAVENOUS at 08:22

## 2018-10-28 RX ADMIN — SODIUM CHLORIDE, POTASSIUM CHLORIDE, SODIUM LACTATE AND CALCIUM CHLORIDE: 600; 310; 30; 20 INJECTION, SOLUTION INTRAVENOUS at 02:09

## 2018-10-28 ASSESSMENT — ACTIVITIES OF DAILY LIVING (ADL)
ADLS_ACUITY_SCORE: 9

## 2018-10-28 NOTE — PLAN OF CARE
Problem: Patient Care Overview  Goal: Plan of Care/Patient Progress Review  Outcome: Adequate for Discharge Date Met: 10/28/18  Pt is stable and asymptomatic, iv dc'd with catheter intact. VSS, DC instructions read and reviewed, questions answered. DC to home.

## 2018-10-28 NOTE — PROGRESS NOTES
"General Surgery Progress Note    Admission Date: 10/24/2018  Today's Date: 10/28/2018         Assessment:      Criss Don is a 47 year old female   S/P lap repair of perforated marginal ulcer POD 4, recovering well, tolerating diet         Plan:   - Discharge to home today. Instructions reviewed, questions answered. Follow-up in weight loss clinic for recheck in approximately 2 weeks  - Full liquid diet x 2 weeks  - Protonix 40mg po bid  - Oxy, liquid tylenol, flexeril, simethicone, zofran at discharge  - No further antibiotics  - Remove drain prior to discharge, cover drain site with small gauze and tape          Interval History:   Afebrile, VSS on room air with slight bradycardia at times. Patient feeling well, tolerating full liquids. Pain better with flexeril and simethicone added. Occasional mild nausea with PO intake. Passing flatus. ESMER output decreasing. Ambulating and voiding well.          Physical Exam:   /72 (BP Location: Right arm)  Pulse 68  Temp 98  F (36.7  C) (Oral)  Resp 16  Ht 1.575 m (5' 2\")  Wt 63.8 kg (140 lb 9.6 oz)  SpO2 98%  BMI 25.72 kg/m2  I/O last 3 completed shifts:  In: 2602 [P.O.:240; I.V.:2362]  Out: 40 [Drains:40]  General: NAD, pleasant, alert and oriented x3  Cardiovascular: regular rate and rhythm; S1 and S2 distinct without murmur  Respiratory: lungs clear to auscultation bilaterally without wheezes, rales or rhonchi   Abdomen: soft, non tender, non distended, nontender to palpation, + bowel sounds. ESMER drain in place with scant serous fluid in bulb  Incisions: clean, dry, and intact    LABS:  Recent Labs   Lab Test  10/25/18   0740  10/24/18   0400  08/02/18   1345   WBC  7.6  9.8  6.6   HGB  11.2*  13.4  10.8*   MCV  93  92  90   PLT  178  233  236      Recent Labs   Lab Test  10/26/18   0918  10/25/18   0740  10/24/18   0400   POTASSIUM  3.4  3.9  3.8   CHLORIDE  103  106  106   CO2  28  29  27   BUN  9  6*  14   CR  0.49*  0.50*  0.56   ANIONGAP  8  6  4    "           Martha Beck PA-C  [facility]: [phone]  Pager: [phone] (7am-4pm)

## 2018-10-28 NOTE — PLAN OF CARE
Problem: Patient Care Overview  Goal: Plan of Care/Patient Progress Review  Outcome: Improving  A&Ox4. VSS; ex HR 46. Rechecked HR at 4 AM, HR 58. IV infusing. Up independent. Full liquid diet. Bowel sounds hypoactive. Passing flatus. ESMER dressing changed, CDI. Pt slept most of the night.

## 2018-10-28 NOTE — PLAN OF CARE
Problem: Patient Care Overview  Goal: Plan of Care/Patient Progress Review  Outcome: No Change  VSS. Pain managed with oxycodone, flexeril, & scheduled tylenol. Up Ind. BS hypoactive, passing flatus. Slight intermittent nausea. Voiding. Tolerated full liquid diet.

## 2018-10-29 ENCOUNTER — TELEPHONE (OUTPATIENT)
Dept: SURGERY | Facility: CLINIC | Age: 48
End: 2018-10-29

## 2018-10-29 NOTE — TELEPHONE ENCOUNTER
MD Reyna patient:    Reason for call:  Other   Patient called regarding (reason for call): appointment  Additional comments: This patient was calling to schedule a follow up appointment from a perforated ulcer surgery. I am not sure how to schedule, or if that is through general surgery. Please reach out to the scheduling dept or the patient directly.    Phone number to reach patient:  Home number on file [phone] (home)    Best Time:  Anytime    Can we leave a detailed message on this number?  YES

## 2018-10-30 NOTE — DISCHARGE SUMMARY
[facility] Discharge Summary    Criss Don MRN# [id number]   Age: 47 year old YOB: 1970     Date of Admission:  10/24/2018  Date of Discharge::  10/28/2018 12:31 PM  Admitting Physician:  Chang Corea MD  Discharge Physician:  hCang Corea MD     Home clinic: [facility]          Admission Diagnoses:   Perforated marginal ulcer          Discharge Diagnosis:   Perforated marginal ulcer  Generalized anxiety disorder          Procedures:   Procedure(s): Laparoscopic repair of perforated marginal ulcer       Imaging performed:   Upper GI series performed on POD#2 demonstrated no leak or significant stenosis from edema              Medications Prior to Admission:       No current facility-administered medications on file prior to encounter.   Current Outpatient Prescriptions on File Prior to Encounter:  calcium-vitamin D-vitamin K (VIACTIV) 500-500-40 MG-UNT-MCG CHEW Take 3 tablets by mouth At Bedtime    Cholecalciferol (VITAMIN D3 PO) Take 2,000 Units by mouth 2 times daily    Cyanocobalamin (B-12) 2500 MCG SUBL Place 1 tablet under the tongue Every Mon, Wed, Fri Morning 3 times weekly    mirtazapine (REMERON) 15 MG tablet Take 15 mg by mouth nightly as needed    multivitamin  peds with iron (FLINTSTONES COMPLETE) 60 MG chewable tablet Take 2 chew tab by mouth every morning    SPRINTEC 28 0.25-35 MG-MCG per tablet TAKE ONE TABLET BY MOUTH ONCE DAILY               Discharge Medications:     Discharge Medication List as of 10/28/2018 12:01 PM      START taking these medications    Details   acetaminophen (TYLENOL) 32 mg/mL solution Take 20.3 mLs (650 mg) by mouth every 4 hours as needed for pain, Disp-473 mL, R-0, E-Prescribe      cyclobenzaprine (FLEXERIL) 5 MG tablet Take 1-2 tablets (5-10 mg) by mouth 3 times daily as needed for muscle spasms, Disp-20 tablet, R-0, E-Prescribe      ondansetron (ZOFRAN-ODT) 4 MG ODT tab Place 1-2 tablets (4-8mg) on tongue to  dissolve every 6-8 hours as needed for nausea, Disp-20 tablet, R-0, E-Prescribe      oxyCODONE IR (ROXICODONE) 5 MG tablet Take 1-2 tablets (5-10 mg) by mouth every 4 hours as needed for moderate to severe pain, Disp-25 tablet, R-0, Local Print      pantoprazole (PROTONIX) 40 MG EC tablet Take 1 tablet (40 mg) by mouth 2 times daily (before meals) Take 30-60 minutes before a meal., Disp-60 tablet, R-3, E-Prescribe      simethicone (MYLICON) 80 MG chewable tablet Take 1 tablet (80 mg) by mouth every 6 hours as needed for flatulence or cramping, Disp-30 tablet, R-1, E-Prescribe         CONTINUE these medications which have NOT CHANGED    Details   calcium-vitamin D-vitamin K (VIACTIV) 500-500-40 MG-UNT-MCG CHEW Take 3 tablets by mouth At Bedtime , Historical      Cholecalciferol (VITAMIN D3 PO) Take 2,000 Units by mouth 2 times daily , Historical      Cyanocobalamin (B-12) 2500 MCG SUBL Place 1 tablet under the tongue Every Mon, Wed, Fri Morning 3 times weekly , Historical      docusate sodium (COLACE) 100 MG capsule Take 300 mg by mouth every morning, Historical      FLUoxetine (PROZAC) 20 MG capsule Take 20 mg by mouth daily, Historical      mirtazapine (REMERON) 15 MG tablet Take 15 mg by mouth nightly as needed , Historical      multivitamin  peds with iron (FLINTSTONES COMPLETE) 60 MG chewable tablet Take 2 chew tab by mouth every morning , Historical      ranitidine (ZANTAC) 150 MG tablet Take 150 mg by mouth daily, Historical      SPRINTEC 28 0.25-35 MG-MCG per tablet TAKE ONE TABLET BY MOUTH ONCE DAILY, Disp-84 tablet, R-2, E-Prescribe                   Consultations:   No consultations were requested during this admission          Brief History of Illness:   Ms. Don is a 47-year-old woman with a history of RYGB who presented to the hospital with a perforated marginal ulcer.  See admission H&P for details.           Hospital Course:   The patient was urgently taken for a laparoscopic repair of the  perforated ulcer.  She was admitted to the surgical floor post-operatively.  She was left npo until POD#2 on which an upper GI series was performed showing no repair or stenosing edema around the repair.  She was started on a clear liquid diet, which she tolerated well.  Her drain was then removed.  She was advanced to a full liquid diet, which she tolerated well.  She was passing flatus.  She was then discharged home in good condition.          Discharge Instructions and Follow-Up:   Discharge diet: Full liquid   Discharge activity: Lifting restricted to 20 pounds   Discharge follow-up: Follow up with Dr. Corea in 2 weeks   Wound care: Keep wound clean and dry           Discharge Disposition:   Discharged to home      Attestation:  Amount of time performed on this discharge: <30 minutes.    Nomi Aquino MD

## 2018-11-06 ENCOUNTER — OFFICE VISIT (OUTPATIENT)
Dept: SURGERY | Facility: CLINIC | Age: 48
End: 2018-11-06
Payer: COMMERCIAL

## 2018-11-06 VITALS
BODY MASS INDEX: 23.39 KG/M2 | SYSTOLIC BLOOD PRESSURE: 98 MMHG | RESPIRATION RATE: 12 BRPM | OXYGEN SATURATION: 99 % | HEART RATE: 84 BPM | WEIGHT: 127.1 LBS | HEIGHT: 62 IN | DIASTOLIC BLOOD PRESSURE: 62 MMHG

## 2018-11-06 DIAGNOSIS — Z98.84 BARIATRIC SURGERY STATUS: Primary | ICD-10-CM

## 2018-11-06 DIAGNOSIS — K27.5 PERFORATED ULCER (H): ICD-10-CM

## 2018-11-06 PROCEDURE — 99024 POSTOP FOLLOW-UP VISIT: CPT | Performed by: PHYSICIAN ASSISTANT

## 2018-11-06 RX ORDER — PANTOPRAZOLE SODIUM 40 MG/1
40 TABLET, DELAYED RELEASE ORAL 2 TIMES DAILY
Qty: 60 TABLET | Refills: 4 | Status: SHIPPED | OUTPATIENT
Start: 2018-11-06 | End: 2018-11-27

## 2018-11-06 NOTE — PROGRESS NOTES
"[facility]   1 Week Surgical Follow-Up     PCP:  Napoleon Gray  Surgery:  Perforated Gastric Ulcer in a Gastric bypass patient  November 6, 2018    HISTORY OF PRESENT ILLNESS:  Criss Don returns today for her post visit S/P Perforated Gastric marginal Ulcer repair.  Overall patient is doing well.  Still not sure why this happened in the first place. Denies any NSAIDs, alcohol, nicotine caffeine.  Denies coffee or tea use. No steroid.  No identifable cause for the perforation. She states very particular about what she puts in her mouth.  Initially patient went to bet at 11 pm.  Woke up about an hour and a half later with excruciating pain in her left shoulder 10+/10 and sweaty.  Took her breathe away.  Pain started traveling down to abdomen.  Had vomited 4-5 times over the previous weekend. Thought she had a \"bug\".  Tried to go back to bed but the pain did not go away. Could not move.  It took about an hour to put her pants on and make it to her roommates door who took her to the hospital. Minimal pain leading up to this day.  She is currently using Oxycodone intermittently for pain medication. Last use a couple days ago.  Patient's off work for another 2 weeks.The very minimal pain she has is located lower mid abdomen.  Getting in 60+ oz of water in. Liquid diet currently  Patient has started all postoperative vitamins.  Was scheduled for her annual with PA/diet the day she went into hospital  Activity:  Moving frequently    REVIEW OF SYSTEMS:  GI:    Nausea - No  Vomiting - No    Pulmonary:  No complaints    PHYSICAL EXAMINATION:    BP 98/62 (BP Location: Right arm, Patient Position: Sitting, Cuff Size: Adult Regular)  Pulse 84  Resp 12  Ht 5' 2\" (1.575 m)  Wt 127 lb 1.6 oz (57.7 kg)  SpO2 99%  BMI 23.25 kg/m2       GENERAL: No acute distress. Alert and oriented time 3.  HEART: Regular rate and rhythm.  LUNGS:  Clear to auscultation bilaterally.  ABDOMEN: Soft, incisions " clean,dry, and intact. Tenderness WNL for post op. Steri-Strips removed today  EXTREMITIES: No lower extremity edema bilaterally. No calf swelling or tenderness. Negative michael's  SKIN: No rashes.  PSYCHOLOGICAL: Stable. Pleasant      ASSESSMENT:    1. S/P bariatric surgery.  2. Post surgical malabsorption  3. Perforated ulcer    PLAN:   Discussed with patient that it does not sound like her perforated ulcer was caused by the obvious risk factors (NSAIDs, alcohol, nicotine, meds).  Informed her that we may never know exactly why this happened but to continue avoiding all the known risk factors  Can progress to pureeds and then to soft solids two weeks later.  Recommend she go slowly.  Rescheduled her annual visit for about 3 weeks from now.    Continue with pantaprozole 40 mg bid for the next 6 months.  A script was sent to pharmacy   Strive to drink 64 oz of fluid daily.  Strive to move hourly while awake to decrease risk of developing a blood clot.  Continue to do deep breathing exercises twice daily or use your spirometer.   Continue recommended postoperative vitamins.   Return to clinic in 3 weeks for annual with PA/Diet  Call with questions or concerns at any time.

## 2018-11-06 NOTE — MR AVS SNAPSHOT
After Visit Summary   11/6/2018    Criss Don    MRN: [id number]           Patient Information     Date Of Birth          1970        Visit Information        Provider Department      11/6/2018 11:00 AM Faisal Barr PA-C [facility]      Today's Diagnoses     Bariatric surgery status    -  1    Perforated ulcer (H)          Care Instructions    Discussed with patient that it does not sound like her perforated ulcer was caused by the obvious risk factors (NSAIDs, alcohol, nicotine, meds).  Informed her that we may never know exactly why this happened but to continue avoiding all the known risk factors  Can progress to pureeds and then to soft solids two weeks later.  Recommend she go slowly.  Rescheduled her annual visit for about 3 weeks from now.    Continue with pantaprozole 40 mg bid for the next 6 months.  A script was sent to pharmacy   Strive to drink 64 oz of fluid daily.  Strive to move hourly while awake to decrease risk of developing a blood clot.  Continue to do deep breathing exercises twice daily or use your spirometer.   Continue recommended postoperative vitamins.   Return to clinic in 3 weeks for annual with PA/Diet  Call with questions or concerns at any time.                   Follow-ups after your visit        Your next 10 appointments already scheduled     Nov 08, 2018  2:00 PM CST   Return Visit with Brenda Perez PsyD   [facility]    [address] [place] [address]   [phone]            Nov 27, 2018  8:30 AM CST   (Arrive by 8:15 AM)   Return Bariatric Visit with Cris Paez PA-C   [facility] ([facility])    [address] [place] [address] [phone]            Nov 27, 2018  9:00 AM CST   (Arrive by 8:45 AM)    Return Bariatric Nutrition Visit with  Wl Diet 3, RD   [facility] [place] ([facility])    [address]  [place] [address]   [phone]            Nov 30, 2018  7:00 AM CST   Return Visit with Brenda Perez PsyD   [facility] [place] ([facility] [place])    [address]  [place] [address]   [phone]            Dec 14, 2018  7:00 AM CST   Return Visit with Brenda Perez PsyD   [facility] [place] ([facility] [place])    [address]  [place] [address]   [phone]            Feb 01, 2019  9:45 AM CST   Return Visit with  Oncology Nurse   [facility] ([facility]    [facility]  [address]  [place] [address] [phone]            Feb 07, 2019  1:00 PM CST   Return Visit with Pricilla Rahman MD   [facility] ([facility])    [facility]  [address]  [facility] [address] [phone]              Who to contact     If you have questions or need follow up information about today's clinic visit or your schedule please contact [facility] directly at [phone].  Normal or non-critical lab and imaging results will be communicated to you by MyChart, letter or phone within 4 business days after the clinic has received the results. If you do not hear from us within 7 days, please contact the clinic through [employer] or phone. If you have a critical or abnormal lab result, we will notify you by phone as soon as possible.  Submit refill requests through P[employer]ESA or call your pharmacy and they will forward the refill request to us. Please allow 3 business days for your refill to be completed.          Additional Information About Your Visit        Care EveryWhere ID     This is your Care  "EveryWhere ID. This could be used by other organizations to access your [facility] medical records  [id number]        Your Vitals Were     Pulse Respirations Height Pulse Oximetry BMI (Body Mass Index)       84 12 5' 2\" (1.575 m) 99% 23.25 kg/m2        Blood Pressure from Last 3 Encounters:   11/06/18 98/62   10/28/18 109/72   08/10/18 93/60    Weight from Last 3 Encounters:   11/06/18 127 lb 1.6 oz (57.7 kg)   10/28/18 140 lb 9.6 oz (63.8 kg)   08/02/18 136 lb 6.4 oz (61.9 kg)              Today, you had the following     No orders found for display         Today's Medication Changes          These changes are accurate as of 11/6/18 12:21 PM.  If you have any questions, ask your nurse or doctor.               These medicines have changed or have updated prescriptions.        Dose/Directions    * pantoprazole 40 MG EC tablet   Commonly known as:  PROTONIX   This may have changed:  Another medication with the same name was added. Make sure you understand how and when to take each.   Used for:  Perforated ulcer (H)   Changed by:  Faisal Barr PA-C        Dose:  40 mg   Take 1 tablet (40 mg) by mouth 2 times daily (before meals) Take 30-60 minutes before a meal.   Quantity:  60 tablet   Refills:  3       * pantoprazole 40 MG EC tablet   Commonly known as:  PROTONIX   This may have changed:  You were already taking a medication with the same name, and this prescription was added. Make sure you understand how and when to take each.   Used for:  Bariatric surgery status, Perforated ulcer (H)   Changed by:  Faisal Barr PA-C        Dose:  40 mg   Take 1 tablet (40 mg) by mouth 2 times daily   Quantity:  60 tablet   Refills:  4       * Notice:  This list has 2 medication(s) that are the same as other medications prescribed for you. Read the directions carefully, and ask your doctor or other care provider to review them with you.         Where to get your medicines      These medications were sent to " [id number] - [place], [place] - [address]  [address] [place] [address]    Hours:  Added 10/26 CK Checked with pharmacy Phone:  [phone]     pantoprazole 40 MG EC tablet                Primary Care Provider Office Phone # Fax #    Napoleon Gray PA-C [phone]       [address]  [place] [address]        Equal Access to Services     [facility] : Hadii aad ku hadasho Soomaali, waaxda luqadaha, qaybta kaalmada adeegyada, waxay idiin hayaan adeeg kharash layogesh . So [facility] [phone].    ATENCIÓN: Si habla español, tiene a madera disposición servicios gratuitos de asistencia lingüística. [facility] [phone].    We comply with applicable federal civil rights laws and [place] laws. We do not discriminate on the basis of race, color, national origin, age, disability, sex, sexual orientation, or gender identity.            Thank you!     Thank you for choosing [facility]  for your care. Our goal is always to provide you with excellent care. Hearing back from our patients is one way we can continue to improve our services. Please take a few minutes to complete the written survey that you may receive in the mail after your visit with us. Thank you!             Your Updated Medication List - Protect others around you: Learn how to safely use, store and throw away your medicines at www.disposemymeds.org.          This list is accurate as of 11/6/18 12:21 PM.  Always use your most recent med list.                   Brand Name Dispense Instructions for use Diagnosis    acetaminophen 32 mg/mL solution    TYLENOL    473 mL    Take 20.3 mLs (650 mg) by mouth every 4 hours as needed for pain    Perforated ulcer (H)       B-12 2500 MCG Subl      Place 1 tablet under the tongue Every Mon, Wed, Fri Morning 3 times weekly        cyclobenzaprine 5 MG tablet    FLEXERIL    20 tablet    Take 1-2 tablets (5-10 mg) by mouth 3  times daily as needed for muscle spasms    Perforated ulcer (H)       docusate sodium 100 MG capsule    COLACE     Take 300 mg by mouth every morning        FLUoxetine 20 MG capsule    PROzac     Take 20 mg by mouth daily        mirtazapine 15 MG tablet    REMERON     Take 15 mg by mouth nightly as needed    Iron deficiency, Malabsorption of iron       multivitamin  peds with iron 60 MG chewable tablet      Take 2 chew tab by mouth every morning        ondansetron 4 MG ODT tab    ZOFRAN-ODT    20 tablet    Place 1-2 tablets (4-8mg) on tongue to dissolve every 6-8 hours as needed for nausea    Perforated ulcer (H)       oxyCODONE IR 5 MG tablet    ROXICODONE    25 tablet    Take 1-2 tablets (5-10 mg) by mouth every 4 hours as needed for moderate to severe pain    Perforated ulcer (H)       * pantoprazole 40 MG EC tablet    PROTONIX    60 tablet    Take 1 tablet (40 mg) by mouth 2 times daily (before meals) Take 30-60 minutes before a meal.    Perforated ulcer (H)       * pantoprazole 40 MG EC tablet    PROTONIX    60 tablet    Take 1 tablet (40 mg) by mouth 2 times daily    Bariatric surgery status, Perforated ulcer (H)       ranitidine 150 MG tablet    ZANTAC     Take 150 mg by mouth daily        simethicone 80 MG chewable tablet    MYLICON    30 tablet    Take 1 tablet (80 mg) by mouth every 6 hours as needed for flatulence or cramping    Perforated ulcer (H)       SPRINTEC 28 0.25-35 MG-MCG per tablet   Generic drug:  norgestimate-ethinyl estradiol     84 tablet    TAKE ONE TABLET BY MOUTH ONCE DAILY    General counseling for prescription of oral contraceptives       VIACTIV 500-500-40 MG-UNT-MCG Chew   Generic drug:  calcium-vitamin D-vitamin K      Take 3 tablets by mouth At Bedtime        VITAMIN D3 PO      Take 2,000 Units by mouth 2 times daily        * Notice:  This list has 2 medication(s) that are the same as other medications prescribed for you. Read the directions carefully, and ask your doctor or other  care provider to review them with you.

## 2018-11-06 NOTE — PATIENT INSTRUCTIONS
Discussed with patient that it does not sound like her perforated ulcer was caused by the obvious risk factors (NSAIDs, alcohol, nicotine, meds).  Informed her that we may never know exactly why this happened but to continue avoiding all the known risk factors  Can progress to pureeds and then to soft solids two weeks later.  Recommend she go slowly.  Rescheduled her annual visit for about 3 weeks from now.    Continue with pantaprozole 40 mg bid for the next 6 months.  A script was sent to pharmacy   Strive to drink 64 oz of fluid daily.  Strive to move hourly while awake to decrease risk of developing a blood clot.  Continue to do deep breathing exercises twice daily or use your spirometer.   Continue recommended postoperative vitamins.   Return to clinic in 3 weeks for annual with PA/Diet  Call with questions or concerns at any time.

## 2018-11-07 ENCOUNTER — TELEPHONE (OUTPATIENT)
Dept: SURGERY | Facility: CLINIC | Age: 48
End: 2018-11-07

## 2018-11-07 NOTE — TELEPHONE ENCOUNTER
Spoke with Dr. Zelaya.  No surgery planned for patient's gallbladder at this time even with stones if asymptomatic.  If symptoms arise be seen in ED if emergent or clinic if not.    Called patient and informed her re: above.   Given signs/sx of when to get to ED or to be seen in clinic.  Patient verbalized understanding and is agreeable to plan.  Vesna Evans, MS, RD, RN

## 2018-11-07 NOTE — TELEPHONE ENCOUNTER
IMMANUEL Paez patient:    Reason for call:  Other   Patient called regarding (reason for call): call back  Additional comments: Patient would like a call back to discuss a few post-op questions.    Phone number to reach patient:  Home number on file [phone] (home)    Best Time:  Anytime    Can we leave a detailed message on this number?  YES

## 2018-11-07 NOTE — TELEPHONE ENCOUNTER
Patient had repair of perf ulcer 10/24/18 S/P RNY 10/26/16.   10/24/18 CT = Cholelithiasis.     Patient had PO visit with MANISHA Malone yesterday.  She is wanting to know why she had an ulcer that perforated if she followed the rules to a T.  Patient wondering if the ulcer ould be caused by stress.    Informed patient that stress is a possibility, but not high on the list of offenders as smoking, ETOH, NSAID's.  Noted she has been dealing with a significant amount of stress this year due to divorce.  Patient is seeing a therapist and feels comfortable working on stress with this therapist.  She will continue to work on stress management tools for stress and anxiety.      Encouraged her to keep doing what she is doing which is fantastic (following the rules).    She is also wanting to know if she needs her GB out.  I informed her I would pose that question to her surgeon.  Patient is asymptomatic.  At this point, I gave patient s/sx of when she needs to be seen or get to the ED.   Told her that if it's not broken don't fix.    Will contact surgeon to check if plan other than to watch GB and let patient know at or before her annual in 3 weeks.  Patient verbalized understanding and is agreeable to plan.  Vesna Evans, MS, RD, RN

## 2018-11-08 ENCOUNTER — OFFICE VISIT (OUTPATIENT)
Dept: PSYCHOLOGY | Facility: CLINIC | Age: 48
End: 2018-11-08
Payer: COMMERCIAL

## 2018-11-08 DIAGNOSIS — F50.9 EATING DISORDER, UNSPECIFIED: ICD-10-CM

## 2018-11-08 DIAGNOSIS — F41.1 GENERALIZED ANXIETY DISORDER: Primary | ICD-10-CM

## 2018-11-08 PROCEDURE — 90834 PSYTX W PT 45 MINUTES: CPT | Performed by: PSYCHOLOGIST

## 2018-11-08 NOTE — PROGRESS NOTES
"                                             Progress Note    Client Name: Criss Don  Date:  11/8/2018         Service Type: Individual      Session Start Time: 2:00  Session End Time: 2:45      Session Length: 45     Session #: 41     Attendees: Client attended alone    Treatment Plan Last Reviewed:   11/8/2018  PHQ-9 / NED-7 :      DATA      Progress Since Last Session (Related to Symptoms / Goals / Homework):   Symptoms: no improvement    Homework: Partially completed      Episode of Care Goals: Minimal progress - ACTION (Actively working towards change); Intervened by reinforcing change plan / affirming steps taken     Current / Ongoing Stressors and Concerns:   See EHR for notes pertaining to recent hospitalization and surgery. Reported that her recovery is going well and that she has been trying to eliminate as much stress as possible. Reported that her recent health crisis has \"put things into perspective for me\" and that she is working on letting things go rather than stewing in her stress.       Treatment Objective(s) Addressed in This Session:   stress management  Maintain compliance with post-surgical dietary needs     Intervention:   CBT: coached client on ways to surface and replace thinking errors that increase anxiety; explored triggers to anxiety; reviewed physical warning signs of anxiety  Solution Focused: coached client on relaxation and stress management skills; coached her on taking things one step at a time; assigned homework to practice at least one mindfulness skill that she is learning in group daily  Supportive therapy:  provided active listening, support, and encouragement.       ASSESSMENT: Current Emotional / Mental Status (status of significant symptoms):   Risk status (Self / Other harm or suicidal ideation)   Client denies current fears or concerns for personal safety.   Client denies current or recent suicidal ideation or behaviors.   Client denies current or recent homicidal " ideation or behaviors.   Client denies current or recent self injurious behavior or ideation.   Client denies other safety concerns.   A safety and risk management plan has not been developed at this time, however client was given the after-hours number / 911 should there be a change in any of these risk factors.     Appearance:   Appropriate    Eye Contact:   Good    Psychomotor Behavior: Normal    Attitude:   Cooperative  Pleasant    Orientation:   All   Speech    Rate / Production: Normal     Volume:  Normal    Mood:    Anxious    Affect:    Appropriate    Thought Content:  worry    Thought Form:  Coherent  Logical    Insight:    Good      Medication Review:   No changes to current psychiatric medication(s)     Medication Compliance:   Yes     Changes in Health Issues:   None reported     Chemical Use Review:   Substance Use: Chemical use reviewed, no active concerns identified      Tobacco Use: No current tobacco use.       Collateral Reports Completed:   Not Applicable.     PLAN: (Client Tasks / Therapist Tasks / Other)  Future appointments are scheduled. Identify physical signs of anxiety and use relaxation skills as discussed in session. Use assertiveness skills as discussed in session to set effective limits with family and friends. Surface and challenge unrealistic expectations and disproportionate thoughts that generate anxiety.        Brenda Perez PsyD LP                                                       Treatment Plan    Client's Name: Criss Don  YOB: 1970    Date:  11/8/2018    DSM-V Diagnoses: 300.02 Generalized Anxiety Disorder, eating disorder, unspecified  Psychosocial / Contextual Factors: divorce proceedings, health concerns  WHODAS: 22    Referral / Collaboration:  Referral to another professional/service is not indicated at this time..    Anticipated number of session or this episode of care: reassess after 12 sessions      MeasurableTreatment Goal(s) related to  diagnosis / functional impairment(s)  Goal 1: Client will learn coping skills to manage stress and adjust to post-surgical lifestyle changes more effectively.    I will know I've met my goal when I'm not weighing myself all the time and feeling more comfortable with my weight loss.      Objective #A (Client Action)    Client will surface and challenge thinking errors that contribute to anxiety.  Status: Continued - Date(s): 11/8/2018    Intervention(s)  Therapist will assign homework (thought  logs), assist client in surfacing and replacement ineffective thinking patterns.    Objective #B  Client will learn deep breathing and relaxation skills and practice at least 3x daily.  Status: Continued - Date(s): 11/8/2018    Intervention(s)  Therapist will teach deep breathing and relaxation skills, assign homework, problem-solve barriers to follow through.    Objective #C  Client will learn and use assertiveness skills to set healthy boundaries and communicate her needs more effectively.  Status: Continued - Date(s): 11/8/2018    Intervention(s)  Therapist will assist client in identifying and balancing her expectations.        Client has reviewed and agreed to the above plan.      Brenda Perez PsyD LP  11/8/2018

## 2018-11-08 NOTE — MR AVS SNAPSHOT
MRN:[id number]                      After Visit Summary   11/8/2018    Criss Don    MRN: [id number]           Visit Information        Provider Department      11/8/2018 2:00 PM Brenda Perez PsyD [facility]      Your next 10 appointments already scheduled     Nov 27, 2018  8:30 AM CST   (Arrive by 8:15 AM)   Return Bariatric Visit with Cris Paez PA-C   [facility] ([facility])    [address]  [address]  [place] [address]   [phone]            Nov 27, 2018  9:00 AM CST   (Arrive by 8:45 AM)   Return Bariatric Nutrition Visit with  Mandi Arthur 3, MCKENZIE   [facility] ([facility])    [address]  [address]  [place] [address]   [phone]            Nov 30, 2018  7:00 AM CST   Return Visit with Brenda Perez PsyD   [facility] [place] ([facility] [place])    [address] [place] [address]   [phone]            Dec 14, 2018  7:00 AM CST   Return Visit with Brenda Perez PsyD   [facility]    [address] [place] [address]   [phone]            Jan 04, 2019  7:00 AM CST   Return Visit with Brenda Perez PsyD   [facility] ([facility])    [address] [place] [address]   [phone]            Feb 01, 2019  9:45 AM CST   Return Visit with  Oncology Nurse   [facility] ([facility])    [facility]  [address]  [facility] [address]   [phone]            Feb 07, 2019  1:00 PM CST   Return Visit with Pricilla Rahman MD   [facility] ([facility])    [facility]  [address]  [facility]  [address]   [phone]              Care EveryWhere ID     This is your Care EveryWhere ID. This could be used by other organizations to access your [place] medical records  [id number]        Equal Access to Services     STEVEN MUNOZ : Phong Wooten, waniyahda carol, qabetzaidata kaalmagabriela faulkner, suri yeager. So [facility] [phone].    ATENCIÓN: Si habla español, tiene a madera disposición servicios gratuitos de asistencia lingüística. Llame al [phone].    We comply with applicable federal civil rights laws and [place] laws. We do not discriminate on the basis of race, color, national origin, age, disability, sex, sexual orientation, or gender identity.

## 2018-11-16 ENCOUNTER — OFFICE VISIT (OUTPATIENT)
Dept: FAMILY MEDICINE | Facility: CLINIC | Age: 48
End: 2018-11-16
Payer: COMMERCIAL

## 2018-11-16 VITALS
OXYGEN SATURATION: 100 % | BODY MASS INDEX: 23 KG/M2 | WEIGHT: 125 LBS | HEART RATE: 76 BPM | DIASTOLIC BLOOD PRESSURE: 73 MMHG | HEIGHT: 62 IN | SYSTOLIC BLOOD PRESSURE: 98 MMHG | TEMPERATURE: 98.5 F

## 2018-11-16 DIAGNOSIS — L89.159 PRESSURE INJURY OF SKIN OF SACRAL REGION, UNSPECIFIED INJURY STAGE: Primary | ICD-10-CM

## 2018-11-16 PROCEDURE — 99213 OFFICE O/P EST LOW 20 MIN: CPT | Performed by: PHYSICIAN ASSISTANT

## 2018-11-16 NOTE — PROGRESS NOTES
SUBJECTIVE:   Criss Don is a 47 year old female who presents to clinic today for the following health issues:      Tailbone Sore       Duration: x 10/28/18    Description  Location: tail bone area-poss cyst/sore, feels like a rash or dead skin     Intensity:  mild, moderate    Accompanying signs and symptoms: unable to see because of it's location    History  Previous similar problem: no   Previous evaluation:  none    Precipitating or alleviating factors:  Trauma or overuse: no   Aggravating factors include: none    Therapies tried and outcome: nestor Caldwell is a 46 yo female s/p laparoscopic gastric bypass and s/p perforated marginal ulcer repair (10/24) who presents today with a 3-week history of a painful sore on her tailbone. Patient described that she has had a pain sensation around the area for a while but it worsened with her surgery on 10/24. The pain associated with her sore was described as sharp with direct pressure. Patient described that the pain is increased with sitting/lying down and rated the pain at those moments a 10/10. Patient has had to shift her sitting position in order to alleviate the pain. Patient has not been able to visualize the area but described that it feels as if the scab over the area has fallen off. Patient denied any bleeding or purulent discharge from her sore, itchiness, numbness/tingling around the area, fever, cold symptoms, or joint pain.       Problem list and histories reviewed & adjusted, as indicated.  Additional history: as documented    Patient Active Problem List   Diagnosis     Labial abscess     CARDIOVASCULAR SCREENING; LDL GOAL LESS THAN 160     Mass of right foot     History of abnormal cervical Pap smear     Intertrigo     Bilateral edema of lower extremity     Bariatric surgery status     Malnutrition following gastrointestinal surgery     Body dysmorphic disorder     Hypokalemia     Overweight (BMI 25.0-29.9)     Hypoglycemia     NED (generalized  anxiety disorder)     Other specified eating disorder     Slow transit constipation     Iron deficiency     Malabsorption of iron     Iron deficiency anemia, unspecified iron deficiency anemia type     Perforated ulcer (H)     Past Surgical History:   Procedure Laterality Date     LAPAROSCOPIC BYPASS GASTRIC N/A 10/26/2016    Procedure: LAPAROSCOPIC BYPASS GASTRIC;  Surgeon: Romario Reyna MD;  Location:  OR     LAPAROSCOPY DIAGNOSTIC (GENERAL) N/A 10/24/2018    Procedure: LAPAROSCOPY DIAGNOSTIC FOR PERFORATED GASTRIC ULCER;  Surgeon: Chang Corea MD;  Location:  OR     NO HISTORY OF SURGERY         Social History   Substance Use Topics     Smoking status: Never Smoker     Smokeless tobacco: Never Used     Alcohol use 0.0 oz/week     0 Standard drinks or equivalent per week      Comment: very rare     Family History   Problem Relation Age of Onset     Hypertension Mother      Breast Cancer Mother      64 dx     Allergies Mother      Obesity Mother      Respiratory Mother      Asthma     HEART DISEASE Mother      Hypertension Maternal Grandmother      Cancer Maternal Grandmother      Ovarian     Cancer Sister      Cervical     Cancer Other      Mat. great aunt-ovarian     Hypertension Brother      Cancer Maternal Aunt      ?Gastric     Brain Cancer Cousin      maternal     Leukemia Nephew      dx at 23, CML         Current Outpatient Prescriptions   Medication Sig Dispense Refill     acetaminophen (TYLENOL) 32 mg/mL solution Take 20.3 mLs (650 mg) by mouth every 4 hours as needed for pain 473 mL 0     calcium-vitamin D-vitamin K (VIACTIV) 500-500-40 MG-UNT-MCG CHEW Take 3 tablets by mouth At Bedtime        Cholecalciferol (VITAMIN D3 PO) Take 2,000 Units by mouth 2 times daily        Cyanocobalamin (B-12) 2500 MCG SUBL Place 1 tablet under the tongue Every Mon, Wed, Fri Morning 3 times weekly        cyclobenzaprine (FLEXERIL) 5 MG tablet Take 1-2 tablets (5-10 mg) by mouth 3 times daily as  "needed for muscle spasms 20 tablet 0     docusate sodium (COLACE) 100 MG capsule Take 300 mg by mouth every morning       FLUoxetine (PROZAC) 20 MG capsule Take 20 mg by mouth daily       mirtazapine (REMERON) 15 MG tablet Take 15 mg by mouth nightly as needed        multivitamin  peds with iron (FLINTSTONES COMPLETE) 60 MG chewable tablet Take 2 chew tab by mouth every morning        order for DME Equipment being ordered: Donut pillow 1 Units 0     pantoprazole (PROTONIX) 40 MG EC tablet Take 1 tablet (40 mg) by mouth 2 times daily 60 tablet 4     pantoprazole (PROTONIX) 40 MG EC tablet Take 1 tablet (40 mg) by mouth 2 times daily (before meals) Take 30-60 minutes before a meal. 60 tablet 3     ranitidine (ZANTAC) 150 MG tablet Take 150 mg by mouth daily       simethicone (MYLICON) 80 MG chewable tablet Take 1 tablet (80 mg) by mouth every 6 hours as needed for flatulence or cramping 30 tablet 1     SPRINTEC 28 0.25-35 MG-MCG per tablet TAKE ONE TABLET BY MOUTH ONCE DAILY 84 tablet 2     ondansetron (ZOFRAN-ODT) 4 MG ODT tab Place 1-2 tablets (4-8mg) on tongue to dissolve every 6-8 hours as needed for nausea (Patient not taking: Reported on 11/16/2018) 20 tablet 0     Allergies   Allergen Reactions     Lortab [Hydrocodone-Acetaminophen] Nausea     Also light headed and flushed       Reviewed and updated as needed this visit by clinical staff       Reviewed and updated as needed this visit by Provider         ROS:  Constitutional, HEENT, cardiovascular, pulmonary, GI, , musculoskeletal, neuro, skin, endocrine and psych systems are negative, except as otherwise noted.    OBJECTIVE:     BP 98/73  Pulse 76  Temp 98.5  F (36.9  C) (Tympanic)  Ht 5' 2\" (1.575 m)  Wt 125 lb (56.7 kg)  LMP 10/21/2018  SpO2 100%  BMI 22.86 kg/m2  Body mass index is 22.86 kg/(m^2).  GENERAL: healthy, alert and oriented, shifting from one buttock to another for sitting due to pain  SKIN: a macular lesion of 5cm in diameter located on " the skin area overlying the sacrum and proximal coccyx. The center is flesh-colored while the borders are flat and slightly erythematous. No excoriation or pus noted. The area is warm and slightly tender to palpation. No signs of any active infection. No signs of trauma or fracture.  PSYCH: mentation appears normal, affect normal/bright      ASSESSMENT/PLAN:   Patient is a 46 yo female s/p gastric bypass (2016) and perforated gastric ulcer repair (10/24/2018) who presented with a 3-week history of a painful sore on her tailbone. Given the history of surgeries, hospitalizations, as well as physical exam findings, a stage 1 pressure ulcer over the sacral region is the diagnosis.    1. Pressure injury of skin of sacral region, unspecified injury stage  - Monitor for resolution of symptoms  - Neosporin OTC prn, apply to the affected area  - order for DME; Equipment being ordered: Donut pillow  Dispense: 1 Units; Refill: 0  - Follow-up in 2 weeks  - Call clinic if symptoms worsen    Napoleon Gray PA-C  [facility]

## 2018-11-16 NOTE — MR AVS SNAPSHOT
After Visit Summary   11/16/2018    Criss Don    MRN: [id number]           Patient Information     Date Of Birth          1970        Visit Information        Provider Department      11/16/2018 7:40 AM Napoleon Gray PA-C [facility]        Today's Diagnoses     Pressure injury of skin of sacral region, unspecified injury stage    -  1      Care Instructions      [facility]:  [address], [place] [address]            Follow-ups after your visit        Follow-up notes from your care team     Return in about 2 weeks (around 11/30/2018) for if symptom worsen.      Your next 10 appointments already scheduled     Nov 27, 2018  8:30 AM CST   (Arrive by 8:15 AM)   Return Bariatric Visit with Cris Paez PA-C   [facility] ([facility])    [address]  [facility] [address]   [phone]            Nov 27, 2018  9:00 AM CST   (Arrive by 8:45 AM)   Return Bariatric Nutrition Visit with  Wl Diet 3, RD   [facility] ([facility])    [address] [address] [phone]            Nov 30, 2018  7:00 AM CST   Return Visit with Brenda Perez [facility] ([facility] [place])    [address] [place] [address]   [phone]            Dec 14, 2018  7:00 AM CST   Return Visit with Brenda Perez PsyD   [facility] ([facility])    [address] [place] [address]   [phone]            Jan 04, 2019  7:00 AM CST   Return Visit with Brenda Perez PsyD   [facility] [place] ([facility])    [address] [place] [address]   [phone]            Feb 01, 2019  9:45 AM CST   Return Visit with  Oncology Nurse  "  [facility] ([facility])    [facility] [place]  [address]  [place] [address] [phone]            Feb 07, 2019  1:00 PM CST   Return Visit with Pricilla Rahman MD   [facility] ([facility])    [facility] [place]  [address]  [place] [address] [phone]              Who to contact     If you have questions or need follow up information about today's clinic visit or your schedule please contact [facility] [place] directly at [phone].  Normal or non-critical lab and imaging results will be communicated to you by MyChart, letter or phone within 4 business days after the clinic has received the results. If you do not hear from us within 7 days, please contact the clinic through MyChart or phone. If you have a critical or abnormal lab result, we will notify you by phone as soon as possible.  Submit refill requests through [employer] or call your pharmacy and they will forward the refill request to us. Please allow 3 business days for your refill to be completed.          Additional Information About Your Visit        Care EveryWhere ID     This is your Care EveryWhere ID. This could be used by other organizations to access your [facility] medical records  [id number]        Your Vitals Were     Pulse Temperature Height Last Period Pulse Oximetry BMI (Body Mass Index)    76 98.5  F (36.9  C) (Tympanic) 5' 2\" (1.575 m) 10/21/2018 100% 22.86 kg/m2       Blood Pressure from Last 3 Encounters:   11/16/18 98/73   11/06/18 98/62   10/28/18 109/72    Weight from Last 3 Encounters:   11/16/18 125 lb (56.7 kg)   11/06/18 127 lb 1.6 oz (57.7 kg)   10/28/18 140 lb 9.6 oz (63.8 kg)              Today, you had the following     No orders found for display         Today's Medication Changes          These changes are accurate as of 11/16/18  8:27 AM.  If you have any questions, ask your nurse or " doctor.               Start taking these medicines.        Dose/Directions    order for DME   Used for:  Pressure injury of skin of sacral region, unspecified injury stage   Started by:  Napoleon Gray PA-C        Equipment being ordered: Donut pillow   Quantity:  1 Units   Refills:  0            Where to get your medicines      Some of these will need a paper prescription and others can be bought over the counter.  Ask your nurse if you have questions.     Bring a paper prescription for each of these medications     order for DME                Primary Care Provider Office Phone # Fax #    Napoleon Gray PA-C [phone]       [address]  [place] [address]        Equal Access to Services     [facility] TAMMY : Hadii deandra cowart hadasho Solouisali, waaxda luqadaha, qaybta kaalmada adeegyada, suri yeager. So [facility] [phone].    ATENCIÓN: Si habla español, tiene a madera disposición servicios gratuitos de asistencia lingüística. Llame al [phone].    We comply with applicable federal civil rights laws and [place] laws. We do not discriminate on the basis of race, color, national origin, age, disability, sex, sexual orientation, or gender identity.            Thank you!     [facility]. Our goal is always to provide you with excellent care. Hearing back from our patients is one way we can continue to improve our services. Please take a few minutes to complete the written survey that you may receive in the mail after your visit with us. Thank you!             Your Updated Medication List - Protect others around you: Learn how to safely use, store and throw away your medicines at www.disposemymeds.org.          This list is accurate as of 11/16/18  8:27 AM.  Always use your most recent med list.                   Brand Name Dispense Instructions for use Diagnosis    acetaminophen 32 mg/mL solution     TYLENOL    473 mL    Take 20.3 mLs (650 mg) by mouth every 4 hours as needed for pain    Perforated ulcer (H)       B-12 2500 MCG Subl      Place 1 tablet under the tongue Every Mon, Wed, Fri Morning 3 times weekly        cyclobenzaprine 5 MG tablet    FLEXERIL    20 tablet    Take 1-2 tablets (5-10 mg) by mouth 3 times daily as needed for muscle spasms    Perforated ulcer (H)       docusate sodium 100 MG capsule    COLACE     Take 300 mg by mouth every morning        FLUoxetine 20 MG capsule    PROzac     Take 20 mg by mouth daily        mirtazapine 15 MG tablet    REMERON     Take 15 mg by mouth nightly as needed    Iron deficiency, Malabsorption of iron       multivitamin  peds with iron 60 MG chewable tablet      Take 2 chew tab by mouth every morning        ondansetron 4 MG ODT tab    ZOFRAN-ODT    20 tablet    Place 1-2 tablets (4-8mg) on tongue to dissolve every 6-8 hours as needed for nausea    Perforated ulcer (H)       order for [facility]     1 Units    Equipment being ordered: Donut pillow    Pressure injury of skin of sacral region, unspecified injury stage       * pantoprazole 40 MG EC tablet    PROTONIX    60 tablet    Take 1 tablet (40 mg) by mouth 2 times daily (before meals) Take 30-60 minutes before a meal.    Perforated ulcer (H)       * pantoprazole 40 MG EC tablet    PROTONIX    60 tablet    Take 1 tablet (40 mg) by mouth 2 times daily    Bariatric surgery status, Perforated ulcer (H)       ranitidine 150 MG tablet    ZANTAC     Take 150 mg by mouth daily        simethicone 80 MG chewable tablet    MYLICON    30 tablet    Take 1 tablet (80 mg) by mouth every 6 hours as needed for flatulence or cramping    Perforated ulcer (H)       SPRINTEC 28 0.25-35 MG-MCG per tablet   Generic drug:  norgestimate-ethinyl estradiol     84 tablet    TAKE ONE TABLET BY MOUTH ONCE DAILY    General counseling for prescription of oral contraceptives       VIACTIV 500-500-40 MG-UNT-MCG Chew   Generic drug:   calcium-vitamin D-vitamin K      Take 3 tablets by mouth At Bedtime        VITAMIN D3 PO      Take 2,000 Units by mouth 2 times daily        * Notice:  This list has 2 medication(s) that are the same as other medications prescribed for you. Read the directions carefully, and ask your doctor or other care provider to review them with you.

## 2018-11-27 ENCOUNTER — OFFICE VISIT (OUTPATIENT)
Dept: SURGERY | Facility: CLINIC | Age: 48
End: 2018-11-27
Payer: COMMERCIAL

## 2018-11-27 VITALS
DIASTOLIC BLOOD PRESSURE: 68 MMHG | RESPIRATION RATE: 12 BRPM | WEIGHT: 124.9 LBS | HEIGHT: 62 IN | SYSTOLIC BLOOD PRESSURE: 104 MMHG | HEART RATE: 67 BPM | OXYGEN SATURATION: 100 % | BODY MASS INDEX: 22.98 KG/M2

## 2018-11-27 VITALS — WEIGHT: 124.9 LBS | BODY MASS INDEX: 22.98 KG/M2 | HEIGHT: 62 IN

## 2018-11-27 DIAGNOSIS — K91.2 POSTSURGICAL MALABSORPTION: ICD-10-CM

## 2018-11-27 DIAGNOSIS — F45.22 BODY DYSMORPHIC DISORDER: ICD-10-CM

## 2018-11-27 DIAGNOSIS — F50.89 OTHER SPECIFIED EATING DISORDER: ICD-10-CM

## 2018-11-27 DIAGNOSIS — K90.9 MALABSORPTION OF IRON: ICD-10-CM

## 2018-11-27 DIAGNOSIS — Z98.84 BARIATRIC SURGERY STATUS: ICD-10-CM

## 2018-11-27 DIAGNOSIS — Z79.899 CURRENT USE OF PROTON PUMP INHIBITOR: ICD-10-CM

## 2018-11-27 DIAGNOSIS — E61.1 IRON DEFICIENCY: Primary | ICD-10-CM

## 2018-11-27 DIAGNOSIS — K27.5 PERFORATED ULCER (H): ICD-10-CM

## 2018-11-27 PROCEDURE — 99214 OFFICE O/P EST MOD 30 MIN: CPT | Performed by: PHYSICIAN ASSISTANT

## 2018-11-27 PROCEDURE — 97803 MED NUTRITION INDIV SUBSEQ: CPT | Performed by: DIETITIAN, REGISTERED

## 2018-11-27 RX ORDER — PANTOPRAZOLE SODIUM 40 MG/1
40 TABLET, DELAYED RELEASE ORAL 2 TIMES DAILY
Qty: 180 TABLET | Refills: 3 | Status: SHIPPED | OUTPATIENT
Start: 2018-11-27 | End: 2019-12-06

## 2018-11-27 NOTE — LETTER
[facility] [place] [facility]          [address]  [address]  [place] [address]                                         Tel:  [phone]  Fax: [phone]    2019    Criss Don  [address] [address]    : 1970      Dear Criss;     We have identified that you have outstanding lab tests that have . If you would like to have the  labs completed, please contact our clinic at [phone] to have them re-ordered. If you have any questions, please contact our office.  Sincerely,    Aminata SOTO MA

## 2018-11-27 NOTE — MR AVS SNAPSHOT
MRN:[id number]                      After Visit Summary   11/27/2018    Criss Don    MRN: [id number]           Visit Information        Provider Department      11/27/2018 9:00 AM Usman Figueroa RD [facility]      Your next 10 appointments already scheduled     Nov 30, 2018  7:00 AM CST   Return Visit with Brenda Perez PsyD   [facility]    [address] [place] [address]   [phone]            Dec 14, 2018  7:00 AM CST   Return Visit with Brenda Perez PsyD   [facility]    [address] [place] [address]   [phone]            Jan 04, 2019  7:00 AM CST   Return Visit with Brenda Perez PsyD   [facility] ([facility])    [address] [place] [address]   [phone]            Feb 01, 2019  9:45 AM CST   Return Visit with  Oncology Nurse   [facility] ([facility])    [facility]  [address]  [place] [address]   [phone]            Feb 07, 2019  1:00 PM CST   Return Visit with Pricilla Rahman MD   [facility] ([facility])    [facility]  [address]  [facility] [address]   [phone]              Care EveryWhere ID     This is your Care EveryWhere ID. This could be used by other organizations to access your [facility] medical records  [id number]        Equal Access to Services     STEVEN MUNOZ : Hadii deandra langfordo Sojorge, waaxda luqadaha, qaybta kaalmada adeegyagabriela, suri yeager. Ch[facility] [phone].    ATENCIÓN: Si habla español, tiene a madera disposición servicios gratuitos de asistencia lingüística. Llame al [phone].    We comply with  applicable federal civil rights laws and [place] laws. We do not discriminate on the basis of race, color, national origin, age, disability, sex, sexual orientation, or gender identity.

## 2018-11-27 NOTE — PROGRESS NOTES
BARIATRIC FOLLOW UP VISIT     November 27, 2018     HISTORY OF PRESENT ILLNESS: Pt returns today for her follow-up appointment status post laparoscopic gastric bypass. She is 2 years post surgery.  She had a perforated gastric marginal ulcer repair by Dr. Corea on 10/24/2018 from no known precipitating factor. Is currently on pantoprazole 40 mg daily and will continue this for at least the next 6 months.     Still working up to her solid diet following her recent surgery. She sees the dietician following my visit today.   Is doing well on her pureed diet. Expects to transition to soft solids today.      She has been working with [facility] for Binge eating disorder since February.  She works with a therapist and dietician weekly.  Sees the psychiatrist every 6 weeks. She continues to see Brenda Perez at [facility] every 2 weeks as well.    Her divorce is finalized.  This has been a huge relief. Her  still tries to get a hold of her. This is less frequent.  She is working on setting boundaries.   but has not tried for a week and a half.     Paulette has decreased her exercise to walking 5 days a week for 30 minutes per the direction of her therapist at [facility].     Initial Weight: 307 lb 11.2 oz (139.6 kg)   Current Weight: 124 lb 14.4 oz (56.7 kg)  Cumulative weight loss (lbs): 182.8  Last Visits Weight: 127 lb (57.6 kg)     Patient is taking the following bariatric postoperative vitamins:  2 Complete multivitamins with minerals (at different times than calcium)  4000 Int Units Vitamin D Daily    0830-5774 mg of Calcium daily in divided doses  2500 mcg of Vitamin B12 sl every three days     SOCIAL HISTORY:  Pt denies smoking.  Pt denies alcohol use.  Avoids NSAIDS.      REVIEW OF SYSTEMS:     GI:  Nausea-none  Vomiting-none  Diarrhea-none  Constipation-seldom, continues stool softener  Dysphagia-none  Abdominal Pain-none  Heartburn-seldom, on pantoprazole 40 mg daily. Has had a couple of days where she  "has had some belching episodes for a couple hours.  Occurs randomly and resolved spontaneously.      SKIN:  Intertriginous irritation-none       PSYCH:  Anxiety-sees Brenda Sherwood every other week.     ENDO:  Reactive Hypoglycemia/Lightheadedness- symptoms have resolved. Not currently taking any antidiabetic medications.     PHYSICAL EXAMINATION:   /68 (BP Location: Left arm, Patient Position: Sitting, Cuff Size: Adult Regular)  Pulse 67  Resp 12  Ht 5' 2\" (1.575 m)  Wt 124 lb 14.4 oz (56.7 kg)  SpO2 100%  BMI 22.84 kg/m2    GENERAL: Alert and oriented x3. NAD  HEART: No murmurs, rubs or gallops, Regular rate and rhythm  LUNGS: Breathing unlabored,  ABDOMEN: soft; nontender; nondistended, incision well healed. No hernia  EXTREMITIES:  Gait normal  SKIN: No intertriginous irritation or rash    ASSESSMENT AND PLAN:      2 years status laparoscopic gastric bypass  Body mass index is 22.84 kg/(m^2).  Body dysmorphic disorder   Continue to see SONI Lopez twice monthly.  HOLLIE   Continue working with dietician and therapist at [facility].  Post surgical malabsorption:   Ordered CBC, vitamin B12, vitamin D, PTH.   Follow food plan per dietitian recommendations.   Continue taking recommended post-op vitamins.   Ordered Dexa Scan.  Perforated Ulcer   Ordered Magnesium Level   Continue pantoprazole 40 mg daily for next year.   Iron deficiency/ Iron malabsorption   Continue iron infusion and management with hematology  Return to clinic in 1 year.    I spent a total of 25 minutes face to face with Criss during today's office visit. Over 50% of this time was spent counseling the patient and/or coordinating care.  "

## 2018-11-27 NOTE — PROGRESS NOTES
"NUTRITION POST OP APPOINTMENT  DATE OF VISIT: November 27, 2018    Criss Don  1970  female  [id number]  48 year old     ASSESSMENT:    REASON FOR VISIT:  Criss is a 48 year old year old female presents today for 2 year PO nutrition follow-up appointment. Patient is accompanied by self.    DIAGNOSIS:  Status post gastric bypass surgery.  Obesity Normal BMI     ANTHROPOMETRICS:  Initial Weight: 307.7 lbs    Height: 157.5 cm (5' 2\")  Current Weight: 56.7 kg (124 lb 14.4 oz)   BMI: 22.89 kg/(m^2).    VITAMINS AND MINERALS:  2 Multivitamin with Minerals  500 mg Calcium With Vitamin D TID  4000 International units Vitamin D  2500 mcg Vitamin B-12 injection, 3x/week  Iron infusions      NUTRITION HISTORY:  Breakfast: cream of wheat + scrambled egg   Lunch: greek yogurt   Supper: mashed potatoes mixed with cream-based soup  canned meat mixed with cream-based soup  Snacks: protein shake, greek yogurt  Fluids consumed: Water, protein shake   Consuming liquid calories: Yes  Protein intake: 40-50 grams/day  Tolerate regular texture food: No  Any foods not tolerated details: Yes  If any food not tolerated: solids (readvancing diet)  Portion size: 1/2-1 cup  Take 20-30 minutes to consume each meal: Yes   Eat protein foods first: Yes  Fluids and meals separate by at least 30 minutes: Yes  Chew foods thoroughly: Yes  Tolerating diet: Yes  Drinking high protein supplements: Yes  Consuming snacks per day: 1-2  Additional Information: Pt s/p ulcer perf, re-advancing diet and ready to start soft solids. Provided Stage 4 diet advancement information and reviewed. Pt continues to work with [facility] DOE. RD at this facility has pt on 2000kcal meal plan. Sent pt with bariatric meal plan, emphasized lifestyle and volume guidelines in conjunction with micronutrient guidelines from ED treatment plan.       PHYSICAL ACTIVITY:  Type: walking  Frequency (days per week): 5  Duration (min): 30    DIAGNOSIS:  Previous Nutrition " Diagnosis: Altered gastrointestinal function related to alteration in gastrointestinal structure as evidenced by history of gastric bypass surgery.- no change    Previous goals:  Gradually increase fiber intake to 10-15 grams of fiber per day - not met  Continue to practice all post- bariatric diet guidelines - met  Continue to include at least 1 serving of a food with carbohydrate (grain, fruit, starch veggie or legume) at each meal  - improving  Weight maintenance (not written) - not met    Current Nutrition Diagnosis: Altered gastrointestinal function related to alteration in gastrointestinal structure as evidenced by history of gastric bypass surgery.    INTERVENTION:   Nutrition Prescription: Eat 3 meals a day at regular intervals. Consume 60-90 grams of protein daily. Follow post-surgical vitamin and mineral protocol.  Assessed learning needs and learning preferences.    GOALS:  Continue to follow all post-op bariatric recommendations  Kcal and PRO goals per RD at [facility]      Follow-Up:   Recommend annual follow up visits to assist with lifestyle changes or per insurance.  Implementation: Discussed progress toward previous goals; reinforced importance of following bariatric lifestyle changes.    NUTRITION MONITORING AND EVALUATION:  Anticipated compliance: fair-good  Verbalized fair-good understanding.    Follow up: Patient to follow up in 12 months.    TIME SPENT WITH PATIENT:  25 minutes    Marimar Smith RD, LD  Clinical Dietitian

## 2018-11-27 NOTE — PATIENT INSTRUCTIONS
Call to schedule your bone density scan at [phone]    Continue pantoprazole 40 mg daily until appointment in 1 year.     Have follow up labs drawn.    Return to clinic in 1 year.

## 2018-11-27 NOTE — LETTER
[facility]          [address]  [address]  [place] [address]                                         Tel:  [phone]  Fax: [phone]    2019    Criss Don  [address] [place] [address]    : 1970      Dear Criss;     We have identified that you have outstanding lab tests that have . If you would like to have the  labs completed, please contact our clinic at [phone] to have them re-ordered. If you have any questions, please contact our office.  Sincerely,    Lor Shah CMA

## 2018-11-27 NOTE — MR AVS SNAPSHOT
After Visit Summary   11/27/2018    Criss Don    MRN: [id number]           Patient Information     Date Of Birth          1970        Visit Information        Provider Department      11/27/2018 8:30 AM Cris Paez PA-C [facility]      Today's Diagnoses     Iron deficiency    -  1    Bariatric surgery status        Postsurgical malabsorption        Perforated ulcer (H)        Malabsorption of iron        Binge eating disorder        Current use of proton pump inhibitor          Care Instructions    Call to schedule your bone density scan at [phone]    Continue pantoprazole 40 mg daily until appointment in 1 year.     Have follow up labs drawn.    Return to clinic in 1 year.                Follow-ups after your visit        Follow-up notes from your care team     Return in 1 year (on 11/27/2019).      Your next 10 appointments already scheduled     Nov 30, 2018  7:00 AM CST   Return Visit with Brenda Perez [facility] ([facility])    [address] [place] [address]  [place] [address]   [phone]            Dec 14, 2018  7:00 AM CST   Return Visit with Brenda Perez Song   [facility] [place] ([facility])    [address] [place], [place] [address] [address]   [phone]            Jan 04, 2019  7:00 AM CST   Return Visit with Brenda Perez Song   [facility] [place] ([facility])    [address] [place], [place] [address] [address]   [phone]            Feb 01, 2019  9:45 AM CST   Return Visit with  Oncology Nurse   [facility] ([facility])    [facility] [place]  [address]  [place] [address]   [phone]            Feb 07, 2019  1:00 PM CST   Return Visit with Pricilla Rahman MD   [facility]  [facility])    [facility] [place]  [address]  [place] [address] [phone]              Future tests that were ordered for you today     Open Future Orders        Priority Expected Expires Ordered    Magnesium Routine  5/26/2019 11/27/2018    DX Hip/Pelvis/Spine Routine  11/27/2019 11/27/2018    Vitamin B12 Routine 11/27/2018 11/27/2019 11/27/2018    Vitamin D Screen Routine 11/27/2018 11/27/2019 11/27/2018    Parathyroid Hormone Intact Routine 11/27/2018 11/27/2019 11/27/2018            Who to contact     If you have questions or need follow up information about today's clinic visit or your schedule please contact [facility] directly at [phone].  Normal or non-critical lab and imaging results will be communicated to you by MyChart, letter or phone within 4 business days after the clinic has received the results. If you do not hear from us within 7 days, please contact the clinic through MyChart or phone. If you have a critical or abnormal lab result, we will notify you by phone as soon as possible.  Submit refill requests through "[employer] or call your pharmacy and they will forward the refill request to us. Please allow 3 business days for your refill to be completed.          Additional Information About Your Visit        Care EveryWhere ID     This is your Care EveryWhere ID. This could be used by other organizations to access your [facility] medical records  [id number]        Your Vitals Were     Pulse Respirations Height Pulse Oximetry BMI (Body Mass Index)       67 12 5' 2\" (1.575 m) 100% 22.84 kg/m2        Blood Pressure from Last 3 Encounters:   11/27/18 104/68   11/16/18 98/73   11/06/18 98/62    Weight from Last 3 Encounters:   11/27/18 124 lb 14.4 oz (56.7 kg)   11/27/18 124 lb 14.4 oz (56.7 kg)   11/16/18 125 lb (56.7 kg)               Primary Care Provider Office Phone # Fax #    Napoleon Gray PA-C [phone] " [phone]       [address]  [place] [address]        Equal Access to Services     DORIKRISTIN TAMMY : Hadii deandra cowart kristian Wooten, waniyahda luqadaha, qaybta kaalmada kaushik, suri arturoin hayaabienvenido solizrandee lorenajosemanuelmandi yeager. So [facility] [phone].    ATENCIÓN: Si habla español, tiene a madera disposición servicios gratuitos de asistencia lingüística. Llame al [phone].    We comply with applicable federal civil rights laws and [place] laws. We do not discriminate on the basis of race, color, national origin, age, disability, sex, sexual orientation, or gender identity.            Thank you!     Thank you for choosing [facility]  for your care. Our goal is always to provide you with excellent care. Hearing back from our patients is one way we can continue to improve our services. Please take a few minutes to complete the written survey that you may receive in the mail after your visit with us. Thank you!             Your Updated Medication List - Protect others around you: Learn how to safely use, store and throw away your medicines at www.disposemymeds.org.          This list is accurate as of 11/27/18  9:01 AM.  Always use your most recent med list.                   Brand Name Dispense Instructions for use Diagnosis    acetaminophen 32 mg/mL liquid    TYLENOL    473 mL    Take 20.3 mLs (650 mg) by mouth every 4 hours as needed for pain    Perforated ulcer (H)       B-12 2500 MCG Subl      Place 1 tablet under the tongue Every Mon, Wed, Fri Morning 3 times weekly        childrens multivitamin w/ionr 60 MG chewable tablet      Take 2 chew tab by mouth every morning        cyclobenzaprine 5 MG tablet    FLEXERIL    20 tablet    Take 1-2 tablets (5-10 mg) by mouth 3 times daily as needed for muscle spasms    Perforated ulcer (H)       docusate sodium 100 MG capsule    COLACE     Take 300 mg by mouth every morning        FLUoxetine 20 MG capsule    PROzac     Take 20 mg by  mouth daily        mirtazapine 15 MG tablet    REMERON     Take 15 mg by mouth nightly as needed    Iron deficiency, Malabsorption of iron       ondansetron 4 MG ODT tab    ZOFRAN-ODT    20 tablet    Place 1-2 tablets (4-8mg) on tongue to dissolve every 6-8 hours as needed for nausea    Perforated ulcer (H)       order for DME     1 Units    Equipment being ordered: Donut pillow    Pressure injury of skin of sacral region, unspecified injury stage       * pantoprazole 40 MG EC tablet    PROTONIX    60 tablet    Take 1 tablet (40 mg) by mouth 2 times daily (before meals) Take 30-60 minutes before a meal.    Perforated ulcer (H)       * pantoprazole 40 MG EC tablet    PROTONIX    60 tablet    Take 1 tablet (40 mg) by mouth 2 times daily    Bariatric surgery status, Perforated ulcer (H)       ranitidine 150 MG tablet    ZANTAC     Take 150 mg by mouth daily        simethicone 80 MG chewable tablet    MYLICON    30 tablet    Take 1 tablet (80 mg) by mouth every 6 hours as needed for flatulence or cramping    Perforated ulcer (H)       SPRINTEC 28 0.25-35 MG-MCG per tablet   Generic drug:  norgestimate-ethinyl estradiol     84 tablet    TAKE ONE TABLET BY MOUTH ONCE DAILY    General counseling for prescription of oral contraceptives       VIACTIV 500-500-40 MG-UNT-MCG Chew   Generic drug:  calcium-vitamin D-vitamin K      Take 3 tablets by mouth At Bedtime        VITAMIN D3 PO      Take 2,000 Units by mouth 2 times daily        * Notice:  This list has 2 medication(s) that are the same as other medications prescribed for you. Read the directions carefully, and ask your doctor or other care provider to review them with you.

## 2018-11-30 ENCOUNTER — OFFICE VISIT (OUTPATIENT)
Dept: PSYCHOLOGY | Facility: CLINIC | Age: 48
End: 2018-11-30
Payer: COMMERCIAL

## 2018-11-30 DIAGNOSIS — F50.9 EATING DISORDER: ICD-10-CM

## 2018-11-30 DIAGNOSIS — F41.1 GENERALIZED ANXIETY DISORDER: Primary | ICD-10-CM

## 2018-11-30 PROCEDURE — 90834 PSYTX W PT 45 MINUTES: CPT | Performed by: PSYCHOLOGIST

## 2018-11-30 NOTE — PROGRESS NOTES
"                                             Progress Note    Client Name: Criss Don  Date:  11/30/2018         Service Type: Individual      Session Start Time: 7:00  Session End Time: 7:45      Session Length: 45     Session #: 42     Attendees: Client attended alone    Treatment Plan Last Reviewed:   11/30/2018  PHQ-9 / NED-7 :      DATA      Progress Since Last Session (Related to Symptoms / Goals / Homework):   Symptoms: mild improvement    Homework: Partially completed      Episode of Care Goals: Minimal progress - ACTION (Actively working towards change); Intervened by reinforcing change plan / affirming steps taken     Current / Ongoing Stressors and Concerns:   Reported she is feeling \"better\" physically and her anxiety has improved mildly over the last few weeks. Reported that several people in her family have been anxious lately, which triggers her own anxiety. Acknowledged that she \"constantly worries\" about her nephews, who both struggle with mental health issues and tend to make poor choices. Reported she has been better able to notice her signs of being anxious and has been working on letting go of the anxiety of others that she cannot control.        Treatment Objective(s) Addressed in This Session:   stress management  Maintain compliance with post-surgical dietary needs     Intervention:   CBT: coached client on ways to surface and replace thinking errors that increase anxiety; explored triggers to anxiety; reviewed physical warning signs of anxiety  Solution Focused: coached client on relaxation and stress management skills; coached her on taking things one step at a time; rehearsed self-compassionate statements in session  Supportive therapy:  provided active listening, support, and encouragement.       ASSESSMENT: Current Emotional / Mental Status (status of significant symptoms):   Risk status (Self / Other harm or suicidal ideation)   Client denies current fears or concerns for personal " safety.   Client denies current or recent suicidal ideation or behaviors.   Client denies current or recent homicidal ideation or behaviors.   Client denies current or recent self injurious behavior or ideation.   Client denies other safety concerns.   A safety and risk management plan has not been developed at this time, however client was given the after-hours number / 911 should there be a change in any of these risk factors.     Appearance:   Appropriate    Eye Contact:   Good    Psychomotor Behavior: Normal    Attitude:   Cooperative  Pleasant    Orientation:   All   Speech    Rate / Production: Normal     Volume:  Normal    Mood:    Anxious    Affect:    Appropriate    Thought Content:  worry    Thought Form:  Coherent  Logical    Insight:    Good      Medication Review:   No changes to current psychiatric medication(s)     Medication Compliance:   Yes     Changes in Health Issues:   None reported     Chemical Use Review:   Substance Use: Chemical use reviewed, no active concerns identified      Tobacco Use: No current tobacco use.       Collateral Reports Completed:   Not Applicable.     PLAN: (Client Tasks / Therapist Tasks / Other)  Future appointments are scheduled. Identify physical signs of anxiety and use relaxation skills as discussed in session. Use assertiveness skills as discussed in session to set effective limits with family and friends. Surface and challenge unrealistic expectations and disproportionate thoughts that generate anxiety.  Practice using self-compassionate statements as reviewed in session.      Brenda Perez PsyD LP                                                       Treatment Plan    Client's Name: Criss Don  YOB: 1970    Date:  11/30/2018    DSM-V Diagnoses: 300.02 Generalized Anxiety Disorder, eating disorder, unspecified  Psychosocial / Contextual Factors: divorce proceedings, health concerns  WHODAS: 22    Referral / Collaboration:  Referral to another  professional/service is not indicated at this time..    Anticipated number of session or this episode of care: reassess after 12 sessions      MeasurableTreatment Goal(s) related to diagnosis / functional impairment(s)  Goal 1: Client will learn coping skills to manage stress and adjust to post-surgical lifestyle changes more effectively.    I will know I've met my goal when I'm not weighing myself all the time and feeling more comfortable with my weight loss.      Objective #A (Client Action)    Client will surface and challenge thinking errors that contribute to anxiety.  Status: Continued - Date(s): 11/8/2018    Intervention(s)  Therapist will assign homework (thought  logs), assist client in surfacing and replacement ineffective thinking patterns.    Objective #B  Client will learn deep breathing and relaxation skills and practice at least 3x daily.  Status: Continued - Date(s): 11/8/2018    Intervention(s)  Therapist will teach deep breathing and relaxation skills, assign homework, problem-solve barriers to follow through.    Objective #C  Client will learn and use assertiveness skills to set healthy boundaries and communicate her needs more effectively.  Status: Continued - Date(s): 11/8/2018    Intervention(s)  Therapist will assist client in identifying and balancing her expectations.        Client has reviewed and agreed to the above plan.      Brenda Perez PsyD LP  11/8/2018

## 2018-11-30 NOTE — MR AVS SNAPSHOT
MRN:[id number]                      After Visit Summary   11/30/2018    Criss Don    MRN: [id number]           Visit Information        Provider Department      11/30/2018 7:00 AM Brenda Perez PsyD [facility] [place] [facility] Generic      Your next 10 appointments already scheduled     Dec 07, 2018 10:15 AM CST   DX HIP/PELVIS/SPINE with [id number]   [facility] Dexa ([facility])    [address] [place] [id number] [phone]           How do I prepare for my exam? (Food and drink instructions) No Food and Drink Restrictions.  How do I prepare for my exam? (Other instructions) Please do not take any of the following 24 hours prior to the day of your exam: vitamins, calcium tablets, antacids.  What should I wear: If possible, please wear clothes without metal (snaps, zippers). A sweat suit works well.  How long does the exam take: The exam takes about 20 minutes.  What should I bring: Bring a list of your current medicines to your exam (including vitamins, minerals and over-the-counter drugs).  Do I need a :  No  is needed.  What should I do after the exam: No restrictions, You may resume normal activities.  How do I prepare for my exam? (Food and drink instructions) A DEXA scan is a bone-density scan. It uses a low level of radiation to check the strength of your bones. As you lie on a padded table, a machine will take X-rays. We most often scan the hips and lower spine.  Who should I call with questions: If you have any questions, please call the Imaging Department where you will have your exam. Directions, parking instructions, and other information is available on our website, [email or web address].            Dec 14, 2018  7:00 AM CST   Return Visit with Brenda Perez PsyD   [facility] [place] ([facility] [place])    [address] [place] [address]  [place] [id number] [phone]             Jan 04, 2019  7:00 AM CST   Return Visit with Brenda Perez PsyD   [facility] [place] ([facility] [place])    [address] [facility]  [place] [address]   [phone]            Jan 17, 2019  1:00 PM CST   Return Visit with Brenda Perez PsyD   [facility] [place] ([facility] [place])    [address] [facility] [address]  [place] [address]   [phone]            Jan 31, 2019  1:00 PM CST   Return Visit with Brenda Perez PsyD   [facility] [place] ([facility] [place])    [address] [facility]  [place] [address]   [phone]            Feb 01, 2019  9:45 AM CST   Return Visit with  Oncology Nurse   [facility] ([facility])    [facility]  [address]  [place] [address]   [phone]            Feb 07, 2019  1:00 PM CST   Return Visit with Pricilla Rahman MD   [facility] ([facility])    [facility]  [address]  [place] [address]   [phone]              Care EveryWhere ID     This is your Care EveryWhere ID. This could be used by other organizations to access your [facility] medical records  [id number]        Equal Access to Services     STEVEN MUNOZ AH: Hadii aad ku hadasho Soomaali, waaxda luqadaha, qaybta kaalmada adeegyada, suri yeager. So [facility] [phone].    ATENCIÓN: Si habla español, tiene a madera disposición servicios gratuitos de asistencia lingüística. Llame al [phone].    We comply with applicable federal civil rights laws and [place] laws. We do not discriminate on the basis of race, color, national origin, age, disability, sex, sexual orientation, or gender identity.

## 2018-12-07 ENCOUNTER — HOSPITAL ENCOUNTER (OUTPATIENT)
Dept: BONE DENSITY | Facility: CLINIC | Age: 48
Discharge: HOME | End: 2018-12-07
Attending: PHYSICIAN ASSISTANT | Admitting: PHYSICIAN ASSISTANT
Payer: COMMERCIAL

## 2018-12-07 DIAGNOSIS — K91.2 POSTSURGICAL MALABSORPTION: ICD-10-CM

## 2018-12-07 DIAGNOSIS — Z79.899 CURRENT USE OF PROTON PUMP INHIBITOR: ICD-10-CM

## 2018-12-07 DIAGNOSIS — Z98.84 BARIATRIC SURGERY STATUS: ICD-10-CM

## 2018-12-07 PROCEDURE — 77080 DXA BONE DENSITY AXIAL: CPT

## 2018-12-11 ENCOUNTER — TELEPHONE (OUTPATIENT)
Dept: SURGERY | Facility: CLINIC | Age: 48
End: 2018-12-11

## 2018-12-11 NOTE — TELEPHONE ENCOUNTER
Reason for call:  Other   Patient called regarding (reason for call): Her clinic told her to call Cris so Cris can explain bone density test to have a clearer understanding   Additional comments: Wants a call back.    Phone number to reach patient:  Home number on file [phone] (home)    Best Time:  Any    Can we leave a detailed message on this number?  YES     stated

## 2018-12-13 ENCOUNTER — TELEPHONE (OUTPATIENT)
Dept: SURGERY | Facility: CLINIC | Age: 48
End: 2018-12-13

## 2018-12-13 NOTE — TELEPHONE ENCOUNTER
IMMANUEL Paez patient:    Reason for call:  Other   Patient called regarding (reason for call): call back  Additional comments: Patient would like a call back from Cris or the care team to discuss her bone density test results. It is abnormal and she is wondering if she needs to do anything differently. Please do not try to contact her through [employer] per her request.    Phone number to reach patient:  Home number on file [phone] (home)    Best Time:  Anytime    Can we leave a detailed message on this number?  YES

## 2018-12-13 NOTE — TELEPHONE ENCOUNTER
Reviewed bone density test results with pt today.  The results came back showing osteopenia.     Please make sure you are doing the following:    Take calcium+D  3421-9355 mg in divided doses  Take 4000 Int Units of Vitamin D daily   Getting weight bearing exercise like walking 5 days a week    Here is some more information about osteopenia:    Bone density is a measurement of how strong (dense) your bones are. Low bone density compared to normal peak density is called osteopenia.  Having osteopenia means there is a greater risk you could develop osteoporosis or very low (weak) bone density. Weaker thinner bones can break easier.     With weight loss surgery, patients can have malabsorption where the body does not take in take in and use as many vitamins and minerals.      Osteopenia is treated by taking steps to keep it from progressing to osteoporosis. Lifestyle changes can help reduce the bone loss that leads to osteopenia and osteoporosis.    Nutrition is improtent to bone development. Calcium is the most critical mineral for bone mass. Vitamin D helps your body absorb calcium and other minerals. Exercise is important for having strong bones, because bone forms in response to stress. Weight-bearing exercises such as walking, hiking, and dancing are all good choices. Avoiding smoking, alcohol, and pop will also reduce your risk of bone loss.    Will order repeat bone density scan can in 2 years.

## 2018-12-14 ENCOUNTER — OFFICE VISIT (OUTPATIENT)
Dept: PSYCHOLOGY | Facility: CLINIC | Age: 48
End: 2018-12-14
Payer: COMMERCIAL

## 2018-12-14 DIAGNOSIS — F41.1 GENERALIZED ANXIETY DISORDER: Primary | ICD-10-CM

## 2018-12-14 DIAGNOSIS — F50.9 EATING DISORDER, UNSPECIFIED: ICD-10-CM

## 2018-12-14 PROCEDURE — 90834 PSYTX W PT 45 MINUTES: CPT | Performed by: PSYCHOLOGIST

## 2018-12-14 NOTE — PROGRESS NOTES
"                                             Progress Note    Client Name: Criss Don  Date:  12/14/2018         Service Type: Individual      Session Start Time: 7:00  Session End Time: 7:45      Session Length: 45     Session #: 43     Attendees: Client attended alone    Treatment Plan Last Reviewed:   12/14/2018  PHQ-9 / NED-7 :      DATA      Progress Since Last Session (Related to Symptoms / Goals / Homework):   Symptoms: increased anxiety    Homework: Partially completed      Episode of Care Goals: Minimal progress - ACTION (Actively working towards change); Intervened by reinforcing change plan / affirming steps taken     Current / Ongoing Stressors and Concerns:   Reported she is feeling nervous and excited to travel to [place] to see her boyfriend over [place]. Reported he is struggling to deal emotionally with his elderly mother's dementia symptoms. Reported that her nephew is \"afraid that I'm going to die and he'll have nobody left.\" Reported her team at [facility] has been \"hinting\" that she will need inpatient treatment if she does not start gaining weight. Reported that she has lost several pounds since being hospitalized, which has her team concerned. Reported that this is a source of anxiety for her because \"I really have been following their recommendations\".        Treatment Objective(s) Addressed in This Session:   stress management  Maintain compliance with post-surgical dietary needs     Intervention:   CBT: coached client on ways to surface and replace thinking errors that increase anxiety; explored triggers to anxiety; reviewed physical warning signs of anxiety  Solution Focused: coached client on relaxation and stress management skills; coached her on taking things one step at a time; rehearsed self-compassionate statements in session; explored any possible barriers to following recommendations from her eating disorder team  Supportive therapy:  provided active listening, support, and " - stable  - no overt bleeding  - monitor     encouragement.       ASSESSMENT: Current Emotional / Mental Status (status of significant symptoms):   Risk status (Self / Other harm or suicidal ideation)   Client denies current fears or concerns for personal safety.   Client denies current or recent suicidal ideation or behaviors.   Client denies current or recent homicidal ideation or behaviors.   Client denies current or recent self injurious behavior or ideation.   Client denies other safety concerns.   A safety and risk management plan has not been developed at this time, however client was given the after-hours number / 911 should there be a change in any of these risk factors.     Appearance:   Appropriate    Eye Contact:   Good    Psychomotor Behavior: Normal    Attitude:   Cooperative  Pleasant    Orientation:   All   Speech    Rate / Production: Normal     Volume:  Normal    Mood:    Anxious    Affect:    Appropriate    Thought Content:  worry    Thought Form:  Coherent  Logical    Insight:    Good      Medication Review:   No changes to current psychiatric medication(s)     Medication Compliance:   Yes     Changes in Health Issues:   None reported     Chemical Use Review:   Substance Use: Chemical use reviewed, no active concerns identified      Tobacco Use: No current tobacco use.       Collateral Reports Completed:   Not Applicable.     PLAN: (Client Tasks / Therapist Tasks / Other)  Future appointments are scheduled. Identify physical signs of anxiety and use relaxation skills as discussed in session. Use assertiveness skills as discussed in session to set effective limits with family and friends. Surface and challenge unrealistic expectations and disproportionate thoughts that generate anxiety.  Practice using self-compassionate statements as reviewed in session.      Brenda Perez PsyD LP                                                       Treatment Plan    Client's Name: Criss Don  YOB: 1970    Date:  12/14/2018    DSM-V  Diagnoses: 300.02 Generalized Anxiety Disorder, eating disorder, unspecified  Psychosocial / Contextual Factors: divorce proceedings, health concerns  WHODAS: 22    Referral / Collaboration:  Referral to another professional/service is not indicated at this time..    Anticipated number of session or this episode of care: reassess after 12 sessions      MeasurableTreatment Goal(s) related to diagnosis / functional impairment(s)  Goal 1: Client will learn coping skills to manage stress and adjust to post-surgical lifestyle changes more effectively.    I will know I've met my goal when I'm not weighing myself all the time and feeling more comfortable with my weight loss.      Objective #A (Client Action)    Client will surface and challenge thinking errors that contribute to anxiety.  Status: Continued - Date(s): 12/14/2018    Intervention(s)  Therapist will assign homework (thought  logs), assist client in surfacing and replacement ineffective thinking patterns.    Objective #B  Client will learn deep breathing and relaxation skills and practice at least 3x daily.  Status: Continued - Date(s): 12/14/2018    Intervention(s)  Therapist will teach deep breathing and relaxation skills, assign homework, problem-solve barriers to follow through.    Objective #C  Client will learn and use assertiveness skills to set healthy boundaries and communicate her needs more effectively.  Status: Continued - Date(s): 12/14/2018    Intervention(s)  Therapist will assist client in identifying and balancing her expectations.        Client has reviewed and agreed to the above plan.      Brenda Perez PsyD LP  12/14/2018

## 2019-01-03 ENCOUNTER — HOSPITAL ENCOUNTER (EMERGENCY)
Facility: CLINIC | Age: 49
Discharge: HOME | End: 2019-01-03
Attending: EMERGENCY MEDICINE | Admitting: EMERGENCY MEDICINE
Payer: COMMERCIAL

## 2019-01-03 ENCOUNTER — APPOINTMENT (OUTPATIENT)
Dept: CT IMAGING | Facility: CLINIC | Age: 49
End: 2019-01-03
Payer: COMMERCIAL

## 2019-01-03 VITALS
DIASTOLIC BLOOD PRESSURE: 72 MMHG | TEMPERATURE: 97.8 F | OXYGEN SATURATION: 99 % | BODY MASS INDEX: 22.45 KG/M2 | WEIGHT: 122 LBS | HEART RATE: 69 BPM | SYSTOLIC BLOOD PRESSURE: 101 MMHG | HEIGHT: 62 IN | RESPIRATION RATE: 16 BRPM

## 2019-01-03 DIAGNOSIS — N39.0 URINARY TRACT INFECTION WITHOUT HEMATURIA, SITE UNSPECIFIED: ICD-10-CM

## 2019-01-03 DIAGNOSIS — R10.31 ABDOMINAL PAIN, RIGHT LOWER QUADRANT: ICD-10-CM

## 2019-01-03 LAB
ALBUMIN SERPL-MCNC: 2.9 G/DL (ref 3.4–5)
ALBUMIN SERPL-MCNC: NORMAL G/DL (ref 3.4–5)
ALBUMIN UR-MCNC: 30 MG/DL
ALP SERPL-CCNC: 42 U/L (ref 40–150)
ALP SERPL-CCNC: NORMAL U/L (ref 40–150)
ALT SERPL W P-5'-P-CCNC: 31 U/L (ref 0–50)
ALT SERPL W P-5'-P-CCNC: NORMAL U/L (ref 0–50)
ANION GAP SERPL CALCULATED.3IONS-SCNC: 8 MMOL/L (ref 3–14)
ANION GAP SERPL CALCULATED.3IONS-SCNC: NORMAL MMOL/L (ref 6–17)
APPEARANCE UR: ABNORMAL
AST SERPL W P-5'-P-CCNC: 16 U/L (ref 0–45)
AST SERPL W P-5'-P-CCNC: NORMAL U/L (ref 0–45)
BASOPHILS # BLD AUTO: 0.1 10E9/L (ref 0–0.2)
BASOPHILS NFR BLD AUTO: 1.3 %
BILIRUB SERPL-MCNC: 0.2 MG/DL (ref 0.2–1.3)
BILIRUB SERPL-MCNC: NORMAL MG/DL (ref 0.2–1.3)
BILIRUB UR QL STRIP: NEGATIVE
BUN SERPL-MCNC: 15 MG/DL (ref 7–30)
BUN SERPL-MCNC: NORMAL MG/DL (ref 7–30)
CALCIUM SERPL-MCNC: 8.1 MG/DL (ref 8.5–10.1)
CALCIUM SERPL-MCNC: NORMAL MG/DL (ref 8.5–10.1)
CAOX CRY #/AREA URNS HPF: ABNORMAL /HPF
CHLORIDE SERPL-SCNC: 105 MMOL/L (ref 94–109)
CHLORIDE SERPL-SCNC: NORMAL MMOL/L (ref 94–109)
CO2 SERPL-SCNC: 28 MMOL/L (ref 20–32)
CO2 SERPL-SCNC: NORMAL MMOL/L (ref 20–32)
COLOR UR AUTO: YELLOW
CREAT SERPL-MCNC: 0.64 MG/DL (ref 0.52–1.04)
CREAT SERPL-MCNC: NORMAL MG/DL (ref 0.52–1.04)
DIFFERENTIAL METHOD BLD: NORMAL
EOSINOPHIL # BLD AUTO: 0 10E9/L (ref 0–0.7)
EOSINOPHIL NFR BLD AUTO: 0.9 %
ERYTHROCYTE [DISTWIDTH] IN BLOOD BY AUTOMATED COUNT: 13.5 % (ref 10–15)
GFR SERPL CREATININE-BSD FRML MDRD: >90 ML/MIN/{1.73_M2}
GFR SERPL CREATININE-BSD FRML MDRD: NORMAL ML/MIN/{1.73_M2}
GLUCOSE SERPL-MCNC: 92 MG/DL (ref 70–99)
GLUCOSE SERPL-MCNC: NORMAL MG/DL (ref 70–99)
GLUCOSE UR STRIP-MCNC: NEGATIVE MG/DL
HCT VFR BLD AUTO: 35 % (ref 35–47)
HGB BLD-MCNC: 11.7 G/DL (ref 11.7–15.7)
HGB UR QL STRIP: NEGATIVE
IMM GRANULOCYTES # BLD: 0 10E9/L (ref 0–0.4)
IMM GRANULOCYTES NFR BLD: 0.2 %
KETONES UR STRIP-MCNC: NEGATIVE MG/DL
LEUKOCYTE ESTERASE UR QL STRIP: ABNORMAL
LIPASE SERPL-CCNC: 88 U/L (ref 73–393)
LIPASE SERPL-CCNC: NORMAL U/L (ref 73–393)
LYMPHOCYTES # BLD AUTO: 1.3 10E9/L (ref 0.8–5.3)
LYMPHOCYTES NFR BLD AUTO: 27.8 %
MCH RBC QN AUTO: 30.2 PG (ref 26.5–33)
MCHC RBC AUTO-ENTMCNC: 33.4 G/DL (ref 31.5–36.5)
MCV RBC AUTO: 90 FL (ref 78–100)
MONOCYTES # BLD AUTO: 0.2 10E9/L (ref 0–1.3)
MONOCYTES NFR BLD AUTO: 5.3 %
MUCOUS THREADS #/AREA URNS LPF: PRESENT /LPF
NEUTROPHILS # BLD AUTO: 3 10E9/L (ref 1.6–8.3)
NEUTROPHILS NFR BLD AUTO: 64.5 %
NITRATE UR QL: NEGATIVE
NRBC # BLD AUTO: 0 10*3/UL
NRBC BLD AUTO-RTO: 0 /100
PH UR STRIP: 5.5 PH (ref 5–7)
PLATELET # BLD AUTO: 216 10E9/L (ref 150–450)
POTASSIUM SERPL-SCNC: 3.5 MMOL/L (ref 3.4–5.3)
POTASSIUM SERPL-SCNC: NORMAL MMOL/L (ref 3.4–5.3)
PROT SERPL-MCNC: 5.7 G/DL (ref 6.8–8.8)
PROT SERPL-MCNC: NORMAL G/DL (ref 6.8–8.8)
RBC # BLD AUTO: 3.87 10E12/L (ref 3.8–5.2)
RBC #/AREA URNS AUTO: 3 /HPF (ref 0–2)
SODIUM SERPL-SCNC: 141 MMOL/L (ref 133–144)
SODIUM SERPL-SCNC: NORMAL MMOL/L (ref 133–144)
SOURCE: ABNORMAL
SP GR UR STRIP: 1.03 (ref 1–1.03)
UROBILINOGEN UR STRIP-MCNC: 2 MG/DL (ref 0–2)
WBC # BLD AUTO: 4.6 10E9/L (ref 4–11)
WBC #/AREA URNS AUTO: 12 /HPF (ref 0–5)

## 2019-01-03 PROCEDURE — 82565 ASSAY OF CREATININE: CPT | Mod: 91

## 2019-01-03 PROCEDURE — 80053 COMPREHEN METABOLIC PANEL: CPT | Performed by: EMERGENCY MEDICINE

## 2019-01-03 PROCEDURE — 85025 COMPLETE CBC W/AUTO DIFF WBC: CPT | Performed by: EMERGENCY MEDICINE

## 2019-01-03 PROCEDURE — 74177 CT ABD & PELVIS W/CONTRAST: CPT

## 2019-01-03 PROCEDURE — 25000125 ZZHC RX 250: Performed by: EMERGENCY MEDICINE

## 2019-01-03 PROCEDURE — 87086 URINE CULTURE/COLONY COUNT: CPT | Performed by: EMERGENCY MEDICINE

## 2019-01-03 PROCEDURE — 25000128 H RX IP 250 OP 636: Performed by: EMERGENCY MEDICINE

## 2019-01-03 PROCEDURE — 81001 URINALYSIS AUTO W/SCOPE: CPT | Performed by: EMERGENCY MEDICINE

## 2019-01-03 PROCEDURE — 99285 EMERGENCY DEPT VISIT HI MDM: CPT | Mod: 25

## 2019-01-03 PROCEDURE — 83690 ASSAY OF LIPASE: CPT | Performed by: EMERGENCY MEDICINE

## 2019-01-03 RX ORDER — IOPAMIDOL 755 MG/ML
61 INJECTION, SOLUTION INTRAVASCULAR ONCE
Status: COMPLETED | OUTPATIENT
Start: 2019-01-03 | End: 2019-01-03

## 2019-01-03 RX ORDER — CEPHALEXIN 500 MG/1
500 CAPSULE ORAL 3 TIMES DAILY
Qty: 28 CAPSULE | Refills: 0 | Status: SHIPPED | OUTPATIENT
Start: 2019-01-03 | End: 2019-02-01

## 2019-01-03 RX ADMIN — IOPAMIDOL 61 ML: 755 INJECTION, SOLUTION INTRAVENOUS at 17:05

## 2019-01-03 RX ADMIN — SODIUM CHLORIDE 60 ML: 9 INJECTION, SOLUTION INTRAVENOUS at 17:05

## 2019-01-03 SDOH — HEALTH STABILITY: MENTAL HEALTH: HOW OFTEN DO YOU HAVE A DRINK CONTAINING ALCOHOL?: NEVER

## 2019-01-03 ASSESSMENT — MIFFLIN-ST. JEOR: SCORE: 1126.64

## 2019-01-03 ASSESSMENT — ENCOUNTER SYMPTOMS: ABDOMINAL PAIN: 1

## 2019-01-03 NOTE — ED NOTES
Bed: ED10  Expected date:   Expected time:   Means of arrival:   Comments:  ernestina - 515 - 49 F abd pain eta 8052

## 2019-01-03 NOTE — ED PROVIDER NOTES
"  History     Chief Complaint:  Abdominal Pain    HPI   Criss Don is a 48 year old female with a history of stomach ulcers who presents to the emergency department for evaluation of abdominal pain. The patient reports she was at baseline throughout the day today until she began experiencing sudden onset right shoulder pain at around 1300. She indicates the pain has since migrated down to the suprapubic region of her abdomen. Her current pain is similar to her history of stomach ulcers. The patient remarks her last period was on 12/21. She indicates her pain has improved with Tylenol which she received from EMS.    Allergies:  Lortab [Hydrocodone-Acetaminophen]     Medications:    Flexeril  Prozac  Protonix  Tylenol  Colace  Remeron  Zantac   Mylicon     Past Medical History:    Perforated ulcer  Depression  Obesity  Urinary frequency  Stomach ulcer    Past Surgical History:    Bypass gastric    Family History:    HTN  Breast cancer  Obesity  Asthma  Heart disease  Cervical cancer    Social History:  Presents alone.  Never smoker.  Negative for alcohol use.  Marital Status:   [2]     Review of Systems   Gastrointestinal: Positive for abdominal pain.   All other systems reviewed and are negative.        Physical Exam     Patient Vitals for the past 24 hrs:   BP Temp Temp src Pulse Resp SpO2 Height Weight   01/03/19 1818 -- -- -- -- 16 -- -- --   01/03/19 1810 -- -- -- -- -- 99 % -- --   01/03/19 1800 101/72 -- -- 69 -- 99 % -- --   01/03/19 1730 104/73 -- -- 66 -- -- -- --   01/03/19 1536 -- -- -- -- -- 100 % -- --   01/03/19 1500 101/75 -- -- 65 -- 100 % -- --   01/03/19 1454 107/76 97.8  F (36.6  C) Oral 65 16 100 % 1.575 m (5' 2\") 55.3 kg (122 lb)     Physical Exam  General: Patient is alert and normal appearing.  HEENT: Head atraumatic    Eyes: pupils equal and reactive. Conjunctiva clear   Nares: patent   Oropharynx: no lesions, uvula midline, no palatal draping, normal voice, no trismus  Neck: " Supple without lymphadenopathy, no meningismus  Chest: Heart regular rate and rhythm.   Lungs: Equal clear to auscultation with no wheeze or rales  Abdomen: Soft, non tender, nondistended, normal bowel sounds, no McBurney point tenderness to palpation, no rebound, no guarding  Back: No costovertebral angle tenderness, no midline C, T or L spine tenderness  Neuro: Grossly nonfocal, normal speech, strength equal bilaterally, CN 2-12 intact  Extremities: No deformities, equal radial and DP pulses. No clubbing, cyanosis.  No edema  Skin: Warm and dry with no rash.       Emergency Department Course     Imaging:  Radiographic findings were communicated with the patient who voiced understanding of the findings.    CT Abdomen/Pelvis with IV contrast:   Small amount of pelvic free fluid which is nonspecific,  otherwise no acute process demonstrated in the abdomen and pelvis.  As per radiology.    Laboratory:  UA with micro: Albumin 30, Leukocyte Esterase Large, RBC 3 (H), WBC 12 (H), Mucous Present, Calcium Oxalate Few, o/w negative    CBC: WBC: 4.6, HGB: 11.7, PLT: 216  CMP: Calcium 8.1 (L), Albumin 2.9 (L), Protein Total 5.7 (H), o/w WNL (Creatinine: 0.64)    1528 Lipase: 102  1642 Lipase: 88    Urine culture aerobic bacterial pending.    Emergency Department Course:  Nursing notes and vitals reviewed. 1540 I performed an exam of the patient as documented above.     IV inserted. Medicine administered as documented above. Blood drawn. This was sent to the lab for further testing, results above.    The patient was sent for a CT Abd/Pelvis while in the emergency department, findings above.     1810 I rechecked the patient and discussed the results of her workup thus far.     Findings and plan explained to the Patient. Patient discharged home with instructions regarding supportive care, medications, and reasons to return. The importance of close follow-up was reviewed. The patient was prescribed Keflex.    I personally  reviewed the laboratory results with the Patient and answered all related questions prior to discharge.     Impression & Plan      Medical Decision Making:  She presents emergency department abdominal pain that started with shoulder pain this morning then radiated down to her pelvis region.  She has no reproducible tenderness on examination here and is hemodynamically stable with normal vital signs.  She stated it felt reminiscent of when she had a perforated ulcer in the past.  She has a benign abdominal exam but given her history she was sent for CT scan of her abdomen pelvis.  No acute abnormalities identified other than a small amount of free fluid in the pelvis.  I repeated her abdominal examination and she continues to have no tenderness on my examination and no obvious rebound or guarding.  She is no specific McBurney point tenderness to palpation.  She does not have tenderness that would make me think this was ovarian torsion.  She has some evidence of a UTI will be given 3 days of Keflex for this.  Discussed with patient that this process may need to further declare itself and should she develop persistent pain, fever, vomiting she should return for repeat evaluation.  Patient does state to me at the end that she is being treated at [facility] for an eating disorder and she ate extra food yesterday that she normally would not because she had lost weight on vacation and they were not happy with her weight loss.  It is possible that this was bowel gas pain that she experienced.  No evidence to suggest perforation at this time.  Patient is encouraged to use Tylenol as needed for pain and if anything is worsening to return to the emergency department.  All patient's questions and concerns addressed and return precautions reviewed at length.    Diagnosis:    ICD-10-CM   1. Abdominal pain, right lower quadrant R10.31   2. Urinary tract infection without hematuria, site unspecified N39.0        Disposition:  discharged to home    Discharge Medications:     Medication List      Started    cephALEXin 500 MG capsule  Commonly known as:  KEFLEX  500 mg, Oral, 3 TIMES DAILY          I, Liam Salcido, am serving as a scribe on 1/3/2019 at 3:46 PM to personally document services performed by Halima Garcia MD based on my observations and the provider's statements to me.     Liam Salcido  1/3/2019    EMERGENCY DEPARTMENT       Halima Garcia MD  01/03/19 [id number]

## 2019-01-03 NOTE — ED AVS SNAPSHOT
Emergency Department  [address] [place] [address]  Phone:  [phone]  Fax:  [phone]                                    Criss Don   MRN: [id number]    Department:   Emergency Department   Date of Visit:  1/3/2019           After Visit Summary Signature Page    I have received my discharge instructions, and my questions have been answered. I have discussed any challenges I see with this plan with the nurse or doctor.    ..........................................................................................................................................  Patient/Patient Representative Signature      ..........................................................................................................................................  Patient Representative Print Name and Relationship to Patient    ..................................................               ................................................  Date                                   Time    ..........................................................................................................................................  Reviewed by Signature/Title    ...................................................              ..............................................  Date                                               Time          22EPIC Rev 08/18

## 2019-01-04 ENCOUNTER — OFFICE VISIT (OUTPATIENT)
Dept: PSYCHOLOGY | Facility: CLINIC | Age: 49
End: 2019-01-04
Payer: COMMERCIAL

## 2019-01-04 ENCOUNTER — TELEPHONE (OUTPATIENT)
Dept: SURGERY | Facility: CLINIC | Age: 49
End: 2019-01-04

## 2019-01-04 DIAGNOSIS — F41.1 GENERALIZED ANXIETY DISORDER: Primary | ICD-10-CM

## 2019-01-04 DIAGNOSIS — F50.9 EATING DISORDER, UNSPECIFIED: ICD-10-CM

## 2019-01-04 LAB
BACTERIA SPEC CULT: NO GROWTH
Lab: NORMAL
SPECIMEN SOURCE: NORMAL

## 2019-01-04 PROCEDURE — 90834 PSYTX W PT 45 MINUTES: CPT | Performed by: PSYCHOLOGIST

## 2019-01-04 NOTE — RESULT ENCOUNTER NOTE
Emergency Dept/Urgent Care discharge antibiotic (if prescribed): Cephalexin (Keflex) 500 mg capsule, 1 capsule (500 mg) by mouth 3 times daily for 3 days.  Date of Rx (if applicable):  1/3/19  No changes in treatment per Urine culture protocol.

## 2019-01-04 NOTE — TELEPHONE ENCOUNTER
1 year S/P gastric bypass patient calling in stating she was in the ED yesterday for abdominal pain.  She had a CT and they saw kidney stones.  Reviewed notes in Epic. Diagnosed with UTI and given ABX.  No evidence of perforation which is what concerned patient.  She was advised to check with bariatric clinic as to whether or not she should reduce her calcium intake.  Currently on viactiv three times daily.  Gets in about 64 oz of water daily.  She does not have a history of kidney stones and they do not know what type of kidney stone she currently has.      Advised that with kidney stones hydration is very important.  She could increase her water intake daily.  It is not normally recommended to decrease calcium especially if not sure of what type of kidney stone it is.

## 2019-01-04 NOTE — PROGRESS NOTES
Progress Note    Client Name: Criss Don  Date:  1/4/2019         Service Type: Individual      Session Start Time: 7:00  Session End Time: 7:45      Session Length: 45     Session #: 43     Attendees: Client attended alone    Treatment Plan Last Reviewed:   1/4/2019  PHQ-9 / NED-7 :      DATA      Progress Since Last Session (Related to Symptoms / Goals / Homework):   Symptoms: stable    Homework: Partially completed      Episode of Care Goals: Minimal progress - ACTION (Actively working towards change); Intervened by reinforcing change plan / affirming steps taken     Current / Ongoing Stressors and Concerns:   Reported her trip to [place] was positive overall, and that she enjoyed seeing her boyfriend and their friends. Reported that his mother is not likely to live much longer, which has been hard on her boyfriend. Reported feeling anxious after presenting to the ER yesterday for pain that resembled the pain she had with a perforated ulcer, but was relieved that test results showed kidney stones and a UTI instead.        Treatment Objective(s) Addressed in This Session:   stress management  Maintain compliance with post-surgical dietary needs     Intervention:   CBT: coached client on ways to surface and replace thinking errors that increase anxiety; explored triggers to anxiety; reviewed physical warning signs of anxiety  Solution Focused: coached client on relaxation and stress management skills; coached her on taking things one step at a time; rehearsed self-compassionate statements in session; explored any possible barriers to following recommendations from her eating disorder team  Supportive therapy:  provided active listening, support, and encouragement.       ASSESSMENT: Current Emotional / Mental Status (status of significant symptoms):   Risk status (Self / Other harm or suicidal ideation)   Client denies current fears or concerns for personal  "safety.   Client denies current or recent suicidal ideation or behaviors.   Client denies current or recent homicidal ideation or behaviors.   Client denies current or recent self injurious behavior or ideation.   Client denies other safety concerns.   A safety and risk management plan has not been developed at this time, however client was given the after-hours number / 911 should there be a change in any of these risk factors.     Appearance:   Appropriate    Eye Contact:   Good    Psychomotor Behavior: Normal    Attitude:   Cooperative  Pleasant    Orientation:   All   Speech    Rate / Production: Normal     Volume:  Normal    Mood:    \"pretty good\" with periods of anxiety    Affect:    Appropriate    Thought Content:  worry    Thought Form:  Coherent  Logical    Insight:    Good      Medication Review:   No changes to current psychiatric medication(s)     Medication Compliance:   Yes     Changes in Health Issues:   None reported     Chemical Use Review:   Substance Use: Chemical use reviewed, no active concerns identified      Tobacco Use: No current tobacco use.       Collateral Reports Completed:   Not Applicable.     PLAN: (Client Tasks / Therapist Tasks / Other)  Future appointments are scheduled. Identify physical signs of anxiety and use relaxation skills as discussed in session. Use assertiveness skills as discussed in session to set effective limits with family and friends. Surface and challenge unrealistic expectations and disproportionate thoughts that generate anxiety.  Practice using self-compassionate statements as reviewed in session.      Brenda Perez PsyD LP                                                       Treatment Plan    Client's Name: Criss Don  YOB: 1970    Date:  1/4/2019    DSM-V Diagnoses: 300.02 Generalized Anxiety Disorder, eating disorder, unspecified  Psychosocial / Contextual Factors: divorce proceedings, health concerns  WHODAS: 22    Referral / " Collaboration:  Referral to another professional/service is not indicated at this time..    Anticipated number of session or this episode of care: reassess after 12 sessions      MeasurableTreatment Goal(s) related to diagnosis / functional impairment(s)  Goal 1: Client will learn coping skills to manage stress and adjust to post-surgical lifestyle changes more effectively.    I will know I've met my goal when I'm not weighing myself all the time and feeling more comfortable with my weight loss.      Objective #A (Client Action)    Client will surface and challenge thinking errors that contribute to anxiety.  Status: Continued - Date(s): 1/4/2019    Intervention(s)  Therapist will assign homework (thought  logs), assist client in surfacing and replacement ineffective thinking patterns.    Objective #B  Client will learn deep breathing and relaxation skills and practice at least 3x daily.  Status: Continued - Date(s): 1/4/2019    Intervention(s)  Therapist will teach deep breathing and relaxation skills, assign homework, problem-solve barriers to follow through.    Objective #C  Client will learn and use assertiveness skills to set healthy boundaries and communicate her needs more effectively.  Status: Continued - Date(s): 1/4/2019    Intervention(s)  Therapist will assist client in identifying and balancing her expectations.        Client has reviewed and agreed to the above plan.      Brenda Perez PsyD LP  1/4/2019

## 2019-01-05 LAB
CREAT BLD-MCNC: 0.6 MG/DL (ref 0.52–1.04)
GFR SERPL CREATININE-BSD FRML MDRD: >90 ML/MIN/{1.73_M2}

## 2019-01-10 ENCOUNTER — OFFICE VISIT (OUTPATIENT)
Dept: FAMILY MEDICINE | Facility: CLINIC | Age: 49
End: 2019-01-10
Payer: COMMERCIAL

## 2019-01-10 VITALS
SYSTOLIC BLOOD PRESSURE: 100 MMHG | WEIGHT: 124 LBS | TEMPERATURE: 96.3 F | BODY MASS INDEX: 22.82 KG/M2 | HEIGHT: 62 IN | HEART RATE: 61 BPM | DIASTOLIC BLOOD PRESSURE: 69 MMHG

## 2019-01-10 DIAGNOSIS — Z98.84 BARIATRIC SURGERY STATUS: ICD-10-CM

## 2019-01-10 DIAGNOSIS — N20.0 NEPHROLITHIASIS: Primary | ICD-10-CM

## 2019-01-10 DIAGNOSIS — R10.84 ABDOMINAL PAIN, GENERALIZED: ICD-10-CM

## 2019-01-10 PROCEDURE — 99214 OFFICE O/P EST MOD 30 MIN: CPT | Performed by: PHYSICIAN ASSISTANT

## 2019-01-10 ASSESSMENT — MIFFLIN-ST. JEOR: SCORE: 1135.71

## 2019-01-10 ASSESSMENT — PATIENT HEALTH QUESTIONNAIRE - PHQ9: SUM OF ALL RESPONSES TO PHQ QUESTIONS 1-9: 16

## 2019-01-10 NOTE — NURSING NOTE
"Chief Complaint   Patient presents with     ER F/U       Initial /69   Pulse 61   Temp 96.3  F (35.7  C) (Tympanic)   Ht 1.575 m (5' 2\")   Wt 56.2 kg (124 lb)   Breastfeeding? No   BMI 22.68 kg/m   Estimated body mass index is 22.68 kg/m  as calculated from the following:    Height as of this encounter: 1.575 m (5' 2\").    Weight as of this encounter: 56.2 kg (124 lb).  BP completed using cuff size: regular    Health Maintenance Due   Topic Date Due     HIV SCREEN (SYSTEM ASSIGNED)  11/17/1986     ZOSTER IMMUNIZATION (1 of 2) 11/17/2018     COLON CANCER SCREEN (SYSTEM ASSIGNED)  11/17/2018     PHQ-2 Q1 YR  01/25/2019         Love Bee MA 1/10/19        "

## 2019-01-10 NOTE — PROGRESS NOTES
SUBJECTIVE:   Criss Don is a 48 year old female who presents to clinic today for the following health issues:      ED/UC Followup:    Facility:  [facility]   Date of visit: 1/3/2019  Reason for visit: abdominal Pain, Kidney stone found from CT scan done in the ER.   Current Status: Pt reports that er sx improved, no longer having abdominal pain      Paulette presents to the clinic for follow up of her recent ER visit for severe abdominal pain.  Thankfully in the ED her CT scan did not show any acute cause of this pain, although she was treated for a YTI, culture negative after leaving ED. She improved with a GI cocktail and percocet and was discharged home with abx.   .  Incidentally a 9 mm non obstructing kidney stone was noted in her left kidney, she would advise on next steps.  She is feeling much better today, she continues to follow her nutrition with both [facility] as well as the bariatric clinic.  At this time, bariatric office did recommend she continue calcium supplement without knowing the type of stone that she may have.         Problem list and histories reviewed & adjusted, as indicated.  Additional history: as documented    Patient Active Problem List   Diagnosis     Labial abscess     CARDIOVASCULAR SCREENING; LDL GOAL LESS THAN 160     Mass of right foot     History of abnormal cervical Pap smear     Intertrigo     Bilateral edema of lower extremity     Bariatric surgery status     Malnutrition following gastrointestinal surgery     Body dysmorphic disorder     Hypokalemia     Overweight (BMI 25.0-29.9)     Hypoglycemia     NED (generalized anxiety disorder)     Other specified eating disorder     Slow transit constipation     Iron deficiency     Malabsorption of iron     Iron deficiency anemia, unspecified iron deficiency anemia type     Perforated ulcer (H)     Past Surgical History:   Procedure Laterality Date     LAPAROSCOPIC BYPASS GASTRIC N/A 10/26/2016    Procedure: LAPAROSCOPIC  BYPASS GASTRIC;  Surgeon: Romario Reyna MD;  Location: [facility] OR     LAPAROSCOPY DIAGNOSTIC (GENERAL) N/A 10/24/2018    Procedure: LAPAROSCOPY DIAGNOSTIC FOR PERFORATED GASTRIC ULCER;  Surgeon: Chang Corea MD;  Location: [facility] OR     NO HISTORY OF SURGERY         Social History     Tobacco Use     Smoking status: Never Smoker     Smokeless tobacco: Never Used   Substance Use Topics     Alcohol use: No     Alcohol/week: 0.0 oz     Frequency: Never     Comment: very rare     Family History   Problem Relation Age of Onset     Hypertension Mother      Breast Cancer Mother         64 dx     Allergies Mother      Obesity Mother      Respiratory Mother         Asthma     Heart Disease Mother      Hypertension Maternal Grandmother      Cancer Maternal Grandmother         Ovarian     Cancer Sister         Cervical     Cancer Other         Mat. great aunt-ovarian     Hypertension Brother      Cancer Maternal Aunt         ?Gastric     Brain Cancer Cousin         maternal     Leukemia Nephew         dx at 23, CML         Current Outpatient Medications   Medication Sig Dispense Refill     acetaminophen (TYLENOL) 32 mg/mL solution Take 20.3 mLs (650 mg) by mouth every 4 hours as needed for pain 473 mL 0     calcium-vitamin D-vitamin K (VIACTIV) 500-500-40 MG-UNT-MCG CHEW Take 3 tablets by mouth At Bedtime        Cholecalciferol (VITAMIN D3 PO) Take 2,000 Units by mouth 2 times daily        Cyanocobalamin (B-12) 2500 MCG SUBL Place 1 tablet under the tongue Every Mon, Wed, Fri Morning 3 times weekly        cyclobenzaprine (FLEXERIL) 5 MG tablet Take 1-2 tablets (5-10 mg) by mouth 3 times daily as needed for muscle spasms 20 tablet 0     docusate sodium (COLACE) 100 MG capsule Take 300 mg by mouth every morning       FLUoxetine (PROZAC) 20 MG capsule Take 20 mg by mouth daily       mirtazapine (REMERON) 15 MG tablet Take 15 mg by mouth nightly as needed        multivitamin  peds with iron (FLINTSTONES COMPLETE) 60 MG  "chewable tablet Take 2 chew tab by mouth every morning        order for DME Equipment being ordered: Donut pillow 1 Units 0     pantoprazole (PROTONIX) 40 MG EC tablet Take 1 tablet (40 mg) by mouth 2 times daily 180 tablet 3     ranitidine (ZANTAC) 150 MG tablet Take 150 mg by mouth daily       simethicone (MYLICON) 80 MG chewable tablet Take 1 tablet (80 mg) by mouth every 6 hours as needed for flatulence or cramping 30 tablet 1     SPRINTEC 28 0.25-35 MG-MCG per tablet TAKE ONE TABLET BY MOUTH ONCE DAILY 84 tablet 2     Allergies   Allergen Reactions     Lortab [Hydrocodone-Acetaminophen] Nausea     Also light headed and flushed       Reviewed and updated as needed this visit by clinical staff       Reviewed and updated as needed this visit by Provider         ROS:  Constitutional, HEENT, cardiovascular, pulmonary, gi and gu systems are negative, except as otherwise noted.    OBJECTIVE:     /69   Pulse 61   Temp 96.3  F (35.7  C) (Tympanic)   Ht 1.575 m (5' 2\")   Wt 56.2 kg (124 lb)   Breastfeeding? No   BMI 22.68 kg/m    Body mass index is 22.68 kg/m .  GENERAL: healthy, alert and no distress  RESP: lungs clear to auscultation - no rales, rhonchi or wheezes  CV: regular rate and rhythm, normal S1 S2,no murmur, click or rub, no peripheral edema  ABDOMEN: soft, nontender, no hepatosplenomegaly, no masses and bowel sounds normal, no CVAT  PSYCH: mentation appears normal, affect normal/bright    Diagnostic Test Results:  none     ASSESSMENT/PLAN:       1. Nephrolithiasis  Due to large size of stone as well as hx of taking high levels of calcium due to past bariatric surgery, I am going to refer her to urology for assessment at this time to further address her kidney stone.  She has had full clinical improvement since her ER visit, no further studies are otherwise recommended at this time  - UROLOGY ADULT REFERRAL    2. Bariatric surgery status    3. Abdominal pain, generalize      See Patient " Instructions    Napoleon Gray PA-C  [facility] [place] [facility]

## 2019-01-16 DIAGNOSIS — N20.0 KIDNEY STONE: Primary | ICD-10-CM

## 2019-01-17 ENCOUNTER — OFFICE VISIT (OUTPATIENT)
Dept: PSYCHOLOGY | Facility: CLINIC | Age: 49
End: 2019-01-17
Payer: COMMERCIAL

## 2019-01-17 DIAGNOSIS — F50.9 EATING DISORDER, UNSPECIFIED: ICD-10-CM

## 2019-01-17 DIAGNOSIS — F41.1 GENERALIZED ANXIETY DISORDER: Primary | ICD-10-CM

## 2019-01-17 PROCEDURE — 90834 PSYTX W PT 45 MINUTES: CPT | Performed by: PSYCHOLOGIST

## 2019-01-17 NOTE — PROGRESS NOTES
"                                             Progress Note    Client Name: Criss Don  Date:  1/17/2019         Service Type: Individual      Session Start Time: 1:00  Session End Time: 1:45      Session Length: 45     Session #: 44     Attendees: Client attended alone    Treatment Plan Last Reviewed:   1/17/2019  PHQ-9 / NED-7 :      DATA      Progress Since Last Session (Related to Symptoms / Goals / Homework):   Symptoms: stable    Homework: Partially completed      Episode of Care Goals: Minimal progress - ACTION (Actively working towards change); Intervened by reinforcing change plan / affirming steps taken     Current / Ongoing Stressors and Concerns:   Reported she has been gaining weight as a result of following her meal plan more effectively. Reported feeling worried about her kidney stone and is relieved to be seeing a specialist next week. Reported she has been feeling pressured by her boyfriend to \"hurry up and get better\" so she can move to [place] with him. Reported she has been getting \"fed up\" with some of her work conditions and would like to find a different job, but also needs the flexibility that her current job allows her to be able to attend all her appointments.         Treatment Objective(s) Addressed in This Session:   stress management  Maintain compliance with post-surgical dietary needs     Intervention:   CBT: coached client on ways to surface and replace thinking errors that increase anxiety; explored triggers to anxiety; reviewed physical warning signs of anxiety  Solution Focused: coached her on assertiveness skills to be more honest with her boyfriend about her feelings and concerns  Supportive therapy:  provided active listening, support, and encouragement.  Reinforced positive behavioral choices. Reflected on the problems that can occur in the future if she is not being upfront with her boyfriend now about her feelings and concerns.     ASSESSMENT: Current Emotional / Mental " Status (status of significant symptoms):   Risk status (Self / Other harm or suicidal ideation)   Client denies current fears or concerns for personal safety.   Client denies current or recent suicidal ideation or behaviors.   Client denies current or recent homicidal ideation or behaviors.   Client denies current or recent self injurious behavior or ideation.   Client denies other safety concerns.   A safety and risk management plan has not been developed at this time, however client was given the after-hours number / 911 should there be a change in any of these risk factors.     Appearance:   Appropriate    Eye Contact:   Good    Psychomotor Behavior: Normal    Attitude:   Cooperative  Pleasant    Orientation:   All   Speech    Rate / Production: Normal     Volume:  Normal    Mood:    Anxious    Affect:    Appropriate    Thought Content:  worry    Thought Form:  Coherent  Logical    Insight:    Good      Medication Review:   No changes to current psychiatric medication(s)     Medication Compliance:   Yes     Changes in Health Issues:   None reported     Chemical Use Review:   Substance Use: Chemical use reviewed, no active concerns identified      Tobacco Use: No current tobacco use.       Collateral Reports Completed:   Not Applicable.     PLAN: (Client Tasks / Therapist Tasks / Other)  Future appointments are scheduled. Identify physical signs of anxiety and use relaxation skills as discussed in session. Use assertiveness skills as discussed in session to communicate feelings and needs more openly with others. Surface and challenge unrealistic expectations and disproportionate thoughts that generate anxiety.  Practice using self-compassionate statements as reviewed in session.      Brenda Perez PsyD LP                                                       Treatment Plan    Client's Name: Criss Don  YOB: 1970    Date:  1/17/2019    DSM-V Diagnoses: 300.02 Generalized Anxiety Disorder,  eating disorder, unspecified  Psychosocial / Contextual Factors: divorce proceedings, health concerns  WHODAS: 22    Referral / Collaboration:  Referral to another professional/service is not indicated at this time..    Anticipated number of session or this episode of care: reassess after 12 sessions      MeasurableTreatment Goal(s) related to diagnosis / functional impairment(s)  Goal 1: Client will learn coping skills to manage stress and adjust to post-surgical lifestyle changes more effectively.    I will know I've met my goal when I'm not weighing myself all the time and feeling more comfortable with my weight loss.      Objective #A (Client Action)    Client will surface and challenge thinking errors that contribute to anxiety.  Status: Continued - Date(s): 1/17/2019    Intervention(s)  Therapist will assign homework (thought  logs), assist client in surfacing and replacement ineffective thinking patterns.    Objective #B  Client will learn deep breathing and relaxation skills and practice at least 3x daily.  Status: Continued - Date(s): 1/17/2019    Intervention(s)  Therapist will teach deep breathing and relaxation skills, assign homework, problem-solve barriers to follow through.    Objective #C  Client will learn and use assertiveness skills to set healthy boundaries and communicate her needs more effectively.  Status: Continued - Date(s): 1/17/2019    Intervention(s)  Therapist will assist client in identifying and balancing her expectations.        Client has reviewed and agreed to the above plan.      Brenda Perez PsyD LP  1/17/2019

## 2019-01-23 ENCOUNTER — HOSPITAL ENCOUNTER (OUTPATIENT)
Dept: GENERAL RADIOLOGY | Facility: CLINIC | Age: 49
Discharge: HOME | End: 2019-01-23
Attending: PHYSICIAN ASSISTANT | Admitting: PHYSICIAN ASSISTANT
Payer: COMMERCIAL

## 2019-01-23 ENCOUNTER — OFFICE VISIT (OUTPATIENT)
Dept: UROLOGY | Facility: CLINIC | Age: 49
End: 2019-01-23
Payer: COMMERCIAL

## 2019-01-23 ENCOUNTER — TELEPHONE (OUTPATIENT)
Dept: UROLOGY | Facility: CLINIC | Age: 49
End: 2019-01-23

## 2019-01-23 VITALS
DIASTOLIC BLOOD PRESSURE: 62 MMHG | HEART RATE: 60 BPM | HEIGHT: 62 IN | WEIGHT: 124 LBS | SYSTOLIC BLOOD PRESSURE: 110 MMHG | BODY MASS INDEX: 22.82 KG/M2

## 2019-01-23 DIAGNOSIS — N20.0 KIDNEY STONE: ICD-10-CM

## 2019-01-23 LAB
ALBUMIN UR-MCNC: NEGATIVE MG/DL
APPEARANCE UR: CLEAR
BILIRUB UR QL STRIP: ABNORMAL
COLOR UR AUTO: YELLOW
GLUCOSE UR STRIP-MCNC: NEGATIVE MG/DL
HGB UR QL STRIP: NEGATIVE
KETONES UR STRIP-MCNC: ABNORMAL MG/DL
LEUKOCYTE ESTERASE UR QL STRIP: ABNORMAL
NITRATE UR QL: NEGATIVE
PH UR STRIP: 5.5 PH (ref 5–7)
SOURCE: ABNORMAL
SP GR UR STRIP: >1.03 (ref 1–1.03)
UROBILINOGEN UR STRIP-ACNC: 0.2 EU/DL (ref 0.2–1)

## 2019-01-23 PROCEDURE — 99243 OFF/OP CNSLTJ NEW/EST LOW 30: CPT | Performed by: PHYSICIAN ASSISTANT

## 2019-01-23 PROCEDURE — 81003 URINALYSIS AUTO W/O SCOPE: CPT | Performed by: PHYSICIAN ASSISTANT

## 2019-01-23 PROCEDURE — 74019 RADEX ABDOMEN 2 VIEWS: CPT

## 2019-01-23 ASSESSMENT — PAIN SCALES - GENERAL: PAINLEVEL: NO PAIN (0)

## 2019-01-23 ASSESSMENT — MIFFLIN-ST. JEOR: SCORE: 1135.71

## 2019-01-23 NOTE — TELEPHONE ENCOUNTER
Paulette is wanting to talk to Jovita as she has questions about the recovery times on the 3 different procedures that were discussed at today's visit. Will send note to Jovita. Paulette is ok to wait to hear from Jovita on Friday when she calls to Paulette to discuss the KUB results.Marcia Sullivan LPN

## 2019-01-23 NOTE — PROGRESS NOTES
CC: Left renal stone.    HPI: It is a pleasure to see Ms. Criss Don, a 48 year old female seen today in the urology clinic in consultation from Napoleon Gray PA-C for evaluation of the above. This was first detected on CT in the ED on 1/3/19 after the patient presented complaining of abdominal pain (hx of gastric bypass and gastric ulcer). The stone measures 9 mm and is located in the upper pole of the left kidney without hydronephrosis. UA in the ED was negative for infection. BMP revealed Cr and Ca to be normal. With pain under good control, the patient was discharged with a prescription for tamsulosin and instructions to follow up with urology.    Currently, the patient reports no pain.  Goes to [place]. Denies gross hematuria, dysuria, fevers, chills, N/V. No prior history of kidney stones (upon review of Oct CT, kidney stone was present).    RECENT IMAGING:  CT ABDOMEN AND PELVIS WITH CONTRAST 1/3/2019 5:09 PM      HISTORY: Abdominal pain, unspecified.     COMPARISON: October 24, 2018     TECHNIQUE: Volumetric helical acquisition of CT images from the lung  bases through the symphysis pubis after the administration of 61 mL  Isovue-370  intravenous contrast. Radiation dose for this scan was  reduced using automated exposure control, adjustment of the mA and/or  kV according to patient size, or iterative reconstruction technique.     FINDINGS: Bowel surgical change is noted. No bowel obstruction. Small  amount of free fluid. No hydronephrosis. Moderate stool.  Cholelithiasis without cholecystitis. Gastric bypass changes. 9 mm  nonobstructing stone in the upper left kidney. No hydronephrosis or  definite urolithiasis. The liver, spleen, adrenal glands, kidneys, and  pancreas demonstrate no worrisome focal lesion. There are no  significant atherosclerotic changes of the visualized aorta and its  branches. There is no evidence of aortic dissection or aneurysm.  Pelvic structures unremarkable. The visualized  lung bases are  unremarkable. Survey of the visualized bony structures demonstrates no  destructive bony lesions.                                                                      IMPRESSION: Small amount of pelvic free fluid which is nonspecific,  otherwise no acute process demonstrated in the abdomen and pelvis.        Past Medical History:   Diagnosis Date     Depressive disorder      morbid obesity      Obese      Ulcer of gastric fundus      Urinary frequency        Past Surgical History:   Procedure Laterality Date     LAPAROSCOPIC BYPASS GASTRIC N/A 10/26/2016    Procedure: LAPAROSCOPIC BYPASS GASTRIC;  Surgeon: Romario Reyna MD;  Location:  OR     LAPAROSCOPY DIAGNOSTIC (GENERAL) N/A 10/24/2018    Procedure: LAPAROSCOPY DIAGNOSTIC FOR PERFORATED GASTRIC ULCER;  Surgeon: Chang Corea MD;  Location:  OR     NO HISTORY OF SURGERY         Current Outpatient Medications   Medication Sig Dispense Refill     acetaminophen (TYLENOL) 32 mg/mL solution Take 20.3 mLs (650 mg) by mouth every 4 hours as needed for pain 473 mL 0     calcium-vitamin D-vitamin K (VIACTIV) 500-500-40 MG-UNT-MCG CHEW Take 3 tablets by mouth At Bedtime        Cholecalciferol (VITAMIN D3 PO) Take 2,000 Units by mouth 2 times daily        Cyanocobalamin (B-12) 2500 MCG SUBL Place 1 tablet under the tongue Every Mon, Wed, Fri Morning 3 times weekly        cyclobenzaprine (FLEXERIL) 5 MG tablet Take 1-2 tablets (5-10 mg) by mouth 3 times daily as needed for muscle spasms 20 tablet 0     docusate sodium (COLACE) 100 MG capsule Take 300 mg by mouth every morning       FLUoxetine (PROZAC) 20 MG capsule Take 20 mg by mouth daily       mirtazapine (REMERON) 15 MG tablet Take 15 mg by mouth nightly as needed        multivitamin  peds with iron (FLINTSTONES COMPLETE) 60 MG chewable tablet Take 2 chew tab by mouth every morning        order for DME Equipment being ordered: Donut pillow 1 Units 0     pantoprazole (PROTONIX) 40 MG  EC tablet Take 1 tablet (40 mg) by mouth 2 times daily 180 tablet 3     ranitidine (ZANTAC) 150 MG tablet Take 150 mg by mouth daily       simethicone (MYLICON) 80 MG chewable tablet Take 1 tablet (80 mg) by mouth every 6 hours as needed for flatulence or cramping 30 tablet 1     SPRINTEC 28 0.25-35 MG-MCG per tablet TAKE ONE TABLET BY MOUTH ONCE DAILY 84 tablet 2       Allergies   Allergen Reactions     Lortab [Hydrocodone-Acetaminophen] Nausea     Also light headed and flushed       FAMILY HISTORY: There is no family h/o  malignancy.  There is family h/o urolithiasis (brother).     SOCIAL HISTORY: The patient does not smoke cigarettes. Denies EtOH and illicit drug abuse.     ROS: A comprehensive 14 point ROS was obtained and otherwise negative except for that outlined above in the HPI.    GENERAL PHYSICAL EXAM:   Vitals: Stable, afebrile, reviewed in EMR  GENERAL: Well groomed, well developed, well nourished female in NAD.  HEAD: Normocephalic. Atraumatic.  RESPIRATORY: No increased respiratory effort.   GI: Soft, NT  MS: Full ROM in extremities, gait normal, normal muscle tone  SKIN: Warm to touch, dry.  NEURO: Alert and oriented x 3.  PSYCH: Normal mood and affect, pleasant and agreeable during interview and exam.    UA today demonstrates no blood.      ASSESSMENT AND PLAN: 48 year old female with a left-sided nonobstructing renal calculus without  hydronephrosis.   -Images reviewed with pt.   - Warning signs discussed including fevers, chills, N/V, gross hematuria, severe pain that is refractory to medications which should prompt more urgent evaluation. Patient understands to proceed to the ER should these warning signs occur outside of clinic hours.    During counseling for this visit, we covered the natural history of kidney stones, the risk of progression to symptomatic pain/infection, and the possibility of renal failure/kidney damage.  We covered treatment options including observation, extracorporeal  shock wave lithotripsy (ESWL), and percutaneous nephrolithotomy (PCNL).    Will check KUB to assess if she is an ESWL candidate.     Standard recommendations on kidney stone prevention were provided.    I have enjoyed participating in the medical care of this patient and will continue to keep you updated on her progress.  Please do not hesitate to contact me with any questions or concerns.      Jovita Mclean PA-C  [facility]    30 minutes were spent with the patient today, > 50% in counseling and coordination of care of kidney stone.

## 2019-01-23 NOTE — LETTER
1/23/2019       RE: Criss Don  [id number] [facility] [address]     Dear Colleague,    Thank you for referring your patient, Criss Don, to the [facility] Please see a copy of my visit note below.    CC: Left renal stone.    HPI: It is a pleasure to see Ms. Criss Don, a 48 year old female seen today in the urology clinic in consultation from Napoleon Gray PA-C for evaluation of the above. This was first detected on CT in the ED on 1/3/19 after the patient presented complaining of abdominal pain (hx of gastric bypass and gastric ulcer). The stone measures 9 mm and is located in the upper pole of the left kidney without hydronephrosis. UA in the ED was negative for infection. BMP revealed Cr and Ca to be normal. With pain under good control, the patient was discharged with a prescription for tamsulosin and instructions to follow up with urology.    Currently, the patient reports no pain.  Goes to [place]. Denies gross hematuria, dysuria, fevers, chills, N/V. No prior history of kidney stones (upon review of Oct CT, kidney stone was present).    RECENT IMAGING:  CT ABDOMEN AND PELVIS WITH CONTRAST 1/3/2019 5:09 PM      HISTORY: Abdominal pain, unspecified.     COMPARISON: October 24, 2018     TECHNIQUE: Volumetric helical acquisition of CT images from the lung  bases through the symphysis pubis after the administration of 61 mL  Isovue-370  intravenous contrast. Radiation dose for this scan was  reduced using automated exposure control, adjustment of the mA and/or  kV according to patient size, or iterative reconstruction technique.     FINDINGS: Bowel surgical change is noted. No bowel obstruction. Small  amount of free fluid. No hydronephrosis. Moderate stool.  Cholelithiasis without cholecystitis. Gastric bypass changes. 9 mm  nonobstructing stone in the upper left kidney. No hydronephrosis  or  definite urolithiasis. The liver, spleen, adrenal glands, kidneys, and  pancreas demonstrate no worrisome focal lesion. There are no  significant atherosclerotic changes of the visualized aorta and its  branches. There is no evidence of aortic dissection or aneurysm.  Pelvic structures unremarkable. The visualized lung bases are  unremarkable. Survey of the visualized bony structures demonstrates no  destructive bony lesions.                                                                      IMPRESSION: Small amount of pelvic free fluid which is nonspecific,  otherwise no acute process demonstrated in the abdomen and pelvis.        Past Medical History:   Diagnosis Date     Depressive disorder      morbid obesity      Obese      Ulcer of gastric fundus      Urinary frequency        Past Surgical History:   Procedure Laterality Date     LAPAROSCOPIC BYPASS GASTRIC N/A 10/26/2016    Procedure: LAPAROSCOPIC BYPASS GASTRIC;  Surgeon: Romario Reyna MD;  Location:  OR     LAPAROSCOPY DIAGNOSTIC (GENERAL) N/A 10/24/2018    Procedure: LAPAROSCOPY DIAGNOSTIC FOR PERFORATED GASTRIC ULCER;  Surgeon: Chang Corea MD;  Location:  OR     NO HISTORY OF SURGERY         Current Outpatient Medications   Medication Sig Dispense Refill     acetaminophen (TYLENOL) 32 mg/mL solution Take 20.3 mLs (650 mg) by mouth every 4 hours as needed for pain 473 mL 0     calcium-vitamin D-vitamin K (VIACTIV) 500-500-40 MG-UNT-MCG CHEW Take 3 tablets by mouth At Bedtime        Cholecalciferol (VITAMIN D3 PO) Take 2,000 Units by mouth 2 times daily        Cyanocobalamin (B-12) 2500 MCG SUBL Place 1 tablet under the tongue Every Mon, Wed, Fri Morning 3 times weekly        cyclobenzaprine (FLEXERIL) 5 MG tablet Take 1-2 tablets (5-10 mg) by mouth 3 times daily as needed for muscle spasms 20 tablet 0     docusate sodium (COLACE) 100 MG capsule Take 300 mg by mouth every morning       FLUoxetine (PROZAC) 20 MG capsule Take 20  mg by mouth daily       mirtazapine (REMERON) 15 MG tablet Take 15 mg by mouth nightly as needed        multivitamin  peds with iron (FLINTSTONES COMPLETE) 60 MG chewable tablet Take 2 chew tab by mouth every morning        order for DME Equipment being ordered: Donut pillow 1 Units 0     pantoprazole (PROTONIX) 40 MG EC tablet Take 1 tablet (40 mg) by mouth 2 times daily 180 tablet 3     ranitidine (ZANTAC) 150 MG tablet Take 150 mg by mouth daily       simethicone (MYLICON) 80 MG chewable tablet Take 1 tablet (80 mg) by mouth every 6 hours as needed for flatulence or cramping 30 tablet 1     SPRINTEC 28 0.25-35 MG-MCG per tablet TAKE ONE TABLET BY MOUTH ONCE DAILY 84 tablet 2       Allergies   Allergen Reactions     Lortab [Hydrocodone-Acetaminophen] Nausea     Also light headed and flushed       FAMILY HISTORY: There is no family h/o  malignancy.  There is family h/o urolithiasis (brother).     SOCIAL HISTORY: The patient does not smoke cigarettes. Denies EtOH and illicit drug abuse.     ROS: A comprehensive 14 point ROS was obtained and otherwise negative except for that outlined above in the HPI.    GENERAL PHYSICAL EXAM:   Vitals: Stable, afebrile, reviewed in EMR  GENERAL: Well groomed, well developed, well nourished female in NAD.  HEAD: Normocephalic. Atraumatic.  RESPIRATORY: No increased respiratory effort.   GI: Soft, NT  MS: Full ROM in extremities, gait normal, normal muscle tone  SKIN: Warm to touch, dry.  NEURO: Alert and oriented x 3.  PSYCH: Normal mood and affect, pleasant and agreeable during interview and exam.    UA today demonstrates no blood.      ASSESSMENT AND PLAN: 48 year old female with a left-sided nonobstructing renal calculus without  hydronephrosis.   -Images reviewed with pt.   - Warning signs discussed including fevers, chills, N/V, gross hematuria, severe pain that is refractory to medications which should prompt more urgent evaluation. Patient understands to proceed to the ER  should these warning signs occur outside of clinic hours.    During counseling for this visit, we covered the natural history of kidney stones, the risk of progression to symptomatic pain/infection, and the possibility of renal failure/kidney damage.  We covered treatment options including observation, extracorporeal shock wave lithotripsy (ESWL), and percutaneous nephrolithotomy (PCNL).    Will check KUB to assess if she is an ESWL candidate.     Standard recommendations on kidney stone prevention were provided.    I have enjoyed participating in the medical care of this patient and will continue to keep you updated on her progress.  Please do not hesitate to contact me with any questions or concerns.      Jovita Mclean PA-C  [facility]    30 minutes were spent with the patient today, > 50% in counseling and coordination of care of kidney stone.

## 2019-01-23 NOTE — PATIENT INSTRUCTIONS
Standard recommendations on kidney stone prevention:  -These include maintaining fluid intake of 3 liters per day or more with a goal of making 2 or more liters of urine per day.  If alcoholic or caffeinated beverages are consumed, you need to drink water along with these beverages to maintain hydration.    -A few ounces of lemon juice concentrate a day diluted in water can help prevent stones (citrate is a stone inhibitor).    -Sodium influences calcium excretion in the urine. Limit intake of red meat, salt, and salty processed foods.    -Maintain calcium intake in diet through continued consumption of dairy products etc.    -Limit foods that are high in oxalate such as spinach, sweet potatoes, dark chocolate, soy products, and some nuts such as peanuts.     -Additional/different recommendations pending any stone analysis.

## 2019-01-25 ENCOUNTER — TELEPHONE (OUTPATIENT)
Dept: SURGERY | Facility: CLINIC | Age: 49
End: 2019-01-25

## 2019-01-25 DIAGNOSIS — Z98.84 BARIATRIC SURGERY STATUS: Primary | ICD-10-CM

## 2019-01-25 NOTE — TELEPHONE ENCOUNTER
Called patient and reassured her that this question would be addressed on 1/28 during clinic hours.

## 2019-01-25 NOTE — TELEPHONE ENCOUNTER
Reason for call:  Other patient is having surgery on feb 7th for kidney stones. Pt wants to know if there are special instructions after having the weight loss surgery.  Patient called regarding (reason for call): call back  Additional comments: none    Phone number to reach patient:  Home number on file [phone] (home)    Best Time:  anytime    Can we leave a detailed message on this number?  YES

## 2019-01-29 NOTE — TELEPHONE ENCOUNTER
Spoke to patient. Scheduled for lithotripsy for kidney stone upcoming 2/7. Wondering if there is anything different that needs to be done given history of bariatric surgery. No recommendations specific to weight loss surgery. MARIELLE Carballo CNP

## 2019-01-31 ENCOUNTER — OFFICE VISIT (OUTPATIENT)
Dept: PSYCHOLOGY | Facility: CLINIC | Age: 49
End: 2019-01-31
Payer: COMMERCIAL

## 2019-01-31 DIAGNOSIS — F41.1 GENERALIZED ANXIETY DISORDER: Primary | ICD-10-CM

## 2019-01-31 DIAGNOSIS — F50.9 EATING DISORDER, UNSPECIFIED: ICD-10-CM

## 2019-01-31 PROCEDURE — 90834 PSYTX W PT 45 MINUTES: CPT | Performed by: PSYCHOLOGIST

## 2019-01-31 NOTE — PROGRESS NOTES
"                                             Progress Note    Client Name: Criss Don  Date:  1/31/2019         Service Type: Individual      Session Start Time: 1:00  Session End Time: 1:45      Session Length: 45     Session #: 45     Attendees: Client attended alone    Treatment Plan Last Reviewed:   1/31/2019  PHQ-9 / NED-7 :      DATA      Progress Since Last Session (Related to Symptoms / Goals / Homework):   Symptoms: stable    Homework: Partially completed      Episode of Care Goals: Minimal progress - ACTION (Actively working towards change); Intervened by reinforcing change plan / affirming steps taken     Current / Ongoing Stressors and Concerns:   Reported feeling anxious about some upcoming procedures to remove her kidney stone. Reported feeling worried about the recovery. Reported a lot of disatisfaction at work, but is trying to \"hold out\" until she is more medically stable to start applying for new jobs. Reported that family continues to be a significant source of worry and stress.   Reported she is trying to push herself to try new things and stretch past her comfort zone.        Treatment Objective(s) Addressed in This Session:   stress management  Maintain compliance with post-surgical dietary needs     Intervention:   CBT: coached client on ways to surface and replace thinking errors that increase anxiety; explored triggers to anxiety; reviewed physical warning signs of anxiety  Solution Focused: coached her on ways to apply the mindfulness skills she has been learning in her group  Supportive therapy:  provided active listening, support, and encouragement.  Reinforced positive behavioral choices.      ASSESSMENT: Current Emotional / Mental Status (status of significant symptoms):   Risk status (Self / Other harm or suicidal ideation)   Client denies current fears or concerns for personal safety.   Client denies current or recent suicidal ideation or behaviors.   Client denies current or " recent homicidal ideation or behaviors.   Client denies current or recent self injurious behavior or ideation.   Client denies other safety concerns.   A safety and risk management plan has not been developed at this time, however client was given the after-hours number / 911 should there be a change in any of these risk factors.     Appearance:   Appropriate    Eye Contact:   Good    Psychomotor Behavior: Normal    Attitude:   Cooperative  Pleasant    Orientation:   All   Speech    Rate / Production: Normal     Volume:  Normal    Mood:    Anxious    Affect:    Appropriate    Thought Content:  worry    Thought Form:  Coherent  Logical    Insight:    Good      Medication Review:   No changes to current psychiatric medication(s)     Medication Compliance:   Yes     Changes in Health Issues:   None reported     Chemical Use Review:   Substance Use: Chemical use reviewed, no active concerns identified      Tobacco Use: No current tobacco use.       Collateral Reports Completed:   Not Applicable.     PLAN: (Client Tasks / Therapist Tasks / Other)  Future appointments are scheduled. Practice mindfulness skills at least once daily. Identify physical signs of anxiety and use relaxation skills as discussed in session. Use assertiveness skills as discussed in session to communicate feelings and needs more openly with others. Surface and challenge unrealistic expectations and disproportionate thoughts that generate anxiety.  Practice using self-compassionate statements as reviewed in session.      Brenda Perez PsyD LP                                                       Treatment Plan    Client's Name: Criss Don  YOB: 1970    Date:  1/31/2019    DSM-V Diagnoses: 300.02 Generalized Anxiety Disorder, eating disorder, unspecified  Psychosocial / Contextual Factors: divorce proceedings, health concerns  WHODAS: 22    Referral / Collaboration:  Referral to another professional/service is not indicated at  this time..    Anticipated number of session or this episode of care: reassess after 12 sessions      MeasurableTreatment Goal(s) related to diagnosis / functional impairment(s)  Goal 1: Client will learn coping skills to manage stress and adjust to post-surgical lifestyle changes more effectively.    I will know I've met my goal when I'm not weighing myself all the time and feeling more comfortable with my weight loss.      Objective #A (Client Action)    Client will surface and challenge thinking errors that contribute to anxiety.  Status: Continued - Date(s): 1/31/2019    Intervention(s)  Therapist will assign homework (thought  logs), assist client in surfacing and replacement ineffective thinking patterns.    Objective #B  Client will learn deep breathing and relaxation skills and practice at least 3x daily.  Status: Continued - Date(s): 1/31/2019    Intervention(s)  Therapist will teach deep breathing and relaxation skills, assign homework, problem-solve barriers to follow through.    Objective #C  Client will learn and use assertiveness skills to set healthy boundaries and communicate her needs more effectively.  Status: Continued - Date(s): 1/31/2019    Intervention(s)  Therapist will assist client in identifying and balancing her expectations.        Client has reviewed and agreed to the above plan.      Brenda Perez PsyD LP  1/31/2019

## 2019-02-01 ENCOUNTER — INFUSION THERAPY VISIT (OUTPATIENT)
Dept: INFUSION THERAPY | Facility: CLINIC | Age: 49
End: 2019-02-01
Attending: INTERNAL MEDICINE
Payer: COMMERCIAL

## 2019-02-01 ENCOUNTER — HOSPITAL ENCOUNTER (OUTPATIENT)
Facility: CLINIC | Age: 49
Setting detail: SPECIMEN
Discharge: HOME | End: 2019-02-01
Attending: INTERNAL MEDICINE | Admitting: INTERNAL MEDICINE
Payer: COMMERCIAL

## 2019-02-01 DIAGNOSIS — K90.9 MALABSORPTION OF IRON: ICD-10-CM

## 2019-02-01 DIAGNOSIS — E61.1 IRON DEFICIENCY: ICD-10-CM

## 2019-02-01 DIAGNOSIS — K27.5 PERFORATED ULCER (H): Primary | ICD-10-CM

## 2019-02-01 LAB
ERYTHROCYTE [DISTWIDTH] IN BLOOD BY AUTOMATED COUNT: 13.5 % (ref 10–15)
FERRITIN SERPL-MCNC: 138 NG/ML (ref 8–252)
HCT VFR BLD AUTO: 38.1 % (ref 35–47)
HGB BLD-MCNC: 12.5 G/DL (ref 11.7–15.7)
IRON SATN MFR SERPL: 33 % (ref 15–46)
IRON SERPL-MCNC: 80 UG/DL (ref 35–180)
MCH RBC QN AUTO: 30.3 PG (ref 26.5–33)
MCHC RBC AUTO-ENTMCNC: 32.8 G/DL (ref 31.5–36.5)
MCV RBC AUTO: 92 FL (ref 78–100)
PLATELET # BLD AUTO: 224 10E9/L (ref 150–450)
RBC # BLD AUTO: 4.13 10E12/L (ref 3.8–5.2)
TIBC SERPL-MCNC: 239 UG/DL (ref 240–430)
WBC # BLD AUTO: 3.5 10E9/L (ref 4–11)

## 2019-02-01 PROCEDURE — 82728 ASSAY OF FERRITIN: CPT | Performed by: INTERNAL MEDICINE

## 2019-02-01 PROCEDURE — 85027 COMPLETE CBC AUTOMATED: CPT | Performed by: INTERNAL MEDICINE

## 2019-02-01 PROCEDURE — 83540 ASSAY OF IRON: CPT | Performed by: INTERNAL MEDICINE

## 2019-02-01 PROCEDURE — 83550 IRON BINDING TEST: CPT | Performed by: INTERNAL MEDICINE

## 2019-02-01 PROCEDURE — 36415 COLL VENOUS BLD VENIPUNCTURE: CPT

## 2019-02-01 NOTE — PROGRESS NOTES
Medical Assistant Note:  Criss Don presents today for Blood Draw.    Patient seen by provider today: No.   present during visit today: Not Applicable.    Concerns: No Concerns.    Procedure:  Lab draw site: LAC, Needle type: BF, Gauge: 23 gauze and coban applied.    Post Assessment:  Labs drawn without difficulty: Yes.    Discharge Plan:  Departure Mode: Ambulatory.    Face to Face Time: 4 min.    Geovanna Wilson CMA

## 2019-02-07 ENCOUNTER — HOSPITAL ENCOUNTER (OUTPATIENT)
Facility: CLINIC | Age: 49
Discharge: HOME | End: 2019-02-07
Attending: UROLOGY | Admitting: UROLOGY
Payer: COMMERCIAL

## 2019-02-07 ENCOUNTER — ANESTHESIA (OUTPATIENT)
Dept: SURGERY | Facility: CLINIC | Age: 49
End: 2019-02-07
Payer: COMMERCIAL

## 2019-02-07 ENCOUNTER — ANESTHESIA EVENT (OUTPATIENT)
Dept: SURGERY | Facility: CLINIC | Age: 49
End: 2019-02-07
Payer: COMMERCIAL

## 2019-02-07 VITALS
DIASTOLIC BLOOD PRESSURE: 73 MMHG | BODY MASS INDEX: 22.63 KG/M2 | TEMPERATURE: 98.3 F | SYSTOLIC BLOOD PRESSURE: 107 MMHG | OXYGEN SATURATION: 92 % | HEIGHT: 62 IN | HEART RATE: 48 BPM | WEIGHT: 123 LBS | RESPIRATION RATE: 16 BRPM

## 2019-02-07 LAB — HCG UR QL: NEGATIVE

## 2019-02-07 PROCEDURE — 50590 FRAGMENTING OF KIDNEY STONE: CPT | Mod: LT | Performed by: UROLOGY

## 2019-02-07 PROCEDURE — 40000306 ZZH STATISTIC PRE PROC ASSESS II: Performed by: UROLOGY

## 2019-02-07 PROCEDURE — 25000128 H RX IP 250 OP 636: Performed by: NURSE ANESTHETIST, CERTIFIED REGISTERED

## 2019-02-07 PROCEDURE — 37000008 ZZH ANESTHESIA TECHNICAL FEE, 1ST 30 MIN: Performed by: UROLOGY

## 2019-02-07 PROCEDURE — 71000012 ZZH RECOVERY PHASE 1 LEVEL 1 FIRST HR: Performed by: UROLOGY

## 2019-02-07 PROCEDURE — 71000027 ZZH RECOVERY PHASE 2 EACH 15 MINS: Performed by: UROLOGY

## 2019-02-07 PROCEDURE — 25000128 H RX IP 250 OP 636: Performed by: ANESTHESIOLOGY

## 2019-02-07 PROCEDURE — 25000125 ZZHC RX 250: Performed by: ANESTHESIOLOGY

## 2019-02-07 PROCEDURE — 36000093 ZZH SURGERY LEVEL 4 1ST 30 MIN: Performed by: UROLOGY

## 2019-02-07 PROCEDURE — 36000063 ZZH SURGERY LEVEL 4 EA 15 ADDTL MIN: Performed by: UROLOGY

## 2019-02-07 PROCEDURE — 25000125 ZZHC RX 250: Performed by: NURSE ANESTHETIST, CERTIFIED REGISTERED

## 2019-02-07 PROCEDURE — 81025 URINE PREGNANCY TEST: CPT | Performed by: ANESTHESIOLOGY

## 2019-02-07 PROCEDURE — 37000009 ZZH ANESTHESIA TECHNICAL FEE, EACH ADDTL 15 MIN: Performed by: UROLOGY

## 2019-02-07 RX ORDER — OXYCODONE HYDROCHLORIDE 5 MG/1
5 TABLET ORAL EVERY 4 HOURS PRN
Status: DISCONTINUED | OUTPATIENT
Start: 2019-02-07 | End: 2019-02-07 | Stop reason: HOSPADM

## 2019-02-07 RX ORDER — NEOSTIGMINE METHYLSULFATE 1 MG/ML
VIAL (ML) INJECTION PRN
Status: DISCONTINUED | OUTPATIENT
Start: 2019-02-07 | End: 2019-02-07

## 2019-02-07 RX ORDER — DEXAMETHASONE SODIUM PHOSPHATE 4 MG/ML
INJECTION, SOLUTION INTRA-ARTICULAR; INTRALESIONAL; INTRAMUSCULAR; INTRAVENOUS; SOFT TISSUE PRN
Status: DISCONTINUED | OUTPATIENT
Start: 2019-02-07 | End: 2019-02-07

## 2019-02-07 RX ORDER — FENTANYL CITRATE 50 UG/ML
25-50 INJECTION, SOLUTION INTRAMUSCULAR; INTRAVENOUS
Status: DISCONTINUED | OUTPATIENT
Start: 2019-02-07 | End: 2019-02-07 | Stop reason: HOSPADM

## 2019-02-07 RX ORDER — HYDROMORPHONE HYDROCHLORIDE 1 MG/ML
.3-.5 INJECTION, SOLUTION INTRAMUSCULAR; INTRAVENOUS; SUBCUTANEOUS EVERY 10 MIN PRN
Status: DISCONTINUED | OUTPATIENT
Start: 2019-02-07 | End: 2019-02-07 | Stop reason: HOSPADM

## 2019-02-07 RX ORDER — ONDANSETRON 4 MG/1
4 TABLET, ORALLY DISINTEGRATING ORAL EVERY 30 MIN PRN
Status: DISCONTINUED | OUTPATIENT
Start: 2019-02-07 | End: 2019-02-07 | Stop reason: HOSPADM

## 2019-02-07 RX ORDER — LIDOCAINE 40 MG/G
CREAM TOPICAL
Status: DISCONTINUED | OUTPATIENT
Start: 2019-02-07 | End: 2019-02-07 | Stop reason: HOSPADM

## 2019-02-07 RX ORDER — SODIUM CHLORIDE, SODIUM LACTATE, POTASSIUM CHLORIDE, CALCIUM CHLORIDE 600; 310; 30; 20 MG/100ML; MG/100ML; MG/100ML; MG/100ML
INJECTION, SOLUTION INTRAVENOUS CONTINUOUS
Status: DISCONTINUED | OUTPATIENT
Start: 2019-02-07 | End: 2019-02-07 | Stop reason: HOSPADM

## 2019-02-07 RX ORDER — GLYCOPYRROLATE 0.2 MG/ML
INJECTION, SOLUTION INTRAMUSCULAR; INTRAVENOUS PRN
Status: DISCONTINUED | OUTPATIENT
Start: 2019-02-07 | End: 2019-02-07

## 2019-02-07 RX ORDER — NALOXONE HYDROCHLORIDE 0.4 MG/ML
.1-.4 INJECTION, SOLUTION INTRAMUSCULAR; INTRAVENOUS; SUBCUTANEOUS
Status: DISCONTINUED | OUTPATIENT
Start: 2019-02-07 | End: 2019-02-07 | Stop reason: HOSPADM

## 2019-02-07 RX ORDER — FENTANYL CITRATE 50 UG/ML
INJECTION, SOLUTION INTRAMUSCULAR; INTRAVENOUS PRN
Status: DISCONTINUED | OUTPATIENT
Start: 2019-02-07 | End: 2019-02-07

## 2019-02-07 RX ORDER — CEFAZOLIN SODIUM 2 G/100ML
2 INJECTION, SOLUTION INTRAVENOUS
Status: DISCONTINUED | OUTPATIENT
Start: 2019-02-07 | End: 2019-02-07 | Stop reason: HOSPADM

## 2019-02-07 RX ORDER — METOPROLOL TARTRATE 1 MG/ML
1-2 INJECTION, SOLUTION INTRAVENOUS EVERY 5 MIN PRN
Status: DISCONTINUED | OUTPATIENT
Start: 2019-02-07 | End: 2019-02-07 | Stop reason: HOSPADM

## 2019-02-07 RX ORDER — CEFAZOLIN SODIUM 1 G/3ML
1 INJECTION, POWDER, FOR SOLUTION INTRAMUSCULAR; INTRAVENOUS SEE ADMIN INSTRUCTIONS
Status: DISCONTINUED | OUTPATIENT
Start: 2019-02-07 | End: 2019-02-07 | Stop reason: HOSPADM

## 2019-02-07 RX ORDER — ALBUTEROL SULFATE 0.83 MG/ML
2.5 SOLUTION RESPIRATORY (INHALATION) EVERY 4 HOURS PRN
Status: DISCONTINUED | OUTPATIENT
Start: 2019-02-07 | End: 2019-02-07 | Stop reason: HOSPADM

## 2019-02-07 RX ORDER — PROPOFOL 10 MG/ML
INJECTION, EMULSION INTRAVENOUS PRN
Status: DISCONTINUED | OUTPATIENT
Start: 2019-02-07 | End: 2019-02-07

## 2019-02-07 RX ORDER — ONDANSETRON 2 MG/ML
INJECTION INTRAMUSCULAR; INTRAVENOUS PRN
Status: DISCONTINUED | OUTPATIENT
Start: 2019-02-07 | End: 2019-02-07

## 2019-02-07 RX ORDER — HYDRALAZINE HYDROCHLORIDE 20 MG/ML
2.5-5 INJECTION INTRAMUSCULAR; INTRAVENOUS EVERY 10 MIN PRN
Status: DISCONTINUED | OUTPATIENT
Start: 2019-02-07 | End: 2019-02-07 | Stop reason: HOSPADM

## 2019-02-07 RX ORDER — MEPERIDINE HYDROCHLORIDE 50 MG/ML
12.5 INJECTION INTRAMUSCULAR; INTRAVENOUS; SUBCUTANEOUS
Status: DISCONTINUED | OUTPATIENT
Start: 2019-02-07 | End: 2019-02-07 | Stop reason: HOSPADM

## 2019-02-07 RX ORDER — FENTANYL CITRATE 50 UG/ML
25-50 INJECTION, SOLUTION INTRAMUSCULAR; INTRAVENOUS EVERY 5 MIN PRN
Status: DISCONTINUED | OUTPATIENT
Start: 2019-02-07 | End: 2019-02-07 | Stop reason: HOSPADM

## 2019-02-07 RX ORDER — ONDANSETRON 2 MG/ML
4 INJECTION INTRAMUSCULAR; INTRAVENOUS EVERY 30 MIN PRN
Status: DISCONTINUED | OUTPATIENT
Start: 2019-02-07 | End: 2019-02-07 | Stop reason: HOSPADM

## 2019-02-07 RX ADMIN — MIDAZOLAM 2 MG: 1 INJECTION INTRAMUSCULAR; INTRAVENOUS at 14:32

## 2019-02-07 RX ADMIN — ONDANSETRON 4 MG: 2 INJECTION INTRAMUSCULAR; INTRAVENOUS at 15:00

## 2019-02-07 RX ADMIN — GLYCOPYRROLATE 0.1 MG: 0.2 INJECTION, SOLUTION INTRAMUSCULAR; INTRAVENOUS at 14:55

## 2019-02-07 RX ADMIN — FENTANYL CITRATE 100 MCG: 50 INJECTION, SOLUTION INTRAMUSCULAR; INTRAVENOUS at 14:36

## 2019-02-07 RX ADMIN — Medication 3 MG: at 15:05

## 2019-02-07 RX ADMIN — PROPOFOL 120 MG: 10 INJECTION, EMULSION INTRAVENOUS at 14:36

## 2019-02-07 RX ADMIN — PHENYLEPHRINE HYDROCHLORIDE 100 MCG: 10 INJECTION, SOLUTION INTRAMUSCULAR; INTRAVENOUS; SUBCUTANEOUS at 14:49

## 2019-02-07 RX ADMIN — GLYCOPYRROLATE 0.4 MG: 0.2 INJECTION, SOLUTION INTRAMUSCULAR; INTRAVENOUS at 15:05

## 2019-02-07 RX ADMIN — SODIUM CHLORIDE, POTASSIUM CHLORIDE, SODIUM LACTATE AND CALCIUM CHLORIDE: 600; 310; 30; 20 INJECTION, SOLUTION INTRAVENOUS at 14:32

## 2019-02-07 RX ADMIN — ROCURONIUM BROMIDE 25 MG: 10 INJECTION INTRAVENOUS at 14:36

## 2019-02-07 RX ADMIN — DEXAMETHASONE SODIUM PHOSPHATE 4 MG: 4 INJECTION, SOLUTION INTRA-ARTICULAR; INTRALESIONAL; INTRAMUSCULAR; INTRAVENOUS; SOFT TISSUE at 14:36

## 2019-02-07 RX ADMIN — GLYCOPYRROLATE 0.1 MG: 0.2 INJECTION, SOLUTION INTRAMUSCULAR; INTRAVENOUS at 14:52

## 2019-02-07 RX ADMIN — PHENYLEPHRINE HYDROCHLORIDE 100 MCG: 10 INJECTION, SOLUTION INTRAMUSCULAR; INTRAVENOUS; SUBCUTANEOUS at 14:40

## 2019-02-07 RX ADMIN — LIDOCAINE HYDROCHLORIDE 50 MG: 10 INJECTION, SOLUTION EPIDURAL; INFILTRATION; INTRACAUDAL; PERINEURAL at 14:36

## 2019-02-07 ASSESSMENT — MIFFLIN-ST. JEOR: SCORE: 1131.17

## 2019-02-07 NOTE — BRIEF OP NOTE
Diagnosis: 9 mm left renal calculus  Procedure: Left renal lithotripsy (1250 shocks)  Surgeon: Tal  Anesthesia: General laryngeal mask  Estimated blood loss: No estimate  Findings: 9 mm stone in left kidney broke up easily with 1250 shocks as seen on follow-up films

## 2019-02-07 NOTE — ANESTHESIA CARE TRANSFER NOTE
Patient: Criss Don    Procedure(s):  videocystoscopy, left EXTRACORPOREAL SHOCK WAVE LITHOTRIPSY, possible left stent placement    Diagnosis: Left kidney stone  Diagnosis Additional Information: No value filed.    Anesthesia Type:   General, LMA     Note:  Airway :Room Air  Patient transferred to:PACU  Handoff Report: Identifed the Patient, Identified the Reponsible Provider, Reviewed the pertinent medical history, Discussed the surgical course, Reviewed Intra-OP anesthesia mangement and issues during anesthesia, Set expectations for post-procedure period and Allowed opportunity for questions and acknowledgement of understanding      Vitals: (Last set prior to Anesthesia Care Transfer)    CRNA VITALS  2/7/2019 1438 - 2/7/2019 1515      2/7/2019             Pulse:  81    SpO2:  99 %    Resp Rate (observed):  17                Electronically Signed By: Dean Dennis Severson, APRN CRNA  February 7, 2019  3:15 PM

## 2019-02-07 NOTE — ANESTHESIA POSTPROCEDURE EVALUATION
Patient: Criss Don    Procedure(s):  LEFT EXTRACORPOREAL SHOCK WAVE LITHOTRIPSY    Diagnosis:Left kidney stone  Diagnosis Additional Information: Diagnosis: 9 mm left renal calculus  Procedure: Left renal lithotripsy (1250 shocks        Anesthesia Type:  General, LMA    Note:  Anesthesia Post Evaluation    Patient location during evaluation: PACU  Patient participation: Able to fully participate in evaluation  Level of consciousness: awake  Pain management: adequate  Airway patency: patent  Cardiovascular status: acceptable  Respiratory status: acceptable  Hydration status: acceptable  PONV: controlled     Anesthetic complications: None          Last vitals:  Vitals:    02/07/19 1535 02/07/19 1540 02/07/19 1545   BP: 109/66 115/69    Pulse: (!) 47 (!) 48    Resp: 14 14 12   Temp:   98.7  F (37.1  C)   SpO2: 100% 100% 100%         Electronically Signed By: Sanjeev Davenport MD  February 7, 2019  3:58 PM

## 2019-02-07 NOTE — ANESTHESIA PREPROCEDURE EVALUATION
Anesthesia Pre-Procedure Evaluation    Patient: Criss Don   MRN: [id number] : 1970          Preoperative Diagnosis: Left kidney stone    Procedure(s):  videocystoscopy, left EXTRACORPOREAL SHOCK WAVE LITHOTRIPSY, possible left stent placement    Past Medical History:   Diagnosis Date     Depressive disorder      morbid obesity      Obese      Renal disease     stone     Ulcer of gastric fundus      Urinary frequency      Past Surgical History:   Procedure Laterality Date     LAPAROSCOPIC BYPASS GASTRIC N/A 10/26/2016    Procedure: LAPAROSCOPIC BYPASS GASTRIC;  Surgeon: Romario Reyna MD;  Location:  OR     LAPAROSCOPY DIAGNOSTIC (GENERAL) N/A 10/24/2018    Procedure: LAPAROSCOPY DIAGNOSTIC FOR PERFORATED GASTRIC ULCER;  Surgeon: Chang Corea MD;  Location:  OR     NO HISTORY OF SURGERY       Anesthesia Evaluation     . Pt has had prior anesthetic.            ROS/MED HX    ENT/Pulmonary:  - neg pulmonary ROS     Neurologic:       Cardiovascular:  - neg cardiovascular ROS       METS/Exercise Tolerance:     Hematologic:         Musculoskeletal:         GI/Hepatic:     (+) GERD       Renal/Genitourinary:     (+) Nephrolithiasis ,       Endo:         Psychiatric:         Infectious Disease:         Malignancy:         Other:                          Physical Exam      Airway   Mallampati: II  TM distance: >3 FB  Neck ROM: full    Dental     Cardiovascular   Rhythm and rate: regular and normal      Pulmonary    breath sounds clear to auscultation            Lab Results   Component Value Date    WBC 3.5 (L) 2019    HGB 12.5 2019    HCT 38.1 2019     2019     2019    POTASSIUM 3.5 2019    CHLORIDE 105 2019    CO2 28 2019    BUN 15 2019    CR 0.64 2019    GLC 92 2019    RAMSES 8.1 (L) 2019    PHOS 2.8 10/26/2018    MAG 1.8 10/26/2018    ALBUMIN 2.9 (L) 2019    PROTTOTAL 5.7 (L) 2019    ALT 31  "01/03/2019    AST 16 01/03/2019    ALKPHOS 42 01/03/2019    BILITOTAL 0.2 01/03/2019    LIPASE 88 01/03/2019    INR 0.95 07/06/2016    TSH 1.44 12/07/2017    HCG Negative 02/07/2019    HCGS Negative 07/17/2013       Preop Vitals  BP Readings from Last 3 Encounters:   02/07/19 99/61   01/23/19 110/62   01/10/19 100/69    Pulse Readings from Last 3 Encounters:   02/07/19 65   01/23/19 60   01/10/19 61      Resp Readings from Last 3 Encounters:   02/07/19 20   01/03/19 16   11/27/18 12    SpO2 Readings from Last 3 Encounters:   02/07/19 98%   01/03/19 99%   11/27/18 100%      Temp Readings from Last 1 Encounters:   02/07/19 97.7  F (36.5  C) (Temporal)    Ht Readings from Last 1 Encounters:   02/07/19 1.575 m (5' 2\")      Wt Readings from Last 1 Encounters:   02/07/19 55.8 kg (123 lb)    Estimated body mass index is 22.5 kg/m  as calculated from the following:    Height as of this encounter: 1.575 m (5' 2\").    Weight as of this encounter: 55.8 kg (123 lb).       Anesthesia Plan      History & Physical Review  History and physical reviewed and following examination; no interval change.    ASA Status:  3 .    NPO Status:  > 8 hours    Plan for General and LMA with Intravenous and Propofol induction. Maintenance will be Balanced.    PONV prophylaxis:  Ondansetron (or other 5HT-3) and Dexamethasone or Solumedrol       Postoperative Care  Postoperative pain management:  IV analgesics, Oral pain medications and Multi-modal analgesia.      Consents  Anesthetic plan, risks, benefits and alternatives discussed with:  Patient..                 Sanjeev Davenport MD                    .  "

## 2019-02-08 NOTE — OP NOTE
Procedure Date: 2019      PREOPERATIVE DIAGNOSIS:  A 9 mm left renal calculus.      POSTOPERATIVE DIAGNOSIS:  A 9 mm left renal calculus.      PROCEDURE:  Left renal lithotripsy (1250 shocks).      SURGEON:  Regis Parada MD      ANESTHESIA:  General laryngeal mask.      ESTIMATED BLOOD LOSS:  No estimate.      INDICATIONS FOR PROCEDURE:  The patient is a 48-year-old female with her first renal stone.  This is seen on CT and KUB.  Her laboratory studies reveal normal electrolytes, normal renal function, and a normal serum calcium level.  Urine pH is 5.5.  Both urine samples this year have been concentrated samples.  The procedure, alternatives, risks, and followup were carefully discussed with the patient.      DESCRIPTION OF THE PROCEDURE:  The patient received 2 grams of IV Ancef and was taken to the lithotripter suite and placed supine on the table.  After adequate general laryngeal mask anesthesia, the patient was positioned on the litho table and her stone was digitized at the F2 focal point.  A total of 200 shocks was delivered to the stone at low power and a slow rate followed by a 2 minute pause.  We then increased the power level to 5, 6, and then 7.  After 400 shocks, the stone was starting to break up.  After 800 shocks, the stone was mostly broken.  A 1250 shocks, on followup films, I was satisfied the stone was fragmented well enough to stop.  The patient tolerated the procedure well and went to the recovery room in stable condition and will be observed there and then sent home.  She will follow up with me in 3-4 weeks with a KUB, and she will bring her stone fragments with her to be analyzed.         REGIS PARADA JR, MD             D: 2019   T: 2019   MT: KAMERON      Name:     KOJO ASHTON   MRN:      [id number]        Account:        [id number]   :      1970           Procedure Date: 2019      Document: [id number]       cc: Napoleon Gray PA-C

## 2019-02-14 ENCOUNTER — ONCOLOGY VISIT (OUTPATIENT)
Dept: ONCOLOGY | Facility: CLINIC | Age: 49
End: 2019-02-14
Attending: INTERNAL MEDICINE
Payer: COMMERCIAL

## 2019-02-14 VITALS
HEIGHT: 62 IN | DIASTOLIC BLOOD PRESSURE: 61 MMHG | SYSTOLIC BLOOD PRESSURE: 94 MMHG | HEART RATE: 66 BPM | BODY MASS INDEX: 22.87 KG/M2 | WEIGHT: 124.3 LBS | RESPIRATION RATE: 16 BRPM | OXYGEN SATURATION: 99 %

## 2019-02-14 DIAGNOSIS — D50.9 IRON DEFICIENCY ANEMIA, UNSPECIFIED IRON DEFICIENCY ANEMIA TYPE: Primary | ICD-10-CM

## 2019-02-14 DIAGNOSIS — D72.819 LEUKOPENIA, UNSPECIFIED TYPE: ICD-10-CM

## 2019-02-14 DIAGNOSIS — K90.9 MALABSORPTION OF IRON: ICD-10-CM

## 2019-02-14 PROCEDURE — 99214 OFFICE O/P EST MOD 30 MIN: CPT | Performed by: INTERNAL MEDICINE

## 2019-02-14 PROCEDURE — G0463 HOSPITAL OUTPT CLINIC VISIT: HCPCS

## 2019-02-14 ASSESSMENT — MIFFLIN-ST. JEOR: SCORE: 1137.07

## 2019-02-14 ASSESSMENT — PAIN SCALES - GENERAL: PAINLEVEL: SEVERE PAIN (6)

## 2019-02-14 NOTE — Clinical Note
2/14/2019         RE: Criss Don  [address] [facility] [id number]        Dear Colleague,    Thank you for referring your patient, Criss Don, to the [facility]. Please see a copy of my visit note below.    Visit Date:   02/14/2019     HEMATOLOGY HISTORY: Ms. Don is a female with iron deficiency anemia. She had gastric bypass surgery in October 2016.   1.  On 03/07/2018 (care everywhere), hemoglobin of 12.3 with MCV of 89.7.   2.  On 04/20/2018, iron of 33, ferritin of 11 and saturation of 11%.   3.  On 07/19/2018, iron of 36, ferritin of 6 and saturation of 12%.      SUBJECTIVE:  Ms. Don is a 48-year-old female with iron deficiency anemia.  Patient had gastric bypass surgery.  She has malabsorption of iron. Patient has been treated with IV iron before.      She is here for followup.  Overall, she is doing well.  She has some fatigue.  No worsening of fatigue.  No headache.  No dizziness.  No chest pain.  No difficulty breathing.  No abdominal pain, nausea or vomiting.      Patient is on birth control pill.  That is helping to regulate her menstrual bleeding.  She still premenopausal.      PHYSICAL EXAMINATION:   GENERAL:  She is alert, oriented x 3.   VITAL SIGNS:  Reviewed.  ECOG PS of 1.   Rest of the systems not examined.      LABORATORY DATA:  Reviewed.      ASSESSMENT:   1.  A 48-year-old female with iron deficiency anemia secondary to malabsorption of iron.   2.  Malabsorption of iron secondary to gastric bypass.   3.  Mild leukopenia.   4.  Fatigue.      PLAN:   1.  Patient clinically is doing well except for some fatigue.  CBC was reviewed with her.  Her hemoglobin is normal.  She is not anemic.  Iron and ferritin are normal.  I told her that she does not have iron deficiency anemia.  She is at risk of iron deficiency anemia because of her gastric bypass surgery.  We will recheck a CBC and iron studies in 6 months for followup.  She is agreeable for it.   2.   Patient will continue on vitamin B12.  She takes sublingual vitamin B12 three times weekly.  She will continue.   3.  Patient has mild leukopenia.  Previous CBCs have revealed normal WBC.  Will recheck another CBC in 3 months.  Advised her to go to the emergency room if he has infection, fever or chills.   2.  I will see her in 6 months for followup.  Advised her to call us if she has worsening weakness, muscle cramps, chest pain, shortness of breath, dizziness, or any other concerns.         JORGE BANDA MD             D: 2019   T: 2019   MT: CORINNE      Name:     KOJO ASHTON   MRN:      [id number]        Account:      [id number]   :      1970           Visit Date:   2019      Document: [id number]        Again, thank you for allowing me to participate in the care of your patient.        Sincerely,        Jorge Banda MD

## 2019-02-14 NOTE — PATIENT INSTRUCTIONS
CBC in 3 months., scheduled/ tia  See me in 6 months with labs. , scheduled/Tia  Continue vitamin B12.

## 2019-02-15 ENCOUNTER — OFFICE VISIT (OUTPATIENT)
Dept: PSYCHOLOGY | Facility: CLINIC | Age: 49
End: 2019-02-15
Payer: COMMERCIAL

## 2019-02-15 DIAGNOSIS — F41.1 GENERALIZED ANXIETY DISORDER: Primary | ICD-10-CM

## 2019-02-15 DIAGNOSIS — F50.9 EATING DISORDER, UNSPECIFIED: ICD-10-CM

## 2019-02-15 PROCEDURE — 90834 PSYTX W PT 45 MINUTES: CPT | Performed by: PSYCHOLOGIST

## 2019-02-15 NOTE — PROGRESS NOTES
Visit Date:   02/14/2019     HEMATOLOGY HISTORY: Ms. Don is a female with iron deficiency anemia. She had gastric bypass surgery in October 2016.   1.  On 03/07/2018 (care everywhere), hemoglobin of 12.3 with MCV of 89.7.   2.  On 04/20/2018, iron of 33, ferritin of 11 and saturation of 11%.   3.  On 07/19/2018, iron of 36, ferritin of 6 and saturation of 12%.      SUBJECTIVE:  Ms. Don is a 48-year-old female with iron deficiency anemia.  Patient had gastric bypass surgery.  She has malabsorption of iron. Patient has been treated with IV iron before.      She is here for followup.  Overall, she is doing well.  She has some fatigue.  No worsening of fatigue.  No headache.  No dizziness.  No chest pain.  No difficulty breathing.  No abdominal pain, nausea or vomiting.      Patient is on birth control pill.  That is helping to regulate her menstrual bleeding.  She still premenopausal.      PHYSICAL EXAMINATION:   GENERAL:  She is alert, oriented x 3.   VITAL SIGNS:  Reviewed.  ECOG PS of 1.   Rest of the systems not examined.      LABORATORY DATA:  Reviewed.      ASSESSMENT:   1.  A 48-year-old female with iron deficiency anemia secondary to malabsorption of iron.   2.  Malabsorption of iron secondary to gastric bypass.   3.  Mild leukopenia.   4.  Fatigue.      PLAN:   1.  Patient clinically is doing well except for some fatigue.  CBC was reviewed with her.  Her hemoglobin is normal.  She is not anemic.  Iron and ferritin are normal.  I told her that she does not have iron deficiency anemia.  She is at risk of iron deficiency anemia because of her gastric bypass surgery.  We will recheck a CBC and iron studies in 6 months for followup.  She is agreeable for it.   2.  Patient will continue on vitamin B12.  She takes sublingual vitamin B12 three times weekly.  She will continue.   3.  Patient has mild leukopenia.  Previous CBCs have revealed normal WBC.  Will recheck another CBC in 3 months.  Advised her to go to  the emergency room if he has infection, fever or chills.   2.  I will see her in 6 months for followup.  Advised her to call us if she has worsening weakness, muscle cramps, chest pain, shortness of breath, dizziness, or any other concerns.         JORGE BANDA MD             D: 2019   T: 2019   MT: CORINNE      Name:     KOJO ASHTON   MRN:      [id number]        Account:      [id number]   :      1970           Visit Date:   2019      Document: [id number]

## 2019-02-15 NOTE — PROGRESS NOTES
"                                           Progress Note    Client Name: Criss Don  Date: 2/15/2019         Service Type: Individual  Video Visit: No     Session Start Time: 7:00  Session End Time: 7:45     Session Length: 45    Session #: 46    Attendees: Client attended alone     Treatment Plan Last Reviewed: 2/15/2019  PHQ-9 / NED-7 :     DATA  Interactive Complexity: No  Crisis: No       Progress Since Last Session (Related to Symptoms / Goals / Homework):   Symptoms: no improvement    Homework: Partially completed      Episode of Care Goals: Minimal progress - ACTION (Actively working towards change); Intervened by reinforcing change plan / affirming steps taken     Current / Ongoing Stressors and Concerns:   Reported that she is still feeling run down after  some recent medical procedures (see EHR). Reported that she understands a need to slow down and rest while her body recovers, but acknowledged that this is hard to due because she feels guilt for not being more productive when she is resting. Also reported feeling disappointed that her boyfriend did not acknowledge Julianna's Day, because she was hoping that \"this relationship was going to be different\" than her marriage to her ex-. She also acknowledged that it has been difficult to apply the teachings from her mindfulness group, and feels discouraged when she struggles to apply the skills.     Treatment Objective(s) Addressed in This Session:   managing anxiety  Maintaining compliance with health goals     Intervention:   CBT: surfaced and challenged thinking errors that generate anxiety and unjustified guilt; discussed how to adjust her expectations of herself to be more realistic while she is trying to manage physical health problems  Solution Focused: coached client on \"one-mindfulness\" and gave examples of ways to practice that she can apply in her day to day routines  Supportive therapy: provided active listening, support, and " "encouragement. Normalized the challenge that can come with learning and applying mindfulness skills.        ASSESSMENT: Current Emotional / Mental Status (status of significant symptoms):   Risk status (Self / Other harm or suicidal ideation)   Client denies current fears or concerns for personal safety.   Client denies current or recent suicidal ideation or behaviors.   Client denies current or recent homicidal ideation or behaviors.   Client denies current or recent self injurious behavior or ideation.   Client denies other safety concerns.   Client Client reports there has been no change in risk factors since their last session.     Client Client reports there has been no change in protective factors since their last session.     A safety and risk management plan has not been developed at this time, however client was given the after-hours number / 911 should there be a change in any of these risk factors.     Appearance:   Appropriate    Eye Contact:   Good    Psychomotor Behavior: Normal    Attitude:   Cooperative    Orientation:   All   Speech    Rate / Production: Normal     Volume:  Normal    Mood:    Anxious , \"run down\"   Affect:    Appropriate    Thought Content:  Worry    Thought Form:  Coherent  Logical    Insight:    Good      Medication Review:   No changes to current psychiatric medication(s)     Medication Compliance:   Yes     Changes in Health Issues:   Yes: see EHR     Chemical Use Review:   Substance Use: Chemical use reviewed, no active concerns identified      Tobacco Use: No current tobacco use.      Diagnosis:  1. Generalized anxiety disorder    2. Eating disorder, unspecified        Collateral Reports Completed:   Not Applicable    PLAN: (Client Tasks / Therapist Tasks / Other)  Future appointments are scheduled. Practice mindfulness skills at least once daily as discussed in session. Identify physical signs of anxiety and use relaxation skills as discussed in session. Use assertiveness " skills as discussed in session to communicate feelings and needs more openly with others. Surface and challenge unrealistic expectations and disproportionate thoughts that generate anxiety.  Practice using self-compassionate statements as reviewed in session.        Brenda Perez PsyD LP                                                         ______________________________________________________________________                                                 Treatment Plan    Client's Name: Criss Don  YOB: 1970    Date:  2/15/2019    DSM-V Diagnoses: 300.02 Generalized Anxiety Disorder, eating disorder, unspecified  Psychosocial / Contextual Factors: divorce proceedings, health concerns  WHODAS: 22    Referral / Collaboration:  Referral to another professional/service is not indicated at this time..    Anticipated number of session or this episode of care: reassess after 12 sessions      MeasurableTreatment Goal(s) related to diagnosis / functional impairment(s)  Goal 1: Client will learn coping skills to manage stress and adjust to post-surgical lifestyle changes more effectively.    I will know I've met my goal when I'm not weighing myself all the time and feeling more comfortable with my weight loss.      Objective #A (Client Action)    Client will surface and challenge thinking errors that contribute to anxiety.  Status: Continued - Date(s): 2/15/2019    Intervention(s)  Therapist will assign homework (thought  logs), assist client in surfacing and replacement ineffective thinking patterns.    Objective #B  Client will learn deep breathing and relaxation skills and practice at least 3x daily.  Status: Continued - Date(s): 2/15/2019    Intervention(s)  Therapist will teach deep breathing and relaxation skills, assign homework, problem-solve barriers to follow through.    Objective #C  Client will learn and use assertiveness skills to set healthy boundaries and communicate her needs more  effectively.  Status: Continued - Date(s): 2/15/2019    Intervention(s)  Therapist will assist client in identifying and balancing her expectations.        Client has reviewed and agreed to the above plan.      Brenda Perez PsyD LP  2/15/2019

## 2019-02-27 ENCOUNTER — TELEPHONE (OUTPATIENT)
Dept: SURGERY | Facility: CLINIC | Age: 49
End: 2019-02-27

## 2019-02-27 DIAGNOSIS — K27.5 PERFORATED ULCER (H): ICD-10-CM

## 2019-02-27 DIAGNOSIS — Z98.84 BARIATRIC SURGERY STATUS: Primary | ICD-10-CM

## 2019-02-27 NOTE — TELEPHONE ENCOUNTER
Patient had repair of gastric ulcer with Ousmane patch 10/24/18.  Original RNY 10/26/16.  Per clinic protocol - to have EGD to check PO.    Order placed.  Patient notified.  Given phone number for [facility] if patient doesn't hear from them by Friday.  Requested patient call when appointment is made.  Patient verbalized understanding and is agreeable to plan.  Vesna Evans MS, RD, RN    Order faxed to [facility] and verified it went through today at [phone].  Vesna Evans MS, RD, RN

## 2019-03-01 ENCOUNTER — OFFICE VISIT (OUTPATIENT)
Dept: PSYCHOLOGY | Facility: CLINIC | Age: 49
End: 2019-03-01
Payer: COMMERCIAL

## 2019-03-01 DIAGNOSIS — F50.9 EATING DISORDER, UNSPECIFIED: ICD-10-CM

## 2019-03-01 DIAGNOSIS — F41.1 GENERALIZED ANXIETY DISORDER: Primary | ICD-10-CM

## 2019-03-01 PROCEDURE — 90834 PSYTX W PT 45 MINUTES: CPT | Performed by: PSYCHOLOGIST

## 2019-03-01 NOTE — PROGRESS NOTES
"                                           Progress Note    Client Name: Criss Don  Date: 3/1/2019         Service Type: Individual  Video Visit: No     Session Start Time:  11:00  Session End Time: 11:45     Session Length: 45    Session #: 47    Attendees: Client attended alone     Treatment Plan Last Reviewed: 3/1/2019  PHQ-9 / NED-7 :     DATA  Interactive Complexity: No  Crisis: No       Progress Since Last Session (Related to Symptoms / Goals / Homework):   Symptoms: mild improvement    Homework: Partially completed      Episode of Care Goals: Minimal progress - ACTION (Actively working towards change); Intervened by reinforcing change plan / affirming steps taken     Current / Ongoing Stressors and Concerns:   Reported feeling a little better physically, but that her anxiety has been \"up and down\". 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 Reported that her nephew has been making some poor choices, and she is trying to prevent herself from taking excessive responsibility or feeling guilty about him not doing better in life, but admitted this is hard--she links this to a strong pattern of codependency that had formed between her nephew and her (now ) sister that has created difficult relationship dynamics with him and client. Reported that her ex- has been in contact with her and making demands after 2 months of no contact. Reported that this has generated some anxiety but she was able to set appropriate limits and avoid getting drawn into old habits with him. Reported she is \"getting better\" with her mindful practice and is able to stick with it for longer periods of time.      Treatment Objective(s) Addressed in This Session:   managing anxiety  Maintaining compliance with health goals     Intervention:   CBT: surfaced and challenged thinking errors that generate anxiety and " "unjustified guilt; discussed how to adjust her expectations of herself to be more realistic while she is trying to manage physical health problems  Solution Focused: coached client on \"one-mindfulness\" and gave examples of ways to practice that she can apply in her day to day routines  Supportive therapy: provided active listening, support, and encouragement. Normalized the challenge that can come with learning and applying mindfulness skills. Applauded her for setting appropriate limits and avoiding old habits in her relationships.         ASSESSMENT: Current Emotional / Mental Status (status of significant symptoms):   Risk status (Self / Other harm or suicidal ideation)   Client denies current fears or concerns for personal safety.   Client denies current or recent suicidal ideation or behaviors.   Client denies current or recent homicidal ideation or behaviors.   Client denies current or recent self injurious behavior or ideation.   Client denies other safety concerns.   Client Client reports there has been no change in risk factors since their last session.     Client Client reports there has been no change in protective factors since their last session.     A safety and risk management plan has not been developed at this time, however client was given the after-hours number / 911 should there be a change in any of these risk factors.     Appearance:   Appropriate    Eye Contact:   Good    Psychomotor Behavior: Normal    Attitude:   Cooperative    Orientation:   All   Speech    Rate / Production: Normal     Volume:  Normal    Mood:    Anxious    Affect:    Appropriate    Thought Content:  Worry    Thought Form:  Coherent  Logical    Insight:    Good      Medication Review:   No changes to current psychiatric medication(s)     Medication Compliance:   Yes     Changes in Health Issues:   None reported     Chemical Use Review:   Substance Use: Chemical use reviewed, no active concerns identified      Tobacco Use: " No current tobacco use.      Diagnosis:  1. Generalized anxiety disorder    2. Eating disorder, unspecified        Collateral Reports Completed:   Not Applicable    PLAN: (Client Tasks / Therapist Tasks / Other)  Future appointments are scheduled. Practice mindfulness skills at least once daily as discussed in session. Identify physical signs of anxiety and use relaxation skills as discussed in session. Use assertiveness skills as discussed in session to communicate feelings and needs more openly with others. Surface and challenge unrealistic expectations and disproportionate thoughts that generate anxiety.  Practice using self-compassionate statements as reviewed in session.        Brenda Perez PsyD LP                                                         ______________________________________________________________________                                                 Treatment Plan    Client's Name: Criss Don  YOB: 1970    Date:  3/1/2019    DSM-V Diagnoses: 300.02 Generalized Anxiety Disorder, eating disorder, unspecified  Psychosocial / Contextual Factors: divorce proceedings, health concerns  WHODAS: 22    Referral / Collaboration:  Referral to another professional/service is not indicated at this time..    Anticipated number of session or this episode of care: reassess after 12 sessions      MeasurableTreatment Goal(s) related to diagnosis / functional impairment(s)  Goal 1: Client will learn coping skills to manage stress and adjust to post-surgical lifestyle changes more effectively.    I will know I've met my goal when I'm not weighing myself all the time and feeling more comfortable with my weight loss.      Objective #A (Client Action)    Client will surface and challenge thinking errors that contribute to anxiety.  Status: Continued - Date(s): 3/1/2019    Intervention(s)  Therapist will assign homework (thought  logs), assist client in surfacing and replacement ineffective  thinking patterns.    Objective #B  Client will learn deep breathing and relaxation skills and practice at least 3x daily.  Status: Continued - Date(s): 3/1/2019    Intervention(s)  Therapist will teach deep breathing and relaxation skills, assign homework, problem-solve barriers to follow through.    Objective #C  Client will learn and use assertiveness skills to set healthy boundaries and communicate her needs more effectively.  Status: Continued - Date(s): 3/1/2019    Intervention(s)  Therapist will assist client in identifying and balancing her expectations.        Client has reviewed and agreed to the above plan.      Brenda Perez PsyD LP  3/1/2019

## 2019-03-04 ENCOUNTER — TELEPHONE (OUTPATIENT)
Dept: SURGERY | Facility: CLINIC | Age: 49
End: 2019-03-04

## 2019-03-04 NOTE — TELEPHONE ENCOUNTER
Patient called to report that she is having EGD done Thursday 3/7/19 at 0625.  EGD being done to check if ulcer from perf of 10/24/18 healed.  Informed patient that clinic would call [facility] for report - may not be available until Monday but will start trying Thursday 3/7/19.  Will touch base Monday 3/11/19 at the latest.  Patient verbalized understanding and is agreeable to plan.  Vesna Evans, MS, RD, RN

## 2019-03-06 DIAGNOSIS — Z87.442 PERSONAL HISTORY OF URINARY CALCULI: Primary | ICD-10-CM

## 2019-03-07 ENCOUNTER — TELEPHONE (OUTPATIENT)
Dept: SURGERY | Facility: CLINIC | Age: 49
End: 2019-03-07

## 2019-03-07 ENCOUNTER — TRANSFERRED RECORDS (OUTPATIENT)
Dept: HEALTH INFORMATION MANAGEMENT | Facility: CLINIC | Age: 49
End: 2019-03-07

## 2019-03-07 NOTE — TELEPHONE ENCOUNTER
Patient had EGD done today at [facility] post gastric perf repair of 10/24/18.    Called to get results.  MR will fax results of EGD but not path report as it is not yet ready.    Vesna Evans, MS, RD, RN

## 2019-03-08 ENCOUNTER — OFFICE VISIT (OUTPATIENT)
Dept: UROLOGY | Facility: CLINIC | Age: 49
End: 2019-03-08
Payer: COMMERCIAL

## 2019-03-08 VITALS
WEIGHT: 126.4 LBS | DIASTOLIC BLOOD PRESSURE: 60 MMHG | SYSTOLIC BLOOD PRESSURE: 100 MMHG | HEART RATE: 85 BPM | BODY MASS INDEX: 23.26 KG/M2 | HEIGHT: 62 IN | OXYGEN SATURATION: 100 %

## 2019-03-08 DIAGNOSIS — N20.0 KIDNEY STONE: Primary | ICD-10-CM

## 2019-03-08 LAB
ALBUMIN UR-MCNC: NEGATIVE MG/DL
APPEARANCE UR: CLEAR
BILIRUB UR QL STRIP: NEGATIVE
COLOR UR AUTO: YELLOW
GLUCOSE UR STRIP-MCNC: NEGATIVE MG/DL
HGB UR QL STRIP: ABNORMAL
KETONES UR STRIP-MCNC: ABNORMAL MG/DL
LEUKOCYTE ESTERASE UR QL STRIP: ABNORMAL
NITRATE UR QL: NEGATIVE
PH UR STRIP: 6 PH (ref 5–7)
SOURCE: ABNORMAL
SP GR UR STRIP: 1.02 (ref 1–1.03)
UROBILINOGEN UR STRIP-ACNC: 0.2 EU/DL (ref 0.2–1)

## 2019-03-08 PROCEDURE — 82365 CALCULUS SPECTROSCOPY: CPT | Mod: 90 | Performed by: UROLOGY

## 2019-03-08 PROCEDURE — 81003 URINALYSIS AUTO W/O SCOPE: CPT | Performed by: UROLOGY

## 2019-03-08 PROCEDURE — 99000 SPECIMEN HANDLING OFFICE-LAB: CPT | Performed by: UROLOGY

## 2019-03-08 PROCEDURE — 99024 POSTOP FOLLOW-UP VISIT: CPT | Performed by: UROLOGY

## 2019-03-08 ASSESSMENT — MIFFLIN-ST. JEOR: SCORE: 1146.6

## 2019-03-08 ASSESSMENT — PAIN SCALES - GENERAL: PAINLEVEL: NO PAIN (0)

## 2019-03-08 NOTE — PROGRESS NOTES
Paulette Don underwent lithotripsy for her first kidney stone.  She received 1250 shocks with good fragmentation.  She has normal serum calcium levels and normal renal function.  She claims to drink 64 ounces of water daily but has had concentrated urines in the past.  She has had a gastric bypass and she is on a high protein diet because of an eating disorder.  Other past medical history: Depression, gastric ulcer, non-smoker, laparoscopic surgeries for bypass and ulcer  Medications: Calcium/vitamin D, vitamin D, vitamin B12, Colace, Prozac, multivitamin, Protonix, Sprintec  Allergies: Lortab  Exam: Smiling, alert and oriented, normal appearance  Assessment: Probable calcium oxalate/calcium phosphate stone-one small fragment submitted for analysis  Patient needs metabolic stone evaluation and coordination of any dietary changes in the future with her other doctors  Plan: See [facility] for metabolic stone evaluation, see me in 6 months with KUB.  She needs to drink between 80 and 120 ounces of water daily which is approximately 2-3 L

## 2019-03-08 NOTE — LETTER
3/8/2019      RE: Criss Don  [address] [facility] [id number]       Paulette Don underwent lithotripsy for her first kidney stone.  She received 1250 shocks with good fragmentation.  She has normal serum calcium levels and normal renal function.  She claims to drink 64 ounces of water daily but has had concentrated urines in the past.  She has had a gastric bypass and she is on a high protein diet because of an eating disorder.  Other past medical history: Depression, gastric ulcer, non-smoker, laparoscopic surgeries for bypass and ulcer  Medications: Calcium/vitamin D, vitamin D, vitamin B12, Colace, Prozac, multivitamin, Protonix, Sprintec  Allergies: Lortab  Exam: Smiling, alert and oriented, normal appearance  Assessment: Probable calcium oxalate/calcium phosphate stone-one small fragment submitted for analysis  Patient needs metabolic stone evaluation and coordination of any dietary changes in the future with her other doctors  Plan: See [facility] for metabolic stone evaluation, see me in 6 months with [facility].  She needs to drink between 80 and 120 ounces of water daily which is approximately 2-3 L    Regis Parada MD

## 2019-03-08 NOTE — LETTER
RE: Criss Don  [address] [facility] [id number]     Dear Colleague,    Thank you for referring your patient, Criss Don, to the [facility] [place] at [facility]. Please see a copy of my visit note below.    Paulette Don underwent lithotripsy for her first kidney stone.  She received 1250 shocks with good fragmentation.  She has normal serum calcium levels and normal renal function.  She claims to drink 64 ounces of water daily but has had concentrated urines in the past.  She has had a gastric bypass and she is on a high protein diet because of an eating disorder.  Other past medical history: Depression, gastric ulcer, non-smoker, laparoscopic surgeries for bypass and ulcer  Medications: Calcium/vitamin D, vitamin D, vitamin B12, Colace, Prozac, multivitamin, Protonix, Sprintec  Allergies: Lortab  Exam: Smiling, alert and oriented, normal appearance  Assessment: Probable calcium oxalate/calcium phosphate stone-one small fragment submitted for analysis  Patient needs metabolic stone evaluation and coordination of any dietary changes in the future with her other doctors  Plan: See [facility] for metabolic stone evaluation, see me in 6 months with [facility].  She needs to drink between 80 and 120 ounces of water daily which is approximately 2-3 L    Again, thank you for allowing me to participate in the care of your patient.      Sincerely,    Regis Parada MD

## 2019-03-11 LAB
APPEARANCE STONE: NORMAL
COMPN STONE: NORMAL
NUMBER STONE: 1
SIZE STONE: NORMAL MM
WT STONE: NORMAL MG

## 2019-03-15 ENCOUNTER — OFFICE VISIT (OUTPATIENT)
Dept: PSYCHOLOGY | Facility: CLINIC | Age: 49
End: 2019-03-15
Payer: COMMERCIAL

## 2019-03-15 DIAGNOSIS — F41.1 GENERALIZED ANXIETY DISORDER: Primary | ICD-10-CM

## 2019-03-15 DIAGNOSIS — F50.9 EATING DISORDER: ICD-10-CM

## 2019-03-15 PROCEDURE — 90834 PSYTX W PT 45 MINUTES: CPT | Performed by: PSYCHOLOGIST

## 2019-03-15 NOTE — PROGRESS NOTES
"                                           Progress Note    Client Name: Criss Don  Date: 3/15/2019         Service Type: Individual  Video Visit: No     Session Start Time:  9:00  Session End Time: 9:45     Session Length: 45    Session #: 48    Attendees: Client attended alone     Treatment Plan Last Reviewed: 3/15/2019  PHQ-9 / NED-7 :     DATA  Interactive Complexity: No  Crisis: No       Progress Since Last Session (Related to Symptoms / Goals / Homework):   Symptoms: stable    Homework: Partially completed      Episode of Care Goals: Minimal progress - ACTION (Actively working towards change); Intervened by reinforcing change plan / affirming steps taken     Current / Ongoing Stressors and Concerns:   Reported that her anxiety has been \"up and down\". 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 Reported feeling worried about forming new kidney stones and what that will mean for her diet recommendations.      Treatment Objective(s) Addressed in This Session:   managing anxiety  Maintaining compliance with health goals     Intervention:   CBT: surfaced and challenged thinking errors that generate anxiety and unjustified guilt; discussed how to adjust her expectations of herself to be more realistic while she is trying to manage physical health problems  Solution Focused: coached client on assertiveness skills; explored patterns of codependency and passivity that create strain in her relationships  Supportive therapy: provided active listening, support, and encouragement.         ASSESSMENT: Current Emotional / Mental Status (status of significant symptoms):   Risk status (Self / Other harm or suicidal " ideation)   Client denies current fears or concerns for personal safety.   Client denies current or recent suicidal ideation or behaviors.   Client denies current or recent homicidal ideation or behaviors.   Client denies current or recent self injurious behavior or ideation.   Client denies other safety concerns.   Client Client reports there has been no change in risk factors since their last session.     Client Client reports there has been no change in protective factors since their last session.     A safety and risk management plan has not been developed at this time, however client was given the after-hours number / 911 should there be a change in any of these risk factors.     Appearance:   Appropriate    Eye Contact:   Good    Psychomotor Behavior: Normal    Attitude:   Cooperative    Orientation:   All   Speech    Rate / Production: Normal     Volume:  Normal    Mood:    Anxious    Affect:    Appropriate    Thought Content:  Worry    Thought Form:  Coherent  Logical    Insight:    Good      Medication Review:   No changes to current psychiatric medication(s)     Medication Compliance:   Yes     Changes in Health Issues:   None reported     Chemical Use Review:   Substance Use: Chemical use reviewed, no active concerns identified      Tobacco Use: No current tobacco use.      Diagnosis:  1. Generalized anxiety disorder    2. Eating disorder        Collateral Reports Completed:   Not Applicable    PLAN: (Client Tasks / Therapist Tasks / Other)  Future appointments are scheduled. Continue: Practice mindfulness skills at least once daily. Identify physical signs of anxiety and use relaxation skills as discussed in session. Use assertiveness skills as discussed in session to communicate feelings and needs more openly with others. Surface and challenge unrealistic expectations and disproportionate thoughts that generate anxiety.  Practice using self-compassionate statements as reviewed in session.        Brenda  Elen Perez LP                                                         ______________________________________________________________________                                                 Treatment Plan    Client's Name: Criss Don  YOB: 1970    Date:  3/15/2019    DSM-V Diagnoses: 300.02 Generalized Anxiety Disorder, eating disorder, unspecified  Psychosocial / Contextual Factors: divorce proceedings, health concerns  WHODAS: 22    Referral / Collaboration:  Referral to another professional/service is not indicated at this time..    Anticipated number of session or this episode of care: reassess after 12 sessions      MeasurableTreatment Goal(s) related to diagnosis / functional impairment(s)  Goal 1: Client will learn coping skills to manage stress and adjust to post-surgical lifestyle changes more effectively.    I will know I've met my goal when I'm not weighing myself all the time and feeling more comfortable with my weight loss.      Objective #A (Client Action)    Client will surface and challenge thinking errors that contribute to anxiety.  Status: Continued - Date(s): 3/15/2019    Intervention(s)  Therapist will assign homework (thought  logs), assist client in surfacing and replacement ineffective thinking patterns.    Objective #B  Client will learn deep breathing and relaxation skills and practice at least 3x daily.  Status: Continued - Date(s): 3/15/2019    Intervention(s)  Therapist will teach deep breathing and relaxation skills, assign homework, problem-solve barriers to follow through.    Objective #C  Client will learn and use assertiveness skills to set healthy boundaries and communicate her needs more effectively.  Status: Continued - Date(s): 3/15/2019    Intervention(s)  Therapist will assist client in identifying and balancing her expectations.        Client has reviewed and agreed to the above plan.      Brenda Perez PsyD LP  3/15/2019

## 2019-03-18 ENCOUNTER — TELEPHONE (OUTPATIENT)
Dept: SURGERY | Facility: CLINIC | Age: 49
End: 2019-03-18

## 2019-03-18 NOTE — TELEPHONE ENCOUNTER
Pt callled back re: phone message.   Update pt per PA direction as below; see below message.    Pt verbalizes she is still taking protonix as ordered and does not need refill at this time.  No further questions or concerns.    Siena Scott RN on 3/18/2019 at 3:01 PM

## 2019-03-18 NOTE — TELEPHONE ENCOUNTER
Called pt to confirm she had received her EGD results.  Instructed by IMMANUEL Paez to inform pt that her EGD was primarily normal with mild inflammation and to confirm that she was continuing to take her protonix as ordered.  Also instructed to check if she needs refill on protonix.    Left message for pt to call back.  Siena Scott RN on 3/18/2019 at 2:39 PM

## 2019-03-20 ENCOUNTER — ANCILLARY PROCEDURE (OUTPATIENT)
Dept: MAMMOGRAPHY | Facility: CLINIC | Age: 49
End: 2019-03-20
Payer: COMMERCIAL

## 2019-03-20 DIAGNOSIS — Z12.31 VISIT FOR SCREENING MAMMOGRAM: ICD-10-CM

## 2019-03-20 PROCEDURE — 77067 SCR MAMMO BI INCL CAD: CPT | Mod: TC

## 2019-03-21 ENCOUNTER — OFFICE VISIT (OUTPATIENT)
Dept: FAMILY MEDICINE | Facility: CLINIC | Age: 49
End: 2019-03-21
Payer: COMMERCIAL

## 2019-03-21 VITALS
BODY MASS INDEX: 23.74 KG/M2 | RESPIRATION RATE: 14 BRPM | OXYGEN SATURATION: 99 % | DIASTOLIC BLOOD PRESSURE: 64 MMHG | TEMPERATURE: 97.6 F | WEIGHT: 129 LBS | HEART RATE: 74 BPM | SYSTOLIC BLOOD PRESSURE: 112 MMHG | HEIGHT: 62 IN

## 2019-03-21 DIAGNOSIS — Z30.09 GENERAL COUNSELING FOR PRESCRIPTION OF ORAL CONTRACEPTIVES: ICD-10-CM

## 2019-03-21 DIAGNOSIS — Z98.84 BARIATRIC SURGERY STATUS: ICD-10-CM

## 2019-03-21 DIAGNOSIS — N20.0 NEPHROLITHIASIS: ICD-10-CM

## 2019-03-21 DIAGNOSIS — Z00.00 ENCOUNTER FOR ROUTINE ADULT HEALTH EXAMINATION WITHOUT ABNORMAL FINDINGS: Primary | ICD-10-CM

## 2019-03-21 DIAGNOSIS — Z12.11 SCREEN FOR COLON CANCER: ICD-10-CM

## 2019-03-21 DIAGNOSIS — K59.01 SLOW TRANSIT CONSTIPATION: ICD-10-CM

## 2019-03-21 DIAGNOSIS — K90.9 MALABSORPTION OF IRON: ICD-10-CM

## 2019-03-21 DIAGNOSIS — R60.0 BILATERAL EDEMA OF LOWER EXTREMITY: ICD-10-CM

## 2019-03-21 DIAGNOSIS — F41.1 GAD (GENERALIZED ANXIETY DISORDER): ICD-10-CM

## 2019-03-21 DIAGNOSIS — F45.22 BODY DYSMORPHIC DISORDER: ICD-10-CM

## 2019-03-21 DIAGNOSIS — Z11.4 SCREENING FOR HIV (HUMAN IMMUNODEFICIENCY VIRUS): ICD-10-CM

## 2019-03-21 PROCEDURE — 99396 PREV VISIT EST AGE 40-64: CPT | Performed by: PHYSICIAN ASSISTANT

## 2019-03-21 RX ORDER — NORGESTIMATE AND ETHINYL ESTRADIOL 0.25-0.035
1 KIT ORAL DAILY
Qty: 84 TABLET | Refills: 3 | Status: SHIPPED | OUTPATIENT
Start: 2019-03-21 | End: 2020-08-13

## 2019-03-21 ASSESSMENT — MIFFLIN-ST. JEOR: SCORE: 1158.39

## 2019-03-21 NOTE — PROGRESS NOTES
SUBJECTIVE:   CC: Criss Don is an 48 year old woman who presents for preventive health visit.     Healthy Habits:    Do you get at least three servings of calcium containing foods daily (dairy, green leafy vegetables, etc.)? yes    Amount of exercise or daily activities, outside of work: 6 days/week    Problems taking medications regularly No    Medication side effects: No    Have you had an eye exam in the past two years? yes    Do you see a dentist twice per year? yes    Do you have sleep apnea, excessive snoring or daytime drowsiness?no      Today's PHQ-2 Score:   PHQ-2 ( 1999 [employer]) 3/8/2019 1/23/2019   Q1: Little interest or pleasure in doing things 0 1   Q2: Feeling down, depressed or hopeless 0 2   PHQ-2 Score 0 3       Abuse: Current or Past(Physical, Sexual or Emotional)- No  Do you feel safe in your environment? Yes    Social History     Tobacco Use     Smoking status: Never Smoker     Smokeless tobacco: Never Used   Substance Use Topics     Alcohol use: No     Alcohol/week: 0.0 oz     Frequency: Never     If you drink alcohol do you typically have >3 drinks per day or >7 drinks per week? Not Applicable                     Reviewed orders with patient.  Reviewed health maintenance and updated orders accordingly - Yes  Patient Active Problem List   Diagnosis     Labial abscess     CARDIOVASCULAR SCREENING; LDL GOAL LESS THAN 160     Mass of right foot     History of abnormal cervical Pap smear     Intertrigo     Bilateral edema of lower extremity     Bariatric surgery status     Malnutrition following gastrointestinal surgery     Body dysmorphic disorder     Hypokalemia     Overweight (BMI 25.0-29.9)     Hypoglycemia     NED (generalized anxiety disorder)     Other specified eating disorder     Slow transit constipation     Iron deficiency     Malabsorption of iron     Iron deficiency anemia, unspecified iron deficiency anemia type     Perforated ulcer (H)     Past Surgical History:   Procedure  Laterality Date     EXTRACORPOREAL SHOCK WAVE LITHOTRIPSY (ESWL) Left 2/7/2019    Procedure: Left renal lithotripsy (1250 shocks).;  Surgeon: Regis Parada MD;  Location: [facility] OR     LAPAROSCOPIC BYPASS GASTRIC N/A 10/26/2016    Procedure: LAPAROSCOPIC BYPASS GASTRIC;  Surgeon: Romario Reyna MD;  Location: [facility] OR     LAPAROSCOPY DIAGNOSTIC (GENERAL) N/A 10/24/2018    Procedure: LAPAROSCOPY DIAGNOSTIC FOR PERFORATED GASTRIC ULCER;  Surgeon: Chang Corea MD;  Location: [facility] OR     NO HISTORY OF SURGERY         Social History     Tobacco Use     Smoking status: Never Smoker     Smokeless tobacco: Never Used   Substance Use Topics     Alcohol use: No     Alcohol/week: 0.0 oz     Frequency: Never     Family History   Problem Relation Age of Onset     Hypertension Mother      Breast Cancer Mother         64 dx     Allergies Mother      Obesity Mother      Respiratory Mother         Asthma     Heart Disease Mother      Hypertension Maternal Grandmother      Cancer Maternal Grandmother         Ovarian     Cancer Sister         Cervical     Cancer Other         Mat. great aunt-ovarian     Hypertension Brother      Cancer Maternal Aunt         ?Gastric     Brain Cancer Cousin         maternal     Leukemia Nephew         dx at 23, CML         Current Outpatient Medications   Medication Sig Dispense Refill     calcium-vitamin D-vitamin K (VIACTIV) 500-500-40 MG-UNT-MCG CHEW Take 3 tablets by mouth At Bedtime        Cholecalciferol (VITAMIN D3 PO) Take 2,000 Units by mouth 2 times daily        Cyanocobalamin (B-12) 2500 MCG SUBL Place 1 tablet under the tongue Every Mon, Wed, Fri Morning 3 times weekly        docusate sodium (COLACE) 100 MG capsule Take 300 mg by mouth every morning       FLUoxetine (PROZAC) 20 MG capsule Take 40 mg by mouth daily        multivitamin  peds with iron (FLINTSTONES COMPLETE) 60 MG chewable tablet Take 2 chew tab by mouth every morning        norgestimate-ethinyl estradiol  (SPRINTEC 28) 0.25-35 MG-MCG tablet Take 1 tablet by mouth daily 84 tablet 3     pantoprazole (PROTONIX) 40 MG EC tablet Take 1 tablet (40 mg) by mouth 2 times daily 180 tablet 3     Allergies   Allergen Reactions     Lortab [Hydrocodone-Acetaminophen] Nausea     Also light headed and flushed     Recent Labs   Lab Test 01/03/19  1643 01/03/19  1640 01/03/19  1542  10/24/18  0400 02/02/18  0842 12/07/17 01/27/17  1021  01/06/17  0905  07/06/16  0755  12/21/15  1200   A1C  --   --   --   --   --   --  5.2  --  5.1  --   --   --  5.8  --  5.7   LDL  --   --   --   --   --  90  --   --   --   --  100*  --   --   --  120*   HDL  --   --   --   --   --  57  --   --   --   --  45*  --   --   --  39*   TRIG  --   --   --   --   --  109  --   --   --   --  142  --   --   --  187*   ALT  --  31 Canceled, Test credited  --  25 25  --    < >  --   --  58*  --  27   < >  --    CR  --  0.64 Canceled, Test credited   < > 0.56 0.60 0.58   < >  --    < > 0.56   < > 0.58  --  0.53   GFRESTIMATED >90 >90 Canceled, Test credited   < > >90 >90 108   < >  --    < > >90  Non  GFR Calc     < > >90  Non  GFR Calc    --  >90  Non  GFR Calc     GFRESTBLACK >90 >90 Canceled, Test credited   < > >90 >90 125   < >  --    < > >90   GFR Calc     < > >90   GFR Calc    --  >90   GFR Calc     POTASSIUM  --  3.5 Canceled, Test credited   < > 3.8 3.9 3.7   < >  --    < > 2.7*   < > 4.5  --  3.9   TSH  --   --   --   --   --   --  1.44  --   --   --  1.27  --   --   --  2.13    < > = values in this interval not displayed.        Mammogram Screening: Patient over age 50, mutual decision to screen reflected in health maintenance.    Pertinent mammograms are reviewed under the imaging tab.  History of abnormal Pap smear: NO - age 30- 65 PAP every 3 years recommended  PAP / HPV Latest Ref Rng & Units 1/6/2017 12/21/2015   PAP - NIL NIL   HPV 16 DNA NEG Negative  Negative   HPV 18 DNA NEG Negative Negative   OTHER HR HPV NEG Negative Negative     Reviewed and updated as needed this visit by clinical staff  Tobacco  Allergies  Meds  Med Hx  Surg Hx  Fam Hx         Reviewed and updated as needed this visit by Provider  Med Hx  Surg Hx  Fam Hx        Past Medical History:   Diagnosis Date     Depressive disorder      morbid obesity      Obese      Renal disease     stone     Ulcer of gastric fundus      Urinary frequency       Past Surgical History:   Procedure Laterality Date     EXTRACORPOREAL SHOCK WAVE LITHOTRIPSY (ESWL) Left 2019    Procedure: Left renal lithotripsy (1250 shocks).;  Surgeon: Regis Parada MD;  Location:  OR     LAPAROSCOPIC BYPASS GASTRIC N/A 10/26/2016    Procedure: LAPAROSCOPIC BYPASS GASTRIC;  Surgeon: Romario Reyna MD;  Location:  OR     LAPAROSCOPY DIAGNOSTIC (GENERAL) N/A 10/24/2018    Procedure: LAPAROSCOPY DIAGNOSTIC FOR PERFORATED GASTRIC ULCER;  Surgeon: Chang Corea MD;  Location:  OR     NO HISTORY OF SURGERY       Obstetric History       T0      L0     SAB0   TAB0   Ectopic0   Multiple0   Live Births0           ROS:  CONSTITUTIONAL: NEGATIVE for fever, chills, change in weight  INTEGUMENTARU/SKIN: NEGATIVE for worrisome rashes, moles or lesions  EYES: NEGATIVE for vision changes or irritation  ENT: NEGATIVE for ear, mouth and throat problems  RESP: NEGATIVE for significant cough or SOB  BREAST: NEGATIVE for masses, tenderness or discharge  CV: NEGATIVE for chest pain, palpitations or peripheral edema  GI: NEGATIVE for nausea, abdominal pain, heartburn, or change in bowel habits  : NEGATIVE for unusual urinary or vaginal symptoms. Periods are regular.  MUSCULOSKELETAL: NEGATIVE for significant arthralgias or myalgia  NEURO: NEGATIVE for weakness, dizziness or paresthesias  PSYCHIATRIC: NEGATIVE for changes in mood or affect    OBJECTIVE:   /64   Pulse 74   Temp 97.6  F (36.4  " C) (Tympanic)   Resp 14   Ht 1.575 m (5' 2\")   Wt 58.5 kg (129 lb)   LMP 03/14/2019   SpO2 99%   BMI 23.59 kg/m    EXAM:  GENERAL: healthy, alert and no distress  EYES: Eyes grossly normal to inspection, PERRL and conjunctivae and sclerae normal  HENT: ear canals and TM's normal, nose and mouth without ulcers or lesions  NECK: no adenopathy, no asymmetry, masses, or scars and thyroid normal to palpation  RESP: lungs clear to auscultation - no rales, rhonchi or wheezes  BREAST: normal without masses, tenderness or nipple discharge and no palpable axillary masses or adenopathy  CV: regular rate and rhythm, normal S1 S2, no S3 or S4, no murmur, click or rub, no peripheral edema and peripheral pulses strong  ABDOMEN: soft, nontender, no hepatosplenomegaly, no masses and bowel sounds normal  MS: no gross musculoskeletal defects noted, no edema  SKIN: no suspicious lesions or rashes  NEURO: Normal strength and tone, mentation intact and speech normal  PSYCH: mentation appears normal, affect normal/bright    Diagnostic Test Results:  none     ASSESSMENT/PLAN:   1. Encounter for routine adult health examination without abnormal findings  Has appointment with nephrology for elevated urine calcium and recent kidney stones.   2. Screen for colon cancer  - Fecal colorectal cancer screen (FIT); Future    4. General counseling for prescription of oral contraceptives  Taking consistently, no breaks, working well for her  - norgestimate-ethinyl estradiol (SPRINTEC 28) 0.25-35 MG-MCG tablet; Take 1 tablet by mouth daily  Dispense: 84 tablet; Refill: 3      5. Slow transit constipation    6. Malabsorption of iron  Per Dr. Rahman    7. Bilateral edema of lower extremity  Encouraged use of compression stocking    8. Body dysmorphic disorder  Continues to follow with [facility], therapy going well, weight maintained.  She sees them weekly.  Does not need to be weighed with every visit in the clinic during our visits as this is " "triggering for her.      9. NED (generalized anxiety disorder)  Following with [facility]    10. Bariatric surgery status  Following with Bariatric surgeon yearly      COUNSELING:   Reviewed preventive health counseling, as reflected in patient instructions       Regular exercise       Healthy diet/nutrition    BP Readings from Last 1 Encounters:   03/21/19 112/64     Estimated body mass index is 23.59 kg/m  as calculated from the following:    Height as of this encounter: 1.575 m (5' 2\").    Weight as of this encounter: 58.5 kg (129 lb).           reports that  has never smoked. she has never used smokeless tobacco.      Counseling Resources:  ATP IV Guidelines  Pooled Cohorts Equation Calculator  Breast Cancer Risk Calculator  FRAX Risk Assessment  ICSI Preventive Guidelines  Dietary Guidelines for Americans, 2010  USDA's MyPlate  ASA Prophylaxis  Lung CA Screening    Napoleon Gray PA-C  [facility] [place]  "

## 2019-03-24 PROBLEM — N20.0 NEPHROLITHIASIS: Status: ACTIVE | Noted: 2019-03-24

## 2019-03-26 DIAGNOSIS — Z30.09 GENERAL COUNSELING FOR PRESCRIPTION OF ORAL CONTRACEPTIVES: ICD-10-CM

## 2019-03-26 LAB — HEMOCCULT STL QL IA: NEGATIVE

## 2019-03-26 PROCEDURE — 82274 ASSAY TEST FOR BLOOD FECAL: CPT | Performed by: PHYSICIAN ASSISTANT

## 2019-04-04 ENCOUNTER — OFFICE VISIT (OUTPATIENT)
Dept: PSYCHOLOGY | Facility: CLINIC | Age: 49
End: 2019-04-04
Payer: COMMERCIAL

## 2019-04-04 DIAGNOSIS — F41.1 GENERALIZED ANXIETY DISORDER: Primary | ICD-10-CM

## 2019-04-04 DIAGNOSIS — F50.9 EATING DISORDER: ICD-10-CM

## 2019-04-04 PROCEDURE — 90834 PSYTX W PT 45 MINUTES: CPT | Performed by: PSYCHOLOGIST

## 2019-04-04 NOTE — PROGRESS NOTES
"                                           Progress Note    Client Name: Criss Don  Date: 2019         Service Type: Individual  Video Visit: No     Session Start Time:  2:00  Session End Time: 2:45     Session Length: 45    Session #: 49    Attendees: Client attended alone     Treatment Plan Last Reviewed: 2019  PHQ-9 / NED-7 :     DATA  Interactive Complexity: No  Crisis: No       Progress Since Last Session (Related to Symptoms / Goals / Homework):   Symptoms: stable    Homework: Partially completed      Episode of Care Goals: Minimal progress - ACTION (Actively working towards change); Intervened by reinforcing change plan / affirming steps taken     Current / Ongoing Stressors and Concerns:   Reported that her anxiety continues to be \"up and down\", largely due to the death of her boyfriend's mother and her father having some \"internal bleeding and we don't know why yet.\" Acknowledged that she gets highly anxious around death or anything that represents the possibility of death. Also acknowledged that she has not properly mourned her sister's death because \"I just had to jump in and take care of things\" right after she . Reported that she actively tries to repress intense feelings out of fear that she will not be able to regain control if she allows her emotions to run their course. Reported feeling nervous about addressing her grief but thinks it is necessary for her to start working on it.     Treatment Objective(s) Addressed in This Session:   managing anxiety  Maintaining compliance with health goals     Intervention:   CBT: surfaced and challenged thinking errors that generate anxiety and unjustified guilt; discussed how to adjust her expectations of herself to be more realistic while she is trying to manage physical health problems  Emotion Focused Therapy: explored the impact of unresolved grief in her life  Supportive therapy: provided active listening, support, and encouragement. " Reinforced positive behavioral choices.         ASSESSMENT: Current Emotional / Mental Status (status of significant symptoms):   Risk status (Self / Other harm or suicidal ideation)   Client denies current fears or concerns for personal safety.   Client denies current or recent suicidal ideation or behaviors.   Client denies current or recent homicidal ideation or behaviors.   Client denies current or recent self injurious behavior or ideation.   Client denies other safety concerns.   Client Client reports there has been no change in risk factors since their last session.     Client Client reports there has been no change in protective factors since their last session.     A safety and risk management plan has not been developed at this time, however client was given the after-hours number / 911 should there be a change in any of these risk factors.     Appearance:   Appropriate    Eye Contact:   Good    Psychomotor Behavior: Normal    Attitude:   Cooperative    Orientation:   All   Speech    Rate / Production: Normal     Volume:  Normal    Mood:    Anxious    Affect:    Appropriate    Thought Content:  Worry    Thought Form:  Coherent  Logical    Insight:    Good      Medication Review:   No changes to current psychiatric medication(s)     Medication Compliance:   Yes     Changes in Health Issues:   None reported     Chemical Use Review:   Substance Use: Chemical use reviewed, no active concerns identified      Tobacco Use: No current tobacco use.      Diagnosis:  1. Generalized anxiety disorder    2. Eating disorder        Collateral Reports Completed:   Not Applicable    PLAN: (Client Tasks / Therapist Tasks / Other)  Future appointments are scheduled. Begin addressing grief. Continue: Practice mindfulness skills at least once daily. Identify physical signs of anxiety and use relaxation skills as discussed in session. Use assertiveness skills as discussed in session to communicate feelings and needs more openly  with others. Surface and challenge unrealistic expectations and disproportionate thoughts that generate anxiety.  Practice using self-compassionate statements as reviewed in session.        Brenda Perez PsyD LP                                                         ______________________________________________________________________                                                 Treatment Plan    Client's Name: Criss Don  YOB: 1970    Date:  4/4/2019    DSM-V Diagnoses: 300.02 Generalized Anxiety Disorder, eating disorder, unspecified  Psychosocial / Contextual Factors: divorce proceedings, health concerns  WHODAS: 22    Referral / Collaboration:  Referral to another professional/service is not indicated at this time..    Anticipated number of session or this episode of care: reassess after 12 sessions      MeasurableTreatment Goal(s) related to diagnosis / functional impairment(s)  Goal 1: Client will learn coping skills to manage stress and adjust to post-surgical lifestyle changes more effectively.    I will know I've met my goal when I'm not weighing myself all the time and feeling more comfortable with my weight loss.      Objective #A (Client Action)    Client will surface and challenge thinking errors that contribute to anxiety.  Status: Continued - Date(s): 4/4/2019    Intervention(s)  Therapist will assign homework (thought  logs), assist client in surfacing and replacement ineffective thinking patterns.    Objective #B  Client will learn deep breathing and relaxation skills and practice at least 3x daily.  Status: Continued - Date(s): 4/4/2019    Intervention(s)  Therapist will teach deep breathing and relaxation skills, assign homework, problem-solve barriers to follow through.    Objective #C  Client will learn and use assertiveness skills to set healthy boundaries and communicate her needs more effectively.  Status: Continued - Date(s): 4/4/2019    Intervention(s)  Therapist  will assist client in identifying and balancing her expectations.        Client has reviewed and agreed to the above plan.      Brenda Perez PsyD LP  4/4/2019

## 2019-04-18 ENCOUNTER — TRANSFERRED RECORDS (OUTPATIENT)
Dept: HEALTH INFORMATION MANAGEMENT | Facility: CLINIC | Age: 49
End: 2019-04-18

## 2019-04-19 ENCOUNTER — OFFICE VISIT (OUTPATIENT)
Dept: PSYCHOLOGY | Facility: CLINIC | Age: 49
End: 2019-04-19
Payer: COMMERCIAL

## 2019-04-19 DIAGNOSIS — F41.1 GENERALIZED ANXIETY DISORDER: Primary | ICD-10-CM

## 2019-04-19 DIAGNOSIS — F50.9 EATING DISORDER: ICD-10-CM

## 2019-04-19 PROCEDURE — 90834 PSYTX W PT 45 MINUTES: CPT | Performed by: PSYCHOLOGIST
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
## 2019-05-02 ENCOUNTER — OFFICE VISIT (OUTPATIENT)
Dept: PSYCHOLOGY | Facility: CLINIC | Age: 49
End: 2019-05-02
Payer: COMMERCIAL

## 2019-05-02 DIAGNOSIS — F41.1 GENERALIZED ANXIETY DISORDER: Primary | ICD-10-CM

## 2019-05-02 DIAGNOSIS — F50.9 EATING DISORDER: ICD-10-CM

## 2019-05-02 PROCEDURE — 90834 PSYTX W PT 45 MINUTES: CPT | Performed by: PSYCHOLOGIST
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
## 2019-05-16 ENCOUNTER — INFUSION THERAPY VISIT (OUTPATIENT)
Dept: INFUSION THERAPY | Facility: CLINIC | Age: 49
End: 2019-05-16
Attending: INTERNAL MEDICINE
Payer: COMMERCIAL

## 2019-05-16 ENCOUNTER — HOSPITAL ENCOUNTER (OUTPATIENT)
Facility: CLINIC | Age: 49
Setting detail: SPECIMEN
Discharge: HOME | End: 2019-05-16
Attending: INTERNAL MEDICINE | Admitting: INTERNAL MEDICINE
Payer: COMMERCIAL

## 2019-05-16 DIAGNOSIS — D50.9 IRON DEFICIENCY ANEMIA, UNSPECIFIED IRON DEFICIENCY ANEMIA TYPE: ICD-10-CM

## 2019-05-16 DIAGNOSIS — D72.819 LEUKOPENIA, UNSPECIFIED TYPE: ICD-10-CM

## 2019-05-16 LAB
ERYTHROCYTE [DISTWIDTH] IN BLOOD BY AUTOMATED COUNT: 13.5 % (ref 10–15)
FERRITIN SERPL-MCNC: 111 NG/ML (ref 8–252)
HCT VFR BLD AUTO: 37 % (ref 35–47)
HGB BLD-MCNC: 12.2 G/DL (ref 11.7–15.7)
IRON SATN MFR SERPL: 20 % (ref 15–46)
IRON SERPL-MCNC: 55 UG/DL (ref 35–180)
MCH RBC QN AUTO: 31 PG (ref 26.5–33)
MCHC RBC AUTO-ENTMCNC: 33 G/DL (ref 31.5–36.5)
MCV RBC AUTO: 94 FL (ref 78–100)
PLATELET # BLD AUTO: 261 10E9/L (ref 150–450)
RBC # BLD AUTO: 3.94 10E12/L (ref 3.8–5.2)
TIBC SERPL-MCNC: 270 UG/DL (ref 240–430)
VIT B12 SERPL-MCNC: 518 PG/ML (ref 193–986)
WBC # BLD AUTO: 5.3 10E9/L (ref 4–11)

## 2019-05-16 PROCEDURE — 36415 COLL VENOUS BLD VENIPUNCTURE: CPT

## 2019-05-16 PROCEDURE — 82728 ASSAY OF FERRITIN: CPT | Performed by: INTERNAL MEDICINE

## 2019-05-16 PROCEDURE — 85027 COMPLETE CBC AUTOMATED: CPT | Performed by: INTERNAL MEDICINE

## 2019-05-16 PROCEDURE — 83550 IRON BINDING TEST: CPT | Performed by: INTERNAL MEDICINE

## 2019-05-16 PROCEDURE — 82607 VITAMIN B-12: CPT | Performed by: INTERNAL MEDICINE

## 2019-05-16 PROCEDURE — 83540 ASSAY OF IRON: CPT | Performed by: INTERNAL MEDICINE

## 2019-05-16 NOTE — RESULT ENCOUNTER NOTE
Let her know that hemoglobin, iron and ferritin are all normal.  She is not anemic.  No iron deficiency.    Pricilla Rahman

## 2019-05-16 NOTE — PROGRESS NOTES
Medical Assistant Note:  Criss Don presents today for blood draw.    Patient seen by provider today: No.   present during visit today: Not Applicable.    Concerns: No Concerns.    Procedure:  Lab draw site: LAC, Needle type: BF, Gauge: 23.    Post Assessment:  Labs drawn without difficulty: Yes.    Discharge Plan:  Departure Mode: Ambulatory.    Face to Face Time: 5MIN.    Fadia Carr

## 2019-05-17 ENCOUNTER — OFFICE VISIT (OUTPATIENT)
Dept: PSYCHOLOGY | Facility: CLINIC | Age: 49
End: 2019-05-17
Payer: COMMERCIAL

## 2019-05-17 DIAGNOSIS — F50.9 EATING DISORDER: ICD-10-CM

## 2019-05-17 DIAGNOSIS — F41.1 GENERALIZED ANXIETY DISORDER: Primary | ICD-10-CM

## 2019-05-17 PROCEDURE — 90834 PSYTX W PT 45 MINUTES: CPT | Performed by: PSYCHOLOGIST
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
## 2019-05-30 ENCOUNTER — OFFICE VISIT (OUTPATIENT)
Dept: PSYCHOLOGY | Facility: CLINIC | Age: 49
End: 2019-05-30
Payer: COMMERCIAL

## 2019-05-30 DIAGNOSIS — F41.1 GENERALIZED ANXIETY DISORDER: Primary | ICD-10-CM

## 2019-05-30 DIAGNOSIS — F50.9 EATING DISORDER: ICD-10-CM

## 2019-05-30 PROCEDURE — 90834 PSYTX W PT 45 MINUTES: CPT | Performed by: PSYCHOLOGIST

## 2019-05-30 NOTE — PROGRESS NOTES
"                                           Progress Note    Client Name: Criss Don  Date: 5/30/2019         Service Type: Individual  Video Visit: No     Session Start Time:  1:00  Session End Time: 1:45     Session Length: 45    Session #: 53    Attendees: Client attended alone     Treatment Plan Last Reviewed: 5/30/2019  PHQ-9 / NED-7 :     DATA  Interactive Complexity: No  Crisis: No       Progress Since Last Session (Related to Symptoms / Goals / Homework):   Symptoms: no improvement    Homework: Partially completed      Episode of Care Goals: Minimal progress - ACTION (Actively working towards change); Intervened by reinforcing change plan / affirming steps taken     Current / Ongoing Stressors and Concerns:   Reported feeling stressed to learn that her nephew \"cheated on his wife\". Also reported feeling hurt and disappointed that her boyfriend told her \"you look anorexic\" when she recently texted him a photo of herself. Reported feeling discouraged that he is not acknowledging her efforts in her recovery, because her team at [employer] has seen her symptoms stabilize.     Treatment Objective(s) Addressed in This Session:   managing anxiety  Maintaining compliance with health goals     Intervention:   Supportive therapy: provided active listening, support, and encouragement. Reinforced healthy behavioral choices.  CBT: surfaced and challenged all or nothing and catastrophic thinking errors that have been increasing her anxiety  Solution focused: coached client on assertiveness skills, role-played how to talk to her boyfriend about the impact of his comment      ASSESSMENT: Current Emotional / Mental Status (status of significant symptoms):   Risk status (Self / Other harm or suicidal ideation)   Client denies current fears or concerns for personal safety.   Client denies current or recent suicidal ideation or behaviors.   Client denies current or recent homicidal ideation or behaviors.   Client denies " current or recent self injurious behavior or ideation.   Client denies other safety concerns.   Client Client reports there has been no change in risk factors since their last session.     Client Client reports there has been no change in protective factors since their last session.     A safety and risk management plan has not been developed at this time, however client was given the after-hours number / 911 should there be a change in any of these risk factors.     Appearance:   Appropriate    Eye Contact:   Fair    Psychomotor Behavior: Normal    Attitude:   Cooperative    Orientation:   All   Speech    Rate / Production: Normal     Volume:  Normal    Mood:    Anxious    Affect:    Appropriate    Thought Content:  Worry    Thought Form:  Coherent  Logical    Insight:    Good      Medication Review:   No changes to current psychiatric medication(s)     Medication Compliance:   Yes     Changes in Health Issues:   None reported     Chemical Use Review:   Substance Use: Chemical use reviewed, no active concerns identified      Tobacco Use: No current tobacco use.      Diagnosis:  1. Generalized anxiety disorder    2. Eating disorder        Collateral Reports Completed:   Not Applicable    PLAN: (Client Tasks / Therapist Tasks / Other)  Future appointments are scheduled. Use assertiveness skills as rehearsed in session, keeping in mind that avoiding the conversation will not make things better.     Continue: Practice mindfulness skills at least once daily. Identify physical signs of anxiety and use relaxation skills as discussed in session. Use assertiveness skills as discussed in session to communicate feelings and needs more openly with others. Surface and challenge unrealistic expectations and disproportionate thoughts that generate anxiety.  Practice using self-compassionate statements as reviewed in session.        Brenda Perez PsyD LP                                                          ______________________________________________________________________                                                 Treatment Plan    Client's Name: Criss Don  YOB: 1970    Date:  5/30/2019    DSM-V Diagnoses: 300.02 Generalized Anxiety Disorder, eating disorder, unspecified  Psychosocial / Contextual Factors: divorce proceedings, health concerns  WHODAS: 22    Referral / Collaboration:  Referral to another professional/service is not indicated at this time..    Anticipated number of session or this episode of care: reassess after 12 sessions      MeasurableTreatment Goal(s) related to diagnosis / functional impairment(s)  Goal 1: Client will learn coping skills to manage stress and adjust to post-surgical lifestyle changes more effectively.    I will know I've met my goal when I'm not weighing myself all the time and feeling more comfortable with my weight loss.      Objective #A (Client Action)    Client will surface and challenge thinking errors that contribute to anxiety.  Status: Continued - Date(s): 5/30/2019    Intervention(s)  Therapist will assign homework (thought  logs), assist client in surfacing and replacement ineffective thinking patterns.    Objective #B  Client will learn deep breathing and relaxation skills and practice at least 3x daily.  Status: Continued - Date(s): 5/30/2019    Intervention(s)  Therapist will teach deep breathing and relaxation skills, assign homework, problem-solve barriers to follow through.    Objective #C  Client will learn and use assertiveness skills to set healthy boundaries and communicate her needs more effectively.  Status: Continued - Date(s): 5/30/2019    Intervention(s)  Therapist will assist client in identifying and balancing her expectations.        Client has reviewed and agreed to the above plan.      Brenda Perez PsyD LP  5/30/2019

## 2019-05-31 ENCOUNTER — TRANSFERRED RECORDS (OUTPATIENT)
Dept: HEALTH INFORMATION MANAGEMENT | Facility: CLINIC | Age: 49
End: 2019-05-31

## 2019-06-14 ENCOUNTER — OFFICE VISIT (OUTPATIENT)
Dept: PSYCHOLOGY | Facility: CLINIC | Age: 49
End: 2019-06-14
Payer: COMMERCIAL

## 2019-06-14 DIAGNOSIS — F50.9 EATING DISORDER: ICD-10-CM

## 2019-06-14 DIAGNOSIS — F41.1 GENERALIZED ANXIETY DISORDER: Primary | ICD-10-CM

## 2019-06-14 PROCEDURE — 90834 PSYTX W PT 45 MINUTES: CPT | Performed by: PSYCHOLOGIST

## 2019-06-14 NOTE — PROGRESS NOTES
"                                           Progress Note    Client Name: Criss Don  Date: 6/14/2019         Service Type: Individual  Video Visit: No     Session Start Time:  7:00  Session End Time: 7:45     Session Length: 45    Session #: 54    Attendees: Client attended alone     Treatment Plan Last Reviewed: 6/14/2019  PHQ-9 / NED-7 :     DATA  Interactive Complexity: No  Crisis: No       Progress Since Last Session (Related to Symptoms / Goals / Homework):   Symptoms: no improvement    Homework: Partially completed      Episode of Care Goals: Minimal progress - ACTION (Actively working towards change); Intervened by reinforcing change plan / affirming steps taken     Current / Ongoing Stressors and Concerns:   Reported that her ex- showed up at her apartment intoxicated and uninvited at 1:00 am and made several attempts to be let into her apartment. Reported she eventually called the police and they took him home. Reported she has been feeling edgy since then. Reported she is starting EMDR with a provider at [facility] soon. Reported that she asserted herself with her boyfriend to let him know she felt very hurt by his comments about \"looking anorexic\" and has given him some suggestions for ways he can be more supportive in her recovery.     Treatment Objective(s) Addressed in This Session:   managing anxiety  Maintaining compliance with health goals     Intervention:   Supportive therapy: provided active listening, support, and encouragement. Reinforced healthy behavioral choices.  CBT: surfaced and challenged all or nothing and catastrophic thinking errors that have been increasing her anxiety; rehearsed helpful thoughts on which she can focus  Solution focused: coached client on assertiveness skills; reviewed safety behaviors and resources she has available if her ex shows up uninvited again      ASSESSMENT: Current Emotional / Mental Status (status of significant symptoms):   Risk status " (Self / Other harm or suicidal ideation)   Client denies current fears or concerns for personal safety.   Client denies current or recent suicidal ideation or behaviors.   Client denies current or recent homicidal ideation or behaviors.   Client denies current or recent self injurious behavior or ideation.   Client denies other safety concerns.   Client Client reports there has been no change in risk factors since their last session.     Client Client reports there has been no change in protective factors since their last session.     A safety and risk management plan has not been developed at this time, however client was given the after-hours number / 911 should there be a change in any of these risk factors.     Appearance:   Appropriate    Eye Contact:   Fair    Psychomotor Behavior: Normal    Attitude:   Cooperative    Orientation:   All   Speech    Rate / Production: Normal     Volume:  Normal    Mood:    Anxious    Affect:    Appropriate    Thought Content:  Worry    Thought Form:  Coherent  Logical    Insight:    Good      Medication Review:   No changes to current psychiatric medication(s)     Medication Compliance:   Yes     Changes in Health Issues:   None reported     Chemical Use Review:   Substance Use: Chemical use reviewed, no active concerns identified      Tobacco Use: No current tobacco use.      Diagnosis:  1. Generalized anxiety disorder    2. Eating disorder        Collateral Reports Completed:   Not Applicable    PLAN: (Client Tasks / Therapist Tasks / Other)  Future appointments are scheduled. Focus on helpful thoughts as discussed in session (e.g. It's OK for me to ask for help when I need it; I am not to blame for other people's choices or behaviors, etc)    Continue: Practice mindfulness skills at least once daily. Identify physical signs of anxiety and use relaxation skills as discussed in session. Use assertiveness skills as discussed in session to communicate feelings and needs more  openly with others. Surface and challenge unrealistic expectations and disproportionate thoughts that generate anxiety.  Practice using self-compassionate statements as reviewed in session.        Brenda Perez PsyD LP                                                         ______________________________________________________________________                                                 Treatment Plan    Client's Name: Criss Don  YOB: 1970    Date:  6/14/2019    DSM-V Diagnoses: 300.02 Generalized Anxiety Disorder, eating disorder, unspecified  Psychosocial / Contextual Factors: divorce proceedings, health concerns  WHODAS: 22    Referral / Collaboration:  Referral to another professional/service is not indicated at this time..    Anticipated number of session or this episode of care: reassess after 12 sessions      MeasurableTreatment Goal(s) related to diagnosis / functional impairment(s)  Goal 1: Client will learn coping skills to manage stress and adjust to post-surgical lifestyle changes more effectively.    I will know I've met my goal when I'm not weighing myself all the time and feeling more comfortable with my weight loss.      Objective #A (Client Action)    Client will surface and challenge thinking errors that contribute to anxiety.  Status: Continued - Date(s): 6/14/2019    Intervention(s)  Therapist will assign homework (thought  logs), assist client in surfacing and replacement ineffective thinking patterns.    Objective #B  Client will learn deep breathing and relaxation skills and practice at least 3x daily.  Status: Continued - Date(s): 6/14/2019    Intervention(s)  Therapist will teach deep breathing and relaxation skills, assign homework, problem-solve barriers to follow through.    Objective #C  Client will learn and use assertiveness skills to set healthy boundaries and communicate her needs more effectively.  Status: Continued - Date(s):  6/14/2019    Intervention(s)  Therapist will assist client in identifying and balancing her expectations.        Client has reviewed and agreed to the above plan.      Brenda Perez PsyD LP  6/14/2019

## 2019-06-26 ENCOUNTER — OFFICE VISIT (OUTPATIENT)
Dept: PSYCHOLOGY | Facility: CLINIC | Age: 49
End: 2019-06-26
Payer: COMMERCIAL

## 2019-06-26 DIAGNOSIS — F50.9 EATING DISORDER, UNSPECIFIED: ICD-10-CM

## 2019-06-26 DIAGNOSIS — F41.1 GENERALIZED ANXIETY DISORDER: Primary | ICD-10-CM

## 2019-06-26 PROCEDURE — 90834 PSYTX W PT 45 MINUTES: CPT | Performed by: PSYCHOLOGIST

## 2019-06-26 NOTE — PROGRESS NOTES
"                                           Progress Note    Client Name: Criss Don  Date: 6/26/2019         Service Type: Individual  Video Visit: No     Session Start Time:  7:00  Session End Time: 7:45     Session Length: 45    Session #: 55    Attendees: Client attended alone     Treatment Plan Last Reviewed: 6/26/2019  PHQ-9 / NED-7 :     DATA  Interactive Complexity: No  Crisis: No       Progress Since Last Session (Related to Symptoms / Goals / Homework):   Symptoms: no improvement    Homework: Partially completed      Episode of Care Goals: Minimal progress - ACTION (Actively working towards change); Intervened by reinforcing change plan / affirming steps taken     Current / Ongoing Stressors and Concerns:   Reported that she has been feeling \"jumpy\" and \"nervous\" over the last few weeks, since the incident with her ex- showing up at her apartment. Reported that she gets really anxious when the doorbell rings or when she hears a car approaching with loud music. Reported that her EMDR treatment has been postponed, but she still plans to start. Also reported feeling a sense of unjustified guilt and over-responsibility to her nephew, who is struggling in his marriage and making poor coping choices.      Treatment Objective(s) Addressed in This Session:   managing anxiety  Maintaining compliance with health goals     Intervention:   Supportive therapy: provided active listening, support, and encouragement. Reinforced healthy behavioral choices.  CBT: surfaced and challenged all or nothing and catastrophic thinking errors that have been increasing her anxiety; rehearsed helpful thoughts on which she can focus  Emotion focused: explored feelings of unjustified guilt and shame  Solution focused: coached client on assertiveness skills; reviewed safety behaviors and resources she has available if her ex shows up uninvited again; coached her on calming and grounding skills      ASSESSMENT: Current " Emotional / Mental Status (status of significant symptoms):   Risk status (Self / Other harm or suicidal ideation)   Client denies current fears or concerns for personal safety.   Client denies current or recent suicidal ideation or behaviors.   Client denies current or recent homicidal ideation or behaviors.   Client denies current or recent self injurious behavior or ideation.   Client denies other safety concerns.   Client Client reports there has been no change in risk factors since their last session.     Client Client reports there has been no change in protective factors since their last session.     A safety and risk management plan has not been developed at this time, however client was given the after-hours number / 911 should there be a change in any of these risk factors.     Appearance:   Appropriate    Eye Contact:   Fair    Psychomotor Behavior: Normal    Attitude:   Cooperative    Orientation:   All   Speech    Rate / Production: Normal     Volume:  Normal    Mood:    Anxious    Affect:    Appropriate    Thought Content:  Worry    Thought Form:  Coherent  Logical    Insight:    Good      Medication Review:   No changes to current psychiatric medication(s)     Medication Compliance:   Yes     Changes in Health Issues:   None reported     Chemical Use Review:   Substance Use: Chemical use reviewed, no active concerns identified      Tobacco Use: No current tobacco use.      Diagnosis:  1. Generalized anxiety disorder    2. Eating disorder, unspecified        Collateral Reports Completed:   Not Applicable    PLAN: (Client Tasks / Therapist Tasks / Other)  Future appointments are scheduled. Focus on helpful thoughts as discussed in session (e.g. It's OK for me to ask for help when I need it; I am not to blame for other people's choices or behaviors, etc). Follow up with EMDR referral.     Continue: Practice mindfulness skills at least once daily. Identify physical signs of anxiety and use relaxation  skills as discussed in session. Use assertiveness skills as discussed in session to communicate feelings and needs more openly with others. Surface and challenge unrealistic expectations and disproportionate thoughts that generate anxiety.  Practice using self-compassionate statements as reviewed in session.        Brenda Perez PsyD LP                                                         ______________________________________________________________________                                                 Treatment Plan    Client's Name: Criss Don  YOB: 1970    Date:  6/26/2019    DSM-V Diagnoses: 300.02 Generalized Anxiety Disorder, eating disorder, unspecified  Psychosocial / Contextual Factors: divorce proceedings, health concerns  WHODAS: 22    Referral / Collaboration:  Referral to another professional/service is not indicated at this time..    Anticipated number of session or this episode of care: reassess after 12 sessions      MeasurableTreatment Goal(s) related to diagnosis / functional impairment(s)  Goal 1: Client will learn coping skills to manage stress and adjust to post-surgical lifestyle changes more effectively.    I will know I've met my goal when I'm not weighing myself all the time and feeling more comfortable with my weight loss.      Objective #A (Client Action)    Client will surface and challenge thinking errors that contribute to anxiety.  Status: Continued - Date(s): 6/26/2019    Intervention(s)  Therapist will assign homework (thought  logs), assist client in surfacing and replacement ineffective thinking patterns.    Objective #B  Client will learn deep breathing and relaxation skills and practice at least 3x daily.  Status: Continued - Date(s): 6/26/2019    Intervention(s)  Therapist will teach deep breathing and relaxation skills, assign homework, problem-solve barriers to follow through.    Objective #C  Client will learn and use assertiveness skills to set  healthy boundaries and communicate her needs more effectively.  Status: Continued - Date(s): 6/26/2019    Intervention(s)  Therapist will assist client in identifying and balancing her expectations.        Client has reviewed and agreed to the above plan.      Brenda Perez PsyD LP  6/26/2019

## 2019-07-11 ENCOUNTER — OFFICE VISIT (OUTPATIENT)
Dept: PSYCHOLOGY | Facility: CLINIC | Age: 49
End: 2019-07-11
Payer: COMMERCIAL

## 2019-07-11 DIAGNOSIS — F50.9 EATING DISORDER, UNSPECIFIED: ICD-10-CM

## 2019-07-11 DIAGNOSIS — F41.1 GENERALIZED ANXIETY DISORDER: Primary | ICD-10-CM

## 2019-07-11 PROCEDURE — 90834 PSYTX W PT 45 MINUTES: CPT | Performed by: PSYCHOLOGIST

## 2019-07-11 NOTE — PROGRESS NOTES
"                                           Progress Note    Client Name: Criss Don  Date: 7/11/2019         Service Type: Individual  Video Visit: No     Session Start Time:  1:00  Session End Time: 1:45     Session Length: 45    Session #: 56    Attendees: Client attended alone     Treatment Plan Last Reviewed: 7/11/2019  PHQ-9 / NED-7 :     DATA  Interactive Complexity: No  Crisis: No       Progress Since Last Session (Related to Symptoms / Goals / Homework):   Symptoms: no improvement    Homework: Partially completed      Episode of Care Goals: Minimal progress - ACTION (Actively working towards change); Intervened by reinforcing change plan / affirming steps taken     Current / Ongoing Stressors and Concerns:   Reported that she is officially scheduled to start EMDR next month. Reported she continues to feel nervous and jumpy in response to stimuli that remind her of her ex-. Reported that she is feeling very anxious for her nephews because they are both having personal struggles and she often feels \"helpless\" with them.      Treatment Objective(s) Addressed in This Session:   managing anxiety  Maintaining compliance with health goals     Intervention:   Supportive therapy: provided active listening, support, and encouragement. Reinforced healthy behavioral choices.  CBT: surfaced and challenged all or nothing and catastrophic thinking errors that have been increasing her anxiety; rehearsed helpful thoughts on which she can focus  Emotion focused: explored feelings of unjustified guilt and shame  Solution focused: coached client on assertiveness skills; reviewed safety behaviors and resources she has available if her ex shows up uninvited again; coached her on calming and grounding skills      ASSESSMENT: Current Emotional / Mental Status (status of significant symptoms):   Risk status (Self / Other harm or suicidal ideation)   Client denies current fears or concerns for personal safety.   Client " denies current or recent suicidal ideation or behaviors.   Client denies current or recent homicidal ideation or behaviors.   Client denies current or recent self injurious behavior or ideation.   Client denies other safety concerns.   Client Client reports there has been no change in risk factors since their last session.     Client Client reports there has been no change in protective factors since their last session.     A safety and risk management plan has not been developed at this time, however client was given the after-hours number / 911 should there be a change in any of these risk factors.     Appearance:   Appropriate    Eye Contact:   Fair    Psychomotor Behavior: Normal    Attitude:   Cooperative    Orientation:   All   Speech    Rate / Production: Normal     Volume:  Normal    Mood:    Anxious    Affect:    Appropriate    Thought Content:  Worry    Thought Form:  Coherent  Logical    Insight:    Good      Medication Review:   No changes to current psychiatric medication(s)     Medication Compliance:   Yes     Changes in Health Issues:   None reported     Chemical Use Review:   Substance Use: Chemical use reviewed, no active concerns identified      Tobacco Use: No current tobacco use.      Diagnosis:  1. Generalized anxiety disorder    2. Eating disorder, unspecified        Collateral Reports Completed:   Not Applicable    PLAN: (Client Tasks / Therapist Tasks / Other)  Future appointments are scheduled. Informed client of this writer s departure from [facility]. She is unsure at this point if she would like to transfer to another [facility] therapist, and may instead focus on her services at [facility].     Continue: Focus on helpful thoughts as discussed in session (e.g. It's OK for me to ask for help when I need it; I am not to blame for other people's choices or behaviors, etc). Practice mindfulness skills at least once daily. Identify physical signs of anxiety and use relaxation skills as discussed in  session. Use assertiveness skills as discussed in session to communicate feelings and needs more openly with others. Surface and challenge unrealistic expectations and disproportionate thoughts that generate anxiety.  Practice using self-compassionate statements as reviewed in session.        Brenda Perez PsyD LP                                                         ______________________________________________________________________                                                 Treatment Plan    Client's Name: Criss Don  YOB: 1970    Date:  7/11/2019    DSM-V Diagnoses: 300.02 Generalized Anxiety Disorder, eating disorder, unspecified  Psychosocial / Contextual Factors: divorce proceedings, health concerns  WHODAS: 22    Referral / Collaboration:  Referral to another professional/service is not indicated at this time..    Anticipated number of session or this episode of care: reassess after 12 sessions      MeasurableTreatment Goal(s) related to diagnosis / functional impairment(s)  Goal 1: Client will learn coping skills to manage stress and adjust to post-surgical lifestyle changes more effectively.    I will know I've met my goal when I'm not weighing myself all the time and feeling more comfortable with my weight loss.      Objective #A (Client Action)    Client will surface and challenge thinking errors that contribute to anxiety.  Status: Continued - Date(s): 7/11/2019    Intervention(s)  Therapist will assign homework (thought  logs), assist client in surfacing and replacement ineffective thinking patterns.    Objective #B  Client will learn deep breathing and relaxation skills and practice at least 3x daily.  Status: Continued - Date(s): 7/11/2019    Intervention(s)  Therapist will teach deep breathing and relaxation skills, assign homework, problem-solve barriers to follow through.    Objective #C  Client will learn and use assertiveness skills to set healthy boundaries and  communicate her needs more effectively.  Status: Continued - Date(s): 7/11/2019    Intervention(s)  Therapist will assist client in identifying and balancing her expectations.        Client has reviewed and agreed to the above plan.      Brenda Perez PsyD LP  7/11/2019

## 2019-07-25 ENCOUNTER — OFFICE VISIT (OUTPATIENT)
Dept: PSYCHOLOGY | Facility: CLINIC | Age: 49
End: 2019-07-25
Payer: COMMERCIAL

## 2019-07-25 DIAGNOSIS — F41.1 GENERALIZED ANXIETY DISORDER: Primary | ICD-10-CM

## 2019-07-25 DIAGNOSIS — F50.9 EATING DISORDER, UNSPECIFIED: ICD-10-CM

## 2019-07-25 PROCEDURE — 90834 PSYTX W PT 45 MINUTES: CPT | Performed by: PSYCHOLOGIST

## 2019-08-02 NOTE — PROGRESS NOTES
"                                           Progress Note    Client Name: Criss Don  Date: 7/25/2019         Service Type: Individual  Video Visit: No     Session Start Time:  1:00  Session End Time: 1:45     Session Length: 45    Session #: 57    Attendees: Client attended alone     Treatment Plan Last Reviewed: 7/25/2019  PHQ-9 / NED-7 :     DATA  Interactive Complexity: No  Crisis: No       Progress Since Last Session (Related to Symptoms / Goals / Homework):   Symptoms: no improvement    Homework: Partially completed      Episode of Care Goals: Minimal progress - ACTION (Actively working towards change); Intervened by reinforcing change plan / affirming steps taken     Current / Ongoing Stressors and Concerns:   Reported that one of her nephews is \"spiraling\" due to poorly managed symptoms of bipolar disorder, and she is concerned about his well-being. Reported that he was threatened by some co-workers and she does not trust that he will handle himself appropriately in the face of conflict. Reported feeling a strong sense of responsibility to him that contributes to heightened anxiety, despite recognition that she cannot control his choices. Reported that her usual ways of coping with anxiety seem to be less effective than usual. Reported she is excited that her boyfriend will be coming up to visit soon.      Treatment Objective(s) Addressed in This Session:   managing anxiety  Maintaining compliance with health goals     Intervention:   Supportive therapy: provided active listening, support, and encouragement. Reinforced healthy behavioral choices.  CBT: surfaced and challenged all or nothing and catastrophic thinking errors that have been increasing her anxiety; rehearsed helpful thoughts on which she can focus  Emotion focused: explored feelings of unjustified guilt and shame  Solution focused: coached client on assertiveness skills; reviewed safety behaviors and resources she has available if her ex " shows up uninvited again; coached her on calming and grounding skills, and ways to add variety to her current set of coping skills       ASSESSMENT: Current Emotional / Mental Status (status of significant symptoms):   Risk status (Self / Other harm or suicidal ideation)   Client denies current fears or concerns for personal safety.   Client denies current or recent suicidal ideation or behaviors.   Client denies current or recent homicidal ideation or behaviors.   Client denies current or recent self injurious behavior or ideation.   Client denies other safety concerns.   Client Client reports there has been no change in risk factors since their last session.     Client Client reports there has been no change in protective factors since their last session.     A safety and risk management plan has not been developed at this time, however client was given the after-hours number / 911 should there be a change in any of these risk factors.     Appearance:   Appropriate    Eye Contact:   Fair    Psychomotor Behavior: Normal    Attitude:   Cooperative    Orientation:   All   Speech    Rate / Production: Normal     Volume:  Normal    Mood:    Anxious    Affect:    Appropriate    Thought Content:  Worry    Thought Form:  Coherent  Logical    Insight:    Good      Medication Review:   No changes to current psychiatric medication(s)     Medication Compliance:   Yes     Changes in Health Issues:   None reported     Chemical Use Review:   Substance Use: Chemical use reviewed, no active concerns identified      Tobacco Use: No current tobacco use.      Diagnosis:  1. Generalized anxiety disorder    2. Eating disorder, unspecified        Collateral Reports Completed:   Not Applicable    PLAN: (Client Tasks / Therapist Tasks / Other)  Future appointments are scheduled. Client has decided that she is not interested in a transfer to another [facility] therapist, and will instead focus on her services at [facility]. Reported that she  understands that she may return to [facility] in the future if interested, and is aware of the counseling resources available to her at [facility], too.    Continue: Focus on helpful thoughts as discussed in session (e.g. It's OK for me to ask for help when I need it; I am not to blame for other people's choices or behaviors, etc). Practice mindfulness skills at least once daily. Identify physical signs of anxiety and use relaxation skills as discussed in session. Use assertiveness skills as discussed in session to communicate feelings and needs more openly with others. Surface and challenge unrealistic expectations and disproportionate thoughts that generate anxiety.  Practice using self-compassionate statements as reviewed in session.        Brenda Perez PsyD LP                                                         ______________________________________________________________________                                                 Treatment Plan    Client's Name: Criss Don  YOB: 1970    Date:  7/25/2019    DSM-V Diagnoses: 300.02 Generalized Anxiety Disorder, eating disorder, unspecified  Psychosocial / Contextual Factors: divorce proceedings, health concerns  WHODAS: 22    Referral / Collaboration:  Referral to another professional/service is not indicated at this time..    Anticipated number of session or this episode of care: reassess after 12 sessions      MeasurableTreatment Goal(s) related to diagnosis / functional impairment(s)  Goal 1: Client will learn coping skills to manage stress and adjust to post-surgical lifestyle changes more effectively.    I will know I've met my goal when I'm not weighing myself all the time and feeling more comfortable with my weight loss.      Objective #A (Client Action)    Client will surface and challenge thinking errors that contribute to anxiety.  Status: Continued - Date(s): 7/25/2019    Intervention(s)  Therapist will assign homework (thought   logs), assist client in surfacing and replacement ineffective thinking patterns.    Objective #B  Client will learn deep breathing and relaxation skills and practice at least 3x daily.  Status: Continued - Date(s): 7/25/2019    Intervention(s)  Therapist will teach deep breathing and relaxation skills, assign homework, problem-solve barriers to follow through.    Objective #C  Client will learn and use assertiveness skills to set healthy boundaries and communicate her needs more effectively.  Status: Continued - Date(s): 7/25/2019    Intervention(s)  Therapist will assist client in identifying and balancing her expectations.        Client has reviewed and agreed to the above plan.      Brenda Perez PsyD LP  7/25/2019

## 2019-08-09 ENCOUNTER — OFFICE VISIT (OUTPATIENT)
Dept: PSYCHOLOGY | Facility: CLINIC | Age: 49
End: 2019-08-09
Payer: COMMERCIAL

## 2019-08-09 DIAGNOSIS — F41.1 GENERALIZED ANXIETY DISORDER: Primary | ICD-10-CM

## 2019-08-09 DIAGNOSIS — F50.9 EATING DISORDER, UNSPECIFIED: ICD-10-CM

## 2019-08-09 PROCEDURE — 90834 PSYTX W PT 45 MINUTES: CPT | Performed by: PSYCHOLOGIST

## 2019-08-09 NOTE — PROGRESS NOTES
"                                           Progress Note    Client Name: Criss Don  Date: 8/9/2019         Service Type: Individual  Video Visit: No     Session Start Time:  8:00  Session End Time: 8:45     Session Length: 45    Session #: 58    Attendees: Client attended alone     Treatment Plan Last Reviewed: 8/9/2019  PHQ-9 / NED-7 :     DATA  Interactive Complexity: No  Crisis: No       Progress Since Last Session (Related to Symptoms / Goals / Homework):   Symptoms: increased anger    Homework: Partially completed      Episode of Care Goals: Minimal progress - ACTION (Actively working towards change); Intervened by reinforcing change plan / affirming steps taken     Current / Ongoing Stressors and Concerns:   Reported feeling angry and disappointed that her boyfriend cancelled his trip to come see her, and gave her conflicting reasons for his decision. Reported feeling uncomfortable at her family reunion because people asked her several questions about what she was eating and why. Also reported a high level of concern for her nephew, but is hopeful that he has decided to go to his doctor for help.     Treatment Objective(s) Addressed in This Session:   managing anxiety  Maintaining compliance with health goals     Intervention:   Supportive therapy: provided active listening, support, and encouragement. Reinforced healthy behavioral choices.  CBT: surfaced and challenged all or nothing and catastrophic thinking errors that have been increasing her anxiety; rehearsed helpful thoughts on which she can focus  Emotion focused: explored feelings of anger and disappointment  Solution focused: coached client on assertiveness skills, and reflected on the importance of talking to her boyfriend about what is bothering her rather than \"just letting it go\"       ASSESSMENT: Current Emotional / Mental Status (status of significant symptoms):   Risk status (Self / Other harm or suicidal ideation)   Client denies " current fears or concerns for personal safety.   Client denies current or recent suicidal ideation or behaviors.   Client denies current or recent homicidal ideation or behaviors.   Client denies current or recent self injurious behavior or ideation.   Client denies other safety concerns.   Client Client reports there has been no change in risk factors since their last session.     Client Client reports there has been no change in protective factors since their last session.     A safety and risk management plan has not been developed at this time, however client was given the after-hours number / 911 should there be a change in any of these risk factors.     Appearance:   Appropriate    Eye Contact:   Fair    Psychomotor Behavior: Normal    Attitude:   Cooperative    Orientation:   All   Speech    Rate / Production: Normal     Volume:  Normal    Mood:    Angry  Anxious    Affect:    Appropriate    Thought Content:  Worry    Thought Form:  Coherent  Logical    Insight:    Good      Medication Review:   No changes to current psychiatric medication(s)     Medication Compliance:   Yes     Changes in Health Issues:   None reported     Chemical Use Review:   Substance Use: Chemical use reviewed, no active concerns identified      Tobacco Use: No current tobacco use.      Diagnosis:  1. Generalized anxiety disorder    2. Eating disorder, unspecified        Collateral Reports Completed:   Not Applicable    PLAN: (Client Tasks / Therapist Tasks / Other)  Future appointments are scheduled. Talk to your boyfriend about the impact of him cancelling his trip.    Continue: Focus on helpful thoughts as discussed in session (e.g. It's OK for me to ask for help when I need it; I am not to blame for other people's choices or behaviors, etc). Practice mindfulness skills at least once daily. Identify physical signs of anxiety and use relaxation skills as discussed in session. Use assertiveness skills as discussed in session to  communicate feelings and needs more openly with others. Surface and challenge unrealistic expectations and disproportionate thoughts that generate anxiety.  Practice using self-compassionate statements as reviewed in session.        Brenda Perez PsyD LP                                                         ______________________________________________________________________                                                 Treatment Plan    Client's Name: Criss Don  YOB: 1970    Date:  8/9/2019    DSM-V Diagnoses: 300.02 Generalized Anxiety Disorder, eating disorder, unspecified  Psychosocial / Contextual Factors: divorce proceedings, health concerns  WHODAS: 22    Referral / Collaboration:  Referral to another professional/service is not indicated at this time..    Anticipated number of session or this episode of care: reassess after 12 sessions      MeasurableTreatment Goal(s) related to diagnosis / functional impairment(s)  Goal 1: Client will learn coping skills to manage stress and adjust to post-surgical lifestyle changes more effectively.    I will know I've met my goal when I'm not weighing myself all the time and feeling more comfortable with my weight loss.      Objective #A (Client Action)    Client will surface and challenge thinking errors that contribute to anxiety.  Status: Continued - Date(s): 8/9/2019    Intervention(s)  Therapist will assign homework (thought  logs), assist client in surfacing and replacement ineffective thinking patterns.    Objective #B  Client will learn deep breathing and relaxation skills and practice at least 3x daily.  Status: Continued - Date(s): 8/9/2019    Intervention(s)  Therapist will teach deep breathing and relaxation skills, assign homework, problem-solve barriers to follow through.    Objective #C  Client will learn and use assertiveness skills to set healthy boundaries and communicate her needs more effectively.  Status: Continued -  Date(s): 8/9/2019    Intervention(s)  Therapist will assist client in identifying and balancing her expectations.        Client has reviewed and agreed to the above plan.      Brenda Perez PsyD LP  8/9/2019

## 2019-08-15 ENCOUNTER — INFUSION THERAPY VISIT (OUTPATIENT)
Dept: INFUSION THERAPY | Facility: CLINIC | Age: 49
End: 2019-08-15
Attending: INTERNAL MEDICINE
Payer: COMMERCIAL

## 2019-08-15 ENCOUNTER — HOSPITAL ENCOUNTER (OUTPATIENT)
Facility: CLINIC | Age: 49
Setting detail: SPECIMEN
Discharge: HOME | End: 2019-08-15
Attending: INTERNAL MEDICINE | Admitting: INTERNAL MEDICINE
Payer: COMMERCIAL

## 2019-08-15 ENCOUNTER — ONCOLOGY VISIT (OUTPATIENT)
Dept: ONCOLOGY | Facility: CLINIC | Age: 49
End: 2019-08-15
Attending: INTERNAL MEDICINE
Payer: COMMERCIAL

## 2019-08-15 VITALS
HEART RATE: 66 BPM | OXYGEN SATURATION: 98 % | HEIGHT: 62 IN | WEIGHT: 139 LBS | BODY MASS INDEX: 25.58 KG/M2 | DIASTOLIC BLOOD PRESSURE: 70 MMHG | RESPIRATION RATE: 15 BRPM | TEMPERATURE: 98.3 F | SYSTOLIC BLOOD PRESSURE: 105 MMHG

## 2019-08-15 DIAGNOSIS — D50.9 IRON DEFICIENCY ANEMIA, UNSPECIFIED IRON DEFICIENCY ANEMIA TYPE: Primary | ICD-10-CM

## 2019-08-15 DIAGNOSIS — D72.819 LEUKOPENIA, UNSPECIFIED TYPE: ICD-10-CM

## 2019-08-15 LAB
ERYTHROCYTE [DISTWIDTH] IN BLOOD BY AUTOMATED COUNT: 13.2 % (ref 10–15)
HCT VFR BLD AUTO: 36.4 % (ref 35–47)
HGB BLD-MCNC: 12 G/DL (ref 11.7–15.7)
MCH RBC QN AUTO: 30.6 PG (ref 26.5–33)
MCHC RBC AUTO-ENTMCNC: 33 G/DL (ref 31.5–36.5)
MCV RBC AUTO: 93 FL (ref 78–100)
PLATELET # BLD AUTO: 248 10E9/L (ref 150–450)
RBC # BLD AUTO: 3.92 10E12/L (ref 3.8–5.2)
WBC # BLD AUTO: 5.8 10E9/L (ref 4–11)

## 2019-08-15 PROCEDURE — 36415 COLL VENOUS BLD VENIPUNCTURE: CPT

## 2019-08-15 PROCEDURE — G0463 HOSPITAL OUTPT CLINIC VISIT: HCPCS

## 2019-08-15 PROCEDURE — 85027 COMPLETE CBC AUTOMATED: CPT | Performed by: INTERNAL MEDICINE

## 2019-08-15 PROCEDURE — 99214 OFFICE O/P EST MOD 30 MIN: CPT | Performed by: INTERNAL MEDICINE

## 2019-08-15 ASSESSMENT — MIFFLIN-ST. JEOR: SCORE: 1203.75

## 2019-08-15 ASSESSMENT — PAIN SCALES - GENERAL: PAINLEVEL: NO PAIN (0)

## 2019-08-15 NOTE — PROGRESS NOTES
Visit Date:   08/15/2019     HEMATOLOGY HISTORY: Ms. Don is a female with iron deficiency anemia. She had gastric bypass surgery in October 2016.   1.  On 03/07/2018 (care everywhere), hemoglobin of 12.3 with MCV of 89.7.   2.  On 04/20/2018, iron of 33, ferritin of 11 and saturation of 11%.   3.  On 07/19/2018, iron of 36, ferritin of 6 and saturation of 12%.      SUBJECTIVE:  Ms. Don is a 48-year-old female with history of iron deficiency anemia secondary to gastric bypass surgery causing malabsorption of iron.  The patient previously has received IV iron.      She is here for followup.  Overall, she is doing good.  She has mild fatigue.  Occasional headache and dizziness.  No chest pain.  No shortness of breath.  No abdominal pain.  Occasional nausea and vomiting.  No urinary or bowel complaints.  No bleeding.  No fever, chills, or night sweats.  Sometimes she feels weak and cramps in her leg.      PHYSICAL EXAMINATION:   GENERAL:  She is alert, oriented x 3.   VITAL SIGNS:  Reviewed.  ECOG PS of 1.   The rest of the systems not examined.      LABORATORY DATA:  Labs on 05/16/2019 revealed normal hemoglobin, MCV, iron and ferritin.      ASSESSMENT:   1.  A 48-year-old female with history of iron deficiency anemia secondary to gastric bypass surgery.   2.  Mild leukopenia, which has resolved.      PLAN:   1.  The patient clinically is doing well.  She has mild fatigue and headache.  They are not severe.      Discussed with her regarding iron deficiency anemia.  This is due to her malabsorption of iron.      Labs from today are pending.  We will inform her of the result.  I explained to the patient that she is at risk of becoming iron deficient because of poor absorption of iron.  Because of that, we will monitor.     2.  She will have CBC, iron and ferritin checked every 6 months.  I will see her in 1 years' time. Patient advised to see a physician sooner if she has worsening weakness, chest pain, shortness  of breath, muscle cramps, bleeding or any other concerns.      3. She will continue on vitamin B12 replacement.         JORGE BANDA MD             D: 08/15/2019   T: 08/15/2019   MT: ELIER      Name:     KOJO ASHTON   MRN:      [id number]        Account:      [id number]   :      1970           Visit Date:   08/15/2019      Document: [id number]

## 2019-08-15 NOTE — PROGRESS NOTES
Medical Assistant Note:  Criss Don presents today for blood draw.    Patient seen by provider today: Yes: Dr Rahman.   present during visit today: Not Applicable.    Concerns: No Concerns.    Procedure:  Lab draw site: Left Hand, Needle type: BF, Gauge: 23g with gauze and coban.    Post Assessment:  Labs drawn without difficulty: Yes.    Discharge Plan:  Departure Mode: Ambulatory.    Face to Face Time: 5.    Blanca De Luna

## 2019-08-22 ENCOUNTER — OFFICE VISIT (OUTPATIENT)
Dept: PSYCHOLOGY | Facility: CLINIC | Age: 49
End: 2019-08-22
Payer: COMMERCIAL

## 2019-08-22 DIAGNOSIS — F50.9 EATING DISORDER, UNSPECIFIED: ICD-10-CM

## 2019-08-22 DIAGNOSIS — F41.1 GENERALIZED ANXIETY DISORDER: Primary | ICD-10-CM

## 2019-08-22 PROCEDURE — 90834 PSYTX W PT 45 MINUTES: CPT | Performed by: PSYCHOLOGIST

## 2019-08-22 NOTE — PROGRESS NOTES
Discharge Summary  Multiple Sessions    Client Name: Criss Don MRN#: [id number] YOB: 1970    Discharge Date:   August 22, 2019      Service Type: Individual      Session Start Time: 1:00  Session End Time: 1:45      Session Length: 45     Session #: 59     Attendees: Client attended alone    Focus of Treatment Objective(s):  Client's presenting concerns included: anxiety, disordered eating  Stage of Change at time of Discharge: ACTION (Actively working towards change)    Medication Adherence:  Yes    Chemical Use:  NA    Assessment: Current Emotional / Mental Status (status of significant symptoms):    Risk status (Self / Other harm or suicidal ideation)  Client denies current fears or concerns for personal safety.  Client denies current or recent suicidal ideation or behaviors.  Client denies current or recent homicidal ideation or behaviors.  Client denies current or recent self injurious behavior or ideation.  Client denies other safety concerns.  A safety and risk management plan has not been developed at this time, however client was given the after-hours number should there be a change in any of these risk factors.    Appearance:   Appropriate   Eye Contact:   Good   Psychomotor Behavior: Normal   Attitude:   Cooperative   Orientation:   All  Speech   Rate / Production: Normal    Volume:  Normal   Mood:    Anxious   Affect:    Appropriate   Thought Content:  Worry   Thought Form:  Coherent  Logical   Insight:   Good     DSM5 Diagnoses: (Sustained by DSM5 Criteria Listed Above)  Diagnoses: 300.02 (F41.1) Generalized Anxiety Disorder  307.50 (F50.9) Unspecified Feeding or Eating Disorder  Psychosocial & Contextual Factors: health concerns  WHODAS 2.0 (12 item) Score:     Reason for Discharge:  This writer is leaving [facility]. Client was given the option to transfer to another [facility] therapist, however she has opted to focus on her care at [facility]. Client also has  access to the [facility] counseling center should she decide to return to general counseling in the future. She may also resume services with [facility] if desired.       Aftercare Plan:  Client may resume counseling services at any time in the future by calling the [facility] Intake Office, [phone].      Brenda Perez PsyD LP

## 2019-09-13 ENCOUNTER — HOSPITAL ENCOUNTER (OUTPATIENT)
Dept: GENERAL RADIOLOGY | Facility: CLINIC | Age: 49
Discharge: HOME | End: 2019-09-13
Attending: UROLOGY | Admitting: UROLOGY
Payer: COMMERCIAL

## 2019-09-13 DIAGNOSIS — N20.0 KIDNEY STONE: ICD-10-CM

## 2019-09-13 PROCEDURE — 74019 RADEX ABDOMEN 2 VIEWS: CPT

## 2019-09-20 ENCOUNTER — NURSE TRIAGE (OUTPATIENT)
Dept: FAMILY MEDICINE | Facility: CLINIC | Age: 49
End: 2019-09-20

## 2019-09-20 ENCOUNTER — OFFICE VISIT (OUTPATIENT)
Dept: UROLOGY | Facility: CLINIC | Age: 49
End: 2019-09-20
Payer: COMMERCIAL

## 2019-09-20 VITALS
HEIGHT: 62 IN | BODY MASS INDEX: 26.13 KG/M2 | DIASTOLIC BLOOD PRESSURE: 60 MMHG | HEART RATE: 68 BPM | WEIGHT: 142 LBS | OXYGEN SATURATION: 99 % | SYSTOLIC BLOOD PRESSURE: 108 MMHG

## 2019-09-20 DIAGNOSIS — N20.0 KIDNEY STONE: Primary | ICD-10-CM

## 2019-09-20 LAB
ALBUMIN UR-MCNC: NEGATIVE MG/DL
APPEARANCE UR: CLEAR
BILIRUB UR QL STRIP: NEGATIVE
COLOR UR AUTO: YELLOW
GLUCOSE UR STRIP-MCNC: NEGATIVE MG/DL
HGB UR QL STRIP: NEGATIVE
KETONES UR STRIP-MCNC: NEGATIVE MG/DL
LEUKOCYTE ESTERASE UR QL STRIP: NEGATIVE
NITRATE UR QL: NEGATIVE
PH UR STRIP: 5 PH (ref 5–7)
SOURCE: NORMAL
SP GR UR STRIP: >1.03 (ref 1–1.03)
UROBILINOGEN UR STRIP-ACNC: 0.2 EU/DL (ref 0.2–1)

## 2019-09-20 PROCEDURE — 81003 URINALYSIS AUTO W/O SCOPE: CPT | Performed by: UROLOGY

## 2019-09-20 PROCEDURE — 99213 OFFICE O/P EST LOW 20 MIN: CPT | Performed by: UROLOGY

## 2019-09-20 ASSESSMENT — PAIN SCALES - GENERAL: PAINLEVEL: NO PAIN (0)

## 2019-09-20 ASSESSMENT — MIFFLIN-ST. JEOR: SCORE: 1217.36

## 2019-09-20 NOTE — TELEPHONE ENCOUNTER
Pt states she had xrays through urology to f/u on kidney issues and the urologist told her he did not see anything on xray and should schedule an appt with PCP.  Pt scheduled with Napoleongael Gray Friday 9/27 at 7:20 am.  Advised if the pain lasts longer than a couple of minutes she needs to go to the ER for evaluation.      Additional Information    Negative: Passed out (i.e., fainted, collapsed and was not responding)    Negative: Shock suspected (e.g., cold/pale/clammy skin, too weak to stand, low BP, rapid pulse)    Negative: Sounds like a life-threatening emergency to the triager    Negative: Chest pain    Negative: Pain is mainly in upper abdomen (if needed ask: 'is it mainly above the belly button?')    Negative: Abdominal pain and pregnant > 20 weeks    Negative: Abdominal pain and pregnant < 20 weeks    Negative: SEVERE abdominal pain (e.g., excruciating)    Negative: Vomiting red blood or black (coffee ground) material    Negative: Bloody, black, or tarry bowel movements    Negative: Constant abdominal pain lasting > 2 hours    Negative: Vomiting bile (green color)    Negative: Patient sounds very sick or weak to the triager    Negative: Vomiting and abdomen looks much more swollen than usual    Negative: White of the eyes have turned yellow (i.e., jaundice)    Negative: Blood in urine (red, pink, or tea-colored)    Negative: Fever > 103 F (39.4 C)    Negative: Fever > 101 F (38.3 C) and over 60 years of age    Negative: Fever > 100.0 F (37.8 C) and has diabetes mellitus or a weak immune system (e.g., HIV positive, cancer chemotherapy, organ transplant, splenectomy, chronic steroids)    Negative: Fever > 100.0 F (37.8 C) and bedridden (e.g., nursing home patient, stroke, chronic illness, recovering from surgery)    Negative: Pregnant or could be pregnant (i.e., missed last menstrual period)    Negative: Patient wants to be seen    Negative: MODERATE OR MILD pain that comes and goes (cramps) lasts > 24 hours     "Negative: Unusual vaginal discharge    Negative: Age > 60 years    Abdominal pain is a chronic symptom (recurrent or ongoing AND lasting > 4 weeks)    Answer Assessment - Initial Assessment Questions  1. LOCATION: \"Where does it hurt?\"       Lower right abdomen  2. RADIATION: \"Does the pain shoot anywhere else?\" (e.g., chest, back)      no  3. ONSET: \"When did the pain begin?\" (e.g., minutes, hours or days ago)       1-1.5 months ago  4. SUDDEN: \"Gradual or sudden onset?\"      sudden  5. PATTERN \"Does the pain come and go, or is it constant?\"     - If constant: \"Is it getting better, staying the same, or worsening?\"       (Note: Constant means the pain never goes away completely; most serious pain is constant and it progresses)      - If intermittent: \"How long does it last?\" \"Do you have pain now?\"      (Note: Intermittent means the pain goes away completely between bouts)      Comes and goes  6. SEVERITY: \"How bad is the pain?\"  (e.g., Scale 1-10; mild, moderate, or severe)    - MILD (1-3): doesn't interfere with normal activities, abdomen soft and not tender to touch     - MODERATE (4-7): interferes with normal activities or awakens from sleep, tender to touch     - SEVERE (8-10): excruciating pain, doubled over, unable to do any normal activities       Severe when the pain occurs and lasts for about 1-2 minutes  7. RECURRENT SYMPTOM: \"Have you ever had this type of abdominal pain before?\" If so, ask: \"When was the last time?\" and \"What happened that time?\"       no  8. CAUSE: \"What do you think is causing the abdominal pain?\"      Not sure  9. RELIEVING/AGGRAVATING FACTORS: \"What makes it better or worse?\" (e.g., movement, antacids, bowel movement)      Nothing makes it better or worse since short lived  10. OTHER SYMPTOMS: \"Has there been any vomiting, diarrhea, constipation, or urine problems?\"        No other sxs  11. PREGNANCY: \"Is there any chance you are pregnant?\" \"When was your last menstrual period?\"    "     no    Protocols used: ABDOMINAL PAIN - FEMALE-A-OH    Yvette FOLEY RN  EP Triage

## 2019-09-20 NOTE — LETTER
9/20/2019      RE: Criss Don  [address] [address] [facility] [address]       Paulette Don is a 48-year-old female with a history of calcium oxalate/calcium phosphate urolithiasis.  She underwent lithotripsy a year ago and passed some fragments that were analyzed.  On KUB today she has remaining stone fragments in the left upper pole renal calyx that are probably trapped by a narrow infundibulum.  She is having no left flank pain, dysuria or hematuria.  Her urinalysis is normal except for a specific gravity of greater than 1.030.  She claims to drink 16 ounces of water every morning with her medications and is drinking between 2 and 3 L of water daily.  She likes to drink before 4 PM so that she can sleep better at night.  She is having some sharp stabbing right lower quadrant pain.  This drops her to her knees when it is occurring.  It is not relieved by anything but goes away on its own.  She has had normal pelvic exams with her primary care physician.  She has had no blood in her stool and her KUB shows no calcifications on the right side.  Other past medical history: Depression, history of obesity with gastric bypass, gastric ulcer, urinary frequency, non-smoker  Medications: Calcium/vitamin D/vitamin K, vitamin D, vitamin B12, Colace, Prozac, multivitamin, oral contraception, Protonix  Allergies: Lortab  Review of systems: Some relative constipation-pain in right lower quadrant is not relieved by bowel.  No nausea or vomiting.  Patient did have a 24-hour urine and saw nephrology-she is on a high water, low sodium and low oxalate diet.  Apparently she also had elevated urine calcium levels.  Currently she is not taking her regular calcium supplement  Exam: Alert and oriented, normal cephalic, normal respirations, neuro grossly intact.  Normal vital signs  Assessment: Calcium oxalate/calcium phosphate urolithiasis with fragmented stone in left upper pole renal calyx.  Right lower quadrant pain could be  related to an adhesion.  Suggest that her primary care image her with a CT abdomen and pelvis  with contrast if this continues.  See me in 12 months with KUKATHY.    Regis Parada MD

## 2019-09-20 NOTE — TELEPHONE ENCOUNTER
"Reason for call:  Patient reporting a symptom    Symptom or request: Lower right abdominal pain     Duration (how long have symptoms been present): 1 month     Have you been treated for this before? NO    Additional comments: Patient had recent x-rays for her kidney stones. But urologist cannot find reason for pain. Happens 2-3 times a week, no specific time it happens \"feels like a knife\". Urologist told her to contact [facility] to get another opinion.     Phone Number patient can be reached at:  Home number on file [phone] (home)    Best Time:  Anytime     Can we leave a detailed message on this number:  YES    Call taken on 9/20/2019 at 1:22 PM by Sherron Garrison    "

## 2019-09-20 NOTE — NURSING NOTE
"Chief Complaint   Patient presents with     Hx of Kidney Stone     Patient her today to go over Result of KUB       Blood pressure 108/60, pulse 68, height 1.575 m (5' 2\"), weight 64.4 kg (142 lb), SpO2 99 %, not currently breastfeeding. Body mass index is 25.97 kg/m .    Patient Active Problem List   Diagnosis     Labial abscess     CARDIOVASCULAR SCREENING; LDL GOAL LESS THAN 160     Mass of right foot     History of abnormal cervical Pap smear     Intertrigo     Bilateral edema of lower extremity     Bariatric surgery status     Malnutrition following gastrointestinal surgery     Body dysmorphic disorder     Hypokalemia     Overweight (BMI 25.0-29.9)     Hypoglycemia     NED (generalized anxiety disorder)     Other specified eating disorder     Slow transit constipation     Iron deficiency     Malabsorption of iron     Iron deficiency anemia, unspecified iron deficiency anemia type     Perforated ulcer (H)     Nephrolithiasis       Allergies   Allergen Reactions     Lortab [Hydrocodone-Acetaminophen] Nausea     Also light headed and flushed       Current Outpatient Medications   Medication Sig Dispense Refill     calcium-vitamin D-vitamin K (VIACTIV) 500-500-40 MG-UNT-MCG CHEW Take 3 tablets by mouth At Bedtime        Cholecalciferol (VITAMIN D3 PO) Take 2,000 Units by mouth 2 times daily        Cyanocobalamin (B-12) 2500 MCG SUBL Place 1 tablet under the tongue Every Mon, Wed, Fri Morning 3 times weekly        docusate sodium (COLACE) 100 MG capsule Take 300 mg by mouth every morning       FLUoxetine (PROZAC) 20 MG capsule Take 40 mg by mouth daily        multivitamin  peds with iron (FLINTSTONES COMPLETE) 60 MG chewable tablet Take 2 chew tab by mouth every morning        norgestimate-ethinyl estradiol (SPRINTEC 28) 0.25-35 MG-MCG tablet Take 1 tablet by mouth daily 84 tablet 3     pantoprazole (PROTONIX) 40 MG EC tablet Take 1 tablet (40 mg) by mouth 2 times daily 180 tablet 3       Social History     Tobacco " Use     Smoking status: Never Smoker     Smokeless tobacco: Never Used   Substance Use Topics     Alcohol use: No     Alcohol/week: 0.0 oz     Frequency: Never     Drug use: No       UA RESULTS:  Recent Labs   Lab Test 09/20/19  0928  01/03/19  1545   COLOR Yellow   < > Yellow   APPEARANCE Clear   < > Slightly Cloudy   URINEGLC Negative   < > Negative   URINEBILI Negative   < > Negative   URINEKETONE Negative   < > Negative   SG >1.030   < > 1.029   UBLD Negative   < > Negative   URINEPH 5.0   < > 5.5   PROTEIN Negative   < > 30*   UROBILINOGEN 0.2   < >  --    NITRITE Negative   < > Negative   LEUKEST Negative   < > Large*   RBCU  --   --  3*   WBCU  --   --  12*    < > = values in this interval not displayed.         Lynette Gibbs  [facility]  9/20/2019

## 2019-09-20 NOTE — LETTER
9/20/2019       RE: Criss Don  [address] [address]     Dear Colleague,    Thank you for referring your patient, Criss Don, to the [facility] [place] at [facility]. Please see a copy of my visit note below.    Paulette Don is a 48-year-old female with a history of calcium oxalate/calcium phosphate urolithiasis.  She underwent lithotripsy a year ago and passed some fragments that were analyzed.  On KUB today she has remaining stone fragments in the left upper pole renal calyx that are probably trapped by a narrow infundibulum.  She is having no left flank pain, dysuria or hematuria.  Her urinalysis is normal except for a specific gravity of greater than 1.030.  She claims to drink 16 ounces of water every morning with her medications and is drinking between 2 and 3 L of water daily.  She likes to drink before 4 PM so that she can sleep better at night.  She is having some sharp stabbing right lower quadrant pain.  This drops her to her knees when it is occurring.  It is not relieved by anything but goes away on its own.  She has had normal pelvic exams with her primary care physician.  She has had no blood in her stool and her KUB shows no calcifications on the right side.  Other past medical history: Depression, history of obesity with gastric bypass, gastric ulcer, urinary frequency, non-smoker  Medications: Calcium/vitamin D/vitamin K, vitamin D, vitamin B12, Colace, Prozac, multivitamin, oral contraception, Protonix  Allergies: Lortab  Review of systems: Some relative constipation-pain in right lower quadrant is not relieved by bowel.  No nausea or vomiting.  Patient did have a 24-hour urine and saw nephrology-she is on a high water, low sodium and low oxalate diet.  Apparently she also had elevated urine calcium levels.  Currently she is not taking her regular calcium supplement  Exam: Alert and oriented,  normal cephalic, normal respirations, neuro grossly intact.  Normal vital signs  Assessment: Calcium oxalate/calcium phosphate urolithiasis with fragmented stone in left upper pole renal calyx.  Right lower quadrant pain could be related to an adhesion.  Suggest that her primary care image her with a CT abdomen and pelvis  with contrast if this continues.  See me in 12 months with KUB.    Again, thank you for allowing me to participate in the care of your patient.      Sincerely,    Regis Parada MD

## 2019-09-20 NOTE — PROGRESS NOTES
Paulette Don is a 48-year-old female with a history of calcium oxalate/calcium phosphate urolithiasis.  She underwent lithotripsy a year ago and passed some fragments that were analyzed.  On KUB today she has remaining stone fragments in the left upper pole renal calyx that are probably trapped by a narrow infundibulum.  She is having no left flank pain, dysuria or hematuria.  Her urinalysis is normal except for a specific gravity of greater than 1.030.  She claims to drink 16 ounces of water every morning with her medications and is drinking between 2 and 3 L of water daily.  She likes to drink before 4 PM so that she can sleep better at night.  She is having some sharp stabbing right lower quadrant pain.  This drops her to her knees when it is occurring.  It is not relieved by anything but goes away on its own.  She has had normal pelvic exams with her primary care physician.  She has had no blood in her stool and her KUB shows no calcifications on the right side.  Other past medical history: Depression, history of obesity with gastric bypass, gastric ulcer, urinary frequency, non-smoker  Medications: Calcium/vitamin D/vitamin K, vitamin D, vitamin B12, Colace, Prozac, multivitamin, oral contraception, Protonix  Allergies: Lortab  Review of systems: Some relative constipation-pain in right lower quadrant is not relieved by bowel.  No nausea or vomiting.  Patient did have a 24-hour urine and saw nephrology-she is on a high water, low sodium and low oxalate diet.  Apparently she also had elevated urine calcium levels.  Currently she is not taking her regular calcium supplement  Exam: Alert and oriented, normal cephalic, normal respirations, neuro grossly intact.  Normal vital signs  Assessment: Calcium oxalate/calcium phosphate urolithiasis with fragmented stone in left upper pole renal calyx.  Right lower quadrant pain could be related to an adhesion.  Suggest that her primary care image her with a CT abdomen and  pelvis  with contrast if this continues.  See me in 12 months with KUB.

## 2019-09-26 NOTE — PROGRESS NOTES
"Subjective     Criss Don is a 48 year old female who presents to clinic today for the following health issues:    HPI     Patient presents with 1.5 months of sharp RLQ pain. Pain comes on suddenly, is \"sharp like a knife\" and lasts 1-2 minutes and then completely resolves. She reports \"when it happens it brings me to my knees.\" The pain is in consistently in one spot in her R pelvic and does not radiate. These episodes happen about 2x/week, most recently yesterday. Pain is not dependant on activity or position and has happened while walking, sitting, and sleeping.   She been previously seen by urology for this concern earlier this month who suggested follow up with PCP and a possible CT for adhesions. Denies fever, urinary changes, bowel changes. Negative Rovsing, psoas, and obturator signs.    She has been having a couple months of irregular periods. She has been on Sprintec for several years and reports that her periods were regular. In the past couple months her periods have become irregular with frequent and unpredictabe spotting. Pt was unsure exactly when the menstrual irregularity started. She reports a mild amount of discomfort and cramping with menstruation that has been consistent while taking Sprintec. Before taking Sprintec pt reports painful and irregular periods.     Past medical history is significant for ongoing L nephrolithiasis that has been treated with lithotripsy but with remaining fragments in 2019, azalia-en-y gastric bypass surgery in 2016, and diagnostic laproscopic surgery in 2018.     -------------------------------------    Patient Active Problem List   Diagnosis     Labial abscess     CARDIOVASCULAR SCREENING; LDL GOAL LESS THAN 160     Mass of right foot     History of abnormal cervical Pap smear     Intertrigo     Bilateral edema of lower extremity     Bariatric surgery status     Malnutrition following gastrointestinal surgery     Body dysmorphic disorder     Hypokalemia     " Overweight (BMI 25.0-29.9)     Hypoglycemia     NED (generalized anxiety disorder)     Other specified eating disorder     Slow transit constipation     Iron deficiency     Malabsorption of iron     Iron deficiency anemia, unspecified iron deficiency anemia type     Perforated ulcer (H)     Nephrolithiasis     Past Surgical History:   Procedure Laterality Date     EXTRACORPOREAL SHOCK WAVE LITHOTRIPSY (ESWL) Left 2/7/2019    Procedure: Left renal lithotripsy (1250 shocks).;  Surgeon: Regis Parada MD;  Location: [facility] OR     LAPAROSCOPIC BYPASS GASTRIC N/A 10/26/2016    Procedure: LAPAROSCOPIC BYPASS GASTRIC;  Surgeon: Romario Reyna MD;  Location: [facility] OR     LAPAROSCOPY DIAGNOSTIC (GENERAL) N/A 10/24/2018    Procedure: LAPAROSCOPY DIAGNOSTIC FOR PERFORATED GASTRIC ULCER;  Surgeon: Chang Corea MD;  Location: [facility] OR     NO HISTORY OF SURGERY         Social History     Tobacco Use     Smoking status: Never Smoker     Smokeless tobacco: Never Used   Substance Use Topics     Alcohol use: No     Alcohol/week: 0.0 standard drinks     Frequency: Never     Family History   Problem Relation Age of Onset     Hypertension Mother      Breast Cancer Mother         64 dx     Allergies Mother      Obesity Mother      Respiratory Mother         Asthma     Heart Disease Mother      Hypertension Maternal Grandmother      Cancer Maternal Grandmother         Ovarian     Cancer Sister         Cervical     Cancer Other         Mat. great aunt-ovarian     Hypertension Brother      Cancer Maternal Aunt         ?Gastric     Brain Cancer Cousin         maternal     Leukemia Nephew         dx at 23, CML         Current Outpatient Medications   Medication Sig Dispense Refill     calcium-vitamin D-vitamin K (VIACTIV) 500-500-40 MG-UNT-MCG CHEW Take 3 tablets by mouth At Bedtime        Cholecalciferol (VITAMIN D3 PO) Take 2,000 Units by mouth 2 times daily        Cyanocobalamin (B-12) 2500 MCG SUBL Place 1 tablet under the  "tongue Every Mon, Wed, Fri Morning 3 times weekly        docusate sodium (COLACE) 100 MG capsule Take 300 mg by mouth every morning       FLUoxetine (PROZAC) 20 MG capsule Take 40 mg by mouth daily        multivitamin  peds with iron (FLINTSTONES COMPLETE) 60 MG chewable tablet Take 2 chew tab by mouth every morning        norgestimate-ethinyl estradiol (SPRINTEC 28) 0.25-35 MG-MCG tablet Take 1 tablet by mouth daily 84 tablet 3     pantoprazole (PROTONIX) 40 MG EC tablet Take 1 tablet (40 mg) by mouth 2 times daily 180 tablet 3     Allergies   Allergen Reactions     Lortab [Hydrocodone-Acetaminophen] Nausea     Also light headed and flushed       Reviewed and updated as needed this visit by Provider  Med Hx  Surg Hx  Fam Hx         Review of Systems   ROS COMP: Constitutional, HEENT, cardiovascular, pulmonary, gi and gu systems are negative, except as otherwise noted.      Objective    BP 98/62   Pulse 64   Temp 98  F (36.7  C) (Tympanic)   Ht 1.575 m (5' 2\")   Wt 64.9 kg (143 lb)   LMP 09/22/2019   SpO2 96%   BMI 26.16 kg/m    Body mass index is 26.16 kg/m .  Physical Exam   GENERAL: healthy, alert and no distress  RESP: lungs clear to auscultation - no rales, rhonchi or wheezes  CV: regular rate and rhythm, normal S1 S2,   ABDOMEN: soft, nontender, no masses and bowel sounds normal   (female): normal female external genitalia, normal cervix/adnexa/uterus without masses or discharge, no cervical tenderness  PSYCH: mentation appears normal, affect normal/bright    Diagnostic Test Results:  Labs reviewed in Epic        Assessment & Plan     1. Chronic RLQ pain  Based on patient's symptoms of sharp unilateral pain in the pelvis and previously seen ovarian cyst on CT, there is suspicion for ovarian cyst rupture.Ordered pelvic US to better view R ovary for diagnosis or rule out. Considered abdominal adhesions from previous surgeries and will consider abdominal CT if US is negative.  - US Pelvic Complete w " "Transvaginal & Abd/Pel Duplex Limited; Future    Follow-up: Report to ED if pain becomes persistent, pain worsens, fever develops, or if vaginal bleeding becomes heavy.     BMI:   Estimated body mass index is 26.16 kg/m  as calculated from the following:    Height as of this encounter: 1.575 m (5' 2\").    Weight as of this encounter: 64.9 kg (143 lb).     No follow-ups on file.    Napoleon Gray PA-C  [facility] [place]    "

## 2019-09-27 ENCOUNTER — OFFICE VISIT (OUTPATIENT)
Dept: FAMILY MEDICINE | Facility: CLINIC | Age: 49
End: 2019-09-27
Payer: COMMERCIAL

## 2019-09-27 VITALS
OXYGEN SATURATION: 96 % | WEIGHT: 143 LBS | TEMPERATURE: 98 F | DIASTOLIC BLOOD PRESSURE: 62 MMHG | BODY MASS INDEX: 26.31 KG/M2 | HEART RATE: 64 BPM | HEIGHT: 62 IN | SYSTOLIC BLOOD PRESSURE: 98 MMHG

## 2019-09-27 DIAGNOSIS — G89.29 CHRONIC RLQ PAIN: Primary | ICD-10-CM

## 2019-09-27 DIAGNOSIS — R10.31 CHRONIC RLQ PAIN: Primary | ICD-10-CM

## 2019-09-27 PROCEDURE — 99214 OFFICE O/P EST MOD 30 MIN: CPT | Performed by: PHYSICIAN ASSISTANT

## 2019-09-27 ASSESSMENT — MIFFLIN-ST. JEOR: SCORE: 1221.89

## 2019-09-27 ASSESSMENT — ANXIETY QUESTIONNAIRES
6. BECOMING EASILY ANNOYED OR IRRITABLE: SEVERAL DAYS
5. BEING SO RESTLESS THAT IT IS HARD TO SIT STILL: SEVERAL DAYS
7. FEELING AFRAID AS IF SOMETHING AWFUL MIGHT HAPPEN: NEARLY EVERY DAY
IF YOU CHECKED OFF ANY PROBLEMS ON THIS QUESTIONNAIRE, HOW DIFFICULT HAVE THESE PROBLEMS MADE IT FOR YOU TO DO YOUR WORK, TAKE CARE OF THINGS AT HOME, OR GET ALONG WITH OTHER PEOPLE: SOMEWHAT DIFFICULT
2. NOT BEING ABLE TO STOP OR CONTROL WORRYING: MORE THAN HALF THE DAYS
1. FEELING NERVOUS, ANXIOUS, OR ON EDGE: SEVERAL DAYS
6. BECOMING EASILY ANNOYED OR IRRITABLE: SEVERAL DAYS
5. BEING SO RESTLESS THAT IT IS HARD TO SIT STILL: SEVERAL DAYS
7. FEELING AFRAID AS IF SOMETHING AWFUL MIGHT HAPPEN: NEARLY EVERY DAY
2. NOT BEING ABLE TO STOP OR CONTROL WORRYING: MORE THAN HALF THE DAYS
GAD7 TOTAL SCORE: 11
GAD7 TOTAL SCORE: 11
3. WORRYING TOO MUCH ABOUT DIFFERENT THINGS: MORE THAN HALF THE DAYS
3. WORRYING TOO MUCH ABOUT DIFFERENT THINGS: MORE THAN HALF THE DAYS
1. FEELING NERVOUS, ANXIOUS, OR ON EDGE: SEVERAL DAYS

## 2019-09-27 ASSESSMENT — PATIENT HEALTH QUESTIONNAIRE - PHQ9
5. POOR APPETITE OR OVEREATING: SEVERAL DAYS
5. POOR APPETITE OR OVEREATING: SEVERAL DAYS
SUM OF ALL RESPONSES TO PHQ QUESTIONS 1-9: 18

## 2019-09-28 ASSESSMENT — ANXIETY QUESTIONNAIRES: GAD7 TOTAL SCORE: 11

## 2019-10-03 ENCOUNTER — TELEPHONE (OUTPATIENT)
Dept: FAMILY MEDICINE | Facility: CLINIC | Age: 49
End: 2019-10-03

## 2019-10-03 NOTE — TELEPHONE ENCOUNTER
Called Paulette to give her [facility] imaging number to call and schedule US. Patient verbalized understanding and agrees with plan.     Radha Mondragon RN, BSN  [facility] [place]

## 2019-10-03 NOTE — TELEPHONE ENCOUNTER
Reason for Call:  Other call back    Detailed comments: Paulette is calling because she has not received a phone call to schedule her ultrasound ordered by Napoleon Gray. Please advise and give Paulette a call back. Thank you!    Phone Number Patient can be reached at: Cell number on file:    Telephone Information:   Mobile [phone]       Best Time: any    Can we leave a detailed message on this number? YES    Call taken on 10/3/2019 at 9:51 AM by Radha Brar

## 2019-10-04 ENCOUNTER — HOSPITAL ENCOUNTER (OUTPATIENT)
Dept: ULTRASOUND IMAGING | Facility: CLINIC | Age: 49
Discharge: HOME | End: 2019-10-04
Attending: PHYSICIAN ASSISTANT | Admitting: PHYSICIAN ASSISTANT
Payer: COMMERCIAL

## 2019-10-04 DIAGNOSIS — R10.31 CHRONIC RLQ PAIN: ICD-10-CM

## 2019-10-04 DIAGNOSIS — G89.29 CHRONIC RLQ PAIN: ICD-10-CM

## 2019-10-04 PROCEDURE — 76830 TRANSVAGINAL US NON-OB: CPT

## 2019-12-06 ENCOUNTER — OFFICE VISIT (OUTPATIENT)
Dept: SURGERY | Facility: CLINIC | Age: 49
End: 2019-12-06
Payer: COMMERCIAL

## 2019-12-06 ENCOUNTER — HOSPITAL ENCOUNTER (OUTPATIENT)
Dept: LAB | Facility: CLINIC | Age: 49
Discharge: HOME | End: 2019-12-06
Attending: PHYSICIAN ASSISTANT | Admitting: PHYSICIAN ASSISTANT
Payer: COMMERCIAL

## 2019-12-06 VITALS
WEIGHT: 152.1 LBS | BODY MASS INDEX: 26.95 KG/M2 | HEIGHT: 63 IN | DIASTOLIC BLOOD PRESSURE: 66 MMHG | SYSTOLIC BLOOD PRESSURE: 102 MMHG | HEART RATE: 64 BPM | OXYGEN SATURATION: 100 %

## 2019-12-06 DIAGNOSIS — K91.2 POSTSURGICAL MALABSORPTION: ICD-10-CM

## 2019-12-06 DIAGNOSIS — Z98.84 BARIATRIC SURGERY STATUS: ICD-10-CM

## 2019-12-06 DIAGNOSIS — D50.9 IRON DEFICIENCY ANEMIA, UNSPECIFIED IRON DEFICIENCY ANEMIA TYPE: ICD-10-CM

## 2019-12-06 DIAGNOSIS — K21.9 GASTROESOPHAGEAL REFLUX DISEASE WITHOUT ESOPHAGITIS: ICD-10-CM

## 2019-12-06 DIAGNOSIS — E66.3 OVERWEIGHT: ICD-10-CM

## 2019-12-06 DIAGNOSIS — Z98.84 BARIATRIC SURGERY STATUS: Primary | ICD-10-CM

## 2019-12-06 LAB
ERYTHROCYTE [DISTWIDTH] IN BLOOD BY AUTOMATED COUNT: 13 % (ref 10–15)
FERRITIN SERPL-MCNC: 76 NG/ML (ref 8–252)
HCT VFR BLD AUTO: 41.3 % (ref 35–47)
HGB BLD-MCNC: 13.4 G/DL (ref 11.7–15.7)
IRON SATN MFR SERPL: 29 % (ref 15–46)
IRON SERPL-MCNC: 93 UG/DL (ref 35–180)
MCH RBC QN AUTO: 30.4 PG (ref 26.5–33)
MCHC RBC AUTO-ENTMCNC: 32.4 G/DL (ref 31.5–36.5)
MCV RBC AUTO: 94 FL (ref 78–100)
PLATELET # BLD AUTO: 263 10E9/L (ref 150–450)
PTH-INTACT SERPL-MCNC: 93 PG/ML (ref 12–64)
RBC # BLD AUTO: 4.41 10E12/L (ref 3.8–5.2)
TIBC SERPL-MCNC: 316 UG/DL (ref 240–430)
VIT B12 SERPL-MCNC: 460 PG/ML (ref 193–986)
WBC # BLD AUTO: 5.1 10E9/L (ref 4–11)

## 2019-12-06 PROCEDURE — 82607 VITAMIN B-12: CPT | Performed by: PHYSICIAN ASSISTANT

## 2019-12-06 PROCEDURE — 82728 ASSAY OF FERRITIN: CPT | Performed by: PHYSICIAN ASSISTANT

## 2019-12-06 PROCEDURE — 83550 IRON BINDING TEST: CPT | Performed by: PHYSICIAN ASSISTANT

## 2019-12-06 PROCEDURE — 82306 VITAMIN D 25 HYDROXY: CPT | Performed by: PHYSICIAN ASSISTANT

## 2019-12-06 PROCEDURE — 83970 ASSAY OF PARATHORMONE: CPT | Performed by: PHYSICIAN ASSISTANT

## 2019-12-06 PROCEDURE — 83540 ASSAY OF IRON: CPT | Performed by: PHYSICIAN ASSISTANT

## 2019-12-06 PROCEDURE — 85027 COMPLETE CBC AUTOMATED: CPT | Performed by: PHYSICIAN ASSISTANT

## 2019-12-06 PROCEDURE — 99214 OFFICE O/P EST MOD 30 MIN: CPT | Performed by: PHYSICIAN ASSISTANT

## 2019-12-06 PROCEDURE — 36415 COLL VENOUS BLD VENIPUNCTURE: CPT | Performed by: PHYSICIAN ASSISTANT

## 2019-12-06 RX ORDER — PANTOPRAZOLE SODIUM 40 MG/1
40 TABLET, DELAYED RELEASE ORAL 2 TIMES DAILY
Qty: 180 TABLET | Refills: 3 | Status: SHIPPED | OUTPATIENT
Start: 2019-12-06 | End: 2020-12-23

## 2019-12-06 RX ORDER — POTASSIUM CITRATE 10 MEQ/1
2 TABLET, EXTENDED RELEASE ORAL 2 TIMES DAILY
COMMUNITY

## 2019-12-06 RX ORDER — ALLOPURINOL 300 MG/1
300 TABLET ORAL DAILY
COMMUNITY
End: 2020-08-13

## 2019-12-06 ASSESSMENT — MIFFLIN-ST. JEOR: SCORE: 1266.11

## 2019-12-06 NOTE — PROGRESS NOTES
BARIATRIC FOLLOW UP VISIT     December 6, 2019       HISTORY OF PRESENT ILLNESS:  Pt returns today for her follow-up appointment status post laparoscopic gastric bypass. She is 3 years post surgery.   Pt had kidney stones last year.  She had lithotripsy but has not been able to get the fragments out.   She is on a low sodium and low oxalate diet.    Is getting 80 oz of fluid in daily.      Relevant medical hx:   Perforated gastric marginal ulcer repair by Dr. Corea on 10/24/2018 from no known precipitating factor. Is currently on pantoprazole 40 mg BID.      HOLLIE  She has been working with [facility] for Binge eating disorder for 2 years.  She works with a therapist weekly and dietician biweekly.  Sees the psychiatrist every 6 weeks.      Exercise: Paulette has decreased her exercise to walking 5 days a week for 40-50 minutes per the direction of her therapist at [facility].      BARIATRIC METRICS:  Current Weight: 152 lb 1.6 oz (69 kg)  Body mass index is 27.38 kg/m .   Wt change since last visit (lbs): 27.2  Cumulative weight loss (lbs): 155.6     Patient is taking the following bariatric postoperative vitamins:  2 Complete multivitamins with minerals (at different times than calcium)  4000 Int Units Vitamin D Daily    Poto Citrate 3 CR 1080 mg 2 Twice daily   2500 mcg of Vitamin B12 sl every three days     SOCIAL HISTORY:  Pt denies smoking.  Pt denies alcohol use.  Avoids NSAIDS.  Her divorce is finalized.  She hasn't heard from her  since August when police escorted off her property.  This has been a huge relief.      REVIEW OF SYSTEMS:     GI:  Nausea-none  Vomiting-none  Diarrhea-none  Constipation-seldom, continues stool softener  Dysphagia-none  Abdominal Pain-none  Heartburn-seldom, on pantoprazole 40 mg daily. Has had a couple of days where she has had some belching episodes for a couple hours.  Occurs randomly and resolved spontaneously.      SKIN:  Intertriginous  "irritation-none     PSYCH:  Anxiety-controlled on Prozac.       PHYSICAL EXAMINATION:  /66   Pulse 64   Ht 5' 2.5\" (1.588 m)   Wt 152 lb 1.6 oz (69 kg)   SpO2 100%   BMI 27.38 kg/m    GENERAL Pt in NAD.  HEART: No JVD  LUNGS: Breathing unlabored.  MUSC: Gait normal  NEURO: Alert and oriented x3.      ASSESSMENT AND PLAN:    3 years status laparoscopic gastric bypass Body mass index is 27.38 kg/m .  HOLLIE              Continue working with dietician and therapist at [facility].  Post surgical malabsorption:              Ordered CBC, vitamin B12, vitamin D, PTH.              Follow food plan per dietitian recommendations.              Continue taking recommended post-op vitamins.     Hx of Perforated Ulcer/GERD              Continue pantoprazole 40 mg BID   pantoprazole (PROTONIX) 40 MG EC tablet; Take 1 tablet (40 mg) by mouth 2 times daily  Dispense: 180 tablet;  Refill: 3  Iron deficiency/ Iron malabsorption              Continue iron infusion and management with hematology  Return to clinic in 1 year.     I spent a total of 25 minutes face to face with Criss during today's office visit. Over 50% of this time was spent counseling the patient and/or coordinating care.  "

## 2019-12-09 LAB — DEPRECATED CALCIDIOL+CALCIFEROL SERPL-MC: 45 UG/L (ref 20–75)

## 2019-12-11 ENCOUNTER — TELEPHONE (OUTPATIENT)
Dept: SURGERY | Facility: CLINIC | Age: 49
End: 2019-12-11

## 2019-12-11 NOTE — TELEPHONE ENCOUNTER
Patient was seen in clinic for 12/6/19 for 3 year PO visit with YINA Lynch.  Patient had annual labs done at that time and is wondering results.    Informed patient that Cris is out of clinic until next week.  Informed patient that the only abnormal result was her PTH and vitamin D was NL but not were clinic liked it to be.  Patient stated that she thought this may be a problem.  Informed patient that Cris will contact her when she is back in clinic next week with a plan as vitamin D adjustment is not an urgent matter.  Patient verbalized understanding and is agreeable to plan.  Vesna Evans, MS, RD, RN

## 2019-12-18 NOTE — RESULT ENCOUNTER NOTE
Siena, Can you please call Paulette and let jose know her labs look great except your PTH is slightly elevated.  This means you could be getting some of your calcium from your bones instead of you food.  Please have her increase her vitamin D to 6000 Int Units daily. Labs orderd  to recheck you PTH and Vit D level in 3 months.  Thanks.      Cris Paez PA-C

## 2019-12-23 ENCOUNTER — TELEPHONE (OUTPATIENT)
Dept: SURGERY | Facility: CLINIC | Age: 49
End: 2019-12-23

## 2019-12-23 NOTE — TELEPHONE ENCOUNTER
Called pt as directed.  Reviewed above message with pt.  No questions at this time.  Siena Scott RN on 12/23/2019 at 12:50 PM

## 2020-01-06 ENCOUNTER — DOCUMENTATION ONLY (OUTPATIENT)
Dept: SURGERY | Facility: CLINIC | Age: 50
End: 2020-01-06

## 2020-02-14 ENCOUNTER — HOSPITAL ENCOUNTER (OUTPATIENT)
Facility: CLINIC | Age: 50
Setting detail: SPECIMEN
Discharge: HOME | End: 2020-02-14
Attending: INTERNAL MEDICINE | Admitting: INTERNAL MEDICINE
Payer: COMMERCIAL

## 2020-02-14 ENCOUNTER — INFUSION THERAPY VISIT (OUTPATIENT)
Dept: INFUSION THERAPY | Facility: CLINIC | Age: 50
End: 2020-02-14
Attending: INTERNAL MEDICINE
Payer: COMMERCIAL

## 2020-02-14 DIAGNOSIS — D50.9 IRON DEFICIENCY ANEMIA, UNSPECIFIED IRON DEFICIENCY ANEMIA TYPE: ICD-10-CM

## 2020-02-14 LAB
ERYTHROCYTE [DISTWIDTH] IN BLOOD BY AUTOMATED COUNT: 13.4 % (ref 10–15)
FERRITIN SERPL-MCNC: 53 NG/ML (ref 8–252)
HCT VFR BLD AUTO: 39.8 % (ref 35–47)
HGB BLD-MCNC: 12.8 G/DL (ref 11.7–15.7)
IRON SATN MFR SERPL: 25 % (ref 15–46)
IRON SERPL-MCNC: 79 UG/DL (ref 35–180)
MCH RBC QN AUTO: 30.2 PG (ref 26.5–33)
MCHC RBC AUTO-ENTMCNC: 32.2 G/DL (ref 31.5–36.5)
MCV RBC AUTO: 94 FL (ref 78–100)
PLATELET # BLD AUTO: 261 10E9/L (ref 150–450)
RBC # BLD AUTO: 4.24 10E12/L (ref 3.8–5.2)
TIBC SERPL-MCNC: 315 UG/DL (ref 240–430)
WBC # BLD AUTO: 5.7 10E9/L (ref 4–11)

## 2020-02-14 PROCEDURE — 83550 IRON BINDING TEST: CPT | Performed by: INTERNAL MEDICINE

## 2020-02-14 PROCEDURE — 83540 ASSAY OF IRON: CPT | Performed by: INTERNAL MEDICINE

## 2020-02-14 PROCEDURE — 82728 ASSAY OF FERRITIN: CPT | Performed by: INTERNAL MEDICINE

## 2020-02-14 PROCEDURE — 36415 COLL VENOUS BLD VENIPUNCTURE: CPT

## 2020-02-14 PROCEDURE — 85027 COMPLETE CBC AUTOMATED: CPT | Performed by: INTERNAL MEDICINE

## 2020-02-14 NOTE — LETTER
Patient:  Criss Don  :   1970  MRN:     [id number]        Ms.Cynthia DANIELLE Don  [address] [facility] [address]        2020    Dear ,    We are writing to inform you of your test results.    Your blood tests are good.  No anemia.  No iron deficiency.    Please, call me with any questions.    Sincerely:    Pricilla Rahman MD          Resulted Orders   Ferritin   Result Value Ref Range    Ferritin 53 8 - 252 ng/mL   Iron and iron binding capacity   Result Value Ref Range    Iron 79 35 - 180 ug/dL    Iron Binding Cap 315 240 - 430 ug/dL    Iron Saturation Index 25 15 - 46 %   CBC with platelets   Result Value Ref Range    WBC 5.7 4.0 - 11.0 10e9/L    RBC Count 4.24 3.8 - 5.2 10e12/L    Hemoglobin 12.8 11.7 - 15.7 g/dL    Hematocrit 39.8 35.0 - 47.0 %    MCV 94 78 - 100 fl    MCH 30.2 26.5 - 33.0 pg    MCHC 32.2 31.5 - 36.5 g/dL    RDW 13.4 10.0 - 15.0 %    Platelet Count 261 150 - 450 10e9/L           [id number]  1970

## 2020-02-14 NOTE — PROGRESS NOTES
Medical Assistant Note:  Criss Don presents today for blood draw.    Patient seen by provider today: No.   present during visit today: Not Applicable.    Concerns: No Concerns.    Procedure:  Lab draw site: LAC, Needle type: BF, Gauge: 23g with gauze and coban.    Post Assessment:  Labs drawn without difficulty: Yes.    Discharge Plan:  Departure Mode: Ambulatory.    Face to Face Time: 5.    Blanca De Luna, CMA

## 2020-05-20 ENCOUNTER — TRANSFERRED RECORDS (OUTPATIENT)
Dept: HEALTH INFORMATION MANAGEMENT | Facility: CLINIC | Age: 50
End: 2020-05-20

## 2020-05-29 DIAGNOSIS — N20.0 URIC ACID NEPHROLITHIASIS: ICD-10-CM

## 2020-05-29 DIAGNOSIS — E21.3 HYPERPARATHYROIDISM, UNSPECIFIED (H): ICD-10-CM

## 2020-05-29 DIAGNOSIS — D50.9 IRON DEFICIENCY ANEMIA, UNSPECIFIED IRON DEFICIENCY ANEMIA TYPE: ICD-10-CM

## 2020-05-29 DIAGNOSIS — N20.0 URIC ACID NEPHROLITHIASIS: Primary | ICD-10-CM

## 2020-05-29 LAB
ALBUMIN SERPL-MCNC: 3.3 G/DL (ref 3.4–5)
ALBUMIN UR-MCNC: NEGATIVE MG/DL
ANION GAP SERPL CALCULATED.3IONS-SCNC: 6 MMOL/L (ref 3–14)
APPEARANCE UR: CLEAR
BILIRUB UR QL STRIP: NEGATIVE
BUN SERPL-MCNC: 12 MG/DL (ref 7–30)
CALCIUM SERPL-MCNC: 8.5 MG/DL (ref 8.5–10.1)
CHLORIDE SERPL-SCNC: 105 MMOL/L (ref 94–109)
CO2 SERPL-SCNC: 27 MMOL/L (ref 20–32)
COLOR UR AUTO: YELLOW
CREAT SERPL-MCNC: 0.52 MG/DL (ref 0.52–1.04)
ERYTHROCYTE [DISTWIDTH] IN BLOOD BY AUTOMATED COUNT: 13.1 % (ref 10–15)
FERRITIN SERPL-MCNC: 62 NG/ML (ref 8–252)
GFR SERPL CREATININE-BSD FRML MDRD: >90 ML/MIN/{1.73_M2}
GLUCOSE SERPL-MCNC: 86 MG/DL (ref 70–99)
GLUCOSE UR STRIP-MCNC: NEGATIVE MG/DL
HCT VFR BLD AUTO: 39 % (ref 35–47)
HGB BLD-MCNC: 12.8 G/DL (ref 11.7–15.7)
HGB UR QL STRIP: ABNORMAL
IRON SATN MFR SERPL: 24 % (ref 15–46)
IRON SERPL-MCNC: 70 UG/DL (ref 35–180)
KETONES UR STRIP-MCNC: NEGATIVE MG/DL
LEUKOCYTE ESTERASE UR QL STRIP: ABNORMAL
MAGNESIUM SERPL-MCNC: 2 MG/DL (ref 1.6–2.3)
MCH RBC QN AUTO: 30.5 PG (ref 26.5–33)
MCHC RBC AUTO-ENTMCNC: 32.8 G/DL (ref 31.5–36.5)
MCV RBC AUTO: 93 FL (ref 78–100)
NITRATE UR QL: NEGATIVE
PH UR STRIP: 5.5 PH (ref 5–7)
PHOSPHATE SERPL-MCNC: 3.6 MG/DL (ref 2.5–4.5)
PLATELET # BLD AUTO: 300 10E9/L (ref 150–450)
POTASSIUM SERPL-SCNC: 4 MMOL/L (ref 3.4–5.3)
PTH-INTACT SERPL-MCNC: 68 PG/ML (ref 18–80)
RBC # BLD AUTO: 4.2 10E12/L (ref 3.8–5.2)
RBC #/AREA URNS AUTO: NORMAL /HPF
SODIUM SERPL-SCNC: 138 MMOL/L (ref 133–144)
SOURCE: ABNORMAL
SP GR UR STRIP: 1.01 (ref 1–1.03)
TIBC SERPL-MCNC: 286 UG/DL (ref 240–430)
URATE SERPL-MCNC: 1.9 MG/DL (ref 2.6–6)
UROBILINOGEN UR STRIP-ACNC: 0.2 EU/DL (ref 0.2–1)
WBC # BLD AUTO: 5.2 10E9/L (ref 4–11)
WBC #/AREA URNS AUTO: NORMAL /HPF

## 2020-05-29 PROCEDURE — 83550 IRON BINDING TEST: CPT | Performed by: INTERNAL MEDICINE

## 2020-05-29 PROCEDURE — 36415 COLL VENOUS BLD VENIPUNCTURE: CPT | Performed by: INTERNAL MEDICINE

## 2020-05-29 PROCEDURE — 82728 ASSAY OF FERRITIN: CPT | Performed by: INTERNAL MEDICINE

## 2020-05-29 PROCEDURE — 83970 ASSAY OF PARATHORMONE: CPT | Performed by: INTERNAL MEDICINE

## 2020-05-29 PROCEDURE — 84550 ASSAY OF BLOOD/URIC ACID: CPT | Performed by: INTERNAL MEDICINE

## 2020-05-29 PROCEDURE — 83540 ASSAY OF IRON: CPT | Performed by: INTERNAL MEDICINE

## 2020-05-29 PROCEDURE — 85027 COMPLETE CBC AUTOMATED: CPT | Performed by: INTERNAL MEDICINE

## 2020-05-29 PROCEDURE — 81001 URINALYSIS AUTO W/SCOPE: CPT | Performed by: INTERNAL MEDICINE

## 2020-05-29 PROCEDURE — 80069 RENAL FUNCTION PANEL: CPT | Performed by: INTERNAL MEDICINE

## 2020-05-29 PROCEDURE — 83735 ASSAY OF MAGNESIUM: CPT | Performed by: INTERNAL MEDICINE

## 2020-06-15 NOTE — PLAN OF CARE
Patients caregiver, Malika, is calling to discuss establishing care with Dr. Centeno. Reception gave a list of MD's at  and they chose Dr. Centeno. The patient needs to transfer care to refill his prescriptions, he recently moved to a facility. Malika was asking if a new patient visit could be done over the phone because the patient is unable to travel due to a knee injury. He also does not want to come into the clinic because of COV19, reception relayed the procedures the clinic currently has. When calling, please call after 10:30 am. [phone]           Problem: Patient Care Overview  Goal: Plan of Care/Patient Progress Review  Outcome: No Change  VSS. Pain managed with IV dilaudid. Incisions intact. BS hypoactive, passing gas. Nausea improved after zofran but was unable to eat dinner. Tolerating water. Clear liquid diet. SBA. Menses, pad on. Voiding.

## 2020-08-13 ENCOUNTER — INFUSION THERAPY VISIT (OUTPATIENT)
Dept: INFUSION THERAPY | Facility: CLINIC | Age: 50
End: 2020-08-13
Attending: INTERNAL MEDICINE
Payer: COMMERCIAL

## 2020-08-13 ENCOUNTER — HOSPITAL ENCOUNTER (OUTPATIENT)
Facility: CLINIC | Age: 50
Setting detail: SPECIMEN
Discharge: HOME | End: 2020-08-13
Attending: INTERNAL MEDICINE | Admitting: INTERNAL MEDICINE
Payer: COMMERCIAL

## 2020-08-13 ENCOUNTER — ONCOLOGY VISIT (OUTPATIENT)
Dept: ONCOLOGY | Facility: CLINIC | Age: 50
End: 2020-08-13
Attending: PHYSICIAN ASSISTANT
Payer: COMMERCIAL

## 2020-08-13 VITALS
HEIGHT: 63 IN | OXYGEN SATURATION: 96 % | SYSTOLIC BLOOD PRESSURE: 98 MMHG | WEIGHT: 164.2 LBS | BODY MASS INDEX: 29.09 KG/M2 | TEMPERATURE: 98.3 F | RESPIRATION RATE: 16 BRPM | HEART RATE: 77 BPM | DIASTOLIC BLOOD PRESSURE: 67 MMHG

## 2020-08-13 DIAGNOSIS — D50.9 IRON DEFICIENCY ANEMIA, UNSPECIFIED IRON DEFICIENCY ANEMIA TYPE: ICD-10-CM

## 2020-08-13 DIAGNOSIS — D50.9 IRON DEFICIENCY ANEMIA, UNSPECIFIED IRON DEFICIENCY ANEMIA TYPE: Primary | ICD-10-CM

## 2020-08-13 PROBLEM — E16.2 HYPOGLYCEMIA: Status: RESOLVED | Noted: 2018-02-22 | Resolved: 2020-08-13

## 2020-08-13 PROBLEM — E87.6 HYPOKALEMIA: Status: RESOLVED | Noted: 2017-01-08 | Resolved: 2020-08-13

## 2020-08-13 PROBLEM — K27.5 PERFORATED ULCER (H): Status: RESOLVED | Noted: 2018-10-24 | Resolved: 2020-08-13

## 2020-08-13 LAB
ERYTHROCYTE [DISTWIDTH] IN BLOOD BY AUTOMATED COUNT: 13.2 % (ref 10–15)
FERRITIN SERPL-MCNC: 37 NG/ML (ref 8–252)
HCT VFR BLD AUTO: 39.6 % (ref 35–47)
HGB BLD-MCNC: 12.8 G/DL (ref 11.7–15.7)
IRON SATN MFR SERPL: 14 % (ref 15–46)
IRON SERPL-MCNC: 44 UG/DL (ref 35–180)
MCH RBC QN AUTO: 29.9 PG (ref 26.5–33)
MCHC RBC AUTO-ENTMCNC: 32.3 G/DL (ref 31.5–36.5)
MCV RBC AUTO: 93 FL (ref 78–100)
PLATELET # BLD AUTO: 292 10E9/L (ref 150–450)
RBC # BLD AUTO: 4.28 10E12/L (ref 3.8–5.2)
TIBC SERPL-MCNC: 313 UG/DL (ref 240–430)
WBC # BLD AUTO: 5.7 10E9/L (ref 4–11)

## 2020-08-13 PROCEDURE — 85027 COMPLETE CBC AUTOMATED: CPT | Performed by: INTERNAL MEDICINE

## 2020-08-13 PROCEDURE — 83550 IRON BINDING TEST: CPT | Performed by: INTERNAL MEDICINE

## 2020-08-13 PROCEDURE — 36415 COLL VENOUS BLD VENIPUNCTURE: CPT

## 2020-08-13 PROCEDURE — G0463 HOSPITAL OUTPT CLINIC VISIT: HCPCS

## 2020-08-13 PROCEDURE — 83540 ASSAY OF IRON: CPT | Performed by: INTERNAL MEDICINE

## 2020-08-13 PROCEDURE — 99213 OFFICE O/P EST LOW 20 MIN: CPT | Performed by: INTERNAL MEDICINE

## 2020-08-13 PROCEDURE — 82728 ASSAY OF FERRITIN: CPT | Performed by: INTERNAL MEDICINE

## 2020-08-13 ASSESSMENT — MIFFLIN-ST. JEOR: SCORE: 1321

## 2020-08-13 ASSESSMENT — PAIN SCALES - GENERAL: PAINLEVEL: NO PAIN (0)

## 2020-08-13 NOTE — LETTER
8/13/2020         RE: Criss Durant  [address] [address]        Dear Colleague,    Thank you for referring your patient, Criss Durant, to the [facility]. Please see a copy of my visit note below.    Visit Date:   08/13/2020     HEMATOLOGY HISTORY: Ms. Don is a female with iron deficiency anemia. She had gastric bypass surgery in October 2016.   1.  On 03/07/2018 (care everywhere), hemoglobin of 12.3 with MCV of 89.7.   2.  On 04/20/2018, iron of 33, ferritin of 11 and saturation of 11%.   3.  On 07/19/2018, iron of 36, ferritin of 6 and saturation of 12%.      SUBJECTIVE:  Ms. Durant is a 49-year-old female with gastric bypass surgery in 2016.  Because of that, she has malabsorption of iron resulting in iron deficiency anemia.      The patient overall has been doing good.  No excessive fatigue.  No headache.  No dizziness.  No chest pain.  No shortness of breath.  No abdominal pain, nausea or vomiting.  No abnormal bleeding.      PHYSICAL EXAMINATION:   GENERAL:  The patient is alert, oriented x 3.  She is not in any distress.   VITAL SIGNS:  Reviewed.     The rest of the systems not examined.      LABORATORY DATA:  Reviewed.      ASSESSMENT:   1.  Iron-deficiency anemia, resolved.   2.  Malabsorption of iron due to gastric bypass surgery.   3.  Status post gastric bypass surgery.      PLAN:   1.  Labs were reviewed with the patient.  I explained to her that her hemoglobin and MCV are normal.  Iron and ferritin are normal.  Iron saturation is low at 14%.  I explained to her that in the next few months, she may develop anemia.   2.  At this time, we will monitor her.  She will be seen in 6 months with labs, including CBC and iron studies.  Advised her to see a physician sooner if she has worsening weakness, dizziness, chest pain, shortness of breath, muscle cramp or any other concerns.         JORGE BANDA MD             D: 08/14/2020   T: 08/14/2020   MT: NAOMIE       Name:     KOJO DURANT   MRN:      [id number]        Account:      [id number]   :      1970           Visit Date:   2020      Document: [id number]      Visit Date:   2020      SUBJECTIVE:  Ms. Durant is a 49-year-old female with gastric bypass surgery in 2016.  Because of that, she has malabsorption of iron resulting in iron deficiency anemia.      The patient overall has been doing good.  No excessive fatigue.  No headache.  No dizziness.  No chest pain.  No shortness of breath.  No abdominal pain, nausea or vomiting.  No abnormal bleeding.      PHYSICAL EXAMINATION:   GENERAL:  The patient is alert, oriented x 3.  She is not in any distress.   VITAL SIGNS:  Reviewed.     The rest of the systems not examined.      LABORATORY DATA:  Reviewed.      ASSESSMENT:   1.  Iron-deficiency anemia, resolved.   2.  Malabsorption of iron due to gastric bypass surgery.   3.  Status post gastric bypass surgery.      PLAN:   1.  Labs were reviewed with the patient.  I explained to her that her hemoglobin and MCV are normal.  Iron and ferritin are normal.  Iron saturation is low at 14%.  I explained to her that in the next few months, ***.   2.  At this time, we will monitor her.  She will be seen in 6 months with labs, including CBC and iron studies.  Advised her to see a physician sooner if she has worsening weakness, dizziness, chest pain, shortness of breath, muscle cramp or any other concerns.         JORGE BANDA MD             D: 2020   T: 2020   MT: NAOMIE      Name:     KOJO DURANT   MRN:      [id number]        Account:      [id number]   :      1970           Visit Date:   2020      Document: [id number]       Again, thank you for allowing me to participate in the care of your patient.        Sincerely,        Jorge Banda MD

## 2020-08-13 NOTE — PROGRESS NOTES
Medical Assistant Note:  Criss Durant presents today for blood draw.    Patient seen by provider today: Yes: Josiah.   present during visit today: Not Applicable.    Concerns: No Concerns.    Procedure:  Lab draw site: lac, Needle type: bf, Gauge: 23.    Post Assessment:  Labs drawn without difficulty: Yes.    Discharge Plan:  Departure Mode: Ambulatory.    Face to Face Time: 5 min  .    Fadia Carr, CMA

## 2020-08-14 PROBLEM — E61.1 IRON DEFICIENCY: Status: RESOLVED | Noted: 2018-07-27 | Resolved: 2020-08-14

## 2020-08-14 PROBLEM — D50.9 IRON DEFICIENCY ANEMIA, UNSPECIFIED IRON DEFICIENCY ANEMIA TYPE: Status: RESOLVED | Noted: 2018-08-02 | Resolved: 2020-08-14

## 2020-08-14 NOTE — PROGRESS NOTES
Visit Date:   2020     HEMATOLOGY HISTORY: Ms. Don is a female with iron deficiency anemia. She had gastric bypass surgery in 2016.   1.  On 2018 (care everywhere), hemoglobin of 12.3 with MCV of 89.7.   2.  On 2018, iron of 33, ferritin of 11 and saturation of 11%.   3.  On 2018, iron of 36, ferritin of 6 and saturation of 12%.      SUBJECTIVE:  Ms. Durant is a 49-year-old female with gastric bypass surgery in .  Because of that, she has malabsorption of iron resulting in iron deficiency anemia.      The patient overall has been doing good.  No excessive fatigue.  No headache.  No dizziness.  No chest pain.  No shortness of breath.  No abdominal pain, nausea or vomiting.  No abnormal bleeding.      PHYSICAL EXAMINATION:   GENERAL:  The patient is alert, oriented x 3.  She is not in any distress.   VITAL SIGNS:  Reviewed.     The rest of the systems not examined.      LABORATORY DATA:  Reviewed.      ASSESSMENT:   1.  Iron-deficiency anemia, resolved.   2.  Malabsorption of iron due to gastric bypass surgery.   3.  Status post gastric bypass surgery.      PLAN:   1.  Labs were reviewed with the patient.  I explained to her that her hemoglobin and MCV are normal.  Iron and ferritin are normal.  Iron saturation is low at 14%.  I explained to her that in the next few months, she may develop anemia.   2.  At this time, we will monitor her.  She will be seen in 6 months with labs, including CBC and iron studies.  Advised her to see a physician sooner if she has worsening weakness, dizziness, chest pain, shortness of breath, muscle cramp or any other concerns.         JORGE BANDA MD             D: 2020   T: 2020   MT: NAOMIE      Name:     KOJO DURANT   MRN:      [id number]        Account:      [id number]   :      1970           Visit Date:   2020      Document: [id number]

## 2020-08-18 ENCOUNTER — TELEPHONE (OUTPATIENT)
Dept: ONCOLOGY | Facility: CLINIC | Age: 50
End: 2020-08-18

## 2020-08-18 NOTE — TELEPHONE ENCOUNTER
Left voicemail message for patient requesting a return call regarding scheduling future appointments - 6 month follow up with Dr Rahman, labs prior.  Due February 2021

## 2020-08-19 NOTE — PROGRESS NOTES
Visit Date:   2020     HEMATOLOGY HISTORY: Ms. Don is a female with iron deficiency anemia. She had gastric bypass surgery in 2016.   1.  On 2018 (care everywhere), hemoglobin of 12.3 with MCV of 89.7.   2.  On 2018, iron of 33, ferritin of 11 and saturation of 11%.   3.  On 2018, iron of 36, ferritin of 6 and saturation of 12%.      SUBJECTIVE:  Ms. Durant is a 49-year-old female with gastric bypass surgery in .  Because of that, she has malabsorption of iron resulting in iron deficiency anemia.      The patient overall has been doing good.  No excessive fatigue.  No headache.  No dizziness.  No chest pain.  No shortness of breath.  No abdominal pain, nausea or vomiting.  No abnormal bleeding.      PHYSICAL EXAMINATION:   GENERAL:  The patient is alert, oriented x 3.  She is not in any distress.   VITAL SIGNS:  Reviewed.     The rest of the systems not examined.      LABORATORY DATA:  Reviewed.      ASSESSMENT:   1.  Iron-deficiency anemia, resolved.   2.  Malabsorption of iron due to gastric bypass surgery.   3.  Status post gastric bypass surgery.      PLAN:   1.  Labs were reviewed with the patient.  I explained to her that her hemoglobin and MCV are normal.  Iron and ferritin are normal.  Iron saturation is low at 14%.  I explained to her that in the next few months, she may develop anemia.   2.  At this time, we will monitor her.  She will be seen in 6 months with labs, including CBC and iron studies.  Advised her to see a physician sooner if she has worsening weakness, dizziness, chest pain, shortness of breath, muscle cramp or any other concerns.         JORGE BANDA MD             D: 2020   T: 2020   MT: NAOMIE      Name:     KOJO DURANT   MRN:      [id number]        Account:      [id number]   :      1970           Visit Date:   2020      Document: [id number]

## 2020-09-28 ENCOUNTER — TELEPHONE (OUTPATIENT)
Dept: UROLOGY | Facility: CLINIC | Age: 50
End: 2020-09-28

## 2020-09-28 NOTE — TELEPHONE ENCOUNTER
I have extended her KUB orders . She could do this now if she is worried about a stone. Please call to arrange and get her in before March !  The call center said this was the 1st opening for Tal. It could be a telephone visit if there really are no openings. Thanks

## 2020-09-28 NOTE — TELEPHONE ENCOUNTER
[facility] Call Center    Phone Message    May a detailed message be left on voicemail: yes     Reason for Call: Order(s): Other:   Reason for requested: KUB Orders  Date needed: 9/28/20  Provider name: Dr. Tal Caldwell had her last appointment with Dr. Parada on 9/20/19, and she was supposed to follow up in a year with a KUB prior. Paulette has scheduled for the next available appointment with Dr. Parada on 3/26/20, but she needs the KUB orders extended to have this done prior. Paulette also reported that she started having pain yesterday (9/28/20), so she has some concerns that it may be another kidney stone. Paulette would like a call back once orders are placed, so she can receive an update. Please give Paulette a call back at your earliest convenience to discuss.      Action Taken: Message routed to:  Other: UA Uro    Travel Screening: Not Applicable

## 2020-09-29 ENCOUNTER — OFFICE VISIT (OUTPATIENT)
Dept: FAMILY MEDICINE | Facility: CLINIC | Age: 50
End: 2020-09-29
Payer: COMMERCIAL

## 2020-09-29 ENCOUNTER — NURSE TRIAGE (OUTPATIENT)
Dept: FAMILY MEDICINE | Facility: CLINIC | Age: 50
End: 2020-09-29

## 2020-09-29 ENCOUNTER — HOSPITAL ENCOUNTER (OUTPATIENT)
Dept: CT IMAGING | Facility: CLINIC | Age: 50
Discharge: HOME | End: 2020-09-29
Attending: FAMILY MEDICINE | Admitting: FAMILY MEDICINE
Payer: COMMERCIAL

## 2020-09-29 ENCOUNTER — ANCILLARY PROCEDURE (OUTPATIENT)
Dept: GENERAL RADIOLOGY | Facility: CLINIC | Age: 50
End: 2020-09-29
Attending: FAMILY MEDICINE
Payer: COMMERCIAL

## 2020-09-29 DIAGNOSIS — R10.9 FLANK PAIN: Primary | ICD-10-CM

## 2020-09-29 DIAGNOSIS — R10.9 FLANK PAIN: ICD-10-CM

## 2020-09-29 DIAGNOSIS — R82.90 NONSPECIFIC FINDING ON EXAMINATION OF URINE: ICD-10-CM

## 2020-09-29 LAB
ALBUMIN UR-MCNC: 30 MG/DL
APPEARANCE UR: ABNORMAL
BACTERIA #/AREA URNS HPF: ABNORMAL /HPF
BILIRUB UR QL STRIP: ABNORMAL
COLOR UR AUTO: ABNORMAL
ERYTHROCYTE [DISTWIDTH] IN BLOOD BY AUTOMATED COUNT: 13.3 % (ref 10–15)
GLUCOSE UR STRIP-MCNC: NEGATIVE MG/DL
HCT VFR BLD AUTO: 39.1 % (ref 35–47)
HGB BLD-MCNC: 12.9 G/DL (ref 11.7–15.7)
HGB UR QL STRIP: ABNORMAL
KETONES UR STRIP-MCNC: ABNORMAL MG/DL
LEUKOCYTE ESTERASE UR QL STRIP: ABNORMAL
MCH RBC QN AUTO: 30.3 PG (ref 26.5–33)
MCHC RBC AUTO-ENTMCNC: 33 G/DL (ref 31.5–36.5)
MCV RBC AUTO: 92 FL (ref 78–100)
NITRATE UR QL: NEGATIVE
NON-SQ EPI CELLS #/AREA URNS LPF: ABNORMAL /LPF
PH UR STRIP: 5.5 PH (ref 5–7)
PLATELET # BLD AUTO: 267 10E9/L (ref 150–450)
RBC # BLD AUTO: 4.26 10E12/L (ref 3.8–5.2)
RBC #/AREA URNS AUTO: ABNORMAL /HPF
SOURCE: ABNORMAL
SP GR UR STRIP: 1.02 (ref 1–1.03)
UROBILINOGEN UR STRIP-ACNC: 0.2 EU/DL (ref 0.2–1)
WBC # BLD AUTO: 8.6 10E9/L (ref 4–11)
WBC #/AREA URNS AUTO: >100 /HPF

## 2020-09-29 PROCEDURE — 99215 OFFICE O/P EST HI 40 MIN: CPT | Performed by: FAMILY MEDICINE

## 2020-09-29 PROCEDURE — 36415 COLL VENOUS BLD VENIPUNCTURE: CPT | Performed by: FAMILY MEDICINE

## 2020-09-29 PROCEDURE — 74176 CT ABD & PELVIS W/O CONTRAST: CPT

## 2020-09-29 PROCEDURE — 87088 URINE BACTERIA CULTURE: CPT | Performed by: FAMILY MEDICINE

## 2020-09-29 PROCEDURE — 81001 URINALYSIS AUTO W/SCOPE: CPT | Performed by: FAMILY MEDICINE

## 2020-09-29 PROCEDURE — 85027 COMPLETE CBC AUTOMATED: CPT | Performed by: FAMILY MEDICINE

## 2020-09-29 PROCEDURE — 80053 COMPREHEN METABOLIC PANEL: CPT | Performed by: FAMILY MEDICINE

## 2020-09-29 PROCEDURE — 74019 RADEX ABDOMEN 2 VIEWS: CPT | Mod: FY

## 2020-09-29 PROCEDURE — 87086 URINE CULTURE/COLONY COUNT: CPT | Performed by: FAMILY MEDICINE

## 2020-09-29 PROCEDURE — 87186 SC STD MICRODIL/AGAR DIL: CPT | Performed by: FAMILY MEDICINE

## 2020-09-29 RX ORDER — OXYCODONE AND ACETAMINOPHEN 5; 325 MG/1; MG/1
1 TABLET ORAL EVERY 6 HOURS PRN
Qty: 12 TABLET | Refills: 0 | Status: SHIPPED | OUTPATIENT
Start: 2020-09-29 | End: 2020-10-02

## 2020-09-29 RX ORDER — SULFAMETHOXAZOLE/TRIMETHOPRIM 800-160 MG
1 TABLET ORAL 2 TIMES DAILY
Qty: 14 TABLET | Refills: 0 | Status: SHIPPED | OUTPATIENT
Start: 2020-09-29 | End: 2020-10-06

## 2020-09-29 NOTE — LETTER
September 30, 2020      Criss Durant  [address] [facility] [address] [facility] [address]        Dear ,    We are writing to inform you of your test results.    Liver and gallbladder tests are normal (ALT,AST, Alk phos, bilirubin), kidney function is normal (Cr, GFR), sodium is normal, potassium is normal, calcium is normal, glucose is normal.     Resulted Orders   CBC with platelets   Result Value Ref Range    WBC 8.6 4.0 - 11.0 10e9/L    RBC Count 4.26 3.8 - 5.2 10e12/L    Hemoglobin 12.9 11.7 - 15.7 g/dL    Hematocrit 39.1 35.0 - 47.0 %    MCV 92 78 - 100 fl    MCH 30.3 26.5 - 33.0 pg    MCHC 33.0 31.5 - 36.5 g/dL    RDW 13.3 10.0 - 15.0 %    Platelet Count 267 150 - 450 10e9/L   UA with Microscopic reflex to Culture   Result Value Ref Range    Color Urine Christine     Appearance Urine Cloudy     Glucose Urine Negative NEG^Negative mg/dL    Bilirubin Urine Small (A) NEG^Negative      Comment:      This is an unconfirmed screening test result. A positive result may be false.    Ketones Urine Trace (A) NEG^Negative mg/dL    Specific Gravity Urine 1.025 1.003 - 1.035    pH Urine 5.5 5.0 - 7.0 pH    Protein Albumin Urine 30 (A) NEG^Negative mg/dL    Urobilinogen Urine 0.2 0.2 - 1.0 EU/dL    Nitrite Urine Negative NEG^Negative    Blood Urine Large (A) NEG^Negative    Leukocyte Esterase Urine Moderate (A) NEG^Negative    Source Midstream Urine     WBC Urine >100 (A) OTO5^0 - 5 /HPF    RBC Urine 5-10 (A) OTO2^O - 2 /HPF    Squamous Epithelial /LPF Urine Many (A) FEW^Few /LPF    Bacteria Urine Many (A) NEG^Negative /HPF   Comprehensive metabolic panel   Result Value Ref Range    Sodium 136 133 - 144 mmol/L    Potassium 4.1 3.4 - 5.3 mmol/L    Chloride 103 94 - 109 mmol/L    Carbon Dioxide 26 20 - 32 mmol/L    Anion Gap 7 3 - 14 mmol/L    Glucose 80 70 - 99 mg/dL      Comment:      Non Fasting    Urea Nitrogen 13 7 - 30 mg/dL    Creatinine 0.70 0.52 - 1.04 mg/dL    GFR Estimate >90 >60 mL/min/[1.73_m2]      Comment:       Non  GFR Calc  Starting 12/18/2018, serum creatinine based estimated GFR (eGFR) will be   calculated using the Chronic Kidney Disease Epidemiology Collaboration   (CKD-EPI) equation.      GFR Estimate If Black >90 >60 mL/min/[1.73_m2]      Comment:       GFR Calc  Starting 12/18/2018, serum creatinine based estimated GFR (eGFR) will be   calculated using the Chronic Kidney Disease Epidemiology Collaboration   (CKD-EPI) equation.      Calcium 8.9 8.5 - 10.1 mg/dL    Bilirubin Total 0.4 0.2 - 1.3 mg/dL    Albumin 3.4 3.4 - 5.0 g/dL    Protein Total 7.5 6.8 - 8.8 g/dL    Alkaline Phosphatase 76 40 - 150 U/L    ALT 33 0 - 50 U/L    AST 19 0 - 45 U/L       If you have any questions or concerns, please call the clinic at the number listed above.       Sincerely,        Umberto Story MD

## 2020-09-29 NOTE — TELEPHONE ENCOUNTER
Pt calling and had a fever yesterday of 102 and she had l sided  kidney pain. Last night the pain shifted to her abdomen. Stool was a  Bright greens color. No fever this morning but still has the stomach pain. Transferring to triage to advise.   Sol Glaser,

## 2020-09-29 NOTE — PROGRESS NOTES
"Subjective     Criss Durant is a 49 year old female who presents to clinic today for the following health issues:    HPI       Abdominal/Flank Pain  Onset/Duration: x Yesterday   Description:   Character: Sharp  Location: left lower quadrant, kidney area  \"feeling bubble sensation inside   Radiation: None  Intensity: mild , severe  Progression of Symptoms:  same  Accompanying Signs & Symptoms: fell x 2 weeks ago   Fever/Chills: YES  Gas/Bloating: YES  Nausea: YES  Vomitting: YES x 2 times   Diarrhea: YES  Constipation: no  Dysuria or Hematuria: no  History:   Trauma: no  Previous similar pain: YES- hx kidney stone perforated ulcer    Previous tests done: CT- 1 year ago   Precipitating factors:   Does the pain change with:     Food: YES    Bowel Movement: no    Urination: no   Other factors:  no  Therapies tried and outcome: tylenol   No LMP recorded.  Feels slight urgency and hesitancy in urine.        Review of Systems   Constitutional, HEENT, cardiovascular, pulmonary, GI, , musculoskeletal, neuro, skin, endocrine and psych systems are negative, except as otherwise noted.      Objective    There were no vitals taken for this visit.  There is no height or weight on file to calculate BMI.  Physical Exam   GENERAL: healthy, alert and no distress  NECK: no adenopathy, no asymmetry, masses, or scars and thyroid normal to palpation  RESP: lungs clear to auscultation - no rales, rhonchi or wheezes  CV: regular rate and rhythm, normal S1 S2, no S3 or S4, no murmur, click or rub, no peripheral edema and peripheral pulses strong  ABDOMEN: soft, nontender, no hepatosplenomegaly, no masses and bowel sounds normal  MS: no gross musculoskeletal defects noted, no edema  Left side flank pain, no CVA tenderness            Assessment & Plan     Flank pain  Patient has tenderness in the flank area.  She has history of kidney stones.  Her urine indicate very elevated white count.  This could be urinary tract infection.  X-ray " "done which indicate a ureteral stone which looks stable from the previous one.  Due to ongoing symptoms and some fever chills I will start her on antibiotics.  We will give her Bactrim to begin with culture the urine.  Oxycodone as needed for pain.  I will order a CT scan to rule out any hydronephrosis or any other abnormality.  If any worsening symptoms he is advised to go to the ER otherwise if she has a kidney stones and not improving I would suggest her to contact her urologist for further evaluation.  - CBC with platelets  - UA with Microscopic reflex to Culture  - XR Abdomen 2 Views  - Comprehensive metabolic panel  - CT Abdomen Pelvis w Contrast; Future  - sulfamethoxazole-trimethoprim (BACTRIM DS) 800-160 MG tablet; Take 1 tablet by mouth 2 times daily for 7 days  - oxyCODONE-acetaminophen (PERCOCET) 5-325 MG tablet; Take 1 tablet by mouth every 6 hours as needed for pain    Nonspecific finding on examination of urine    - Urine Culture Aerobic Bacterial     BMI:   Estimated body mass index is 29.55 kg/m  as calculated from the following:    Height as of 8/13/20: 1.588 m (5' 2.5\").    Weight as of 8/13/20: 74.5 kg (164 lb 3.2 oz).     More than 40 min spent with the patient >half time spent in counseling and coordinating care, discusses diagnose and treatment plan.        Return in about 1 week (around 10/6/2020) for if symptom does not get better.    Umberto Story MD  [facility]    "

## 2020-09-29 NOTE — TELEPHONE ENCOUNTER
"  Additional Information    Negative: Passed out (i.e., fainted, collapsed and was not responding)    Negative: Shock suspected (e.g., cold/pale/clammy skin, too weak to stand, low BP, rapid pulse)    Negative: Sounds like a life-threatening emergency to the triager    Negative: Chest pain    Negative: Pain is mainly in upper abdomen (if needed ask: 'is it mainly above the belly button?')    Negative: Abdominal pain and pregnant > 20 weeks    Negative: Abdominal pain and pregnant < 20 weeks    Negative: SEVERE abdominal pain (e.g., excruciating)    Negative: Vomiting red blood or black (coffee ground) material    Negative: Bloody, black, or tarry bowel movements    Negative: Constant abdominal pain lasting > 2 hours    Negative: Vomiting bile (green color)    Negative: Patient sounds very sick or weak to the triager    Negative: Vomiting and abdomen looks much more swollen than usual    Negative: White of the eyes have turned yellow (i.e., jaundice)    Negative: Blood in urine (red, pink, or tea-colored)    Negative: Fever > 103 F (39.4 C)    Negative: Fever > 101 F (38.3 C) and over 60 years of age    Negative: Fever > 100.0 F (37.8 C) and has diabetes mellitus or a weak immune system (e.g., HIV positive, cancer chemotherapy, organ transplant, splenectomy, chronic steroids)    Negative: Fever > 100.0 F (37.8 C) and bedridden (e.g., nursing home patient, stroke, chronic illness, recovering from surgery)    Negative: Pregnant or could be pregnant (i.e., missed last menstrual period)    MODERATE OR MILD pain that comes and goes (cramps) lasts > 24 hours    Answer Assessment - Initial Assessment Questions  1. LOCATION: \"Where does it hurt?\"       All over abdomen  2. RADIATION: \"Does the pain shoot anywhere else?\" (e.g., chest, back)      no  3. ONSET: \"When did the pain begin?\" (e.g., minutes, hours or days ago)       Yesterday started by kidneys and moved to the front  4. SUDDEN: \"Gradual or sudden onset?\"      " "gradual  5. PATTERN \"Does the pain come and go, or is it constant?\"     - If constant: \"Is it getting better, staying the same, or worsening?\"       (Note: Constant means the pain never goes away completely; most serious pain is constant and it progresses)      - If intermittent: \"How long does it last?\" \"Do you have pain now?\"      (Note: Intermittent means the pain goes away completely between bouts)      Comes and goes.    6. SEVERITY: \"How bad is the pain?\"  (e.g., Scale 1-10; mild, moderate, or severe)    - MILD (1-3): doesn't interfere with normal activities, abdomen soft and not tender to touch     - MODERATE (4-7): interferes with normal activities or awakens from sleep, tender to touch     - SEVERE (8-10): excruciating pain, doubled over, unable to do any normal activities       moderate  7. RECURRENT SYMPTOM: \"Have you ever had this type of abdominal pain before?\" If so, ask: \"When was the last time?\" and \"What happened that time?\"       Yes when had perforated ulcer  8. CAUSE: \"What do you think is causing the abdominal pain?\"      Not sure but does have known kidney stones in the left kidney and fell a couple of weeks ago so is not sure if the fall caused something  9. RELIEVING/AGGRAVATING FACTORS: \"What makes it better or worse?\" (e.g., movement, antacids, bowel movement)      nothing  10. OTHER SYMPTOMS: \"Has there been any vomiting, diarrhea, constipation, or urine problems?\"        Diarrhea this morning and stools were a bright green.  Fever yesterday of 102 but no fever today.  Yesterday felt nauseated in the morning and only ate a yogurt all day but was drinking water.  Today no fever and had 33 oz water and 2 waffles this morning  11. PREGNANCY: \"Is there any chance you are pregnant?\" \"When was your last menstrual period?\"        no    Protocols used: ABDOMINAL PAIN - FEMALE-A-OH    SEE TODAY IN OFFICE:   * You need to be examined today. Let me give you an appointment.  * IF NO AVAILABLE " APPOINTMENTS: You need to be seen in the Urgent Care Center. Go to the one at. Go there today. A nearby Urgent Care Center is often a good source of care. Another choice is to go to the ER.      CALL BACK IF:  * Abdominal pain is constant and present for more than 2 hours  * Abdominal pains come and go and are present for more than 24 hours  * You are pregnant  * You become worse      Patient/Caregiver understands and will follow care advice? Yes, plans to follow advice     Yvette FOLEY RN  EP Triage

## 2020-09-30 LAB
ALBUMIN SERPL-MCNC: 3.4 G/DL (ref 3.4–5)
ALP SERPL-CCNC: 76 U/L (ref 40–150)
ALT SERPL W P-5'-P-CCNC: 33 U/L (ref 0–50)
ANION GAP SERPL CALCULATED.3IONS-SCNC: 7 MMOL/L (ref 3–14)
AST SERPL W P-5'-P-CCNC: 19 U/L (ref 0–45)
BILIRUB SERPL-MCNC: 0.4 MG/DL (ref 0.2–1.3)
BUN SERPL-MCNC: 13 MG/DL (ref 7–30)
CALCIUM SERPL-MCNC: 8.9 MG/DL (ref 8.5–10.1)
CHLORIDE SERPL-SCNC: 103 MMOL/L (ref 94–109)
CO2 SERPL-SCNC: 26 MMOL/L (ref 20–32)
CREAT SERPL-MCNC: 0.7 MG/DL (ref 0.52–1.04)
GFR SERPL CREATININE-BSD FRML MDRD: >90 ML/MIN/{1.73_M2}
GLUCOSE SERPL-MCNC: 80 MG/DL (ref 70–99)
POTASSIUM SERPL-SCNC: 4.1 MMOL/L (ref 3.4–5.3)
PROT SERPL-MCNC: 7.5 G/DL (ref 6.8–8.8)
SODIUM SERPL-SCNC: 136 MMOL/L (ref 133–144)

## 2020-10-01 LAB
BACTERIA SPEC CULT: ABNORMAL
SPECIMEN SOURCE: ABNORMAL

## 2020-10-09 ENCOUNTER — VIRTUAL VISIT (OUTPATIENT)
Dept: UROLOGY | Facility: CLINIC | Age: 50
End: 2020-10-09
Payer: COMMERCIAL

## 2020-10-09 VITALS — BODY MASS INDEX: 28.52 KG/M2 | WEIGHT: 155 LBS | HEIGHT: 62 IN

## 2020-10-09 DIAGNOSIS — N20.9 CALCIUM UROLITHIASIS: Primary | ICD-10-CM

## 2020-10-09 PROCEDURE — 99212 OFFICE O/P EST SF 10 MIN: CPT | Mod: 95 | Performed by: UROLOGY

## 2020-10-09 ASSESSMENT — PAIN SCALES - GENERAL: PAINLEVEL: NO PAIN (0)

## 2020-10-09 ASSESSMENT — MIFFLIN-ST. JEOR: SCORE: 1271.33

## 2020-10-09 NOTE — PROGRESS NOTES
"Criss Durant is a 49 year old female who is being evaluated via a billable telephone visit.      The patient has been notified of following:     \"This telephone visit will be conducted via a call between you and your physician/provider. We have found that certain health care needs can be provided without the need for a physical exam.  This service lets us provide the care you need with a short phone conversation.  If a prescription is necessary we can send it directly to your pharmacy.  If lab work is needed we can place an order for that and you can then stop by our lab to have the test done at a later time.    Telephone visits are billed at different rates depending on your insurance coverage. During this emergency period, for some insurers they may be billed the same as an in-person visit.  Please reach out to your insurance provider with any questions.    If during the course of the call the physician/provider feels a telephone visit is not appropriate, you will not be charged for this service.\"    Patient has given verbal consent for Telephone visit?  Yes    What phone number would you like to be contacted at? [phone]    Notes: Paulette Durant is a pleasant 49-year-old female with a history of calcium oxalate urolithiasis who has a stable 8.3 mm stone in the left kidney.  She is undergone lithotripsy in the past.  She has mild hyperparathyroidism and has undergone gastric bypass.  She sees Alek Calderon MD from [facility].  Recently she had some left lower quadrant pain and CT scan shows her renal stone, no hydronephrosis and no ureteral stones.  Her urine culture grew Klebsiella and once on antibiotics for left lower quadrant discomfort resolved.  Her infection was associated with fever and chills.  She has not had recurrent bacterial cystitis in the past and is not sexually active.  Other past medical history: Depression, obesity, gastric ulcer, non-smoker, abnormal Pap smear, intertrigo, " bilateral lower extremity edema, hypokalemia, hypoglycemia, anxiety, slow transit constipation, iron deficiency anemia  Medications: Potassium citrate, multivitamin, Prozac, Colace, vitamin B12, vitamin D, calcium/vitamin D/vitamin K, Protonix  Allergies: Hydrocodone  Review of systems: No pneumaturia, hematuria  Exam: Alert and oriented, no respiratory signs or admitted symptoms  Assessment: Stable left renal stone.  Recent complicated bacterial cystitis  Plan: See me in the office for cystoscopy and repeat urinalysis.  Repeat KUB in 12 months  Other past medical history:    How would you like to obtain your AVS? Mail  Natali Yeh CMA    Phone call duration: 12 minutes    WM Tal MD

## 2020-10-09 NOTE — LETTER
"10/9/2020       RE: Criss Durant  [address] [facility] [address]     Dear Colleague,    Thank you for referring your patient, Criss Durnat, to the [facility] [place] at [facility]. Please see a copy of my visit note below.    Criss Durant is a 49 year old female who is being evaluated via a billable telephone visit.      The patient has been notified of following:     \"This telephone visit will be conducted via a call between you and your physician/provider. We have found that certain health care needs can be provided without the need for a physical exam.  This service lets us provide the care you need with a short phone conversation.  If a prescription is necessary we can send it directly to your pharmacy.  If lab work is needed we can place an order for that and you can then stop by our lab to have the test done at a later time.    Telephone visits are billed at different rates depending on your insurance coverage. During this emergency period, for some insurers they may be billed the same as an in-person visit.  Please reach out to your insurance provider with any questions.    If during the course of the call the physician/provider feels a telephone visit is not appropriate, you will not be charged for this service.\"    Patient has given verbal consent for Telephone visit?  Yes    What phone number would you like to be contacted at? [phone]    Notes: Paulette Durant is a pleasant 49-year-old female with a history of calcium oxalate urolithiasis who has a stable 8.3 mm stone in the left kidney.  She is undergone lithotripsy in the past.  She has mild hyperparathyroidism and has undergone gastric bypass.  She sees Alek Calderon MD from [facility].  Recently she had some left lower quadrant pain and CT scan shows her renal stone, no hydronephrosis and no ureteral stones.  Her urine culture grew Klebsiella and once on " antibiotics for left lower quadrant discomfort resolved.  Her infection was associated with fever and chills.  She has not had recurrent bacterial cystitis in the past and is not sexually active.  Other past medical history: Depression, obesity, gastric ulcer, non-smoker, abnormal Pap smear, intertrigo, bilateral lower extremity edema, hypokalemia, hypoglycemia, anxiety, slow transit constipation, iron deficiency anemia  Medications: Potassium citrate, multivitamin, Prozac, Colace, vitamin B12, vitamin D, calcium/vitamin D/vitamin K, Protonix  Allergies: Hydrocodone  Review of systems: No pneumaturia, hematuria  Exam: Alert and oriented, no respiratory signs or admitted symptoms  Assessment: Stable left renal stone.  Recent complicated bacterial cystitis  Plan: See me in the office for cystoscopy and repeat urinalysis.  Repeat KUB in 12 months  Other past medical history:    How would you like to obtain your AVS? Mail  Natali Yeh CMA    Phone call duration: 12 minutes    WM Tal MD

## 2020-11-05 ENCOUNTER — OFFICE VISIT (OUTPATIENT)
Dept: UROLOGY | Facility: CLINIC | Age: 50
End: 2020-11-05
Payer: COMMERCIAL

## 2020-11-05 VITALS
BODY MASS INDEX: 28.35 KG/M2 | DIASTOLIC BLOOD PRESSURE: 70 MMHG | WEIGHT: 160 LBS | SYSTOLIC BLOOD PRESSURE: 100 MMHG | HEIGHT: 63 IN

## 2020-11-05 DIAGNOSIS — N20.0 KIDNEY STONE: Primary | ICD-10-CM

## 2020-11-05 DIAGNOSIS — N30.01 ACUTE CYSTITIS WITH HEMATURIA: ICD-10-CM

## 2020-11-05 DIAGNOSIS — Z79.2 PROPHYLACTIC ANTIBIOTIC: ICD-10-CM

## 2020-11-05 LAB
ALBUMIN UR-MCNC: NEGATIVE MG/DL
APPEARANCE UR: ABNORMAL
BILIRUB UR QL STRIP: NEGATIVE
COLOR UR AUTO: YELLOW
GLUCOSE UR STRIP-MCNC: NEGATIVE MG/DL
HGB UR QL STRIP: ABNORMAL
KETONES UR STRIP-MCNC: NEGATIVE MG/DL
LEUKOCYTE ESTERASE UR QL STRIP: ABNORMAL
NITRATE UR QL: NEGATIVE
PH UR STRIP: 5.5 PH (ref 5–7)
SOURCE: ABNORMAL
SP GR UR STRIP: 1.02 (ref 1–1.03)
UROBILINOGEN UR STRIP-ACNC: 0.2 EU/DL (ref 0.2–1)

## 2020-11-05 PROCEDURE — 99212 OFFICE O/P EST SF 10 MIN: CPT | Mod: 25 | Performed by: UROLOGY

## 2020-11-05 PROCEDURE — 52000 CYSTOURETHROSCOPY: CPT | Performed by: UROLOGY

## 2020-11-05 PROCEDURE — 81003 URINALYSIS AUTO W/O SCOPE: CPT | Performed by: UROLOGY

## 2020-11-05 RX ORDER — CIPROFLOXACIN 500 MG/1
500 TABLET, FILM COATED ORAL ONCE
Qty: 1 TABLET | Refills: 0 | Status: SHIPPED | OUTPATIENT
Start: 2020-11-05 | End: 2020-11-05

## 2020-11-05 RX ORDER — LIDOCAINE HYDROCHLORIDE 20 MG/ML
JELLY TOPICAL ONCE
Status: COMPLETED | OUTPATIENT
Start: 2020-11-05 | End: 2020-11-05

## 2020-11-05 RX ORDER — LIDOCAINE HYDROCHLORIDE 20 MG/ML
JELLY TOPICAL ONCE
Status: DISCONTINUED | OUTPATIENT
Start: 2020-11-05 | End: 2020-11-05

## 2020-11-05 RX ADMIN — LIDOCAINE HYDROCHLORIDE: 20 JELLY TOPICAL at 08:37

## 2020-11-05 ASSESSMENT — MIFFLIN-ST. JEOR: SCORE: 1301.95

## 2020-11-05 ASSESSMENT — PAIN SCALES - GENERAL: PAINLEVEL: NO PAIN (0)

## 2020-11-05 NOTE — PATIENT INSTRUCTIONS

## 2020-11-05 NOTE — NURSING NOTE
Chief Complaint   Patient presents with     Recent Cytsitis     Cystoscopy     Prior to the start of the procedure and with procedural staff participation, I verbally confirmed the patient s identity using two indicators, relevant allergies, that the procedure was appropriate and matched the consent or emergent situation, and that the correct equipment/implants were available. Immediately prior to starting the procedure I conducted the Time Out with the procedural staff and re-confirmed the patient s name, procedure, and site/side. I have wiped the patient off with the povidone-Iodine solution, draped them, and used Lidocaine hydrochloride jelly. (The Joint Commission universal protocol was followed.)  Yes    Sedation (Moderate or Deep): None    5mL 2% lidocaine hydrochloride Urojet instilled into urethra.    NDC# [address]-6  Lot #: [id number]  Expiration Date:  06/22    Melisa Lopez, EMT

## 2020-11-05 NOTE — PROGRESS NOTES
Paulette Durant is a 49-year-old female with a history of calcium oxalate urolithiasis who has a 9 mm stone that is stable in the upper left renal calyx.  This is totally in a lateral calyx and should not cause problems or pass in the future.  However, she recently had a complicated Klebsiella urinary tract infection with fever and chills and microhematuria.  She now returns for office cystoscopy  Other past medical history: Depression, morbid obesity, gastric bypass, gastric ulcer, mild hyperparathyroidism, history of iron deficiency anemia, non-smoker.  Surgeries include ESWL, gastric bypass, laparoscopy for perforated gastric ulcer  Medications: Calcium/vitamin D/vitamin K, vitamin D, vitamin B12, Colace, Prozac, multivitamin, Protonix, potassium citrate  Allergies: Hydrocodone  Review of systems: No gross hematuria, flank pain.  She is not sexually active.  She is menopausal  She sees Alek Calderon MD from [facility]  Urinalysis: Trace of leukocytes only, specific gravity 1.020, pH 5.5  Exam: Alert and oriented, normal appearance, normal external genitalia and urethral meatus  Flexible cystoscopy: Normal bladder mucosa, small postvoid residual, normal ureteral orifices, no stones, tumors, raised erythema, diverticulum or fistula  Assessment: Stable calcium urolithiasis with no flank pain.  Recent Klebsiella UTI.  Discussed bacterial colonization, eventual need for estrogen replacement therapy-prefer vaginal estrogen cream and will have her discuss with her primary care physician at her upcoming visit  Plan: Antibiotic x1 today.  See 12 months with KUB and for urinalysis

## 2020-11-05 NOTE — LETTER
11/5/2020       RE: Cirss Durant  [address] [address]     Dear Colleague,    Thank you for referring your patient, Criss Durant, to the [facility] [place] at [facility]. Please see a copy of my visit note below.    Paulette Durant is a 49-year-old female with a history of calcium oxalate urolithiasis who has a 9 mm stone that is stable in the upper left renal calyx.  This is totally in a lateral calyx and should not cause problems or pass in the future.  However, she recently had a complicated Klebsiella urinary tract infection with fever and chills and microhematuria.  She now returns for office cystoscopy  Other past medical history: Depression, morbid obesity, gastric bypass, gastric ulcer, mild hyperparathyroidism, history of iron deficiency anemia, non-smoker.  Surgeries include ESWL, gastric bypass, laparoscopy for perforated gastric ulcer  Medications: Calcium/vitamin D/vitamin K, vitamin D, vitamin B12, Colace, Prozac, multivitamin, Protonix, potassium citrate  Allergies: Hydrocodone  Review of systems: No gross hematuria, flank pain.  She is not sexually active.  She is menopausal  She sees Alek Calderon MD from [facility]  Urinalysis: Trace of leukocytes only, specific gravity 1.020, pH 5.5  Exam: Alert and oriented, normal appearance, normal external genitalia and urethral meatus  Flexible cystoscopy: Normal bladder mucosa, small postvoid residual, normal ureteral orifices, no stones, tumors, raised erythema, diverticulum or fistula  Assessment: Stable calcium urolithiasis with no flank pain.  Recent Klebsiella UTI.  Discussed bacterial colonization, eventual need for estrogen replacement therapy-prefer vaginal estrogen cream and will have her discuss with her primary care physician at her upcoming visit  Plan: Antibiotic x1 today.  See 12 months with KUB and for urinalysis    Sincerely,    Regis LASSITER  MD Tal

## 2020-12-23 ENCOUNTER — VIRTUAL VISIT (OUTPATIENT)
Dept: SURGERY | Facility: CLINIC | Age: 50
End: 2020-12-23
Payer: COMMERCIAL

## 2020-12-23 VITALS — BODY MASS INDEX: 29.52 KG/M2 | WEIGHT: 164 LBS

## 2020-12-23 DIAGNOSIS — D50.8 IRON DEFICIENCY ANEMIA FOLLOWING BARIATRIC SURGERY: ICD-10-CM

## 2020-12-23 DIAGNOSIS — K91.2 POSTSURGICAL MALABSORPTION: ICD-10-CM

## 2020-12-23 DIAGNOSIS — Z98.84 BARIATRIC SURGERY STATUS: ICD-10-CM

## 2020-12-23 DIAGNOSIS — K95.89 IRON DEFICIENCY ANEMIA FOLLOWING BARIATRIC SURGERY: ICD-10-CM

## 2020-12-23 DIAGNOSIS — E66.3 OVERWEIGHT (BMI 25.0-29.9): Primary | ICD-10-CM

## 2020-12-23 DIAGNOSIS — K59.01 SLOW TRANSIT CONSTIPATION: ICD-10-CM

## 2020-12-23 DIAGNOSIS — N20.0 NEPHROLITHIASIS: ICD-10-CM

## 2020-12-23 DIAGNOSIS — K21.9 GERD WITHOUT ESOPHAGITIS: ICD-10-CM

## 2020-12-23 DIAGNOSIS — Z87.11 HISTORY OF GASTRIC ULCER: ICD-10-CM

## 2020-12-23 PROCEDURE — 99213 OFFICE O/P EST LOW 20 MIN: CPT | Mod: 95 | Performed by: PHYSICIAN ASSISTANT

## 2020-12-23 NOTE — PROGRESS NOTES
"Criss Durant is a 50 year old female who is being evaluated via a billable telephone visit.      The patient has been notified of following:     \"This telephone visit will be conducted via a call between you and your physician/provider. We have found that certain health care needs can be provided without the need for a physical exam.  This service lets us provide the care you need with a short phone conversation.  If a prescription is necessary we can send it directly to your pharmacy.  If lab work is needed we can place an order for that and you can then stop by our lab to have the test done at a later time.    Telephone visits are billed at different rates depending on your insurance coverage. During this emergency period, for some insurers they may be billed the same as an in-person visit.  Please reach out to your insurance provider with any questions.    If during the course of the call the physician/provider feels a telephone visit is not appropriate, you will not be charged for this service.\"    Patient has given verbal consent for Telephone visit?  Yes    What phone number would you like to be contacted at? [phone]    How would you like to obtain your AVS? MAIL    Phone call duration: 18 minutes    Call start: 4:06  Call end: 4:24          TELEPHONIC BARIATRIC FOLLOW UP VISIT           December 23, 2020       HISTORY OF PRESENT ILLNESS: Pt presents telephonically today for her follow-up appointment status post laparoscopic gastric bypass. Overall she is doing well. Continues to follow up with urology regarding her kidney stone and is sticking to a low sodium and low oxalate diet. Drinking 85 oz water daily  She continue to take pantoprazole daily due to her  Perforated gastric marginal ulcer repair 10/24/2018.  Needs a refill.    As far as her history of binge eating disorder she feels she is doing better. Had been following up with [facility] but her last visit was in September because her new job " does not allow her to make the appointments. Was seeing nutritionist once monthly and therapist twice monthly. 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 They help make sure she is taking her food breaks as needed.  Does plan on seeing the therapist once COVID improves.       Patient is taking the following bariatric postoperative vitamins:  2 Complete multivitamins with minerals (at different times than calcium)   2000 International Units of Vitamin D daily  1500 mg of Calcium citrate daily in divided doses  2500 mcg of Vitamin B12 sl 3 times weekly  Sees hematologist and gets iron infusions when necessary. No oral iron    Pt is exercising by walking 10-12 miles daily at work. In charge of [employer] shopping. No additional working out    Restraining order with ex.  Does not go out at night to a gym due to fear       OBESITY RELATED CONDITIONS:  Prediabetes - resolved       SOCIAL HISTORY:  Pt denies smoking.  Pt denies alcohol use. Avoids NSAIDS.  No caffeine      REVIEW OF SYSTEMS:  GI:  Nausea- No  Vomiting- No  Diarrhea- No  Constipation- yes - takes colace and keeps regulary  Dysphagia- No  Abdominal Pain- No  Heartburn- Takes protonix 40 mg bid.  Had a marginal ulcer repair.  So will stay on.  Needs both doses as she will get very symptomatic heartburn if she doesn't       PSYCH:  Depression- stable  Anxiety- stable      LABS/IMAGING/MEDICAL RECORDS REVIEW: [facility]      PHYSICAL EXAMINATION:   Wt 164 lb (74.4 kg)   BMI 29.52 kg/m    GENERAL: Alert and oriented x3. NAD  LUNGS: Breathing unlabored.  MUSC: Gait normal  NEURO: Alert and oriented x3.         ASSESSMENT AND PLAN:      Overweight (BMI 25.0-29.9)   Stable    Bariatric surgery status   4 years SP Gastric Bypass    Slow transit constipation   Continue current regime    GERD without esophagitis/ History of Gastric ulcer   Continue protonix 40 mg bid.  RX sent over to pharmacy for a year    Postsurgical  malabsorption  -     Vitamin B12; Future  -     Vitamin D Screen; Future  -     Parathyroid Hormone Intact; Future  Reviewed recent labs    Nephrolithiasis   Continue follow up with urology and maintain low sodium and low oxalate diet    Iron deficiency anemia following bariatric surgery   Continue follow up with hematology. Iron labs already ordered    .    Faisal Barr MS, PA-C

## 2020-12-28 NOTE — PROGRESS NOTES
AVS printed and mailed to pt home address on file at PA request.  Siena Scott RN on 12/28/2020 at 9:22 AM

## 2021-01-08 ENCOUNTER — OFFICE VISIT (OUTPATIENT)
Dept: FAMILY MEDICINE | Facility: CLINIC | Age: 51
End: 2021-01-08
Payer: COMMERCIAL

## 2021-01-08 VITALS
BODY MASS INDEX: 29.59 KG/M2 | HEART RATE: 61 BPM | DIASTOLIC BLOOD PRESSURE: 74 MMHG | WEIGHT: 167 LBS | SYSTOLIC BLOOD PRESSURE: 104 MMHG | HEIGHT: 63 IN | OXYGEN SATURATION: 100 % | TEMPERATURE: 97.6 F

## 2021-01-08 DIAGNOSIS — F41.1 GAD (GENERALIZED ANXIETY DISORDER): ICD-10-CM

## 2021-01-08 DIAGNOSIS — N89.8 VAGINAL DISCHARGE: Primary | ICD-10-CM

## 2021-01-08 DIAGNOSIS — Z12.4 SCREENING FOR CERVICAL CANCER: ICD-10-CM

## 2021-01-08 LAB
SPECIMEN SOURCE: NORMAL
WET PREP SPEC: NORMAL

## 2021-01-08 PROCEDURE — 99214 OFFICE O/P EST MOD 30 MIN: CPT | Performed by: PHYSICIAN ASSISTANT

## 2021-01-08 PROCEDURE — 87210 SMEAR WET MOUNT SALINE/INK: CPT | Performed by: PHYSICIAN ASSISTANT

## 2021-01-08 PROCEDURE — 87624 HPV HI-RISK TYP POOLED RSLT: CPT | Performed by: PHYSICIAN ASSISTANT

## 2021-01-08 PROCEDURE — G0145 SCR C/V CYTO,THINLAYER,RESCR: HCPCS | Performed by: PHYSICIAN ASSISTANT

## 2021-01-08 RX ORDER — METRONIDAZOLE 500 MG/1
500 TABLET ORAL 2 TIMES DAILY
Qty: 14 TABLET | Refills: 0 | Status: SHIPPED | OUTPATIENT
Start: 2021-01-08 | End: 2021-01-15

## 2021-01-08 ASSESSMENT — ANXIETY QUESTIONNAIRES
GAD7 TOTAL SCORE: 6
IF YOU CHECKED OFF ANY PROBLEMS ON THIS QUESTIONNAIRE, HOW DIFFICULT HAVE THESE PROBLEMS MADE IT FOR YOU TO DO YOUR WORK, TAKE CARE OF THINGS AT HOME, OR GET ALONG WITH OTHER PEOPLE: NOT DIFFICULT AT ALL
1. FEELING NERVOUS, ANXIOUS, OR ON EDGE: SEVERAL DAYS
7. FEELING AFRAID AS IF SOMETHING AWFUL MIGHT HAPPEN: SEVERAL DAYS
5. BEING SO RESTLESS THAT IT IS HARD TO SIT STILL: SEVERAL DAYS
6. BECOMING EASILY ANNOYED OR IRRITABLE: NOT AT ALL
3. WORRYING TOO MUCH ABOUT DIFFERENT THINGS: SEVERAL DAYS
2. NOT BEING ABLE TO STOP OR CONTROL WORRYING: SEVERAL DAYS

## 2021-01-08 ASSESSMENT — MIFFLIN-ST. JEOR: SCORE: 1328.7

## 2021-01-08 ASSESSMENT — PATIENT HEALTH QUESTIONNAIRE - PHQ9
5. POOR APPETITE OR OVEREATING: SEVERAL DAYS
SUM OF ALL RESPONSES TO PHQ QUESTIONS 1-9: 10

## 2021-01-08 NOTE — PROGRESS NOTES
"    Assessment & Plan     Vaginal discharge  Wet prep shows WBCs but does not show specific evidence of infection.  Based on exam, I am going to treat with metronidazole for suspected BV. I suspect that her mild spotting may be secondary to atrophic vaginitis which may also be causing some of her discomfort.  Advised that she finish the course of metronidazole.  If bleeding does not resolve, or if worsening we will consider US and further workup with EMB given age and menopausal status.  If antibiotic improves symptoms she will monitor for any further issues.  She may benefit from vaginal estrogen in the future. She is agreeable  - Wet prep  - metroNIDAZOLE (FLAGYL) 500 MG tablet; Take 1 tablet (500 mg) by mouth 2 times daily for 7 days    Screening for cervical cancer  - HPV High Risk Types DNA Cervical  - Pap imaged thin layer screen with HPV - recommended age 30 - 65 years (select HPV order below)    NED (generalized anxiety disorder)  Stable, noted some increased anxiety last night       BMI:   Estimated body mass index is 30.06 kg/m  as calculated from the following:    Height as of this encounter: 1.588 m (5' 2.5\").    Weight as of this encounter: 75.8 kg (167 lb).       Depression Screening Follow Up    PHQ 1/8/2021   PHQ-9 Total Score 10   Q9: Thoughts of better off dead/self-harm past 2 weeks Not at all     Last PHQ-9 1/8/2021   1.  Little interest or pleasure in doing things 0   2.  Feeling down, depressed, or hopeless 1   3.  Trouble falling or staying asleep, or sleeping too much 3   4.  Feeling tired or having little energy 3   5.  Poor appetite or overeating 1   6.  Feeling bad about yourself 1   7.  Trouble concentrating 1   8.  Moving slowly or restless 0   Q9: Thoughts of better off dead/self-harm past 2 weeks 0   PHQ-9 Total Score 10   Difficulty at work, home, or with people Somewhat difficult             Return in about 6 months (around 7/8/2021) for Physical Exam.    Napoleon Gray, " "MANISHA DICKSON [facility] CARMENCITA Caldwell is a 50 year old who presents to clinic today for the following health issues    HPI       Concern - vaginal problem  Onset: x couple of weeks  Description:  Intensity: mild  Progression of Symptoms:  same  Accompanying Signs & Symptoms:   Previous history of similar problem: floresita  Precipitating factors:        Worsened by:   Alleviating factors:        Improved by:   Therapies tried and outcome:       Paulette has been experiencing vaginal discomfort and abnormal discharge over the past 2 weeks.  Discharge is a brownish/yellow.  She describes the discomfort as feeling like a \"swelling sensation\".  She has noticed intermittent spotting when she wipes after urinating. She is not experiencing pelvic pain, urinary symptoms or fevers.  She has no concerns for STDs.   LMP 1/2020, she is almost 1 year from LMP.  She has a hx of kidney stones, this does not feel like the same sensation.         Review of Systems   Constitutional, HEENT, cardiovascular, pulmonary, GI, , musculoskeletal, neuro, skin, endocrine and psych systems are negative, except as otherwise noted.      Objective    /74   Pulse 61   Temp 97.6  F (36.4  C) (Tympanic)   Ht 1.588 m (5' 2.5\")   Wt 75.8 kg (167 lb)   SpO2 100%   BMI 30.06 kg/m    Body mass index is 30.06 kg/m .  Physical Exam   GENERAL: healthy, alert and no distress  ABDOMEN: soft, nontender, no hepatosplenomegaly, no masses and bowel sounds normal   (female): normal female external genitalia, normal urethral meatus, vaginal  mucosa is strophic appearing, normal cervix/adnexa/uterus without masses, there is a moderate amount of greening/brown discharge noted in the os, pap and wet prep swab obtained.    PSYCH: mentation appears normal, affect normal/bright    Results for orders placed or performed in visit on 01/08/21   Wet prep     Status: None    Specimen: Vagina   Result Value Ref Range    Specimen Description " Vagina     Wet Prep No Trichomonas seen     Wet Prep No yeast seen     Wet Prep No clue cells seen     Wet Prep WBC'S seen  Many

## 2021-01-09 ASSESSMENT — ANXIETY QUESTIONNAIRES: GAD7 TOTAL SCORE: 6

## 2021-01-12 LAB
COPATH REPORT: NORMAL
PAP: NORMAL

## 2021-01-14 LAB
FINAL DIAGNOSIS: NORMAL
HPV HR 12 DNA CVX QL NAA+PROBE: NEGATIVE
HPV16 DNA SPEC QL NAA+PROBE: NEGATIVE
HPV18 DNA SPEC QL NAA+PROBE: NEGATIVE
SPECIMEN DESCRIPTION: NORMAL
SPECIMEN SOURCE CVX/VAG CYTO: NORMAL

## 2021-01-15 ENCOUNTER — MYC MEDICAL ADVICE (OUTPATIENT)
Dept: FAMILY MEDICINE | Facility: CLINIC | Age: 51
End: 2021-01-15

## 2021-01-15 DIAGNOSIS — N93.9 VAGINAL SPOTTING: Primary | ICD-10-CM

## 2021-01-19 ENCOUNTER — HOSPITAL ENCOUNTER (OUTPATIENT)
Dept: ULTRASOUND IMAGING | Facility: CLINIC | Age: 51
Discharge: HOME | End: 2021-01-19
Attending: PHYSICIAN ASSISTANT | Admitting: PHYSICIAN ASSISTANT
Payer: COMMERCIAL

## 2021-01-19 DIAGNOSIS — N93.9 VAGINAL SPOTTING: ICD-10-CM

## 2021-01-19 PROCEDURE — 76856 US EXAM PELVIC COMPLETE: CPT

## 2021-01-19 PROCEDURE — 76830 TRANSVAGINAL US NON-OB: CPT

## 2021-01-20 NOTE — RESULT ENCOUNTER NOTE
Paulette,  Your ultrasound is normal and does not show a cause for your symptoms.  How are you feeling?  If your symptoms have been persisting, the next step would be to try a vaginal estrogen cream.  If you are interested, let me know and I will send this to the pharmacy for you.    Napoleon Gray PA-C

## 2021-01-21 ENCOUNTER — ALLIED HEALTH/NURSE VISIT (OUTPATIENT)
Dept: NURSING | Facility: CLINIC | Age: 51
End: 2021-01-21
Payer: COMMERCIAL

## 2021-01-21 DIAGNOSIS — N89.8 VAGINAL DISCHARGE: Primary | ICD-10-CM

## 2021-01-21 LAB
SPECIMEN SOURCE: NORMAL
WET PREP SPEC: NORMAL

## 2021-01-21 PROCEDURE — 99207 PR NO CHARGE NURSE ONLY: CPT

## 2021-01-21 PROCEDURE — 87210 SMEAR WET MOUNT SALINE/INK: CPT | Performed by: PHYSICIAN ASSISTANT

## 2021-01-22 ENCOUNTER — TELEPHONE (OUTPATIENT)
Dept: FAMILY MEDICINE | Facility: CLINIC | Age: 51
End: 2021-01-22

## 2021-01-22 DIAGNOSIS — N95.2 ATROPHIC VAGINITIS: Primary | ICD-10-CM

## 2021-01-22 NOTE — TELEPHONE ENCOUNTER
Prior Authorization Retail Medication Request    Medication/Dose: conjugated estrogens (PREMARIN) 0.625 MG/GM vaginal cream  ICD code (if different than what is on RX):    Previously Tried and Failed:    Rationale:      Insurance Name:  [employer]  Insurance ID:  [id number]      Pharmacy Information (if different than what is on RX)  Name:  same  Phone:  [phone]

## 2021-01-24 ENCOUNTER — HEALTH MAINTENANCE LETTER (OUTPATIENT)
Age: 51
End: 2021-01-24

## 2021-01-25 NOTE — TELEPHONE ENCOUNTER
PRIOR AUTHORIZATION DENIED    Medication: conjugated estrogens (PREMARIN) 0.625 MG/GM vaginal cream-DENIED    Denial Date: 1/25/2021    Denial Rational: Patient must have a history of trial & failure to 2 of  the formulary alternative(s) or have a contraindication or intolerance to the formulary alternatives: estradiol patch, estradiol cream, Estrogel          Appeal Information:     If provider would like to appeal please provide a letter of medical necessity stating why formulary alternatives would not be clinically appropriate for patient and route back to the PA team.

## 2021-01-25 NOTE — TELEPHONE ENCOUNTER
Central Prior Authorization Team   Phone: [phone]      PA Initiation    Medication: conjugated estrogens (PREMARIN) 0.625 MG/GM vaginal cream-Initiated  Insurance Company: [employer] - Phone [phone] Fax [phone]  Pharmacy Filling the Rx: [id number]  [place] [facility] [place] [place] [facility] [address] [facility]  Filling Pharmacy Phone: [phone]  Filling Pharmacy Fax:    Start Date: 1/25/2021

## 2021-01-26 RX ORDER — ESTRADIOL 0.1 MG/G
2 CREAM VAGINAL
Qty: 42.5 G | Refills: 3 | Status: SHIPPED | OUTPATIENT
Start: 2021-01-28 | End: 2021-07-06

## 2021-01-26 NOTE — TELEPHONE ENCOUNTER
Patient called back and was given message below and she would like to get an alternative medication please.

## 2021-02-09 ENCOUNTER — MYC MEDICAL ADVICE (OUTPATIENT)
Dept: FAMILY MEDICINE | Facility: CLINIC | Age: 51
End: 2021-02-09

## 2021-02-09 DIAGNOSIS — F41.1 GAD (GENERALIZED ANXIETY DISORDER): Primary | ICD-10-CM

## 2021-02-09 NOTE — TELEPHONE ENCOUNTER
Please see [employer] message and advise.      Thank you,  Tammy WINTERRN BSN  [facility]  [phone]

## 2021-02-11 ENCOUNTER — ONCOLOGY VISIT (OUTPATIENT)
Dept: ONCOLOGY | Facility: CLINIC | Age: 51
End: 2021-02-11
Attending: INTERNAL MEDICINE
Payer: COMMERCIAL

## 2021-02-11 ENCOUNTER — INFUSION THERAPY VISIT (OUTPATIENT)
Dept: INFUSION THERAPY | Facility: CLINIC | Age: 51
End: 2021-02-11
Attending: INTERNAL MEDICINE
Payer: COMMERCIAL

## 2021-02-11 ENCOUNTER — HOSPITAL ENCOUNTER (OUTPATIENT)
Facility: CLINIC | Age: 51
Setting detail: SPECIMEN
Discharge: HOME | End: 2021-02-11
Attending: INTERNAL MEDICINE | Admitting: INTERNAL MEDICINE
Payer: COMMERCIAL

## 2021-02-11 VITALS
HEART RATE: 63 BPM | TEMPERATURE: 97.6 F | SYSTOLIC BLOOD PRESSURE: 101 MMHG | OXYGEN SATURATION: 99 % | BODY MASS INDEX: 30.45 KG/M2 | DIASTOLIC BLOOD PRESSURE: 72 MMHG | RESPIRATION RATE: 16 BRPM | WEIGHT: 169.2 LBS

## 2021-02-11 DIAGNOSIS — D50.9 IRON DEFICIENCY ANEMIA, UNSPECIFIED IRON DEFICIENCY ANEMIA TYPE: Primary | ICD-10-CM

## 2021-02-11 DIAGNOSIS — D50.9 IRON DEFICIENCY ANEMIA, UNSPECIFIED IRON DEFICIENCY ANEMIA TYPE: ICD-10-CM

## 2021-02-11 LAB
ERYTHROCYTE [DISTWIDTH] IN BLOOD BY AUTOMATED COUNT: 13.2 % (ref 10–15)
FERRITIN SERPL-MCNC: 21 NG/ML (ref 8–252)
HCT VFR BLD AUTO: 39.3 % (ref 35–47)
HGB BLD-MCNC: 12.6 G/DL (ref 11.7–15.7)
IRON SATN MFR SERPL: 17 % (ref 15–46)
IRON SERPL-MCNC: 50 UG/DL (ref 35–180)
MCH RBC QN AUTO: 29.1 PG (ref 26.5–33)
MCHC RBC AUTO-ENTMCNC: 32.1 G/DL (ref 31.5–36.5)
MCV RBC AUTO: 91 FL (ref 78–100)
PLATELET # BLD AUTO: 288 10E9/L (ref 150–450)
RBC # BLD AUTO: 4.33 10E12/L (ref 3.8–5.2)
TIBC SERPL-MCNC: 290 UG/DL (ref 240–430)
WBC # BLD AUTO: 6.5 10E9/L (ref 4–11)

## 2021-02-11 PROCEDURE — 83550 IRON BINDING TEST: CPT | Performed by: INTERNAL MEDICINE

## 2021-02-11 PROCEDURE — 82728 ASSAY OF FERRITIN: CPT | Performed by: INTERNAL MEDICINE

## 2021-02-11 PROCEDURE — 83540 ASSAY OF IRON: CPT | Performed by: INTERNAL MEDICINE

## 2021-02-11 PROCEDURE — 99213 OFFICE O/P EST LOW 20 MIN: CPT | Performed by: INTERNAL MEDICINE

## 2021-02-11 PROCEDURE — G0463 HOSPITAL OUTPT CLINIC VISIT: HCPCS

## 2021-02-11 PROCEDURE — 85027 COMPLETE CBC AUTOMATED: CPT | Performed by: INTERNAL MEDICINE

## 2021-02-11 PROCEDURE — 36415 COLL VENOUS BLD VENIPUNCTURE: CPT

## 2021-02-11 ASSESSMENT — PAIN SCALES - GENERAL: PAINLEVEL: NO PAIN (0)

## 2021-02-11 NOTE — LETTER
"    2/11/2021         RE: Criss Durant  [address] [facility] [address]        Dear Colleague,    Thank you for referring your patient, Criss Durant, to the [facility]. Please see a copy of my visit note below.    Oncology Rooming Note    February 11, 2021 1:28 PM   Criss Durant is a 50 year old female who presents for:    Chief Complaint   Patient presents with     Oncology Clinic Visit     Initial Vitals: /72   Pulse 63   Temp 97.6  F (36.4  C) (Oral)   Resp 16   Wt 76.7 kg (169 lb 3.2 oz)   SpO2 99%   BMI 30.45 kg/m   Estimated body mass index is 30.45 kg/m  as calculated from the following:    Height as of 1/8/21: 1.588 m (5' 2.5\").    Weight as of this encounter: 76.7 kg (169 lb 3.2 oz). Body surface area is 1.84 meters squared.  No Pain (0) Comment: Data Unavailable   No LMP recorded.  Allergies reviewed: Yes  Medications reviewed: Yes    Medications: Medication refills not needed today.  Pharmacy name entered into [employer]: [id number] [place] [place] [facility] [address] [facility]    Clinical concerns: no      Blanca De Luna [facility] [address]              Visit Date:   02/11/2021     HEMATOLOGY HISTORY: Ms. Don is a female with iron deficiency anemia. She had gastric bypass surgery in October 2016.   1.  On 03/07/2018 (care everywhere), hemoglobin of 12.3 with MCV of 89.7.   2.  On 04/20/2018, iron of 33, ferritin of 11 and saturation of 11%.   3.  On 07/19/2018, iron of 36, ferritin of 6 and saturation of 12%.       SUBJECTIVE:  Ms. Durant is a 50-year-old female with history of gastric bypass surgery.  Because of that, she has malabsorption of iron and becomes iron deficient.  In the past, she has received IV iron.      The patient says that she has been feeling more tired.  She also has anxiety.  She thinks her anxiety can also be causing some of her fatigue.      No headache.  No dizziness.  No neck pain.  No chest pain.  No shortness of breath at " rest.  No nausea or vomiting.  Appetite is good.  No urinary or bowel complaints.  No bleeding.  All other review of systems negative.      PHYSICAL EXAMINATION:   GENERAL:  She is alert, oriented x 3.  ECOG PS of 1.   The rest of systems not examined.      LABORATORY DATA:  CBC, iron and ferritin are all normal.      ASSESSMENT:   1.  A 50-year-old female with iron deficiency anemia, which has resolved.   2.  Malabsorption of iron due to gastric bypass surgery.   3.  Fatigue.   4.  Anxiety.      PLAN:   1.  I explained to the patient that she is not anemic or iron deficient.  CBC and iron studies are normal.  She was happy to know that.  She is at risk of becoming iron deficient.  We will plan on seeing her in 6 months' time with labs including iron studies.   2.  The patient has fatigue.  I told her it is due to other causes.  She does not have any iron deficiency anemia.  The patient does have anxiety that could be accounting for fatigue.  The patient said because of anxiety, her sleep is less and that will also cause fatigue.   3.  I will see her in 6 months' time with CBC and iron studies.  Advised her to see a physician sooner if she has any chest pain, shortness of breath, worsening weakness, dizziness or any other concerns.         JORGE BANDA MD             D: 2021   T: 2021   MT: SEGUN      Name:     KOJO ZAMAN   MRN:      [id number]        Account:      [id number]   :      1970           Visit Date:   2021      Document: [id number]      This office note has been dictated.          Again, thank you for allowing me to participate in the care of your patient.        Sincerely,        Jorge Banda MD

## 2021-02-11 NOTE — PROGRESS NOTES
"Oncology Rooming Note    February 11, 2021 1:28 PM   Criss Durant is a 50 year old female who presents for:    Chief Complaint   Patient presents with     Oncology Clinic Visit     Initial Vitals: /72   Pulse 63   Temp 97.6  F (36.4  C) (Oral)   Resp 16   Wt 76.7 kg (169 lb 3.2 oz)   SpO2 99%   BMI 30.45 kg/m   Estimated body mass index is 30.45 kg/m  as calculated from the following:    Height as of 1/8/21: 1.588 m (5' 2.5\").    Weight as of this encounter: 76.7 kg (169 lb 3.2 oz). Body surface area is 1.84 meters squared.  No Pain (0) Comment: Data Unavailable   No LMP recorded.  Allergies reviewed: Yes  Medications reviewed: Yes    Medications: Medication refills not needed today.  Pharmacy name entered into Maintenance Assistant: [id number] - [place], [place] - [address] [facility]    Clinical concerns: no      Blanca De Luna CMA            "

## 2021-02-11 NOTE — PROGRESS NOTES
Medical Assistant Note:  Criss Durant presents today for blood draw.    Patient seen by provider today: Yes: kris.   present during visit today: Not Applicable.    Concerns: No Concerns.    Procedure:  Lab draw site: left hand, Needle type: bf, Gauge: 23.    Post Assessment:  Labs drawn without difficulty: Yes.    Discharge Plan:  Departure Mode: Ambulatory.    Face to Face Time: 5 min  .    Fadia Carr, CMA

## 2021-02-15 NOTE — PROGRESS NOTES
Visit Date:   02/11/2021     HEMATOLOGY HISTORY: Ms. Don is a female with iron deficiency anemia. She had gastric bypass surgery in October 2016.   1.  On 03/07/2018 (care everywhere), hemoglobin of 12.3 with MCV of 89.7.   2.  On 04/20/2018, iron of 33, ferritin of 11 and saturation of 11%.   3.  On 07/19/2018, iron of 36, ferritin of 6 and saturation of 12%.       SUBJECTIVE:  Ms. Durant is a 50-year-old female with history of gastric bypass surgery.  Because of that, she has malabsorption of iron and becomes iron deficient.  In the past, she has received IV iron.      The patient says that she has been feeling more tired.  She also has anxiety.  She thinks her anxiety can also be causing some of her fatigue.      No headache.  No dizziness.  No neck pain.  No chest pain.  No shortness of breath at rest.  No nausea or vomiting.  Appetite is good.  No urinary or bowel complaints.  No bleeding.  All other review of systems negative.      PHYSICAL EXAMINATION:   GENERAL:  She is alert, oriented x 3.  ECOG PS of 1.   The rest of systems not examined.      LABORATORY DATA:  CBC, iron and ferritin are all normal.      ASSESSMENT:   1.  A 50-year-old female with iron deficiency anemia, which has resolved.   2.  Malabsorption of iron due to gastric bypass surgery.   3.  Fatigue.   4.  Anxiety.      PLAN:   1.  I explained to the patient that she is not anemic or iron deficient.  CBC and iron studies are normal.  She was happy to know that.  She is at risk of becoming iron deficient.  We will plan on seeing her in 6 months' time with labs including iron studies.   2.  The patient has fatigue.  I told her it is due to other causes.  She does not have any iron deficiency anemia.  The patient does have anxiety that could be accounting for fatigue.  The patient said because of anxiety, her sleep is less and that will also cause fatigue.   3.  I will see her in 6 months' time with CBC and iron studies.  Advised her to see a  physician sooner if she has any chest pain, shortness of breath, worsening weakness, dizziness or any other concerns.         JOREG BANDA MD             D: 2021   T: 2021   MT: SEGUN      Name:     KOJO ZAMAN   MRN:      [id number]        Account:      [id number]   :      1970           Visit Date:   2021      Document: [id number]

## 2021-04-30 ENCOUNTER — MYC MEDICAL ADVICE (OUTPATIENT)
Dept: FAMILY MEDICINE | Facility: CLINIC | Age: 51
End: 2021-04-30

## 2021-04-30 ENCOUNTER — NURSE TRIAGE (OUTPATIENT)
Dept: FAMILY MEDICINE | Facility: CLINIC | Age: 51
End: 2021-04-30

## 2021-04-30 NOTE — TELEPHONE ENCOUNTER
"Billy message:    Good morning  Just a couple things, I did get the moderna vaccination on 3/17/21 & 4/14/21. And I feel like I need to see/talk to someone about my anxiety and stress levels because it's to the point I will randomly start breathing really fast and shaking (not just my hands but my entire body). And I am not sure who to talk too?  Have a great day!    Triage Note:  Pt states that she has been having more anxiety over the last 6 months; pt has had 2 panic attack over the last month.    Pt states has been on prozac for about a year but feels that recently it has not been helping her anxiety. Pt states that she will have racing thoughts, feels overwhelmed and will begin shaking and crying. Pt states that normally to cope with panic attacks and increased anxiety she use to \"purge\" (induce vomiting after eating) but is  \"trying very hard not to do that.\"  Pt denies any illicit drug or alcohol use.     Pt has not seen a therapist in about a year when she changed jobs. States she feels she has support at home but is also responsible for her sisters 2 adult children who have mental health issues of their own.     Denies SOB, chest pain, suicidal ideation or plan.     Scheduled pt to be seen next Tuesday in clinic. Pt states that she will continue to take her medication as prescribed and knows when and who to call to reach out for help. Advised pt to please call back if her symptoms get worse or she feels suicidal. Pt agreeable to plan.     Additional Information    Negative: Severe difficulty breathing (e.g., struggling for each breath, speaks in single words)    Negative: Bluish (or gray) lips or face    Negative: Difficult to awaken or acting confused  (e.g., disoriented, slurred speech)    Negative: Hysterical or combative behavior    Negative: Sounds like a life-threatening emergency to the triager    Negative: Chest pain    Negative: Palpitations, skipped heart beat, or rapid heart beat    Negative: " "Cough is main symptom    Negative: Suicide thoughts, threats, attempts, or questions    Negative: Depression is main problem or symptom (e.g., feelings of sadness or hopelessness)    Negative: Difficulty breathing and persists > 10 minutes and not relieved by reassurance provided by triager    Negative: Lightheadedness or dizziness and persists > 10 minutes and not relieved by reassurance provided by triager    Negative: Alcohol or drug abuse, known or suspected, and feeling very shaky (i.e., visible tremors of hands)    Negative: Patient sounds very sick or weak to the triager    Negative: Patient sounds very upset or troubled to the triager    Symptoms interfere with work or school    Answer Assessment - Initial Assessment Questions  1. CONCERN: \"What happened that made you call today?\"      Anxiety, panic attack   2. ANXIETY SYMPTOM SCREENING: \"Can you describe how you have been feeling?\"  (e.g., tense, restless, panicky, anxious, keyed up, trouble sleeping, trouble concentrating)      Trouble sleeping on/off, trouble concentrating.  3. ONSET: \"How long have you been feeling this way?\"      About 6 months; have been feeling very anxious the last few days, feels very labile.   4. RECURRENT: \"Have you felt this way before?\"  If yes: \"What happened that time?\" \"What helped these feelings go away in the past?\"       Yes; have had panic attacks before. Use to \"purge\" (induce vomiting) in the past but don't do it anymore.   5. RISK OF HARM - SUICIDAL IDEATION:  \"Do you ever have thoughts of hurting or killing yourself?\"  (e.g., yes, no, no but preoccupation with thoughts about death)    - INTENT:  \"Do you have thoughts of hurting or killing yourself right NOW?\" (e.g., yes, no, N/A)    - PLAN: \"Do you have a specific plan for how you would do this?\" (e.g., gun, knife, overdose, no plan, N/A)      N0. No plan.   6. RISK OF HARM - HOMICIDAL IDEATION:  \"Do you ever have thoughts of hurting or killing someone else?\"  (e.g., " "yes, no, no but preoccupation with thoughts about death)    - INTENT:  \"Do you have thoughts of hurting or killing someone right NOW?\" (e.g., yes, no, N/A)    - PLAN: \"Do you have a specific plan for how you would do this?\" (e.g., gun, knife, no plan, N/A)       No  7. FUNCTIONAL IMPAIRMENT: \"How have things been going for you overall in your life? Have you had any more difficulties than usual doing your normal daily activities?\"  (e.g., better, same, worse; self-care, school, work, interactions)      Work has been more stressful than usual, changed jobs about a year ago.   8. SUPPORT: \"Who is with you now?\" \"Who do you live with?\" \"Do you have family or friends nearby who you can talk to?\"       Pt took over care of sisters children when her sister dies 10 years ago and both children have mental health issues  9. THERAPIST: \"Do you have a counselor or therapist? Name?\"      N0,stopped when she changed jobs.   10. STRESSORS: \"Has there been any new stress or recent changes in your life?\"        no  11. CAFFEINE ABUSE: \"Do you drink caffeinated beverages, and how much each day?\" (e.g., coffee, tea, medina)        np  12. SUBSTANCE ABUSE: \"Do you use any illegal drugs or alcohol?\"        no  13. OTHER SYMPTOMS: \"Do you have any other physical symptoms right now?\" (e.g., chest pain, palpitations, difficulty breathing, fever)        Shaking and fast breathing when she has panic attacks, very difficult to focus at aork   14. PREGNANCY: \"Is there any chance you are pregnant?\" \"When was your last menstrual period?\"        NA    Protocols used: ANXIETY AND PANIC ATTACK-A-OH    Delia Alonso RN    "

## 2021-05-04 ENCOUNTER — OFFICE VISIT (OUTPATIENT)
Dept: FAMILY MEDICINE | Facility: CLINIC | Age: 51
End: 2021-05-04
Payer: COMMERCIAL

## 2021-05-04 VITALS
HEART RATE: 62 BPM | OXYGEN SATURATION: 100 % | SYSTOLIC BLOOD PRESSURE: 110 MMHG | TEMPERATURE: 96.5 F | DIASTOLIC BLOOD PRESSURE: 72 MMHG

## 2021-05-04 DIAGNOSIS — Z12.31 VISIT FOR SCREENING MAMMOGRAM: ICD-10-CM

## 2021-05-04 DIAGNOSIS — F41.1 GAD (GENERALIZED ANXIETY DISORDER): Primary | ICD-10-CM

## 2021-05-04 DIAGNOSIS — K14.8 TONGUE BITING: ICD-10-CM

## 2021-05-04 PROCEDURE — 99214 OFFICE O/P EST MOD 30 MIN: CPT | Performed by: PHYSICIAN ASSISTANT

## 2021-05-04 RX ORDER — HYDROXYZINE HYDROCHLORIDE 50 MG/1
50 TABLET, FILM COATED ORAL EVERY 8 HOURS PRN
Qty: 90 TABLET | Refills: 0 | Status: SHIPPED | OUTPATIENT
Start: 2021-05-04 | End: 2022-10-31

## 2021-05-04 RX ORDER — FLUOXETINE 40 MG/1
CAPSULE ORAL
Qty: 180 CAPSULE | Refills: 1 | Status: SHIPPED | OUTPATIENT
Start: 2021-05-04 | End: 2021-11-03

## 2021-05-04 ASSESSMENT — PATIENT HEALTH QUESTIONNAIRE - PHQ9
5. POOR APPETITE OR OVEREATING: NEARLY EVERY DAY
SUM OF ALL RESPONSES TO PHQ QUESTIONS 1-9: 14

## 2021-05-04 ASSESSMENT — ANXIETY QUESTIONNAIRES
7. FEELING AFRAID AS IF SOMETHING AWFUL MIGHT HAPPEN: NEARLY EVERY DAY
6. BECOMING EASILY ANNOYED OR IRRITABLE: MORE THAN HALF THE DAYS
2. NOT BEING ABLE TO STOP OR CONTROL WORRYING: NEARLY EVERY DAY
1. FEELING NERVOUS, ANXIOUS, OR ON EDGE: NEARLY EVERY DAY
3. WORRYING TOO MUCH ABOUT DIFFERENT THINGS: NEARLY EVERY DAY
GAD7 TOTAL SCORE: 20
5. BEING SO RESTLESS THAT IT IS HARD TO SIT STILL: NEARLY EVERY DAY
IF YOU CHECKED OFF ANY PROBLEMS ON THIS QUESTIONNAIRE, HOW DIFFICULT HAVE THESE PROBLEMS MADE IT FOR YOU TO DO YOUR WORK, TAKE CARE OF THINGS AT HOME, OR GET ALONG WITH OTHER PEOPLE: SOMEWHAT DIFFICULT

## 2021-05-04 NOTE — PROGRESS NOTES
Assessment & Plan     NED (generalized anxiety disorder)  Increase fluoxetine to 80 mg daily.  Start hydroxyzine for panic episodes.  She will follow up in 4 weeks for med check  - FLUoxetine (PROZAC) 40 MG capsule; Take 2 tablets daily ( 80 mg)  - hydrOXYzine (ATARAX) 50 MG tablet; Take 1 tablet (50 mg) by mouth every 8 hours as needed for itching    Visit for screening mammogram  - MA SCREENING DIGITAL BILAT - Future  (s+30); Future    Tongue biting  Unclear if this could represent a seizure vs a nightmare.  She is not having other neurologic symptoms.  This episode occurred about 1 month ago. Advised that she follow up with neurology for EMG.  Follow up with me sooner if she has further episodes or acutely in the ER for sudden seizure.   - NEUROLOGY ADULT REFERRAL         BMI:       Depression Screening Follow Up    PHQ 5/4/2021   PHQ-9 Total Score 14   Q9: Thoughts of better off dead/self-harm past 2 weeks Not at all     Last PHQ-9 5/4/2021   1.  Little interest or pleasure in doing things 1   2.  Feeling down, depressed, or hopeless 1   3.  Trouble falling or staying asleep, or sleeping too much 3   4.  Feeling tired or having little energy 1   5.  Poor appetite or overeating 1   6.  Feeling bad about yourself 3   7.  Trouble concentrating 3   8.  Moving slowly or restless 1   Q9: Thoughts of better off dead/self-harm past 2 weeks 0   PHQ-9 Total Score 14   Difficulty at work, home, or with people Somewhat difficult         Return in about 4 weeks (around 6/1/2021) for med check.    MANISHA Meredith [facility]   Paulette is a 50 year old who presents for the following health issues   HPI     Anxiety Follow-Up    How are you doing with your anxiety since your last visit? Worsened     Are you having other symptoms that might be associated with anxiety? Yes:  uncontrolled shaky, fearful thoughts, excesive worry, rapid heart beat    Have you had a significant  life event? No     Are you feeling depressed? Yes:  na    Do you have any concerns with your use of alcohol or other drugs? No    Social History     Tobacco Use     Smoking status: Never Smoker     Smokeless tobacco: Never Used   Substance Use Topics     Alcohol use: No     Alcohol/week: 0.0 standard drinks     Frequency: Never     Drug use: No     NED-7 SCORE 9/27/2019 1/8/2021 5/4/2021   Total Score 11 6 20     PHQ 9/27/2019 1/8/2021 5/4/2021   PHQ-9 Total Score 18 10 14   Q9: Thoughts of better off dead/self-harm past 2 weeks Not at all Not at all Not at all     Last PHQ-9 5/4/2021   1.  Little interest or pleasure in doing things 1   2.  Feeling down, depressed, or hopeless 1   3.  Trouble falling or staying asleep, or sleeping too much 3   4.  Feeling tired or having little energy 1   5.  Poor appetite or overeating 1   6.  Feeling bad about yourself 3   7.  Trouble concentrating 3   8.  Moving slowly or restless 1   Q9: Thoughts of better off dead/self-harm past 2 weeks 0   PHQ-9 Total Score 14   Difficulty at work, home, or with people Somewhat difficult     NED-7  5/4/2021   1. Feeling nervous, anxious, or on edge 3   2. Not being able to stop or control worrying 3   3. Worrying too much about different things 3   4. Trouble relaxing 3   5. Being so restless that it is hard to sit still 3   6. Becoming easily annoyed or irritable 2   7. Feeling afraid, as if something awful might happen 3   NED-7 Total Score 20   If you checked any problems, how difficult have they made it for you to do your work, take care of things at home, or get along with other people? Somewhat difficult         How many servings of fruits and vegetables do you eat daily?  0-1    On average, how many sweetened beverages do you drink each day (Examples: soda, juice, sweet tea, etc.  Do NOT count diet or artificially sweetened beverages)?   0    How many days per week do you exercise enough to make your heart beat faster? 3 or less    How  "many minutes a day do you exercise enough to make your heart beat faster? 10 - 19, walking at work    How many days per week do you miss taking your medication? 0        Paulette has a PMH NED and depression, she is taking Prozac 60 mg daily.  Up until a few months ago, this was well controlled.  She has been having a lot of stress at work and has been having episodes of panic at work.  During these episodes she feels shaky, nervous and has poor concentration.  These episodes have been becoming more frequent  In general she feels more anxious about several different things.   4 weeks ago, she had a episode where she awake from sleep and had bitten her tongue.  She says she was \" covered\" in blood and had scratches all over the body.She felt lethargic the day following this incident.  No witnesses. She has not had any other concerns for seizure like activity.  Does not remember having a bad dream           Review of Systems   Constitutional, HEENT, cardiovascular, pulmonary, GI, , musculoskeletal, neuro, skin, endocrine and psych systems are negative, except as otherwise noted.      Objective    /72 (Cuff Size: Adult Large)   Pulse 62   Temp 96.5  F (35.8  C) (Tympanic)   SpO2 100%   There is no height or weight on file to calculate BMI.  Physical Exam   GENERAL: healthy, alert and no distress  RESP: lungs clear to auscultation - no rales, rhonchi or wheezes  CV: regular rate and rhythm, normal S1 S2, no S3 or S4, no murmur, click or rub, no peripheral edema and peripheral pulses strong  PSYCH: mentation appears normal, affect normal/bright                "

## 2021-05-05 ASSESSMENT — ANXIETY QUESTIONNAIRES: GAD7 TOTAL SCORE: 20

## 2021-05-20 ENCOUNTER — TRANSFERRED RECORDS (OUTPATIENT)
Dept: HEALTH INFORMATION MANAGEMENT | Facility: CLINIC | Age: 51
End: 2021-05-20

## 2021-05-26 ENCOUNTER — ANCILLARY PROCEDURE (OUTPATIENT)
Dept: MAMMOGRAPHY | Facility: CLINIC | Age: 51
End: 2021-05-26
Attending: PHYSICIAN ASSISTANT
Payer: COMMERCIAL

## 2021-05-26 DIAGNOSIS — Z12.31 VISIT FOR SCREENING MAMMOGRAM: ICD-10-CM

## 2021-05-26 PROCEDURE — 77067 SCR MAMMO BI INCL CAD: CPT | Mod: TC | Performed by: RADIOLOGY

## 2021-06-29 ENCOUNTER — TRANSFERRED RECORDS (OUTPATIENT)
Dept: HEALTH INFORMATION MANAGEMENT | Facility: CLINIC | Age: 51
End: 2021-06-29

## 2021-06-29 DIAGNOSIS — G47.33 OSA (OBSTRUCTIVE SLEEP APNEA): Primary | ICD-10-CM

## 2021-07-06 ENCOUNTER — OFFICE VISIT (OUTPATIENT)
Dept: FAMILY MEDICINE | Facility: CLINIC | Age: 51
End: 2021-07-06
Payer: COMMERCIAL

## 2021-07-06 VITALS
TEMPERATURE: 97.6 F | BODY MASS INDEX: 30.65 KG/M2 | SYSTOLIC BLOOD PRESSURE: 112 MMHG | HEART RATE: 58 BPM | HEIGHT: 63 IN | WEIGHT: 173 LBS | DIASTOLIC BLOOD PRESSURE: 68 MMHG | OXYGEN SATURATION: 98 %

## 2021-07-06 DIAGNOSIS — Z13.6 CARDIOVASCULAR SCREENING; LDL GOAL LESS THAN 160: ICD-10-CM

## 2021-07-06 DIAGNOSIS — Z23 NEED FOR VACCINATION: ICD-10-CM

## 2021-07-06 DIAGNOSIS — Z11.59 NEED FOR HEPATITIS C SCREENING TEST: ICD-10-CM

## 2021-07-06 DIAGNOSIS — Z11.4 SCREENING FOR HIV (HUMAN IMMUNODEFICIENCY VIRUS): ICD-10-CM

## 2021-07-06 DIAGNOSIS — Z98.84 BARIATRIC SURGERY STATUS: ICD-10-CM

## 2021-07-06 DIAGNOSIS — Z00.00 ENCOUNTER FOR ROUTINE ADULT HEALTH EXAMINATION WITHOUT ABNORMAL FINDINGS: Primary | ICD-10-CM

## 2021-07-06 DIAGNOSIS — Z12.11 SPECIAL SCREENING FOR MALIGNANT NEOPLASMS, COLON: ICD-10-CM

## 2021-07-06 DIAGNOSIS — F50.89 OTHER SPECIFIED EATING DISORDER: ICD-10-CM

## 2021-07-06 DIAGNOSIS — F41.1 GAD (GENERALIZED ANXIETY DISORDER): ICD-10-CM

## 2021-07-06 DIAGNOSIS — N20.0 NEPHROLITHIASIS: ICD-10-CM

## 2021-07-06 LAB
ALBUMIN SERPL-MCNC: 3.4 G/DL (ref 3.4–5)
ALP SERPL-CCNC: 91 U/L (ref 40–150)
ALT SERPL W P-5'-P-CCNC: 37 U/L (ref 0–50)
ANION GAP SERPL CALCULATED.3IONS-SCNC: 6 MMOL/L (ref 3–14)
AST SERPL W P-5'-P-CCNC: 21 U/L (ref 0–45)
BILIRUB SERPL-MCNC: 0.5 MG/DL (ref 0.2–1.3)
BUN SERPL-MCNC: 13 MG/DL (ref 7–30)
CALCIUM SERPL-MCNC: 8.8 MG/DL (ref 8.5–10.1)
CHLORIDE SERPL-SCNC: 103 MMOL/L (ref 94–109)
CHOLEST SERPL-MCNC: 217 MG/DL
CO2 SERPL-SCNC: 29 MMOL/L (ref 20–32)
CREAT SERPL-MCNC: 0.59 MG/DL (ref 0.52–1.04)
GFR SERPL CREATININE-BSD FRML MDRD: >90 ML/MIN/{1.73_M2}
GLUCOSE SERPL-MCNC: 91 MG/DL (ref 70–99)
HCV AB SERPL QL IA: NONREACTIVE
HDLC SERPL-MCNC: 85 MG/DL
HIV 1+2 AB+HIV1 P24 AG SERPL QL IA: NONREACTIVE
LDLC SERPL CALC-MCNC: 118 MG/DL
NONHDLC SERPL-MCNC: 132 MG/DL
POTASSIUM SERPL-SCNC: 4 MMOL/L (ref 3.4–5.3)
PROT SERPL-MCNC: 6.9 G/DL (ref 6.8–8.8)
SODIUM SERPL-SCNC: 138 MMOL/L (ref 133–144)
TRIGL SERPL-MCNC: 69 MG/DL

## 2021-07-06 PROCEDURE — 36415 COLL VENOUS BLD VENIPUNCTURE: CPT | Performed by: PHYSICIAN ASSISTANT

## 2021-07-06 PROCEDURE — 87389 HIV-1 AG W/HIV-1&-2 AB AG IA: CPT | Performed by: PHYSICIAN ASSISTANT

## 2021-07-06 PROCEDURE — 99396 PREV VISIT EST AGE 40-64: CPT | Mod: 25 | Performed by: PHYSICIAN ASSISTANT

## 2021-07-06 PROCEDURE — 90471 IMMUNIZATION ADMIN: CPT | Performed by: PHYSICIAN ASSISTANT

## 2021-07-06 PROCEDURE — 86803 HEPATITIS C AB TEST: CPT | Performed by: PHYSICIAN ASSISTANT

## 2021-07-06 PROCEDURE — 90750 HZV VACC RECOMBINANT IM: CPT | Performed by: PHYSICIAN ASSISTANT

## 2021-07-06 PROCEDURE — 80061 LIPID PANEL: CPT | Performed by: PHYSICIAN ASSISTANT

## 2021-07-06 PROCEDURE — 80053 COMPREHEN METABOLIC PANEL: CPT | Performed by: PHYSICIAN ASSISTANT

## 2021-07-06 RX ORDER — PANTOPRAZOLE SODIUM 40 MG/1
40 TABLET, DELAYED RELEASE ORAL 2 TIMES DAILY
Qty: 180 TABLET | Refills: 3 | Status: SHIPPED | OUTPATIENT
Start: 2021-07-06 | End: 2022-02-01

## 2021-07-06 ASSESSMENT — PATIENT HEALTH QUESTIONNAIRE - PHQ9
SUM OF ALL RESPONSES TO PHQ QUESTIONS 1-9: 11
10. IF YOU CHECKED OFF ANY PROBLEMS, HOW DIFFICULT HAVE THESE PROBLEMS MADE IT FOR YOU TO DO YOUR WORK, TAKE CARE OF THINGS AT HOME, OR GET ALONG WITH OTHER PEOPLE: SOMEWHAT DIFFICULT
SUM OF ALL RESPONSES TO PHQ QUESTIONS 1-9: 11

## 2021-07-06 ASSESSMENT — ENCOUNTER SYMPTOMS
DIARRHEA: 0
DIZZINESS: 1
SHORTNESS OF BREATH: 0
FEVER: 0
ABDOMINAL PAIN: 1
NERVOUS/ANXIOUS: 1
FREQUENCY: 1
MYALGIAS: 0
ARTHRALGIAS: 0
HEMATURIA: 0
EYE PAIN: 0
PALPITATIONS: 1
JOINT SWELLING: 0
COUGH: 0
HEMATOCHEZIA: 0
SORE THROAT: 0
NAUSEA: 1
DYSURIA: 0
CONSTIPATION: 0
WEAKNESS: 0
BREAST MASS: 0
HEADACHES: 0
PARESTHESIAS: 1
CHILLS: 0
HEARTBURN: 1

## 2021-07-06 ASSESSMENT — ANXIETY QUESTIONNAIRES
5. BEING SO RESTLESS THAT IT IS HARD TO SIT STILL: SEVERAL DAYS
GAD7 TOTAL SCORE: 16
3. WORRYING TOO MUCH ABOUT DIFFERENT THINGS: NEARLY EVERY DAY
4. TROUBLE RELAXING: MORE THAN HALF THE DAYS
GAD7 TOTAL SCORE: 16
GAD7 TOTAL SCORE: 16
2. NOT BEING ABLE TO STOP OR CONTROL WORRYING: NEARLY EVERY DAY
6. BECOMING EASILY ANNOYED OR IRRITABLE: SEVERAL DAYS
1. FEELING NERVOUS, ANXIOUS, OR ON EDGE: NEARLY EVERY DAY
7. FEELING AFRAID AS IF SOMETHING AWFUL MIGHT HAPPEN: NEARLY EVERY DAY
7. FEELING AFRAID AS IF SOMETHING AWFUL MIGHT HAPPEN: NEARLY EVERY DAY

## 2021-07-06 ASSESSMENT — MIFFLIN-ST. JEOR: SCORE: 1355.91

## 2021-07-06 ASSESSMENT — PAIN SCALES - GENERAL: PAINLEVEL: NO PAIN (0)

## 2021-07-06 NOTE — PROGRESS NOTES
SUBJECTIVE:   CC: Criss Durant is an 50 year old woman who presents for preventive health visit.       Patient has been advised of split billing requirements and indicates understanding: Yes  Healthy Habits:     Getting at least 3 servings of Calcium per day:  NO    Bi-annual eye exam:  Yes    Dental care twice a year:  Yes    Sleep apnea or symptoms of sleep apnea:  None    Diet:  Low salt, Low fat/cholesterol and Other    Frequency of exercise:  2-3 days/week    Duration of exercise:  30-45 minutes    Taking medications regularly:  Yes    Medication side effects:  None    PHQ-2 Total Score: 2    Additional concerns today:  No      Today's PHQ-2 Score:   PHQ-2 ( 1999 [employer]) 7/6/2021   Q1: Little interest or pleasure in doing things 1   Q2: Feeling down, depressed or hopeless 1   PHQ-2 Score 2   Q1: Little interest or pleasure in doing things Several days   Q2: Feeling down, depressed or hopeless Several days   PHQ-2 Score 2       Abuse: Current or Past (Physical, Sexual or Emotional) - No  Do you feel safe in your environment? Yes        Social History     Tobacco Use     Smoking status: Never Smoker     Smokeless tobacco: Never Used   Substance Use Topics     Alcohol use: No     Alcohol/week: 0.0 standard drinks     Frequency: Never       Alcohol Use 7/6/2021   Prescreen: >3 drinks/day or >7 drinks/week? Not Applicable   Prescreen: >3 drinks/day or >7 drinks/week? -       Reviewed orders with patient.  Reviewed health maintenance and updated orders accordingly - Yes    Breast Cancer Screening:  Any new diagnosis of family breast, ovarian, or bowel cancer? No    FHS-7:   Breast CA Risk Assessment (FHS-7) 7/6/2021   Did any of your first-degree relatives have breast or ovarian cancer? Yes   Did any of your relatives have bilateral breast cancer? Unknown   Did any man in your family have breast cancer? No   Did any woman in your family have breast and ovarian cancer? Yes   Did any woman in your family have  breast cancer before age 50 y? No   Do you have 2 or more relatives with breast and/or ovarian cancer? Yes   Do you have 2 or more relatives with breast and/or bowel cancer? Yes       History of abnormal Pap smear: NO - age 30-65 PAP every 5 years with negative HPV co-testing recommended  PAP / HPV Latest Ref Rng & Units 1/8/2021 1/6/2017 12/21/2015   PAP - NIL NIL NIL   HPV 16 DNA NEG:Negative Negative Negative Negative   HPV 18 DNA NEG:Negative Negative Negative Negative   OTHER HR HPV NEG:Negative Negative Negative Negative     Reviewed and updated as needed this visit by clinical staff  Tobacco    Problems  Med Hx  Surg Hx  Fam Hx          Reviewed and updated as needed this visit by Provider  Tobacco    Problems  Med Hx  Surg Hx  Fam Hx         Past Medical History:   Diagnosis Date     Depressive disorder      morbid obesity      Obese      Renal disease     stone     Ulcer of gastric fundus      Urinary frequency         Review of Systems   Constitutional: Negative for chills and fever.   HENT: Negative for congestion, ear pain, hearing loss and sore throat.    Eyes: Positive for visual disturbance. Negative for pain.   Respiratory: Negative for cough and shortness of breath.    Cardiovascular: Positive for palpitations and peripheral edema. Negative for chest pain.   Gastrointestinal: Positive for abdominal pain, heartburn and nausea. Negative for constipation, diarrhea and hematochezia.   Breasts:  Negative for tenderness, breast mass and discharge.   Genitourinary: Positive for frequency, urgency and vaginal discharge. Negative for dysuria, genital sores, hematuria, pelvic pain and vaginal bleeding.   Musculoskeletal: Negative for arthralgias, joint swelling and myalgias.   Skin: Positive for rash.   Neurological: Positive for dizziness and paresthesias. Negative for weakness and headaches.   Psychiatric/Behavioral: Positive for mood changes. The patient is nervous/anxious.         OBJECTIVE:  "  /68   Pulse 58   Temp 97.6  F (36.4  C) (Tympanic)   Ht 1.588 m (5' 2.5\")   Wt 78.5 kg (173 lb)   LMP 09/22/2019   SpO2 98%   BMI 31.14 kg/m    Physical Exam  Vitals signs and nursing note reviewed.   HENT:      Head: Normocephalic and atraumatic.      Right Ear: Tympanic membrane, ear canal and external ear normal.      Left Ear: Tympanic membrane and ear canal normal.      Mouth/Throat:      Mouth: Mucous membranes are moist.      Pharynx: Oropharynx is clear.   Eyes:      Extraocular Movements: Extraocular movements intact.      Conjunctiva/sclera: Conjunctivae normal.      Pupils: Pupils are equal, round, and reactive to light.   Cardiovascular:      Pulses: Normal pulses.      Heart sounds: Normal heart sounds.   Pulmonary:      Effort: Pulmonary effort is normal.      Breath sounds: Normal breath sounds.   Abdominal:      General: Abdomen is flat. Bowel sounds are normal.      Palpations: Abdomen is soft.   Skin:     Findings: Rash present.      Comments: Erythematous rash on inner left thigh.    Neurological:      Mental Status: She is alert.         ASSESSMENT/PLAN:   1. Encounter for routine adult health examination without abnormal findings  Paulette is following with neurology for ongoing symptoms of insomnia and abnormal sleep behavior.  She has a sleep study scheduled.  No new symptoms    2. NED (generalized anxiety disorder)  Stable on current medication regimen Fluoxetine 40 mg. Will continue same dose for now   - MENTAL HEALTH REFERRAL  - Adult; Outpatient Treatment; Individual/Couples/Family/Group Therapy/Health Psychology[place] [facility] [facility] [phone]; We will contact you to schedule the appointment or please call with any questions    3. Other specified eating disorder  Stable, following with therapy  - MENTAL HEALTH REFERRAL  - Adult; Outpatient Treatment; Individual/Couples/Family/Group Therapy/Health Psychology; [facility] [facility] [phone]; We " "will contact you to schedule the appointment or please call with any questions    4. Nephrolithiasis  Stable at this time. Followed by urology    5. Bariatric surgery status  Stable.   - pantoprazole (PROTONIX) 40 MG EC tablet; Take 1 tablet (40 mg) by mouth 2 times daily  Dispense: 180 tablet; Refill: 3    6. CARDIOVASCULAR SCREENING; LDL GOAL LESS THAN 160  - Lipid Profile (Chol, Trig, HDL, LDL calc)  - Comprehensive metabolic panel (BMP + Alb, Alk Phos, ALT, AST, Total. Bili, TP)    7. Screening for HIV (human immunodeficiency virus)  - HIV Antigen Antibody Combo    8. Need for hepatitis C screening test  - Hepatitis C Screen Reflex to HCV RNA Quant and Genotype    9. Special screening for malignant neoplasms, colon  Discussed benefits of colonoscopy vs FIT. Patient has elected for FIT.   - Fecal colorectal cancer screen (FIT); Future    10. Need for vaccination  - SHINGRIX [[id number]]    Patient has been advised of split billing requirements and indicates understanding: Yes  COUNSELING:  Reviewed preventive health counseling, as reflected in patient instructions       Regular exercise       Healthy diet/nutrition       Immunizations    Vaccinated for: Zoster          Estimated body mass index is 31.14 kg/m  as calculated from the following:    Height as of this encounter: 1.588 m (5' 2.5\").    Weight as of this encounter: 78.5 kg (173 lb).    Weight management plan: post-bariatric surgery patient.    She reports that she has never smoked. She has never used smokeless tobacco.      Counseling Resources:  ATP IV Guidelines  Pooled Cohorts Equation Calculator  Breast Cancer Risk Calculator  BRCA-Related Cancer Risk Assessment: FHS-7 Tool  FRAX Risk Assessment  ICSI Preventive Guidelines  Dietary Guidelines for Americans, 2010  USDA's MyPlate  ASA Prophylaxis  Lung CA Screening    [facility] [place]  Answers for HPI/ROS submitted by the patient on 7/6/2021   If you " checked off any problems, how difficult have these problems made it for you to do your work, take care of things at home, or get along with other people?: Somewhat difficult  PHQ9 TOTAL SCORE: 11  NED 7 TOTAL SCORE: 16

## 2021-07-07 ASSESSMENT — ANXIETY QUESTIONNAIRES: GAD7 TOTAL SCORE: 16

## 2021-07-07 NOTE — RESULT ENCOUNTER NOTE
Criss-  Here are your recent results.     Your labs are normal with the exception of mildly elevated LDL cholesterol level. No medication is needed right now.  Keeping a healthy diet and exercising regularly can be helpful in improving this. We can recheck this in 1 year.     Napoleon Gray PA-C

## 2021-07-15 ENCOUNTER — LAB (OUTPATIENT)
Dept: LAB | Facility: CLINIC | Age: 51
End: 2021-07-15
Payer: COMMERCIAL

## 2021-07-15 DIAGNOSIS — Z12.11 SPECIAL SCREENING FOR MALIGNANT NEOPLASMS, COLON: ICD-10-CM

## 2021-07-15 PROCEDURE — 82274 ASSAY TEST FOR BLOOD FECAL: CPT

## 2021-07-21 NOTE — PROGRESS NOTES
Paulette is a 50 year old who is being evaluated via a billable video or telephone visit.      Does patient have any form of state insurance? [employer]    Do you have wifi? yes  Do you have a smart phone? yes  Can you download an mara on your phone comfortably with out assistance? yes  Can you watch a Youtube video? Yes       How would you like to obtain your AVS? MyChart  If the video visit is dropped, the invitation should be resent by: Send to e-mail at: [email or web address]  Will anyone else be joining your video or telephone visit? Fiordaliza Mock [facility]      Telephone Visit Details:     Telephone Visit Start Time: 2:47 PM    Telephone Visit End Time:  3:30 PM      Sleep Consultation:    Date on this visit: 7/22/2021    Criss Durant is sent by Kerry Pereira for a sleep consultation regarding insomnia, abnormal sleep behavior, possible seizures/yumiko.    Primary Physician: Napoleon Gray     Criss Durant is a 50-year-old female with a PMH pertinent for depression, anxiety, panic attacks, history of bariatric surgery - s/p gastric bypass, hyperparathyroidism, osteopenia, gout, and mild obesity who presents via telephone visit today with reports of occasional snoring, vivid dreams (sees spider on ceiling/wall after awakening at night) frequently which spontaneously resolves, has awakened with small bruises on her legs a few times for the last couple of months, and has always had difficulty sleeping.  She was recently seen by Kerry Chavarria MD, at [facility], [facility] on 6/29/2021.  She was seen for symptoms of trembling spells, panic attack, anxiety, and the above sleep concerns.  Recent prior test results for MRI brain and Sleep Deprived EEG were normal.  The patient had reported that she continues to wake up sometimes with small bruises over her legs.  She also had reported that she had always had sleep difficulty, sometimes not sleeping for 3 days, then would sleep  for 6 hours and would be just fine.  Her neurologist also has some concern for possible seizure activity during her sleep as well as possible obstructive sleep apnea.    Criss goes to bed at 9:00 - 10:00 PM, often to sleep at about 11:00 PM during the week. She wakes up at 5:30 AM with an alarm. She falls asleep in  minutes.  Criss has difficulty falling asleep.  She wakes up 1 times a night and is unable to fall back to sleep.  Criss wakes up to uncertain reasons.  On Mondays and Saturdays, Criss goes to sleep at 9:00 - 10:00 PM.  She wakes up at 3:30 AM with an alarm. She falls asleep in  minutes.  Patient gets an average of 3-6 hours of sleep per night.     Patient does watch TV in bed, worry in bed about everything and read in bed and does not use electronics in bed.     Criss does not do shift work.  She works day shifts.  She works in [employer] for [employer].    Criss usually does not snore some nights and snoring is soft. Patient does not have a regular bed partner. There is report of snoring and poor quality of sleep.  She does not have witnessed apneas.  Patient sleeps on her back, side and stomach. She has occasional sleep disturbance, morning dry mouth and morning confusion, denies occasional snort arousals, morning headaches and restless legs. Criss has occasional bruxism, sleep paralysis and hypnogogic/hypnopompic hallucinations and denies any sleep walking, sleep talking, dream enactment and cataplexy.    She confirms sleep walking as a child.  Criss has heartburn and depression, denies difficulty breathing through her nose, claustrophobia and reflux at night.  She reports that she is unable to have anything on her face at just about anytime.    Criss has gained 15 pounds in the last year.  Patient describes themself as a morning person.  She would prefer to go to sleep at 10:00 PM and wake up at 5:00 - 5:30 AM.  Patient's [place] Sleepiness score 1/24  consistent with no daytime sleepiness.      Criss does not take naps.  She takes no inadvertant naps.  She denies closing eyes, dozing and falling asleep while driving. Patient was counseled on the importance of driving while alert, to pull over if drowsy, or nap before getting into the vehicle if sleepy.  She uses no caffeine.     Allergies:    Allergies   Allergen Reactions     Fish      Lortab [Hydrocodone-Acetaminophen] Nausea     Also light headed and flushed       Medications:    Current Outpatient Medications   Medication Sig Dispense Refill     calcium-vitamin D-vitamin K (VIACTIV) 500-500-40 MG-UNT-MCG CHEW Take 3 tablets by mouth At Bedtime        Cholecalciferol (VITAMIN D3 PO) Take 2,000 Units by mouth 2 times daily        Cyanocobalamin (B-12) 2500 MCG SUBL Place 1 tablet under the tongue Every Mon, Wed, Fri Morning 3 times weekly        docusate sodium (COLACE) 100 MG capsule Take 300 mg by mouth every morning       FLUoxetine (PROZAC) 40 MG capsule Take 2 tablets daily ( 80 mg) 180 capsule 1     hydrOXYzine (ATARAX) 50 MG tablet Take 1 tablet (50 mg) by mouth every 8 hours as needed for itching 90 tablet 0     multivitamin  peds with iron (FLINTSTONES COMPLETE) 60 MG chewable tablet Take 2 chew tab by mouth every morning        pantoprazole (PROTONIX) 40 MG EC tablet Take 1 tablet (40 mg) by mouth 2 times daily 180 tablet 3     potassium citrate (UROCIT-K) 10 MEQ (1080 MG) CR tablet Take by mouth 3 times daily (with meals)         Problem List:  Patient Active Problem List    Diagnosis Date Noted     Anxiety 07/22/2021     Priority: Medium     Gout 07/22/2021     Priority: Medium     Panic attacks 07/22/2021     Priority: Medium     Hyperparathyroidism (H) 05/31/2019     Priority: Medium     Nephrolithiasis 03/24/2019     Priority: Medium     Osteopenia of multiple sites 12/11/2018     Priority: Medium     Formatting of this note might be different from the original.  Bone density testing 12/7/18.        Depressive disorder 10/11/2018     Priority: Medium     Malabsorption of iron 08/02/2018     Priority: Medium     NED (generalized anxiety disorder) 03/27/2018     Priority: Medium     Slow transit constipation 03/27/2018     Priority: Medium     Other specified eating disorder 03/26/2018     Priority: Medium     H/O gastric bypass 03/26/2018     Priority: Medium     Formatting of this note might be different from the original.  Rasheeda-en-Y 2016       Overweight (BMI 25.0-29.9) 10/27/2017     Priority: Medium     Body dysmorphic disorder 01/06/2017     Priority: Medium     Bariatric surgery status 11/01/2016     Priority: Medium     Bilateral edema of lower extremity 06/28/2016     Priority: Medium     History of abnormal cervical Pap smear 12/21/2015     Priority: Medium     Unsure of results or where completed.  12/2015 Pap= NIL, Neg HPV. Co-test 1 yr  01/06/17 Pap= NIL, Neg HPV. 3 yr co-test  1/8/21 NIL, Neg HPV. Provider review       CARDIOVASCULAR SCREENING; LDL GOAL LESS THAN 160 12/07/2015     Priority: Medium     Mass of right foot 12/07/2015     Priority: Medium     Lipoma, medial right foot, verified with US 12/2015          Past Medical/Surgical History:  Past Medical History:   Diagnosis Date     Depressive disorder      Gout 7/22/2021     morbid obesity      Obese      Renal disease     stone     Ulcer of gastric fundus      Urinary frequency      Past Surgical History:   Procedure Laterality Date     CYSTOSCOPY       EXTRACORPOREAL SHOCK WAVE LITHOTRIPSY (ESWL) Left 2/7/2019    Procedure: Left renal lithotripsy (1250 shocks).;  Surgeon: Regis Parada MD;  Location:  OR     LAPAROSCOPIC BYPASS GASTRIC N/A 10/26/2016    Procedure: LAPAROSCOPIC BYPASS GASTRIC;  Surgeon: Romario Reyna MD;  Location:  OR     LAPAROSCOPY DIAGNOSTIC (GENERAL) N/A 10/24/2018    Procedure: LAPAROSCOPY DIAGNOSTIC FOR PERFORATED GASTRIC ULCER;  Surgeon: Chang Corea MD;  Location:  OR     NO  HISTORY OF SURGERY         Social History:  Social History     Socioeconomic History     Marital status: Single     Spouse name: seperated     Number of children: Not on file     Years of education: Not on file     Highest education level: Not on file   Occupational History     Occupation: PCA   Tobacco Use     Smoking status: Never Smoker     Smokeless tobacco: Never Used   Substance and Sexual Activity     Alcohol use: No     Alcohol/week: 0.0 standard drinks     Drug use: No     Sexual activity: Yes     Partners: Male   Other Topics Concern     Parent/sibling w/ CABG, MI or angioplasty before 65F 55M? Not Asked   Social History Narrative     Not on file     Social Determinants of Health     Financial Resource Strain:      Difficulty of Paying Living Expenses:    Food Insecurity:      Worried About Running Out of Food in the Last Year:      Ran Out of Food in the Last Year:    Transportation Needs:      Lack of Transportation (Medical):      Lack of Transportation (Non-Medical):    Physical Activity:      Days of Exercise per Week:      Minutes of Exercise per Session:    Stress:      Feeling of Stress :    Social Connections:      Frequency of Communication with Friends and Family:      Frequency of Social Gatherings with Friends and Family:      Attends [employer] Services:      Active Member of Clubs or Organizations:      Attends Club or Organization Meetings:      Marital Status:    Intimate Partner Violence:      Fear of Current or Ex-Partner:      Emotionally Abused:      Physically Abused:      Sexually Abused:        Family History:  Family History   Problem Relation Age of Onset     Hypertension Mother      Breast Cancer Mother         64 dx     Allergies Mother      Obesity Mother      Respiratory Mother         Asthma     Heart Disease Mother      Hypertension Maternal Grandmother      Cancer Maternal Grandmother         Ovarian     Cancer Sister         Cervical     Cancer Other         Mat. great  aunt-ovarian     Hypertension Brother      Cancer Maternal Aunt         ?Gastric     Brain Cancer Cousin         maternal     Leukemia Nephew         dx at 23, CML       Review of Systems:  CONSTITUTIONAL: NEGATIVE for weight gain/loss, fever, chills, sweats, drug allergies.  CONSTITUTIONAL:  POSITIVE for  night sweats  EYES: NEGATIVE for changes in vision, blind spots, double vision.  ENT: NEGATIVE for sore throat, sinus pain, post-nasal drip, runny nose, bloody nose  ENT:  POSITIVE for ear pain  CARDIAC: NEGATIVE for fluttering in chest, chest pain or pressure, breathlessness when lying flat, swollen legs or swollen feet.  CARDIAC:  POSITIVE for  fast heart beats  NEUROLOGIC: NEGATIVE headaches, weakness or numbness in the arms or legs.  DERMATOLOGIC: NEGATIVE for rashes, new moles or change in mole(s)  PULMONARY: NEGATIVE SOB at rest, SOB with activity, dry cough, productive cough, coughing up blood, wheezing or whistling when breathing.    GASTROINTESTINAL: NEGATIVE for nausea or vomitting, loose or watery stools, fat or grease in stools, constipation, abdominal pain, bowel movements black in color or blood noted.  GENITOURINARY: NEGATIVE for pain during urination, blood in urine, urinating more frequently than usual, irregular menstrual periods.  MUSCULOSKELETAL: NEGATIVE for bone or joint pain, swollen joints.  MUSCULOSKELETAL:  POSITIVE for  muscle pain  ENDOCRINE: NEGATIVE for increased thirst or urination, diabetes.  LYMPHATIC: NEGATIVE for swollen lymph nodes, lumps or bumps in the breasts or nipple discharge.    Physical Examination:  Vitals: LMP 09/22/2019   BMI= 31.1 kg/m   Height: 5 feet 2.5 inches  Weight: 173 pounds       Bailey Total Score 7/21/2021   Total score - Bailey 1       ESTHER Total Score: 18 (07/21/21 1300)    Ferritin   Date Value Ref Range Status   02/11/2021 21 8 - 252 ng/mL Final     Iron   Date Value Ref Range Status   02/11/2021 50 35 - 180 ug/dL Final     Iron Binding Cap   Date  Value Ref Range Status   02/11/2021 290 240 - 430 ug/dL Final       Lab Results   Component Value Date    WBC 6.5 02/11/2021     Lab Results   Component Value Date    RBC 4.33 02/11/2021     Lab Results   Component Value Date    HGB 12.6 02/11/2021     Lab Results   Component Value Date    HCT 39.3 02/11/2021     Lab Results   Component Value Date    MCV 91 02/11/2021     Lab Results   Component Value Date    MCH 29.1 02/11/2021     Lab Results   Component Value Date    MCHC 32.1 02/11/2021     Lab Results   Component Value Date    RDW 13.2 02/11/2021     Lab Results   Component Value Date     02/11/2021       Last Comprehensive Metabolic Panel:  Sodium   Date Value Ref Range Status   07/06/2021 138 133 - 144 mmol/L Final     Potassium   Date Value Ref Range Status   07/06/2021 4.0 3.4 - 5.3 mmol/L Final     Chloride   Date Value Ref Range Status   07/06/2021 103 94 - 109 mmol/L Final     Carbon Dioxide   Date Value Ref Range Status   07/06/2021 29 20 - 32 mmol/L Final     Anion Gap   Date Value Ref Range Status   07/06/2021 6 3 - 14 mmol/L Final     Glucose   Date Value Ref Range Status   07/06/2021 91 70 - 99 mg/dL Final     Comment:     Fasting specimen     Urea Nitrogen   Date Value Ref Range Status   07/06/2021 13 7 - 30 mg/dL Final     Creatinine   Date Value Ref Range Status   07/06/2021 0.59 0.52 - 1.04 mg/dL Final     GFR Estimate   Date Value Ref Range Status   07/06/2021 >90 >60 mL/min/[1.73_m2] Final     Comment:     Non  GFR Calc  Starting 12/18/2018, serum creatinine based estimated GFR (eGFR) will be   calculated using the Chronic Kidney Disease Epidemiology Collaboration   (CKD-EPI) equation.       Calcium   Date Value Ref Range Status   07/06/2021 8.8 8.5 - 10.1 mg/dL Final     Bilirubin Total   Date Value Ref Range Status   07/06/2021 0.5 0.2 - 1.3 mg/dL Final     Alkaline Phosphatase   Date Value Ref Range Status   07/06/2021 91 40 - 150 U/L Final     ALT   Date Value Ref  Range Status   07/06/2021 37 0 - 50 U/L Final     AST   Date Value Ref Range Status   07/06/2021 21 0 - 45 U/L Final     TSH   Date Value Ref Range Status   12/07/2017 1.44 0.30 - 5.00 uIU/mL Final   01/06/2017 1.27 0.40 - 4.00 mU/L Final   12/21/2015 2.13 0.40 - 4.00 mU/L Final       GENERAL APPEARANCE: alert, no distress and cooperative  RESP: Unlabored, easy breathing with normal conversational speech  NEURO: Alert and oriented x3, mentation intact and speech normal  PSYCH: mentation appears normal and affect normal/bright  Mallampati Class: Unable to examine  Tonsillar Stage: Unable to examine    Impression/Plan:  1. Sleep disturbance  2. Snoring  3. Vivid dream  4. Visual hallucinations  5. Insomnia, unspecified type  6. Iron deficiency  - SLEEP EVALUATION & MANAGEMENT REFERRAL - [facility] [phone]  (Age 18 and up)  - Comprehensive Sleep Study; Future    This is a pleasant 50-year-old female with a PMH pertinent for depression, anxiety, panic attacks, history of bariatric surgery - s/p gastric bypass, hyperparathyroidism, osteopenia, gout, and mild obesity who presents today with reports of occasional snoring, vivid dreams (sees spider on ceiling/wall after awakening at night) frequently which spontaneously resolves, has awakened with small bruises on her legs a few times for the last couple of months, and has always had difficulty sleeping.  Recent MRI brain and sleep deprived EEG results were normal per neurology notes.  Neurology with some concern for possible nocturnal seizure activity contributing to her symptoms versus possible FELICIA.  Patient is also iron deficient as result of malabsorption of iron due to her bariatric surgery.  Oral iron supplementation has not been effective and intravenous iron has been used previously with good benefit, per patient.  A review of her most recent lab work shows that her most recent ferritin level was 21 ng/mL from February 2021, which  may also be playing a role in her frequent awakenings and possibly the bruising on her legs with significant periodic limb movements.  She is unaware of RLS symptoms.  Her BMI today is 31.1 kg/m .  Her [place] score is 1/24 suggestive of no daytime somnolence and her Insomnia Severity Index is 18/28 suggestive of mild-moderate clinical insomnia.  Given her symptoms of snoring, visual hallucinations, vivid dreams and generally poorly restful sleep will recommend further evaluation with polysomnography.      Literature provided regarding sleep apnea.      She will follow up with me in approximately two weeks after her sleep study has been competed to review the results and discuss plan of care.       Polysomnography reviewed.  Obstructive sleep apnea reviewed.  Complications of untreated sleep apnea were reviewed.    55 minutes were spent on the date of the encounter doing chart review, history and exam, documentation and further activities as noted above.       MARIELLE Clay CNP  Sleep Medicine    CC: Kerry Acosta*    This note was written with the assistance of the Dragon voice-dictation technology software. The final document, although reviewed, may contain errors. For corrections, please contact the office.

## 2021-07-21 NOTE — PATIENT INSTRUCTIONS

## 2021-07-22 ENCOUNTER — VIRTUAL VISIT (OUTPATIENT)
Dept: SLEEP MEDICINE | Facility: CLINIC | Age: 51
End: 2021-07-22
Attending: PSYCHIATRY & NEUROLOGY
Payer: COMMERCIAL

## 2021-07-22 DIAGNOSIS — G47.9 SLEEP DISTURBANCE: Primary | ICD-10-CM

## 2021-07-22 DIAGNOSIS — G47.00 INSOMNIA, UNSPECIFIED TYPE: ICD-10-CM

## 2021-07-22 DIAGNOSIS — E61.1 IRON DEFICIENCY: ICD-10-CM

## 2021-07-22 DIAGNOSIS — R06.83 SNORING: ICD-10-CM

## 2021-07-22 DIAGNOSIS — R44.1 VISUAL HALLUCINATIONS: ICD-10-CM

## 2021-07-22 DIAGNOSIS — R68.89 VIVID DREAM: ICD-10-CM

## 2021-07-22 PROBLEM — F41.9 ANXIETY: Status: ACTIVE | Noted: 2021-07-22

## 2021-07-22 PROBLEM — M10.9 GOUT: Status: ACTIVE | Noted: 2021-07-22

## 2021-07-22 PROBLEM — F41.0 PANIC ATTACKS: Status: ACTIVE | Noted: 2021-07-22

## 2021-07-22 PROBLEM — E21.3 HYPERPARATHYROIDISM (H): Status: ACTIVE | Noted: 2019-05-31

## 2021-07-22 PROBLEM — Z98.84 H/O GASTRIC BYPASS: Status: ACTIVE | Noted: 2018-03-26

## 2021-07-22 PROBLEM — F32.A DEPRESSIVE DISORDER: Status: ACTIVE | Noted: 2018-10-11

## 2021-07-22 PROBLEM — M85.89 OSTEOPENIA OF MULTIPLE SITES: Status: ACTIVE | Noted: 2018-12-11

## 2021-07-22 PROCEDURE — 99204 OFFICE O/P NEW MOD 45 MIN: CPT | Mod: 95 | Performed by: NURSE PRACTITIONER

## 2021-07-25 LAB — HEMOCCULT STL QL IA: NEGATIVE

## 2021-07-25 NOTE — RESULT ENCOUNTER NOTE
Criss-      Your FIT test is negative ( normal).  We should rescreen in 1 year.     Napoleon Gray PA-C

## 2021-09-01 ENCOUNTER — ONCOLOGY VISIT (OUTPATIENT)
Dept: ONCOLOGY | Facility: CLINIC | Age: 51
End: 2021-09-01
Attending: INTERNAL MEDICINE
Payer: COMMERCIAL

## 2021-09-01 ENCOUNTER — LAB (OUTPATIENT)
Dept: INFUSION THERAPY | Facility: CLINIC | Age: 51
End: 2021-09-01
Attending: INTERNAL MEDICINE
Payer: COMMERCIAL

## 2021-09-01 VITALS
TEMPERATURE: 98.2 F | DIASTOLIC BLOOD PRESSURE: 74 MMHG | OXYGEN SATURATION: 100 % | SYSTOLIC BLOOD PRESSURE: 106 MMHG | BODY MASS INDEX: 31.07 KG/M2 | WEIGHT: 172.6 LBS | HEART RATE: 56 BPM

## 2021-09-01 DIAGNOSIS — D50.9 IRON DEFICIENCY ANEMIA, UNSPECIFIED IRON DEFICIENCY ANEMIA TYPE: ICD-10-CM

## 2021-09-01 DIAGNOSIS — D50.9 IRON DEFICIENCY ANEMIA, UNSPECIFIED IRON DEFICIENCY ANEMIA TYPE: Primary | ICD-10-CM

## 2021-09-01 PROBLEM — M10.9 GOUT: Status: RESOLVED | Noted: 2021-07-22 | Resolved: 2021-09-01

## 2021-09-01 LAB
ERYTHROCYTE [DISTWIDTH] IN BLOOD BY AUTOMATED COUNT: 13.2 % (ref 10–15)
FERRITIN SERPL-MCNC: 26 NG/ML (ref 8–252)
HCT VFR BLD AUTO: 41.3 % (ref 35–47)
HGB BLD-MCNC: 13 G/DL (ref 11.7–15.7)
IRON SATN MFR SERPL: 20 % (ref 15–46)
IRON SERPL-MCNC: 61 UG/DL (ref 35–180)
MCH RBC QN AUTO: 28.9 PG (ref 26.5–33)
MCHC RBC AUTO-ENTMCNC: 31.5 G/DL (ref 31.5–36.5)
MCV RBC AUTO: 92 FL (ref 78–100)
PLATELET # BLD AUTO: 293 10E3/UL (ref 150–450)
RBC # BLD AUTO: 4.5 10E6/UL (ref 3.8–5.2)
TIBC SERPL-MCNC: 310 UG/DL (ref 240–430)
WBC # BLD AUTO: 5.5 10E3/UL (ref 4–11)

## 2021-09-01 PROCEDURE — 36415 COLL VENOUS BLD VENIPUNCTURE: CPT

## 2021-09-01 PROCEDURE — G0463 HOSPITAL OUTPT CLINIC VISIT: HCPCS

## 2021-09-01 PROCEDURE — 99213 OFFICE O/P EST LOW 20 MIN: CPT | Performed by: INTERNAL MEDICINE

## 2021-09-01 PROCEDURE — 85027 COMPLETE CBC AUTOMATED: CPT | Performed by: INTERNAL MEDICINE

## 2021-09-01 PROCEDURE — 83550 IRON BINDING TEST: CPT | Performed by: INTERNAL MEDICINE

## 2021-09-01 PROCEDURE — 82728 ASSAY OF FERRITIN: CPT | Performed by: INTERNAL MEDICINE

## 2021-09-01 ASSESSMENT — PAIN SCALES - GENERAL: PAINLEVEL: NO PAIN (0)

## 2021-09-01 NOTE — PROGRESS NOTES
"Oncology Rooming Note    September 1, 2021 8:14 AM   Criss Durant is a 50 year old female who presents for:    Chief Complaint   Patient presents with     Oncology Clinic Visit     Initial Vitals: /74   Pulse 56   Temp 98.2  F (36.8  C) (Oral)   Wt 78.3 kg (172 lb 9.6 oz)   LMP 09/22/2019   SpO2 100%   BMI 31.07 kg/m   Estimated body mass index is 31.07 kg/m  as calculated from the following:    Height as of 7/6/21: 1.588 m (5' 2.5\").    Weight as of this encounter: 78.3 kg (172 lb 9.6 oz). Body surface area is 1.86 meters squared.  No Pain (0) Comment: Data Unavailable   Patient's last menstrual period was 09/22/2019.  Allergies reviewed: Yes  Medications reviewed: Yes    Medications: Medication refills not needed today.  Pharmacy name entered into [facility]: [id number] [place] [facility] [address] [address]    Clinical concerns: no       Shari J. Schoenberger, CMA            "

## 2021-09-01 NOTE — PROGRESS NOTES
Medical Assistant Note:  Crsis Durant presents today for blood draw.    Patient seen by provider today: Yes: kris.   present during visit today: Not Applicable.    Concerns: No Concerns.    Procedure:  Lab draw site: lac, Needle type: bf, Gauge: 23.    Post Assessment:  Labs drawn without difficulty: Yes.    Discharge Plan:  Departure Mode: Ambulatory.    Face to Face Time: 5 min  .    Fadia Carr, CMA

## 2021-09-01 NOTE — LETTER
"    9/1/2021         RE: Criss Durant  [id number] [facility] [address]        Dear Colleague,    Thank you for referring your patient, Criss Durant, to the [facility]. Please see a copy of my visit note below.    Oncology Rooming Note    September 1, 2021 8:14 AM   Criss Durant is a 50 year old female who presents for:    Chief Complaint   Patient presents with     Oncology Clinic Visit     Initial Vitals: /74   Pulse 56   Temp 98.2  F (36.8  C) (Oral)   Wt 78.3 kg (172 lb 9.6 oz)   LMP 09/22/2019   SpO2 100%   BMI 31.07 kg/m   Estimated body mass index is 31.07 kg/m  as calculated from the following:    Height as of 7/6/21: 1.588 m (5' 2.5\").    Weight as of this encounter: 78.3 kg (172 lb 9.6 oz). Body surface area is 1.86 meters squared.  No Pain (0) Comment: Data Unavailable   Patient's last menstrual period was 09/22/2019.  Allergies reviewed: Yes  Medications reviewed: Yes    Medications: Medication refills not needed today.  Pharmacy name entered into [employer]: [id number] - [place], [place] - [address] [address]    Clinical concerns: no       Shari J. Schoenberger, [facility]              HEMATOLOGY HISTORY: Ms. Don is a female with iron deficiency anemia. She had gastric bypass surgery in October 2016.   1.  On 03/07/2018 (care everywhere), hemoglobin of 12.3 with MCV of 89.7.   2.  On 04/20/2018, iron of 33, ferritin of 11 and saturation of 11%.   3.  On 07/19/2018, iron of 36, ferritin of 6 and saturation of 12%.     SUBJECTIVE:  Ms. Durant is a 50-year-old female with iron deficiency anemia secondary to malabsorption due to gastric bypass surgery.  The patient previously received IV iron.     The patient has some fatigue.  No excessive fatigue.  The patient gets leg cramps and muscle cramps.  The patient is going to have a sleep study.  There are evaluating for possible seizure during sleep.     No headache.  Occasional dizziness.  No " chest pain.  No shortness of breath at rest.  No nausea or vomiting.  No blood in the urine or stool.  The patient is perimenopausal.  Occasionally, she gets mild vaginal bleeding.  All other review of systems is negative.     PHYSICAL EXAMINATION:    GENERAL:  She is alert and oriented x 3.  Not in any distress.  ECOG PS of 1.   Rest of systems not examined.     LABS:  CBC and iron studies reviewed.     ASSESSMENT:    1.  A 50-year-old female with history of iron deficiency anemia secondary to malabsorption  2.  Iron deficiency anemia, resolved.  3.  Status post gastric bypass surgery.  4.  Leg cramps/muscle cramps.  5.  Fatigue.     PLAN:    1.  Labs were reviewed with the patient.  MCV, iron and ferritin are all normal.  I explained to her that she does not have iron deficiency anemia. She is at risk of developing iron deficiency anemia in future.  She will have CBC and iron studies checked every 6 months.    2.  The patient has leg cramp/muscle cramp.  She is getting evaluation for it.  She also going to get a sleep study.       I explained to the patient that sometimes muscle/leg cramps improve with a higher ferritin level.  If her PCP and other physicians recommend, patient can be given more IV iron.  We will wait for their recommendation.    3.  She will continue on oral vitamin B12 and also multivitamin.    4.  She had multiple questions, which were all answered.  I reassured the patient that at this time, her labs are all good.  I will see her in 1 year's time.  I advised her to call us with any questions or concerns.     TOTAL FACE-TO-FACE TIME SPENT:  Twenty-five minutes, more than 50% of the time spent in counseling and coordination of care.    This office note has been dictated.          Again, thank you for allowing me to participate in the care of your patient.        Sincerely,        Pricilla Rahman MD

## 2021-09-04 ENCOUNTER — HEALTH MAINTENANCE LETTER (OUTPATIENT)
Age: 51
End: 2021-09-04

## 2021-09-07 ENCOUNTER — ALLIED HEALTH/NURSE VISIT (OUTPATIENT)
Dept: FAMILY MEDICINE | Facility: CLINIC | Age: 51
End: 2021-09-07
Payer: COMMERCIAL

## 2021-09-07 DIAGNOSIS — Z23 NEED FOR VACCINATION: Primary | ICD-10-CM

## 2021-09-07 PROCEDURE — 90750 HZV VACC RECOMBINANT IM: CPT

## 2021-09-07 PROCEDURE — 90471 IMMUNIZATION ADMIN: CPT

## 2021-09-11 NOTE — PROGRESS NOTES
HEMATOLOGY HISTORY: Ms. Don is a female with iron deficiency anemia. She had gastric bypass surgery in October 2016.   1.  On 03/07/2018 (care everywhere), hemoglobin of 12.3 with MCV of 89.7.   2.  On 04/20/2018, iron of 33, ferritin of 11 and saturation of 11%.   3.  On 07/19/2018, iron of 36, ferritin of 6 and saturation of 12%.     SUBJECTIVE:  Ms. Durant is a 50-year-old female with iron deficiency anemia secondary to malabsorption due to gastric bypass surgery.  The patient previously received IV iron.     The patient has some fatigue.  No excessive fatigue.  The patient gets leg cramps and muscle cramps.  The patient is going to have a sleep study.  There are evaluating for possible seizure during sleep.     No headache.  Occasional dizziness.  No chest pain.  No shortness of breath at rest.  No nausea or vomiting.  No blood in the urine or stool.  The patient is perimenopausal.  Occasionally, she gets mild vaginal bleeding.  All other review of systems is negative.     PHYSICAL EXAMINATION:    GENERAL:  She is alert and oriented x 3.  Not in any distress.  ECOG PS of 1.   Rest of systems not examined.     LABS:  CBC and iron studies reviewed.     ASSESSMENT:    1.  A 50-year-old female with history of iron deficiency anemia secondary to malabsorption  2.  Iron deficiency anemia, resolved.  3.  Status post gastric bypass surgery.  4.  Leg cramps/muscle cramps.  5.  Fatigue.     PLAN:    1.  Labs were reviewed with the patient.  MCV, iron and ferritin are all normal.  I explained to her that she does not have iron deficiency anemia. She is at risk of developing iron deficiency anemia in future.  She will have CBC and iron studies checked every 6 months.    2.  The patient has leg cramp/muscle cramp.  She is getting evaluation for it.  She also going to get a sleep study.       I explained to the patient that sometimes muscle/leg cramps improve with a higher ferritin level.  If her PCP and other physicians  recommend, patient can be given more IV iron.  We will wait for their recommendation.    3.  She will continue on oral vitamin B12 and also multivitamin.    4.  She had multiple questions, which were all answered.  I reassured the patient that at this time, her labs are all good.  I will see her in 1 year's time.  I advised her to call us with any questions or concerns.     TOTAL FACE-TO-FACE TIME SPENT:  Twenty-five minutes, more than 50% of the time spent in counseling and coordination of care.

## 2021-09-29 ASSESSMENT — ANXIETY QUESTIONNAIRES
4. TROUBLE RELAXING: NEARLY EVERY DAY
6. BECOMING EASILY ANNOYED OR IRRITABLE: SEVERAL DAYS
2. NOT BEING ABLE TO STOP OR CONTROL WORRYING: NEARLY EVERY DAY
7. FEELING AFRAID AS IF SOMETHING AWFUL MIGHT HAPPEN: NEARLY EVERY DAY
GAD7 TOTAL SCORE: 19
GAD7 TOTAL SCORE: 19
1. FEELING NERVOUS, ANXIOUS, OR ON EDGE: NEARLY EVERY DAY
GAD7 TOTAL SCORE: 19
7. FEELING AFRAID AS IF SOMETHING AWFUL MIGHT HAPPEN: NEARLY EVERY DAY
8. IF YOU CHECKED OFF ANY PROBLEMS, HOW DIFFICULT HAVE THESE MADE IT FOR YOU TO DO YOUR WORK, TAKE CARE OF THINGS AT HOME, OR GET ALONG WITH OTHER PEOPLE?: SOMEWHAT DIFFICULT
5. BEING SO RESTLESS THAT IT IS HARD TO SIT STILL: NEARLY EVERY DAY
3. WORRYING TOO MUCH ABOUT DIFFERENT THINGS: NEARLY EVERY DAY

## 2021-09-29 ASSESSMENT — PATIENT HEALTH QUESTIONNAIRE - PHQ9
10. IF YOU CHECKED OFF ANY PROBLEMS, HOW DIFFICULT HAVE THESE PROBLEMS MADE IT FOR YOU TO DO YOUR WORK, TAKE CARE OF THINGS AT HOME, OR GET ALONG WITH OTHER PEOPLE: SOMEWHAT DIFFICULT
SUM OF ALL RESPONSES TO PHQ QUESTIONS 1-9: 15
SUM OF ALL RESPONSES TO PHQ QUESTIONS 1-9: 15

## 2021-09-30 ENCOUNTER — VIRTUAL VISIT (OUTPATIENT)
Dept: PSYCHOLOGY | Facility: CLINIC | Age: 51
End: 2021-09-30
Payer: COMMERCIAL

## 2021-09-30 DIAGNOSIS — F41.1 GAD (GENERALIZED ANXIETY DISORDER): Primary | ICD-10-CM

## 2021-09-30 PROCEDURE — 90834 PSYTX W PT 45 MINUTES: CPT | Mod: 95 | Performed by: PSYCHOLOGIST

## 2021-09-30 ASSESSMENT — COLUMBIA-SUICIDE SEVERITY RATING SCALE - C-SSRS
ATTEMPT LIFETIME: NO
1. IN THE PAST MONTH, HAVE YOU WISHED YOU WERE DEAD OR WISHED YOU COULD GO TO SLEEP AND NOT WAKE UP?: NO
LETHALITY/MEDICAL DAMAGE CODE FIRST ACTUAL ATTEMPT: NO PHYSICAL DAMAGE OR VERY MINOR PHYSICAL DAMAGE
TOTAL  NUMBER OF ABORTED OR SELF INTERRUPTED ATTEMPTS PAST 3 MONTHS: NO
TOTAL  NUMBER OF INTERRUPTED ATTEMPTS PAST 3 MONTHS: NO
TOTAL  NUMBER OF ABORTED OR SELF INTERRUPTED ATTEMPTS PAST LIFETIME: NO
5. HAVE YOU STARTED TO WORK OUT OR WORKED OUT THE DETAILS OF HOW TO KILL YOURSELF? DO YOU INTEND TO CARRY OUT THIS PLAN?: NO
6. HAVE YOU EVER DONE ANYTHING, STARTED TO DO ANYTHING, OR PREPARED TO DO ANYTHING TO END YOUR LIFE?: NO
1. IN THE PAST MONTH, HAVE YOU WISHED YOU WERE DEAD OR WISHED YOU COULD GO TO SLEEP AND NOT WAKE UP?: NO
4. HAVE YOU HAD THESE THOUGHTS AND HAD SOME INTENTION OF ACTING ON THEM?: NO
LETHALITY/MEDICAL DAMAGE CODE FIRST POTENTIAL ATTEMPT: BEHAVIOR NOT LIKELY TO RESULT IN INJURY
6. HAVE YOU EVER DONE ANYTHING, STARTED TO DO ANYTHING, OR PREPARED TO DO ANYTHING TO END YOUR LIFE?: NO
4. HAVE YOU HAD THESE THOUGHTS AND HAD SOME INTENTION OF ACTING ON THEM?: NO
TOTAL  NUMBER OF INTERRUPTED ATTEMPTS LIFETIME: NO
2. HAVE YOU ACTUALLY HAD ANY THOUGHTS OF KILLING YOURSELF?: NO
ATTEMPT PAST THREE MONTHS: NO
3. HAVE YOU BEEN THINKING ABOUT HOW YOU MIGHT KILL YOURSELF?: NO
5. HAVE YOU STARTED TO WORK OUT OR WORKED OUT THE DETAILS OF HOW TO KILL YOURSELF? DO YOU INTEND TO CARRY OUT THIS PLAN?: NO
2. HAVE YOU ACTUALLY HAD ANY THOUGHTS OF KILLING YOURSELF LIFETIME?: YES

## 2021-09-30 ASSESSMENT — ANXIETY QUESTIONNAIRES: GAD7 TOTAL SCORE: 19

## 2021-09-30 ASSESSMENT — PATIENT HEALTH QUESTIONNAIRE - PHQ9: SUM OF ALL RESPONSES TO PHQ QUESTIONS 1-9: 15

## 2021-09-30 NOTE — PROGRESS NOTES
"                 Progress Note - Initial Visit    Client Name:  Criss Durant Date: 21         Service Type: Individual     Visit Start Time: 9:00 AM  Visit End Time: 9:52 AM    Visit #: 1    Attendees: Client    Service Modality:  Phone Visit:      Provider verified identity through the following two step process.  Patient provided:  Patient     The patient has been notified of the following:      \"We have found that certain health care needs can be provided without the need for a face to face visit.  This service lets us provide the care you need with a phone conversation.       I will have full access to your [facility] medical record during this entire phone call.   I will be taking notes for your medical record.      Since this is like an office visit, we will bill your insurance company for this service.       There are potential benefits and risks of telephone visits (e.g. limits to patient confidentiality) that differ from in-person visits.?Confidentiality still applies for telephone services, and nobody will record the visit.  It is important to be in a quiet, private space that is free of distractions (including cell phone or other devices) during the visit.??      If during the course of the call I believe a telephone visit is not appropriate, you will not be charged for this service\"     Consent has been obtained for this service by care team member: Yes        DATA:   Interactive Complexity: No   Crisis: No     Presenting Concerns/  Current Stressors:   Individual/Family/Relational/Societal/Health  Patient was on time and present for the session. Patient presented to the session with concerns related to increasing panic and anxiety attacks.  Patient expressed this has been taking place for the last few years.  Patient has expressed therapy in the past due to health and weight related concerns.  Patient and therapist completed necessary assessments and gathered pertinent background " information necessary to make a clinical assessment.       ASSESSMENT:  Mental Status Assessment:  Appearance:   Unable to assess   Eye Contact:   Unable to assess   Psychomotor Behavior: Unable to assess   Attitude:   Cooperative   Orientation:   All  Speech   Rate / Production: Normal/ Responsive   Volume:  Normal   Mood:    Unable to assess  Affect:    Unable to assess   Thought Content:  Clear   Thought Form:  Coherent   Insight:    Fair       Safety Issues and Plan for Safety and Risk Management:     [place] Suicide Severity Rating Scale (Lifetime/Recent)  [place] Suicide Severity Rating (Lifetime/Recent) 9/30/2021   1. Wish to be Dead (Lifetime) No   1. Wish to be Dead (Recent) No   2. Non-Specific Active Suicidal Thoughts (Lifetime) Yes   2. Non-Specific Active Suicidal Thoughts (Recent) No   3. Active Suicidal Ideation with any Methods (Not Plan) Without Intent to Act (Lifetime) No   3. Active Suicidal Ideation with any Methods (Not Plan) Without Intent to Act (Recent) No   4. Active Suicidal Ideation with Some Intent to Act, Without Specific Plan (Lifetime) No   4. Active Suicidal Ideation with Some Intent to Act, Without Specific Plan (Recent) No   5. Active Suicidal Ideation with Specific Plan and Intent (Lifetime) No   5. Active Suicidal Ideation with Specific Plan and Intent (Recent) No   Most Severe Ideation Rating (Lifetime) NA   Frequency (Lifetime) NA   Duration (Lifetime) NA   Controllability (Lifetime) NA   Protective Factors  (Lifetime) NA   Reasons for Ideation (Lifetime) NA   Most Severe Ideation Rating (Past Month) NA   Frequency (Past Month) NA   Duration (Past Month) NA   Controllability (Past Month) NA   Protective Factors (Past Month) NA   Reasons for Ideation (Past Month) NA   Actual Attempt (Lifetime) No   Actual Attempt (Past 3 Months) No   Has subject engaged in non-suicidal self-injurious behavior? (Lifetime) Yes   Has subject engaged in non-suicidal self-injurious behavior? (Past 3  Months) Yes   Interrupted Attempts (Lifetime) No   Interrupted Attempts (Past 3 Months) No   Aborted or Self-Interrupted Attempt (Lifetime) No   Aborted or Self-Interrupted Attempt (Past 3 Months) No   Preparatory Acts or Behavior (Lifetime) No   Preparatory Acts or Behavior (Past 3 Months) No   Most Recent Attempt Actual Lethality Code NA   Most Lethal Attempt Actual Lethality Code NA   Initial/First Attempt Actual Lethality Code 0   Initial/First Attempt Potential Lethality Code 0     Patient denies current fears or concerns for personal safety.  Patient denies current or recent suicidal ideation or behaviors.  Patient denies current or recent homicidal ideation or behaviors.  Patient denies current or recent self injurious behavior or ideation.  Patient denies other safety concerns.  Recommended that patient call 911 or go to the local ED should there be a change in any of these risk factors.  Patient reports there are no firearms in the house.     Diagnostic Criteria:  A. Excessive anxiety and worry about a number of events or activities (such as work or school performance).   B. The person finds it difficult to control the worry.  C. Select 3 or more symptoms (required for diagnosis). Only one item is required in children.   - Restlessness or feeling keyed up or on edge.    - Being easily fatigued.    - Difficulty concentrating or mind going blank.    - Irritability.    - Muscle tension.   D. The focus of the anxiety and worry is not confined to features of an Axis I disorder.  E. The anxiety, worry, or physical symptoms cause clinically significant distress or impairment in social, occupational, or other important areas of functioning.   F. The disturbance is not due to the direct physiological effects of a substance (e.g., a drug of abuse, a medication) or a general medical condition (e.g., hyperthyroidism) and does not occur exclusively during a Mood Disorder, a Psychotic Disorder, or a Pervasive  Developmental Disorder.    - The aformentioned symptoms began many year(s) ago and occurs 5 days per week and is experienced as mild to moderate.  CRITERIA (A-C) REPRESENT A MAJOR DEPRESSIVE EPISODE - SELECT THESE CRITERIA  A) Recurrent episode(s) - symptoms have been present during the same 2-week period and represent a change from previous functioning 5 or more symptoms (required for diagnosis)   - Depressed mood. Note: In children and adolescents, can be irritable mood.     - Diminished interest or pleasure in all, or almost all, activities.    - Increased sleep.    - Psychomotor activity agitation.    - Fatigue or loss of energy.    - Feelings of worthlessness or inappropriate and excessive guilt.    - Diminished ability to think or concentrate, or indecisiveness.   B) The symptoms cause clinically significant distress or impairment in social, occupational, or other important areas of functioning  C) The episode is not attributable to the physiological effects of a substance or to another medical condition  D) The occurence of major depressive episode is not better explained by other thought / psychotic disorders  E) There has never been a manic episode or hypomanic episode      DSM5 Diagnoses: (Sustained by DSM5 Criteria Listed Above)  Diagnoses: 296.31 (F33.0) Major Depressive Disorder, Recurrent Episode, Mild _  300.02 (F41.1) Generalized Anxiety Disorder  Psychosocial & Contextual Factors: Individual/Family/Health/Relational/Societal  WHODAS 2.0 (12 item):   WHODAS 2.0 Total Score 9/29/2021   Total Score 22   [facility]     Intervention:   Psychodynamic- Patient processed internal experiences   Collateral Reports Completed:  Not Applicable      PLAN: (Homework, other):  1. Provider will continue Diagnostic Assessment.  Patient was given the following to do until next session:  No homework given at this time.    2. Provider recommended the following referrals: No other referrals indicated at this  time.      3.  Safety plan created.  Provider recommended that patient contact support persons or any health care professional for any safety risk or concerns, for any immediate safety risk, please notify 911 immediately.       ALICE Giles  September 30, 2021

## 2021-10-22 ENCOUNTER — VIRTUAL VISIT (OUTPATIENT)
Dept: PSYCHOLOGY | Facility: CLINIC | Age: 51
End: 2021-10-22
Payer: COMMERCIAL

## 2021-10-22 DIAGNOSIS — F41.1 GAD (GENERALIZED ANXIETY DISORDER): Primary | ICD-10-CM

## 2021-10-22 PROCEDURE — 90791 PSYCH DIAGNOSTIC EVALUATION: CPT | Mod: 95 | Performed by: PSYCHOLOGIST

## 2021-10-25 ENCOUNTER — THERAPY VISIT (OUTPATIENT)
Dept: SLEEP MEDICINE | Facility: CLINIC | Age: 51
End: 2021-10-25
Attending: NURSE PRACTITIONER
Payer: COMMERCIAL

## 2021-10-25 DIAGNOSIS — R68.89 VIVID DREAM: ICD-10-CM

## 2021-10-25 DIAGNOSIS — G47.9 SLEEP DISTURBANCE: ICD-10-CM

## 2021-10-25 DIAGNOSIS — R44.1 VISUAL HALLUCINATIONS: ICD-10-CM

## 2021-10-25 DIAGNOSIS — R06.83 SNORING: ICD-10-CM

## 2021-10-25 DIAGNOSIS — G47.00 INSOMNIA, UNSPECIFIED TYPE: ICD-10-CM

## 2021-10-25 DIAGNOSIS — E61.1 IRON DEFICIENCY: ICD-10-CM

## 2021-10-25 PROCEDURE — 95810 POLYSOM 6/> YRS 4/> PARAM: CPT | Performed by: INTERNAL MEDICINE
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
## 2021-10-31 DIAGNOSIS — F41.1 GAD (GENERALIZED ANXIETY DISORDER): ICD-10-CM

## 2021-10-31 NOTE — PROCEDURES
" SLEEP STUDY INTERPRETATION  DIAGNOSTIC POLYSOMNOGRAPHY REPORT      Patient: KOJO ZAMAN  YOB: 1970  Study Date: 10/25/2021  MRN: [id number]  Referring Provider: MD Chavarria Raluca  Ordering Provider: MARIELLE Arroyo CNP, Thomas    Indications for Polysomnography: The patient is a 50 year old Female who is 5' 2\" and weighs 173.0 lbs. Her BMI is 31.4, Ola sleepiness scale 1 and neck circumference is 37 cm. Relevant medical history includes depression, anxiety, panic attacks, s/p gastric bypass, hyperparathyroidism, osteopenia, gout. A diagnostic polysomnogram was performed to evaluate for sleep apnea.    Polysomnogram Data: A full night polysomnogram recorded the standard physiologic parameters including EEG, EOG, EMG, ECG, nasal and oral airflow. Respiratory parameters of chest and abdominal movements were recorded with respiratory inductance plethysmography. Oxygen saturation was recorded by pulse oximetry. Hypopnea scoring rule used: 1B 4%.    Sleep Architecture: Fragmented sleep.   The total recording time of the polysomnogram was 426.4 minutes. The total sleep time was 357.0 minutes. Sleep latency was normal at 19.4 minutes without the use of a sleep aid. REM latency was 188.5 minutes. Arousal index was increased at 48.4 arousals per hour. Sleep efficiency was decreased at 83.7%. Wake after sleep onset was 47.0 minutes. The patient spent 9.9% of total sleep time in Stage N1, 50.7% in Stage N2, 21.1% in Stage N3, and 18.2% in REM. Time in REM supine was 65.0 minutes.    Respiration: Negative for sleep apnea.     Events ? The polysomnogram revealed a presence of 3 obstructive, 2 central, and 3 mixed apneas resulting in an apnea index of 1.3 events per hour. There were 6 obstructive hypopneas and - central hypopneas resulting in an obstructive hypopnea index of 1.0 and central hypopnea index of - events per hour. The combined apnea/hypopnea index was 2.4 events per hour (central " apnea/hypopnea index was 0.3 events per hour). The REM AHI was 8.3 events per hour. The supine AHI was 2.4 events per hour. The RERA index was 1.7 events per hour.  The RDI was 4.0 events per hour.    Snoring - was reported as mild.    Respiratory rate and pattern - was notable for normal respiratory rate and pattern.    Sustained Sleep Associated Hypoventilation - Transcutaneous carbon dioxide monitoring was not used; however significant hypoventilation was not suggested by oximetry.    Sleep Associated Hypoxemia - (Greater than 5 minutes O2 sat at or below 88%) was not present. Baseline oxygen saturation was 95.3%. Lowest oxygen saturation was 89.2%. Time spent less than or equal to 88% was 0 minutes. Time spent less than or equal to 89% was 0 minutes.    Movement Activity: Mildly increased periodic limb movements of sleep without a significant degree of associated arousals.     Periodic Limb Activity - There were 99 PLMs during the entire study. The PLM index was 16.6 movements per hour. The PLM Arousal Index was 2.5 per hour.    REM EMG Activity - Excessive transient muscle activity was present.    Nocturnal Behavior - Abnormal sleep related behaviors were not noted during/arising out of NREM / REM sleep.     Bruxism - None apparent.    Cardiac Summary: Sinus rhythm.   The average pulse rate was 67.4 bpm. The minimum pulse rate was 55.0 bpm while the maximum pulse rate was 93.1 bpm.  Arrhythmias were not noted.    Assessment:     This sleep study is negative for sleep apnea or hypoxemia.    Mildly increased periodic limb movements of sleep without significant degree of associated arousals.      REM sleep without atonia was noted, which can be seen with serotonergic antidepressant therapy or with REM sleep behavioral disorder.     There was an increased frequency of spontaneous arousals. Sleep architecture was otherwise unremarkable with normal proportions of sleep stages.     There were no motor behaviors to  indicate nocturnal seizures.     Recommendations:    Clinical assessment of REM sleep without atonia to evaluate for history of dream enactment behavior/ REM sleep behavioral disorder and evidence of alpha synuclein neurodegeneration. REM sleep without atonia can be an incidental finding in patients receiving Serotonergic antidepressant therapy.     Suggest optimizing sleep schedule and avoiding sleep deprivation.    Pharmacologic therapy should be used for management of restless legs syndrome only if present and clinically indicated and not based on the presence of periodic limb movements alone.    Diagnostic Codes:   Repetitive Intrusions Into Sleep F51.8    10/25/2021 [facility] Study (173.0 lbs) - AHI 2.4, RDI 4.0, Supine AHI 2.4, REM AHI 8.3, Low O2 89.2%, Time Spent ?88% 0 minutes / Time Spent ?89% 0 minutes.    _____________________________________   Electronically Signed By: Phil Alcocer MD 10/30/2021

## 2021-11-01 LAB — SLPCOMP: NORMAL

## 2021-11-01 NOTE — TELEPHONE ENCOUNTER
Napoleon Gray PA-C  [facility] [place]  Answers for HPI/ROS submitted by the patient on 7/6/2021   If you checked off any problems, how difficult have these problems made it for you to do your work, take care of things at home, or get along with other people?: Somewhat difficult  PHQ9 TOTAL SCORE: 11  NED 7 TOTAL SCORE: 16      Please refill as appropriate.  Thank you,    Nani Hatfield RN  [facility]

## 2021-11-03 ENCOUNTER — VIRTUAL VISIT (OUTPATIENT)
Dept: PSYCHOLOGY | Facility: CLINIC | Age: 51
End: 2021-11-03
Payer: COMMERCIAL

## 2021-11-03 DIAGNOSIS — F41.1 GAD (GENERALIZED ANXIETY DISORDER): Primary | ICD-10-CM

## 2021-11-03 PROCEDURE — 90834 PSYTX W PT 45 MINUTES: CPT | Mod: 95 | Performed by: PSYCHOLOGIST

## 2021-11-03 RX ORDER — FLUOXETINE 40 MG/1
CAPSULE ORAL
Qty: 180 CAPSULE | Refills: 0 | Status: SHIPPED | OUTPATIENT
Start: 2021-11-03 | End: 2022-01-25

## 2021-11-08 NOTE — PROGRESS NOTES
Paulette is a 50 year old who is being evaluated via a billable video visit.      Are you currently in the state of [place]: Yes    How would you like to obtain your AVS? MyChart  If the video visit is dropped, the invitation should be resent by: Text to cell phone: [phone]  Will anyone else be joining your video visit? No        Video-Visit Details    Type of service:  Video Visit  Video Start Time: 9:31 AM  Video End Time:  9:51 AM    Originating Location (pt. Location): Home    Distant Location (provider location):  [facility] SLEEP [facility]     Platform used for Video Visit: [facility]      Sleep Study Follow-Up Visit:    Date on this visit: 11/9/2021    Criss Durant is a pleasant 50-year-old female with a PMH pertinent for depression, anxiety, panic attacks, history of bariatric surgery - s/p gastric bypass, hyperparathyroidism, osteopenia, gout, and mild obesity who presents today for follow-up of her sleep study done on 10/25/2021 at the [facility] for possible sleep apnea.    Difficulty with sense of smell? No  Syncope? Yes?  Constipation? Yes? s/p gastric bypass - now on medications for this  Visual hallucinations? Frequent - every night when goes to sleep  Tremor? Yes - with panic attacks and at rest at times in hands  *Family Hx - mother possible early stages PD    Sleep latency 19.4 minutes without Ambien.  REM achieved.   REM latency 188.5 minutes.  Sleep efficiency 83.7%. Total sleep time 357.0 minutes.    Sleep architecture:  Stage 1, 9.9% (5%), stage 2, 50.7% (45-55%), stage 3, 21.1% (15-20%), stage REM, 18.2% (20-25%).  AHI was 2.4, with desaturations down to 89.2%. RDI 4.0.  REM RDI 8.3, consistent with mild REM FELICIA.  Supine RDI 2.4, consistent with no SUPINE FELICIA.  Periodic Limb Movement Index 16.6/hour.  The PLM Arousal Index was 2.5 per hour.  REM EMG Activity - Excessive transient muscle activity was present.  Nocturnal Behavior - Abnormal sleep related  behaviors were not noted during/arising out of NREM / REM sleep.   Bruxism - None apparent    10/25/2021 [facility] Study (173.0 lbs) - AHI 2.4, RDI 4.0, Supine AHI 2.4, REM AHI 8.3, Low O2 89.2%, Time Spent ?88% 0 minutes / Time Spent ?89% 0 minutes.    These findings were reviewed with patient.     Past medical/surgical history, family history, social history, medications and allergies were reviewed.      Problem List:  Patient Active Problem List    Diagnosis Date Noted     Anxiety 07/22/2021     Priority: Medium     Panic attacks 07/22/2021     Priority: Medium     Hyperparathyroidism (H) 05/31/2019     Priority: Medium     Nephrolithiasis 03/24/2019     Priority: Medium     Osteopenia of multiple sites 12/11/2018     Priority: Medium     Formatting of this note might be different from the original.  Bone density testing 12/7/18.       Depressive disorder 10/11/2018     Priority: Medium     Malabsorption of iron 08/02/2018     Priority: Medium     NED (generalized anxiety disorder) 03/27/2018     Priority: Medium     Slow transit constipation 03/27/2018     Priority: Medium     Other specified eating disorder 03/26/2018     Priority: Medium     H/O gastric bypass 03/26/2018     Priority: Medium     Formatting of this note might be different from the original.  Rasheeda-en-Y 2016       Overweight (BMI 25.0-29.9) 10/27/2017     Priority: Medium     Body dysmorphic disorder 01/06/2017     Priority: Medium     Bariatric surgery status 11/01/2016     Priority: Medium     Bilateral edema of lower extremity 06/28/2016     Priority: Medium     CARDIOVASCULAR SCREENING; LDL GOAL LESS THAN 160 12/07/2015     Priority: Medium      Current Outpatient Medications   Medication     calcium-vitamin D-vitamin K (VIACTIV) 500-500-40 MG-UNT-MCG CHEW     Cholecalciferol (VITAMIN D3 PO)     Cyanocobalamin (B-12) 2500 MCG SUBL     docusate sodium (COLACE) 100 MG capsule     FLUoxetine (PROZAC) 40 MG capsule     hydrOXYzine  "(ATARAX) 50 MG tablet     multivitamin  peds with iron (FLINTSTONES COMPLETE) 60 MG chewable tablet     pantoprazole (PROTONIX) 40 MG EC tablet     potassium citrate (UROCIT-K) 10 MEQ (1080 MG) CR tablet     No current facility-administered medications for this visit.     No vitals taken at this visit.  Estimated body mass index is 31.07 kg/m  as calculated from the following:    Height as of 7/6/21: 1.588 m (5' 2.5\").    Weight as of 9/1/21: 78.3 kg (172 lb 9.6 oz).    Impression/Plan:  No significant obstructive sleep apnea or sleep associated hypoxemia was present.  REM sleep behavior disorder  Snoring    This is a pleasant 50-year-old female with a PMH pertinent for depression, anxiety, panic attacks, history of bariatric surgery - s/p gastric bypass, hyperparathyroidism, osteopenia, gout, and mild obesity who presents today for follow-up of her sleep study done on 10/25/2021 at the [facility] which showed no significant obstructive sleep apnea or sleep associated hypoxemia.  REM sleep without atonia was observed on EMG which is suggestive of possible REM sleep behavior disorder versus effects of SSRI medication.  In addition, the patient also notes additional symptoms of possible syncopal events, chronic constipation, visual hallucinations nightly, and hand tremors at times, but denies symptoms of anosmia.  These may be consistent with ancillary symptoms of alpha-synuclein neurodegenerative disorder.  I have recommended that she follow-up with her neurologist, Dr. Kerry Chavarria, at [facility], with these sleep study results for further evaluation for possible PD.    We discussed treatments to consider minimizing her symptoms of RBD including higher dose melatonin and/or clonazepam.    She will follow up with me in about 2 month(s), or sooner should her symptoms worsen or fail to improve.     Twenty-five minutes spent with patient, all of which were spent " face-to-face counseling, consulting, chart review/documentation, and coordinating plan of care.      MARIELLE Clay CNP  Sleep Medicine      CC: Napoleon Gray     This note was written with the assistance of the Dragon voice-dictation technology software. The final document, although reviewed, may contain errors. For corrections, please contact the office.

## 2021-11-09 ENCOUNTER — VIRTUAL VISIT (OUTPATIENT)
Dept: SLEEP MEDICINE | Facility: CLINIC | Age: 51
End: 2021-11-09
Payer: COMMERCIAL

## 2021-11-09 DIAGNOSIS — G47.52 REM SLEEP BEHAVIOR DISORDER: Primary | ICD-10-CM

## 2021-11-09 DIAGNOSIS — R06.83 SNORING: ICD-10-CM

## 2021-11-09 PROCEDURE — 99213 OFFICE O/P EST LOW 20 MIN: CPT | Mod: GT | Performed by: NURSE PRACTITIONER

## 2021-11-09 NOTE — PATIENT INSTRUCTIONS
Tips to safeguard your sleeping environment to prevent injury from dream enactment:    Move hard or sharp objects from the bedside (tables).  Put pillows between you and a bed partner.  Consider using padded side rails on the bed to prevent falling out of bed, or move the mattress to the floor so there is not far to fall.  Consider sleeping in a sleeping bag to reduce how far or hard you can kick/punch.

## 2021-11-09 NOTE — PROGRESS NOTES
"                                           Progress Note    Patient Name: Criss Durant  Date: 11/3/[birth year]         Service Type: Individual      Session Start Time: 2:00 PM  Session End Time: 2:52 PM     Session Length: 38-52 min    Session #: 4    Attendees: Client    Service Modality:  Phone Visit:      Provider verified identity through the following two step process.  Patient provided:  Patient     The patient has been notified of the following:      \"We have found that certain health care needs can be provided without the need for a face to face visit.  This service lets us provide the care you need with a phone conversation.       I will have full access to your [facility] medical record during this entire phone call.   I will be taking notes for your medical record.      Since this is like an office visit, we will bill your insurance company for this service.       There are potential benefits and risks of telephone visits (e.g. limits to patient confidentiality) that differ from in-person visits.?Confidentiality still applies for telephone services, and nobody will record the visit.  It is important to be in a quiet, private space that is free of distractions (including cell phone or other devices) during the visit.??      If during the course of the call I believe a telephone visit is not appropriate, you will not be charged for this service\"     Consent has been obtained for this service by care team member: Yes      Treatment Plan Last Reviewed:   PHQ-9 / NED-7 :     DATA  Interactive Complexity: No  Crisis: No       Progress Since Last Session (Related to Symptoms / Goals / Homework):   Symptoms: No change Patient expressed and exhibited little to no changes in symptoms and behaviors.     Homework: Partially completed      Episode of Care Goals: Satisfactory progress - PREPARATION (Decided to change - considering how); Intervened by negotiating a change plan and determining options / strategies " for behavior change, identifying triggers, exploring social supports, and working towards setting a date to begin behavior change     Current / Ongoing Stressors and Concerns:   Individual/Family/Employment/Health  Patient was on time and present for the session.  Patient expressed current related stressors with work, family and health.  Therapist and patient identified areas of present concern, behaviors and symptoms to come up with an effective treatment plan and interventions     Treatment Objective(s) Addressed in This Session:   identified areas of present concerns behaviors and symptoms       Intervention:   Psychodynamic: processing of internal experiences        ASSESSMENT: Current Emotional / Mental Status (status of significant symptoms):   Risk status (Self / Other harm or suicidal ideation)   Patient denies current fears or concerns for personal safety.   Patient denies current or recent suicidal ideation or behaviors.   Patient denies current or recent homicidal ideation or behaviors.   Patient denies current or recent self injurious behavior or ideation.   Patient denies other safety concerns.   Patient reports there has been no change in risk factors since their last session.     Patient reports there has been no change in protective factors since their last session.     Recommended that patient call 911 or go to the local ED should there be a change in any of these risk factors.     Appearance:   Unable to assess    Eye Contact:   Unable to assess    Psychomotor Behavior: Unable to assess    Attitude:   Cooperative    Orientation:   All   Speech    Rate / Production: Normal     Volume:  Normal    Mood:    Unable to assess   Affect:    Unable to assess    Thought Content:  Unable to assess   Thought Form:  Coherent  Logical    Insight:    Good      Medication Review:   No changes to current psychiatric medication(s)     Medication Compliance:   Yes     Changes in Health Issues:   None  reported     Chemical Use Review:   Substance Use: Chemical use reviewed, no active concerns identified      Tobacco Use: No current tobacco use.      Diagnosis:  Major Depressive Disorder Recurrent with atypical features  Generalized Anxiety Disorder    Collateral Reports Completed:   Not Applicable    PLAN: (Patient Tasks / Therapist Tasks / Other)  Continue to meet for individual therapy and think of one or two goals for therapy         ALICE Giles

## 2021-11-09 NOTE — PROCEDURES
Seizure montage addendum by neuro sleep staff    Electroencephalograph-32 scalp leads were employed and evaluated in a  double banana  montage.  The patient demonstrated a symmetric posterior dominant rhythm of 9-10hz that attenuated with eye opening.  The patient s NREM microarchitecture demonstrated a relatively rapid cyclic alternating pattern (rate approximately every 20-30 seconds). No target behaviors documented to scrutinize for epileptic activity.      This study should not be conclusive in ruling out epileptic activity. If there is a lingering clinical concern for seizure recommend referral to epilepsy division for prolonged video EEG monitoring in EMU.

## 2021-11-30 ENCOUNTER — VIRTUAL VISIT (OUTPATIENT)
Dept: PSYCHOLOGY | Facility: CLINIC | Age: 51
End: 2021-11-30
Payer: COMMERCIAL

## 2021-11-30 DIAGNOSIS — F41.1 GAD (GENERALIZED ANXIETY DISORDER): Primary | ICD-10-CM

## 2021-11-30 PROCEDURE — 90834 PSYTX W PT 45 MINUTES: CPT | Mod: 95 | Performed by: PSYCHOLOGIST

## 2021-12-01 NOTE — PROGRESS NOTES
"                                            Progress Note     Patient Name: Criss Durant                  Date:   21                                           Service Type: Individual                            Session Start Time:  12:30 PM                       Session End Time:    12:52 PM                Session Length:        38-52 min     Session #:      5     Attendees:     Client     Service Modality:  Phone Visit:      Provider verified identity through the following two step process.  Patient provided:  Patient      The patient has been notified of the following:      \"We have found that certain health care needs can be provided without the need for a face to face visit.  This service lets us provide the care you need with a phone conversation.       I will have full access to your [facility] medical record during this entire phone call.   I will be taking notes for your medical record.      Since this is like an office visit, we will bill your insurance company for this service.       There are potential benefits and risks of telephone visits (e.g. limits to patient confidentiality) that differ from in-person visits.?Confidentiality still applies for telephone services, and nobody will record the visit.  It is important to be in a quiet, private space that is free of distractions (including cell phone or other devices) during the visit.??      If during the course of the call I believe a telephone visit is not appropriate, you will not be charged for this service\"     Consent has been obtained for this service by care team member: Yes                 Treatment Plan Last Reviewed:   PHQ-9 / NED-7 :      DATA  Interactive Complexity: No  Crisis: No                                   Progress Since Last Session (Related to Symptoms / Goals / Homework):              Symptoms: No change Patient expressed and exhibited little to no changes in symptoms and behaviors.      Homework: Partially " completed                            Episode of Care Goals: Satisfactory progress - PREPARATION (Decided to change - considering how); Intervened by negotiating a change plan and determining options / strategies for behavior change, identifying triggers, exploring social supports, and working towards setting a date to begin behavior change                 Current / Ongoing Stressors and Concerns:              Individual/Family/Employment/Health  Patient was on time and present for the session.  Patient expressed current related stressors with work, family and health.  Patient expressed that they have had some difficulties but appear to be managing well.  Patient expressed family conflict over the holidays.  Patient also expressed being assessed for a sleep disorder and that are further assessing her condition.  Patient expressed that they have sudden like and jolt like symptoms while they are sleeping that is preventing sleep.  Patient expressed having poor sleep and not getting more than 4 hours of sleep a night at best.  When discussing treatment planning, this was difficult due to recent insight into poor quality of sleep for patient and ability to function at their level.  Will continue to discuss treatment planning.                  Treatment Objective(s) Addressed in This Session:          identified areas of present concerns behaviors and symptoms                    Intervention:              Psychodynamic: processing of internal experiences                              ASSESSMENT: Current Emotional / Mental Status (status of significant symptoms):              Risk status (Self / Other harm or suicidal ideation)              Patient denies current fears or concerns for personal safety.              Patient denies current or recent suicidal ideation or behaviors.              Patient denies current or recent homicidal ideation or behaviors.              Patient denies current or recent self injurious behavior  or ideation.              Patient denies other safety concerns.              Patient reports there has been no change in risk factors since their last session.                Patient reports there has been no change in protective factors since their last session.                Recommended that patient call 911 or go to the local ED should there be a change in any of these risk factors.                 Appearance:                            Unable to assess               Eye Contact:                           Unable to assess               Psychomotor Behavior:          Unable to assess               Attitude:                                   Cooperative               Orientation:                             All              Speech                          Rate / Production:       Normal                           Volume:                       Normal               Mood:                                      Unable to assess              Affect:                                      Unable to assess               Thought Content:                    Unable to assess              Thought Form:                        Coherent  Logical               Insight:                                     Good                  Medication Review:              No changes to current psychiatric medication(s)                 Medication Compliance:              Yes                 Changes in Health Issues:              None reported                 Chemical Use Review:              Substance Use: Chemical use reviewed, no active concerns identified                  Tobacco Use: No current tobacco use.       Diagnosis:  Major Depressive Disorder Recurrent with atypical features  Generalized Anxiety Disorder     Collateral Reports Completed:              Not Applicable     PLAN: (Patient Tasks / Therapist Tasks / Other)  Continue to meet for individual therapy and think of one or two goals for therapy            ALICE Giles

## 2021-12-13 NOTE — NURSING NOTE
Note has been mailed to [facility].  Pt on wait list for device.      ANDREY Rodriguez

## 2022-01-03 ENCOUNTER — TRANSFERRED RECORDS (OUTPATIENT)
Dept: HEALTH INFORMATION MANAGEMENT | Facility: CLINIC | Age: 52
End: 2022-01-03
Payer: COMMERCIAL

## 2022-01-24 DIAGNOSIS — F41.1 GAD (GENERALIZED ANXIETY DISORDER): ICD-10-CM

## 2022-01-25 RX ORDER — FLUOXETINE 40 MG/1
CAPSULE ORAL
Qty: 180 CAPSULE | Refills: 1 | Status: SHIPPED | OUTPATIENT
Start: 2022-01-25 | End: 2022-07-28

## 2022-02-01 ENCOUNTER — VIRTUAL VISIT (OUTPATIENT)
Dept: SURGERY | Facility: CLINIC | Age: 52
End: 2022-02-01
Payer: COMMERCIAL

## 2022-02-01 VITALS — BODY MASS INDEX: 30.3 KG/M2 | WEIGHT: 171 LBS | HEIGHT: 63 IN

## 2022-02-01 DIAGNOSIS — N20.0 NEPHROLITHIASIS: Primary | ICD-10-CM

## 2022-02-01 DIAGNOSIS — Z98.84 BARIATRIC SURGERY STATUS: ICD-10-CM

## 2022-02-01 DIAGNOSIS — K91.2 POSTSURGICAL MALABSORPTION: ICD-10-CM

## 2022-02-01 DIAGNOSIS — F50.89 OTHER DISORDER OF EATING: ICD-10-CM

## 2022-02-01 PROCEDURE — 99215 OFFICE O/P EST HI 40 MIN: CPT | Mod: GT | Performed by: PHYSICIAN ASSISTANT

## 2022-02-01 RX ORDER — PANTOPRAZOLE SODIUM 40 MG/1
40 TABLET, DELAYED RELEASE ORAL 2 TIMES DAILY
Qty: 180 TABLET | Refills: 3 | Status: SHIPPED | OUTPATIENT
Start: 2022-02-01 | End: 2022-10-06

## 2022-02-01 ASSESSMENT — MIFFLIN-ST. JEOR: SCORE: 1341.84

## 2022-02-01 NOTE — PATIENT INSTRUCTIONS
Nice to talk with you today.  Below is our plan we discussed.-  MANISHA Lynch    Labs have been ordered in the system.You can have these drawn at any [facility] lab.  Please call [phone] set up an appointment.  Your results will be posted on P21 as soon as they're complete.  After all are resulted, I will review and comment to you.    Your pantroprazole has been refilled    Keep a watch on your abdominal pain and when it occurs, an if it worsens.      FOLLOW-UP:    Please call [phone] to schedule your next visit to see diego Milian in 1-2 weeks and with Cris Paez PA-C annually or sooner if a check in works better in 4 months.       We are always here for you!             Bariatric Post Op Guidelines  General:    To avoid marginal ulcers avoid all forms of tobacco, alcohol in excess, caffeine, and NSAIDS (unless indicated for cardioprotection or othewise and opposed by a PPI).    Exercise is key for continued weight loss and weight maintenance. Aim for 30-60 minutes of physical activity most days of the week. Include cardiovascular and strength training.    Continue lifelong vitamins supplementation and annual lab follow up.  All  patients should supplement with the following bariatric postoperative vitamins:  2 Complete multivitamins with minerals (at different times than calcium)  Vitamin D 5000 Int Units/125 mg daily   Calcium 600 mg twice daily or 500 mg hree times daily   Vitamin B12: 500 mcg of sl daily or 1000 mcg Inj monthly  B complex daily or Thiamine 100 mg weekly  1 Iron/Vit C. Daily for females who menstruate and/or as directed    The bariatric team should be aware and potentially evaluate all adverse gastrointestinal symptoms which can be a sign of complication. Inability to tolerate textured solid food (chicken, steak, fish) may need to be evaluated by endoscopy.    There is a 10% increase of Alcohol Use Disorder in patients with bariatric surgery.   Most often  occurring around 2 years post op.  Please call the clinic if you feel alcohol is interfering in your daily life.  We can help.     Follow up annually lifelong. Obesity is a chronic disease.  Weight gain can be expected. The goal of follow-up visits is to ensure adequate vitamin and protein absorption, evaluate food intake behavior, review exercise/activity level, and assist with weight regain.    Nutritional:  Eat 3 meals per day  (No snacks between meals.)  Do not skip meals.  This can cause overeating at the next meal and will prevent adequate protein and nutritional intake.    Aim for 60-80 grams of protein per day.  Always eat your protein first. This assists with optimal nutrition and helps you stay full longer.    Eat your protein first, and then follow with fiber.    Add fiber by including fruits, vegetables, whole grains, and beans.     Portions should be about 1 cup per meal. Use measuring cups to be accurate.  Continue to use saucer/salad plates, infant/toddler silverware to keep portion sizes small and take small bites.    Eat S-L-O-W-L-Y to make each meal last 20-30 minutes. Always stop eating when satisfied.    Aim for 64 oz. of calorie-free fluids daily.    Avoid drinking 30 min before, during, and 30 min after meal    Avoid high sugar and high fat foods to prevent high calorie intake. This will reduce your rate of weight loss and can cause weight regain.   Check nutrition labels for less than 10 grams of sugar and less than 10 grams of fat per serving.

## 2022-02-01 NOTE — PROGRESS NOTES
Paulette is a 51 year old who is being evaluated via a billable video visit.      If the video visit is dropped, the invitation should be resent by: Text to cell phone: [phone]  Will anyone else be joining your video visit? No      Video-Visit Details    Type of service:  Video Visit    Video Start Time: 8:36 AM    Video End Time: 9:02 AM    Originating Location (pt. Location): Home    Distant Location (provider location):  [facility] [place]     Platform used for Video Visit: [facility] Bariatric Surgery Note    RE: Criss Durant  MR#: [id number]  : 1970  VISIT DATE: 2022    Dear Napoleon Gray,    I had the pleasure of seeing your patient, Criss Durant, in my post-bariatric surgery assessment clinic.    CHIEF COMPLAINT: Post-bariatric surgery follow-up    HISTORY OF PRESENT ILLNESS:  Questions Regarding Prior Weight Loss Surgery Reviewed With Patient 2022   I had the following weight loss procedure: Rasheeda-en-y Gastric Bypass   What year was your surgery? 2016   How has your weight changed since your last visit? I have gained weight   Are you currently taking any weight loss medications? No   Do you currently have any of the following: Heartburn, acid reflux, or GERD (acid reflux disease)?   Have you been to the Emergency room since your last visit with us? Yes   Were you in the hospital since your last visit with us? No   Do you have any concerns today? None       Paulette has a new job.  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  She weighs a bit more now but  has more muscle build up.  She is walking about 26 miles a day.  She knows this but struggles with the weight gain a little.    She is much better at making sure she eats 3 meals a day.  She informs herwork partners she needs to eats 3 meals a day.  Otherwise she can pass out because of her high energy in her day.    Notices she is falling back into some of her previous habits.    She eats 2 greak yogurts and 1/3 to 1/2 cup of dry cereal because she wants to make ras her stomach will accept this if she is at work.  (Feels this is partly her mind-nervous she might get sick at work.  Even if they have a lunch on she won't try it, and she doesn't know how to combat that.) Would like to get past this. She weighs herself once a week.      Continues to follow up with urology regarding her kidney stone and is sticking to a low sodium and low oxalate diet. Drinking 75 to 80 oz water daily    She continue to take pantoprazole daily due to herp Perforated gastric marginal ulcer repair 10/24/2018.  Needs a refill.    As far as her history of binge eating disorder she feels she is doing better. Had been following up with [facility] but stopped this with the pandemic.  Tried online therapist but this was not working.  She feels like she is in check but sometimes er anxiety gets the best of her.  She gets some shakiness.      Patient is taking the following bariatric postoperative vitamins:  2 Complete multivitamins with minerals (at different times than calcium)   2000 International Units of Vitamin D daily  600 mg of Calcium citrate BID  2500 mcg of Vitamin B12 sl 3 times weekly  Sees hematologist and gets iron infusions when necessary. No oral iron      Weight History:     1/31/2022   What is your highest lifetime weight? 333   What is your lowest weight since surgery? (In pounds) 119     Initial Weight (lbs): 307.7 lbs  Weight: 171 lb (77.6 kg) (pt reported)  Last Visits Weight: 164 lb (74.4 kg)  Cumulative weight loss (lbs): 136.7  Weight Loss Percentage: 44.43%    Questions Regarding Co-Morbidities and Health Concerns Reviewed With Patient 1/31/2022   Pre-diabetes: Gone away   Diabetes II: Never   High Blood Pressure: Never   High cholesterol: Improved   Heartburn/Reflux: Never   Are you taking daily medication for heartburn, acid reflux, or GERD (acid reflux disease)? Yes   Sleep apnea: Never   PCOS:  Never   Back pain: Gone away   Joint pain: Gone away   Lower leg swelling: Stayed the same       Eating Habits 1/31/2022   How many meals do you eat per day? 3   Do you snack between meals? Sometimes   How much food are you eating at each meal? 1/2 cup to 1 cup   Are you able to separate your meals and liquids by at least 30 minutes? Yes   Are you able to avoid liquid calories? Yes       Exercise Questions Reviewed With Patient 1/31/2022   How often do you exercise? 3 to 4 times per week   What is the duration of your exercise (in minutes)? 20 Minutes   What types of exercise do you do? walking, gym membership, climbing stairs at work, other   What keeps you from being more active?  I am as active as I can possbily be       Social History:      1/31/2022   Are you smoking? No   Are you drinking alcohol? No       Medications:  Current Outpatient Medications   Medication     calcium-vitamin D-vitamin K (VIACTIV) 500-500-40 MG-UNT-MCG CHEW     Cholecalciferol (VITAMIN D3 PO)     Cyanocobalamin (B-12) 2500 MCG SUBL     docusate sodium (COLACE) 100 MG capsule     FLUoxetine (PROZAC) 40 MG capsule     hydrOXYzine (ATARAX) 50 MG tablet     multivitamin  peds with iron (FLINTSTONES COMPLETE) 60 MG chewable tablet     pantoprazole (PROTONIX) 40 MG EC tablet     potassium citrate (UROCIT-K) 10 MEQ (1080 MG) CR tablet     No current facility-administered medications for this visit.         1/31/2022   Do you avoid NSAIDs such as (Ibuprofen, Aleve, Naproxen, Advil)?   Yes       ROS:  GI:      1/31/2022   Vomiting: Yes- food specific, red meats, when stressed anxiety   Diarrhea: Yes   Constipation: No   Swallowing trouble: Yes   Abdominal pain: Yes, comes and goes   Heartburn: No   Rash in skin folds: No   Depression: Yes, on proazac   Stress urinary incontinence No     Skin:   BAR RBS ROS - SKIN 1/31/2022   Rash in skin folds: No     Psych:      1/31/2022   Depression: Yes   Anxiety: Yes     She is having some sleep issues at  "night.  Is getting some nocturnal hallucinations and REM Behavioral disorder.      Female Only:   BAR RBS ROS - FEMALE ONLY 1/31/2022   Female only: Loss of menstrual cycles       LABS/IMAGING/MEDICAL RECORDS REVIEW:   Hemoglobin A1C POCT   Date Value Ref Range Status   12/07/2017 5.2 4.0 - 6.0 % Final     Vitamin D Deficiency screening   Date Value Ref Range Status   12/06/2019 45 20 - 75 ug/L Final     Comment:     Season, race, dietary intake, and treatment affect the concentration of   25-hydroxy-Vitamin D. Values may decrease during winter months and increase   during summer months. Values 20-29 ug/L may indicate Vitamin D insufficiency   and values <20 ug/L may indicate Vitamin D deficiency.  Vitamin D determination is routinely performed by an immunoassay specific for   25 hydroxyvitamin D3.  If an individual is on vitamin D2 (ergocalciferol)   supplementation, please specify 25 OH vitamin D2 and D3 level determination by   LCMSMS test [id number].       Parathyroid Hormone Intact   Date Value Ref Range Status   05/29/2020 68 18 - 80 pg/mL Final     Vitamin B12   Date Value Ref Range Status   12/06/2019 460 193 - 986 pg/mL Final     Hemoglobin   Date Value Ref Range Status   09/01/2021 13.0 11.7 - 15.7 g/dL Final     Ferritin   Date Value Ref Range Status   09/01/2021 26 8 - 252 ng/mL Final     Iron   Date Value Ref Range Status   09/01/2021 61 35 - 180 ug/dL Final     Iron Binding Capacity   Date Value Ref Range Status   09/01/2021 310 240 - 430 ug/dL Final     Iron Saturation Index   Date Value Ref Range Status   02/11/2021 17 15 - 46 % Final   08/13/2020 14 (L) 15 - 46 % Final     Iron Sat Index   Date Value Ref Range Status   09/01/2021 20 15 - 46 % Final       PHYSICAL EXAMINATION:  Ht 5' 2.5\" (1.588 m)   Wt 171 lb (77.6 kg)   LMP 09/22/2019   BMI 30.78 kg/m    GENERAL: Healthy, alert and no distress  EYES: Eyes grossly normal to inspection.  No discharge or erythema, or obvious scleral/conjunctival " abnormalities.  RESP: No audible wheeze, cough, or visible cyanosis.  No visible retractions or increased work of breathing.    SKIN: Visible skin clear. No significant rash, abnormal pigmentation or lesions.  NEURO: Cranial nerves grossly intact.  Mentation and speech appropriate for age.  PSYCH: Mentation appears normal, affect normal/bright, judgement and insight intact, normal speech and appearance well-groomed.    ASSESSMENT AND PLAN:        Bariatric surgery status  10/26/2016 RYGBS    Postsurgical malabsorption  Continue taking recommended post-op vitamins.  Labs ordered per protocol.  - Vitamin B12; Future  - Vitamin D Screen; Future  - Parathyroid Hormone Intact; Future  - NUTRITION REFERRAL; Future  - Vitamin A; Future    Nephrolithiasis  Stable. Continues to follow up with urology regarding her kidney stone and is sticking to a low sodium and low oxalate diet. Drinking 75 to 80 oz water daily    Other disorder of eating  - NUTRITION REFERRAL; Future  Paulette will call to se Milian to discuss  Disordered eating habits that are starting to creep back in.  Will ask about addditional options to explore for lunch at work.      FOLLOW-UP:    Please call [phone] to schedule your next visit to see diego Milian in 1-2 weeks and with Cris Paez PA-C annually or sooner if a check in works better in 4 months.     Follow up: Return to clinic in     40 minutes spent on the date of the encounter doing chart review, patient visit, formulation of plan of care, and documentation .

## 2022-02-03 ENCOUNTER — LAB (OUTPATIENT)
Dept: LAB | Facility: CLINIC | Age: 52
End: 2022-02-03
Payer: COMMERCIAL

## 2022-02-03 ENCOUNTER — MYC MEDICAL ADVICE (OUTPATIENT)
Dept: SURGERY | Facility: CLINIC | Age: 52
End: 2022-02-03

## 2022-02-03 DIAGNOSIS — K91.2 POSTSURGICAL MALABSORPTION: ICD-10-CM

## 2022-02-03 DIAGNOSIS — Z98.84 BARIATRIC SURGERY STATUS: ICD-10-CM

## 2022-02-03 DIAGNOSIS — E21.1 SECONDARY HYPERPARATHYROIDISM, NON-RENAL (H): ICD-10-CM

## 2022-02-03 DIAGNOSIS — E55.9 VITAMIN D DEFICIENCY: Primary | ICD-10-CM

## 2022-02-03 LAB
DEPRECATED CALCIDIOL+CALCIFEROL SERPL-MC: 30 UG/L (ref 20–75)
PTH-INTACT SERPL-MCNC: 100 PG/ML (ref 18–80)
VIT B12 SERPL-MCNC: 326 PG/ML (ref 193–986)

## 2022-02-03 PROCEDURE — 99000 SPECIMEN HANDLING OFFICE-LAB: CPT

## 2022-02-03 PROCEDURE — 83970 ASSAY OF PARATHORMONE: CPT

## 2022-02-03 PROCEDURE — 82306 VITAMIN D 25 HYDROXY: CPT

## 2022-02-03 PROCEDURE — 84590 ASSAY OF VITAMIN A: CPT | Mod: 90

## 2022-02-03 PROCEDURE — 36415 COLL VENOUS BLD VENIPUNCTURE: CPT

## 2022-02-03 PROCEDURE — 82607 VITAMIN B-12: CPT

## 2022-02-08 LAB
ANNOTATION COMMENT IMP: NORMAL
RETINYL PALMITATE SERPL-MCNC: <0.02 MG/L
VIT A SERPL-MCNC: 0.56 MG/L

## 2022-02-24 ENCOUNTER — VIRTUAL VISIT (OUTPATIENT)
Dept: SURGERY | Facility: CLINIC | Age: 52
End: 2022-02-24
Payer: COMMERCIAL

## 2022-02-24 DIAGNOSIS — Z98.84 BARIATRIC SURGERY STATUS: ICD-10-CM

## 2022-02-24 PROCEDURE — 97803 MED NUTRITION INDIV SUBSEQ: CPT | Mod: GT | Performed by: DIETITIAN, REGISTERED

## 2022-02-24 NOTE — PATIENT INSTRUCTIONS
GOALS:  Trial of fruit (blueberries) or other fruit by next appointment   Trial of cottage cheese by next appointment     Please call [phone] to schedule appointment for next month.    Thanks,    Bebe Mcdowell, RD, LD  [facility] Diet[facility]   [phone] (office phone)

## 2022-02-24 NOTE — PROGRESS NOTES
"Video-Visit Details    Type of service:  Video Visit    Video Start Time (time video started): 8:58    Video End Time (time video stopped): 9:33    Originating Location (pt. Location): Home    Distant Location (provider location):  [facility] [place]     Mode of Communication:  Video Conference via locr    NUTRITION POST OP APPOINTMENT  DATE OF VISIT: February 24, 2022    Criss Durant  1970  female  [id number]  51 year old     ASSESSMENT:    REASON FOR VISIT:  Criss is a 51 year old year old female presents today for 6 year PO nutrition follow-up appointment. Patient is accompanied by self.    DIAGNOSIS:  Status post gastric bypass surgery.  Obesity Grade I BMI 30-34.9     ANTHROPOMETRICS:  Initial Weight: 307.7 lbs   Height: 5'2.5\"  Current Weight: 171 lbs    BMI: 30.7 kg/(m^2).    VITAMINS AND MINERALS:  2 Complete multivitamins with minerals (at different times than calcium)   2000 International Units of Vitamin D daily  600 mg of Calcium citrate BID  2500 mcg of Vitamin B12 sl 3 times weekly (increase 5 times per week per PA-C)  Sees hematologist and gets iron infusions when necessary. No oral iron     NUTRITION HISTORY:  Breakfast: 2 yogurts and 1/3 cup cereal; 2 scrambled eggs with cheese   Lunch: yogurt or crackers and cheese (dannon light and fit greek yogurt tiramuso -2-4 per day)   Supper: baked potato with avocado + ground turkey (2 T) -felt overly full (deep breathing and ride the waves of emotion)   Fluids consumed: Water  Consuming liquid calories: No  Protein intake: 60+ grams/day  Tolerate regular texture food: No  Any foods not tolerated details: Yes  If any food not tolerated: red meat, lettuce   Portion size: 1/2 cup -11/2 cups depending on foods eaten   Take 20-30 minutes to consume each meal: Yes   Eat protein foods first: Yes  Fluids and meals separate by at least 30 minutes: Yes  Chew foods thoroughly: Yes  Tolerating diet: No  Additional " Information: Patient has not been seen in clinic for years.  Patient states will vomit 5-10 minutes after eating (struggling with purging and restriction).  Patient is trying to prevent purging.  Patient states cannot eat anything but yogurt when she is working as is afraid will vomit.  Patient with history of nephrolithiasis and follows low sodium and low oxalate diet.  Patient did not know amount of sodium to follow so limits as much as possible.  Patient cannot eat rice,nuts, banana.  Patient states 1 bite of banana or nuts will cause constipation for 5-7 days.  Patient does not eat red meat or chicken or bread.      Allergic to fish     [place] squash; low sodium wheat thins or unsalted donkey chips with cheese and shredded chicken     PHYSICAL ACTIVITY:  Type: walking, gym membership   Frequency (days per week): 3-4  Duration (min): 20 minutes     DIAGNOSIS:  Previous Nutrition Diagnosis: Altered gastrointestinal function related to alteration in gastrointestinal structure as evidenced by history of gastric bypass surgery.- no change    Previous goals:  N/A as has not been in clinic for years     Current Nutrition Diagnosis: Altered gastrointestinal function related to alteration in gastrointestinal structure as evidenced by history of gastric bypass surgery.    INTERVENTION:   Nutrition Prescription: Eat 3 meals a day at regular intervals. Consume 60-90 grams of protein daily. Follow post-surgical vitamin and mineral protocol.  Assessed learning needs and learning preferences.    GOALS:  Trial of fruit (blueberries) or other fruit   Trial of cottage cheese by next appointment     Implementation: Discussed progress toward previous goals; reinforced importance of following bariatric lifestyle changes.  Reviewed diet tolerance and determined food options patient could trial for next appointment.      NUTRITION MONITORING AND EVALUATION:  Expected patient engagement: good  Verbalized good  understanding.    Follow up: Patient to follow up in 1 month.     TIME SPENT WITH PATIENT:  35 minutes    Bebe Jasso, RD, LD  [facility] Dietitian/[facility]   [phone] (office phone)

## 2022-02-25 ENCOUNTER — OFFICE VISIT (OUTPATIENT)
Dept: URGENT CARE | Facility: URGENT CARE | Age: 52
End: 2022-02-25
Payer: COMMERCIAL

## 2022-02-25 VITALS
RESPIRATION RATE: 16 BRPM | OXYGEN SATURATION: 100 % | SYSTOLIC BLOOD PRESSURE: 108 MMHG | TEMPERATURE: 97.3 F | HEART RATE: 60 BPM | WEIGHT: 172 LBS | BODY MASS INDEX: 30.96 KG/M2 | DIASTOLIC BLOOD PRESSURE: 69 MMHG

## 2022-02-25 DIAGNOSIS — H53.9 VISUAL DISTURBANCE: Primary | ICD-10-CM

## 2022-02-25 PROCEDURE — 99213 OFFICE O/P EST LOW 20 MIN: CPT | Performed by: FAMILY MEDICINE

## 2022-02-25 RX ORDER — ALLOPURINOL 300 MG/1
1 TABLET ORAL DAILY
COMMUNITY
Start: 2022-01-28 | End: 2023-10-30

## 2022-02-25 NOTE — PROGRESS NOTES
"To [facility] ED ---Visual disturbance ---r/o retinal detachment vs opthalmic migraine.    SUBJECTIVE:  Criss Durant, a 51 year old female scheduled an appointment to discuss the following issues:  Visual disturbance    Medical, social, surgical, and family histories reviewed.     Cloudy Vision Both Eyes (Pt states she was at work today and started to get blurry vision. Pt states when she went outside the sun hurts her eyes. 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  She has also been diagnosed migraine without headache by neurologist.  1 year ago she had similar symptoms.  Around 2 PM today, patient was seeing \"squares, and light flashing\" in both her eyes and she felt lightheaded and photophobic but no headache.  Nature of her migraine was usually a pounding headache, and this is different.  Patient denies any alcohol or caffeine intake.  No street drug use.    ROS:  See HPI.  No nausea/vomiting.  No fever/chills.  No chest pain/SOB.  No BM/urine problems.  No dizziness or syncope.      OBJECTIVE:  /69   Pulse 60   Temp 97.3  F (36.3  C) (Tympanic)   Resp 16   Wt 78 kg (172 lb)   LMP 09/22/2019   SpO2 100%   BMI 30.96 kg/m    EXAM:  GENERAL APPEARANCE: alert and mild distress; afebrile, no cyanosis or accessory muscle use, GCS 15, moist mucous membrane  EYES: Eyes grossly normal to inspection, PERRL and conjunctivae and sclerae normal; normal EOM, no nystagmus; vision left 20/25, right 20/25  HENT: ear canals and TM's normal and nose and mouth without ulcers or lesions  NECK: no adenopathy, no asymmetry, masses, or scars and thyroid normal to palpation  RESP: lungs clear to auscultation - no rales, rhonchi or wheezes  CV: regular rates and rhythm, normal S1 S2, no S3 or S4 and no murmur, click or rub  LYMPHATICS: no cervical adenopathy  ABDOMEN: soft, non-tender  MS: " extremities normal- no gross deformities noted  SKIN: no suspicious lesions or rashes  NEURO: Normal strength and tone, mentation intact and speech normal; no focal neurological deficit, no pronator drift or facial asymmetry      ASSESSMENT/PLAN:  (H53.9) Visual disturbance  (primary encounter diagnosis)  Comment: Could be retinal detachment versus ophthalmic migraine  Plan:   To [facility] ED ---Visual disturbance ---r/o retinal detachment vs opthalmic migraine.  [facility].  Patient will be driven to hospital by her relative.  Patient left the clinic in a hemodynamically stable condition.

## 2022-03-01 ENCOUNTER — LAB (OUTPATIENT)
Dept: INFUSION THERAPY | Facility: CLINIC | Age: 52
End: 2022-03-01
Attending: INTERNAL MEDICINE
Payer: COMMERCIAL

## 2022-03-01 DIAGNOSIS — D50.9 IRON DEFICIENCY ANEMIA, UNSPECIFIED IRON DEFICIENCY ANEMIA TYPE: ICD-10-CM

## 2022-03-01 LAB
ERYTHROCYTE [DISTWIDTH] IN BLOOD BY AUTOMATED COUNT: 13.5 % (ref 10–15)
FERRITIN SERPL-MCNC: 32 NG/ML (ref 8–252)
HCT VFR BLD AUTO: 40.1 % (ref 35–47)
HGB BLD-MCNC: 12.8 G/DL (ref 11.7–15.7)
IRON SATN MFR SERPL: 12 % (ref 15–46)
IRON SERPL-MCNC: 45 UG/DL (ref 35–180)
MCH RBC QN AUTO: 29.4 PG (ref 26.5–33)
MCHC RBC AUTO-ENTMCNC: 31.9 G/DL (ref 31.5–36.5)
MCV RBC AUTO: 92 FL (ref 78–100)
PLATELET # BLD AUTO: 254 10E3/UL (ref 150–450)
RBC # BLD AUTO: 4.35 10E6/UL (ref 3.8–5.2)
TIBC SERPL-MCNC: 367 UG/DL (ref 240–430)
WBC # BLD AUTO: 4.9 10E3/UL (ref 4–11)

## 2022-03-01 PROCEDURE — 83550 IRON BINDING TEST: CPT | Performed by: INTERNAL MEDICINE

## 2022-03-01 PROCEDURE — 36415 COLL VENOUS BLD VENIPUNCTURE: CPT

## 2022-03-01 PROCEDURE — 82728 ASSAY OF FERRITIN: CPT | Performed by: INTERNAL MEDICINE

## 2022-03-01 PROCEDURE — 85041 AUTOMATED RBC COUNT: CPT | Performed by: INTERNAL MEDICINE

## 2022-03-01 NOTE — PROGRESS NOTES
Medical Assistant Note:  Criss Durant presents today for blood draw.    Patient seen by provider today: No.   present during visit today: Not Applicable.    Concerns: No Concerns.    Procedure:  Lab draw site: right hand, Needle type: bf, Gauge: 23.    Post Assessment:  Labs drawn without difficulty: Yes.    Discharge Plan:  Departure Mode: Ambulatory.    Face to Face Time: 5 min  .    Fadia Carr, CMA

## 2022-03-02 NOTE — RESULT ENCOUNTER NOTE
Dear Ms. Durant,    Hemoglobin is normal. No anemia. Iron and ferritin are normal.    Please, call me with any questions.    Pricilla Rahman MD

## 2022-03-03 ENCOUNTER — TRANSFERRED RECORDS (OUTPATIENT)
Dept: HEALTH INFORMATION MANAGEMENT | Facility: CLINIC | Age: 52
End: 2022-03-03
Payer: COMMERCIAL

## 2022-05-05 NOTE — PROGRESS NOTES
"  Assessment & Plan     Pain of toe of right foot  Unclear etiology of right 5th toe pain and redness.  X ray is normal.  Will treat as infection.  Advised that she apply ice and take ibuprofen for pain.  If worsening, follow up with podiatry  - XR Toe Right G/E 2 Views; Future  - Uric acid; Future  - cephALEXin (KEFLEX) 500 MG capsule; Take 1 capsule (500 mg) by mouth 4 times daily for 7 days  - Orthopedic  Referral; Future  - traMADol (ULTRAM) 50 MG tablet; Take 1 tablet (50 mg) by mouth every 6 hours as needed for severe pain  - Uric acid       BMI:   Estimated body mass index is 29.34 kg/m  as calculated from the following:    Height as of 2/1/22: 1.588 m (5' 2.5\").    Weight as of this encounter: 73.9 kg (163 lb).     Return in about 1 week (around 5/13/2022) for with podiatry if peristent symptoms.    Napoleon Gray PA-C  [facility]   Paulette is a 51 year old who presents for the following health issues   History of Present Illness       Mental Health Follow-up:                    Today's PHQ-9         PHQ-9 Total Score: 17  PHQ-9 Q9 Thoughts of better off dead/self-harm past 2 weeks :   (P) Not at all    How difficult have these problems made it for you to do your work, take care of things at home, or get along with other people: Somewhat difficult        Reason for visit:  Pain in right foot  Symptom intensity:  Severe  Symptom progression:  Worsening  Had these symptoms before:  No  What makes it worse:  The longer I am on my feet/anything touching it (including water)  What makes it better:  Only when I have been off of it and nothing touching it for a few hours    She eats 2-3 servings of fruits and vegetables daily.She consumes 0 sweetened beverage(s) daily.She exercises with enough effort to increase her heart rate 20 to 29 minutes per day.  She exercises with enough effort to increase her heart rate 5 days per week.   She is taking medications " regularly.       Paulette presents to the clinic with 2 week hx of right 5th toe ain and swelling and redness at the distal tip of her toe.  No known injury.  The pain is worse with palpation. She does not have pain with movement, no tingling or numbness of her foot.     Review of Systems   Constitutional, HEENT, cardiovascular, pulmonary, GI, , musculoskeletal, neuro, skin, endocrine and psych systems are negative, except as otherwise noted.      Objective    /68   Pulse 72   Temp (!) 95.7  F (35.4  C) (Tympanic)   Wt 73.9 kg (163 lb)   LMP 09/22/2019   SpO2 97%   BMI 29.34 kg/m    Body mass index is 29.34 kg/m .  Physical Exam   GENERAL: healthy, alert and no distress  MS: right 5th toe with erythema at the distal tip of the digit, exquisite TTP of the distal tip of the toe, FROM of right 5th toe, full pedal pulses, Cap refill < 2 seconds  NEURO: Normal strength and tone, mentation intact and speech normal  PSYCH: mentation appears normal, affect normal/bright    Xray - Reviewed and interpreted by me.  No fracture or dislocation

## 2022-05-06 ENCOUNTER — OFFICE VISIT (OUTPATIENT)
Dept: FAMILY MEDICINE | Facility: CLINIC | Age: 52
End: 2022-05-06
Payer: COMMERCIAL

## 2022-05-06 VITALS
DIASTOLIC BLOOD PRESSURE: 68 MMHG | SYSTOLIC BLOOD PRESSURE: 102 MMHG | OXYGEN SATURATION: 97 % | BODY MASS INDEX: 29.34 KG/M2 | WEIGHT: 163 LBS | HEART RATE: 72 BPM | TEMPERATURE: 95.7 F

## 2022-05-06 DIAGNOSIS — M79.674 PAIN OF TOE OF RIGHT FOOT: Primary | ICD-10-CM

## 2022-05-06 PROCEDURE — 84550 ASSAY OF BLOOD/URIC ACID: CPT | Performed by: PHYSICIAN ASSISTANT

## 2022-05-06 PROCEDURE — 99213 OFFICE O/P EST LOW 20 MIN: CPT | Performed by: PHYSICIAN ASSISTANT

## 2022-05-06 PROCEDURE — 36415 COLL VENOUS BLD VENIPUNCTURE: CPT | Performed by: PHYSICIAN ASSISTANT

## 2022-05-06 RX ORDER — TRAMADOL HYDROCHLORIDE 50 MG/1
50 TABLET ORAL EVERY 6 HOURS PRN
Qty: 10 TABLET | Refills: 0 | Status: SHIPPED | OUTPATIENT
Start: 2022-05-06 | End: 2022-05-09

## 2022-05-06 RX ORDER — TRAMADOL HYDROCHLORIDE 50 MG/1
50 TABLET ORAL EVERY 6 HOURS PRN
Qty: 10 TABLET | Refills: 0 | Status: CANCELLED | OUTPATIENT
Start: 2022-05-06 | End: 2022-05-09

## 2022-05-06 RX ORDER — CEPHALEXIN 500 MG/1
500 CAPSULE ORAL 4 TIMES DAILY
Qty: 28 CAPSULE | Refills: 0 | Status: SHIPPED | OUTPATIENT
Start: 2022-05-06 | End: 2022-05-13

## 2022-05-06 ASSESSMENT — PATIENT HEALTH QUESTIONNAIRE - PHQ9
10. IF YOU CHECKED OFF ANY PROBLEMS, HOW DIFFICULT HAVE THESE PROBLEMS MADE IT FOR YOU TO DO YOUR WORK, TAKE CARE OF THINGS AT HOME, OR GET ALONG WITH OTHER PEOPLE: SOMEWHAT DIFFICULT
SUM OF ALL RESPONSES TO PHQ QUESTIONS 1-9: 17
SUM OF ALL RESPONSES TO PHQ QUESTIONS 1-9: 17

## 2022-05-06 ASSESSMENT — PAIN SCALES - GENERAL: PAINLEVEL: SEVERE PAIN (7)

## 2022-05-07 LAB — URATE SERPL-MCNC: 2 MG/DL (ref 2.6–6)

## 2022-05-07 ASSESSMENT — PATIENT HEALTH QUESTIONNAIRE - PHQ9: SUM OF ALL RESPONSES TO PHQ QUESTIONS 1-9: 17

## 2022-05-10 ENCOUNTER — ANCILLARY PROCEDURE (OUTPATIENT)
Dept: GENERAL RADIOLOGY | Facility: CLINIC | Age: 52
End: 2022-05-10
Attending: PODIATRIST
Payer: COMMERCIAL

## 2022-05-10 ENCOUNTER — OFFICE VISIT (OUTPATIENT)
Dept: PODIATRY | Facility: CLINIC | Age: 52
End: 2022-05-10

## 2022-05-10 VITALS
SYSTOLIC BLOOD PRESSURE: 104 MMHG | HEIGHT: 63 IN | WEIGHT: 163 LBS | DIASTOLIC BLOOD PRESSURE: 60 MMHG | BODY MASS INDEX: 28.88 KG/M2

## 2022-05-10 DIAGNOSIS — M20.61 DEFORMITY OF TOE OF RIGHT FOOT: ICD-10-CM

## 2022-05-10 DIAGNOSIS — M79.674 PAIN OF TOE OF RIGHT FOOT: ICD-10-CM

## 2022-05-10 DIAGNOSIS — L84 CORN OF TOE: Primary | ICD-10-CM

## 2022-05-10 PROCEDURE — 99203 OFFICE O/P NEW LOW 30 MIN: CPT | Performed by: PODIATRIST

## 2022-05-10 PROCEDURE — 73660 X-RAY EXAM OF TOE(S): CPT | Mod: TC | Performed by: RADIOLOGY

## 2022-05-10 NOTE — LETTER
5/10/2022         RE: Criss Durant  [address] [facility] [address]        Dear Colleague,    Thank you for referring your patient, Criss Durant, to the [facility]. Please see a copy of my visit note below.    ASSESSMENT:  Encounter Diagnoses   Name Primary?     Pain of toe of right foot      Corn of toe Yes     Deformity of toe of right foot      MEDICAL DECISION MAKING:  I personally reviewed the x-ray images with Paulette.  A marker was placed over the small lesions on the fifth toe.  This does correlate to where bone is closely approximated between the distal fifth toe and the metaphyseal flare of the proximal interphalangeal joint of the fourth toe.    I explained that the skin lesions are result of the bony architecture.    Conservative recommendations:  Tube foam with an aperture cut over the lesion  A toe spacer  Avoiding tight footwear  Stiffer soled shoes to minimize toe movement during gait    If conservative cares do not adequately reduce her pain, surgical intervention is reasonable.  I would favor a right fifth toe arthroplasty with derotational skin plasty.    Disclaimer: This note consists of symbols derived from keyboarding, dictation and/or voice recognition software. As a result, there may be errors in the script that have gone undetected. Please consider this when interpreting information found in this chart.    Chang Segundo DPM, FACFAS, [facility] Department of Podiatry/Foot & Ankle Surgery      ____________________________________________________________________    HPI:       I was asked by Napoleon Gray PA-C to evaluate Criss Durant in consultation for right fifth toe pain.       Clau presents today with pain involving her right fifth toe.  This is existed for 3 months.  She describes a burning and shooting.  It can be rated a 10 out of 10 at worst.  Weightbearing activities over an hour cause more pain.  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  *  Past Medical History:   Diagnosis Date     Depressive disorder      Gout 7/22/2021     morbid obesity      Obese      Renal disease     stone     Ulcer of gastric fundus      Urinary frequency    *  *  Past Surgical History:   Procedure Laterality Date     CYSTOSCOPY       EXTRACORPOREAL SHOCK WAVE LITHOTRIPSY (ESWL) Left 2/7/2019    Procedure: Left renal lithotripsy (1250 shocks).;  Surgeon: Regis Parada MD;  Location: [facility] OR     LAPAROSCOPIC BYPASS GASTRIC N/A 10/26/2016    Procedure: LAPAROSCOPIC BYPASS GASTRIC;  Surgeon: Romario Reyna MD;  Location: [facility] OR     LAPAROSCOPY DIAGNOSTIC (GENERAL) N/A 10/24/2018    Procedure: LAPAROSCOPY DIAGNOSTIC FOR PERFORATED GASTRIC ULCER;  Surgeon: Chang Corea MD;  Location:  OR     NO HISTORY OF SURGERY     *  *  Current Outpatient Medications   Medication Sig Dispense Refill     allopurinol (ZYLOPRIM) 300 MG tablet Take 1 tablet by mouth daily       calcium-vitamin D-vitamin K (VIACTIV) 500-500-40 MG-UNT-MCG CHEW Take 3 tablets by mouth At Bedtime        cephALEXin (KEFLEX) 500 MG capsule Take 1 capsule (500 mg) by mouth 4 times daily for 7 days 28 capsule 0     Cholecalciferol (VITAMIN D3 PO) Take 2,000 Units by mouth 2 times daily        Cyanocobalamin (B-12) 2500 MCG SUBL Place 1 tablet under the tongue Every Mon, Wed, Fri Morning 3 times weekly       docusate sodium (COLACE) 100 MG capsule Take 300 mg by mouth every morning       FLUoxetine (PROZAC) 40 MG capsule Take 2 capsules by mouth once daily 180 capsule 1     hydrOXYzine (ATARAX) 50 MG tablet Take 1 tablet (50 mg) by mouth every 8 hours as needed for itching 90 tablet 0     multivitamin  peds with iron (FLINTSTONES COMPLETE) 60 MG chewable tablet Take 2 chew tab by mouth every morning        pantoprazole (PROTONIX) 40 MG EC tablet Take 1 tablet (40 mg) by mouth 2 times daily 180 tablet 3     potassium citrate (UROCIT-K) 10 MEQ (1080 MG) CR tablet  Take by mouth 3 times daily (with meals)           EXAM:    Vitals: /60   LMP 09/22/2019   BMI: There is no height or weight on file to calculate BMI.    Constitutional:  Criss Durant is in no apparent distress, appears well-nourished.  Cooperative with history and physical exam.    Vascular:  Pedal pulses are palpable for both the DP and PT arteries.  CFT < 3 sec.  No edema.      Neuro: Light touch sensation is intact to the L4, L5, S1 distributions  No evidence of weakness, spasticity, or contracture in the lower extremities.     Derm: Normal texture and turgor.  No erythema, ecchymosis, or cyanosis.  No open lesions.   There are 2, small, nucleated hyperkeratotic lesions at the distal medial aspect of the right fifth toe.  Tender to the touch.    Musculoskeletal:    Lower extremity muscle strength is normal. No gross deformities.  The right fourth and fifth toes have some contracture and varus rotation.  Fourth toe is slightly overriding the distal aspect of the fifth toe.  This is the area of her discomfort.    TOE RIGHT TWO OR MORE VIEWS  May 10, 2022 3:11 PM      HISTORY: Right fifth toe pain. Pain of toe of right foot.     COMPARISON: 5/6/2022 x-ray.                                                                   IMPRESSION: No lytic or destructive changes to suggest osteomyelitis.  Normal joint spacing. No evidence of acute fracture.      Again, thank you for allowing me to participate in the care of your patient.        Sincerely,        Chang Segundo DPM

## 2022-05-10 NOTE — PATIENT INSTRUCTIONS
Thank you for choosing [facility]le [facility]!    DR. SHAW'S CLINIC LOCATIONS:     [facility] TRIAGE LINE: [phone]   [address] [place] [address] APPOINTMENTS: [phone]   [place] [address] RADIOLOGY: [phone]    SET UP SURGERY: [phone]    BILLING QUESTIONS: [phone]   [facility] FAX: [phone] [id number]    [place] [address]      Follow up: as needed    Next steps: tube foam with hole cut in it, toe spacers, wider shoes, stiffer soled shoes

## 2022-05-10 NOTE — PROGRESS NOTES
ASSESSMENT:  Encounter Diagnoses   Name Primary?     Pain of toe of right foot      Corn of toe Yes     Deformity of toe of right foot      MEDICAL DECISION MAKING:  I personally reviewed the x-ray images with Paulette.  A marker was placed over the small lesions on the fifth toe.  This does correlate to where bone is closely approximated between the distal fifth toe and the metaphyseal flare of the proximal interphalangeal joint of the fourth toe.    I explained that the skin lesions are result of the bony architecture.    Conservative recommendations:  Tube foam with an aperture cut over the lesion  A toe spacer  Avoiding tight footwear  Stiffer soled shoes to minimize toe movement during gait    If conservative cares do not adequately reduce her pain, surgical intervention is reasonable.  I would favor a right fifth toe arthroplasty with derotational skin plasty.    Disclaimer: This note consists of symbols derived from keyboarding, dictation and/or voice recognition software. As a result, there may be errors in the script that have gone undetected. Please consider this when interpreting information found in this chart.    Chang Segundo, KARIE, FACFAS, MS    [facility] Department of Podiatry/Foot & Ankle Surgery      ____________________________________________________________________    HPI:       I was asked by Napoleon Gray PA-C to evaluate Criss Durant in consultation for right fifth toe pain.       Clau presents today with pain involving her right fifth toe.  This is existed for 3 months.  She describes a burning and shooting.  It can be rated a 10 out of 10 at worst.  Weightbearing activities over an hour cause more pain.  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  *  Past Medical History:   Diagnosis Date     Depressive disorder      Gout 7/22/2021     morbid obesity      Obese      Renal disease     stone     Ulcer of gastric fundus      Urinary frequency    *  *  Past Surgical History:    Procedure Laterality Date     CYSTOSCOPY       EXTRACORPOREAL SHOCK WAVE LITHOTRIPSY (ESWL) Left 2/7/2019    Procedure: Left renal lithotripsy (1250 shocks).;  Surgeon: Regis Parada MD;  Location: [facility] OR     LAPAROSCOPIC BYPASS GASTRIC N/A 10/26/2016    Procedure: LAPAROSCOPIC BYPASS GASTRIC;  Surgeon: Romario Reyna MD;  Location: [facility] OR     LAPAROSCOPY DIAGNOSTIC (GENERAL) N/A 10/24/2018    Procedure: LAPAROSCOPY DIAGNOSTIC FOR PERFORATED GASTRIC ULCER;  Surgeon: Chang Corea MD;  Location: [facility] OR     NO HISTORY OF SURGERY     *  *  Current Outpatient Medications   Medication Sig Dispense Refill     allopurinol (ZYLOPRIM) 300 MG tablet Take 1 tablet by mouth daily       calcium-vitamin D-vitamin K (VIACTIV) 500-500-40 MG-UNT-MCG CHEW Take 3 tablets by mouth At Bedtime        cephALEXin (KEFLEX) 500 MG capsule Take 1 capsule (500 mg) by mouth 4 times daily for 7 days 28 capsule 0     Cholecalciferol (VITAMIN D3 PO) Take 2,000 Units by mouth 2 times daily        Cyanocobalamin (B-12) 2500 MCG SUBL Place 1 tablet under the tongue Every Mon, Wed, Fri Morning 3 times weekly       docusate sodium (COLACE) 100 MG capsule Take 300 mg by mouth every morning       FLUoxetine (PROZAC) 40 MG capsule Take 2 capsules by mouth once daily 180 capsule 1     hydrOXYzine (ATARAX) 50 MG tablet Take 1 tablet (50 mg) by mouth every 8 hours as needed for itching 90 tablet 0     multivitamin  peds with iron (FLINTSTONES COMPLETE) 60 MG chewable tablet Take 2 chew tab by mouth every morning        pantoprazole (PROTONIX) 40 MG EC tablet Take 1 tablet (40 mg) by mouth 2 times daily 180 tablet 3     potassium citrate (UROCIT-K) 10 MEQ (1080 MG) CR tablet Take by mouth 3 times daily (with meals)           EXAM:    Vitals: /60   LMP 09/22/2019   BMI: There is no height or weight on file to calculate BMI.    Constitutional:  Criss Durant is in no apparent distress, appears well-nourished.  Cooperative with  history and physical exam.    Vascular:  Pedal pulses are palpable for both the DP and PT arteries.  CFT < 3 sec.  No edema.      Neuro: Light touch sensation is intact to the L4, L5, S1 distributions  No evidence of weakness, spasticity, or contracture in the lower extremities.     Derm: Normal texture and turgor.  No erythema, ecchymosis, or cyanosis.  No open lesions.   There are 2, small, nucleated hyperkeratotic lesions at the distal medial aspect of the right fifth toe.  Tender to the touch.    Musculoskeletal:    Lower extremity muscle strength is normal. No gross deformities.  The right fourth and fifth toes have some contracture and varus rotation.  Fourth toe is slightly overriding the distal aspect of the fifth toe.  This is the area of her discomfort.    TOE RIGHT TWO OR MORE VIEWS  May 10, 2022 3:11 PM      HISTORY: Right fifth toe pain. Pain of toe of right foot.     COMPARISON: 5/6/2022 x-ray.                                                                   IMPRESSION: No lytic or destructive changes to suggest osteomyelitis.  Normal joint spacing. No evidence of acute fracture.

## 2022-05-11 NOTE — RESULT ENCOUNTER NOTE
Criss-  Here are your recent results.     Your uric acid level is low and stable from 1 year ago.     Please let me know if your symptoms persist or worsen  Napoleon

## 2022-07-27 DIAGNOSIS — F41.1 GAD (GENERALIZED ANXIETY DISORDER): ICD-10-CM

## 2022-07-28 RX ORDER — FLUOXETINE 40 MG/1
CAPSULE ORAL
Qty: 180 CAPSULE | Refills: 0 | Status: SHIPPED | OUTPATIENT
Start: 2022-07-28 | End: 2022-10-31

## 2022-07-28 NOTE — TELEPHONE ENCOUNTER
Prescription approved per [facility] Refill Protocol.  Cassandra Burch, RN  [facility] RN Triage Team

## 2022-08-06 ENCOUNTER — HEALTH MAINTENANCE LETTER (OUTPATIENT)
Age: 52
End: 2022-08-06

## 2022-08-25 ENCOUNTER — ONCOLOGY VISIT (OUTPATIENT)
Dept: ONCOLOGY | Facility: CLINIC | Age: 52
End: 2022-08-25
Attending: INTERNAL MEDICINE
Payer: COMMERCIAL

## 2022-08-25 ENCOUNTER — LAB (OUTPATIENT)
Dept: INFUSION THERAPY | Facility: CLINIC | Age: 52
End: 2022-08-25
Attending: INTERNAL MEDICINE
Payer: COMMERCIAL

## 2022-08-25 VITALS
DIASTOLIC BLOOD PRESSURE: 78 MMHG | SYSTOLIC BLOOD PRESSURE: 118 MMHG | RESPIRATION RATE: 16 BRPM | HEART RATE: 58 BPM | OXYGEN SATURATION: 100 % | TEMPERATURE: 98.1 F | BODY MASS INDEX: 29.37 KG/M2 | WEIGHT: 163.2 LBS

## 2022-08-25 DIAGNOSIS — D50.9 IRON DEFICIENCY ANEMIA, UNSPECIFIED IRON DEFICIENCY ANEMIA TYPE: ICD-10-CM

## 2022-08-25 DIAGNOSIS — D50.9 IRON DEFICIENCY ANEMIA, UNSPECIFIED IRON DEFICIENCY ANEMIA TYPE: Primary | ICD-10-CM

## 2022-08-25 DIAGNOSIS — R22.2 LUMP IN CHEST: ICD-10-CM

## 2022-08-25 LAB
ERYTHROCYTE [DISTWIDTH] IN BLOOD BY AUTOMATED COUNT: 13.4 % (ref 10–15)
FERRITIN SERPL-MCNC: 26 NG/ML (ref 8–252)
HCT VFR BLD AUTO: 41.6 % (ref 35–47)
HGB BLD-MCNC: 13.2 G/DL (ref 11.7–15.7)
IRON SATN MFR SERPL: 17 % (ref 15–46)
IRON SERPL-MCNC: 51 UG/DL (ref 35–180)
MCH RBC QN AUTO: 29.3 PG (ref 26.5–33)
MCHC RBC AUTO-ENTMCNC: 31.7 G/DL (ref 31.5–36.5)
MCV RBC AUTO: 92 FL (ref 78–100)
PLATELET # BLD AUTO: 246 10E3/UL (ref 150–450)
RBC # BLD AUTO: 4.51 10E6/UL (ref 3.8–5.2)
TIBC SERPL-MCNC: 302 UG/DL (ref 240–430)
WBC # BLD AUTO: 4.7 10E3/UL (ref 4–11)

## 2022-08-25 PROCEDURE — 82728 ASSAY OF FERRITIN: CPT | Performed by: INTERNAL MEDICINE

## 2022-08-25 PROCEDURE — 85027 COMPLETE CBC AUTOMATED: CPT | Performed by: INTERNAL MEDICINE

## 2022-08-25 PROCEDURE — 83550 IRON BINDING TEST: CPT | Performed by: INTERNAL MEDICINE

## 2022-08-25 PROCEDURE — G0463 HOSPITAL OUTPT CLINIC VISIT: HCPCS

## 2022-08-25 PROCEDURE — 36415 COLL VENOUS BLD VENIPUNCTURE: CPT

## 2022-08-25 PROCEDURE — 99214 OFFICE O/P EST MOD 30 MIN: CPT | Performed by: INTERNAL MEDICINE

## 2022-08-25 ASSESSMENT — PAIN SCALES - GENERAL: PAINLEVEL: NO PAIN (0)

## 2022-08-25 NOTE — PROGRESS NOTES
Medical Assistant Note:  Criss Durant presents today for lab draw.    Patient seen by provider today: Yes: BK.   present during visit today: Not Applicable.    Concerns: No Concerns.    Procedure:  Lab draw site: Right Hand, Needle type: Butterfly, Gauge: 23.    Post Assessment:  Labs drawn without difficulty: Yes.    Discharge Plan:  Departure Mode: Ambulatory.    Face to Face Time: 4 min    Labs drawn by Fadia Carr as witnessed by:    Shari Schoenberger, CMA

## 2022-08-25 NOTE — PROGRESS NOTES
HEMATOLOGY HISTORY: Ms. Don is a female with iron deficiency anemia. She had gastric bypass surgery in October 2016.   1.  On 03/07/2018 (care everywhere), hemoglobin of 12.3 with MCV of 89.7.   2.  On 04/20/2018, iron of 33, ferritin of 11 and saturation of 11%.   3.  On 07/19/2018, iron of 36, ferritin of 6 and saturation of 12%.      SUBJECTIVE:  Ms. Durant is a 51-year-old female with iron deficiency anemia secondary to malabsorption due to gastric bypass surgery.  The patient previously has received IV iron.    Overall she is doing well.  Mild fatigue.  Occasional dizziness.  No chest pain.  No shortness of breath.  No abdominal pain.  No nausea or vomiting.  Appetite is good.  No urinary or bowel complaints.  No bleeding from any site.    Patient feels a tiny lump under the right collarbone. It does not bother her.  She wants it checked out.  Denies any breast lump.  No breast pain.  All other review of systems is negative.     PHYSICAL EXAMINATION:   GENERAL:  Alert and oriented x 3.   VITAL SIGNS:  Reviewed.       EYES:  No icterus.   NECK:  Supple. No lymphadenopathy. No thyromegaly.   AXILLAE:  No lymphadenopathy.   CHEST: Tiny nodule under the right clavicle.  I could only feel it after patient pointed it to me.  Skin over it is normal.  No signs of infection.  No tenderness.  LUNGS:  Good air entry bilaterally.  No crackles or wheezing.   HEART:  Regular.  No murmur.   ABDOMEN:  Soft.  Nontender. No mass.   EXTREMITIES:  No pedal edema.  No calf swelling or tenderness.   SKIN:  No rash.      LABS:  CBC and iron studies reviewed.     ASSESSMENT:    1.  A 51-year-old female with iron deficiency anemia secondary to malabsorption.  Iron deficiency anemia has resolved.  2.  Status post gastric bypass surgery.  3.  Tiny nodule in the right chest wall below right clavicle.     PLAN:    1.  Labs were reviewed with the patient.  Hemoglobin, iron and ferritin are normal.  She does not have iron deficiency  anemia at this time.  She is at risk of developing anemia because of malabsorption of iron.  Patient advised to have CBC and iron studies checked every 6 months. She is agreeable for it.    2.  Patient has a tiny nodule in the right chest wall.  I am not concerned regarding it.  She wants that to be investigated.  We will get an ultrasound of her right chest wall.  She is also due for her mammogram.  She is going to make that appointment.    3.  I will see her in 1 year time.  Advised her to call us with any questions or concerns.    Total visit time of 30 minutes.  Time is spent in today's visit, review of chart/investigations today and documentation today.

## 2022-08-25 NOTE — PATIENT INSTRUCTIONS
Ultrasound of right chest wall in next few days.  Labs every 6 month.   Continue vitamin B12.  Follow-up in 1 year with labs.    Please call to schedule

## 2022-08-25 NOTE — LETTER
"    8/25/2022         RE: Criss Durant  [address] [facility] [address]        Dear Colleague,    Thank you for referring your patient, Criss Durant, to the [facility]. Please see a copy of my visit note below.    Oncology Rooming Note    August 25, 2022 8:34 AM   Criss Durant is a 51 year old female who presents for:    Chief Complaint   Patient presents with     Oncology Clinic Visit     Initial Vitals: /78   Pulse 58   Temp 98.1  F (36.7  C) (Oral)   Resp 16   Wt 74 kg (163 lb 3.2 oz)   LMP 09/22/2019   SpO2 100%   BMI 29.37 kg/m   Estimated body mass index is 29.37 kg/m  as calculated from the following:    Height as of 5/10/22: 1.588 m (5' 2.5\").    Weight as of this encounter: 74 kg (163 lb 3.2 oz). Body surface area is 1.81 meters squared.  No Pain (0) Comment: Data Unavailable   Patient's last menstrual period was 09/22/2019.  Allergies reviewed: Yes  Medications reviewed: Yes    Medications: Medication refills not needed today.  Pharmacy name entered into [facility]: [id number] - [place], [place] - [address] [address] [facility]    Clinical concerns: lump - right chest. BK was notified.      Shari J. Schoenberger, [facility]              HEMATOLOGY HISTORY: Ms. Don is a female with iron deficiency anemia. She had gastric bypass surgery in October 2016.   1.  On 03/07/2018 (care everywhere), hemoglobin of 12.3 with MCV of 89.7.   2.  On 04/20/2018, iron of 33, ferritin of 11 and saturation of 11%.   3.  On 07/19/2018, iron of 36, ferritin of 6 and saturation of 12%.      SUBJECTIVE:  Ms. Durant is a 51-year-old female with iron deficiency anemia secondary to malabsorption due to gastric bypass surgery.  The patient previously has received IV iron.    Overall she is doing well.  Mild fatigue.  Occasional dizziness.  No chest pain.  No shortness of breath.  No abdominal pain.  No nausea or vomiting.  Appetite is good.  No urinary or bowel complaints.  No " bleeding from any site.    Patient feels a tiny lump under the right collarbone. It does not bother her.  She wants it checked out.  Denies any breast lump.  No breast pain.  All other review of systems is negative.     PHYSICAL EXAMINATION:   GENERAL:  Alert and oriented x 3.   VITAL SIGNS:  Reviewed.       EYES:  No icterus.   NECK:  Supple. No lymphadenopathy. No thyromegaly.   AXILLAE:  No lymphadenopathy.   CHEST: Tiny nodule under the right clavicle.  I could only feel it after patient pointed it to me.  Skin over it is normal.  No signs of infection.  No tenderness.  LUNGS:  Good air entry bilaterally.  No crackles or wheezing.   HEART:  Regular.  No murmur.   ABDOMEN:  Soft.  Nontender. No mass.   EXTREMITIES:  No pedal edema.  No calf swelling or tenderness.   SKIN:  No rash.      LABS:  CBC and iron studies reviewed.     ASSESSMENT:    1.  A 51-year-old female with iron deficiency anemia secondary to malabsorption.  Iron deficiency anemia has resolved.  2.  Status post gastric bypass surgery.  3.  Tiny nodule in the right chest wall below right clavicle.     PLAN:    1.  Labs were reviewed with the patient.  Hemoglobin, iron and ferritin are normal.  She does not have iron deficiency anemia at this time.  She is at risk of developing anemia because of malabsorption of iron.  Patient advised to have CBC and iron studies checked every 6 months. She is agreeable for it.    2.  Patient has a tiny nodule in the right chest wall.  I am not concerned regarding it.  She wants that to be investigated.  We will get an ultrasound of her right chest wall.  She is also due for her mammogram.  She is going to make that appointment.    3.  I will see her in 1 year time.  Advised her to call us with any questions or concerns.    Total visit time of 30 minutes.  Time is spent in today's visit, review of chart/investigations today and documentation today.        Again, thank you for allowing me to participate in the care of  your patient.        Sincerely,        Pricilla Rahman MD

## 2022-08-25 NOTE — PROGRESS NOTES
"Oncology Rooming Note    August 25, 2022 8:34 AM   Criss Durant is a 51 year old female who presents for:    Chief Complaint   Patient presents with     Oncology Clinic Visit     Initial Vitals: /78   Pulse 58   Temp 98.1  F (36.7  C) (Oral)   Resp 16   Wt 74 kg (163 lb 3.2 oz)   LMP 09/22/2019   SpO2 100%   BMI 29.37 kg/m   Estimated body mass index is 29.37 kg/m  as calculated from the following:    Height as of 5/10/22: 1.588 m (5' 2.5\").    Weight as of this encounter: 74 kg (163 lb 3.2 oz). Body surface area is 1.81 meters squared.  No Pain (0) Comment: Data Unavailable   Patient's last menstrual period was 09/22/2019.  Allergies reviewed: Yes  Medications reviewed: Yes    Medications: Medication refills not needed today.  Pharmacy name entered into [facility]: [id number] - [place], [place] - [address] [facility]    Clinical concerns: lump - right chest. BK was notified.      Shari J. Schoenberger, KATINA            "

## 2022-08-31 ENCOUNTER — HOSPITAL ENCOUNTER (OUTPATIENT)
Dept: MAMMOGRAPHY | Facility: CLINIC | Age: 52
Discharge: HOME | End: 2022-08-31
Attending: INTERNAL MEDICINE
Payer: COMMERCIAL

## 2022-08-31 DIAGNOSIS — R22.2 LUMP IN CHEST: ICD-10-CM

## 2022-08-31 PROCEDURE — 76642 ULTRASOUND BREAST LIMITED: CPT | Mod: RT

## 2022-08-31 PROCEDURE — 77062 BREAST TOMOSYNTHESIS BI: CPT

## 2022-10-04 DIAGNOSIS — Z98.84 BARIATRIC SURGERY STATUS: ICD-10-CM

## 2022-10-06 RX ORDER — PANTOPRAZOLE SODIUM 40 MG/1
TABLET, DELAYED RELEASE ORAL
Qty: 180 TABLET | Refills: 0 | Status: SHIPPED | OUTPATIENT
Start: 2022-10-06 | End: 2023-01-09

## 2022-10-06 NOTE — TELEPHONE ENCOUNTER
[facility] Surgery 2016.    Sent message to pharmacy to Please advise patient to schedule an appointment with PCP for further refills.      Refilled per [facility] protocol.  Nani Hatfield RN

## 2022-10-06 NOTE — PROGRESS NOTES
"Oncology Rooming Note    August 2, 2018 1:15 PM   Criss Don is a 47 year old female who presents for:    Chief Complaint   Patient presents with     Hematology     Iron deficiency Anemia     Initial Vitals: /65 (BP Location: Left arm, Patient Position: Sitting, Cuff Size: Adult Regular)  Pulse 74  Temp 98.1  F (36.7  C) (Oral)  Resp 16  Wt 61.9 kg (136 lb 6.4 oz)  SpO2 97%  BMI 24.95 kg/m2 Estimated body mass index is 24.95 kg/(m^2) as calculated from the following:    Height as of 7/27/18: 1.575 m (5' 2\").    Weight as of this encounter: 61.9 kg (136 lb 6.4 oz). Body surface area is 1.65 meters squared.  No Pain (0) Comment: Data Unavailable   No LMP recorded.  Allergies reviewed: Yes  Medications reviewed: Yes    Medications: Medication refills not needed today.  Pharmacy name entered into [facility]: [id number] [place] [facility] [place] - [address] [address]    Clinical concerns: None                      4 minutes for nursing intake (face to face time)     Temitope Davies MA            " Patient Transferred to: prep and recovery Handoff Report Given to sol

## 2022-10-22 ENCOUNTER — HEALTH MAINTENANCE LETTER (OUTPATIENT)
Age: 52
End: 2022-10-22

## 2022-10-28 ENCOUNTER — NURSE TRIAGE (OUTPATIENT)
Dept: FAMILY MEDICINE | Facility: CLINIC | Age: 52
End: 2022-10-28

## 2022-10-28 NOTE — TELEPHONE ENCOUNTER
"Clinic received refill request for Fluoxetine with patient comment that she is having worsening anxiety symptoms and wants to know if dose can be increased. Reached out to pt to triage symptoms of worsening anxiety. Per protocol pt should be seen within 3 days. Scheduled pt with first available provider at the clinic that worked with her scheduled. She stated understanding and agreement to discuss with Dr. Leggett on Monday. She had no further questions at this time.     Reason for Disposition    Symptoms interfere with work or school    Additional Information    Negative: SEVERE difficulty breathing (e.g., struggling for each breath, speaks in single words)    Negative: Bluish (or gray) lips or face    Negative: Difficult to awaken or acting confused (e.g., disoriented, slurred speech)    Negative: Hysterical or combative behavior    Negative: Sounds like a life-threatening emergency to the triager    Negative: Chest pain    Negative: Palpitations, skipped heart beat, or rapid heart beat    Negative: Cough is main symptom    Negative: Suicide thoughts, threats, attempts, or questions    Negative: Depression is main problem or symptom (e.g., feelings of sadness or hopelessness)    Negative: Difficulty breathing and persists > 10 minutes and not relieved by reassurance provided by triager    Negative: Lightheadedness or dizziness and persists > 10 minutes and not relieved by reassurance provided by triager    Negative: Substance use (drug use) or unhealthy alcohol use, known or suspected, and feeling very shaky (i.e., visible tremors of hands)    Negative: Patient sounds very sick or weak to the triager    Negative: Patient sounds very upset or troubled to the triager    Answer Assessment - Initial Assessment Questions  1. CONCERN: \"Did anything happen that prompted you to call today?\"         Pt concerned about worsening anxiety and thinks medication may need adjustment. \"very up dn down like a roller coaster\". Been " "breaking down and crying at work which is not normal.      2. ANXIETY SYMPTOMS: \"Can you describe how you (your loved one; patient) have been feeling?\" (e.g., tense, restless, panicky, anxious, keyed up, overwhelmed, sense of impending doom).         More frequent crying, tense, creating cramping pain in her abdomen this comes and goes it goes away when she calms down.    3. ONSET: \"How long have you been feeling this way?\" (e.g., hours, days, weeks)        Started noticing this worsening for the past couple months.     4. SEVERITY: \"How would you rate the level of anxiety?\" (e.g., 0 - 10; or mild, moderate, severe).        Severe.     5. FUNCTIONAL IMPAIRMENT: \"How have these feelings affected your ability to do daily activities?\" \"Have you had more difficulty than usual doing your normal daily activities?\" (e.g., getting better, same, worse; self-care, school, work, interactions)        Affecting ability to do daily activities breaking down at work.     6. HISTORY: \"Have you felt this way before?\" \"Have you ever been diagnosed with an anxiety problem in the past?\" (e.g., generalized anxiety disorder, panic attacks, PTSD). If Yes, ask: \"How was this problem treated?\" (e.g., medicines, counseling, etc.)        Yes, when she feels like this usually medication was adjusted in the past. Fluoxetine. Tried counseling, has not worked for her, has not felt connected with someone and therefore does not help her.     7. RISK OF HARM - SUICIDAL IDEATION: \"Do you ever have thoughts of hurting or killing yourself?\" If Yes, ask:  \"Do you have these feelings now?\" \"Do you have a plan on how you would do this?\"        No    8. TREATMENT:  \"What has been done so far to treat this anxiety?\" (e.g., medicines, relaxation strategies). \"What has helped?\"        On the Fluoxetine and relaxation strategies, breathing technics. Just getting worse.     9. TREATMENT - THERAPIST: \"Do you have a counselor or therapist? Name?\"        At this " "time she doesn't see counselor or therapist.     10. POTENTIAL TRIGGERS: \"Do you drink caffeinated beverages (e.g., coffee, medina, teas), and how much daily?\" \"Do you drink alcohol or use any drugs?\" \"Have you started any new medicines recently?\"        None    10. PATIENT SUPPORT: \"Who is with you now?\" \"Who do you live with?\" \"Do you have family or friends who you can talk to?\"           Lives with others, has friends and family support who have also noticed this.     11. OTHER SYMPTOMS: \"Do you have any other symptoms?\" (e.g., feeling depressed, trouble concentrating, trouble sleeping, trouble breathing, palpitations or fast heartbeat, chest pain, sweating, nausea, or diarrhea)          Trouble sleeping has done sleep studies, in the midst of anxiety attack can have palpitations, sweating cramping pain in abdomen. This is all with anxiety attacks symptoms improve when she calms down.     12. PREGNANCY: \"Is there any chance you are pregnant?\" \"When was your last menstrual period?\"          NA    Protocols used: ANXIETY AND PANIC ATTACK-A-OH    SEE IN OFFICE WITHIN 3 DAYS:   * You need to be examined.   * Let me give you an appointment.        Patient/Caregiver understands and will follow care advice? Yes, plans to follow advice        Jeanne SALDANA RN, [facility]   "

## 2022-10-31 ENCOUNTER — VIRTUAL VISIT (OUTPATIENT)
Dept: FAMILY MEDICINE | Facility: CLINIC | Age: 52
End: 2022-10-31
Payer: COMMERCIAL

## 2022-10-31 DIAGNOSIS — F41.1 GAD (GENERALIZED ANXIETY DISORDER): Primary | ICD-10-CM

## 2022-10-31 PROCEDURE — 99213 OFFICE O/P EST LOW 20 MIN: CPT | Mod: GT | Performed by: FAMILY MEDICINE

## 2022-10-31 RX ORDER — FLUOXETINE 40 MG/1
40 CAPSULE ORAL DAILY
Qty: 90 CAPSULE | Refills: 1 | Status: SHIPPED | OUTPATIENT
Start: 2022-10-31 | End: 2023-05-01

## 2022-10-31 RX ORDER — HYDROXYZINE HYDROCHLORIDE 50 MG/1
50 TABLET, FILM COATED ORAL EVERY 12 HOURS PRN
Qty: 180 TABLET | Refills: 1 | Status: SHIPPED | OUTPATIENT
Start: 2022-10-31

## 2022-10-31 NOTE — PROGRESS NOTES
"Paulette is a 51 year old who is being evaluated via a billable video visit.      How would you like to obtain your AVS? Mail a copy  If the video visit is dropped, the invitation should be resent by: Text to cell phone: [phone]  Will anyone else be joining your video visit? No          Assessment & Plan     NED (generalized anxiety disorder)  Worsening, has been on 80mg of fluoxetine for last 2 years, will have the dose drop to 60mg, and increase hydroxyzine to bid prn   Encouraged her to start BCT, mental health referral is also placed   - hydrOXYzine (ATARAX) 50 MG tablet; Take 1 tablet (50 mg) by mouth every 12 hours as needed for itching  - FLUoxetine (PROZAC) 20 MG capsule; Take 1 capsule (20 mg) by mouth daily  - FLUoxetine (PROZAC) 40 MG capsule; Take 1 capsule (40 mg) by mouth daily  - Adult Mental Health  Rferral               BMI:   Estimated body mass index is 29.37 kg/m  as calculated from the following:    Height as of 5/10/22: 1.588 m (5' 2.5\").    Weight as of 8/25/22: 74 kg (163 lb 3.2 oz).   Weight management plan: Discussed healthy diet and exercise guidelines    FUTURE APPOINTMENTS:       - Follow-up visit in 6 months     No follow-ups on file.    Juaquin Leggett MD  [facility]    Radames Caldwell is a 51 year old presenting for the following health issues:  No chief complaint on file.      History of Present Illness       Reason for visit:  Up dosage of med    She eats 2-3 servings of fruits and vegetables daily.She consumes 0 sweetened beverage(s) daily.She exercises with enough effort to increase her heart rate 30 to 60 minutes per day.  She exercises with enough effort to increase her heart rate 5 days per week.   She is taking medications regularly.         Review of Systems   Constitutional, HEENT, cardiovascular, pulmonary, gi and gu systems are negative, except as otherwise noted.      Objective           Vitals:  No vitals were obtained today due to " virtual visit.    Physical Exam   GENERAL: Healthy, alert and no distress  EYES: Eyes grossly normal to inspection.  No discharge or erythema, or obvious scleral/conjunctival abnormalities.  RESP: No audible wheeze, cough, or visible cyanosis.  No visible retractions or increased work of breathing.    SKIN: Visible skin clear. No significant rash, abnormal pigmentation or lesions.  NEURO: Cranial nerves grossly intact.  Mentation and speech appropriate for age.  PSYCH: Mentation appears normal, affect normal/bright, judgement and insight intact, normal speech and appearance well-groomed.                Video-Visit Details    Video Start Time: 5:09    Type of service:  Video Visit    Video End Time:5:32 PM    Originating Location (pt. Location): Home        Distant Location (provider location):  On-site    Platform used for Video Visit: Haleigh

## 2022-12-19 NOTE — NURSING NOTE
"Chief Complaint   Patient presents with     Derm Problem       Initial /77 (Cuff Size: Adult Regular)  Pulse 75  Temp 97.3  F (36.3  C) (Tympanic)  Resp 14  Ht 5' 2\" (1.575 m)  SpO2 100% Estimated body mass index is 26.65 kg/(m^2) as calculated from the following:    Height as of 11/28/17: 5' 2.5\" (1.588 m).    Weight as of 11/28/17: 148 lb 1 oz (67.2 kg).  Medication Reconciliation: complete   Mila Brown, CMA  "
16

## 2022-12-21 ENCOUNTER — HOSPITAL ENCOUNTER (OUTPATIENT)
Dept: MAMMOGRAPHY | Facility: CLINIC | Age: 52
Discharge: HOME | End: 2022-12-21
Attending: INTERNAL MEDICINE | Admitting: INTERNAL MEDICINE
Payer: COMMERCIAL

## 2022-12-21 DIAGNOSIS — R92.8 BI-RADS CATEGORY 3 MAMMOGRAM RESULT: ICD-10-CM

## 2022-12-21 PROCEDURE — 76642 ULTRASOUND BREAST LIMITED: CPT | Mod: RT

## 2023-01-07 DIAGNOSIS — Z98.84 BARIATRIC SURGERY STATUS: ICD-10-CM

## 2023-01-09 RX ORDER — PANTOPRAZOLE SODIUM 40 MG/1
TABLET, DELAYED RELEASE ORAL
Qty: 180 TABLET | Refills: 0 | Status: SHIPPED | OUTPATIENT
Start: 2023-01-09 | End: 2023-04-10

## 2023-02-03 NOTE — TELEPHONE ENCOUNTER
Script sent   Calcipotriene Counseling: Cantharidin Counseling:  I discussed with the patient the risks of Cantharidin including but not limited to pain, redness, burning, itching, and blistering.

## 2023-03-15 ASSESSMENT — ANXIETY QUESTIONNAIRES
7. FEELING AFRAID AS IF SOMETHING AWFUL MIGHT HAPPEN: NEARLY EVERY DAY
6. BECOMING EASILY ANNOYED OR IRRITABLE: NEARLY EVERY DAY
2. NOT BEING ABLE TO STOP OR CONTROL WORRYING: NEARLY EVERY DAY
8. IF YOU CHECKED OFF ANY PROBLEMS, HOW DIFFICULT HAVE THESE MADE IT FOR YOU TO DO YOUR WORK, TAKE CARE OF THINGS AT HOME, OR GET ALONG WITH OTHER PEOPLE?: SOMEWHAT DIFFICULT
7. FEELING AFRAID AS IF SOMETHING AWFUL MIGHT HAPPEN: NEARLY EVERY DAY
GAD7 TOTAL SCORE: 20
5. BEING SO RESTLESS THAT IT IS HARD TO SIT STILL: MORE THAN HALF THE DAYS
4. TROUBLE RELAXING: NEARLY EVERY DAY
GAD7 TOTAL SCORE: 20
1. FEELING NERVOUS, ANXIOUS, OR ON EDGE: NEARLY EVERY DAY
GAD7 TOTAL SCORE: 20
IF YOU CHECKED OFF ANY PROBLEMS ON THIS QUESTIONNAIRE, HOW DIFFICULT HAVE THESE PROBLEMS MADE IT FOR YOU TO DO YOUR WORK, TAKE CARE OF THINGS AT HOME, OR GET ALONG WITH OTHER PEOPLE: SOMEWHAT DIFFICULT
3. WORRYING TOO MUCH ABOUT DIFFERENT THINGS: NEARLY EVERY DAY

## 2023-03-16 ENCOUNTER — VIRTUAL VISIT (OUTPATIENT)
Dept: PSYCHOLOGY | Facility: CLINIC | Age: 53
End: 2023-03-16
Attending: FAMILY MEDICINE
Payer: COMMERCIAL

## 2023-03-16 DIAGNOSIS — F41.1 GAD (GENERALIZED ANXIETY DISORDER): ICD-10-CM

## 2023-03-16 PROCEDURE — 90791 PSYCH DIAGNOSTIC EVALUATION: CPT | Mod: VID | Performed by: SOCIAL WORKER

## 2023-03-16 ASSESSMENT — COLUMBIA-SUICIDE SEVERITY RATING SCALE - C-SSRS
6. HAVE YOU EVER DONE ANYTHING, STARTED TO DO ANYTHING, OR PREPARED TO DO ANYTHING TO END YOUR LIFE?: NO
TOTAL  NUMBER OF INTERRUPTED ATTEMPTS SINCE LAST CONTACT: NO
SUICIDE, SINCE LAST CONTACT: NO
1. SINCE LAST CONTACT, HAVE YOU WISHED YOU WERE DEAD OR WISHED YOU COULD GO TO SLEEP AND NOT WAKE UP?: NO
2. HAVE YOU ACTUALLY HAD ANY THOUGHTS OF KILLING YOURSELF?: NO
TOTAL  NUMBER OF ABORTED OR SELF INTERRUPTED ATTEMPTS SINCE LAST CONTACT: NO
ATTEMPT SINCE LAST CONTACT: NO
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
## 2023-03-17 ENCOUNTER — TELEPHONE (OUTPATIENT)
Dept: PSYCHOLOGY | Facility: CLINIC | Age: 53
End: 2023-03-17
Payer: COMMERCIAL

## 2023-03-17 NOTE — TELEPHONE ENCOUNTER
Therapist left a voice message for client to notify her that a transfer therapist has been identified.  She can contact Trupti Stewart directly to schedule.  Therapist provided her with the phone number and notified her that the remaining appointment with her has been cancelled.

## 2023-03-23 ENCOUNTER — LAB (OUTPATIENT)
Dept: INFUSION THERAPY | Facility: CLINIC | Age: 53
End: 2023-03-23
Attending: INTERNAL MEDICINE
Payer: COMMERCIAL

## 2023-03-23 DIAGNOSIS — D50.9 IRON DEFICIENCY ANEMIA, UNSPECIFIED IRON DEFICIENCY ANEMIA TYPE: ICD-10-CM

## 2023-03-23 LAB
ERYTHROCYTE [DISTWIDTH] IN BLOOD BY AUTOMATED COUNT: 13.2 % (ref 10–15)
FERRITIN SERPL-MCNC: 41 NG/ML (ref 11–328)
HCT VFR BLD AUTO: 40.9 % (ref 35–47)
HGB BLD-MCNC: 13 G/DL (ref 11.7–15.7)
IRON BINDING CAPACITY (ROCHE): 281 UG/DL (ref 240–430)
IRON SATN MFR SERPL: 19 % (ref 15–46)
IRON SERPL-MCNC: 53 UG/DL (ref 37–145)
MCH RBC QN AUTO: 29.1 PG (ref 26.5–33)
MCHC RBC AUTO-ENTMCNC: 31.8 G/DL (ref 31.5–36.5)
MCV RBC AUTO: 92 FL (ref 78–100)
PLATELET # BLD AUTO: 280 10E3/UL (ref 150–450)
RBC # BLD AUTO: 4.47 10E6/UL (ref 3.8–5.2)
WBC # BLD AUTO: 5.4 10E3/UL (ref 4–11)

## 2023-03-23 PROCEDURE — 83550 IRON BINDING TEST: CPT | Performed by: INTERNAL MEDICINE

## 2023-03-23 PROCEDURE — 85014 HEMATOCRIT: CPT | Performed by: INTERNAL MEDICINE

## 2023-03-23 PROCEDURE — 36415 COLL VENOUS BLD VENIPUNCTURE: CPT

## 2023-03-23 PROCEDURE — 82728 ASSAY OF FERRITIN: CPT | Performed by: INTERNAL MEDICINE

## 2023-03-23 NOTE — PROGRESS NOTES
Medical Assistant Note:  Criss Durant presents today for lab draw.    Patient seen by provider today: No.   present during visit today: Not Applicable.    Concerns: No Concerns.    Procedure:  Lab draw site: LAC, Needle type: BF, Gauge: 21. Gauze and coban applied    Post Assessment:  Labs drawn without difficulty: Yes.    Discharge Plan:  Departure Mode: Ambulatory.    Face to Face Time: 5.    Qi Coombs CMA

## 2023-03-24 NOTE — RESULT ENCOUNTER NOTE
Dear Ms. Durant,    Blood tests are normal.    Please, call me with any questions.    Pricilla Rahman MD

## 2023-04-08 DIAGNOSIS — Z98.84 BARIATRIC SURGERY STATUS: ICD-10-CM

## 2023-04-10 RX ORDER — PANTOPRAZOLE SODIUM 40 MG/1
TABLET, DELAYED RELEASE ORAL
Qty: 180 TABLET | Refills: 0 | Status: SHIPPED | OUTPATIENT
Start: 2023-04-10 | End: 2023-07-03

## 2023-04-26 DIAGNOSIS — F41.1 GAD (GENERALIZED ANXIETY DISORDER): ICD-10-CM

## 2023-04-26 NOTE — TELEPHONE ENCOUNTER
Prescription approved per [facility] Refill Protocol.  Yesi Mosley, RN  [facility] Triage Nurse

## 2023-04-29 DIAGNOSIS — F41.1 GAD (GENERALIZED ANXIETY DISORDER): ICD-10-CM

## 2023-05-01 RX ORDER — FLUOXETINE 40 MG/1
CAPSULE ORAL
Qty: 90 CAPSULE | Refills: 0 | Status: SHIPPED | OUTPATIENT
Start: 2023-05-01 | End: 2023-07-20

## 2023-07-01 DIAGNOSIS — Z98.84 BARIATRIC SURGERY STATUS: ICD-10-CM

## 2023-07-03 RX ORDER — PANTOPRAZOLE SODIUM 40 MG/1
TABLET, DELAYED RELEASE ORAL
Qty: 180 TABLET | Refills: 0 | Status: ON HOLD | OUTPATIENT
Start: 2023-07-03 | End: 2023-10-24

## 2023-08-16 ENCOUNTER — ONCOLOGY VISIT (OUTPATIENT)
Dept: ONCOLOGY | Facility: CLINIC | Age: 53
End: 2023-08-16
Attending: INTERNAL MEDICINE
Payer: COMMERCIAL

## 2023-08-16 ENCOUNTER — LAB (OUTPATIENT)
Dept: INFUSION THERAPY | Facility: CLINIC | Age: 53
End: 2023-08-16
Attending: INTERNAL MEDICINE
Payer: COMMERCIAL

## 2023-08-16 VITALS
DIASTOLIC BLOOD PRESSURE: 69 MMHG | SYSTOLIC BLOOD PRESSURE: 103 MMHG | HEART RATE: 69 BPM | TEMPERATURE: 98.7 F | RESPIRATION RATE: 18 BRPM | OXYGEN SATURATION: 95 % | BODY MASS INDEX: 31.68 KG/M2 | WEIGHT: 176 LBS

## 2023-08-16 DIAGNOSIS — D50.9 IRON DEFICIENCY ANEMIA, UNSPECIFIED IRON DEFICIENCY ANEMIA TYPE: ICD-10-CM

## 2023-08-16 DIAGNOSIS — D50.9 IRON DEFICIENCY ANEMIA, UNSPECIFIED IRON DEFICIENCY ANEMIA TYPE: Primary | ICD-10-CM

## 2023-08-16 LAB
ERYTHROCYTE [DISTWIDTH] IN BLOOD BY AUTOMATED COUNT: 13.4 % (ref 10–15)
FERRITIN SERPL-MCNC: 27 NG/ML (ref 11–328)
HCT VFR BLD AUTO: 39.8 % (ref 35–47)
HGB BLD-MCNC: 12.8 G/DL (ref 11.7–15.7)
IRON BINDING CAPACITY (ROCHE): 308 UG/DL (ref 240–430)
IRON SATN MFR SERPL: 15 % (ref 15–46)
IRON SERPL-MCNC: 46 UG/DL (ref 37–145)
MCH RBC QN AUTO: 29.4 PG (ref 26.5–33)
MCHC RBC AUTO-ENTMCNC: 32.2 G/DL (ref 31.5–36.5)
MCV RBC AUTO: 92 FL (ref 78–100)
PLATELET # BLD AUTO: 284 10E3/UL (ref 150–450)
RBC # BLD AUTO: 4.35 10E6/UL (ref 3.8–5.2)
WBC # BLD AUTO: 5.1 10E3/UL (ref 4–11)

## 2023-08-16 PROCEDURE — G0463 HOSPITAL OUTPT CLINIC VISIT: HCPCS | Performed by: INTERNAL MEDICINE

## 2023-08-16 PROCEDURE — 83550 IRON BINDING TEST: CPT | Performed by: INTERNAL MEDICINE

## 2023-08-16 PROCEDURE — 85027 COMPLETE CBC AUTOMATED: CPT | Performed by: INTERNAL MEDICINE

## 2023-08-16 PROCEDURE — 99213 OFFICE O/P EST LOW 20 MIN: CPT | Performed by: INTERNAL MEDICINE

## 2023-08-16 PROCEDURE — 82728 ASSAY OF FERRITIN: CPT | Performed by: INTERNAL MEDICINE

## 2023-08-16 PROCEDURE — 36415 COLL VENOUS BLD VENIPUNCTURE: CPT

## 2023-08-16 ASSESSMENT — PAIN SCALES - GENERAL: PAINLEVEL: NO PAIN (0)

## 2023-08-16 NOTE — PROGRESS NOTES
"Oncology Rooming Note    August 16, 2023 2:16 PM   Criss Durant is a 52 year old female who presents for:    Chief Complaint   Patient presents with    Oncology Clinic Visit     Initial Vitals: /69   Pulse 69   Temp 98.7  F (37.1  C) (Oral)   Resp 18   Wt 79.8 kg (176 lb)   LMP 09/22/2019   SpO2 95%   BMI 31.68 kg/m   Estimated body mass index is 31.68 kg/m  as calculated from the following:    Height as of 5/10/22: 1.588 m (5' 2.5\").    Weight as of this encounter: 79.8 kg (176 lb). Body surface area is 1.88 meters squared.  No Pain (0) Comment: Data Unavailable   Patient's last menstrual period was 09/22/2019.  Allergies reviewed: Yes  Medications reviewed: Yes    Medications: Medication refills not needed today.  Pharmacy name entered into [employer]!: [id number] - [place], [place] - [address] [address]    Clinical concerns: no       Shari J. Schoenberger, CMA            "

## 2023-08-16 NOTE — LETTER
"    8/16/2023         RE: Criss Durant  [address] [facility] [address]        Dear Colleague,    Thank you for referring your patient, Criss Durant, to the [facility]. Please see a copy of my visit note below.    Oncology Rooming Note    August 16, 2023 2:16 PM   Criss Durant is a 52 year old female who presents for:    Chief Complaint   Patient presents with     Oncology Clinic Visit     Initial Vitals: /69   Pulse 69   Temp 98.7  F (37.1  C) (Oral)   Resp 18   Wt 79.8 kg (176 lb)   LMP 09/22/2019   SpO2 95%   BMI 31.68 kg/m   Estimated body mass index is 31.68 kg/m  as calculated from the following:    Height as of 5/10/22: 1.588 m (5' 2.5\").    Weight as of this encounter: 79.8 kg (176 lb). Body surface area is 1.88 meters squared.  No Pain (0) Comment: Data Unavailable   Patient's last menstrual period was 09/22/2019.  Allergies reviewed: Yes  Medications reviewed: Yes    Medications: Medication refills not needed today.  Pharmacy name entered into [employer]: [id number] - [place], [place] - [address] [facility]    Clinical concerns: no       Shari J. Schoenberger, CMA              HEMATOLOGY HISTORY: Ms. Don is a female with iron deficiency anemia. She had gastric bypass surgery in October 2016.   1.  On 03/07/2018 (care everywhere), hemoglobin of 12.3 with MCV of 89.7.   2.  On 04/20/2018, iron of 33, ferritin of 11 and saturation of 11%.   3.  On 07/19/2018, iron of 36, ferritin of 6 and saturation of 12%.      SUBJECTIVE:  Ms. Durant is a 52-year-old female with iron deficiency anemia secondary to malabsorption due to gastric bypass surgery. She previously has received IV iron.     Her overall condition is stable.  She has mild generalized weakness.  No worsening.  No headache.  Occasional dizziness.  No chest pain.  No shortness of breath.  No abdominal pain.  No nausea or vomiting.  Appetite is good.  No urinary or bowel complaints.  " No bleeding from any site. All other review of systems is negative.     PHYSICAL EXAMINATION:   GENERAL:  Alert and oriented x 3.   VITAL SIGNS:  Reviewed.       Rest of the system is not examined.     LABS:  CBC, iron and ferritin reviewed.     ASSESSMENT:    1.  A 52-year-old female with iron deficiency anemia secondary to malabsorption.  Iron deficiency anemia has resolved.  2.  Status post gastric bypass surgery.     PLAN:    1.  Patient's overall condition is stable.  She has mild fatigue which will go along with her age and other medical problem.  She is still active and able to do all her things.    2.  Labs are reviewed.  Hemoglobin, iron and ferritin are normal.  Explained to her that she does not have any anemia or iron deficiency.  She does not need any iron replacement.    Explained to the patient that she is at risk of becoming iron deficient.  She needs to have labs checked monitored.  She will have it checked through her PCP.    3.  She will continue on vitamin B12 oral pills.  She will also continue her multivitamin.  She is tolerating it well.    4.  She had few questions which were all answered.  She will follow-up with PCP.  No return appointment being made for hematology/oncology clinic.  Patient can sense when she is becoming anemic.  Advised her to call her PCP and have blood checked when she feels she is getting anemic.     Total visit time of 20 minutes.  Time is spent in today's visit, review of chart/investigations today and documentation today.            Again, thank you for allowing me to participate in the care of your patient.        Sincerely,        Pricilla Rahman MD

## 2023-08-16 NOTE — PROGRESS NOTES
Medical Assistant Note:  Criss Durant presents today for blood draw.    Patient seen by provider today: Yes: kris.   present during visit today: Not Applicable.    Concerns: No Concerns.    Procedure:  Lab draw site: lac, Needle type: bf, Gauge: 23.    Post Assessment:  Labs drawn without difficulty: Yes.    Discharge Plan:  Departure Mode: Ambulatory.    Face to Face Time: 5 min.    Fadia Carr, CMA

## 2023-08-18 NOTE — PROGRESS NOTES
HEMATOLOGY HISTORY: Ms. Don is a female with iron deficiency anemia. She had gastric bypass surgery in October 2016.   1.  On 03/07/2018 (care everywhere), hemoglobin of 12.3 with MCV of 89.7.   2.  On 04/20/2018, iron of 33, ferritin of 11 and saturation of 11%.   3.  On 07/19/2018, iron of 36, ferritin of 6 and saturation of 12%.      SUBJECTIVE:  Ms. Durant is a 52-year-old female with iron deficiency anemia secondary to malabsorption due to gastric bypass surgery. She previously has received IV iron.     Her overall condition is stable.  She has mild generalized weakness.  No worsening.  No headache.  Occasional dizziness.  No chest pain.  No shortness of breath.  No abdominal pain.  No nausea or vomiting.  Appetite is good.  No urinary or bowel complaints.  No bleeding from any site. All other review of systems is negative.     PHYSICAL EXAMINATION:   GENERAL:  Alert and oriented x 3.   VITAL SIGNS:  Reviewed.       Rest of the system is not examined.     LABS:  CBC, iron and ferritin reviewed.     ASSESSMENT:    1.  A 52-year-old female with iron deficiency anemia secondary to malabsorption.  Iron deficiency anemia has resolved.  2.  Status post gastric bypass surgery.     PLAN:    1.  Patient's overall condition is stable.  She has mild fatigue which will go along with her age and other medical problem.  She is still active and able to do all her things.    2.  Labs are reviewed.  Hemoglobin, iron and ferritin are normal.  Explained to her that she does not have any anemia or iron deficiency.  She does not need any iron replacement.    Explained to the patient that she is at risk of becoming iron deficient.  She needs to have labs checked monitored.  She will have it checked through her PCP.    3.  She will continue on vitamin B12 oral pills.  She will also continue her multivitamin.  She is tolerating it well.    4.  She had few questions which were all answered.  She will follow-up with PCP.  No return  appointment being made for hematology/oncology clinic.  Patient can sense when she is becoming anemic.  Advised her to call her PCP and have blood checked when she feels she is getting anemic.     Total visit time of 20 minutes.  Time is spent in today's visit, review of chart/investigations today and documentation today.

## 2023-08-27 ENCOUNTER — HEALTH MAINTENANCE LETTER (OUTPATIENT)
Age: 53
End: 2023-08-27

## 2023-10-06 NOTE — PROGRESS NOTES
"Outpatient Physical Therapy Discharge Note     Patient: Criss Don  : 1970    Beginning/End Dates of Reporting Period:  10/24/17 to 2017    Referring Provider: Napoleon Gray PA-C    Therapy Diagnosis: Dizziness, BPPV     Client Self Report: Had 2 episodes since last session (not spinning, more of a flush and warm feeling). Reports not having spinning feeling anymore.  Negative MRA of neck and brain.     Goals:  Goal Identifier BPPV   Goal Description The client will be negative for any signs of BPPV in order to demonstrate resolution of symptoms   Target Date 17   Date Met  Goal met   Progress: Negative     Goal Identifier DHI   Goal Description The client will score 20 or less on DHI in order to demonstrate minimal impairment related to dizziness.    Target Date 17   Date Met      Progress:       Progress Toward Goals:   Progress this reporting period: Ms. Don no longer demonstrates any indications of BPPV today.  She is educated on what to look for in future related to possible reoccurrence and the recommendation to stay hydrated as well as able given surgical history. The client shows no other signs of oculomotor or vestibular impairment today to help determine cause of \"flush\" symptoms. No further physical therapy warranted at this time.       Plan:  Discharge from therapy.    Discharge:    Reason for Discharge: Patient has met all goals.    Equipment Issued: None    Discharge Plan: Patient to return to primary for next step related to diagnosis.   " Central Line      Patient reassessed immediately prior to procedure    Patient location during procedure: OR  Indications: vascular access  Staff  Anesthesiologist: Kayce Jackson DO  Preanesthetic Checklist  Completed: patient identified, IV checked, site marked, risks and benefits discussed, surgical consent, monitors and equipment checked, pre-op evaluation and timeout performed  Central Line Prep  Sterile Tech:cap, gloves, gown, mask and sterile barriers  Prep: chloraprep  Patient monitoring: blood pressure monitoring, continuous pulse oximetry and EKG  Central Line Procedure  Laterality:right  Location:internal jugular  Catheter Type:MAC  Catheter Size:9 Fr  Guidance:ultrasound guided  PROCEDURE NOTE/ULTRASOUND INTERPRETATION.  Using ultrasound guidance the potential vascular sites for insertion of the catheter were visualized to determine the patency of the vessel to be used for vascular access.  After selecting the appropriate site for insertion, the needle was visualized under ultrasound being inserted into the internal jugular vein, followed by ultrasound confirmation of wire and catheter placement. There were no abnormalities seen on ultrasound; an image was taken; and the patient tolerated the procedure with no complications. Images: still images obtained, printed/placed on chart  Assessment  Post procedure:biopatch applied, line sutured, occlusive dressing applied and secured with tape  Assessement:blood return through all ports, free fluid flow, chest x-ray ordered and Tyron Test  Complications:no  Patient Tolerance:patient tolerated the procedure well with no apparent complications  Additional Notes  Smooth first pass US guided RIJMAC+SLIC placement using sterile technique.  Negative tyron x2. CXR to follow in ICU.

## 2023-10-22 ENCOUNTER — HOSPITAL ENCOUNTER (OUTPATIENT)
Facility: CLINIC | Age: 53
Setting detail: OBSERVATION
Discharge: HOME | End: 2023-10-24
Attending: STUDENT IN AN ORGANIZED HEALTH CARE EDUCATION/TRAINING PROGRAM | Admitting: INTERNAL MEDICINE
Payer: COMMERCIAL

## 2023-10-22 ENCOUNTER — APPOINTMENT (OUTPATIENT)
Dept: CT IMAGING | Facility: CLINIC | Age: 53
End: 2023-10-22
Attending: STUDENT IN AN ORGANIZED HEALTH CARE EDUCATION/TRAINING PROGRAM
Payer: COMMERCIAL

## 2023-10-22 DIAGNOSIS — K28.9 MARGINAL ULCER: ICD-10-CM

## 2023-10-22 DIAGNOSIS — Z98.84 BARIATRIC SURGERY STATUS: ICD-10-CM

## 2023-10-22 DIAGNOSIS — K92.1 MELENA: ICD-10-CM

## 2023-10-22 LAB
ALBUMIN SERPL BCG-MCNC: 4 G/DL (ref 3.5–5.2)
ALP SERPL-CCNC: 99 U/L (ref 35–104)
ALT SERPL W P-5'-P-CCNC: 30 U/L (ref 0–50)
ANION GAP SERPL CALCULATED.3IONS-SCNC: 10 MMOL/L (ref 7–15)
AST SERPL W P-5'-P-CCNC: 30 U/L (ref 0–45)
BASO+EOS+MONOS # BLD AUTO: NORMAL 10*3/UL
BASO+EOS+MONOS NFR BLD AUTO: NORMAL %
BASOPHILS # BLD AUTO: 0 10E3/UL (ref 0–0.2)
BASOPHILS NFR BLD AUTO: 1 %
BILIRUB SERPL-MCNC: 0.4 MG/DL
BUN SERPL-MCNC: 14 MG/DL (ref 6–20)
CALCIUM SERPL-MCNC: 8.8 MG/DL (ref 8.6–10)
CHLORIDE SERPL-SCNC: 100 MMOL/L (ref 98–107)
CREAT SERPL-MCNC: 0.53 MG/DL (ref 0.51–0.95)
DEPRECATED HCO3 PLAS-SCNC: 28 MMOL/L (ref 22–29)
EGFRCR SERPLBLD CKD-EPI 2021: >90 ML/MIN/1.73M2
EOSINOPHIL # BLD AUTO: 0.1 10E3/UL (ref 0–0.7)
EOSINOPHIL NFR BLD AUTO: 1 %
ERYTHROCYTE [DISTWIDTH] IN BLOOD BY AUTOMATED COUNT: 13.1 % (ref 10–15)
GLUCOSE SERPL-MCNC: 96 MG/DL (ref 70–99)
HCT VFR BLD AUTO: 40.7 % (ref 35–47)
HGB BLD-MCNC: 11.5 G/DL (ref 11.7–15.7)
HGB BLD-MCNC: 13 G/DL (ref 11.7–15.7)
HOLD SPECIMEN: NORMAL
IMM GRANULOCYTES # BLD: 0 10E3/UL
IMM GRANULOCYTES NFR BLD: 0 %
LIPASE SERPL-CCNC: 40 U/L (ref 13–60)
LYMPHOCYTES # BLD AUTO: 1.1 10E3/UL (ref 0.8–5.3)
LYMPHOCYTES NFR BLD AUTO: 15 %
MCH RBC QN AUTO: 28.7 PG (ref 26.5–33)
MCHC RBC AUTO-ENTMCNC: 31.9 G/DL (ref 31.5–36.5)
MCV RBC AUTO: 90 FL (ref 78–100)
MONOCYTES # BLD AUTO: 0.4 10E3/UL (ref 0–1.3)
MONOCYTES NFR BLD AUTO: 5 %
NEUTROPHILS # BLD AUTO: 5.3 10E3/UL (ref 1.6–8.3)
NEUTROPHILS NFR BLD AUTO: 78 %
NRBC # BLD AUTO: 0 10E3/UL
NRBC BLD AUTO-RTO: 0 /100
PLATELET # BLD AUTO: 291 10E3/UL (ref 150–450)
POTASSIUM SERPL-SCNC: 4.9 MMOL/L (ref 3.4–5.3)
PROT SERPL-MCNC: 6.9 G/DL (ref 6.4–8.3)
RBC # BLD AUTO: 4.53 10E6/UL (ref 3.8–5.2)
SODIUM SERPL-SCNC: 138 MMOL/L (ref 135–145)
WBC # BLD AUTO: 6.9 10E3/UL (ref 4–11)

## 2023-10-22 PROCEDURE — 96375 TX/PRO/DX INJ NEW DRUG ADDON: CPT

## 2023-10-22 PROCEDURE — G0378 HOSPITAL OBSERVATION PER HR: HCPCS

## 2023-10-22 PROCEDURE — 83690 ASSAY OF LIPASE: CPT | Performed by: STUDENT IN AN ORGANIZED HEALTH CARE EDUCATION/TRAINING PROGRAM

## 2023-10-22 PROCEDURE — 258N000003 HC RX IP 258 OP 636: Performed by: INTERNAL MEDICINE

## 2023-10-22 PROCEDURE — 96376 TX/PRO/DX INJ SAME DRUG ADON: CPT

## 2023-10-22 PROCEDURE — 99223 1ST HOSP IP/OBS HIGH 75: CPT | Performed by: INTERNAL MEDICINE

## 2023-10-22 PROCEDURE — 74177 CT ABD & PELVIS W/CONTRAST: CPT

## 2023-10-22 PROCEDURE — C9113 INJ PANTOPRAZOLE SODIUM, VIA: HCPCS | Mod: JZ | Performed by: INTERNAL MEDICINE

## 2023-10-22 PROCEDURE — 250N000013 HC RX MED GY IP 250 OP 250 PS 637: Performed by: INTERNAL MEDICINE

## 2023-10-22 PROCEDURE — 85025 COMPLETE CBC W/AUTO DIFF WBC: CPT | Performed by: EMERGENCY MEDICINE

## 2023-10-22 PROCEDURE — 258N000003 HC RX IP 258 OP 636: Performed by: EMERGENCY MEDICINE

## 2023-10-22 PROCEDURE — 99285 EMERGENCY DEPT VISIT HI MDM: CPT | Mod: 25

## 2023-10-22 PROCEDURE — 250N000011 HC RX IP 250 OP 636: Mod: JZ | Performed by: INTERNAL MEDICINE

## 2023-10-22 PROCEDURE — 80053 COMPREHEN METABOLIC PANEL: CPT | Performed by: STUDENT IN AN ORGANIZED HEALTH CARE EDUCATION/TRAINING PROGRAM

## 2023-10-22 PROCEDURE — 250N000011 HC RX IP 250 OP 636: Performed by: STUDENT IN AN ORGANIZED HEALTH CARE EDUCATION/TRAINING PROGRAM

## 2023-10-22 PROCEDURE — 85018 HEMOGLOBIN: CPT | Performed by: INTERNAL MEDICINE

## 2023-10-22 PROCEDURE — 99204 OFFICE O/P NEW MOD 45 MIN: CPT | Performed by: SPECIALIST

## 2023-10-22 PROCEDURE — 85025 COMPLETE CBC W/AUTO DIFF WBC: CPT | Performed by: STUDENT IN AN ORGANIZED HEALTH CARE EDUCATION/TRAINING PROGRAM

## 2023-10-22 PROCEDURE — 36415 COLL VENOUS BLD VENIPUNCTURE: CPT | Performed by: STUDENT IN AN ORGANIZED HEALTH CARE EDUCATION/TRAINING PROGRAM

## 2023-10-22 PROCEDURE — 96374 THER/PROPH/DIAG INJ IV PUSH: CPT | Mod: XU

## 2023-10-22 PROCEDURE — 80053 COMPREHEN METABOLIC PANEL: CPT | Performed by: EMERGENCY MEDICINE

## 2023-10-22 PROCEDURE — 36415 COLL VENOUS BLD VENIPUNCTURE: CPT | Performed by: EMERGENCY MEDICINE

## 2023-10-22 PROCEDURE — C9113 INJ PANTOPRAZOLE SODIUM, VIA: HCPCS | Mod: JZ | Performed by: STUDENT IN AN ORGANIZED HEALTH CARE EDUCATION/TRAINING PROGRAM

## 2023-10-22 PROCEDURE — 36415 COLL VENOUS BLD VENIPUNCTURE: CPT | Performed by: INTERNAL MEDICINE

## 2023-10-22 PROCEDURE — 250N000009 HC RX 250: Performed by: STUDENT IN AN ORGANIZED HEALTH CARE EDUCATION/TRAINING PROGRAM

## 2023-10-22 RX ORDER — ACETAMINOPHEN 650 MG/1
650 SUPPOSITORY RECTAL EVERY 6 HOURS PRN
Status: DISCONTINUED | OUTPATIENT
Start: 2023-10-22 | End: 2023-10-24 | Stop reason: HOSPADM

## 2023-10-22 RX ORDER — PROCHLORPERAZINE MALEATE 10 MG
10 TABLET ORAL EVERY 6 HOURS PRN
Status: DISCONTINUED | OUTPATIENT
Start: 2023-10-22 | End: 2023-10-24 | Stop reason: HOSPADM

## 2023-10-22 RX ORDER — SUCRALFATE ORAL 1 G/10ML
1 SUSPENSION ORAL
Status: DISCONTINUED | OUTPATIENT
Start: 2023-10-22 | End: 2023-10-24 | Stop reason: HOSPADM

## 2023-10-22 RX ORDER — IOPAMIDOL 755 MG/ML
85 INJECTION, SOLUTION INTRAVASCULAR ONCE
Status: COMPLETED | OUTPATIENT
Start: 2023-10-22 | End: 2023-10-22

## 2023-10-22 RX ORDER — ONDANSETRON 2 MG/ML
4 INJECTION INTRAMUSCULAR; INTRAVENOUS ONCE
Status: COMPLETED | OUTPATIENT
Start: 2023-10-22 | End: 2023-10-22

## 2023-10-22 RX ORDER — ONDANSETRON 4 MG/1
4 TABLET, ORALLY DISINTEGRATING ORAL EVERY 6 HOURS PRN
Status: DISCONTINUED | OUTPATIENT
Start: 2023-10-22 | End: 2023-10-24 | Stop reason: HOSPADM

## 2023-10-22 RX ORDER — NALOXONE HYDROCHLORIDE 0.4 MG/ML
0.2 INJECTION, SOLUTION INTRAMUSCULAR; INTRAVENOUS; SUBCUTANEOUS
Status: DISCONTINUED | OUTPATIENT
Start: 2023-10-22 | End: 2023-10-24 | Stop reason: HOSPADM

## 2023-10-22 RX ORDER — SODIUM CHLORIDE 9 MG/ML
INJECTION, SOLUTION INTRAVENOUS CONTINUOUS
Status: DISCONTINUED | OUTPATIENT
Start: 2023-10-22 | End: 2023-10-24 | Stop reason: HOSPADM

## 2023-10-22 RX ORDER — ONDANSETRON 2 MG/ML
4 INJECTION INTRAMUSCULAR; INTRAVENOUS EVERY 6 HOURS PRN
Status: DISCONTINUED | OUTPATIENT
Start: 2023-10-22 | End: 2023-10-24 | Stop reason: HOSPADM

## 2023-10-22 RX ORDER — OXYCODONE HYDROCHLORIDE 5 MG/1
5 TABLET ORAL EVERY 4 HOURS PRN
Status: DISCONTINUED | OUTPATIENT
Start: 2023-10-22 | End: 2023-10-24 | Stop reason: HOSPADM

## 2023-10-22 RX ORDER — MORPHINE SULFATE 4 MG/ML
4 INJECTION, SOLUTION INTRAMUSCULAR; INTRAVENOUS ONCE
Status: COMPLETED | OUTPATIENT
Start: 2023-10-22 | End: 2023-10-22

## 2023-10-22 RX ORDER — MORPHINE SULFATE 2 MG/ML
1-2 INJECTION, SOLUTION INTRAMUSCULAR; INTRAVENOUS
Status: DISCONTINUED | OUTPATIENT
Start: 2023-10-22 | End: 2023-10-24 | Stop reason: HOSPADM

## 2023-10-22 RX ORDER — PROCHLORPERAZINE 25 MG
25 SUPPOSITORY, RECTAL RECTAL EVERY 12 HOURS PRN
Status: DISCONTINUED | OUTPATIENT
Start: 2023-10-22 | End: 2023-10-24 | Stop reason: HOSPADM

## 2023-10-22 RX ORDER — NALOXONE HYDROCHLORIDE 0.4 MG/ML
0.4 INJECTION, SOLUTION INTRAMUSCULAR; INTRAVENOUS; SUBCUTANEOUS
Status: DISCONTINUED | OUTPATIENT
Start: 2023-10-22 | End: 2023-10-24 | Stop reason: HOSPADM

## 2023-10-22 RX ORDER — ACETAMINOPHEN 325 MG/1
650 TABLET ORAL EVERY 6 HOURS PRN
Status: DISCONTINUED | OUTPATIENT
Start: 2023-10-22 | End: 2023-10-24 | Stop reason: HOSPADM

## 2023-10-22 RX ADMIN — SODIUM CHLORIDE: 9 INJECTION, SOLUTION INTRAVENOUS at 16:16

## 2023-10-22 RX ADMIN — MORPHINE SULFATE 2 MG: 2 INJECTION, SOLUTION INTRAMUSCULAR; INTRAVENOUS at 15:53

## 2023-10-22 RX ADMIN — SODIUM CHLORIDE: 9 INJECTION, SOLUTION INTRAVENOUS at 23:28

## 2023-10-22 RX ADMIN — SODIUM CHLORIDE 64 ML: 9 INJECTION, SOLUTION INTRAVENOUS at 11:26

## 2023-10-22 RX ADMIN — SUCRALFATE 1 G: 1 SUSPENSION ORAL at 21:16

## 2023-10-22 RX ADMIN — PANTOPRAZOLE SODIUM 80 MG: 40 INJECTION, POWDER, FOR SOLUTION INTRAVENOUS at 11:18

## 2023-10-22 RX ADMIN — IOPAMIDOL 85 ML: 755 INJECTION, SOLUTION INTRAVENOUS at 11:22

## 2023-10-22 RX ADMIN — MORPHINE SULFATE 4 MG: 4 INJECTION, SOLUTION INTRAMUSCULAR; INTRAVENOUS at 11:20

## 2023-10-22 RX ADMIN — PANTOPRAZOLE SODIUM 40 MG: 40 INJECTION, POWDER, FOR SOLUTION INTRAVENOUS at 21:16

## 2023-10-22 RX ADMIN — SUCRALFATE 1 G: 1 SUSPENSION ORAL at 18:19

## 2023-10-22 RX ADMIN — MORPHINE SULFATE 2 MG: 2 INJECTION, SOLUTION INTRAMUSCULAR; INTRAVENOUS at 21:16

## 2023-10-22 ASSESSMENT — ACTIVITIES OF DAILY LIVING (ADL)
ADLS_ACUITY_SCORE: 35
ADLS_ACUITY_SCORE: 31
ADLS_ACUITY_SCORE: 31
ADLS_ACUITY_SCORE: 35
ADLS_ACUITY_SCORE: 31
ADLS_ACUITY_SCORE: 35
ADLS_ACUITY_SCORE: 31

## 2023-10-22 NOTE — ED TRIAGE NOTES
Abd pain with n/v and black stools since tuesday     Triage Assessment (Adult)       Row Name 10/22/23 7243          Triage Assessment    Airway WDL WDL        Respiratory WDL    Respiratory WDL WDL        Cardiac WDL    Cardiac WDL WDL        Cognitive/Neuro/Behavioral WDL    Cognitive/Neuro/Behavioral WDL WDL

## 2023-10-22 NOTE — ED TRIAGE NOTES
Patient has many dietary sensitivities. Tuesday she ate food outside of what she usually can because she was at a conference without options. This morning she had a black watery stool. She is not able to keep food down and water makes her nauseous  Pain is rated 9/10 when spasming and 3/10 at rest.     Triage Assessment (Adult)       Row Name 10/22/23 1106 10/22/23 6124       Triage Assessment    Airway WDL WDL WDL       Respiratory WDL    Respiratory WDL WDL WDL       Skin Circulation/Temperature WDL    Skin Circulation/Temperature WDL WDL --       Cardiac WDL    Cardiac WDL WDL WDL       Peripheral/Neurovascular WDL    Peripheral Neurovascular WDL WDL --       Cognitive/Neuro/Behavioral WDL    Cognitive/Neuro/Behavioral WDL WDL WDL

## 2023-10-22 NOTE — H&P
[facility]    History and Physical - Hospitalist Service       Date of Admission:  10/22/2023    Assessment & Plan      Criss Durant is a 52 year old female with past medical history of gastric bypass surgery in 2016 with resultant iron deficiency anemia secondary to malabsorption for which she has followed with hematology in the past, history of a marginal ulcer with perforation s/p repair in 2018, depression, kidney stones and urinary frequency who was admitted under observation status on 10/22/2023 for evaluation of abdominal plain pain and dark stool    Abdominal pain, melanotic stool  Hx of marginal ulcer, with perforation s/p repair in 2018  Hx of Rasheeda-en-Y gastric bypass in 2016  Hx of iron deficiency dt malabsorption, now resolved  *Patient has a history of a perforated GJ ulcer. In 10/2018 she was hospitalized with a perforated marginal ulcer and underwent underwent laparoscopic repair of a perforated marginal ulcer per Dr. Corea, She has followed with [facility] in the past and underwent previous endoscopies, most recently in 2019.   *Patient reported she had begun experiencing abdominal pain a few days prior to admission, she says this was due in part to limited dietary options available while attending a conference earlier this week.  She developed associated nausea and vomiting and had ongoing crampy abdominal pain that seemed to be associated with oral intake. On the day of admission she had 1 black watery appearing stool.  Denies associated dizziness, lightheadedness or shortness of breath. She ultimately presented to the emergency room for further evaluation.  *In the ED, she was afebrile and hemodynamically stable. BMP and CBC were unremarkable.  Her electrolytes, renal function, LFTs, lipase, WBC and hemoglobin were all stable. CT abd/pelvis  obtained and showed some inflammation in the Rasheeda limb near the gastrojejunostomy suggestive of a marginal ulcer. Findings were  "discussed with general surgery on-call, Dr. Jaffe. Given stable hemodynamics and hemoglobin recommended admission under observation status for ongoing evaluation. In the ED, she was given IV fluids, Zofran, Protonix and IV pain medications.  -- trend hgb q8h  -- general surgery consult  -- [facility] consult  -- clears today, NPO at midnight for possible procedures in AM  -- cont PPI BID  -- supportive cares with IVFs, prns for pain/nausea    Depression  *Chronic and stable on fluoxetine, will hold while evaluating above        Diet:  clear liquids today and NPO at midnight  DVT Prophylaxis: Pneumatic Compression Devices  Gee Catheter: Not present  Lines: None     Cardiac Monitoring: None  Code Status:  Full code    Clinically Significant Risk Factors Present on Admission                       # Obesity: Estimated body mass index is 31.09 kg/m  as calculated from the following:    Height as of this encounter: 1.575 m (5' 2\").    Weight as of this encounter: 77.1 kg (170 lb).              Disposition Plan      Expected Discharge Date: 10/24/2023                  Vanessa Avelar DO  Hospitalist Service  [facility]  Securely message with [employer] (more info)  Text page via [employer] Paging/Directory     ______________________________________________________________________    Chief Complaint   Abdominal pain, dark watery stool    History is obtained from the patient    History of Present Illness   Criss Durant is a 52 year old female with past medical history of gastric bypass surgery in 2016 with resultant iron deficiency anemia secondary to malabsorption for which she has followed with [facility] hematology in the past, history of a marginal ulcer with perforation s/p repair in 2018, depression, kidney stones and urinary frequency who presented to the ED for evaluation of abdominal plain pain and dark stool x1.    Patient has a history of a perforated GJ ulcer. In 10/2018 she was hospitalized " with a perforated marginal ulcer and underwent underwent laparoscopic repair of a perforated marginal ulcer per Dr. Corea, She has followed with [facility] in the past and underwent previous endoscopies, most recently in 2019.     Reported she had begun experiencing abdominal pain a few days prior to admission, she says this was due in part to limited dietary options available while attending a conference earlier this week.  She developed associated nausea and vomiting and had ongoing crampy abdominal pain that seemed to be associated with oral intake. On the day of admission she had 1 black watery appearing stool.  Denies associated dizziness, lightheadedness or shortness of breath. She ultimately presented to the emergency room for further evaluation.    In the ED, she was afebrile and hemodynamically stable. BMP and CBC were unremarkable.  Her electrolytes, renal function, LFTs, lipase, WBC and hemoglobin were all stable. CT abd/pelvis  obtained and showed some inflammation in the Rasheeda limb near the gastrojejunostomy suggestive of a marginal ulcer. Findings were discussed with general surgery on-call, Dr. Jaffe. Given stable hemodynamics and hemoglobin recommended admission under observation status for ongoing evaluation. In the ED, she was given IV fluids, Zofran, Protonix and IV pain medications.    When I saw the patient, she was resting comfortably.  Alert and conversing appropriately.  Able to relay above history.  Notes that after initial treatments in the emergency room her symptoms are improving.  She states that the IV morphine seem to be the most helpful.  She is in agreement with admission under observation status for ongoing evaluation and care.      Past Medical History    Past Medical History:   Diagnosis Date    Depressive disorder     Gout 7/22/2021    morbid obesity     Obese     Renal disease     stone    Ulcer of gastric fundus     Urinary frequency        Past Surgical History   Past Surgical  History:   Procedure Laterality Date    CYSTOSCOPY      EXTRACORPOREAL SHOCK WAVE LITHOTRIPSY (ESWL) Left 2/7/2019    Procedure: Left renal lithotripsy (1250 shocks).;  Surgeon: Regis Parada MD;  Location: [facility] OR    LAPAROSCOPIC BYPASS GASTRIC N/A 10/26/2016    Procedure: LAPAROSCOPIC BYPASS GASTRIC;  Surgeon: Romario Reyna MD;  Location: [facility] OR    LAPAROSCOPY DIAGNOSTIC (GENERAL) N/A 10/24/2018    Procedure: LAPAROSCOPY DIAGNOSTIC FOR PERFORATED GASTRIC ULCER;  Surgeon: Chang Corea MD;  Location:  OR    NO HISTORY OF SURGERY         Prior to Admission Medications      PTA Med List   Medication Sig Last Dose    allopurinol (ZYLOPRIM) 300 MG tablet Take 1 tablet by mouth daily 10/22/2023    calcium-vitamin D-vitamin K (VIACTIV) 500-500-40 MG-UNT-MCG CHEW Take 3 tablets by mouth At Bedtime  10/21/2023    Cyanocobalamin (B-12) 2500 MCG SUBL Place 1 tablet under the tongue Every Mon, Wed, Fri Morning 3 times weekly 10/20/2023    docusate sodium (COLACE) 100 MG capsule Take 300 mg by mouth every morning 10/22/2023    FLUoxetine (PROZAC) 20 MG capsule TAKE 1 CAPSULE BY MOUTH ONCE DAILY. TAKE WITH 40MG CAPSULE TO EQUAL 60MG PER DAY 10/22/2023    FLUoxetine (PROZAC) 40 MG capsule TAKE 1 CAPSULE BY MOUTH ONCE DAILY. TAKE WITH 20 MG CAPSULE TO EQUAL 60 MG DAILY 10/22/2023    hydrOXYzine (ATARAX) 50 MG tablet Take 1 tablet (50 mg) by mouth every 12 hours as needed for itching 10/20/2023    multivitamin  peds with iron (FLINTSTONES COMPLETE) 60 MG chewable tablet Take 2 chew tab by mouth every morning  10/22/2023    pantoprazole (PROTONIX) 40 MG EC tablet Take 1 tablet by mouth twice daily Past Week    potassium citrate (UROCIT-K) 10 MEQ (1080 MG) CR tablet Take 2 tablets by mouth 2 times daily 10/22/2023        Review of Systems    The 10 point Review of Systems is negative other than noted in the HPI or here.      Physical Exam   Vital Signs: Temp: 97.9  F (36.6  C) Temp src: Temporal BP: 134/74  Pulse: 76   Resp: 16 SpO2: 94 % O2 Device: None (Room air)    Weight: 170 lbs 0 oz    Constitutional: awake, alert, cooperative, no distress, and appears stated age  Respiratory: No increased work of breathing, good air exchange, clear to auscultation bilaterally, no crackles or wheezing  Cardiovascular: HRRR, no MGR, trace bilateral LE edema  GI: S, +TTP over upper abd, no guarding/rigidity  Skin: warm/dry    Medical Decision Making     75 MINUTES SPENT BY ME on the date of service doing chart review, history, exam, documentation & further activities per the note.      Data     I have personally reviewed the following data over the past 24 hrs:    6.9  \   13.0   / 291     138 100 14.0 /  96   4.9 28 0.53 \     ALT: 30 AST: 30 AP: 99 TBILI: 0.4   ALB: 4.0 TOT PROTEIN: 6.9 LIPASE: 40       Imaging results reviewed over the past 24 hrs:   Recent Results (from the past 24 hour(s))   CT Abdomen Pelvis w Contrast    Narrative    EXAM: CT ABDOMEN PELVIS W CONTRAST  LOCATION: [facility]  DATE: 10/22/2023    INDICATION: History of Rasheeda en Y in 2017 and prior perforated gastric ulcer.  Abdominal pain and dark stool.  COMPARISON: 09/29/2020  TECHNIQUE: CT scan of the abdomen and pelvis was performed following injection of IV contrast. Multiplanar reformats were obtained. Dose reduction techniques were used.  CONTRAST: 85 mL Isovue 370    FINDINGS:   LOWER CHEST: Mild calcified atherosclerosis left anterior descending coronary artery.    HEPATOBILIARY: Focal fatty infiltration of the liver along the fissure. Cholelithiasis.    PANCREAS: Normal.    SPLEEN: Normal.    ADRENAL GLANDS: Normal.    KIDNEYS/BLADDER: Left kidney cysts, requiring no follow-up.    BOWEL: Rasheeda-en-Y gastric bypass with antecolic Rasheeda limb. Mild inflammation has developed about the inferior surface of the Rasheeda limb just distal to the gastrojejunostomy. Otherwise normal bowel, including the appendix.    LYMPH NODES:  Normal.    VASCULATURE: Trace atherosclerosis.    PELVIC ORGANS: Pelvic phleboliths.    MUSCULOSKELETAL: Mild degenerative change in the spine.      Impression    IMPRESSION:   Inflammation has developed in the Rasheeda limb near the the gastrojejunostomy, suggesting a marginal ulcer as the etiology for pain and dark stool

## 2023-10-22 NOTE — ED PROVIDER NOTES
History     Chief Complaint:  Abdominal Pain       HPI   Criss Durant is a 52 year old female history of prior Rasheeda-en-Y, also suffered a prior perforated ulcer, iron deficiency anemia, not anticoagulated, presenting with abdominal pain.  Has had a few days of abdominal pain in the epigastrium.  It is not worsening.  Today she had black loose stool.  Pain with eating.  Nausea.  No vomiting.  No fever.  No urinary symptoms.  Taking antacid daily.      Independent Historian:    none    Review of External Notes:  Oncology note August 16, 2023    Medications:    allopurinol (ZYLOPRIM) 300 MG tablet  calcium-vitamin D-vitamin K (VIACTIV) 500-500-40 MG-UNT-MCG CHEW  Cholecalciferol (VITAMIN D3 PO)  Cyanocobalamin (B-12) 2500 MCG SUBL  docusate sodium (COLACE) 100 MG capsule  FLUoxetine (PROZAC) 20 MG capsule  FLUoxetine (PROZAC) 40 MG capsule  hydrOXYzine (ATARAX) 50 MG tablet  multivitamin  peds with iron (FLINTSTONES COMPLETE) 60 MG chewable tablet  pantoprazole (PROTONIX) 40 MG EC tablet  potassium citrate (UROCIT-K) 10 MEQ (1080 MG) CR tablet        Past Medical History:    Past Medical History:   Diagnosis Date    Depressive disorder     Gout 7/22/2021    morbid obesity     Obese     Renal disease     Ulcer of gastric fundus     Urinary frequency        Past Surgical History:    Past Surgical History:   Procedure Laterality Date    CYSTOSCOPY      EXTRACORPOREAL SHOCK WAVE LITHOTRIPSY (ESWL) Left 2/7/2019    Procedure: Left renal lithotripsy (1250 shocks).;  Surgeon: Regis Parada MD;  Location:  OR    LAPAROSCOPIC BYPASS GASTRIC N/A 10/26/2016    Procedure: LAPAROSCOPIC BYPASS GASTRIC;  Surgeon: Romario Reyna MD;  Location:  OR    LAPAROSCOPY DIAGNOSTIC (GENERAL) N/A 10/24/2018    Procedure: LAPAROSCOPY DIAGNOSTIC FOR PERFORATED GASTRIC ULCER;  Surgeon: Chang Corea MD;  Location: [facility] OR    NO HISTORY OF SURGERY            Physical Exam   Patient Vitals for the past 24 hrs:   BP  "Temp Temp src Pulse Resp SpO2 Height Weight   10/22/23 0924 134/74 97.9  F (36.6  C) Temporal 76 16 94 % 1.575 m (5' 2\") 77.1 kg (170 lb)        Physical Exam  GENERAL: Patient well-appearing  HEAD: Atraumatic.  NECK: No rigidity  CV: RRR, no murmurs rubs or gallops  PULM: CTAB with good aeration; no retractions, rales, rhonchi, or wheezing  ABD: Soft, epigastric tenderness to palpation.  No guarding or rigidity.  Rectal: Chaperone present.  No stool on digital exam.  DERM: No rash. Skin warm and dry  EXTREMITY: Moving all extremities without difficulty.        Emergency Department Course        Imaging:  CT Abdomen Pelvis w Contrast   Final Result   IMPRESSION:    Inflammation has developed in the Rasheeda limb near the the gastrojejunostomy, suggesting a marginal ulcer as the etiology for pain and dark stool        Report per radiology    Laboratory:  Labs Ordered and Resulted from Time of ED Arrival to Time of ED Departure   COMPREHENSIVE METABOLIC PANEL - Normal       Result Value    Sodium 138      Potassium 4.9      Carbon Dioxide (CO2) 28      Anion Gap 10      Urea Nitrogen 14.0      Creatinine 0.53      GFR Estimate >90      Calcium 8.8      Chloride 100      Glucose 96      Alkaline Phosphatase 99      AST 30      ALT 30      Protein Total 6.9      Albumin 4.0      Bilirubin Total 0.4     LIPASE - Normal    Lipase 40     CBC WITH PLATELETS AND DIFFERENTIAL    WBC Count 6.9      RBC Count 4.53      Hemoglobin 13.0      Hematocrit 40.7      MCV 90      MCH 28.7      MCHC 31.9      RDW 13.1      Platelet Count 291      % Neutrophils 78      % Lymphocytes 15      % Monocytes 5      Mids % (Monos, Eos, Basos)        % Eosinophils 1      % Basophils 1      % Immature Granulocytes 0      NRBCs per 100 WBC 0      Absolute Neutrophils 5.3      Absolute Lymphocytes 1.1      Absolute Monocytes 0.4      Mids Abs (Monos, Eos, Basos)        Absolute Eosinophils 0.1      Absolute Basophils 0.0      Absolute Immature " Granulocytes 0.0      Absolute NRBCs 0.0               Emergency Department Course & Assessments:             Interventions:  Medications   sodium chloride 0.9% BOLUS 1,000 mL (0 mLs Intravenous Stopped 10/22/23 0939)   ondansetron (ZOFRAN) injection 4 mg (has no administration in time range)   iohexol (OMNIPAQUE) 140 MG/ML solution for oral use 25 mL (has no administration in time range)   morphine (PF) injection 4 mg (4 mg Intravenous $Given 10/22/23 1120)   pantoprazole (PROTONIX) IV push injection 80 mg (80 mg Intravenous $Given 10/22/23 1118)   Saline flush (64 mLs Intravenous $Given 10/22/23 1126)   iopamidol (ISOVUE-370) solution 85 mL (85 mLs Intravenous $Given 10/22/23 1122)             Independent Interpretation (X-rays, CTs, rhythm strip):  None    Consultations/Discussion of Management or Tests:  Dr. Jaffe general surgery.  Dr. Avelar hospitalist       Social Determinants of Health affecting care:  none     Disposition:  The patient was admitted to the hospital under the care of Dr. Avelar.     Impression & Plan         Medical Decision Making:  Symptoms concerning for GI bleed.     Chronic conditions complicating -prior Rasheeda-en-Y and prior perforated ulcer.    DDx considered esophageal rupture, upper vs lower GI bleed, coagulopathy.    Labs unremarkable.  Vital signs unremarkable.    On rectal exam-no palpated stool but patient reports black stool earlier today.    CT scan demonstrated a marginal ulcer.    Given IV protonix     Not anticoagulated.    Pain treated with IV morphine.    Discussed with general surgery who recommended admission and GI consult on the inpatient side.    Discussed with hospitalist Dr. Avelar, with plans for GI consult as an inpatient.  But does not require emergent scope.    Patient admitted.      Critical Care time:  was 0 minutes for this patient excluding procedures.    Diagnosis:    ICD-10-CM    1. Marginal ulcer  K28.9       2. Melena  K92.1            Discharge  Medications:  New Prescriptions    No medications on file          Ronny Montgomery MD  10/22/2023   Ronny Montgomery MD Foss, Kevin, MD  10/22/23 6005

## 2023-10-22 NOTE — PROGRESS NOTES
RECEIVING UNIT ED HANDOFF REVIEW    ED Nurse Handoff Report was reviewed by: Jamila Sotelo RN on October 22, 2023 at 2:57 PM

## 2023-10-22 NOTE — CONSULTS
Surgery Consult    HPI: I was asked by Dr. Montgomery of the ED to consult on this 53 y/o female. She underwent a Lap RNY Gastric Bypass in 2016 by Dr. Reyna. In 2018 she developed a perforated marginal ulcer that was surgically treated. She has been on protonix since then but has not taken it of the past 10-14 days due to change in insurance. She was not having sxs until she stopped the protonix. She has developed severe epigastric pain with eating or drinking as well as dysphagia. She has also noted black stools. She presents to the ED due to the pain and not being able to keep anything down.     PMH: Allergies: Fish oil, Hydrocodone  Med: Iron deficiency anemia           Nephrolithiasis  Surg: S/p RNY Gastric Bypass            S/P repair perforated margnial ulcer    Exam:  Gen: Pt seen in ED. Laying comfortably on cart,   Resp: Non labored, rr-16bpm  Cor_ RRR, her 70  Abd: Tender across epigastrium but no guarding or rebound.     Labs: Hgb 13.0; WBC 6.9    CT: Personally reviewed  FINDINGS:   LOWER CHEST: Mild calcified atherosclerosis left anterior descending coronary artery.     HEPATOBILIARY: Focal fatty infiltration of the liver along the fissure. Cholelithiasis.     PANCREAS: Normal.     SPLEEN: Normal.     ADRENAL GLANDS: Normal.     KIDNEYS/BLADDER: Left kidney cysts, requiring no follow-up.     BOWEL: Rasheeda-en-Y gastric bypass with antecolic Rasheeda limb. Mild inflammation has developed about the inferior surface of the Rasheeda limb just distal to the gastrojejunostomy. Otherwise normal bowel, including the appendix.     LYMPH NODES: Normal.     VASCULATURE: Trace atherosclerosis.     PELVIC ORGANS: Pelvic phleboliths.     MUSCULOSKELETAL: Mild degenerative change in the spine.                                                                      IMPRESSION:   Inflammation has developed in the Rasheeda limb near the the gastrojejunostomy, suggesting a marginal ulcer as the etiology for pain and dark stool    A/P: Pt now 7  years out from RNY Gastric Bypass. She has had issues with a marginal ulcer in the past, including a perforation. She was out of her PPI for 10-14 days and this may be contributory to recurrence of the ulcer.     Admit for iv PPI. Consider EGD at some point to better evaluate ulcer and potential stricture.     Dmitry Jaffe MD  [facility] Department of Surgery

## 2023-10-22 NOTE — CONSULTS
GASTROENTEROLOGY CONSULTATION      Criss Durant  [address] [address] [facility] [address]  52 year old female     Admission Date/Time: 10/22/2023  Primary Care Provider: Napoleon Gray     We were asked to see the patient in consultation by Dr. Avelar for evaluation of abdominal pain.    IRP:      #1 Epigastric pain with N/V and one bout of melena  #2 Prior RYBypass 2016, h/o marginal ulcer with perf and surg repair 2018    Hb stable at 13, also gallstones noted on CT without clear cholecystitis and LFTs normal. Based on presentation and CT, recurrent ulcer possible. Doubt major bleed.     - IV protonix q12  - will add carafate liquid.  - start with clears tonight. Ok to advance as tolerated tomorrow.  - anti-nausea regimen.  - no acute concerns on CT needing surgery.  - will need EGD before discharge, not planning for tomorrow, GI team will reassess timing tomorrow based on status and availability. Discussed with pt who understands.  - if any concern for ruq pain persistent despite above, do ruq ultrasound (gallstones).      Navneet Kitchen MD   [facility]  [phone]      ________________________________________________________________________        HPI:  Criss Durant is a 52 year old female who presents with 5-6 days of epigastric pain, progressive, with nausea/vomiting. She ran out of pantoprazole about 10 days ago, otherwise has been on bid regimen for years. Had RYB surgery 2016, had perforated marginal ulcer in 2018 requiring surgical repair. Was doing well since 2018 without issues. Denies any NSAIDs. Denies smoking. Noted black stools this AM, no hematemesis. Has no had EGD for years. CT with inflamed appearance of azalia limb - ? Recurrent marginal ulcer?     ROS: A comprehensive ten point review of systems was negative aside from those in mentioned in the HPI.      PAST MEDICAL HISTORY:  Past Medical History:   Diagnosis Date    Depressive disorder     Gout 7/22/2021     morbid obesity     Obese     Renal disease     stone    Ulcer of gastric fundus     Urinary frequency      SOCIAL HISTORY:  Social History     Tobacco Use    Smoking status: Never    Smokeless tobacco: Never   Substance Use Topics    Alcohol use: No     Alcohol/week: 0.0 standard drinks of alcohol    Drug use: No     FAMILY HISTORY:  Family History   Problem Relation Age of Onset    Hypertension Mother     Breast Cancer Mother         64 dx    Allergies Mother     Obesity Mother     Respiratory Mother         Asthma    Heart Disease Mother     Chronic Obstructive Pulmonary Disease Mother     Hypertension Maternal Grandmother     Cancer Maternal Grandmother         Ovarian    Hypertension Brother     Cancer Sister         Cervical    Leukemia Nephew         dx at 23, CML    Brain Cancer Cousin         maternal    Cancer Maternal Aunt         ?Gastric    Cancer Other         Mat. great aunt-ovarian     ALLERGIES:   Allergies   Allergen Reactions    Fish Oil     Hydrocodone-Acetaminophen Nausea     Also light headed and flushed  Other reaction(s): Headache  Also light headed and flushed     MEDICATIONS:   Prior to Admission medications    Medication Sig Start Date End Date Taking? Authorizing Provider   allopurinol (ZYLOPRIM) 300 MG tablet Take 1 tablet by mouth daily 1/28/22  Yes Reported, Patient   calcium-vitamin D-vitamin K (VIACTIV) 500-500-40 MG-UNT-MCG CHEW Take 3 tablets by mouth At Bedtime    Yes Reported, Patient   Cyanocobalamin (B-12) 2500 MCG SUBL Place 1 tablet under the tongue Every Mon, Wed, Fri Morning 3 times weekly   Yes Reported, Patient   docusate sodium (COLACE) 100 MG capsule Take 300 mg by mouth every morning   Yes Unknown, Entered By History   FLUoxetine (PROZAC) 20 MG capsule TAKE 1 CAPSULE BY MOUTH ONCE DAILY. TAKE WITH 40MG CAPSULE TO EQUAL 60MG PER DAY 4/26/23  Yes Juaquin Leggett MD   FLUoxetine (PROZAC) 40 MG capsule TAKE 1 CAPSULE BY MOUTH ONCE DAILY. TAKE WITH 20 MG CAPSULE TO EQUAL 60 MG  "DAILY 7/20/23  Yes Napoleon Gray PA-C   hydrOXYzine (ATARAX) 50 MG tablet Take 1 tablet (50 mg) by mouth every 12 hours as needed for itching 10/31/22  Yes Juaquin Leggett MD   multivitamin  peds with iron (FLINTSTONES COMPLETE) 60 MG chewable tablet Take 2 chew tab by mouth every morning  8/10/16  Yes Chris Dutta MD   pantoprazole (PROTONIX) 40 MG EC tablet Take 1 tablet by mouth twice daily 7/3/23  Yes Juaquin Leggett MD   potassium citrate (UROCIT-K) 10 MEQ (1080 MG) CR tablet Take 2 tablets by mouth 2 times daily   Yes Reported, Patient     PHYSICAL EXAM:   /71   Pulse 65   Temp 97.5  F (36.4  C)   Resp 16   Ht 1.575 m (5' 2\")   Wt 77.6 kg (171 lb 1.6 oz)   LMP 09/22/2019   SpO2 100%   BMI 31.29 kg/m     GEN: No distress, Alert, comfortable.  RAISSA: No pallor, No icterus, oral mucosa moist.    NECK: No thyromegaly. No mass.    HEART: RRR, S1 S2 normal.   LUNGS: CTA BL  ABD: soft,  tender in epigastric area, lap scars from prior RYB surgery, non-distended, no peritoneal signs.  NEURO: No gross motor deficits.  PSYCH: A O X 3.  EXTREMITIES: No pedal edema.      ADDITIONAL DATA:   I reviewed the patient's new clinical lab test results.   Recent Labs   Lab Test 10/22/23  0936 08/16/23  1409 03/23/23  1120 10/21/16  0955 07/06/16  0755   WBC 6.9 5.1 5.4   < > 8.8   HGB 13.0 12.8 13.0   < > 13.0   MCV 90 92 92   < > 81    284 280   < > 152   INR  --   --   --   --  0.95    < > = values in this interval not displayed.     Recent Labs   Lab Test 10/22/23  0936 07/06/21  0846 09/29/20  1032    138 136   POTASSIUM 4.9 4.0 4.1   CHLORIDE 100 103 103   CO2 28 29 26   BUN 14.0 13 13   CR 0.53 0.59 0.70   ANIONGAP 10 6 7   RAMSES 8.8 8.8 8.9   GLC 96 91 80     Recent Labs   Lab Test 10/22/23  1055 10/22/23  0936 07/06/21  0846 11/05/20  0818 09/29/20  1032 05/29/20  0945 01/23/19  0843 01/03/19  1640 01/03/19  1545 01/03/19  1542   ALBUMIN  --  4.0 3.4  --  3.4  --    < > 2.9*  --  Canceled, " Test credited   BILITOTAL  --  0.4 0.5  --  0.4  --   --  0.2  --  Canceled, Test credited   ALT  --  30 37  --  33  --   --  31  --  Canceled, Test credited   AST  --  30 21  --  19  --   --  16  --  Canceled, Test credited   PROTEIN  --   --   --  Negative 30* Negative   < >  --    < >  --    LIPASE 40  --   --   --   --   --   --  88  --  Canceled, Test credited    < > = values in this interval not displayed.        I reviewed the patient's new imaging results.    Total time: 45 minutes.

## 2023-10-22 NOTE — PROGRESS NOTES
"[facility]  ED Nurse Handoff Report    ED Chief complaint: Abdominal Pain      ED Diagnosis:   Final diagnoses:   Marginal ulcer   Melena       Code Status: Full Code    Allergies:   Allergies   Allergen Reactions    Fish Oil     Hydrocodone-Acetaminophen Nausea     Also light headed and flushed  Other reaction(s): Headache  Also light headed and flushed       Patient Story: Patient experiencing abdominal pain since last Tuesday.  Focused Assessment:  Bilateral upper quadrant abdominal pain at rest 3/10 pain and 9/10 when spasming.    Treatments and/or interventions provided: Labs drawn, imaging done, and medications given.  Patient's response to treatments and/or interventions: Patient tolerated interventions well.    To be done/followed up on inpatient unit:  Follow plan of care and treat pain.    Does this patient have any cognitive concerns?:  N/A    Activity level - Baseline/Home:  Independent  Activity Level - Current:   Independent    Patient's Preferred language: English   Needed?: No    Isolation: None  Infection: Not Applicable  Patient tested for COVID 19 prior to admission: NO  Bariatric?: No    Vital Signs:   Vitals:    10/22/23 0924   BP: 134/74   Pulse: 76   Resp: 16   Temp: 97.9  F (36.6  C)   TempSrc: Temporal   SpO2: 94%   Weight: 77.1 kg (170 lb)   Height: 1.575 m (5' 2\")       Cardiac Rhythm:     Was the PSS-3 completed:   Yes  What interventions are required if any?  None needed             Family Comments: N/A  OBS brochure/video discussed/provided to patient/family: No              Name of person given brochure if not patient:               Relationship to patient:     For the majority of the shift this patient's behavior was Green.   Behavioral interventions performed were none needed.    ED NURSE PHONE NUMBER: [phone]        " 86

## 2023-10-22 NOTE — LETTER
[facility] EXTENDED RECOVERY AND SHORT STAY  [address] [facility] [address]  Phone: [phone]    Hospitalist Service  [facility]   [address] [place], [place] [address]  Answering Service [phone]    To whom it may concern,    Criss Durant was cared for by myself and the Hospitalist service at [facility] from 10/22/23 to 10/24/23.  Her primary care physician is Napoleon Gray who can be reached at phone number [phone].    I, as her Physician Assistant, recommend return to work 10/27. Lifting restricted to 10 lbs and pt should get a full 30 minutes to eat lunch.     She can follow up with her primary care physician for adjustment of these recommendations as needed.    Sincerely,         Daina Sanchez PA-C

## 2023-10-22 NOTE — PROGRESS NOTES
Observation Goals  -diagnostic tests and consults completed and resulted=Not Met   -tolerating oral intake to maintain hydration=Not Met   -adequate pain control on oral analgesics=Not Met   -seen by specialists=Partially Met   Nurse to notify provider when observation goals have been met and patient is ready for discharge.

## 2023-10-22 NOTE — PHARMACY-ADMISSION MEDICATION HISTORY
Pharmacy Intern Admission Medication History    Admission medication history is complete. The information provided in this note is only as accurate as the sources available at the time of the update.    Information Source(s): Patient and CareEverywhere/SureScripts via in-person    Pertinent Information: Patient was prescribed pantoprazole but has run out and not taken for at least one week.     Changes made to PTA medication list:  Added: None  Deleted: None  Changed: None    Medication Affordability:  Not including over the counter (OTC) medications, was there a time in the past 3 months when you did not take your medications as prescribed because of cost?: No    Allergies reviewed with patient and updates made in EHR: yes    Medication History Completed By: Jacqueline Blair 10/22/2023 2:19 PM    PTA Med List   Medication Sig Last Dose    allopurinol (ZYLOPRIM) 300 MG tablet Take 1 tablet by mouth daily 10/22/2023    calcium-vitamin D-vitamin K (VIACTIV) 500-500-40 MG-UNT-MCG CHEW Take 3 tablets by mouth At Bedtime  10/21/2023    Cyanocobalamin (B-12) 2500 MCG SUBL Place 1 tablet under the tongue Every Mon, Wed, Fri Morning 3 times weekly 10/20/2023    docusate sodium (COLACE) 100 MG capsule Take 300 mg by mouth every morning 10/22/2023    FLUoxetine (PROZAC) 20 MG capsule TAKE 1 CAPSULE BY MOUTH ONCE DAILY. TAKE WITH 40MG CAPSULE TO EQUAL 60MG PER DAY 10/22/2023    FLUoxetine (PROZAC) 40 MG capsule TAKE 1 CAPSULE BY MOUTH ONCE DAILY. TAKE WITH 20 MG CAPSULE TO EQUAL 60 MG DAILY 10/22/2023    hydrOXYzine (ATARAX) 50 MG tablet Take 1 tablet (50 mg) by mouth every 12 hours as needed for itching 10/20/2023    multivitamin  peds with iron (FLINTSTONES COMPLETE) 60 MG chewable tablet Take 2 chew tab by mouth every morning  10/22/2023    pantoprazole (PROTONIX) 40 MG EC tablet Take 1 tablet by mouth twice daily Past Week    potassium citrate (UROCIT-K) 10 MEQ (1080 MG) CR tablet Take 2 tablets by mouth 2 times daily  10/22/2023

## 2023-10-23 LAB
ANION GAP SERPL CALCULATED.3IONS-SCNC: 6 MMOL/L (ref 7–15)
BUN SERPL-MCNC: 8.1 MG/DL (ref 6–20)
CALCIUM SERPL-MCNC: 8.4 MG/DL (ref 8.6–10)
CHLORIDE SERPL-SCNC: 104 MMOL/L (ref 98–107)
CREAT SERPL-MCNC: 0.5 MG/DL (ref 0.51–0.95)
DEPRECATED HCO3 PLAS-SCNC: 29 MMOL/L (ref 22–29)
EGFRCR SERPLBLD CKD-EPI 2021: >90 ML/MIN/1.73M2
ERYTHROCYTE [DISTWIDTH] IN BLOOD BY AUTOMATED COUNT: 13 % (ref 10–15)
GLUCOSE SERPL-MCNC: 88 MG/DL (ref 70–99)
HCT VFR BLD AUTO: 37.5 % (ref 35–47)
HGB BLD-MCNC: 11.8 G/DL (ref 11.7–15.7)
HGB BLD-MCNC: 11.8 G/DL (ref 11.7–15.7)
MCH RBC QN AUTO: 28.6 PG (ref 26.5–33)
MCHC RBC AUTO-ENTMCNC: 31.5 G/DL (ref 31.5–36.5)
MCV RBC AUTO: 91 FL (ref 78–100)
PLATELET # BLD AUTO: 218 10E3/UL (ref 150–450)
POTASSIUM SERPL-SCNC: 3.8 MMOL/L (ref 3.4–5.3)
RBC # BLD AUTO: 4.13 10E6/UL (ref 3.8–5.2)
SODIUM SERPL-SCNC: 139 MMOL/L (ref 135–145)
WBC # BLD AUTO: 4.7 10E3/UL (ref 4–11)

## 2023-10-23 PROCEDURE — 99231 SBSQ HOSP IP/OBS SF/LOW 25: CPT | Performed by: PHYSICIAN ASSISTANT

## 2023-10-23 PROCEDURE — 250N000013 HC RX MED GY IP 250 OP 250 PS 637: Performed by: INTERNAL MEDICINE

## 2023-10-23 PROCEDURE — 96376 TX/PRO/DX INJ SAME DRUG ADON: CPT

## 2023-10-23 PROCEDURE — C9113 INJ PANTOPRAZOLE SODIUM, VIA: HCPCS | Mod: JZ | Performed by: INTERNAL MEDICINE

## 2023-10-23 PROCEDURE — 85027 COMPLETE CBC AUTOMATED: CPT | Performed by: INTERNAL MEDICINE

## 2023-10-23 PROCEDURE — 96375 TX/PRO/DX INJ NEW DRUG ADDON: CPT

## 2023-10-23 PROCEDURE — G0378 HOSPITAL OBSERVATION PER HR: HCPCS

## 2023-10-23 PROCEDURE — 99233 SBSQ HOSP IP/OBS HIGH 50: CPT | Performed by: PHYSICIAN ASSISTANT

## 2023-10-23 PROCEDURE — 80048 BASIC METABOLIC PNL TOTAL CA: CPT | Performed by: INTERNAL MEDICINE

## 2023-10-23 PROCEDURE — 258N000003 HC RX IP 258 OP 636: Performed by: INTERNAL MEDICINE

## 2023-10-23 PROCEDURE — 250N000011 HC RX IP 250 OP 636: Performed by: INTERNAL MEDICINE

## 2023-10-23 PROCEDURE — 36415 COLL VENOUS BLD VENIPUNCTURE: CPT | Performed by: INTERNAL MEDICINE

## 2023-10-23 RX ORDER — MECLIZINE HCL 12.5 MG 12.5 MG/1
12.5 TABLET ORAL 3 TIMES DAILY PRN
Status: DISCONTINUED | OUTPATIENT
Start: 2023-10-23 | End: 2023-10-24 | Stop reason: HOSPADM

## 2023-10-23 RX ADMIN — SODIUM CHLORIDE: 9 INJECTION, SOLUTION INTRAVENOUS at 09:18

## 2023-10-23 RX ADMIN — OXYCODONE HYDROCHLORIDE 5 MG: 5 TABLET ORAL at 09:20

## 2023-10-23 RX ADMIN — ONDANSETRON 4 MG: 2 INJECTION INTRAMUSCULAR; INTRAVENOUS at 05:57

## 2023-10-23 RX ADMIN — MORPHINE SULFATE 2 MG: 2 INJECTION, SOLUTION INTRAMUSCULAR; INTRAVENOUS at 05:57

## 2023-10-23 RX ADMIN — PANTOPRAZOLE SODIUM 40 MG: 40 INJECTION, POWDER, FOR SOLUTION INTRAVENOUS at 07:50

## 2023-10-23 RX ADMIN — SUCRALFATE 1 G: 1 SUSPENSION ORAL at 06:03

## 2023-10-23 RX ADMIN — SUCRALFATE 1 G: 1 SUSPENSION ORAL at 21:29

## 2023-10-23 RX ADMIN — SUCRALFATE 1 G: 1 SUSPENSION ORAL at 11:33

## 2023-10-23 RX ADMIN — OXYCODONE HYDROCHLORIDE 5 MG: 5 TABLET ORAL at 18:30

## 2023-10-23 RX ADMIN — PANTOPRAZOLE SODIUM 40 MG: 40 INJECTION, POWDER, FOR SOLUTION INTRAVENOUS at 19:32

## 2023-10-23 RX ADMIN — SODIUM CHLORIDE: 9 INJECTION, SOLUTION INTRAVENOUS at 18:26

## 2023-10-23 RX ADMIN — SUCRALFATE 1 G: 1 SUSPENSION ORAL at 16:04

## 2023-10-23 ASSESSMENT — ACTIVITIES OF DAILY LIVING (ADL)
ADLS_ACUITY_SCORE: 31

## 2023-10-23 NOTE — PROGRESS NOTES
PRIMARY DIAGNOSIS: ACUTE PAIN  OUTPATIENT/OBSERVATION GOALS TO BE MET BEFORE DISCHARGE:  1. Pain Status: Improved but still requiring IV narcotics.    2. Return to near baseline physical activity: No    3. Cleared for discharge by consultants (if involved): No    Discharge Planner Nurse   Safe discharge environment identified: Yes  Barriers to discharge: Yes       Entered by: Jenny Osorio RN 10/23/2023 5:07 AM     Please review provider order for any additional goals.   Nurse to notify provider when observation goals have been met and patient is ready for discharge.

## 2023-10-23 NOTE — PROGRESS NOTES
"PRIMARY Concern: Abdominal pain, nausea/vomiting, black stools. No black stool during my shift 2300-0730.  SAFETY RISK Concerns (fall risk, behaviors, etc.): None      Isolation/Type: None  Tests/Procedures for NEXT shift: EGD on Tuesday  Consults? (Pending/following, signed-off?) GI consulted  Where is patient from? (Home, TCU, etc.): Home  Other Important info for NEXT shift: None  Anticipated DC date & active delays: TBD    SUMMARY NOTE  Date & Time: 10/22/23 7475-2119  Orientation/Cognitive: A&Ox4  Mobility Level/Assist Equipment: Independent  Fall Risk (Y/N): No  Behavior Concerns: Green  Pain Management: PRN Morphine 2 mg given once for pain.  Tele/VS/O2: VSS on RA  ABNL Lab/BG: Hgb 11.5 WDL  Diet: Clear liquid   Bowel/Bladder: Continent  Skin Concerns: None  Drains/Devices: R PIV infusing at 100 ml/hr  Tests/Procedures for next shift: None  Anticipated DC date & active delays: TBD    /67 (BP Location: Left arm, Patient Position: Supine, Cuff Size: Adult Regular)   Pulse 54   Temp 98.6  F (37  C) (Oral)   Resp 18   Ht 1.575 m (5' 2\")   Wt 77.6 kg (171 lb 1.6 oz)   LMP 09/22/2019   SpO2 96%   BMI 31.29 kg/m      "

## 2023-10-23 NOTE — PROGRESS NOTES
PRIMARY DIAGNOSIS: ACUTE PAIN  OUTPATIENT/OBSERVATION GOALS TO BE MET BEFORE DISCHARGE:  1. Pain Status: partially met. Oxy prn/morphine    2. Return to near baseline physical activity: partially met.     3. Cleared for discharge by consultants (if involved): No    Discharge Planner Nurse   Safe discharge environment identified: Yes  Barriers to discharge: Yes       Entered by: Jamila Sotelo RN 10/23/2023 12:08 PM     Please review provider order for any additional goals.   Nurse to notify provider when observation goals have been met and patient is ready for discharge.

## 2023-10-23 NOTE — PROGRESS NOTES
PRIMARY DIAGNOSIS: ACUTE PAIN  OUTPATIENT/OBSERVATION GOALS TO BE MET BEFORE DISCHARGE:  1. Pain Status: Improved but still requiring IV narcotics.    2. Return to near baseline physical activity: No    3. Cleared for discharge by consultants (if involved): No    Discharge Planner Nurse   Safe discharge environment identified: Yes  Barriers to discharge: Yes       Entered by: Jenny Osorio RN 10/23/2023 12:06 AM     Please review provider order for any additional goals.   Nurse to notify provider when observation goals have been met and patient is ready for discharge.

## 2023-10-23 NOTE — PROGRESS NOTES
PRIMARY DIAGNOSIS: ACUTE PAIN  OUTPATIENT/OBSERVATION GOALS TO BE MET BEFORE DISCHARGE:  1. Pain Status: partially met. Oxy prn/morphine    2. Return to near baseline physical activity: partially met.     3. Cleared for discharge by consultants (if involved): No    Discharge Planner Nurse   Safe discharge environment identified: Yes  Barriers to discharge: Yes       Entered by: Jamila Sotelo RN 10/23/2023 4:16 PM     Please review provider order for any additional goals.   Nurse to notify provider when observation goals have been met and patient is ready for discharge.

## 2023-10-23 NOTE — PROGRESS NOTES
"  GASTROENTEROLOGY PROGRESS NOTE     IMPRESSION:  1.  Abdominal pain with nausea and vomiting-CT scans with inflammation in the inferior portion of the Rasheeda-en-Y anastomosis, thus may represent ulceration.    2.  Melena-1 episode-could be secondary to anastomotic ulceration as well.    RECOMMENDATIONS:  - Monitor hemoglobin  - Transfuse necessarily  - N.p.o. at midnight  - Upper endoscopy tomorrow    Ramakrishna Escalante MD  [facility]  [phone]      ________________________________________________________________________      SUBJECTIVE: Patient does feel better.  No significant melena.       OBJECTIVE:  /68 (BP Location: Left arm)   Pulse 53   Temp 98.5  F (36.9  C) (Oral)   Resp 18   Ht 1.575 m (5' 2\")   Wt 77.6 kg (171 lb 1.6 oz)   LMP 2019   SpO2 96%   BMI 31.29 kg/m    Temp (24hrs), Av.2  F (36.8  C), Min:97.5  F (36.4  C), Max:98.6  F (37  C)    Patient Vitals for the past 72 hrs:   Weight   10/22/23 1602 77.6 kg (171 lb 1.6 oz)   10/22/23 0924 77.1 kg (170 lb)        PHYSICAL EXAM  GEN: Alert, NAD.    HRT: reg  LUNGS: CTA  ABD: +BS, non-tender, non-distended, no rebound or guarding.    Additional Data:  I have reviewed the patient's new clinical lab results:     Recent Labs   Lab Test 10/23/23  0719 10/22/23  2240 10/22/23  0936 23  1409 10/21/16  0955 16  0755   WBC 4.7  --  6.9 5.1   < > 8.8   HGB 11.8  11.8 11.5* 13.0 12.8   < > 13.0   MCV 91  --  90 92   < > 81     --  291 284   < > 152   INR  --   --   --   --   --  0.95    < > = values in this interval not displayed.     Recent Labs   Lab Test 10/23/23  0719 10/22/23  0936 21  0846    138 138   POTASSIUM 3.8 4.9 4.0   CHLORIDE 104 100 103   CO2 29 28 29   BUN 8.1 14.0 13   CR 0.50* 0.53 0.59   ANIONGAP 6* 10 6   RAMSES 8.4* 8.8 8.8   GLC 88 96 91     Recent Labs   Lab Test 10/22/23  1055 10/22/23  0936 21  0846 20  0818 20  1032 20  0945 19  0843 19  1640 " 01/03/19  1545 01/03/19  1542   ALBUMIN  --  4.0 3.4  --  3.4  --    < > 2.9*  --  Canceled, Test credited   BILITOTAL  --  0.4 0.5  --  0.4  --   --  0.2  --  Canceled, Test credited   ALT  --  30 37  --  33  --   --  31  --  Canceled, Test credited   AST  --  30 21  --  19  --   --  16  --  Canceled, Test credited   PROTEIN  --   --   --  Negative 30* Negative   < >  --    < >  --    LIPASE 40  --   --   --   --   --   --  88  --  Canceled, Test credited    < > = values in this interval not displayed.

## 2023-10-23 NOTE — PROGRESS NOTES
"General Surgery Progress Note    Assessment and Plan:  52 year old female with h/o RNY gastric bypass in 2016 and perforated ulcer at GJ in 2018, presents with severe epigastric abdominal pain, CT scan shows inflammation of the inferior surface in the Rasheeda limb distal to GJ, suggesting a marginal ulcer, also with cholelithiasis.  - Appreciate GI input, plan for EGD tomorrow   - Continue Protonix bid and Carafate  - Hopeful for improvement with conservative treatment  - Med mngt per hospitalist, much appreciate your assistance  - Will need OP follow up in bariatric clinic for ulcer and cholelithiasis  - Will discuss with Dr. Dominik Yap Hx:   Reports abdominal pain, states it has improved some since admission, on clears but has some discomfort with PO intake, urinating without difficulty, ambulating.    Exam:  Vitals: Blood pressure 111/66, pulse 50, temperature 98.3  F (36.8  C), temperature source Oral, resp. rate 18, height 1.575 m (5' 2\"), weight 77.6 kg (171 lb 1.6 oz), last menstrual period 2019, SpO2 98%, not currently breastfeeding.  Temperature Temp  Av.1  F (36.7  C)  Min: 97.5  F (36.4  C)  Max: 98.6  F (37  C)   I/O last 3 completed shifts:  In: 1463.33 [P.O.:40; I.V.:1423.33]  Out: -     Gen: Awake and in no apparent distress.  Abd: soft, ttp epigastric region, nd. No signs of peritonitis, no guarding.     Data:  Recent Labs   Lab Test 10/23/23  0719 10/22/23  2240 10/22/23  0936 08/16/23  1409   WBC 4.7  --  6.9 5.1   HGB 11.8  11.8 11.5* 13.0 12.8   HCT 37.5  --  40.7 39.8     --  291 284      Recent Labs   Lab Test 10/23/23  0719 10/22/23  0936 21  0846    138 138   POTASSIUM 3.8 4.9 4.0   CHLORIDE 104 100 103   CO2 29 28 29   BUN 8.1 14.0 13   CR 0.50* 0.53 0.59       Ratna Nelsno PA-C  [facility]  [phone]   "

## 2023-10-23 NOTE — PROGRESS NOTES
[facility]    Medicine Progress Note - Hospitalist Service    Date of Admission:  10/22/2023    Assessment & Plan   Criss Durant is a 52 year old female with past medical history of gastric bypass surgery in 2016 with resultant iron deficiency anemia secondary to malabsorption for which she has followed with Hematology in the past, history of a marginal ulcer with perforation s/p repair in 2018, depression, kidney stones and urinary frequency who was admitted under observation status on 10/22/2023 for evaluation of abdominal plain pain and dark stool.      Abdominal pain, melanotic stool  Hx of marginal ulcer, with perforation s/p repair in 2018  Hx of Rasheeda-en-Y gastric bypass in 2016  Hx of iron deficiency dt malabsorption, now resolved  *Patient has a history of a perforated GJ ulcer. In 10/2018 she was hospitalized with a perforated marginal ulcer and underwent laparoscopic repair of a perforated marginal ulcer per Dr. Corea, She has followed with [facility] in the past and underwent previous endoscopies, most recently in 2019.   *Patient reported she had begun experiencing abdominal pain a few days prior to admission, she says this was due in part to limited dietary options available while attending a conference earlier this week.  She developed associated nausea and vomiting and had ongoing crampy abdominal pain that seemed to be associated with oral intake. On the day of admission she had 1 black watery appearing stool.  Denies associated dizziness, lightheadedness or shortness of breath. She ultimately presented to the emergency room for further evaluation.  *In the ED, she was afebrile and hemodynamically stable. BMP and CBC were unremarkable.  Her electrolytes, renal function, LFTs, lipase, WBC and hemoglobin were all stable. CT abd/pelvis  obtained and showed some inflammation in the Rasheeda limb near the gastrojejunostomy suggestive of a marginal ulcer. Findings were discussed with  "general surgery on-call, Dr. Jaffe. Given stable hemodynamics and hemoglobin recommended admission under observation status for ongoing evaluation. In the ED, she was given IV fluids, Zofran, Protonix and IV pain medications.  -- trend hgb q8h  -- general surgery consulted, appreciate input   - Continue Protonix 40 mg IV bid and Carafate 1 g QID before meals and nightly  - Will need OP follow up in bariatric clinic for ulcer and cholelithiasis  -- [facility] consult, plan for EGD 10/24   -- clears today, NPO at midnight for possible procedures in AM  -- cont PPI BID  -- supportive cares with IVFs, prns for pain/nausea    Recent Labs   Lab 10/23/23  0719 10/22/23  2240 10/22/23  0936   HGB 11.8  11.8 11.5* 13.0      Suspected BPPV: Hx of BPPV. On 10/23, complained of \"vision blurring around the edges of her eyes\", frontal headache, photophobia. Wears bifocals. No gross focal neuro deficits. No eye pain. No hx of headache. When EOM exam attempted, pt with significant dizziness described as sensation that room is spinning consistent with prior BPPV. Historically BPPV improved with PT vestibular therapy.   - PT consulted for vestibular therapy   - IVF with 0.9% NS at 100 ccs/hr   - Meclizine PRN, hold prior to vestibular therapy eval   - Monitor sx  - Monitor closely for change in neuro status, no indication for head imaging at this time     Depression  *Chronic and stable on fluoxetine, will hold while evaluating above    Cholelithiasis: Noted on CT. Outpatient follow-up with General Surgery. Hepatic panel WNL.      Observation Goals: -diagnostic tests and consults completed and resulted, -tolerating oral intake to maintain hydration, -adequate pain control on oral analgesics, -seen by specialists, Nurse to notify provider when observation goals have been met and patient is ready for discharge.  Diet: Clear Liquid Diet    DVT Prophylaxis: Pneumatic Compression Devices  Gee Catheter: Not present  Lines: None     Cardiac " "Monitoring: None  Code Status: Full Code      Clinically Significant Risk Factors Present on Admission          # Hypocalcemia: Lowest Ca = 8.4 mg/dL in last 2 days, will monitor and replace as appropriate              # Obesity: Estimated body mass index is 31.29 kg/m  as calculated from the following:    Height as of this encounter: 1.575 m (5' 2\").    Weight as of this encounter: 77.6 kg (171 lb 1.6 oz).              Disposition Plan      Expected Discharge Date: 10/24/2023        Discharge Comments: GI and General surgery following.          The patient's care was discussed with the Attending Physician, Dr. Han, Bedside Nurse, and Patient.    Daina Sanchez PA-C  Hospitalist Service  [facility]  Securely message with [employer] (more info)  Text page via [facility] Paging/Directory   ______________________________________________________________________    Interval History   Care assumed 10/23. On interview, complaining of \"vision blurring around the edges of her eyes\", frontal headache, photophobia. Wears bifocals. No gross focal neuro deficits. No eye pain. No hx of headache. When EOM exam attempted, pt with significant dizziness described as sensation that room is spinning consistent with prior BPPV. Historically BPPV improved with PT vestibular therapy.     Pt reports pain with eating. No recurrence of melena.     Physical Exam   Vital Signs: Temp: 98.5  F (36.9  C) Temp src: Oral BP: 110/68 Pulse: 53   Resp: 18 SpO2: 96 % O2 Device: None (Room air)    Weight: 171 lbs 1.6 oz    CONSTITUTIONAL: Pt laying in bed, dressed in hospital garb. Appears comfortable. Cooperative with interview.   HEENT: Normocephalic, atraumatic. Pupils equal, round, and reactive to light. Negative for conjunctival redness or scleral icterus.  EOM unable to be fully assessed due to significant sensation that room is spinning.   CARDIOVASCULAR: RRR, no murmurs, rubs, or extra heart sounds " appreciated. Pulses +2/4 and regular in upper and lower extremities, bilaterally.   RESPIRATORY: No increased work of breathing. CTA, bilat; no wheezes, rales, or rhonchi appreciated.  GASTROINTESTINAL:  Abdomen soft, non-distended. BS auscultated in all four quadrants. Tender to palpation of epigastric region.   MUSCULOSKELETAL: No gross deformities noted. Normal muscle tone.   HEMATOLOGIC/LYMPHATIC/IMMUNOLOGIC: Negative for lower extremity edema, bilaterally.  NEUROLOGIC: Alert and oriented to person, place, and time. No focal neuro deficits.   SKIN: Warm, dry, intact.       Medical Decision Making       50 MINUTES SPENT BY ME on the date of service doing chart review, history, exam, documentation & further activities per the note.      Data     I have personally reviewed the following data over the past 24 hrs:    4.7  \   11.8; 11.8   / 218     139 104 8.1 /  88   3.8 29 0.50 (L) \       Imaging results reviewed over the past 24 hrs:   No results found for this or any previous visit (from the past 24 hour(s)).

## 2023-10-23 NOTE — PROGRESS NOTES
Top portion must ALWAYS be completed  PRIMARY Concern: Abdominal pain, nausea/vomiting, black stools  SAFETY RISK Concerns (fall risk, behaviors, etc.): None      Isolation/Type: None  Tests/Procedures for NEXT shift: EGD on Tuesday  Consults? (Pending/following, signed-off?) GI consulted  Where is patient from? (Home, TCU, etc.): Home  Other Important info for NEXT shift: None  Anticipated DC date & active delays: TBD  _____________________________________________________________________________  SUMMARY NOTE:            Orientation/Cognitive: AxOx4  Observation Goals (Met/ Not Met): Not Met  Mobility Level/Assist Equipment: Ind  Antibiotics & Plan (IV/po, length of tx left): None  Pain Management: PRN morphine given x1  Tele/VS/O2: VSS on RA  ABNL Lab/BG: WDL  Diet: Clear liquid  Bowel/Bladder: Continent  Skin Concerns: None  Drains/Devices: PIV infusing NS @ 100ml/hr  Patient Stated Goal for Today: To eat

## 2023-10-23 NOTE — PROGRESS NOTES
PRIMARY DIAGNOSIS: ACUTE PAIN  OUTPATIENT/OBSERVATION GOALS TO BE MET BEFORE DISCHARGE:  1. Pain Status: partially met. Oxy prn/morphine    2. Return to near baseline physical activity: partially met.     3. Cleared for discharge by consultants (if involved): No    Discharge Planner Nurse   Safe discharge environment identified: Yes  Barriers to discharge: Yes       Entered by: Jamila Sotelo RN 10/23/2023 12:10 PM     Please review provider order for any additional goals.   Nurse to notify provider when observation goals have been met and patient is ready for discharge.

## 2023-10-23 NOTE — PROGRESS NOTES
General surgery note    Met with patient, she just took some clear liquids.  No nausea nor vomiting but feels quite full.  This is improved from even a day ago.  Passing flatus, no more BMs yet.  Awaiting EGD tomorrow.  Continue PPIs twice daily.  We will continue to follow peripherally no indication for surgical intervention at the moment.

## 2023-10-23 NOTE — PLAN OF CARE
Goal Outcome Evaluation:      Plan of Care Reviewed With: patient                PRIMARY Concern: Pt here with abdominal pain, nausea and one episode of black stool. No stool since admission   SAFETY RISK Concerns (fall risk, behaviors, etc.): fall risk due to onset of vertigo this morning       Isolation/Type: none  Tests/Procedures for NEXT shift: PT for Vestibular Therapy, labs in am, EGD tomorrow   Consults? (Pending/following, signed-off?) GI following. Surgery following   Where is patient from? (Home, TCU, etc.): home   Other Important info for NEXT shift: acute onset of vertigo this morning.  PT to see for VT. IV PPI, HGB checks. EGD tomorrow.   Anticipated DC date & active delays:  1-2 days     SUMMARY NOTE:   Orientation/Cognitive: A/O X4  Observation Goals (Met/ Not Met): not met   Mobility Level/Assist Equipment: up IND in the hallway   Antibiotics & Plan (IV/po, length of tx left): none  Pain Management: oxy prn, morphine prn, pain is tolerable  Tele/VS/O2: VSS, RA  ABNL Lab/BG: hgb 11.8,   Diet: regular   Bowel/Bladder: continence  Skin Concerns: none   Drains/Devices: PIV  Patient Stated Goal for Today: pain control

## 2023-10-24 VITALS
DIASTOLIC BLOOD PRESSURE: 74 MMHG | TEMPERATURE: 98 F | RESPIRATION RATE: 16 BRPM | BODY MASS INDEX: 31.49 KG/M2 | HEIGHT: 62 IN | WEIGHT: 171.1 LBS | HEART RATE: 57 BPM | OXYGEN SATURATION: 95 % | SYSTOLIC BLOOD PRESSURE: 114 MMHG

## 2023-10-24 LAB
HGB BLD-MCNC: 12.2 G/DL (ref 11.7–15.7)
UPPER GI ENDOSCOPY: NORMAL

## 2023-10-24 PROCEDURE — 250N000011 HC RX IP 250 OP 636: Mod: JZ | Performed by: INTERNAL MEDICINE

## 2023-10-24 PROCEDURE — 250N000013 HC RX MED GY IP 250 OP 250 PS 637: Performed by: PHYSICIAN ASSISTANT

## 2023-10-24 PROCEDURE — G0378 HOSPITAL OBSERVATION PER HR: HCPCS

## 2023-10-24 PROCEDURE — C9113 INJ PANTOPRAZOLE SODIUM, VIA: HCPCS | Mod: JZ | Performed by: INTERNAL MEDICINE

## 2023-10-24 PROCEDURE — 250N000013 HC RX MED GY IP 250 OP 250 PS 637: Performed by: INTERNAL MEDICINE

## 2023-10-24 PROCEDURE — 36415 COLL VENOUS BLD VENIPUNCTURE: CPT | Performed by: PHYSICIAN ASSISTANT

## 2023-10-24 PROCEDURE — 999N000147 HC STATISTIC PT IP EVAL DEFER: Performed by: PHYSICAL THERAPIST

## 2023-10-24 PROCEDURE — 99239 HOSP IP/OBS DSCHRG MGMT >30: CPT | Performed by: PHYSICIAN ASSISTANT

## 2023-10-24 PROCEDURE — 43235 EGD DIAGNOSTIC BRUSH WASH: CPT | Performed by: INTERNAL MEDICINE

## 2023-10-24 PROCEDURE — 85018 HEMOGLOBIN: CPT | Performed by: PHYSICIAN ASSISTANT

## 2023-10-24 PROCEDURE — 250N000011 HC RX IP 250 OP 636: Performed by: INTERNAL MEDICINE

## 2023-10-24 PROCEDURE — 96376 TX/PRO/DX INJ SAME DRUG ADON: CPT | Mod: XU

## 2023-10-24 PROCEDURE — 99214 OFFICE O/P EST MOD 30 MIN: CPT | Performed by: PHYSICIAN ASSISTANT

## 2023-10-24 PROCEDURE — 999N000099 HC STATISTIC MODERATE SEDATION < 10 MIN: Performed by: INTERNAL MEDICINE

## 2023-10-24 PROCEDURE — 250N000009 HC RX 250: Performed by: INTERNAL MEDICINE

## 2023-10-24 RX ORDER — FENTANYL CITRATE 50 UG/ML
50-100 INJECTION, SOLUTION INTRAMUSCULAR; INTRAVENOUS EVERY 5 MIN PRN
Status: DISCONTINUED | OUTPATIENT
Start: 2023-10-24 | End: 2023-10-24 | Stop reason: HOSPADM

## 2023-10-24 RX ORDER — FLUMAZENIL 0.1 MG/ML
0.2 INJECTION, SOLUTION INTRAVENOUS
Status: DISCONTINUED | OUTPATIENT
Start: 2023-10-24 | End: 2023-10-24 | Stop reason: HOSPADM

## 2023-10-24 RX ORDER — PANTOPRAZOLE SODIUM 40 MG/1
40 TABLET, DELAYED RELEASE ORAL 2 TIMES DAILY
Qty: 120 TABLET | Refills: 0 | Status: SHIPPED | OUTPATIENT
Start: 2023-10-24 | End: 2023-10-26

## 2023-10-24 RX ORDER — NALOXONE HYDROCHLORIDE 0.4 MG/ML
0.4 INJECTION, SOLUTION INTRAMUSCULAR; INTRAVENOUS; SUBCUTANEOUS
Status: DISCONTINUED | OUTPATIENT
Start: 2023-10-24 | End: 2023-10-24 | Stop reason: HOSPADM

## 2023-10-24 RX ORDER — ATROPINE SULFATE 0.1 MG/ML
1 INJECTION INTRAVENOUS
Status: DISCONTINUED | OUTPATIENT
Start: 2023-10-24 | End: 2023-10-24 | Stop reason: HOSPADM

## 2023-10-24 RX ORDER — LIDOCAINE 40 MG/G
CREAM TOPICAL
Status: CANCELLED | OUTPATIENT
Start: 2023-10-24

## 2023-10-24 RX ORDER — NALOXONE HYDROCHLORIDE 0.4 MG/ML
0.2 INJECTION, SOLUTION INTRAMUSCULAR; INTRAVENOUS; SUBCUTANEOUS
Status: DISCONTINUED | OUTPATIENT
Start: 2023-10-24 | End: 2023-10-24 | Stop reason: HOSPADM

## 2023-10-24 RX ORDER — BISACODYL 10 MG
10 SUPPOSITORY, RECTAL RECTAL ONCE
Status: COMPLETED | OUTPATIENT
Start: 2023-10-24 | End: 2023-10-24

## 2023-10-24 RX ORDER — DIPHENHYDRAMINE HYDROCHLORIDE 50 MG/ML
25-50 INJECTION INTRAMUSCULAR; INTRAVENOUS
Status: DISCONTINUED | OUTPATIENT
Start: 2023-10-24 | End: 2023-10-24 | Stop reason: HOSPADM

## 2023-10-24 RX ORDER — SUCRALFATE ORAL 1 G/10ML
1 SUSPENSION ORAL
Qty: 1200 ML | Refills: 0 | Status: SHIPPED | OUTPATIENT
Start: 2023-10-24 | End: 2023-10-26

## 2023-10-24 RX ORDER — SIMETHICONE 40MG/0.6ML
133 SUSPENSION, DROPS(FINAL DOSAGE FORM)(ML) ORAL
Status: DISCONTINUED | OUTPATIENT
Start: 2023-10-24 | End: 2023-10-24 | Stop reason: HOSPADM

## 2023-10-24 RX ORDER — EPINEPHRINE 1 MG/ML
0.1 INJECTION, SOLUTION, CONCENTRATE INTRAVENOUS
Status: DISCONTINUED | OUTPATIENT
Start: 2023-10-24 | End: 2023-10-24 | Stop reason: HOSPADM

## 2023-10-24 RX ORDER — FENTANYL CITRATE 50 UG/ML
INJECTION, SOLUTION INTRAMUSCULAR; INTRAVENOUS PRN
Status: DISCONTINUED | OUTPATIENT
Start: 2023-10-24 | End: 2023-10-24 | Stop reason: HOSPADM

## 2023-10-24 RX ADMIN — SUCRALFATE 1 G: 1 SUSPENSION ORAL at 10:56

## 2023-10-24 RX ADMIN — BISACODYL 10 MG: 10 SUPPOSITORY RECTAL at 13:08

## 2023-10-24 RX ADMIN — PANTOPRAZOLE SODIUM 40 MG: 40 INJECTION, POWDER, FOR SOLUTION INTRAVENOUS at 07:46

## 2023-10-24 RX ADMIN — SUCRALFATE 1 G: 1 SUSPENSION ORAL at 06:25

## 2023-10-24 RX ADMIN — ACETAMINOPHEN 650 MG: 325 TABLET, FILM COATED ORAL at 02:57

## 2023-10-24 ASSESSMENT — ACTIVITIES OF DAILY LIVING (ADL)
ADLS_ACUITY_SCORE: 31

## 2023-10-24 NOTE — DISCHARGE SUMMARY
"[facility]  Hospitalist Discharge Summary      Date of Admission:  10/22/2023  Date of Discharge:  10/24/2023  Discharging Provider: Daina Sanchez PA-C  Discharge Service: Hospitalist Service    Discharge Diagnoses   GJ anastomosis ulcer causing abdominal pain, melanotic stool  Hx of marginal ulcer, with perforation s/p repair in 2018  Hx of Rasheeda-en-Y gastric bypass in 2016  Hx of iron deficiency dt malabsorption, now resolved  Suspected BPPV, resolved  Depression   Cholelithiasis     Clinically Significant Risk Factors     # Obesity: Estimated body mass index is 31.29 kg/m  as calculated from the following:    Height as of this encounter: 1.575 m (5' 2\").    Weight as of this encounter: 77.6 kg (171 lb 1.6 oz).       Follow-ups Needed After Discharge   Follow-up Appointments     Follow-up and recommended labs and tests       You have a history of ulcers and cholelithiasis. Follow up in Bariatric   clinic/Dr. Reyna in 2-3 weeks. Call [phone] to schedule an   appointment.    Follow up with GI as instructed.    AVOID NSAIDs and continue your Protonix and Carafate.        Follow-up and recommended labs and tests       Follow up with primary care provider, Napoleon Gray, within 7   days for hospital follow- up.  The following labs/tests are recommended: H   Pylori stool antigen if obtained prior to discharge.          Unresulted Labs Ordered in the Past 30 Days of this Admission       No orders found from 9/22/2023 to 10/23/2023.        These results will be followed up by PCP    Discharge Disposition   Discharged to home  Condition at discharge: Stable    Hospital Course   Criss Durant is a 52 year old female with past medical history of gastric bypass surgery in 2016 with resultant iron deficiency anemia secondary to malabsorption for which she has followed with Hematology in the past, history of a marginal ulcer with perforation s/p repair in 2018, " depression, kidney stones and urinary frequency who was admitted under observation status on 10/22/2023 for evaluation of abdominal plain pain and dark stool. Registered to observation for further evaluation and treatment. Refer to H&P by Dr. Vanessa Avelar from complete details on presentation.     GJ anastomosis ulcer causing abdominal pain, melanotic stool  Hx of marginal ulcer, with perforation s/p repair in 2018  Hx of Rasheeda-en-Y gastric bypass in 2016  Hx of iron deficiency dt malabsorption, now resolved  *Patient has a history of a perforated GJ ulcer. In 10/2018 she was hospitalized with a perforated marginal ulcer and underwent laparoscopic repair of a perforated marginal ulcer per Dr. Corea, She has followed with [facility] in the past and underwent previous endoscopies, most recently in 2019.   *Patient reported she had begun experiencing abdominal pain a few days prior to admission, she says this was due in part to limited dietary options available while attending a conference earlier this week.  She developed associated nausea and vomiting and had ongoing crampy abdominal pain that seemed to be associated with oral intake. On the day of admission she had 1 black watery appearing stool.  Denies associated dizziness, lightheadedness or shortness of breath. She ultimately presented to the emergency room for further evaluation.  *In the ED, she was afebrile and hemodynamically stable. BMP and CBC were unremarkable.  Her electrolytes, renal function, LFTs, lipase, WBC and hemoglobin were all stable. CT abd/pelvis obtained and showed some inflammation in the Rasheeda limb near the gastrojejunostomy suggestive of a marginal ulcer. Findings were discussed with general surgery on-call, Dr. Jaffe. Given stable hemodynamics and hemoglobin recommended admission under observation status for ongoing evaluation. In the ED, she was given IV fluids, Zofran, Protonix and IV pain medications.  *EGD 10/24 with normal esophagus.  "Gastric bypass with a normal-sized pouch and intact staple line. Gastrojejunal anastomosis characterized by ulceration. Normal jejunum. No specimens collected.   -- [facility] followed throughout stay   - Pantoprazole 40mg BID   - Check H. pylori stool antigen (if able to provide stool sample prior to discharge) and treat if positive. If unable to obtain stool sample prior to discharge, can obtain outpatient with PCP    - Avoid NSAIDs   - Carafate QID for 2- 4 weeks, if tolerating and covered/affordable   - Repeat EGD in 2 months to document healing - ordered at [facility]   -- General surgery consulted, appreciate input   - Continue Protonix 40 mg IV bid and Carafate 1 g QID before meals and nightly  - Stay on low residue diet  - Follow up in bariatric clinic (notified clinic of hospital stay)/Dr. Reyna for ulcer and cholelithiasis   -- Close PCP follow-up upon dismissal   -- Diet advanced to low residue      Recent Labs   Lab 10/24/23  0653 10/23/23  0719 10/22/23  2240 10/22/23  0936   HGB 12.2 11.8  11.8 11.5* 13.0     Suspected BPPV, resolved: Hx of BPPV. On 10/23, complained of \"vision blurring around the edges of her eyes\", frontal headache, photophobia. Wears bifocals. No gross focal neuro deficits. No eye pain. No hx of headache. When EOM exam attempted, pt with significant dizziness described as sensation that room is spinning consistent with prior BPPV. Historically BPPV improved with PT vestibular therapy.   *Improved 10/23 evening, no recurrence 10/24. Treated with IVF. Did not utilize PRN meclizine     Depression  *Chronic and stable on fluoxetine, will hold while evaluating above     Cholelithiasis: Noted on CT. Outpatient follow-up with General Surgery. Hepatic panel WNL.     Consultations This Hospital Stay   SURGERY GENERAL IP CONSULT  GASTROENTEROLOGY IP CONSULT  VESTIBULAR PHYSICAL THERAPY IP CONSULT    Code Status   Full Code    Time Spent on this Encounter   I, Daina Sanchez PA-C, " personally saw the patient today and spent greater than 30 minutes discharging this patient.       MANISHA Cornejo [facility] EXTENDED RECOVERY AND SHORT STAY  [address] [address]  Phone: [phone]  _____________________________________________________________    Physical Exam   Vital Signs: Temp: 97.8  F (36.6  C) Temp src: Oral BP: 107/69 Pulse: (!) 49   Resp: 15 SpO2: 97 % O2 Device: None (Room air)    Weight: 171 lbs 1.6 oz    CONSTITUTIONAL: Pt laying in bed, dressed in hospital garb. Appears comfortable. Cooperative with interview. Accompanied by comfortable.  HEENT: Normocephalic, atraumatic.   CARDIOVASCULAR: RRR, no murmurs, rubs, or extra heart sounds appreciated. Pulses +2/4 and regular in upper and lower extremities, bilaterally.   RESPIRATORY: No increased work of breathing.  CTA, bilat; no wheezes, rales, or rhonchi appreciated.  GASTROINTESTINAL:  Abdomen soft, non-distended. BS auscultated in all four quadrants. Negative for tenderness to palpation.   MUSCULOSKELETAL: No gross deformities noted. Normal muscle tone.   HEMATOLOGIC/LYMPHATIC/IMMUNOLOGIC: Negative for lower extremity edema, bilaterally.  NEUROLOGIC: Alert and oriented to person, place, and time. No focal neuro deficits.   SKIN: Warm, dry, intact.     Primary Care Physician   Napoleon Gray    Discharge Orders      Activity    Your activity upon discharge: activity as tolerated     Discharge Instructions    - Pantoprazole 40mg BID   - Avoid NSAIDs   - Carafate QID for 2- 4 weeks, if tolerating and covered/affordable   - Repeat EGD in 2 months to document healing - ordered at [facility]  - Stay on low residue diet when advanced  - Follow up in bariatric clinic (notified clinic of hospital stay)/Dr. Reyna for ulcer and cholelithiasis     Reason for your hospital stay    You were hospitalized for further evaluation of abdominal pain and melena, found to have a marginal  ulcer at your GJ anastomosis, confirmed per EGD. Seen by Hospitalist team, GI and General Surgery. You will discharge with ongoing Protonix twice daily and Carafate 4X daily with meals and nightly. You will have outpatient follow-up with GI and General Surgery as recommended.     Follow-up and recommended labs and tests     You have a history of ulcers and cholelithiasis. Follow up in Bariatric clinic/Dr. Reyna in 2-3 weeks. Call [phone] to schedule an appointment.    Follow up with GI as instructed.    AVOID NSAIDs and continue your Protonix and Carafate.     Follow-up and recommended labs and tests     Follow up with primary care provider, Napoleon Gray, within 7 days for hospital follow- up.  The following labs/tests are recommended: H Pylori stool antigen if obtained prior to discharge.     Diet    Follow this diet upon discharge: low residue diet     Diet    Follow this diet upon discharge: Orders Placed This Encounter      Diet      Low Fiber Diet       Significant Results and Procedures   Most Recent 3 CBC's:  Recent Labs   Lab Test 10/24/23  0653 10/23/23  0719 10/22/23  2240 10/22/23  0936 08/16/23  1409   WBC  --  4.7  --  6.9 5.1   HGB 12.2 11.8  11.8 11.5* 13.0 12.8   MCV  --  91  --  90 92   PLT  --  218  --  291 284     Most Recent 3 BMP's:  Recent Labs   Lab Test 10/23/23  0719 10/22/23  0936 07/06/21  0846    138 138   POTASSIUM 3.8 4.9 4.0   CHLORIDE 104 100 103   CO2 29 28 29   BUN 8.1 14.0 13   CR 0.50* 0.53 0.59   ANIONGAP 6* 10 6   RAMSES 8.4* 8.8 8.8   GLC 88 96 91     Most Recent 2 LFT's:  Recent Labs   Lab Test 10/22/23  0936 07/06/21  0846   AST 30 21   ALT 30 37   ALKPHOS 99 91   BILITOTAL 0.4 0.5     Most Recent 3 Hemoglobins:  Recent Labs   Lab Test 10/24/23  0653 10/23/23  0719 10/22/23  2240   HGB 12.2 11.8  11.8 11.5*   ,   Results for orders placed or performed during the hospital encounter of 10/22/23   CT Abdomen Pelvis w Contrast    Narrative    EXAM: CT ABDOMEN  PELVIS W CONTRAST  LOCATION: [facility]  DATE: 10/22/2023    INDICATION: History of Rasheeda en Y in 2017 and prior perforated gastric ulcer.  Abdominal pain and dark stool.  COMPARISON: 09/29/2020  TECHNIQUE: CT scan of the abdomen and pelvis was performed following injection of IV contrast. Multiplanar reformats were obtained. Dose reduction techniques were used.  CONTRAST: 85 mL Isovue 370    FINDINGS:   LOWER CHEST: Mild calcified atherosclerosis left anterior descending coronary artery.    HEPATOBILIARY: Focal fatty infiltration of the liver along the fissure. Cholelithiasis.    PANCREAS: Normal.    SPLEEN: Normal.    ADRENAL GLANDS: Normal.    KIDNEYS/BLADDER: Left kidney cysts, requiring no follow-up.    BOWEL: Rasheeda-en-Y gastric bypass with antecolic Rasheeda limb. Mild inflammation has developed about the inferior surface of the Rasheeda limb just distal to the gastrojejunostomy. Otherwise normal bowel, including the appendix.    LYMPH NODES: Normal.    VASCULATURE: Trace atherosclerosis.    PELVIC ORGANS: Pelvic phleboliths.    MUSCULOSKELETAL: Mild degenerative change in the spine.      Impression    IMPRESSION:   Inflammation has developed in the Rasheeda limb near the the gastrojejunostomy, suggesting a marginal ulcer as the etiology for pain and dark stool       Discharge Medications   Current Discharge Medication List        START taking these medications    Details   sucralfate (CARAFATE) 1 GM/10ML suspension Take 10 mLs (1 g) by mouth 4 times daily (before meals and nightly) for 30 days  Qty: 1200 mL, Refills: 0    Comments: Future refills by PCP Dr. Napoleon Gray with phone number [phone].  Associated Diagnoses: Marginal ulcer           CONTINUE these medications which have NOT CHANGED    Details   allopurinol (ZYLOPRIM) 300 MG tablet Take 1 tablet by mouth daily      calcium-vitamin D-vitamin K (VIACTIV) 500-500-40 MG-UNT-MCG CHEW Take 3 tablets by mouth At Bedtime        Cyanocobalamin (B-12) 2500 MCG SUBL Place 1 tablet under the tongue Every Mon, Wed, Fri Morning 3 times weekly      docusate sodium (COLACE) 100 MG capsule Take 300 mg by mouth every morning      !! FLUoxetine (PROZAC) 20 MG capsule TAKE 1 CAPSULE BY MOUTH ONCE DAILY. TAKE WITH 40MG CAPSULE TO EQUAL 60MG PER DAY  Qty: 90 capsule, Refills: 1    Associated Diagnoses: NED (generalized anxiety disorder)      !! FLUoxetine (PROZAC) 40 MG capsule TAKE 1 CAPSULE BY MOUTH ONCE DAILY. TAKE WITH 20 MG CAPSULE TO EQUAL 60 MG DAILY  Qty: 90 capsule, Refills: 0    Associated Diagnoses: NED (generalized anxiety disorder)      hydrOXYzine (ATARAX) 50 MG tablet Take 1 tablet (50 mg) by mouth every 12 hours as needed for itching  Qty: 180 tablet, Refills: 1    Associated Diagnoses: NED (generalized anxiety disorder)      multivitamin  peds with iron (FLINTSTONES COMPLETE) 60 MG chewable tablet Take 2 chew tab by mouth every morning       pantoprazole (PROTONIX) 40 MG EC tablet Take 1 tablet by mouth twice daily  Qty: 180 tablet, Refills: 0    Associated Diagnoses: Bariatric surgery status      potassium citrate (UROCIT-K) 10 MEQ (1080 MG) CR tablet Take 2 tablets by mouth 2 times daily       !! - Potential duplicate medications found. Please discuss with provider.        Allergies   Allergies   Allergen Reactions    Fish Oil     Hydrocodone-Acetaminophen Nausea     Also light headed and flushed  Other reaction(s): Headache  Also light headed and flushed

## 2023-10-24 NOTE — PLAN OF CARE
Goal Outcome Evaluation:  PT-Received order and discussed with nurse. Nurse reports patient isn't having any more symptoms of vertigo and she is up moving around the room independently. Will complete order.

## 2023-10-24 NOTE — PLAN OF CARE
Goal Outcome Evaluation:      Plan of Care Reviewed With: patient      Pt a/o. VSS. No pain. Tolerating reg diet. Voiding. Ambulating. Abraham paper work reviewed with pt. Verbalizes understanding. PIV removed. Take home meds given. Verified 5 rights of med. All questions answered. Follow up aware. Dc home with family

## 2023-10-24 NOTE — PLAN OF CARE
PRIMARY Concern: Pt here with abdominal pain, nausea and one episode of black stool. No stool since admission   SAFETY RISK Concerns (fall risk, behaviors, etc.): fall risk due to onset of vertigo 10/23 morning       Isolation/Type: none  Tests/Procedures for NEXT shift: PT for Vestibular Therapy, labs in am, EGD 10/24, 0845  Consults? (Pending/following, signed-off?) GI following. Surgery following   Where is patient from? (Home, TCU, etc.): home   Other Important info for NEXT shift: acute onset of vertigo this morning.  PT to see for VT. IV PPI, HGB checks. EGD AM 10/24   Anticipated DC date & active delays:  1-2 days      SUMMARY NOTE:   Orientation/Cognitive: A/O X4  Observation Goals (Met/ Not Met): not met   Mobility Level/Assist Equipment: up IND in the hallway   Antibiotics & Plan (IV/po, length of tx left): none  Pain Management: oxy prn, morphine prn, denies pain this shift   Tele/VS/O2: VSS, RA  ABNL Lab/BG: hgb 11.8  Diet: regular   Bowel/Bladder: continence  Skin Concerns: none   Drains/Devices: PIV  Patient Stated Goal for Today: pain control

## 2023-10-24 NOTE — PROGRESS NOTES
"General Surgery Progress Note    Assessment and Plan:  52 year old female with h/o RNY gastric bypass in 2016 and perforated ulcer at GJ in 2018, presents with severe epigastric abdominal pain, CT scan shows inflammation of the inferior surface in the Rasheeda limb distal to GJ, suggesting a marginal ulcer, also with cholelithiasis.  EGD shows 2 cm ulceration without active bleeding at the jejunal side of GJ anastomosis.  - Appreciate GI intervention- s/p EGD with 2 cm non-bleeding ulceration at GJ  - Hgb stable 12.2  - Continue Protonix bid and Carafate  - Stay on low residue diet when advanced  - Avoid NSAIDs  - Dulcolax supp  - Med mngt per hospitalist, much appreciate your assistance  - Ok to discharge when medically able   - Follow up in bariatric clinic (notified clinic of hospital stay)/Dr. Reyna for ulcer and cholelithiasis  - Follow up with [facility] in 2 months for repeat EGD, continue PPI bid and Carafate per recs  - Plan discussed with patient and Dr. Lehman    Interval Hx:   Denies abdominal pain, n/v, and melena, clears, urinating without difficulty, concerned about no BM, ambulating. She denies smoking.     Exam:  Vitals: Blood pressure 118/76, pulse 56, temperature 98.5  F (36.9  C), temperature source Oral, resp. rate 20, height 1.575 m (5' 2\"), weight 77.6 kg (171 lb 1.6 oz), last menstrual period 2019, SpO2 99%, not currently breastfeeding.  Temperature Temp  Av.1  F (36.7  C)  Min: 97.6  F (36.4  C)  Max: 98.5  F (36.9  C)   No intake/output data recorded.    Gen: Awake and in no apparent distress.  Abd: soft, nt, nd. No signs of peritonitis, no guarding.      Data:  Recent Labs   Lab Test 10/24/23  0653 10/23/23  0719 10/22/23  2240 10/22/23  0936 23  1409   WBC  --  4.7  --  6.9 5.1   HGB 12.2 11.8  11.8 11.5* 13.0 12.8   HCT  --  37.5  --  40.7 39.8   PLT  --  218  --  291 284     Ratna Nelson PA-C  [facility]  [phone]   "

## 2023-10-24 NOTE — PROGRESS NOTES
PRIMARY DIAGNOSIS: ACUTE PAIN  OUTPATIENT/OBSERVATION GOALS TO BE MET BEFORE DISCHARGE:  1. Pain Status: partially met. Oxy prn/morphine    2. Return to near baseline physical activity: met    3. Cleared for discharge by consultants (if involved): ,met     Discharge Planner Nurse   Safe discharge environment identified: Yes  Barriers to discharge: Yes       Entered by: Jamila Sotelo RN 10/24/2023 12:03 PM     Please review provider order for any additional goals.   Nurse to notify provider when observation goals have been met and patient is ready for discharge.

## 2023-10-24 NOTE — PROGRESS NOTES
PRIMARY DIAGNOSIS: ACUTE PAIN  OUTPATIENT/OBSERVATION GOALS TO BE MET BEFORE DISCHARGE:  1. Pain Status: partially met. Oxy prn/morphine    2. Return to near baseline physical activity: partially met.     3. Cleared for discharge by consultants (if involved): No    Discharge Planner Nurse   Safe discharge environment identified: Yes  Barriers to discharge: Yes       Entered by: Jamila Sotelo RN 10/24/2023 9:02 AM     Please review provider order for any additional goals.   Nurse to notify provider when observation goals have been met and patient is ready for discharge.

## 2023-10-24 NOTE — PROGRESS NOTES
PRIMARY DIAGNOSIS: ACUTE PAIN  OUTPATIENT/OBSERVATION GOALS TO BE MET BEFORE DISCHARGE:  1. Pain Status: met    2. Return to near baseline physical activity: met    3. Cleared for discharge by consultants (if involved): ,met     Discharge Planner Nurse   Safe discharge environment identified: Yes  Barriers to discharge: Yes       Entered by: Jamila Sotelo RN 10/24/2023 4:06 PM     Please review provider order for any additional goals.   Nurse to notify provider when observation goals have been met and patient is ready for discharge.

## 2023-10-26 ENCOUNTER — OFFICE VISIT (OUTPATIENT)
Dept: SURGERY | Facility: CLINIC | Age: 53
End: 2023-10-26
Payer: COMMERCIAL

## 2023-10-26 VITALS
HEIGHT: 62 IN | DIASTOLIC BLOOD PRESSURE: 77 MMHG | SYSTOLIC BLOOD PRESSURE: 118 MMHG | OXYGEN SATURATION: 98 % | BODY MASS INDEX: 31.52 KG/M2 | WEIGHT: 171.3 LBS | HEART RATE: 67 BPM

## 2023-10-26 DIAGNOSIS — Z98.84 BARIATRIC SURGERY STATUS: ICD-10-CM

## 2023-10-26 DIAGNOSIS — K80.20 GALLSTONES: ICD-10-CM

## 2023-10-26 DIAGNOSIS — K28.9 MARGINAL ULCER: Primary | ICD-10-CM

## 2023-10-26 DIAGNOSIS — F41.9 ANXIETY: ICD-10-CM

## 2023-10-26 PROCEDURE — 99215 OFFICE O/P EST HI 40 MIN: CPT | Performed by: PHYSICIAN ASSISTANT

## 2023-10-26 RX ORDER — SUCRALFATE ORAL 1 G/10ML
1 SUSPENSION ORAL
Qty: 1200 ML | Refills: 0 | Status: SHIPPED | OUTPATIENT
Start: 2023-10-26 | End: 2024-01-04

## 2023-10-26 RX ORDER — PANTOPRAZOLE SODIUM 40 MG/1
40 TABLET, DELAYED RELEASE ORAL 2 TIMES DAILY
Qty: 120 TABLET | Refills: 0 | Status: SHIPPED | OUTPATIENT
Start: 2023-10-26 | End: 2023-12-16

## 2023-10-26 NOTE — PATIENT INSTRUCTIONS
"To ensure quality you may receive a patient satisfaction survey. The greatest compliment you can give is \"Likely to Recommend.\"    Nice to talk with you today.  Thank you for your trust. Below is the plan discussed.-  MANISHA Lynch      Plan:    Schedule Surgeon Consult  Continue Pantoprazole twice a daily  Continue Sulcrafate    Please call to schedule your endoscopy in 2 months.   There is a central phone and fax for both [facility] as below.    [facility]  Phone: [phone]  Fax: [phone]    Please call [phone] to schedule with medical therapy management with Lauren Bloch, MTM Pharmacist    Call neurologist to get medication name for sleep.    Follow up with primary care regarding anxiety.    FOLLOW-UP:  Return to clinic in in 3 months.             "

## 2023-10-26 NOTE — ASSESSMENT & PLAN NOTE
Will continue on pantoprazole and Carafate until follow up EGD in 2 months.   Discussed addition of famotidine long term since pantoprazole 40 mg BID has not healed initial ulcer and additional ulcer formed. She will follow up in 3 months.

## 2023-10-26 NOTE — ASSESSMENT & PLAN NOTE
Suspect this may be contributing to ulcers.  Will see PCP and discuss additional treatment options.

## 2023-10-26 NOTE — PROGRESS NOTES
Return Bariatric Surgery Note    RE: Criss Durant  MR#: [id number]  : 1970  VISIT DATE: 10/26/2023    Dear Napoleon Gray,    I had the pleasure of seeing your patient, Criss Durant, in my post-bariatric surgery assessment clinic.    Assessment & Plan   Problem List Items Addressed This Visit       Bariatric surgery status     10/26/2016 RYGBS BRP         Relevant Medications    pantoprazole (PROTONIX) 40 MG EC tablet    Other Relevant Orders    Med Therapy Management Referral    Anxiety     Suspect this may be contributing to ulcers.  Will see PCP and discuss additional treatment options.          Relevant Orders    Med Therapy Management Referral    Marginal ulcer - Primary     Will continue on pantoprazole and Carafate until follow up EGD in 2 months.   Discussed addition of famotidine long term since pantoprazole 40 mg BID has not healed initial ulcer and additional ulcer formed. She will follow up in 3 months.          Relevant Medications    sucralfate (CARAFATE) 1 GM/10ML suspension    Other Relevant Orders    Med Therapy Management Referral    Gallstones     Consult scheduled with Dr. Reyna for probable cholecystectomy.  Will update surgeon in case he would like to bypass consult and move directly to surgery after reviewing chart.          Relevant Orders    Adult General Surg Referral        PATIENT INSTRUCTIONS:  Schedule Surgeon Consult  Continue Pantoprazole twice a daily  Continue Sucralfate    Please call to schedule your endoscopy in 2 months.   There is a central phone and fax for both [facility] as below.    [facility]  Phone: [phone]  Fax: [phone]    Please call [phone] to schedule with medical therapy management with Lauren Bloch, MTM Pharmacist    Call neurologist to get medication name for sleep to review with Kalpana METZGER.      Follow up with primary care regarding anxiety.    FOLLOW-UP:  Return to clinic in 3 months.     FOLLOW-UP:  Please call [phone] to  schedule your next visit in    50 minutes spent on the date of the encounter doing chart review, history and exam, result review, counseling, developing plan of care, documentation, and further activities as noted        CHIEF COMPLAINT: Post-bariatric surgery follow-up    HISTORY OF PRESENT ILLNESS:      10/26/2023     5:53 AM   Questions Regarding Prior Weight Loss Surgery Reviewed With Patient   I had the following weight loss procedure Rasheeda-en-y Gastric Bypass   What year was your surgery? 2016   How has your weight changed since your last visit? I have gained weight   Do you currently have any of the following Heartburn, acid reflux, or GERD (acid reflux disease)?   Do you have any concerns today? Check in because of new ulcer   Paulette went to a conference 1 week ago and was not able to bring her own food.  That Tuesday and Wednesday had food from conference.   Was vomiting from Tuesday on. Admitted to [facility] on Sunday because in so much pain.  Had melena that morning.  Had been out of pantoprazole for about a week at that time. (Did not realize it.).  CT scan showed suggested marginal ulcer. And gallstones.  Restarted pantoprazole and Carafate.  Endoscopy performed Tuesday and confirmed       On current GI medication she is not have any abdominal pain.  Is on a pureed diet and planning on advancing to a full diet.  Will try with chili.      Will restart work tomorrow with lifting restricting.  Also had gallstones.  Will need  a cholecystectomy.  Previously had BRP do bypass surgery.     Was admitted to [facility] on 10/22/2023 for  GJ anastomosis ulcer causing abdominal pain, melanotic stool.     Date of Admission:  10/22/2023  Date of Discharge:  10/24/2023  Discharging Provider: Daina Sanchez PA-C  Discharge Service: Hospitalist Service        Discharge Diagnoses  GJ anastomosis ulcer causing abdominal pain, melanotic stool  Hx of marginal ulcer, with perforation s/p repair in 2018  Hx of Rasheeda-en-Y  gastric bypass in 2016  Hx of iron deficiency dt malabsorption, now resolved  Suspected BPPV, resolved  Depression   Cholelithiasis     Weight History:      10/26/2023     5:53 AM   --   What is your highest lifetime weight? 330   What is your lowest weight since surgery? (In pounds) 125     Initial Weight (lbs): 307.7 lbs  Weight: 171 lb 4.8 oz (77.7 kg)  Last Visits Weight: 171 lb (77.6 kg)  Cumulative weight loss (lbs): 136.4  Weight Loss Percentage: 44.33%    Patient is taking the following bariatric postoperative vitamins:  2 Complete multivitamins with minerals (at different times than calcium)  5000 Int Units of Vitamin D daily   500 mg of calcium  mg of calcium BID  500 mcg of Vitamin B12 sl daily  B complex daily   Thiamine weekly  1 Iron/Vit C. daily        10/26/2023     5:53 AM   Questions Regarding Co-Morbidities and Health Concerns Reviewed With Patient   Pre-diabetes Never   Diabetes II Never   High Blood Pressure Never   High cholesterol Improved   Heartburn/Reflux Never   Sleep apnea Stayed the same   PCOS Never   Back pain Never   Joint pain Never   Lower leg swelling Stayed the same           10/26/2023     5:53 AM   Eating Habits   How many meals do you eat per day? 3   Do you snack between meals? Sometimes   How much food are you eating at each meal? 1/2 cup to 1 cup   Are you able to separate your meals and liquids by at least 30 minutes? Yes   Are you able to avoid liquid calories? Yes           10/26/2023     5:53 AM   Exercise Questions Reviewed With Patient   How often do you exercise? 1 to 2 times per week   What is the duration of your exercise (in minutes)? 30 Minutes   What types of exercise do you do? walking    gym membership    climbing stairs at work   What keeps you from being more active? I am as active as I can possbily be    I have recently been sick    Lack of Time    Worried people will look at me       Social History:      10/26/2023     5:53 AM   --   Are you smoking?  No   Are you drinking alcohol? No       Medications:  Current Outpatient Medications   Medication    allopurinol (ZYLOPRIM) 300 MG tablet    calcium-vitamin D-vitamin K (VIACTIV) 500-500-40 MG-UNT-MCG CHEW    Cyanocobalamin (B-12) 2500 MCG SUBL    docusate sodium (COLACE) 100 MG capsule    FLUoxetine (PROZAC) 20 MG capsule    FLUoxetine (PROZAC) 40 MG capsule    hydrOXYzine (ATARAX) 50 MG tablet    multivitamin  peds with iron (FLINTSTONES COMPLETE) 60 MG chewable tablet    pantoprazole (PROTONIX) 40 MG EC tablet    potassium citrate (UROCIT-K) 10 MEQ (1080 MG) CR tablet    sucralfate (CARAFATE) 1 GM/10ML suspension     No current facility-administered medications for this visit.         10/26/2023     5:53 AM   --   Do you avoid NSAIDs such as (Ibuprofen, Aleve, Naproxen, Advil)? Yes       ROS:  GI:       10/26/2023     5:53 AM   --   Vomiting Yes   Diarrhea Yes   Constipation Yes   Swallowing trouble Yes   Abdominal pain Yes   Heartburn Yes     Skin:       10/26/2023     5:53 AM   BAR RBS ROS - SKIN   Rash in skin folds No     Psych:       10/26/2023     5:53 AM   --   Depression Yes   Anxiety Yes     Female Only:       10/26/2023     5:53 AM   BAR RBS ROS -    Female only Post-menopausal   Stress urinary incontinence No       LABS/IMAGING/MEDICAL RECORDS REVIEW:     CT ABDOMEN PELVIS W CONTRAST 10/22/2023  [facility]     INDICATION: History of Rasheeda en Y in 2017 and prior perforated gastric ulcer.  Abdominal pain and dark stool.  COMPARISON: 09/29/2020  TECHNIQUE: CT scan of the abdomen and pelvis was performed following injection of IV contrast. Multiplanar reformats were obtained. Dose reduction techniques were used.  CONTRAST: 85 mL Isovue 370     FINDINGS:   LOWER CHEST: Mild calcified atherosclerosis left anterior descending coronary artery.  HEPATOBILIARY: Focal fatty infiltration of the liver along the fissure. Cholelithiasis.   PANCREAS: Normal.   SPLEEN: Normal.   ADRENAL  GLANDS: Normal.   KIDNEYS/BLADDER: Left kidney cysts, requiring no follow-up.   BOWEL: Rasheeda-en-Y gastric bypass with antecolic Rasheeda limb. Mild inflammation has developed about the inferior surface of the Rasheeda limb just distal to the gastrojejunostomy. Otherwise normal bowel, including the appendix.  LYMPH NODES: Normal.  VASCULATURE: Trace atherosclerosis.   PELVIC ORGANS: Pelvic phleboliths.  MUSCULOSKELETAL: Mild degenerative change in the spine.     IMPRESSION:   Inflammation has developed in the Rasheeda limb near the gastrojejunostomy, suggesting a marginal ulcer as the etiology for pain and dark stool    EGD 10/24/2023  Ramakrishna Escalante MD   Impression:               - Normal esophagus.                             - Gastric bypass with a normal-sized pouch and                             intact staple line. Gastrojejunal anastomosis                             characterized by ulceration.                             - Normal examined jejunum.                             - No specimens collected.   Recommendation:           - Pantoprazole 40mg BID                             - Check H. pylori stool antigen and treat if                             positive                             - Avoid NSAIDs                             - Carafate QID for 2- 4 weeks, if tolerating and                             covered/affordable                             - Repeat EGD in 2 months to document healing -                             ordered at [facility]                                 Hemoglobin A1C   Date Value Ref Range Status   12/07/2017 5.2 4.0 - 6.0 % Final     Vitamin D, Total (25-Hydroxy)   Date Value Ref Range Status   02/03/2022 30 20 - 75 ug/L Final     Parathyroid Hormone Intact   Date Value Ref Range Status   02/03/2022 100 (H) 18 - 80 pg/mL Final     Vitamin B12   Date Value Ref Range Status   02/03/2022 326 193 - 986 pg/mL Final     Hemoglobin   Date Value Ref Range Status   10/24/2023 12.2 11.7 - 15.7 g/dL Final  "    Ferritin   Date Value Ref Range Status   08/16/2023 27 11 - 328 ng/mL Final     Iron   Date Value Ref Range Status   08/16/2023 46 37 - 145 ug/dL Final     Iron Binding Capacity   Date Value Ref Range Status   08/16/2023 308 240 - 430 ug/dL Final     Iron Sat Index   Date Value Ref Range Status   08/16/2023 15 15 - 46 % Final   03/23/2023 19 15 - 46 % Final   08/25/2022 17 15 - 46 % Final     Vitamin A   Date Value Ref Range Status   02/03/2022 0.56 0.30 - 1.20 mg/L Final       PHYSICAL EXAMINATION:  /77   Pulse 67   Ht 5' 2\" (1.575 m)   Wt 171 lb 4.8 oz (77.7 kg)   LMP 09/22/2019   SpO2 98%   BMI 31.33 kg/m    GENERAL: Healthy, alert and no distress  EYES: Eyes grossly normal to inspection.  No discharge or erythema, or obvious scleral/conjunctival abnormalities.  RESP: No audible wheeze, cough, or visible cyanosis.  No visible retractions or increased work of breathing.    ABD: Pain on palpation mid abdomen  below sternum and laterally.   SKIN: Visible skin clear. No significant rash, abnormal pigmentation or lesions.  NEURO: Cranial nerves grossly intact.  Mentation and speech appropriate for age.  PSYCH: Mentation appears normal, affect normal/bright, judgement and insight intact, normal speech and appearance well-groomed.    COUNSELING PROVIDED:  We reviewed the important post op bariatric recommendations:  eating 3 meals daily  eating protein first, getting >60gm protein daily  eating slowly, chewing food well  avoiding/limiting calorie containing beverages  avoiding fluids 30 minutes before, during, and after meals  limiting restaurant or cafeteria eating to twice a week or less  Pt reminded to avoid marginal ulcers she should avoid tobacco at all, alcohol in excess, caffeine, and NSAIDS (unless indicated for cardioprotection or otherwise and opposed by a PPI).  Pt encouraged to establish and maintain a consistent physical activity routine, 6-8 hours of restorative sleep each night and optimal " stress management.  Pt counseled on the importance of life long vitamin supplementation and life long follow up.    Sincerely,    Cris Paez PA-C

## 2023-10-26 NOTE — Clinical Note
Say Paulette (10/26/22 ROSAS) in today in clinic.  Was hospitalized for 2nd marginal ulcer/melena.  On PPI/Carafate.  Repeat Endoscopy in 2 mo. Also found gallstones.  Recommended jaun.  Scheduled a consult.  After viewing her chart, let me know if you want me to keep the appointment of if you would rather just set a surgery date.  Thanks.    Cris

## 2023-10-26 NOTE — ASSESSMENT & PLAN NOTE
Consult scheduled with Dr. Reyna for probable cholecystectomy.  Will update surgeon in case he would like to bypass consult and move directly to surgery after reviewing chart.

## 2023-10-30 ENCOUNTER — MYC REFILL (OUTPATIENT)
Dept: FAMILY MEDICINE | Facility: CLINIC | Age: 53
End: 2023-10-30
Payer: COMMERCIAL

## 2023-10-30 DIAGNOSIS — F41.1 GAD (GENERALIZED ANXIETY DISORDER): ICD-10-CM

## 2023-10-30 DIAGNOSIS — E79.0 HYPERURICEMIA: Primary | ICD-10-CM

## 2023-10-31 RX ORDER — ALLOPURINOL 300 MG/1
1 TABLET ORAL DAILY
Qty: 90 TABLET | Refills: 1 | Status: SHIPPED | OUTPATIENT
Start: 2023-10-31

## 2023-10-31 RX ORDER — FLUOXETINE 40 MG/1
40 CAPSULE ORAL DAILY
Qty: 90 CAPSULE | Refills: 0 | Status: SHIPPED | OUTPATIENT
Start: 2023-10-31 | End: 2024-01-17

## 2023-10-31 NOTE — TELEPHONE ENCOUNTER
Prescription approved per [facility] Refill Protocol.  Yesi Mosley, RN  [facility] Triage Nurse

## 2023-11-05 ENCOUNTER — HEALTH MAINTENANCE LETTER (OUTPATIENT)
Age: 53
End: 2023-11-05

## 2023-11-08 ENCOUNTER — OFFICE VISIT (OUTPATIENT)
Dept: FAMILY MEDICINE | Facility: CLINIC | Age: 53
End: 2023-11-08
Payer: COMMERCIAL

## 2023-11-08 VITALS
SYSTOLIC BLOOD PRESSURE: 100 MMHG | DIASTOLIC BLOOD PRESSURE: 54 MMHG | HEART RATE: 65 BPM | RESPIRATION RATE: 16 BRPM | OXYGEN SATURATION: 100 % | TEMPERATURE: 98.4 F

## 2023-11-08 DIAGNOSIS — F41.1 GAD (GENERALIZED ANXIETY DISORDER): ICD-10-CM

## 2023-11-08 DIAGNOSIS — Z98.84 H/O GASTRIC BYPASS: ICD-10-CM

## 2023-11-08 DIAGNOSIS — K28.9 MARGINAL ULCER: Primary | ICD-10-CM

## 2023-11-08 DIAGNOSIS — K80.20 GALLSTONES: ICD-10-CM

## 2023-11-08 PROCEDURE — 99214 OFFICE O/P EST MOD 30 MIN: CPT | Performed by: PHYSICIAN ASSISTANT

## 2023-11-08 PROCEDURE — 96127 BRIEF EMOTIONAL/BEHAV ASSMT: CPT | Performed by: PHYSICIAN ASSISTANT

## 2023-11-08 RX ORDER — BUSPIRONE HYDROCHLORIDE 5 MG/1
5 TABLET ORAL 3 TIMES DAILY
Qty: 180 TABLET | Refills: 1 | Status: SHIPPED | OUTPATIENT
Start: 2023-11-08

## 2023-11-08 ASSESSMENT — PAIN SCALES - GENERAL: PAINLEVEL: NO PAIN (0)

## 2023-11-08 NOTE — PROGRESS NOTES
"  Assessment & Plan     Marginal ulcer  Will check h pyori.  She can continue carafate and protonix.  Follow up EGD with [facility] in January 2024  - Helicobacter pylori Antigen Stool; Future  - Helicobacter pylori Antigen Stool    Gallstones  Appointment tomorrow with general surgery.    NED (generalized anxiety disorder)  Start buspar and continue Prozac for anxiety.  New referral for therapy given.  Follow up in 3 months  - busPIRone (BUSPAR) 5 MG tablet; Take 1 tablet (5 mg) by mouth 3 times daily  - Adult Mental Health  Referral; Future    H/O gastric bypass  See above      Post Medication Reconciliation Status:  Discharge medications reconciled and changed, see notes/orders  BMI:   Estimated body mass index is 31.33 kg/m  as calculated from the following:    Height as of 10/26/23: 1.575 m (5' 2\").    Weight as of 10/26/23: 77.7 kg (171 lb 4.8 oz).       Napoleon Gray PA-C  [facility]   Paulette is a 52 year old, presenting for the following health issues:  Hospital F/U        11/8/2023     9:39 AM   Additional Questions   [facility]    Hospital/Nursing Home/ Rehab Facility: [facility]  Date of Admission: 10/22/23  Date of Discharge: 10/24/23  Reason(s) for Admission: Marginal ulcer     Was your hospitalization related to COVID-19? No   Problems taking medications regularly:  None  Medication changes since discharge: added carafate   Problems adhering to non-medication therapy:  None    Summary of hospitalization:  [facility] discharge summary reviewed  Diagnostic Tests/Treatments reviewed.  Follow up needed:  surgery follow up, GI follow up  Other Healthcare Providers Involved in Patient s Care:         Specialist appointment - surgeon, GI  Update since discharge: improved.     Plan of care communicated with patient        Presented to the ER with abdominal pain " and melena. Imaging showed likely marginal ulcer.  She was admitted under observation and EGD was completed and confirmed marginal ulcer.  She was continued on Protonix and started Carafate.  She was also found to have cholelithiasis and has appointment with surgery tomorrow to determine next steps.  She has been feeling better.  Continues to have pain after eating but is otherwise improved.     Has been struggling with anxiety.  Currently taking prozac 60 mg.  This was working previously but is not longer helping her. She is wondering about alternatives.  Also wondering about starting therapy.  Has been to several therapists and has been told that they cannot help her due to her history of eating disorder ( ?) . Requesting new referral      Review of Systems   Constitutional, HEENT, cardiovascular, pulmonary, GI, , musculoskeletal, neuro, skin, endocrine and psych systems are negative, except as otherwise noted.      Objective    /54 (BP Location: Left arm, Patient Position: Sitting, Cuff Size: Adult Regular)   Pulse 65   Temp 98.4  F (36.9  C) (Tympanic)   Resp 16   LMP 09/22/2019   SpO2 100%   There is no height or weight on file to calculate BMI.  Physical Exam   GENERAL: healthy, alert and no distress  EYES: Eyes grossly normal to inspection, PERRL and conjunctivae and sclerae normal  HENT: ear canals and TM's normal, nose and mouth without ulcers or lesions  NECK: no adenopathy, no asymmetry, masses, or scars and thyroid normal to palpation  RESP: lungs clear to auscultation - no rales, rhonchi or wheezes  CV: regular rate and rhythm, normal S1 S2, no S3 or S4, no murmur, click or rub, no peripheral edema and peripheral pulses strong  ABDOMEN: soft, nontender, no hepatosplenomegaly, no masses and bowel sounds normal  PSYCH: mentation appears normal, affect normal/bright

## 2023-11-09 ENCOUNTER — MYC MEDICAL ADVICE (OUTPATIENT)
Dept: FAMILY MEDICINE | Facility: CLINIC | Age: 53
End: 2023-11-09

## 2023-11-09 ENCOUNTER — OFFICE VISIT (OUTPATIENT)
Dept: SURGERY | Facility: CLINIC | Age: 53
End: 2023-11-09
Payer: COMMERCIAL

## 2023-11-09 VITALS
HEIGHT: 63 IN | DIASTOLIC BLOOD PRESSURE: 70 MMHG | BODY MASS INDEX: 30.35 KG/M2 | WEIGHT: 171.3 LBS | SYSTOLIC BLOOD PRESSURE: 118 MMHG | HEART RATE: 66 BPM

## 2023-11-09 DIAGNOSIS — K28.9 MARGINAL ULCER: Primary | ICD-10-CM

## 2023-11-09 PROCEDURE — 99213 OFFICE O/P EST LOW 20 MIN: CPT | Performed by: SURGERY

## 2023-11-09 NOTE — TELEPHONE ENCOUNTER
Please see Cruz FLAHERTY. Patient had appointment with Napoleon Gray PA-C yesterday. Megan Chery RN

## 2023-11-09 NOTE — PROGRESS NOTES
This patient is a pleasant 52-year-old female known to me from prior Rasheeda-en-Y gastric bypass.  She was recently hospitalized due to abdominal pain and found to have marginal ulceration.  She has been started on a regiment of PPI and Carafate.  She continues however to have left upper quadrant abdominal pain left mid abdominal pain exactly in the site of inflammation is seen on her imaging.  She was also noted to have gallstones on imaging.  She denies any significant discomfort or pain in the right upper quadrant.  She has had no fevers or chills.  No pale stools or tea colored urine.    On examination today is unremarkable.  Her imaging was reviewed.  She does in fact have evidence of cholelithiasis dating back several years.    I discussed with her at this time I do not believe that cholecystectomy is necessary or warranted.  I think her discomfort and pain is related to her marginal ulcer and she needs to continue with the prescribed therapy.  She is scheduled for a follow-up endoscopy in the first week of January.  I would like to see her thereafter.  We discussed other issues as well particularly contributing factors to marginal ulceration.  She has had slow weight gain of 50 pounds from where she was at at her lowest weight.  I discussed the need to continue with aggressive behavior management and portion control.  She continues to struggle with anxiety and depression and has been unsuccessful in finding a good mental health provider.  I suggested that perhaps meeting with a psychiatrist may be of benefit rather than just psychotherapy.  This was all discussed at length.  Total time spent was 20 minutes with greater than 50% in face-to-face consultation.  She will follow-up with me after her recheck endoscopy.

## 2023-12-08 ENCOUNTER — NURSE TRIAGE (OUTPATIENT)
Dept: FAMILY MEDICINE | Facility: CLINIC | Age: 53
End: 2023-12-08
Payer: COMMERCIAL

## 2023-12-08 ENCOUNTER — MYC MEDICAL ADVICE (OUTPATIENT)
Dept: FAMILY MEDICINE | Facility: CLINIC | Age: 53
End: 2023-12-08
Payer: COMMERCIAL

## 2023-12-08 NOTE — TELEPHONE ENCOUNTER
See triage telephone encounter. [employer] message sent to the patient with Covid 19 patient instructions. Megan Chery RN

## 2023-12-08 NOTE — TELEPHONE ENCOUNTER
Nurse Triage SBAR    Is this a 2nd Level Triage? NO    Situation: Cold like symptoms yesterday. Worst symptom is sinus congestion. Denies chest pain of SOB.    Background: Up to date with Covid 19 vaccine. BMI 30.83. History of anxiety.     Assessment: Covid 19 with mild to moderate symptoms and risk factor for severe disease.     Protocol Recommended Disposition:   Discuss With PCP And Callback By Nurse Within 1 Hour    Recommendation:      Routed to Hub RN for possible Paxlovid rx.     Does the patient meet one of the following criteria for ADS visit consideration? 16+ years old, with an [facility] PCP     TIP  Providers, please consider if this condition is appropriate for management at one of our Acute and Diagnostic Services sites.     If patient is a good candidate, please use dotphrase <dot>triageresponse and select Refer to ADS to document.           Reason for Disposition   HIGH RISK patient (e.g., weak immune system, age > 64 years, obesity with BMI of 30 or higher, pregnant, chronic lung disease or other chronic medical condition) and COVID symptoms (e.g., cough, fever)  (Exceptions: Already seen by doctor or NP/PA and no new or worsening symptoms.)    Additional Information   Negative: SEVERE difficulty breathing (e.g., struggling for each breath, speaks in single words)   Negative: Difficult to awaken or acting confused (e.g., disoriented, slurred speech)   Negative: Bluish (or gray) lips or face now   Negative: Shock suspected (e.g., cold/pale/clammy skin, too weak to stand, low BP, rapid pulse)   Negative: Sounds like a life-threatening emergency to the triager   Negative: Diagnosed or suspected COVID-19 and symptoms lasting 3 or more weeks   Negative: COVID-19 exposure and no symptoms   Negative: COVID-19 vaccine reaction suspected (e.g., fever, headache, muscle aches) occurring 1 to 3 days after getting vaccine   Negative: COVID-19 vaccine, questions about   Negative: Lives with someone known to have  "influenza (flu test positive) and flu-like symptoms (e.g., cough, runny nose, sore throat, SOB; with or without fever)   Negative: Possible COVID-19 symptoms and triager concerned about severity of symptoms or other causes   Negative: COVID-19 and breastfeeding, questions about   Negative: SEVERE or constant chest pain or pressure  (Exception: Mild central chest pain, present only when coughing.)   Negative: MODERATE difficulty breathing (e.g., speaks in phrases, SOB even at rest, pulse 100-120)   Negative: Headache and stiff neck (can't touch chin to chest)   Negative: Oxygen level (e.g., pulse oximetry) 90% or lower   Negative: Chest pain or pressure  (Exception: MILD central chest pain, present only when coughing.)   Negative: Drinking very little and dehydration suspected (e.g., no urine > 12 hours, very dry mouth, very lightheaded)   Negative: Patient sounds very sick or weak to the triager   Negative: MILD difficulty breathing (e.g., minimal/no SOB at rest, SOB with walking, pulse <100)   Negative: Fever > 103 F (39.4 C)   Negative: Fever > 101 F (38.3 C) and over 60 years of age   Negative: Fever > 100.0 F (37.8 C) and bedridden (e.g., CVA, chronic illness, recovering from surgery)    Answer Assessment - Initial Assessment Questions  1. COVID-19 DIAGNOSIS: \"How do you know that you have COVID?\" (e.g., positive lab test or self-test, diagnosed by doctor or NP/PA, symptoms after exposure).      Home test this morning.   2. COVID-19 EXPOSURE: \"Was there any known exposure to COVID before the symptoms began?\" CDC Definition of close contact: within 6 feet (2 meters) for a total of 15 minutes or more over a 24-hour period.       No known exposure.   3. ONSET: \"When did the COVID-19 symptoms start?\"       yesterday  4. WORST SYMPTOM: \"What is your worst symptom?\" (e.g., cough, fever, shortness of breath, muscle aches)      Pressure behind eyes. Feels like sinus infection due to pressure. Cough here and there. Low " "grade fever 98-99.9 with oral electronic. Legs hurt.  Denies SOB. Has to breath through mouth.   5. COUGH: \"Do you have a cough?\" If Yes, ask: \"How bad is the cough?\"        Hurts head when coughs. Otherwise mild cough.  6. FEVER: \"Do you have a fever?\" If Yes, ask: \"What is your temperature, how was it measured, and when did it start?\"      See above.  7. RESPIRATORY STATUS: \"Describe your breathing?\" (e.g., normal; shortness of breath, wheezing, unable to speak)       Breathing through mouth due to sinus congestion  8. BETTER-SAME-WORSE: \"Are you getting better, staying the same or getting worse compared to yesterday?\"  If getting worse, ask, \"In what way?\"      worse  9. OTHER SYMPTOMS: \"Do you have any other symptoms?\"  (e.g., chills, fatigue, headache, loss of smell or taste, muscle pain, sore throat)      Chills, fatigue, headache, lost taste/not hungry, leg muscle pain, off and on sore throat.   10. HIGH RISK DISEASE: \"Do you have any chronic medical problems?\" (e.g., asthma, heart or lung disease, weak immune system, obesity, etc.)        BMI 30.83  11. VACCINE: \"Have you had the COVID-19 vaccine?\" If Yes, ask: \"Which one, how many shots, when did you get it?\"        Moderna 10/13/23. Moderna 6/26/22, 10/29/21, 4/14/21, 3/17/21  12. PREGNANCY: \"Is there any chance you are pregnant?\" \"When was your last menstrual period?\"        NA  13. O2 SATURATION MONITOR:  \"Do you use an oxygen saturation monitor (pulse oximeter) at home?\" If Yes, ask \"What is your reading (oxygen level) today?\" \"What is your usual oxygen saturation reading?\" (e.g., 95%)        Does not have oximeter.    Protocols used: Coronavirus (COVID-19) Diagnosed or [facility]-I-TD  Megan Chery RN    "

## 2023-12-16 ENCOUNTER — MYC REFILL (OUTPATIENT)
Dept: SURGERY | Facility: CLINIC | Age: 53
End: 2023-12-16
Payer: COMMERCIAL

## 2023-12-16 DIAGNOSIS — Z98.84 BARIATRIC SURGERY STATUS: ICD-10-CM

## 2023-12-18 RX ORDER — PANTOPRAZOLE SODIUM 40 MG/1
40 TABLET, DELAYED RELEASE ORAL 2 TIMES DAILY
Qty: 60 TABLET | Refills: 0 | Status: SHIPPED | OUTPATIENT
Start: 2023-12-18 | End: 2024-01-25

## 2023-12-18 NOTE — TELEPHONE ENCOUNTER
Pt last seen 10/26 - started on carafate and protonix  Seen by [facility]P 11/9 for marginal ulcer.  Note mentions pt having upper endoscopy in early Jan.  Appropriate to refill until next appt with GODFREY on 1/25/2024.    Zulma MARCELINO RN

## 2023-12-20 ENCOUNTER — VIRTUAL VISIT (OUTPATIENT)
Dept: PHARMACY | Facility: CLINIC | Age: 53
End: 2023-12-20
Attending: PHYSICIAN ASSISTANT
Payer: COMMERCIAL

## 2023-12-20 VITALS — WEIGHT: 160 LBS | HEIGHT: 62 IN | BODY MASS INDEX: 29.44 KG/M2

## 2023-12-20 DIAGNOSIS — Z98.84 BARIATRIC SURGERY STATUS: Primary | ICD-10-CM

## 2023-12-20 PROCEDURE — 99207 PR NO CHARGE LOS: CPT | Performed by: PHARMACIST

## 2023-12-20 ASSESSMENT — PAIN SCALES - GENERAL: PAINLEVEL: MODERATE PAIN (4)

## 2023-12-20 ASSESSMENT — PATIENT HEALTH QUESTIONNAIRE - PHQ9: SUM OF ALL RESPONSES TO PHQ QUESTIONS 1-9: 13

## 2023-12-20 NOTE — PROGRESS NOTES
"Disease State Management Encounter:                          Paulette Durant is a 53 year old female called for an initial visit. She was referred to me from [facility]. Insurance does not cover comprehensive medication review with Medication Therapy Management (MTM). Patient declines private pay. Therefore, completed one time consult today.     Reason for visit: Referred to assess for drug interactions with medications as well as safety of potential benzodiazepine start.    History Rasheeda-en-Y Gastric Bypass:   Vitamin B12 2500 mcg Monday, Wednesday, Friday  Calcium-vitamin D 3 tablets bedtime   Flintstones multivitamin 2 tablets once daily     Patient had gastric bypass completed historically several years ago  She does not have complaints of acid reflux. Anxiety, sleep issues, Gastric ulcers- review medications and possible interactions with new neurology medication.  S/P gastric bypass. Reports addiction runs in her family. She is concerned about risk of addiction potential with benzodiazepines especially with gastric bypass history and is wondering if alternative option to assist with sleep. Plans to follow up with provider regarding this. Was also prescribed buspirine for anxiety and feels that much of sleep issues are anxiety related. Racing thoughts. Wants to know if any of these changes are possible with history of bypass.     Hemoglobin   Date Value Ref Range Status   10/24/2023 12.2 11.7 - 15.7 g/dL Final   02/11/2021 12.6 11.7 - 15.7 g/dL Final     Ferritin   Date Value Ref Range Status   08/16/2023 27 11 - 328 ng/mL Final   02/11/2021 21 8 - 252 ng/mL Final     Lab Results   Component Value Date    VITDT 30 02/03/2022      Lab Results   Component Value Date    PTHI 100 (H) 02/03/2022      Lab Results   Component Value Date    B12 326 02/03/2022      Lab Results   Component Value Date    GINNY 0.56 02/03/2022       Today's Vitals: Ht 5' 2\" (1.575 m)   Wt 160 lb (72.6 kg)   LMP 09/22/2019   BMI " 29.26 kg/m      Assessment/Plan:    Would encourage alternative from benzodiazepine for sleep use if possible in general due to risk of benzodiazepines with long term use, did discuss the limited but potential risk of addiction potential with use. Could consider increase buspirone as appears that sleep issues are largely anxiety related. Will also order bariatric labs that are due. Patient to follow up with psychiatrist to discuss more as planned. Patient would benefit form changing calcium to 1 tablet twice daily to separate as gastrointestinal system can only absorb up to 600 mg calcium at a time.     Follow-up: as needed     I spent 20 minutes with this patient today. A copy of the visit note was provided to the patient's provider(s).    A summary of these recommendations was declined by the patient.    Lauren Bloch, PharmD, BCACP   Medication Therapy Management Pharmacist   [facility] [place]       Medication Therapy Recommendations  No medication therapy recommendations to display

## 2023-12-20 NOTE — NURSING NOTE
Is the patient currently in the state of [place]? YES    Visit mode:TELEPHONE    If the visit is dropped, the patient can be reconnected by: TELEPHONE VISIT: Phone number:   Telephone Information:   Mobile [phone]       Will anyone else be joining the visit? NO  (If patient encounters technical issues they should call [phone]    How would you like to obtain your AVS? MyChart    Are changes needed to the allergy or medication list? No    Reason for visit: RECHECK    Becca BRAUN

## 2023-12-20 NOTE — PROGRESS NOTES
Virtual Visit Details    Type of service:  Telephone Visit   Phone call duration: 20 minutes      Normal

## 2023-12-31 NOTE — PATIENT INSTRUCTIONS
Recommendations from today's disease management visit:                                                      Bariatric labs ordered  Separate Calcium by 1 tablet twice daily  Follow up with psychiatry as planned.     Follow-up: as needed - Call [phone] to schedule.       To schedule another MTM appointment, please call the clinic directly or you may call the MTM scheduling line at [phone] or toll-free at [phone].     My Clinical Pharmacist's contact information:                                                      Please feel free to contact me with any questions or concerns you have.     Lauren Bloch, PharmD, BCACP   Medication Therapy Management Pharmacist   [facility] [place]

## 2024-01-03 ENCOUNTER — TRANSFERRED RECORDS (OUTPATIENT)
Dept: HEALTH INFORMATION MANAGEMENT | Facility: CLINIC | Age: 54
End: 2024-01-03
Payer: COMMERCIAL

## 2024-01-04 ENCOUNTER — OFFICE VISIT (OUTPATIENT)
Dept: SURGERY | Facility: CLINIC | Age: 54
End: 2024-01-04
Payer: COMMERCIAL

## 2024-01-04 VITALS
SYSTOLIC BLOOD PRESSURE: 100 MMHG | HEIGHT: 63 IN | WEIGHT: 156.6 LBS | DIASTOLIC BLOOD PRESSURE: 60 MMHG | HEART RATE: 66 BPM | BODY MASS INDEX: 27.75 KG/M2

## 2024-01-04 DIAGNOSIS — K28.9 MARGINAL ULCER: ICD-10-CM

## 2024-01-04 PROCEDURE — 99212 OFFICE O/P EST SF 10 MIN: CPT | Performed by: SURGERY

## 2024-01-04 RX ORDER — SUCRALFATE ORAL 1 G/10ML
1 SUSPENSION ORAL 2 TIMES DAILY
Qty: 1200 ML | Refills: 0 | Status: SHIPPED | OUTPATIENT
Start: 2024-01-04 | End: 2024-01-25

## 2024-01-04 NOTE — PROGRESS NOTES
Patient returns in follow-up for marginal ulceration several years postop from prior Rasheeda-en-Y bypass.  Does also have a prior history of perforated marginal ulcer that was surgically repaired.  Since the time of her last visit she states that she is feeling well.  Still does intermittently have some abdominal pains that she relates to stress.  She otherwise is doing well and has started losing weight again.  She reports follow-up endoscopy was performed yesterday.  Report was not available for review but the patient states that the gastroenterologist told her that things look good.  He did however recommend that she continue to take the Carafate but could back off to twice a day.    Overall she seems to be doing well.  I have refilled her Carafate for 90 days.  Thereafter I think she can back off to simply using acid suppression treatment.  She has follow-up within the bariatric clinic near the end of the month.  Thereafter I think she can go back to annual follow-ups.  Encouraged her to continue with her lifestyle modifications.  Overall I think she is doing well for a long-term postop gastric bypass patient.  Total time spent was 10 minutes entirely in consultation.

## 2024-01-17 ENCOUNTER — MYC REFILL (OUTPATIENT)
Dept: FAMILY MEDICINE | Facility: CLINIC | Age: 54
End: 2024-01-17
Payer: COMMERCIAL

## 2024-01-17 DIAGNOSIS — F41.1 GAD (GENERALIZED ANXIETY DISORDER): ICD-10-CM

## 2024-01-17 RX ORDER — FLUOXETINE 40 MG/1
40 CAPSULE ORAL DAILY
Qty: 90 CAPSULE | Refills: 2 | Status: SHIPPED | OUTPATIENT
Start: 2024-01-17

## 2024-01-18 NOTE — PROGRESS NOTES
02-Nov-2022 01:50 Return Bariatric Surgery Note    RE: Criss Durant  MR#: [id number]  : 1970  VISIT DATE: 2024    Dear Napoleon Gray,    I had the pleasure of seeing your patient, Criss Durant, in my post-bariatric surgery assessment clinic.    Assessment & Plan   Problem List Items Addressed This Visit       Bariatric surgery status     10/26/2016 RYGBS BRP          Relevant Medications    pantoprazole (PROTONIX) 40 MG EC tablet    Postsurgical malabsorption - Primary     Continue taking recommended post-op vitamins.  Labs ordered per protocol.           Relevant Medications    thiamine (B-1) 100 MG tablet    Marginal ulcer     Recent EGD shows healed ulcer.  Continue Carafate twice daily for 2 months.  Continue Pantoprazole 40 mg twice daily  I sent Carafate liquid to your pharmacy.  You can use this 10 ml 4x a day as needed if epigastric pain returns once off daily dose.          Relevant Medications    sucralfate (CARAFATE) 1 GM/10ML suspension        PATIENT INSTRUCTIONS:  Labs ordered. Call [phone] to schedule.  Continue your bariatric vitamins: Add thiamine 100 mg weekly    Continue Carafate twice daily for 2 months.  Continue Pantoprazole 40 mg twice daily  I sent Carafate liquid to your pharmacy.  You can use this 10 ml 4x a day as needed if epigastric pain returns once off daily dose.     FOLLOW-UP:  Call [phone] to schedule next visit in Oct 2024 of annual visit.    20 minutes spent on the date of the encounter doing chart review, history and exam, result review, counseling, developing plan of care, documentation, and further activities as noted        CHIEF COMPLAINT: Post-bariatric surgery follow-up    HISTORY OF PRESENT ILLNESS:      2024     4:50 PM   Questions Regarding Prior Weight Loss Surgery Reviewed With Patient   I had the following weight loss procedure Rasheeda-en-y Gastric Bypass   What year was your surgery? 2016   How has your weight changed since your last visit? I  have stayed about the same   Do you currently have any of the following Heartburn, acid reflux, or GERD (acid reflux disease)?   Do you have any concerns today? Why the swelling in the ankles/lower leg     Pt last seen 10/26 - started on carafate and protonix for marginal ulcer.    Seen by BRP 11/9 for marginal ulcer and possble jaun.  Gallstone has been longstanding.  BRP does not feel jaun will help symptoms.  Woulld like pt to continue carafate and protonix and have EGD, then to follow up with BRP if needed in Jan   Note mentions pt having upper endoscopy in early Jan.    Notices when she picks up something heavy she has some  epigastric pain.      1/4/2024 Saw Dr. Reyna in follow-up for marginal ulceration several years postop from prior Rasheeda-en-Y bypass.  Does also have a prior history of perforated marginal ulcer that was surgically repaired.  Since the time of her last visit she states that she is feeling well.  Still does intermittently have some abdominal pains that she relates to stress.  She otherwise is doing well and has started losing weight again.  She reports follow-up endoscopy was performed yesterday.  Report was not available for review but the patient states that the gastroenterologist told her that things look good.      Weight History:      1/20/2024     4:50 PM   --   What is your highest lifetime weight? 330   What is your lowest weight since surgery? (In pounds) 119     Initial Weight (lbs): 307.7 lbs  Weight: 152 lb 12.8 oz (69.3 kg)  Last Visits Weight: 171 lb 4.8 oz (77.7 kg)  Cumulative weight loss (lbs): 154.9  Weight Loss Percentage: 50.34%    VITAMINS AND MINERALS:  2 Flinstones Complete multivitamin  2000 International Units of Vitamin D daily  600 mg of Calcium citrate BID  2500 mcg of Vitamin B12 sl 3 times weekly  No thiamine   Sees hematologist and gets iron infusions when necessary. No oral iron        1/20/2024     4:50 PM   Questions Regarding Co-Morbidities and Health  Concerns Reviewed With Patient   Pre-diabetes Gone away   Diabetes II Never   High Blood Pressure Never   High cholesterol Improved   Heartburn/Reflux Worsened   Sleep apnea Never   PCOS Never   Back pain Never   Joint pain Never   Lower leg swelling Worsened           1/20/2024     4:50 PM   Eating Habits   How many meals do you eat per day? 3   Do you snack between meals? Sometimes   How much food are you eating at each meal? 1/2 cup to 1 cup   Are you able to separate your meals and liquids by at least 30 minutes? Yes   Are you able to avoid liquid calories? Yes           1/20/2024     4:50 PM   Exercise Questions Reviewed With Patient   How often do you exercise? 3 to 4 times per week   What is the duration of your exercise (in minutes)? 30 Minutes   What types of exercise do you do? walking    gym membership    climbing stairs at work    other   What keeps you from being more active? Worried people will look at me       Social History:      1/20/2024     4:50 PM   --   Are you smoking? No   Are you drinking alcohol? No       Medications:  Current Outpatient Medications   Medication    allopurinol (ZYLOPRIM) 300 MG tablet    busPIRone (BUSPAR) 5 MG tablet    calcium-vitamin D-vitamin K (VIACTIV) 500-500-40 MG-UNT-MCG CHEW    Cyanocobalamin (B-12) 2500 MCG SUBL    docusate sodium (COLACE) 100 MG capsule    FLUoxetine (PROZAC) 20 MG capsule    FLUoxetine (PROZAC) 40 MG capsule    hydrOXYzine (ATARAX) 50 MG tablet    multivitamin  peds with iron (FLINTSTONES COMPLETE) 60 MG chewable tablet    pantoprazole (PROTONIX) 40 MG EC tablet    potassium citrate (UROCIT-K) 10 MEQ (1080 MG) CR tablet    sucralfate (CARAFATE) 1 GM/10ML suspension    thiamine (B-1) 100 MG tablet     No current facility-administered medications for this visit.         1/20/2024     4:50 PM   --   Do you avoid NSAIDs such as (Ibuprofen, Aleve, Naproxen, Advil)? Yes       ROS:  GI:       1/20/2024     4:50 PM   --   Vomiting Yes   Diarrhea No  "  Constipation No   Swallowing trouble No   Abdominal pain Yes   Heartburn No     Skin:       1/20/2024     4:50 PM   BAR RBS ROS - SKIN   Rash in skin folds No     Psych:       1/20/2024     4:50 PM   --   Depression Yes   Anxiety Yes     Female Only:       1/20/2024     4:50 PM   BAR RBS ROS -    Female only Post-menopausal   Stress urinary incontinence No       LABS/IMAGING/MEDICAL RECORDS REVIEW:   Reviewed      PHYSICAL EXAMINATION:  BP (!) 84/62 (BP Location: Right arm, Patient Position: Sitting, Cuff Size: Adult Regular)   Pulse 69   Ht 5' 2.5\" (1.588 m)   Wt 152 lb 12.8 oz (69.3 kg)   LMP 09/22/2019   SpO2 98%   BMI 27.50 kg/m    GENERAL: Healthy, alert and no distress  RESP: No audible wheeze, cough, or visible cyanosis.  No visible retractions or increased work of breathing.    SKIN: Visible skin clear. No significant rash, abnormal pigmentation or lesions.  PSYCH: Mentation appears normal, affect normal/bright, judgement and insight intact    COUNSELING PROVIDED:  We reviewed the important post op bariatric recommendations:  eating 3 meals daily  eating protein first, getting >60gm protein daily  eating slowly, chewing food well  avoiding/limiting calorie containing beverages  avoiding fluids 30 minutes before, during, and after meals  limiting restaurant or cafeteria eating to twice a week or less  Pt reminded to avoid marginal ulcers she should avoid tobacco at all, alcohol in excess, caffeine, and NSAIDS (unless indicated for cardioprotection or otherwise and opposed by a PPI).  Pt encouraged to establish and maintain a consistent physical activity routine, 6-8 hours of restorative sleep each night and optimal stress management.  Pt counseled on the importance of life long vitamin supplementation and life long follow up.      "

## 2024-01-25 ENCOUNTER — OFFICE VISIT (OUTPATIENT)
Dept: SURGERY | Facility: CLINIC | Age: 54
End: 2024-01-25
Payer: COMMERCIAL

## 2024-01-25 VITALS
HEIGHT: 63 IN | SYSTOLIC BLOOD PRESSURE: 84 MMHG | HEART RATE: 69 BPM | OXYGEN SATURATION: 98 % | WEIGHT: 152.8 LBS | BODY MASS INDEX: 27.07 KG/M2 | DIASTOLIC BLOOD PRESSURE: 62 MMHG

## 2024-01-25 DIAGNOSIS — K91.2 POSTSURGICAL MALABSORPTION: Primary | ICD-10-CM

## 2024-01-25 DIAGNOSIS — K28.9 MARGINAL ULCER: ICD-10-CM

## 2024-01-25 DIAGNOSIS — Z98.84 BARIATRIC SURGERY STATUS: ICD-10-CM

## 2024-01-25 PROCEDURE — 99214 OFFICE O/P EST MOD 30 MIN: CPT | Performed by: PHYSICIAN ASSISTANT

## 2024-01-25 RX ORDER — PANTOPRAZOLE SODIUM 40 MG/1
40 TABLET, DELAYED RELEASE ORAL 2 TIMES DAILY
Qty: 180 TABLET | Refills: 3 | Status: SHIPPED | OUTPATIENT
Start: 2024-01-25

## 2024-01-25 RX ORDER — SUCRALFATE ORAL 1 G/10ML
1 SUSPENSION ORAL 4 TIMES DAILY PRN
Qty: 800 ML | Refills: 0 | Status: SHIPPED | OUTPATIENT
Start: 2024-01-25

## 2024-01-25 RX ORDER — LANOLIN ALCOHOL/MO/W.PET/CERES
100 CREAM (GRAM) TOPICAL WEEKLY
Qty: 12 TABLET | Refills: 3 | Status: SHIPPED | OUTPATIENT
Start: 2024-01-25

## 2024-01-25 NOTE — PATIENT INSTRUCTIONS
"To ensure the quality you may receive a patient satisfaction survey. The greatest compliment you can give is \"Likely to Recommend\"    Nice to talk with you today. Thank you for allowing me the privilege of caring for you. Below is the plan discussed.-  . Cris Paez PA-C    Plan:  Labs ordered. Call [phone] to schedule.  Continue your bariatric vitamins: Add thiamine 100 mg weekly    Continue Carafate twice daily for 2 months.  Continue Pantoprazole 40 mg twice daily  I sent Carafate liquid to your pharmacy.  You can use this 10 ml 4x a day as needed if epigastric pain returns once off daily dose.     FOLLOW-UP:  Call [phone] to schedule next visit in Oct 2024 of annual visit.    Bariatric Post Op Guidelines  General:    To avoid marginal ulcers avoid all forms of tobacco, alcohol in excess, caffeine, and NSAIDS     Exercise is key for weight loss and weight maintenance. Aim for 30-60 minutes of physical activity most days.  Include cardiovascular and strength training.    Continue lifelong vitamins supplementation and annual lab follow up.  All  patients should supplement with the following bariatric postoperative vitamins:  2 Complete multivitamins with minerals (at different times than calcium)  Vitamin D 5000 Int Units/125 mg daily   Calcium 600 mg twice daily or 500 mg three times daily   Vitamin B12: 500 mcg sl daily or 1000 mcg Inj monthly  B complex daily or Thiamine 100 mg weekly  1 Iron/Vit C. Daily for females who menstruate and/or as directed    The bariatric team should be aware and evaluate all GI symptoms which can be a sign of complications. Inability to tolerate textured solid food (chicken, steak, fish) may need to be evaluated by endoscopy.    There is a 10% increase of Alcohol Use Disorder in patients with bariatric surgery.   Most often occurring around 2 years post op.  Call if you feel alcohol is interfering in your daily life.  We can help.     Follow up annually lifelong. " Obesity is a chronic disease.  Weight gain can be expected. The goal of follow-up visits is to ensure adequate vitamin and protein absorption, evaluate food intake behavior, review exercise/activity level, and assist with weight regain.    Nutritional:  Eat 3 meals per day  (No snacks between meals.)  Do not skip meals.  This can cause overeating at the next meal and will prevent adequate protein and nutritional intake.    Aim for 60-80 grams of protein per day.  Always eat your protein first. This assists with optimal nutrition and helps you stay full longer.    Eat your protein first, and then follow with fiber.    Add fiber by including fruits, vegetables, whole grains, and beans.     Portions should be about 1 cup per meal. Use measuring cups to be accurate.  Continue to use saucer/salad plates, infant/toddler silverware to keep portion sizes small and take small bites.    Eat S-L-O-W-L-Y to make each meal last 20-30 minutes. Always stop eating when satisfied.    Aim for 64 oz. of calorie-free fluids daily.    Avoid drinking 30 min before, during, and 30 min after meal    Avoid high sugar and high fat foods to prevent high calorie intake. This will reduce your rate of weight loss and can cause weight regain.   Check nutrition labels for less than 10 grams of sugar and less than 10 grams of fat per serving.

## 2024-01-25 NOTE — ASSESSMENT & PLAN NOTE
Recent EGD shows healed ulcer.  Continue Carafate twice daily for 2 months.  Continue Pantoprazole 40 mg twice daily  I sent Carafate liquid to your pharmacy.  You can use this 10 ml 4x a day as needed if epigastric pain returns once off daily dose.

## 2024-01-30 ENCOUNTER — LAB (OUTPATIENT)
Dept: LAB | Facility: CLINIC | Age: 54
End: 2024-01-30
Payer: COMMERCIAL

## 2024-01-30 DIAGNOSIS — Z98.84 HISTORY OF ROUX-EN-Y GASTRIC BYPASS: Primary | ICD-10-CM

## 2024-01-30 DIAGNOSIS — Z98.84 BARIATRIC SURGERY STATUS: ICD-10-CM

## 2024-01-30 DIAGNOSIS — E55.9 VITAMIN D DEFICIENCY: ICD-10-CM

## 2024-01-30 LAB — FOLATE SERPL-MCNC: 7.7 NG/ML (ref 4.6–34.8)

## 2024-01-30 PROCEDURE — 82746 ASSAY OF FOLIC ACID SERUM: CPT

## 2024-01-30 PROCEDURE — 36415 COLL VENOUS BLD VENIPUNCTURE: CPT

## 2024-01-30 PROCEDURE — 82306 VITAMIN D 25 HYDROXY: CPT

## 2024-01-30 PROCEDURE — 82607 VITAMIN B-12: CPT

## 2024-01-30 PROCEDURE — 84425 ASSAY OF VITAMIN B-1: CPT | Mod: 90

## 2024-01-30 PROCEDURE — 99000 SPECIMEN HANDLING OFFICE-LAB: CPT

## 2024-01-31 LAB
VIT B12 SERPL-MCNC: 283 PG/ML (ref 232–1245)
VIT D+METAB SERPL-MCNC: 10 NG/ML (ref 20–50)

## 2024-02-01 RX ORDER — CHOLECALCIFEROL (VITAMIN D3) 1250 MCG
1250 CAPSULE ORAL
Qty: 12 CAPSULE | Refills: 0 | Status: SHIPPED | OUTPATIENT
Start: 2024-02-01 | End: 2024-04-19

## 2024-02-03 LAB — VIT B1 PYROPHOSHATE BLD-SCNC: 183 NMOL/L

## 2024-05-20 ENCOUNTER — MYC MEDICAL ADVICE (OUTPATIENT)
Dept: FAMILY MEDICINE | Facility: CLINIC | Age: 54
End: 2024-05-20
Payer: COMMERCIAL

## 2024-05-21 ENCOUNTER — NURSE TRIAGE (OUTPATIENT)
Dept: FAMILY MEDICINE | Facility: CLINIC | Age: 54
End: 2024-05-21

## 2024-05-21 ENCOUNTER — OFFICE VISIT (OUTPATIENT)
Dept: FAMILY MEDICINE | Facility: CLINIC | Age: 54
End: 2024-05-21
Payer: COMMERCIAL

## 2024-05-21 VITALS
DIASTOLIC BLOOD PRESSURE: 83 MMHG | HEIGHT: 62 IN | TEMPERATURE: 97.2 F | RESPIRATION RATE: 16 BRPM | SYSTOLIC BLOOD PRESSURE: 117 MMHG | HEART RATE: 68 BPM | OXYGEN SATURATION: 99 % | BODY MASS INDEX: 27.46 KG/M2 | WEIGHT: 149.2 LBS

## 2024-05-21 DIAGNOSIS — R30.0 DYSURIA: ICD-10-CM

## 2024-05-21 DIAGNOSIS — R31.9 URINARY TRACT INFECTION WITH HEMATURIA, SITE UNSPECIFIED: Primary | ICD-10-CM

## 2024-05-21 DIAGNOSIS — N89.8 VAGINAL DISCHARGE: ICD-10-CM

## 2024-05-21 DIAGNOSIS — Z12.11 SCREEN FOR COLON CANCER: ICD-10-CM

## 2024-05-21 DIAGNOSIS — N39.0 URINARY TRACT INFECTION WITH HEMATURIA, SITE UNSPECIFIED: Primary | ICD-10-CM

## 2024-05-21 DIAGNOSIS — E79.0 HYPERURICEMIA: ICD-10-CM

## 2024-05-21 PROBLEM — F51.01 PRIMARY INSOMNIA: Status: ACTIVE | Noted: 2018-05-22

## 2024-05-21 LAB
ALBUMIN UR-MCNC: NEGATIVE MG/DL
APPEARANCE UR: CLEAR
BACTERIA #/AREA URNS HPF: ABNORMAL /HPF
BILIRUB UR QL STRIP: NEGATIVE
CAOX CRY #/AREA URNS HPF: ABNORMAL /HPF
CLUE CELLS: ABNORMAL
COLOR UR AUTO: YELLOW
GLUCOSE UR STRIP-MCNC: NEGATIVE MG/DL
HGB UR QL STRIP: ABNORMAL
KETONES UR STRIP-MCNC: NEGATIVE MG/DL
LEUKOCYTE ESTERASE UR QL STRIP: ABNORMAL
NITRATE UR QL: POSITIVE
PH UR STRIP: 5.5 [PH] (ref 5–7)
RBC #/AREA URNS AUTO: ABNORMAL /HPF
SP GR UR STRIP: >=1.03 (ref 1–1.03)
SQUAMOUS #/AREA URNS AUTO: ABNORMAL /LPF
TRICHOMONAS, WET PREP: ABNORMAL
UROBILINOGEN UR STRIP-ACNC: 0.2 E.U./DL
WBC #/AREA URNS AUTO: ABNORMAL /HPF
WBC'S/HIGH POWER FIELD, WET PREP: ABNORMAL
YEAST, WET PREP: ABNORMAL

## 2024-05-21 PROCEDURE — 87088 URINE BACTERIA CULTURE: CPT | Mod: 59 | Performed by: INTERNAL MEDICINE

## 2024-05-21 PROCEDURE — 99214 OFFICE O/P EST MOD 30 MIN: CPT | Performed by: INTERNAL MEDICINE

## 2024-05-21 PROCEDURE — 87210 SMEAR WET MOUNT SALINE/INK: CPT | Performed by: INTERNAL MEDICINE

## 2024-05-21 PROCEDURE — 87186 SC STD MICRODIL/AGAR DIL: CPT | Performed by: INTERNAL MEDICINE

## 2024-05-21 PROCEDURE — 80048 BASIC METABOLIC PNL TOTAL CA: CPT | Performed by: INTERNAL MEDICINE

## 2024-05-21 PROCEDURE — 36415 COLL VENOUS BLD VENIPUNCTURE: CPT | Performed by: INTERNAL MEDICINE

## 2024-05-21 PROCEDURE — 87086 URINE CULTURE/COLONY COUNT: CPT | Performed by: INTERNAL MEDICINE

## 2024-05-21 PROCEDURE — 84550 ASSAY OF BLOOD/URIC ACID: CPT | Performed by: INTERNAL MEDICINE

## 2024-05-21 PROCEDURE — 81001 URINALYSIS AUTO W/SCOPE: CPT | Performed by: INTERNAL MEDICINE

## 2024-05-21 RX ORDER — ALLOPURINOL 300 MG/1
1 TABLET ORAL DAILY
Qty: 90 TABLET | Refills: 1 | Status: CANCELLED | OUTPATIENT
Start: 2024-05-21

## 2024-05-21 RX ORDER — CLOTRIMAZOLE 1 %
1 CREAM WITH APPLICATOR VAGINAL AT BEDTIME
Qty: 40 G | Refills: 0 | Status: SHIPPED | OUTPATIENT
Start: 2024-05-21

## 2024-05-21 RX ORDER — SULFAMETHOXAZOLE/TRIMETHOPRIM 800-160 MG
1 TABLET ORAL 2 TIMES DAILY
Qty: 20 TABLET | Refills: 0 | Status: SHIPPED | OUTPATIENT
Start: 2024-05-21

## 2024-05-21 RX ORDER — CLOTRIMAZOLE 1 %
1 CREAM WITH APPLICATOR VAGINAL AT BEDTIME
Qty: 40 G | Refills: 0 | Status: SHIPPED | OUTPATIENT
Start: 2024-05-21 | End: 2024-05-21

## 2024-05-21 ASSESSMENT — PATIENT HEALTH QUESTIONNAIRE - PHQ9
SUM OF ALL RESPONSES TO PHQ QUESTIONS 1-9: 11
SUM OF ALL RESPONSES TO PHQ QUESTIONS 1-9: 11
10. IF YOU CHECKED OFF ANY PROBLEMS, HOW DIFFICULT HAVE THESE PROBLEMS MADE IT FOR YOU TO DO YOUR WORK, TAKE CARE OF THINGS AT HOME, OR GET ALONG WITH OTHER PEOPLE: SOMEWHAT DIFFICULT

## 2024-05-21 ASSESSMENT — PAIN SCALES - GENERAL: PAINLEVEL: MILD PAIN (3)

## 2024-05-21 NOTE — TELEPHONE ENCOUNTER
Onset: discomfort on 05/18/24, on 05/19/24 itching and burning began  Bleeding, pain with urination, irritation and swelling  Fever: chills but unable to take temp  Severity: with urination, 10/10; when not urinating 4/10  Frequency: 6 times today  Pattern: with each urination episode  Past UTI: once before  Cause: started with yeast infection  Other sx: bleeding with monistat application  Denies blood in urine, flank pain, genital sores, urgency, vaginal discharge     Disposition  Go to [facility]. Writer advised patient that soonest appt available today is at 1pm, writer advised that UC would be quickest option to be treated, patient expressed verbal understanding but declines UC and requests OV.    Writer offered 1pm appt, patient declined appt as she has a prior engagement. Writer offered later appt, patient accepted, appt made:  5/21/2024 3:30 PM (Arrive by 3:10 PM) Asmita Moon MD [facility]     Maddy Wells RN  [facility]      Reason for Disposition   SEVERE pain with urination    Additional Information   Negative: Shock suspected (e.g., cold/pale/clammy skin, too weak to stand, low BP, rapid pulse)   Negative: Sounds like a life-threatening emergency to the triager   Negative: Taking antibiotic for urinary tract infection (UTI)   Negative: Unable to urinate (or only a few drops) and bladder feels very full   Negative: Vomiting   Negative: Patient sounds very sick or weak to the triager    Protocols used: Urination Pain - Female-A-OH

## 2024-05-21 NOTE — PROGRESS NOTES
Assessment and Plan  1. Urinary tract infection with hematuria, site unspecified  New diagnosis on the recent symptoms which patient has been stating as mentioned below on the urinalysis done.  Reviewed the past urinalysis as well as urine culture results in 2020 which was showing Klebsiella UTI positive most sensitive to Bactrim antibiotic.  Shared decision to start off on Bactrim as discussed with the patient.  Will await urine culture results depending on further sensitivities if any updates.  Patient understood and agreed the plan.  - sulfamethoxazole-trimethoprim (BACTRIM DS) 800-160 MG tablet; Take 1 tablet by mouth 2 times daily  Dispense: 20 tablet; Refill: 0    2. Dysuria  - UA with Microscopic reflex to Culture - lab collect; Future  - UA with Microscopic reflex to Culture - lab collect  - Urine Microscopic Exam  - Urine Culture    3. Vaginal discharge  Ongoing problem, patient endorses that she had sexual intercourse with her partner and after that she started having severe burning sensation on the vaginal introitus, already started applying Monistat over-the-counter as she endorses.  -Wet prep done in the office today is positive for 3+ WBC but no fungal hyphae most likely could be due to the medication she already started off as mentioned above.  -Will go ahead and give a course of clotrimazole for improvement of this.  Patient notified.  - Wet prep - lab collect; Future  - Wet prep - lab collect  - clotrimazole (LOTRIMIN) 1 % vaginal cream; Place 1 Applicatorful vaginally at bedtime  Dispense: 40 g; Refill: 0    4. Hyperuricemia  Chronic problem, patient requesting for refill of her allopurinol 300 mg daily which she is currently on for history of renal stones as she endorses.  Checked her last uric acid levels was at 2 in 2022.  Much lower than the lower limit of the uric acid range.  Shared decision to recheck her uric acid levels before need of adjusting the allopurinol to possibly titrate down the  dose to 100 mg daily.  Will defer this to her PCP Napoleon who is managing it.  Patient understood and agreed the plan.  - Uric acid; Future  - Basic metabolic panel  (Ca, Cl, CO2, Creat, Gluc, K, Na, BUN); Future  - Uric acid  - Basic metabolic panel  (Ca, Cl, CO2, Creat, Gluc, K, Na, BUN)    5. Screen for colon cancer  - Fecal colorectal cancer screen FIT - Future (S+30); Future  - Fecal colorectal cancer screen FIT - Future (S+30)         Please note that this note consists of symbols derived from keyboarding, dictation and/or voice recognition software. As a result, there may be errors in the script that have gone undetected. Please consider this when interpreting information found in this chart.    Patient Instructions   As discussed , will check your Urine and vaginal swab   Will await for results before further recommendations.     ==========================================          Return in about 10 days (around 5/31/2024), or if symptoms worsen or fail to improve, for If symptoms persist, Follow up of last visit.    Asmita Moon MD  [facility] is a 53 year old, presenting for the following health issues:  Urinary Problem (yeast infection UTI)        5/21/2024     3:21 PM   Additional Questions   Roomed by Mariana CHAVEZ     History of Present Illness       Reason for visit:  Yeast infection UTI    She eats 2-3 servings of fruits and vegetables daily.She consumes 0 sweetened beverage(s) daily.She exercises with enough effort to increase her heart rate 30 to 60 minutes per day.  She exercises with enough effort to increase her heart rate 5 days per week.   She is taking medications regularly.       Genitourinary - Female  Onset/Duration: 5/17/24  Description:   Painful urination (Dysuria): YES           Frequency: YES -   Blood in urine (Hematuria): YES - very little can see when wiping   Delay in urine (Hesitency): YES  Intensity: When urinating a 10  - when  sitting it is about a 3.   Progression of Symptoms:  worsening  Accompanying Signs & Symptoms:  Fever/chills: YES - Chills on and off all day today   Nausea and vomiting: No  Vaginal symptoms: itching and blood  Abdominal/Pelvic Pain: No  History:   History of frequent UTI s: No  History of kidney stones: YES - Kidney stones and gallstones   Sexually Active: YES  Possibility of pregnancy: No  Precipitating or alleviating factors:   Therapies tried and outcome: OTC tylenol  and  Tylenol and monistat   used monistat this morning       Patient is new to me, follows PCP Napoleon DYER.  She is here with acute concerns of UTI and vaginal symptoms.       Allergies   Allergen Reactions    Fish Oil     Hydrocodone-Acetaminophen Nausea     Also light headed and flushed  Other reaction(s): Headache  Also light headed and flushed        Past Medical History:   Diagnosis Date    Depressive disorder     Gout 7/22/2021    morbid obesity     Obese     Renal disease     stone    Ulcer of gastric fundus     Urinary frequency        Past Surgical History:   Procedure Laterality Date    CYSTOSCOPY      ESOPHAGOSCOPY, GASTROSCOPY, DUODENOSCOPY (EGD), COMBINED N/A 10/24/2023    Procedure: Esophagoscopy, gastroscopy, duodenoscopy (EGD), combined;  Surgeon: Ramakrishna Escalante MD;  Location:  GI    EXTRACORPOREAL SHOCK WAVE LITHOTRIPSY (ESWL) Left 2/7/2019    Procedure: Left renal lithotripsy (1250 shocks).;  Surgeon: Regis Parada MD;  Location:  OR    LAPAROSCOPIC BYPASS GASTRIC N/A 10/26/2016    Procedure: LAPAROSCOPIC BYPASS GASTRIC;  Surgeon: Romario Reyna MD;  Location:  OR    LAPAROSCOPY DIAGNOSTIC (GENERAL) N/A 10/24/2018    Procedure: LAPAROSCOPY DIAGNOSTIC FOR PERFORATED GASTRIC ULCER;  Surgeon: Chang Corea MD;  Location:  OR    NO HISTORY OF SURGERY         Family History   Problem Relation Age of Onset    Hypertension Mother     Breast Cancer Mother         64 dx    Allergies Mother     Obesity  Mother     Respiratory Mother         Asthma    Heart Disease Mother     Chronic Obstructive Pulmonary Disease Mother     Hypertension Maternal Grandmother     Cancer Maternal Grandmother         Ovarian    Hypertension Brother     Cancer Sister         Cervical    Leukemia Nephew         dx at 23, CML    Brain Cancer Cousin         maternal    Cancer Maternal Aunt         ?Gastric    Cancer Other         Mat. great aunt-ovarian       Social History     Tobacco Use    Smoking status: Never     Passive exposure: Never    Smokeless tobacco: Never   Substance Use Topics    Alcohol use: No     Alcohol/week: 0.0 standard drinks of alcohol        Current Outpatient Medications   Medication Sig Dispense Refill    allopurinol (ZYLOPRIM) 300 MG tablet Take 1 tablet (300 mg) by mouth daily 90 tablet 1    busPIRone (BUSPAR) 5 MG tablet Take 1 tablet (5 mg) by mouth 3 times daily 180 tablet 1    clotrimazole (LOTRIMIN) 1 % vaginal cream Place 1 Applicatorful vaginally at bedtime For 5 days 40 g 0    Cyanocobalamin (B-12) 2500 MCG SUBL Place 1 tablet under the tongue Every Mon, Wed, Fri Morning 3 times weekly      docusate sodium (COLACE) 100 MG capsule Take 300 mg by mouth every morning      FLUoxetine (PROZAC) 20 MG capsule Take 1 capsule (20 mg) by mouth daily TAKE 1 CAPSULE BY MOUTH ONCE DAILY. TAKE WITH 40MG CAPSULE TO EQUAL 60MG PER DAY Strength: 20 mg 90 capsule 2    FLUoxetine (PROZAC) 40 MG capsule Take 1 capsule (40 mg) by mouth daily TAKE 1 CAPSULE BY MOUTH ONCE DAILY. TAKE WITH 20 MG CAPSULE TO EQUAL 60 MG DAILY Strength: 40 mg 90 capsule 2    hydrOXYzine (ATARAX) 50 MG tablet Take 1 tablet (50 mg) by mouth every 12 hours as needed for itching 180 tablet 1    multivitamin  peds with iron (FLINTSTONES COMPLETE) 60 MG chewable tablet Take 2 chew tab by mouth every morning       pantoprazole (PROTONIX) 40 MG EC tablet Take 1 tablet (40 mg) by mouth 2 times daily 180 tablet 3    potassium citrate (UROCIT-K) 10 MEQ (1080  "MG) CR tablet Take 2 tablets by mouth 2 times daily      sulfamethoxazole-trimethoprim (BACTRIM DS) 800-160 MG tablet Take 1 tablet by mouth 2 times daily 20 tablet 0    thiamine (B-1) 100 MG tablet Take 1 tablet (100 mg) by mouth once a week 12 tablet 3    calcium-vitamin D-vitamin K (VIACTIV) 500-500-40 MG-UNT-MCG CHEW Take 3 tablets by mouth At Bedtime  (Patient not taking: Reported on 5/21/2024)      sucralfate (CARAFATE) 1 GM/10ML suspension Take 10 mLs (1 g) by mouth 4 times daily as needed (epigstric pain) (Patient not taking: Reported on 5/21/2024) 800 mL 0     No current facility-administered medications for this visit.          Review of Systems  Constitutional, HEENT, cardiovascular, pulmonary, GI, , musculoskeletal, neuro, skin, endocrine and psych systems are negative, except as otherwise noted.      Objective    /83 (BP Location: Right arm, Patient Position: Sitting, Cuff Size: Adult Regular)   Pulse 68   Temp 97.2  F (36.2  C) (Temporal)   Resp 16   Ht 1.575 m (5' 2\")   Wt 67.7 kg (149 lb 3.2 oz)   LMP 09/22/2019   SpO2 99%   BMI 27.29 kg/m    Body mass index is 27.29 kg/m .  Physical Exam   GENERAL: alert and no distress  NECK: no adenopathy, no asymmetry, masses, or scars  RESP: lungs clear to auscultation - no rales, rhonchi or wheezes  CV: regular rate and rhythm, normal S1 S2, no S3 or S4, no murmur, click or rub, no peripheral edema  ABDOMEN: soft, nontender, no hepatosplenomegaly, no masses and bowel sounds normal. No CVAT.   MS: no gross musculoskeletal defects noted, no edema    Signed Electronically by: Asmita Moon MD    "

## 2024-05-21 NOTE — RESULT ENCOUNTER NOTE
Your Urine exam is positive for UTI. I have sent in antibiotic to your pharmacy. Please start it off ASAP. I will update on your culture results which takes few days.     Your vaginal swab is showing negative for any wax as I already discussed with you that you are on Monistat and it would have caused the fungal hyphae to disappear but it still shows 3+ WBC which is significant finding for possible yeast underlying causing this.  I went ahead and sent in vaginal applicator cream which will give you much relief on the burning sensation you are having.    Please let me know if you have any questions.    Thank you,  Asmita Moon MD on 5/21/2024 at 5:33 PM

## 2024-05-21 NOTE — PATIENT INSTRUCTIONS
As discussed , will check your Urine and vaginal swab   Will await for results before further recommendations.     ==========================================

## 2024-05-22 LAB
ANION GAP SERPL CALCULATED.3IONS-SCNC: 10 MMOL/L (ref 7–15)
BUN SERPL-MCNC: 16.8 MG/DL (ref 6–20)
CALCIUM SERPL-MCNC: 8.7 MG/DL (ref 8.6–10)
CHLORIDE SERPL-SCNC: 104 MMOL/L (ref 98–107)
CREAT SERPL-MCNC: 0.51 MG/DL (ref 0.51–0.95)
DEPRECATED HCO3 PLAS-SCNC: 24 MMOL/L (ref 22–29)
EGFRCR SERPLBLD CKD-EPI 2021: >90 ML/MIN/1.73M2
GLUCOSE SERPL-MCNC: 85 MG/DL (ref 70–99)
POTASSIUM SERPL-SCNC: 3.9 MMOL/L (ref 3.4–5.3)
SODIUM SERPL-SCNC: 138 MMOL/L (ref 135–145)
URATE SERPL-MCNC: 2.5 MG/DL (ref 2.4–5.7)

## 2024-05-24 LAB
BACTERIA UR CULT: ABNORMAL
BACTERIA UR CULT: ABNORMAL

## 2024-05-25 NOTE — RESULT ENCOUNTER NOTE
Good news! Your Urine culture is sensitive to the antibiotic started by us. Please complete the course of bactrim given.  Let me know if you have any questions.  Asmita Moon MD on 5/25/2024 at 4:10 PM

## 2024-05-29 ENCOUNTER — TELEPHONE (OUTPATIENT)
Dept: SURGERY | Facility: CLINIC | Age: 54
End: 2024-05-29
Payer: COMMERCIAL

## 2024-09-10 NOTE — MR AVS SNAPSHOT
MRN:[id number]                      After Visit Summary   10/5/2018    Criss Don    MRN: [id number]           Visit Information        Provider Department      10/5/2018 7:00 AM [facility]yD [facility] [place] [facility] Generic      Your next 10 appointments already scheduled     Oct 26, 2018  7:00 AM CDT   Return Visit with Brenda Perez PsyD   [facility] [place] ([facility] [place])    [address] [place], [place] [address] [address]   [phone]            Oct 26, 2018 11:00 AM CDT   Return Bariatric Visit with Cris Paez PA-C   [facility] [place] ([facility])    [facility] [facility]  [address]  [place] [address]   [phone]            Oct 26, 2018 11:30 AM CDT   Return Bariatric Nutrition Visit with  Wl Diet 2, RD   [facility] ([facility])    [facility]  [address]  [place] [address]   [phone]            Nov 08, 2018  2:00 PM CST   Return Visit with Brenda Perez PsyD   [facility] [place] ([facility] [place])    [address] [place] [address] [place] [address]   [phone]            Nov 30, 2018  7:00 AM CST   Return Visit with Brenda Perez PsyD   [facility] [place] ([facility] [place])    [address] [place], [place] [address] [address]   [phone]            Dec 14, 2018  7:00 AM CST   Return Visit with Brenda Perez Song   [facility] [place] [facility]    [address] [place], [place] [address] [address]   [phone]            Feb 01, 2019  9:45 AM CST   Return Visit with  Oncology Nurse   [facility] ([facility])    [facility] [place]  [address]  [place] [address]   [phone]            Feb 07, 2019  1:00 PM CST    Problem: Pain - Adult  Goal: Verbalizes/displays adequate comfort level or baseline comfort level  Outcome: Progressing     Problem: Safety - Adult  Goal: Free from fall injury  Outcome: Progressing     Problem: Discharge Planning  Goal: Discharge to home or other facility with appropriate resources  Outcome: Progressing     Problem: Chronic Conditions and Co-morbidities  Goal: Patient's chronic conditions and co-morbidity symptoms are monitored and maintained or improved  Outcome: Progressing     Problem: Skin  Goal: Decreased wound size/increased tissue granulation at next dressing change  Outcome: Progressing  Flowsheets (Taken 9/9/2024 2006)  Decreased wound size/increased tissue granulation at next dressing change:   Promote sleep for wound healing   Protective dressings over bony prominences  Goal: Participates in plan/prevention/treatment measures  Outcome: Progressing  Flowsheets (Taken 9/9/2024 2006)  Participates in plan/prevention/treatment measures: Elevate heels  Goal: Prevent/manage excess moisture  Outcome: Progressing  Flowsheets (Taken 9/9/2024 2006)  Prevent/manage excess moisture:   Cleanse incontinence/protect with barrier cream   Moisturize dry skin   Monitor for/manage infection if present  Goal: Prevent/minimize sheer/friction injuries  Outcome: Progressing  Flowsheets (Taken 9/9/2024 2006)  Prevent/minimize sheer/friction injuries:   HOB 30 degrees or less   Turn/reposition every 2 hours/use positioning/transfer devices   Use pull sheet   Complete micro-shifts as needed if patient unable. Adjust patient position to relieve pressure points, not a full turn  Goal: Promote/optimize nutrition  Outcome: Progressing  Flowsheets (Taken 9/9/2024 2006)  Promote/optimize nutrition: Monitor/record intake including meals  Goal: Promote skin healing  Outcome: Progressing  Flowsheets (Taken 9/9/2024 2006)  Promote skin healing:   Assess skin/pad under line(s)/device(s)   Protective dressings over bony    Return Visit with Pricilla Rahman MD   [facility] ([facility])    [facility] [place] [facility]  [address]  [place] [id number] [phone]              Care EveryWhere ID     This is your Care EveryWhere ID. This could be used by other organizations to access your [facility] medical records  [id number]        Equal Access to Services     STEVEN MUNOZ : Phong Wooten, seble medina, mirna faulkner, suri yeager. So [facility] [phone].    ATENCIÓN: Si habla español, tiene a madera disposición servicios gratuitos de asistencia lingüística. Llame al [phone].    We comply with applicable federal civil rights laws and [place] laws. We do not discriminate on the basis of race, color, national origin, age, disability, sex, sexual orientation, or gender identity.             prominences   Turn/reposition every 2 hours/use positioning/transfer devices     Problem: Skin  Goal: Participates in plan/prevention/treatment measures  Outcome: Progressing  Flowsheets (Taken 9/9/2024 2006)  Participates in plan/prevention/treatment measures: Elevate heels     Problem: Skin  Goal: Participates in plan/prevention/treatment measures  Outcome: Progressing  Flowsheets (Taken 9/9/2024 2006)  Participates in plan/prevention/treatment measures: Elevate heels     Problem: Skin  Goal: Decreased wound size/increased tissue granulation at next dressing change  Outcome: Progressing  Flowsheets (Taken 9/9/2024 2006)  Decreased wound size/increased tissue granulation at next dressing change:   Promote sleep for wound healing   Protective dressings over bony prominences  Goal: Participates in plan/prevention/treatment measures  Outcome: Progressing  Flowsheets (Taken 9/9/2024 2006)  Participates in plan/prevention/treatment measures: Elevate heels  Goal: Prevent/manage excess moisture  Outcome: Progressing  Flowsheets (Taken 9/9/2024 2006)  Prevent/manage excess moisture:   Cleanse incontinence/protect with barrier cream   Moisturize dry skin   Monitor for/manage infection if present  Goal: Prevent/minimize sheer/friction injuries  Outcome: Progressing  Flowsheets (Taken 9/9/2024 2006)  Prevent/minimize sheer/friction injuries:   HOB 30 degrees or less   Turn/reposition every 2 hours/use positioning/transfer devices   Use pull sheet   Complete micro-shifts as needed if patient unable. Adjust patient position to relieve pressure points, not a full turn  Goal: Promote/optimize nutrition  Outcome: Progressing  Flowsheets (Taken 9/9/2024 2006)  Promote/optimize nutrition: Monitor/record intake including meals  Goal: Promote skin healing  Outcome: Progressing  Flowsheets (Taken 9/9/2024 2006)  Promote skin healing:   Assess skin/pad under line(s)/device(s)   Protective dressings over bony prominences    Turn/reposition every 2 hours/use positioning/transfer devices     Problem: Knowledge Deficit  Goal: Patient/family/caregiver demonstrates understanding of disease process, treatment plan, medications, and discharge instructions  Outcome: Progressing     Problem: Pain  Goal: Takes deep breaths with improved pain control throughout the shift  Outcome: Progressing  Goal: Turns in bed with improved pain control throughout the shift  Outcome: Progressing  Goal: Walks with improved pain control throughout the shift  Outcome: Progressing  Goal: Performs ADL's with improved pain control throughout shift  Outcome: Progressing  Goal: Participates in PT with improved pain control throughout the shift  Outcome: Progressing  Goal: Free from opioid side effects throughout the shift  Outcome: Progressing  Goal: Free from acute confusion related to pain meds throughout the shift  Outcome: Progressing   The patient's goals for the shift include  pain management    The clinical goals for the shift include pain managment and remain safe    Over the shift, the patient did make progress toward the following goals.

## 2024-10-19 ENCOUNTER — MYC REFILL (OUTPATIENT)
Dept: FAMILY MEDICINE | Facility: CLINIC | Age: 54
End: 2024-10-19
Payer: COMMERCIAL

## 2024-10-19 DIAGNOSIS — F41.1 GAD (GENERALIZED ANXIETY DISORDER): ICD-10-CM

## 2024-10-20 ENCOUNTER — HEALTH MAINTENANCE LETTER (OUTPATIENT)
Age: 54
End: 2024-10-20

## 2024-10-21 RX ORDER — FLUOXETINE 40 MG/1
40 CAPSULE ORAL DAILY
Qty: 90 CAPSULE | Refills: 2 | Status: SHIPPED | OUTPATIENT
Start: 2024-10-21

## 2024-10-24 NOTE — PROGRESS NOTES
Return Bariatric Surgery Note    RE: Criss Durant  MR#: [id number]  : 1970  VISIT DATE: 10/31/2024    Dear Napoleon Gray,    I had the pleasure of seeing your patient, Criss Durant, in my post-bariatric surgery assessment clinic.    Assessment & Plan   Problem List Items Addressed This Visit       Bariatric surgery status - Primary     Doing well no current [id number]/ RYGBS BRP.           Relevant Medications    pantoprazole (PROTONIX) 40 MG EC tablet    Other Relevant Orders    CBC with platelets (Completed)    Vitamin B12    Vitamin D Screen    Parathyroid Hormone Intact (Completed)    Iron and Iron Binding Capacity (Completed)    Ferritin    Adult Mental Health  Referral    Postsurgical malabsorption    Relevant Medications    thiamine (B-1) 100 MG tablet    Other Relevant Orders    CBC with platelets (Completed)    Vitamin B12    Vitamin D Screen    Parathyroid Hormone Intact (Completed)    Iron and Iron Binding Capacity (Completed)    Ferritin    Body dysmorphic disorder     Referral to see Dr. Short and also dietitian         Relevant Orders    Adult Mental Health  Referral    History of gastrointestinal ulcer      Currently asymptomatic. continue pantoprazole 40 mg BID.  Use Carafate as needed.         Relevant Medications    sucralfate (CARAFATE) 1 GM/10ML suspension        PATIENT INSTRUCTIONS:  Labs ordered. Call [phone] to schedule.  Continue your bariatric vitamins restart thiamine-  I have put a referral in for mental health with Dr.'s Short please call, please call [phone] to schedule.  Carafate was ordered.  Use as needed for epigastric pain.  Let us know if this worsens and/or does not go away    FOLLOW-UP:  Call [phone] to schedule next visit annually.     25 minutes spent on the date of the encounter doing chart review, history and exam, result review, counseling, developing plan of care, documentation, and further activities as  noted        CHIEF COMPLAINT: Post-bariatric surgery follow-up    HISTORY OF PRESENT ILLNESS:      10/26/2024     4:24 PM   Questions Regarding Prior Weight Loss Surgery Reviewed With Patient   I had the following weight loss procedure Rasheeda-en-y Gastric Bypass   What year was your surgery? 2016   How has your weight changed since your last visit? I have gained weight   Do you currently have any of the following Heartburn, acid reflux, or GERD (acid reflux disease)?   Do you have any concerns today? Why do i have the pain on the upper side by ribs   Pt last seen 1/25/2024.  Was dealing with a marginal ulcer.Recent EGD shows healed ulcer.  Was to continue Carafate twice daily for 2 months. Continue Pantoprazole 40 mg twice daily  Use Carafate.  You can use this 10 ml 4x a day as needed if epigastric pain returns once off daily dose.       Still has body dysmorphia.  Has tried therapists 4 in last year and hasn't been able to connect with them.        Weight History:      10/26/2024     4:24 PM   --   What is your highest lifetime weight? 333   What is your lowest weight since surgery? (In pounds) 119     Initial Weight (lbs): 307.7 lbs  Weight: 136 lb 9.6 oz (62 kg)  Last Visits Weight: 152 lb (68.9 kg)  Cumulative weight loss (lbs): 171.1  Weight Loss Percentage: 55.61%    VITAMINS AND MINERALS:  2 Flinstones Complete multivitamin  2000 International Units of Vitamin D daily  600 mg of Calcium citrate BID  2500 mcg of Vitamin B12 sl 3 times weekly  No thiamine -   Sees hematologist and gets iron infusions when necessary. No oral iron        10/26/2024     4:24 PM   Questions Regarding Co-Morbidities and Health Concerns Reviewed With Patient   Pre-diabetes Improved   Diabetes II Never   High Blood Pressure Never   High cholesterol Improved   Heartburn/Reflux Worsened   Sleep apnea Never   PCOS Never   Back pain Never   Joint pain Never   Lower leg swelling Stayed the same           10/26/2024     4:24 PM   Eating  Habits   How many meals do you eat per day? 3   Do you snack between meals? No   How much food are you eating at each meal? 1/2 cup to 1 cup   Are you able to separate your meals and liquids by at least 30 minutes? Yes   Are you able to avoid liquid calories? Yes           10/26/2024     4:24 PM   Exercise Questions Reviewed With Patient   How often do you exercise? 1 to 2 times per week   What is the duration of your exercise (in minutes)? 30 Minutes   What types of exercise do you do? walking    gym membership    swimming    climbing stairs at work   What keeps you from being more active? Lack of Time    Worried people will look at me   Has not been at gym because of work and the amt of people at the gym.  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  Is not sitting at all during work.      Gets Dr. Reyna in her had saying you are gaining too much weight.  He had wanted her to drop weight.     Social History:      10/26/2024     4:24 PM   --   Are you smoking? No   Are you drinking alcohol? No       Medications:  Current Outpatient Medications   Medication Sig Dispense Refill    allopurinol (ZYLOPRIM) 300 MG tablet Take 1 tablet (300 mg) by mouth daily 90 tablet 1    busPIRone (BUSPAR) 5 MG tablet Take 1 tablet (5 mg) by mouth 3 times daily 180 tablet 1    calcium-vitamin D-vitamin K (VIACTIV) 500-500-40 MG-UNT-MCG CHEW Take 3 tablets by mouth at bedtime.      Cyanocobalamin (B-12) 2500 MCG SUBL Place 1 tablet under the tongue Every Mon, Wed, Fri Morning 3 times weekly      docusate sodium (COLACE) 100 MG capsule Take 300 mg by mouth every morning      FLUoxetine (PROZAC) 20 MG capsule Take 1 capsule (20 mg) by mouth daily. TAKE 1 CAPSULE BY MOUTH ONCE DAILY. TAKE WITH 40MG CAPSULE TO EQUAL 60MG PER DAY Strength: 20 mg 90 capsule 2    FLUoxetine (PROZAC) 40 MG capsule Take 1 capsule (40 mg) by mouth daily. TAKE 1 CAPSULE BY MOUTH ONCE DAILY. TAKE WITH 20 MG CAPSULE TO EQUAL 60 MG DAILY Strength: 40 mg 90 capsule 2    hydrOXYzine  "(ATARAX) 50 MG tablet Take 1 tablet (50 mg) by mouth every 12 hours as needed for itching 180 tablet 1    multivitamin  peds with iron (FLINTSTONES COMPLETE) 60 MG chewable tablet Take 2 chew tab by mouth every morning       pantoprazole (PROTONIX) 40 MG EC tablet Take 1 tablet (40 mg) by mouth 2 times daily. 180 tablet 3    potassium citrate (UROCIT-K) 10 MEQ (1080 MG) CR tablet Take 2 tablets by mouth 2 times daily      sucralfate (CARAFATE) 1 GM/10ML suspension Take 10 mLs (1 g) by mouth 4 times daily as needed (gastritis). 400 mL 2    sucralfate (CARAFATE) 1 GM/10ML suspension Take 10 mLs (1 g) by mouth 4 times daily as needed (epigstric pain) 800 mL 0    thiamine (B-1) 100 MG tablet Take 1 tablet (100 mg) by mouth once a week. 4 tablet 11    thiamine (B-1) 100 MG tablet Take 1 tablet (100 mg) by mouth once a week 12 tablet 3     No current facility-administered medications for this visit.         10/26/2024     4:24 PM   --   Do you avoid NSAIDs such as (Ibuprofen, Aleve, Naproxen, Advil)? Yes       ROS:  GI:       10/26/2024     4:24 PM   --   Vomiting Yes   Diarrhea Yes   Constipation No   Swallowing trouble No   Abdominal pain Yes   Heartburn No, if she forgets one day of PPI, she can feel pain returns.      Skin:       10/26/2024     4:24 PM   BAR RBS ROS - SKIN   Rash in skin folds No     Psych:       10/26/2024     4:24 PM   --   Depression Yes   Anxiety Yes     Female Only:       10/26/2024     4:24 PM   BAR RBS ROS -    Female only Post-menopausal   Stress urinary incontinence No       LABS/IMAGING/MEDICAL RECORDS REVIEW:   Reviewed    Previous Dexa NL.     PHYSICAL EXAMINATION:  /67   Pulse 62   Ht 5' 2.5\" (1.588 m)   Wt 136 lb 9.6 oz (62 kg)   LMP 09/22/2019   SpO2 96%   BMI 24.59 kg/m    GENERAL: Healthy, alert and no distress  RESP: No audible wheeze, cough, or visible cyanosis.  No visible retractions or increased work of breathing.    ABD: Nontender to palpation of epigastric and " lower quadrants  SKIN: Visible skin clear. No significant rash, abnormal pigmentation or lesions.  PSYCH: Mentation appears normal, affect normal/bright, judgement and insight intact    COUNSELING PROVIDED:  We reviewed the important post op bariatric recommendations:  eating 3 meals daily  eating protein first, getting >60gm protein daily  eating slowly, chewing food well  avoiding/limiting calorie containing beverages  avoiding fluids 30 minutes before, during, and after meals  limiting restaurant or cafeteria eating to twice a week or less  Pt reminded to avoid marginal ulcers she should avoid tobacco at all, alcohol in excess, caffeine, and NSAIDS (unless indicated for cardioprotection or otherwise and opposed by a PPI).  Pt encouraged to establish and maintain a consistent physical activity routine, 6-8 hours of restorative sleep each night and optimal stress management.  Pt counseled on the importance of life long vitamin supplementation and life long follow up.

## 2024-10-31 ENCOUNTER — OFFICE VISIT (OUTPATIENT)
Dept: SURGERY | Facility: CLINIC | Age: 54
End: 2024-10-31
Payer: COMMERCIAL

## 2024-10-31 ENCOUNTER — LAB (OUTPATIENT)
Dept: LAB | Facility: CLINIC | Age: 54
End: 2024-10-31
Payer: COMMERCIAL

## 2024-10-31 VITALS — BODY MASS INDEX: 24.2 KG/M2 | WEIGHT: 136.6 LBS | HEIGHT: 63 IN

## 2024-10-31 VITALS
DIASTOLIC BLOOD PRESSURE: 67 MMHG | WEIGHT: 136.6 LBS | HEIGHT: 63 IN | SYSTOLIC BLOOD PRESSURE: 101 MMHG | OXYGEN SATURATION: 96 % | HEART RATE: 62 BPM | BODY MASS INDEX: 24.2 KG/M2

## 2024-10-31 DIAGNOSIS — K91.2 POSTSURGICAL MALABSORPTION: ICD-10-CM

## 2024-10-31 DIAGNOSIS — Z98.84 BARIATRIC SURGERY STATUS: ICD-10-CM

## 2024-10-31 DIAGNOSIS — Z98.84 BARIATRIC SURGERY STATUS: Primary | ICD-10-CM

## 2024-10-31 DIAGNOSIS — F45.22 BODY DYSMORPHIC DISORDER: ICD-10-CM

## 2024-10-31 DIAGNOSIS — E55.9 VITAMIN D DEFICIENCY: ICD-10-CM

## 2024-10-31 DIAGNOSIS — Z98.84 HISTORY OF ROUX-EN-Y GASTRIC BYPASS: ICD-10-CM

## 2024-10-31 DIAGNOSIS — Z87.19 HISTORY OF GASTROINTESTINAL ULCER: ICD-10-CM

## 2024-10-31 LAB
ERYTHROCYTE [DISTWIDTH] IN BLOOD BY AUTOMATED COUNT: 12.9 % (ref 10–15)
FERRITIN SERPL-MCNC: 21 NG/ML (ref 11–328)
HCT VFR BLD AUTO: 41.9 % (ref 35–47)
HGB BLD-MCNC: 13.5 G/DL (ref 11.7–15.7)
IRON BINDING CAPACITY (ROCHE): 308 UG/DL (ref 240–430)
IRON SATN MFR SERPL: 16 % (ref 15–46)
IRON SERPL-MCNC: 49 UG/DL (ref 37–145)
MCH RBC QN AUTO: 28.4 PG (ref 26.5–33)
MCHC RBC AUTO-ENTMCNC: 32.2 G/DL (ref 31.5–36.5)
MCV RBC AUTO: 88 FL (ref 78–100)
PLATELET # BLD AUTO: 247 10E3/UL (ref 150–450)
PTH-INTACT SERPL-MCNC: 172 PG/ML (ref 15–65)
RBC # BLD AUTO: 4.75 10E6/UL (ref 3.8–5.2)
VIT B12 SERPL-MCNC: 244 PG/ML (ref 232–1245)
VIT D+METAB SERPL-MCNC: 17 NG/ML (ref 20–50)
WBC # BLD AUTO: 5.1 10E3/UL (ref 4–11)

## 2024-10-31 PROCEDURE — 97803 MED NUTRITION INDIV SUBSEQ: CPT

## 2024-10-31 PROCEDURE — 83970 ASSAY OF PARATHORMONE: CPT

## 2024-10-31 PROCEDURE — 85027 COMPLETE CBC AUTOMATED: CPT

## 2024-10-31 PROCEDURE — 82728 ASSAY OF FERRITIN: CPT

## 2024-10-31 PROCEDURE — 84590 ASSAY OF VITAMIN A: CPT

## 2024-10-31 PROCEDURE — 82607 VITAMIN B-12: CPT

## 2024-10-31 PROCEDURE — 99213 OFFICE O/P EST LOW 20 MIN: CPT | Performed by: PHYSICIAN ASSISTANT

## 2024-10-31 PROCEDURE — 83550 IRON BINDING TEST: CPT

## 2024-10-31 PROCEDURE — 82306 VITAMIN D 25 HYDROXY: CPT

## 2024-10-31 PROCEDURE — 36415 COLL VENOUS BLD VENIPUNCTURE: CPT

## 2024-10-31 PROCEDURE — G2211 COMPLEX E/M VISIT ADD ON: HCPCS | Performed by: PHYSICIAN ASSISTANT

## 2024-10-31 RX ORDER — LANOLIN ALCOHOL/MO/W.PET/CERES
100 CREAM (GRAM) TOPICAL WEEKLY
Qty: 4 TABLET | Refills: 11 | Status: SHIPPED | OUTPATIENT
Start: 2024-10-31

## 2024-10-31 RX ORDER — PANTOPRAZOLE SODIUM 40 MG/1
40 TABLET, DELAYED RELEASE ORAL 2 TIMES DAILY
Qty: 180 TABLET | Refills: 3 | Status: SHIPPED | OUTPATIENT
Start: 2024-10-31

## 2024-10-31 RX ORDER — SUCRALFATE ORAL 1 G/10ML
1 SUSPENSION ORAL 4 TIMES DAILY PRN
Qty: 400 ML | Refills: 2 | Status: SHIPPED | OUTPATIENT
Start: 2024-10-31

## 2024-10-31 NOTE — PROGRESS NOTES
"NUTRITION POST OP APPOINTMENT  DATE OF VISIT: October 31, 2024    Criss Durant  1970  female  [id number]  53 year old     ASSESSMENT:    REASON FOR VISIT:  Criss is a 53 year old year old female presents today for 8 year PO nutrition follow-up appointment. Patient is accompanied by self.    DIAGNOSIS:  Status post gastric bypass surgery.  Normal BMI     ANTHROPOMETRICS:  Initial Weight: 307.7 lbs   Height: 5' 2.5\"    Current Weight: 136 lbs 9.6 oz   BMI: Body mass index is 24.59 kg/m .    VITAMINS AND MINERALS:  2 Multivitamin w/Minerals   2000 International Units of Vitamin D   600 mg of Calcium Citrate (BID; separate from MVT)  2500 mcg of Vitamin B12 SL - 3 times weekly     **started Vitamin B1 but then stopped; recommended restarting    Sees hematologist and gets iron infusions when necessary. Has not needed in awhile.       NUTRITION HISTORY:  Breakfast: [3am, eats at 5-6am] 2 yogurts + 1/4c cereal (Cheerios)   Lunch: [12-12:30pm] yogurt or 2 SF jellos + handful of cereal   Supper: [5-7pm] Lean Cuisine  cheese quesdedilla  chicken or turkey +/- mashed potatoes   Snacks: [evenings] occasional crackers  Fluids consumed: Water (128oz or more)  Consuming liquid calories: No  Protein intake: 30-50 grams/day  Tolerate regular texture food: Yes  Any foods not tolerated details: Yes  If any food not tolerated: anything beyond her \"normal/safe foods\"   Portion size: 1 cup or more  Take 20-30 minutes to consume each meal: Yes   Eat protein foods first: Yes  Fluids and meals separate by at least 30 minutes: Yes  Chew foods thoroughly: Yes  Tolerating diet: Yes  Drinking high protein supplements: No  Consuming snacks per day: rare  Additional Information: Pt struggling to consume/tolerate foods outside of her comfort zone (refers to these as \"safe\" foods). She has worked with [facility] in the past but not for many years (2017). Pt referred to psychology today by provider; agree with this as a start as well as " potential bridge back to more formal treatment.       PHYSICAL ACTIVITY:  Type: walking (3-4x/week), kickboxing (3x/week), strength training (3x/week)  Duration (min): 30-45    DIAGNOSIS:  Previous Nutrition Diagnosis: Altered gastrointestinal function related to alteration in gastrointestinal structure as evidenced by history of gastric bypass surgery.- no change    Previous goals:  (Remote)    Current Nutrition Diagnosis: Altered gastrointestinal function related to alteration in gastrointestinal structure as evidenced by history of gastric bypass surgery.    INTERVENTION:   Nutrition Prescription: Eat 3 meals a day at regular intervals. Consume 60-90 grams of protein daily. Follow post-surgical vitamin and mineral protocol.  Assessed learning needs and learning preferences.    GOALS:  Consider bone broth between meals to increase protein intake  Restart 100mg Thiamin 1x/week    Follow-Up:   Recommend follow up visit in 3-6 months to assist with lifestyle changes or per insurance.  Implementation: Discussed progress toward previous goals; reinforced importance of following bariatric lifestyle changes.    NUTRITION MONITORING AND EVALUATION:  Anticipated compliance: fair-good  Verbalized fair-good understanding.    Follow up: Patient to follow up in 3-6 months.    TIME SPENT WITH PATIENT:  20 minutes        Marimar Smith RD, LD  Clinical Dietitian

## 2024-10-31 NOTE — PATIENT INSTRUCTIONS
Daryl Caldwell!      It was great chatting with you again today! Here's a summary of the goals we discussed:    1. Restart Vitamin B1 (Thiamin) - 100mg 1x/week    2. Increase protein  - Consider some bone broth         Plan on following up in 3-6 months. This can be scheduled via our call center at . Reach out sooner with any questions or concerns. Have a great day!      Marimar Smith RD, LD  Clinical Dietitian

## 2024-11-01 DIAGNOSIS — E21.1 HYPERPARATHYROIDISM , SECONDARY, NON-RENAL (H): ICD-10-CM

## 2024-11-01 DIAGNOSIS — E55.9 VITAMIN D DEFICIENCY: Primary | ICD-10-CM

## 2024-11-01 DIAGNOSIS — E53.8 LOW SERUM VITAMIN B12: ICD-10-CM

## 2024-11-03 LAB
ANNOTATION COMMENT IMP: NORMAL
RETINYL PALMITATE SERPL-MCNC: <0.02 MG/L
VIT A SERPL-MCNC: 0.43 MG/L

## 2024-11-19 NOTE — PHARMACY-ADMISSION MEDICATION HISTORY
Admission medication history interview status for the 10/24/2018  admission is complete. See EPIC admission navigator for prior to admission medications     Medication history source reliability:Good    Actions taken by pharmacist (provider contacted, etc): interview with patient who had list.     Additional medication history information not noted on PTA med list :None    Medication reconciliation/reorder completed by provider prior to medication history? Yes    Time spent in this activity: 15 min    Prior to Admission medications    Medication Sig Last Dose Taking? Auth Provider   calcium-vitamin D-vitamin K (VIACTIV) 500-500-40 MG-UNT-MCG CHEW Take 3 tablets by mouth At Bedtime  10/23/2018 at Unknown time Yes Reported, Patient   Cholecalciferol (VITAMIN D3 PO) Take 2,000 Units by mouth 2 times daily  10/23/2018 at Unknown time Yes Reported, Patient   Cyanocobalamin (B-12) 2500 MCG SUBL Place 1 tablet under the tongue Every Mon, Wed, Fri Morning 3 times weekly  10/22/2018 Yes Reported, Patient   docusate sodium (COLACE) 100 MG capsule Take 300 mg by mouth every morning 10/23/2018 at am Yes Unknown, Entered By History   FLUoxetine (PROZAC) 20 MG capsule Take 20 mg by mouth daily 10/23/2018 at am Yes Unknown, Entered By History   mirtazapine (REMERON) 15 MG tablet Take 15 mg by mouth nightly as needed  prn Yes Reported, Patient   multivitamin  peds with iron (FLINTSTONES COMPLETE) 60 MG chewable tablet Take 2 chew tab by mouth every morning  10/23/2018 at Unknown time Yes Chris Dutta MD   ranitidine (ZANTAC) 150 MG tablet Take 150 mg by mouth daily 10/23/2018 at am Yes Unknown, Entered By History   SPRINTEC 28 0.25-35 MG-MCG per tablet TAKE ONE TABLET BY MOUTH ONCE DAILY 10/23/2018 at Unknown time Yes Napoleon Gray PA-C         
No

## 2024-12-22 ENCOUNTER — HEALTH MAINTENANCE LETTER (OUTPATIENT)
Age: 54
End: 2024-12-22

## 2025-01-11 NOTE — OP NOTE
General Surgery Operative Note    PREOPERATIVE DIAGNOSIS:  Perforated gastric ulcer     POSTOPERATIVE DIAGNOSIS: Perforated marginal ulcer    PROCEDURE:   Procedure(s):  LAPAROSCOPY DIAGNOSTIC FOR PERFORATED GASTRIC ULCER  Primary repair of gastric ulcer with Ousmane patch    ANESTHESIA:  General.    PREOPERATIVE MEDICATIONS: Zosyn    SURGEON:  Chang Corea MD    ASSISTANT: Michelle Weeks MD, Nomi Aquino MD.  Assistant was required owing to challenging exposure and need for retraction.    INDICATIONS: Patient is 2 years status post laparoscopic gastric bypass.  Presented with sudden abdominal pain since last night.  Was found to have a leukocytosis and a CT scan suggested a perforated ulcer.    PROCEDURE:  The patient was taken to the operating suite and uneventfully endotracheally intubated.  The abdomen was prepped and draped in a sterile fashion.  Surgeon initiated timeout was acknowledged.  We entered the abdomen in the left upper quadrant.  3 other trochars were placed under laparoscopic visualization.  We saw some purulent discharge which was aspirated out.  We placed a liver retractor in the subxiphoid position and saw an area of inflammation with fibrinous exudate under the left lobe of the liver.  After clearing this off we found a 5 mm ulceration at the anterior surface of the gastrojejunostomy.  This was closed primarily with a pair of 2-0 silk sutures.  Fibrin glue was then placed over the repair.  We then ran the small bowel from the ileocecal valve to the jejunojejunostomy.  This all appeared to be mobile and intact.  No internal hernia was noted.  We then followed the Rasheeda limb up to our repair and saw no evidence for stricture.  I then used an additional 3-0 Vicryl suture to place a Ousmane patch and additional fibrin glue was placed.  A drain was placed under the left lobe of the liver over the repair.  11 mm trocar was removed and fascia was closed with an 0 Vicryl suture.  Abdomen was  Pt gina fair, with refusal for transitioning OOB. Rec mod intensity upon d/c    desufflated and the trochars were removed.  The skin edges were reapproximated with 4-0 Vicryl and Steri-Strips.  The patient was uneventfully extubated, awakened and taken to the PACU in stable condition.  At the conclusion of the case, all lap and needle counts were correct.      ESTIMATED BLOOD LOSS: 5 mL    INTRAOPERATIVE FINDINGS: Anterior marginal ulcer at the gastrojejunostomy.  No other abnormality identified.    Chang Corea MD

## 2025-02-19 ENCOUNTER — OFFICE VISIT (OUTPATIENT)
Dept: SURGERY | Facility: CLINIC | Age: 55
End: 2025-02-19
Payer: COMMERCIAL

## 2025-02-19 VITALS — HEIGHT: 63 IN | BODY MASS INDEX: 23.42 KG/M2 | WEIGHT: 132.2 LBS

## 2025-02-19 DIAGNOSIS — K91.2 POSTSURGICAL MALABSORPTION: ICD-10-CM

## 2025-02-19 DIAGNOSIS — Z98.84 BARIATRIC SURGERY STATUS: Primary | ICD-10-CM

## 2025-02-19 DIAGNOSIS — Z86.39 HISTORY OF OBESITY: ICD-10-CM

## 2025-02-19 PROCEDURE — 97803 MED NUTRITION INDIV SUBSEQ: CPT | Performed by: DIETITIAN, REGISTERED

## 2025-02-19 NOTE — PROGRESS NOTES
"NUTRITION POST OP APPOINTMENT  DATE OF VISIT: February 19, 2025    Criss Durant  1970  female  [id number]  54 year old     ASSESSMENT:    REASON FOR VISIT:  Criss is a 54 year old year old female presents today for 8 year PO nutrition follow-up appointment. Patient is accompanied by self.    DIAGNOSIS:  Status post gastric bypass surgery.  Normal BMI     ANTHROPOMETRICS:  Initial Weight: 307.7 lbs    Height: 62.5\"    Current Weight: 132 lbs 3.2 oz    BMI: Body mass index is 23.79 kg/m .    VITAMINS AND MINERALS:  2 Multivitamin w/Minerals   2000 International Units of Vitamin D   600 mg of Calcium Citrate (BID; separate from MVT)  2500 mcg of Vitamin B12 SL - 3 times weekly   100mg Thiamin - weekly    Sees hematologist and gets iron infusions when necessary. Has not needed in awhile.     NUTRITION HISTORY:  Breakfast: 2 yogurts (tiramisu flavored; light and fit) + 1/4c cereal (usually cheerios)   Lunch: yogurt + 10 cheerios   Supper: mashed potatoes or baked potato w/out skin + cheese +/- chicken  Lean Cuisine   Snacks: [morning] none  [afternoon] none  [evenings] crackers +/- sour cream based dip   Fluids consumed: Water (2 gallons)   Consuming liquid calories: No  Protein intake: 50-60 grams/day  Tolerate regular texture food: Yes  Any foods not tolerated details: Yes  If any food not tolerated: meat, milk, protein drinks   Portion size: 1 cup or more  Take 20-30 minutes to consume each meal: Yes   Eat protein foods first: Yes  Fluids and meals separate by at least 30 minutes: Yes  Chew foods thoroughly: Yes  Tolerating diet: Yes  Drinking high protein supplements: No  Consuming snacks per day: 1  Additional Information: Ongoing challenges with tolerance to meat; difficult to determine physiological vs psychosomatic. Pt attempted to establish with therapist but referral via [facility] was booked out 1.5y and not adding to waiting list. Will route to provider for alternative options. Dislikes protein " drinks and milk. Bone broth possibly triggering; pt with history of utilizing broth diet for rapid weight loss. Discussed trial of yogurt-based protein drink as well as using plain greek yogurt w/seasoning in place of sour cream dip. Recommended add core and lower body strength training. Recommended electrolyte supplementation given significant water intake.         PHYSICAL ACTIVITY:  None outside of ADLs although does have physical active job ()   More activity during warmer months  Has a kickboxing dummy in home   Dislikes gym d/t crowds    DIAGNOSIS:  Previous Nutrition Diagnosis: Altered gastrointestinal function related to alteration in gastrointestinal structure as evidenced by history of gastric bypass surgery.- no change    Previous goals:  Consider bone broth between meals to increase protein intake - tried; see notes. Pt also concerned about sodium content however given fluid intake likely does not need to be prioritized.  Restart 100mg Thiamin 1x/week - met    Current Nutrition Diagnosis: Altered gastrointestinal function related to alteration in gastrointestinal structure as evidenced by history of gastric bypass surgery.    INTERVENTION:   Nutrition Prescription: Eat 3 meals a day at regular intervals. Consume 60-90 grams of protein daily. Follow post-surgical vitamin and mineral protocol.  Assessed learning needs and learning preferences.    GOALS:  Trial of yogurt protein drink  Include protein with evening crackers  Consider adding core and lower body strength training  Consider electrolyte replacement daily    Follow-Up:   Recommend q3-6m follow up visits to assist with lifestyle changes or per insurance.  Implementation: Discussed progress toward previous goals; reinforced importance of following bariatric lifestyle changes.    NUTRITION MONITORING AND EVALUATION:  Anticipated compliance: fair-good  Verbalized fair-good understanding.    Follow up: Patient to follow up in 3-6  months.    TIME SPENT WITH PATIENT:  20 minutes        Marimar Smith RD, LD, CSOWM  Clinical Dietitian

## 2025-03-20 NOTE — ASSESSMENT & PLAN NOTE
10/26/2016 RYGBS LUIS AP    [FreeTextEntry1] :  Pentacel and Rotateq given w/out adverse event by LPN  [] : The components of the vaccine(s) to be administered today are listed in the plan of care. The disease(s) for which the vaccine(s) are intended to prevent and the risks have been discussed with the caretaker.  The risks are also included in the appropriate vaccination information statements which have been provided to the patient's caregiver.  The caregiver has given consent to vaccinate.

## 2025-05-14 ENCOUNTER — OFFICE VISIT (OUTPATIENT)
Dept: SURGERY | Facility: CLINIC | Age: 55
End: 2025-05-14
Payer: COMMERCIAL

## 2025-05-14 VITALS — BODY MASS INDEX: 22.57 KG/M2 | HEIGHT: 63 IN | WEIGHT: 127.4 LBS

## 2025-05-14 DIAGNOSIS — Z98.84 BARIATRIC SURGERY STATUS: Primary | ICD-10-CM

## 2025-05-14 DIAGNOSIS — K91.2 POSTSURGICAL MALABSORPTION: ICD-10-CM

## 2025-05-14 DIAGNOSIS — Z86.39 HISTORY OF OBESITY: ICD-10-CM

## 2025-05-14 NOTE — PATIENT INSTRUCTIONS
Daryl Caldwell!      It was great chatting with you again today! Here's a summary of the goals we discussed:    1. Contact office of Veronique Cotton (therapist) at .     2. Increase fiber  - Fiber comes from fruits, vegetables, starches (especially whole grains), beans/lentils and nuts/seeds  - Try adding a fiber supplement until when you're able to increase variety in your diet  - Consider using some of your dinner options as a lunch to increase fiber     3. Continue with regular electrolyte supplementation        Plan on following up in 3-4 months. This can be scheduled via our call center at . Reach out sooner with any questions or concerns. Have a great day!      Marimar Smith, RD, LD, [facility]  Clinical Dietitian

## 2025-05-14 NOTE — PROGRESS NOTES
"NUTRITION POST OP APPOINTMENT  DATE OF VISIT: May 14, 2025    Criss Durant  1970  female  [id number]  54 year old     ASSESSMENT:    REASON FOR VISIT:  Criss is a 54 year old year old female presents today for 8 year PO nutrition follow-up appointment. Patient is accompanied by self.    DIAGNOSIS:  Status post gastric bypass surgery.  Normal BMI     ANTHROPOMETRICS:  Initial Weight: 307.7 lbs    Height: 62.5\"    Current Weight: 127 lbs 6.4 oz     BMI: Body mass index is 22.93 kg/m .    VITAMINS AND MINERALS:  2 Multivitamin w/Minerals   2000 International Units of Vitamin D   600 mg of Calcium Citrate (BID; separate from MVT)  2500 mcg of Vitamin B12 SL - 3 times weekly   100mg Thiamin - weekly    Sees hematologist and gets iron infusions when necessary. Has not needed in awhile.     NUTRITION HISTORY:  Breakfast: 2 yogurts + a few spoonfulls of cereal (Honey Nut Cheerios)   Lunch: (same as breakfast)   Supper: Lean Cuisine  baked potato w/cream soup w/pureed meat or cheese or plain  pureed chicken in gravy  Snacks: [morning] none  [afternoon] none  [evenings] occasional SF jello or pudding   Fluids consumed: Water (2 gallons), pedialyte (every other day)   Consuming liquid calories: No  Protein intake: 50-70  grams/day  Tolerate regular texture food: Yes  Portion size: 1 cup or more  Take 20-30 minutes to consume each meal: Yes   Eat protein foods first: Yes  Fluids and meals separate by at least 30 minutes: Yes  Chew foods thoroughly: Yes  Tolerating diet: Yes  Drinking high protein supplements: No  Consuming snacks per day: 1  Additional Information: Reviewed rationale for establishing with therapist to improve relationship with food and  discuss perceived vs actual intolerance to foods. Pt was provided with therapist contact info to reach out.         PHYSICAL ACTIVITY:  Walking/hiking 5x/week   Strength training - 2x/week - full body     DIAGNOSIS:  Previous Nutrition Diagnosis: Altered " gastrointestinal function related to alteration in gastrointestinal structure as evidenced by history of gastric bypass surgery.- no change    Previous goals:  Trial of yogurt protein drink - not met   Include protein with evening crackers - (not having)  Consider adding core and lower body strength training - met  Consider electrolyte replacement daily - met     Current Nutrition Diagnosis: Altered gastrointestinal function related to alteration in gastrointestinal structure as evidenced by history of gastric bypass surgery.    INTERVENTION:   Nutrition Prescription: Eat 3 meals a day at regular intervals. Consume 60-90 grams of protein daily. Follow post-surgical vitamin and mineral protocol.  Assessed learning needs and learning preferences.    GOALS:  Contact office of Veronique Cotton (therapist) at .   Increase fiber (discussed in detail and resources provided)  Continue with regular electrolyte supplementation    Follow-Up:   Recommend q3-4m follow up visits to assist with lifestyle changes or per insurance.  Implementation: Discussed progress toward previous goals; reinforced importance of following bariatric lifestyle changes.    NUTRITION MONITORING AND EVALUATION:  Anticipated compliance: fair-good  Verbalized fair understanding.    Follow up: Patient to follow up in 3-4 months.    TIME SPENT WITH PATIENT:  20 minutes      Marimar Smith RD, LD, [facility]  Clinical Dietitian

## 2025-05-16 ENCOUNTER — HOSPITAL ENCOUNTER (EMERGENCY)
Facility: CLINIC | Age: 55
Discharge: HOME | End: 2025-05-16
Attending: EMERGENCY MEDICINE | Admitting: EMERGENCY MEDICINE
Payer: COMMERCIAL

## 2025-05-16 ENCOUNTER — APPOINTMENT (OUTPATIENT)
Dept: CT IMAGING | Facility: CLINIC | Age: 55
End: 2025-05-16
Attending: EMERGENCY MEDICINE
Payer: COMMERCIAL

## 2025-05-16 VITALS
TEMPERATURE: 98 F | RESPIRATION RATE: 18 BRPM | DIASTOLIC BLOOD PRESSURE: 73 MMHG | SYSTOLIC BLOOD PRESSURE: 110 MMHG | HEART RATE: 71 BPM | OXYGEN SATURATION: 99 %

## 2025-05-16 DIAGNOSIS — Z98.84 HISTORY OF GASTRIC BYPASS: ICD-10-CM

## 2025-05-16 DIAGNOSIS — R10.10 UPPER ABDOMINAL PAIN: ICD-10-CM

## 2025-05-16 LAB
ALBUMIN SERPL BCG-MCNC: 4 G/DL (ref 3.5–5.2)
ALP SERPL-CCNC: 89 U/L (ref 40–150)
ALT SERPL W P-5'-P-CCNC: 19 U/L (ref 0–50)
ANION GAP SERPL CALCULATED.3IONS-SCNC: 7 MMOL/L (ref 7–15)
AST SERPL W P-5'-P-CCNC: 16 U/L (ref 0–45)
BASOPHILS # BLD AUTO: 0.1 10E3/UL (ref 0–0.2)
BASOPHILS NFR BLD AUTO: 1 %
BILIRUB SERPL-MCNC: 0.3 MG/DL
BUN SERPL-MCNC: 17.4 MG/DL (ref 6–20)
CALCIUM SERPL-MCNC: 8.4 MG/DL (ref 8.8–10.4)
CHLORIDE SERPL-SCNC: 104 MMOL/L (ref 98–107)
CREAT SERPL-MCNC: 0.58 MG/DL (ref 0.51–0.95)
EGFRCR SERPLBLD CKD-EPI 2021: >90 ML/MIN/1.73M2
EOSINOPHIL # BLD AUTO: 0.1 10E3/UL (ref 0–0.7)
EOSINOPHIL NFR BLD AUTO: 1 %
ERYTHROCYTE [DISTWIDTH] IN BLOOD BY AUTOMATED COUNT: 13.2 % (ref 10–15)
GLUCOSE SERPL-MCNC: 96 MG/DL (ref 70–99)
HCO3 SERPL-SCNC: 29 MMOL/L (ref 22–29)
HCT VFR BLD AUTO: 41.8 % (ref 35–47)
HGB BLD-MCNC: 13 G/DL (ref 11.7–15.7)
IMM GRANULOCYTES # BLD: 0 10E3/UL
IMM GRANULOCYTES NFR BLD: 0 %
LIPASE SERPL-CCNC: 31 U/L (ref 13–60)
LYMPHOCYTES # BLD AUTO: 1.3 10E3/UL (ref 0.8–5.3)
LYMPHOCYTES NFR BLD AUTO: 24 %
MCH RBC QN AUTO: 27.8 PG (ref 26.5–33)
MCHC RBC AUTO-ENTMCNC: 31.1 G/DL (ref 31.5–36.5)
MCV RBC AUTO: 89 FL (ref 78–100)
MONOCYTES # BLD AUTO: 0.3 10E3/UL (ref 0–1.3)
MONOCYTES NFR BLD AUTO: 6 %
NEUTROPHILS # BLD AUTO: 3.5 10E3/UL (ref 1.6–8.3)
NEUTROPHILS NFR BLD AUTO: 68 %
NRBC # BLD AUTO: 0 10E3/UL
NRBC BLD AUTO-RTO: 0 /100
PLATELET # BLD AUTO: 263 10E3/UL (ref 150–450)
POTASSIUM SERPL-SCNC: 3.3 MMOL/L (ref 3.4–5.3)
PROT SERPL-MCNC: 6.5 G/DL (ref 6.4–8.3)
RBC # BLD AUTO: 4.68 10E6/UL (ref 3.8–5.2)
SODIUM SERPL-SCNC: 140 MMOL/L (ref 135–145)
WBC # BLD AUTO: 5.2 10E3/UL (ref 4–11)

## 2025-05-16 PROCEDURE — 85025 COMPLETE CBC W/AUTO DIFF WBC: CPT | Performed by: EMERGENCY MEDICINE

## 2025-05-16 PROCEDURE — 250N000009 HC RX 250: Performed by: EMERGENCY MEDICINE

## 2025-05-16 PROCEDURE — 80051 ELECTROLYTE PANEL: CPT | Performed by: EMERGENCY MEDICINE

## 2025-05-16 PROCEDURE — 99285 EMERGENCY DEPT VISIT HI MDM: CPT | Mod: 25

## 2025-05-16 PROCEDURE — 83690 ASSAY OF LIPASE: CPT | Performed by: EMERGENCY MEDICINE

## 2025-05-16 PROCEDURE — 36415 COLL VENOUS BLD VENIPUNCTURE: CPT | Performed by: EMERGENCY MEDICINE

## 2025-05-16 PROCEDURE — 250N000011 HC RX IP 250 OP 636: Performed by: EMERGENCY MEDICINE

## 2025-05-16 PROCEDURE — 74177 CT ABD & PELVIS W/CONTRAST: CPT

## 2025-05-16 RX ORDER — IOPAMIDOL 755 MG/ML
64 INJECTION, SOLUTION INTRAVASCULAR ONCE
Status: COMPLETED | OUTPATIENT
Start: 2025-05-16 | End: 2025-05-16

## 2025-05-16 RX ADMIN — SODIUM CHLORIDE 60 ML: 9 INJECTION, SOLUTION INTRAVENOUS at 10:48

## 2025-05-16 RX ADMIN — IOPAMIDOL 64 ML: 755 INJECTION, SOLUTION INTRAVENOUS at 10:48

## 2025-05-16 ASSESSMENT — ACTIVITIES OF DAILY LIVING (ADL)
ADLS_ACUITY_SCORE: 45

## 2025-05-16 ASSESSMENT — COLUMBIA-SUICIDE SEVERITY RATING SCALE - C-SSRS
6. HAVE YOU EVER DONE ANYTHING, STARTED TO DO ANYTHING, OR PREPARED TO DO ANYTHING TO END YOUR LIFE?: NO
2. HAVE YOU ACTUALLY HAD ANY THOUGHTS OF KILLING YOURSELF IN THE PAST MONTH?: NO
1. IN THE PAST MONTH, HAVE YOU WISHED YOU WERE DEAD OR WISHED YOU COULD GO TO SLEEP AND NOT WAKE UP?: NO

## 2025-05-16 NOTE — ED TRIAGE NOTES
Abd pain started yesterday; hx of GI sleeve and gastric ulcer; took Carafate x2 this morning      Triage Assessment (Adult)       Row Name 05/16/25 0913          Triage Assessment    Airway WDL WDL        Respiratory WDL    Respiratory WDL WDL        Skin Circulation/Temperature WDL    Skin Circulation/Temperature WDL WDL        Cardiac WDL    Cardiac WDL WDL        Peripheral/Neurovascular WDL    Peripheral Neurovascular WDL WDL

## 2025-05-16 NOTE — Clinical Note
Criss Durant was seen and treated in our emergency department on 5/16/2025.  She may return to work on 05/19/2025.  Ms. Durant was seen in our emergency department for a medical issue.  She may require the next several days off of work to recover.  However, she may return without restrictions when she feels well enough to perform her work duties.     If you have any questions or concerns, please don't hesitate to call.      Trierweiler, Chad A, MD

## 2025-05-16 NOTE — DISCHARGE INSTRUCTIONS
As we completely discussed, the exact cause of your pain is not entirely clear.  It can be anything from indigestion to a viral infection to ulcers to gallbladder pathology.  We discussed admission to the hospital for observation but you have elected to be discharged home.  I support this, though I cannot be more clear that there should be no hesitation on your part to return to us for recheck at any point if your pain is worsening or if you change your mind about admission for endoscopy and ultrasound.  Otherwise, follow with your primary team early next week, returning to work when you feel well enough to perform your work duties.    Discharge Instructions  Abdominal Pain    Abdominal pain (belly pain) can be caused by many things. Your evaluation today does not show the exact cause for your pain. Your provider today has decided that it is unlikely your pain is due to a life threatening problem, or a problem requiring surgery or hospital admission. Sometimes those problems cannot be found right away, so it is very important that you follow up as directed.  Sometimes only the changes which occur over time allow the cause of your pain to be found.    Generally, every Emergency Department visit should have a follow-up clinic visit with either a primary or a specialty clinic/provider. Please follow-up as instructed by your emergency provider today. With abdominal pain, we often recommend very close follow-up, such as the following day.    ADULTS:  Return to the Emergency Department right away if:    You get an oral temperature above 102oF or as directed by your provider.  You have blood in your stools. This may be bright red or appear as black, tarry stools.    You keep vomiting (throwing up) or cannot drink liquids.  You see blood when you vomit.   You cannot have a bowel movement or you cannot pass gas.  Your stomach gets bloated or bigger.  Your skin or the whites of your eyes look yellow.  You faint.  You have  bloody, frequent or painful urination (peeing).  You have new symptoms or anything that worries you.    CHILDREN:  Return to the Emergency Department right away if your child has any of the above-listed symptoms or the following:    Pushes your hand away or screams/cries when his/her belly is touched.  You notice your child is very fussy or weak.  Your child is very tired and is too tired to eat or drink.  Your child is dehydrated.  Signs of dehydration can be:  Significant change in the amount of wet diapers/urine.  Your infant or child starts to have dry mouth and lips, or no saliva (spit) or tears.    PREGNANT WOMEN:  Return to the Emergency Department right away if you have any of the above-listed symptoms or the following:    You have bleeding, leaking fluid or passing tissue from the vagina.  You have worse pain or cramping, or pain in your shoulder or back.  You have vomiting that will not stop.  You have a temperature of 100oF or more.  Your baby is not moving as much as usual.  You faint.  You get a bad headache with or without eye problems and abdominal pain.  You have a seizure.  You have unusual discharge from your vagina and abdominal pain.    Abdominal pain is pretty common during pregnancy.  Your pain may or may not be related to your pregnancy. You should follow-up closely with your OB provider so they can evaluate you and your baby.  Until you follow-up with your regular provider, do the following:     Avoid sex and do not put anything in your vagina.  Drink clear fluids.  Only take medications approved by your provider.    MORE INFORMATION:    Appendicitis:  A possible cause of abdominal pain in any person who still has their appendix is acute appendicitis. Appendicitis is often hard to diagnose.  Testing does not always rule out early appendicitis or other causes of abdominal pain. Close follow-up with your provider and re-evaluations may be needed to figure out the reason for your abdominal  "pain.    Follow-up:  It is very important that you make an appointment with your clinic and go to the appointment.  If you do not follow-up with your primary provider, it may result in missing an important development which could result in permanent injury or disability and/or lasting pain.  If there is any problem keeping your appointment, call your provider or return to the Emergency Department.    Medications:  Take your medications as directed by your provider today.  Before using over-the-counter medications, ask your provider and make sure to take the medications as directed.  If you have any questions about medications, ask your provider.    Diet:  Resume your normal diet as much as possible, but do not eat fried, fatty or spicy foods while you have pain.  Do not drink alcohol or have caffeine.  Do not smoke tobacco.    Probiotics: If you have been given an antibiotic, you may want to also take a probiotic pill or eat yogurt with live cultures. Probiotics have \"good bacteria\" to help your intestines stay healthy. Studies have shown that probiotics help prevent diarrhea (loose stools) and other intestine problems (including C. diff infection) when you take antibiotics. You can buy these without a prescription in the pharmacy section of the store.     If you were given a prescription for medicine here today, be sure to read all of the information (including the package insert) that comes with your prescription.  This will include important information about the medicine, its side effects, and any warnings that you need to know about.  The pharmacist who fills the prescription can provide more information and answer questions you may have about the medicine.  If you have questions or concerns that the pharmacist cannot address, please call or return to the Emergency Department.       Remember that you can always come back to the Emergency Department if you are not able to see your regular provider in the amount " of time listed above, if you get any new symptoms, or if there is anything that worries you.

## 2025-05-16 NOTE — ED PROVIDER NOTES
"  Emergency Department Note      History of Present Illness     Chief Complaint   Abdominal Pain (/)      HPI   Criss Durant is a 54 year old female who is 8 years status post Rasheeda-en-Y bypass surgery presenting to us with complaints of having right upper quadrant and upper abdominal pain.  The patient states that she met with her nutritionist earlier in the week.  They advised her to be eating more protein.  She states that she often has issues eating heavier meals and meat.  However, she elected to try a lean cuisine meal that had extra chicken in it.  After eating this meal last night, she developed a severe right upper quadrant abdominal pain with some radiation into her left shoulder.  After several hours, the pain dissipated she was able to get some sleep.  She felt fine this morning.  However, after eating yogurt, she had return of this exact same pain.  She has had several episodes of vomiting of \"foamy emesis\" that is either clear or white in color.  She denies recent fevers.  She notes normal bowel movements.  She denies dysuria.  Of note, she suffered a perforated ulcer at her anastomosis site several years ago and feels that this pain is somewhat similar and she is concerned that she may be dealing with another ulcer.    Independent Historian   None    Review of External Notes   Yes-I reviewed her visit to the bariatric surgery team on May 14    Past Medical History     Medical History and Problem List   Past Medical History:   Diagnosis Date    Depressive disorder     Gout 7/22/2021    morbid obesity     Obese     Renal disease     Ulcer of gastric fundus     Urinary frequency        Medications   allopurinol (ZYLOPRIM) 300 MG tablet  busPIRone (BUSPAR) 5 MG tablet  calcium-vitamin D-vitamin K (VIACTIV) 500-500-40 MG-UNT-MCG CHEW  Cyanocobalamin (B-12) 2500 MCG SUBL  docusate sodium (COLACE) 100 MG capsule  FLUoxetine (PROZAC) 20 MG capsule  FLUoxetine (PROZAC) 40 MG capsule  hydrOXYzine (ATARAX) 50 " MG tablet  multivitamin  peds with iron (FLINTSTONES COMPLETE) 60 MG chewable tablet  pantoprazole (PROTONIX) 40 MG EC tablet  potassium citrate (UROCIT-K) 10 MEQ (1080 MG) CR tablet  sucralfate (CARAFATE) 1 GM/10ML suspension  sucralfate (CARAFATE) 1 GM/10ML suspension  thiamine (B-1) 100 MG tablet  thiamine (B-1) 100 MG tablet        Surgical History   Past Surgical History:   Procedure Laterality Date    CYSTOSCOPY      ESOPHAGOSCOPY, GASTROSCOPY, DUODENOSCOPY (EGD), COMBINED N/A 10/24/2023    Procedure: Esophagoscopy, gastroscopy, duodenoscopy (EGD), combined;  Surgeon: Ramakrishna Escalante MD;  Location:  GI    EXTRACORPOREAL SHOCK WAVE LITHOTRIPSY (ESWL) Left 2/7/2019    Procedure: Left renal lithotripsy (1250 shocks).;  Surgeon: Regis Parada MD;  Location:  OR    LAPAROSCOPIC BYPASS GASTRIC N/A 10/26/2016    Procedure: LAPAROSCOPIC BYPASS GASTRIC;  Surgeon: Romario Reyna MD;  Location:  OR    LAPAROSCOPY DIAGNOSTIC (GENERAL) N/A 10/24/2018    Procedure: LAPAROSCOPY DIAGNOSTIC FOR PERFORATED GASTRIC ULCER;  Surgeon: Chang Corea MD;  Location:  OR    NO HISTORY OF SURGERY         Physical Exam     Patient Vitals for the past 24 hrs:   BP Temp Temp src Pulse Resp SpO2   05/16/25 1249 -- -- -- 71 18 99 %   05/16/25 0923 110/73 98  F (36.7  C) Temporal 74 14 97 %     Physical Exam  General: Uncomfortable appearing middle-age woman lying with her knees up to her chest holding her abdomen.    Eye:  Pupils are equal, round, and reactive.  Extraocular movements intact.    ENT:  No rhinorrhea.  Moist mucus membranes.  Normal tongue and tonsil.    Cardiac:  Regular rate and rhythm.  No murmurs, gallops, or rubs.    Pulmonary:  Clear to auscultation bilaterally.  No wheezes, rales, or rhonchi.    Abdomen:  Positive bowel sounds.  There is generalized abdominal discomfort, maximal in the left upper quadrant and epigastrium.  Negative Durant sign.    Musculoskeletal:  Normal movement of  all extremities without evidence for deficit.    Skin:  Warm and dry without rashes.    Neurologic:  Non-focal exam without asymmetric weakness or numbness.     Psychiatric:  Normal affect with appropriate interaction with examiner.      Diagnostics     Lab Results   Labs Ordered and Resulted from Time of ED Arrival to Time of ED Departure   COMPREHENSIVE METABOLIC PANEL - Abnormal       Result Value    Sodium 140      Potassium 3.3 (*)     Carbon Dioxide (CO2) 29      Anion Gap 7      Urea Nitrogen 17.4      Creatinine 0.58      GFR Estimate >90      Calcium 8.4 (*)     Chloride 104      Glucose 96      Alkaline Phosphatase 89      AST 16      ALT 19      Protein Total 6.5      Albumin 4.0      Bilirubin Total 0.3     CBC WITH PLATELETS AND DIFFERENTIAL - Abnormal    WBC Count 5.2      RBC Count 4.68      Hemoglobin 13.0      Hematocrit 41.8      MCV 89      MCH 27.8      MCHC 31.1 (*)     RDW 13.2      Platelet Count 263      % Neutrophils 68      % Lymphocytes 24      % Monocytes 6      % Eosinophils 1      % Basophils 1      % Immature Granulocytes 0      NRBCs per 100 WBC 0      Absolute Neutrophils 3.5      Absolute Lymphocytes 1.3      Absolute Monocytes 0.3      Absolute Eosinophils 0.1      Absolute Basophils 0.1      Absolute Immature Granulocytes 0.0      Absolute NRBCs 0.0     LIPASE - Normal    Lipase 31         Imaging   CT Abdomen Pelvis w Contrast   Final Result   IMPRESSION:    1.  No evidence to explain right-sided abdominal pain.   2.  Previous gastric bypass.   3.  Two areas of presumed transient small bowel intussusception. No evidence for obstruction above these levels.   4.  Stable hemangioma in the right hepatic dome.             ED Course      Medications Administered   Medications   sodium chloride 0.9 % bag for CT scan flush (60 mLs Intravenous $Given 5/16/25 1048)   iopamidol (ISOVUE-370) solution 64 mL (64 mLs Intravenous $Given 5/16/25 1048)       Procedures   Procedures     Discussion  of Management   None    ED Course        Additional Documentation  None    Medical Decision Making / Diagnosis     CMS Diagnoses: None    MIPS   None               [facility]   Criss Durant is a 54 year old female with a history of gastric bypass and prior anastomotic ulcers presenting to us with concerns of having upper abdominal pain after eating a heavier meal last night and then having yogurt today.  Laboratory investigation is unremarkable.  We obtained a CT of the abdomen due to her prior history, though this study is also generally unremarkable.  When I reassessed the patient, she notes that she is feeling some improvement.  I explained to her that I do not know whether she is dealing with indigestion, gastritis, peptic ulcer, gastroenteritis, or a more serious intra-abdominal pathology.  We discussed admission to the hospital for observation because of her complicated past.  In the end, the patient prefers to be discharged home to continue with omeprazole and Carafate and close watch of her diet.  The patient is highly intelligent, well in tune with her prior symptoms and her medical conditions.  I do believe that she understands that there is diagnostic uncertainty here and that she can return at any moment if she changes her mind.  She notes that she certainly will return for recheck tomorrow if she does not have substantial improvement of her pain, sooner if she develops any black stools, intractable pain, fevers, or other emergent concerns.    Disposition   The patient was discharged.     Diagnosis     ICD-10-CM    1. Upper abdominal pain  R10.10       2. History of gastric bypass  Z98.84            Discharge Medications   Discharge Medication List as of 5/16/2025 12:35 PM            Chad A. Trierweiler, MD Trierweiler, Chad A, MD  05/16/25 2171

## (undated) DEVICE — SU SILK 2-0 FSL 18" 677G

## (undated) DEVICE — SU VICRYL 3-0 SH 27" J316H

## (undated) DEVICE — SOL NACL 0.9% IRRIG 1000ML BOTTLE 07138-09

## (undated) DEVICE — ESU PENCIL W/HOLSTER E2350H

## (undated) DEVICE — PACK LAP CHOLE SLC15LCFSD

## (undated) DEVICE — DRAIN JACKSON PRATT RESERVOIR 100ML SU130-1305

## (undated) DEVICE — PREP CHLORAPREP 26ML TINTED ORANGE  260815

## (undated) DEVICE — ESU CORD MONOPOLAR 10'  E0510

## (undated) DEVICE — SU VICRYL 0 UR-6 27" J603H

## (undated) DEVICE — SU VICRYL 3-0 TIE 12X18" J904T

## (undated) DEVICE — APPLICATOR EVICEL RIGID TIP 35CM 3908

## (undated) DEVICE — SUCTION IRR STRYKERFLOW II W/TIP 250-070-520

## (undated) DEVICE — ENDO TROCAR FIRST ENTRY KII FIOS Z-THRD 12X100MM CTF73

## (undated) DEVICE — LINEN TOWEL PACK X5 5464

## (undated) DEVICE — LINEN FULL SHEET 5511

## (undated) DEVICE — ENDO TROCAR FIRST ENTRY KII FIOS Z-THRD 05X100MM CTF03

## (undated) DEVICE — SU ETHILON 3-0 FS-1 18" 663G

## (undated) DEVICE — SU SILK 2-0 SH 30" K833H

## (undated) DEVICE — ESU HOLDER LAP INST DISP PURPLE LONG 330MM H-PRO-330

## (undated) DEVICE — SOL WATER IRRIG 1000ML BOTTLE 2F7114

## (undated) DEVICE — SOL NACL 0.9% INJ 1000ML BAG 2B1324X

## (undated) DEVICE — DRAIN JACKSON PRATT 15FR ROUND SU130-1323

## (undated) DEVICE — SUCTION CANISTER MEDIVAC LINER 3000ML W/LID 65651-530

## (undated) DEVICE — LINEN HALF SHEET 5512

## (undated) DEVICE — NDL 22GA 1.5"

## (undated) DEVICE — BAG CLEAR TRASH 1.3M 39X33" P4040C

## (undated) DEVICE — GLOVE PROTEXIS BLUE W/NEU-THERA 6.5  2D73EB65

## (undated) DEVICE — ESU GROUND PAD UNIVERSAL W/O CORD

## (undated) DEVICE — SU MONOCRYL 4-0 PS-2 18" UND Y496G

## (undated) RX ORDER — PROPOFOL 10 MG/ML
INJECTION, EMULSION INTRAVENOUS
Status: DISPENSED
Start: 2018-10-24

## (undated) RX ORDER — CEFAZOLIN SODIUM 2 G/100ML
INJECTION, SOLUTION INTRAVENOUS
Status: DISPENSED
Start: 2019-02-07

## (undated) RX ORDER — BUPIVACAINE HYDROCHLORIDE AND EPINEPHRINE 5; 5 MG/ML; UG/ML
INJECTION, SOLUTION EPIDURAL; INTRACAUDAL; PERINEURAL
Status: DISPENSED
Start: 2018-10-24

## (undated) RX ORDER — LIDOCAINE HYDROCHLORIDE 10 MG/ML
INJECTION, SOLUTION EPIDURAL; INFILTRATION; INTRACAUDAL; PERINEURAL
Status: DISPENSED
Start: 2019-02-07

## (undated) RX ORDER — PROPOFOL 10 MG/ML
INJECTION, EMULSION INTRAVENOUS
Status: DISPENSED
Start: 2019-02-07

## (undated) RX ORDER — FENTANYL CITRATE 50 UG/ML
INJECTION, SOLUTION INTRAMUSCULAR; INTRAVENOUS
Status: DISPENSED
Start: 2023-10-24

## (undated) RX ORDER — FENTANYL CITRATE 50 UG/ML
INJECTION, SOLUTION INTRAMUSCULAR; INTRAVENOUS
Status: DISPENSED
Start: 2018-10-24

## (undated) RX ORDER — PHENYLEPHRINE HCL IN 0.9% NACL 1 MG/10 ML
SYRINGE (ML) INTRAVENOUS
Status: DISPENSED
Start: 2019-02-07

## (undated) RX ORDER — ONDANSETRON 2 MG/ML
INJECTION INTRAMUSCULAR; INTRAVENOUS
Status: DISPENSED
Start: 2019-02-07

## (undated) RX ORDER — DEXAMETHASONE SODIUM PHOSPHATE 4 MG/ML
INJECTION, SOLUTION INTRA-ARTICULAR; INTRALESIONAL; INTRAMUSCULAR; INTRAVENOUS; SOFT TISSUE
Status: DISPENSED
Start: 2019-02-07

## (undated) RX ORDER — FENTANYL CITRATE 50 UG/ML
INJECTION, SOLUTION INTRAMUSCULAR; INTRAVENOUS
Status: DISPENSED
Start: 2019-02-07